# Patient Record
Sex: FEMALE | Race: WHITE | NOT HISPANIC OR LATINO | Employment: OTHER | URBAN - METROPOLITAN AREA
[De-identification: names, ages, dates, MRNs, and addresses within clinical notes are randomized per-mention and may not be internally consistent; named-entity substitution may affect disease eponyms.]

---

## 2017-05-12 ENCOUNTER — ALLSCRIPTS OFFICE VISIT (OUTPATIENT)
Dept: OTHER | Facility: OTHER | Age: 52
End: 2017-05-12

## 2017-08-03 ENCOUNTER — HOSPITAL ENCOUNTER (OUTPATIENT)
Facility: HOSPITAL | Age: 52
Setting detail: OBSERVATION
Discharge: HOME | End: 2017-08-04
Attending: EMERGENCY MEDICINE | Admitting: HOSPITALIST
Payer: COMMERCIAL

## 2017-08-03 ENCOUNTER — APPOINTMENT (EMERGENCY)
Dept: CT IMAGING | Facility: HOSPITAL | Age: 52
End: 2017-08-03
Payer: COMMERCIAL

## 2017-08-03 ENCOUNTER — APPOINTMENT (EMERGENCY)
Dept: RADIOLOGY | Facility: HOSPITAL | Age: 52
End: 2017-08-03
Payer: COMMERCIAL

## 2017-08-03 DIAGNOSIS — N39.0 URINARY TRACT INFECTION: ICD-10-CM

## 2017-08-03 DIAGNOSIS — R11.10 VOMITING: ICD-10-CM

## 2017-08-03 DIAGNOSIS — R50.9 FEVER: ICD-10-CM

## 2017-08-03 DIAGNOSIS — E86.0 DEHYDRATION: ICD-10-CM

## 2017-08-03 DIAGNOSIS — R07.9 CHEST PAIN: Primary | ICD-10-CM

## 2017-08-03 LAB
ALBUMIN SERPL BCP-MCNC: 4.2 G/DL (ref 3.5–5)
ALP SERPL-CCNC: 60 U/L (ref 46–116)
ALT SERPL W P-5'-P-CCNC: 23 U/L (ref 12–78)
ANION GAP SERPL CALCULATED.3IONS-SCNC: 11 MMOL/L (ref 4–13)
APTT PPP: 30 SECONDS (ref 23–35)
AST SERPL W P-5'-P-CCNC: 16 U/L (ref 5–45)
BASOPHILS # BLD AUTO: 0.03 THOUSANDS/ΜL (ref 0–0.1)
BASOPHILS NFR BLD AUTO: 1 % (ref 0–1)
BILIRUB SERPL-MCNC: 0.4 MG/DL (ref 0.2–1)
BUN SERPL-MCNC: 20 MG/DL (ref 5–25)
CALCIUM SERPL-MCNC: 10 MG/DL (ref 8.3–10.1)
CHLORIDE SERPL-SCNC: 93 MMOL/L (ref 100–108)
CK SERPL-CCNC: 30 U/L (ref 26–192)
CO2 SERPL-SCNC: 27 MMOL/L (ref 21–32)
CREAT SERPL-MCNC: 1.15 MG/DL (ref 0.6–1.3)
DEPRECATED D DIMER PPP: <270 NG/ML (FEU) (ref 0–424)
EOSINOPHIL # BLD AUTO: 0.01 THOUSAND/ΜL (ref 0–0.61)
EOSINOPHIL NFR BLD AUTO: 0 % (ref 0–6)
ERYTHROCYTE [DISTWIDTH] IN BLOOD BY AUTOMATED COUNT: 11.8 % (ref 11.6–15.1)
GFR SERPL CREATININE-BSD FRML MDRD: 54 ML/MIN/1.73SQ M
GLUCOSE SERPL-MCNC: 105 MG/DL (ref 65–140)
HCT VFR BLD AUTO: 42.3 % (ref 34.8–46.1)
HGB BLD-MCNC: 14.7 G/DL (ref 11.5–15.4)
INR PPP: 1.02 (ref 0.86–1.16)
LIPASE SERPL-CCNC: 235 U/L (ref 73–393)
LYMPHOCYTES # BLD AUTO: 0.95 THOUSANDS/ΜL (ref 0.6–4.47)
LYMPHOCYTES NFR BLD AUTO: 15 % (ref 14–44)
MCH RBC QN AUTO: 34.5 PG (ref 26.8–34.3)
MCHC RBC AUTO-ENTMCNC: 34.8 G/DL (ref 31.4–37.4)
MCV RBC AUTO: 99 FL (ref 82–98)
MONOCYTES # BLD AUTO: 0.93 THOUSAND/ΜL (ref 0.17–1.22)
MONOCYTES NFR BLD AUTO: 15 % (ref 4–12)
NEUTROPHILS # BLD AUTO: 4.33 THOUSANDS/ΜL (ref 1.85–7.62)
NEUTS SEG NFR BLD AUTO: 69 % (ref 43–75)
PLATELET # BLD AUTO: 213 THOUSANDS/UL (ref 149–390)
PMV BLD AUTO: 10.4 FL (ref 8.9–12.7)
POTASSIUM SERPL-SCNC: 4.1 MMOL/L (ref 3.5–5.3)
PROT SERPL-MCNC: 7.7 G/DL (ref 6.4–8.2)
PROTHROMBIN TIME: 13.7 SECONDS (ref 12.1–14.4)
RBC # BLD AUTO: 4.26 MILLION/UL (ref 3.81–5.12)
SODIUM SERPL-SCNC: 131 MMOL/L (ref 136–145)
TROPONIN I SERPL-MCNC: <0.02 NG/ML
WBC # BLD AUTO: 6.25 THOUSAND/UL (ref 4.31–10.16)

## 2017-08-03 PROCEDURE — 83690 ASSAY OF LIPASE: CPT | Performed by: EMERGENCY MEDICINE

## 2017-08-03 PROCEDURE — 85379 FIBRIN DEGRADATION QUANT: CPT | Performed by: EMERGENCY MEDICINE

## 2017-08-03 PROCEDURE — 84484 ASSAY OF TROPONIN QUANT: CPT | Performed by: EMERGENCY MEDICINE

## 2017-08-03 PROCEDURE — 74177 CT ABD & PELVIS W/CONTRAST: CPT

## 2017-08-03 PROCEDURE — 80053 COMPREHEN METABOLIC PANEL: CPT | Performed by: EMERGENCY MEDICINE

## 2017-08-03 PROCEDURE — 87040 BLOOD CULTURE FOR BACTERIA: CPT | Performed by: EMERGENCY MEDICINE

## 2017-08-03 PROCEDURE — 93005 ELECTROCARDIOGRAM TRACING: CPT | Performed by: EMERGENCY MEDICINE

## 2017-08-03 PROCEDURE — 36415 COLL VENOUS BLD VENIPUNCTURE: CPT | Performed by: EMERGENCY MEDICINE

## 2017-08-03 PROCEDURE — 85610 PROTHROMBIN TIME: CPT | Performed by: EMERGENCY MEDICINE

## 2017-08-03 PROCEDURE — 82550 ASSAY OF CK (CPK): CPT | Performed by: EMERGENCY MEDICINE

## 2017-08-03 PROCEDURE — 85025 COMPLETE CBC W/AUTO DIFF WBC: CPT | Performed by: EMERGENCY MEDICINE

## 2017-08-03 PROCEDURE — 71020 HB CHEST X-RAY 2VW FRONTAL&LATL: CPT

## 2017-08-03 PROCEDURE — 83605 ASSAY OF LACTIC ACID: CPT | Performed by: EMERGENCY MEDICINE

## 2017-08-03 PROCEDURE — 71275 CT ANGIOGRAPHY CHEST: CPT

## 2017-08-03 PROCEDURE — 85730 THROMBOPLASTIN TIME PARTIAL: CPT | Performed by: EMERGENCY MEDICINE

## 2017-08-03 RX ORDER — ONDANSETRON 2 MG/ML
4 INJECTION INTRAMUSCULAR; INTRAVENOUS ONCE
Status: COMPLETED | OUTPATIENT
Start: 2017-08-03 | End: 2017-08-04

## 2017-08-03 RX ORDER — LEVOTHYROXINE SODIUM 88 UG/1
88 TABLET ORAL DAILY
COMMUNITY
End: 2018-03-23 | Stop reason: SDUPTHER

## 2017-08-03 RX ORDER — MOEXIPRIL HCL 15 MG
15 TABLET ORAL
COMMUNITY
End: 2018-03-23 | Stop reason: SDUPTHER

## 2017-08-03 RX ORDER — HYDROXYCHLOROQUINE SULFATE 200 MG/1
TABLET, FILM COATED ORAL
COMMUNITY

## 2017-08-03 RX ORDER — HYDROCODONE BITARTRATE AND ACETAMINOPHEN 5; 325 MG/1; MG/1
1 TABLET ORAL EVERY 6 HOURS PRN
COMMUNITY
End: 2020-05-16 | Stop reason: HOSPADM

## 2017-08-03 RX ORDER — LORAZEPAM 0.5 MG/1
0.5 TABLET ORAL EVERY 8 HOURS PRN
COMMUNITY
End: 2018-10-02

## 2017-08-03 RX ORDER — SODIUM CHLORIDE 9 MG/ML
125 INJECTION, SOLUTION INTRAVENOUS CONTINUOUS
Status: DISCONTINUED | OUTPATIENT
Start: 2017-08-03 | End: 2017-08-04 | Stop reason: HOSPADM

## 2017-08-03 RX ADMIN — IOHEXOL 50 ML: 240 INJECTION, SOLUTION INTRATHECAL; INTRAVASCULAR; INTRAVENOUS; ORAL at 23:45

## 2017-08-04 VITALS
SYSTOLIC BLOOD PRESSURE: 93 MMHG | OXYGEN SATURATION: 97 % | WEIGHT: 112.88 LBS | BODY MASS INDEX: 20.77 KG/M2 | TEMPERATURE: 98.5 F | DIASTOLIC BLOOD PRESSURE: 57 MMHG | HEART RATE: 78 BPM | HEIGHT: 62 IN | RESPIRATION RATE: 18 BRPM

## 2017-08-04 PROBLEM — R11.10 VOMITING: Status: ACTIVE | Noted: 2017-08-04

## 2017-08-04 PROBLEM — R07.9 CHEST PAIN: Status: ACTIVE | Noted: 2017-08-04

## 2017-08-04 PROBLEM — N39.0 URINARY TRACT INFECTION: Status: ACTIVE | Noted: 2017-08-04

## 2017-08-04 LAB
ANION GAP SERPL CALCULATED.3IONS-SCNC: 8 MMOL/L (ref 4–13)
ATRIAL RATE: 94 BPM
BACTERIA UR QL AUTO: ABNORMAL /HPF
BASOPHILS # BLD AUTO: 0.02 THOUSANDS/ΜL (ref 0–0.1)
BASOPHILS NFR BLD AUTO: 0 % (ref 0–1)
BILIRUB UR QL STRIP: NEGATIVE
BUN SERPL-MCNC: 17 MG/DL (ref 5–25)
CALCIUM SERPL-MCNC: 8.1 MG/DL (ref 8.3–10.1)
CHLORIDE SERPL-SCNC: 100 MMOL/L (ref 100–108)
CLARITY UR: CLEAR
CO2 SERPL-SCNC: 25 MMOL/L (ref 21–32)
COLOR UR: YELLOW
CREAT SERPL-MCNC: 0.94 MG/DL (ref 0.6–1.3)
EOSINOPHIL # BLD AUTO: 0.02 THOUSAND/ΜL (ref 0–0.61)
EOSINOPHIL NFR BLD AUTO: 0 % (ref 0–6)
ERYTHROCYTE [DISTWIDTH] IN BLOOD BY AUTOMATED COUNT: 11.8 % (ref 11.6–15.1)
GFR SERPL CREATININE-BSD FRML MDRD: 69 ML/MIN/1.73SQ M
GLUCOSE P FAST SERPL-MCNC: 91 MG/DL (ref 65–99)
GLUCOSE SERPL-MCNC: 91 MG/DL (ref 65–140)
GLUCOSE UR STRIP-MCNC: NEGATIVE MG/DL
HCT VFR BLD AUTO: 35.6 % (ref 34.8–46.1)
HGB BLD-MCNC: 12 G/DL (ref 11.5–15.4)
HGB UR QL STRIP.AUTO: NEGATIVE
KETONES UR STRIP-MCNC: NEGATIVE MG/DL
LACTATE SERPL-SCNC: 0.7 MMOL/L (ref 0.5–2)
LEUKOCYTE ESTERASE UR QL STRIP: ABNORMAL
LYMPHOCYTES # BLD AUTO: 1.35 THOUSANDS/ΜL (ref 0.6–4.47)
LYMPHOCYTES NFR BLD AUTO: 28 % (ref 14–44)
MCH RBC QN AUTO: 34 PG (ref 26.8–34.3)
MCHC RBC AUTO-ENTMCNC: 33.7 G/DL (ref 31.4–37.4)
MCV RBC AUTO: 101 FL (ref 82–98)
MONOCYTES # BLD AUTO: 0.97 THOUSAND/ΜL (ref 0.17–1.22)
MONOCYTES NFR BLD AUTO: 20 % (ref 4–12)
NEUTROPHILS # BLD AUTO: 2.44 THOUSANDS/ΜL (ref 1.85–7.62)
NEUTS SEG NFR BLD AUTO: 52 % (ref 43–75)
NITRITE UR QL STRIP: POSITIVE
NON-SQ EPI CELLS URNS QL MICRO: ABNORMAL /HPF
P AXIS: 57 DEGREES
PH UR STRIP.AUTO: 6 [PH] (ref 4.5–8)
PLATELET # BLD AUTO: 178 THOUSANDS/UL (ref 149–390)
PMV BLD AUTO: 10.5 FL (ref 8.9–12.7)
POTASSIUM SERPL-SCNC: 3.9 MMOL/L (ref 3.5–5.3)
PR INTERVAL: 142 MS
PROT UR STRIP-MCNC: NEGATIVE MG/DL
QRS AXIS: 78 DEGREES
QRSD INTERVAL: 76 MS
QT INTERVAL: 338 MS
QTC INTERVAL: 422 MS
RBC # BLD AUTO: 3.53 MILLION/UL (ref 3.81–5.12)
RBC #/AREA URNS AUTO: ABNORMAL /HPF
SODIUM SERPL-SCNC: 133 MMOL/L (ref 136–145)
SP GR UR STRIP.AUTO: <=1.005 (ref 1–1.03)
T WAVE AXIS: 66 DEGREES
TROPONIN I SERPL-MCNC: <0.02 NG/ML
UROBILINOGEN UR QL STRIP.AUTO: 0.2 E.U./DL
VENTRICULAR RATE: 94 BPM
WBC # BLD AUTO: 4.8 THOUSAND/UL (ref 4.31–10.16)
WBC #/AREA URNS AUTO: ABNORMAL /HPF

## 2017-08-04 PROCEDURE — 96375 TX/PRO/DX INJ NEW DRUG ADDON: CPT

## 2017-08-04 PROCEDURE — C9113 INJ PANTOPRAZOLE SODIUM, VIA: HCPCS | Performed by: PHYSICIAN ASSISTANT

## 2017-08-04 PROCEDURE — 84484 ASSAY OF TROPONIN QUANT: CPT | Performed by: EMERGENCY MEDICINE

## 2017-08-04 PROCEDURE — 81001 URINALYSIS AUTO W/SCOPE: CPT | Performed by: EMERGENCY MEDICINE

## 2017-08-04 PROCEDURE — 85025 COMPLETE CBC W/AUTO DIFF WBC: CPT | Performed by: HOSPITALIST

## 2017-08-04 PROCEDURE — 80048 BASIC METABOLIC PNL TOTAL CA: CPT | Performed by: HOSPITALIST

## 2017-08-04 PROCEDURE — 87086 URINE CULTURE/COLONY COUNT: CPT | Performed by: EMERGENCY MEDICINE

## 2017-08-04 PROCEDURE — 87186 SC STD MICRODIL/AGAR DIL: CPT | Performed by: EMERGENCY MEDICINE

## 2017-08-04 PROCEDURE — 99285 EMERGENCY DEPT VISIT HI MDM: CPT

## 2017-08-04 PROCEDURE — 96374 THER/PROPH/DIAG INJ IV PUSH: CPT

## 2017-08-04 PROCEDURE — 36415 COLL VENOUS BLD VENIPUNCTURE: CPT | Performed by: EMERGENCY MEDICINE

## 2017-08-04 PROCEDURE — 87077 CULTURE AEROBIC IDENTIFY: CPT | Performed by: EMERGENCY MEDICINE

## 2017-08-04 PROCEDURE — 87040 BLOOD CULTURE FOR BACTERIA: CPT | Performed by: EMERGENCY MEDICINE

## 2017-08-04 PROCEDURE — 96361 HYDRATE IV INFUSION ADD-ON: CPT

## 2017-08-04 RX ORDER — HYDROXYCHLOROQUINE SULFATE 200 MG/1
200 TABLET, FILM COATED ORAL 2 TIMES DAILY WITH MEALS
Status: DISCONTINUED | OUTPATIENT
Start: 2017-08-04 | End: 2017-08-04 | Stop reason: HOSPADM

## 2017-08-04 RX ORDER — PANTOPRAZOLE SODIUM 40 MG/1
40 TABLET, DELAYED RELEASE ORAL 2 TIMES DAILY
Qty: 60 TABLET | Refills: 0 | Status: SHIPPED | OUTPATIENT
Start: 2017-08-04 | End: 2018-10-15

## 2017-08-04 RX ORDER — PREDNISONE 1 MG/1
1 TABLET ORAL 2 TIMES DAILY PRN
Status: DISCONTINUED | OUTPATIENT
Start: 2017-08-04 | End: 2017-08-04 | Stop reason: HOSPADM

## 2017-08-04 RX ORDER — ACETAMINOPHEN 325 MG/1
650 TABLET ORAL EVERY 6 HOURS PRN
Status: DISCONTINUED | OUTPATIENT
Start: 2017-08-04 | End: 2017-08-04 | Stop reason: HOSPADM

## 2017-08-04 RX ORDER — NICOTINE 21 MG/24HR
1 PATCH, TRANSDERMAL 24 HOURS TRANSDERMAL DAILY
Status: DISCONTINUED | OUTPATIENT
Start: 2017-08-04 | End: 2017-08-04 | Stop reason: HOSPADM

## 2017-08-04 RX ORDER — SODIUM CHLORIDE 9 MG/ML
125 INJECTION, SOLUTION INTRAVENOUS CONTINUOUS
Status: DISCONTINUED | OUTPATIENT
Start: 2017-08-04 | End: 2017-08-04 | Stop reason: HOSPADM

## 2017-08-04 RX ORDER — CIPROFLOXACIN 500 MG/1
500 TABLET, FILM COATED ORAL EVERY 12 HOURS SCHEDULED
Qty: 12 TABLET | Refills: 0 | Status: SHIPPED | OUTPATIENT
Start: 2017-08-04 | End: 2017-08-10

## 2017-08-04 RX ORDER — PANTOPRAZOLE SODIUM 40 MG/1
40 INJECTION, POWDER, FOR SOLUTION INTRAVENOUS ONCE
Status: COMPLETED | OUTPATIENT
Start: 2017-08-04 | End: 2017-08-04

## 2017-08-04 RX ORDER — ONDANSETRON 2 MG/ML
4 INJECTION INTRAMUSCULAR; INTRAVENOUS EVERY 6 HOURS PRN
Status: DISCONTINUED | OUTPATIENT
Start: 2017-08-04 | End: 2017-08-04 | Stop reason: HOSPADM

## 2017-08-04 RX ORDER — LACTOBACILLUS ACIDOPH-L.BULGARICUS 1 MILLION CELL CHEWABLE TABLET 1MM CELL
1 TABLET,CHEWABLE ORAL
Qty: 21 TABLET | Refills: 0 | Status: SHIPPED | OUTPATIENT
Start: 2017-08-04 | End: 2017-08-04

## 2017-08-04 RX ORDER — MELATONIN
1000 DAILY
COMMUNITY
End: 2018-10-02

## 2017-08-04 RX ORDER — PANTOPRAZOLE SODIUM 40 MG/1
40 TABLET, DELAYED RELEASE ORAL 2 TIMES DAILY
Qty: 60 TABLET | Refills: 0 | Status: SHIPPED | OUTPATIENT
Start: 2017-08-04 | End: 2017-08-04

## 2017-08-04 RX ORDER — NICOTINE 21 MG/24HR
1 PATCH, TRANSDERMAL 24 HOURS TRANSDERMAL DAILY
Qty: 28 PATCH | Refills: 0 | Status: SHIPPED | OUTPATIENT
Start: 2017-08-04 | End: 2018-10-02

## 2017-08-04 RX ORDER — PREDNISONE 1 MG/1
1 TABLET ORAL 2 TIMES DAILY PRN
COMMUNITY
End: 2020-08-03 | Stop reason: SDUPTHER

## 2017-08-04 RX ORDER — LORAZEPAM 0.5 MG/1
0.5 TABLET ORAL EVERY 8 HOURS PRN
Status: DISCONTINUED | OUTPATIENT
Start: 2017-08-04 | End: 2017-08-04 | Stop reason: HOSPADM

## 2017-08-04 RX ORDER — MOEXIPRIL HCL 15 MG
15 TABLET ORAL DAILY
Status: DISCONTINUED | OUTPATIENT
Start: 2017-08-04 | End: 2017-08-04 | Stop reason: HOSPADM

## 2017-08-04 RX ORDER — LEVOTHYROXINE SODIUM 88 UG/1
88 TABLET ORAL
Status: DISCONTINUED | OUTPATIENT
Start: 2017-08-04 | End: 2017-08-04 | Stop reason: HOSPADM

## 2017-08-04 RX ORDER — MELATONIN
1000 DAILY
Status: DISCONTINUED | OUTPATIENT
Start: 2017-08-04 | End: 2017-08-04 | Stop reason: HOSPADM

## 2017-08-04 RX ORDER — LACTOBACILLUS ACIDOPH-L.BULGARICUS 1 MILLION CELL CHEWABLE TABLET 1MM CELL
1 TABLET,CHEWABLE ORAL
Qty: 21 TABLET | Refills: 0 | Status: SHIPPED | OUTPATIENT
Start: 2017-08-04 | End: 2017-08-11

## 2017-08-04 RX ORDER — SENNOSIDES 8.6 MG
1 TABLET ORAL DAILY
Status: DISCONTINUED | OUTPATIENT
Start: 2017-08-04 | End: 2017-08-04 | Stop reason: HOSPADM

## 2017-08-04 RX ORDER — HYDROCODONE BITARTRATE AND ACETAMINOPHEN 5; 325 MG/1; MG/1
1 TABLET ORAL EVERY 6 HOURS PRN
Status: DISCONTINUED | OUTPATIENT
Start: 2017-08-04 | End: 2017-08-04 | Stop reason: HOSPADM

## 2017-08-04 RX ORDER — CIPROFLOXACIN 500 MG/1
500 TABLET, FILM COATED ORAL EVERY 12 HOURS SCHEDULED
Qty: 12 TABLET | Refills: 0 | Status: SHIPPED | OUTPATIENT
Start: 2017-08-04 | End: 2017-08-04

## 2017-08-04 RX ADMIN — HYDROCODONE BITARTRATE AND ACETAMINOPHEN 1 TABLET: 5; 325 TABLET ORAL at 08:08

## 2017-08-04 RX ADMIN — FAMOTIDINE 20 MG: 10 INJECTION, SOLUTION INTRAVENOUS at 00:48

## 2017-08-04 RX ADMIN — ONDANSETRON 4 MG: 2 INJECTION INTRAMUSCULAR; INTRAVENOUS at 00:47

## 2017-08-04 RX ADMIN — IOHEXOL 100 ML: 350 INJECTION, SOLUTION INTRAVENOUS at 01:46

## 2017-08-04 RX ADMIN — SODIUM CHLORIDE 125 ML/HR: 0.9 INJECTION, SOLUTION INTRAVENOUS at 02:51

## 2017-08-04 RX ADMIN — ACETAMINOPHEN 650 MG: 325 TABLET ORAL at 05:35

## 2017-08-04 RX ADMIN — SODIUM CHLORIDE 125 ML/HR: 0.9 INJECTION, SOLUTION INTRAVENOUS at 08:09

## 2017-08-04 RX ADMIN — SODIUM CHLORIDE 125 ML/HR: 0.9 INJECTION, SOLUTION INTRAVENOUS at 04:35

## 2017-08-04 RX ADMIN — SODIUM CHLORIDE 1000 ML: 0.9 INJECTION, SOLUTION INTRAVENOUS at 00:40

## 2017-08-04 RX ADMIN — PANTOPRAZOLE SODIUM 40 MG: 40 INJECTION, POWDER, FOR SOLUTION INTRAVENOUS at 05:34

## 2017-08-04 RX ADMIN — Medication 15 MG: at 09:00

## 2017-08-04 RX ADMIN — CHOLECALCIFEROL TAB 25 MCG (1000 UNIT) 1000 UNITS: 25 TAB at 08:08

## 2017-08-04 RX ADMIN — HYDROXYCHLOROQUINE SULFATE 200 MG: 200 TABLET, FILM COATED ORAL at 08:08

## 2017-08-04 RX ADMIN — CEFTRIAXONE SODIUM 1000 MG: 10 INJECTION, POWDER, FOR SOLUTION INTRAVENOUS at 02:49

## 2017-08-04 RX ADMIN — LEVOTHYROXINE SODIUM 88 MCG: 88 TABLET ORAL at 05:35

## 2017-08-06 LAB — BACTERIA UR CULT: NORMAL

## 2017-08-08 ENCOUNTER — ALLSCRIPTS OFFICE VISIT (OUTPATIENT)
Dept: OTHER | Facility: OTHER | Age: 52
End: 2017-08-08

## 2017-08-08 LAB
BILIRUB UR QL STRIP: NORMAL
CLARITY UR: NORMAL
COLOR UR: YELLOW
GLUCOSE (HISTORICAL): NORMAL
HGB UR QL STRIP.AUTO: NORMAL
KETONES UR STRIP-MCNC: NORMAL MG/DL
LEUKOCYTE ESTERASE UR QL STRIP: NORMAL
NITRITE UR QL STRIP: NORMAL
PH UR STRIP.AUTO: 5 [PH]
PROT UR STRIP-MCNC: NORMAL MG/DL
SP GR UR STRIP.AUTO: 1.01
UROBILINOGEN UR QL STRIP.AUTO: 0.2

## 2017-08-09 LAB
BACTERIA BLD CULT: NORMAL
BACTERIA BLD CULT: NORMAL

## 2017-08-11 ENCOUNTER — GENERIC CONVERSION - ENCOUNTER (OUTPATIENT)
Dept: OTHER | Facility: OTHER | Age: 52
End: 2017-08-11

## 2017-08-11 PROCEDURE — 88305 TISSUE EXAM BY PATHOLOGIST: CPT | Performed by: INTERNAL MEDICINE

## 2017-08-12 ENCOUNTER — LAB REQUISITION (OUTPATIENT)
Dept: LAB | Facility: HOSPITAL | Age: 52
End: 2017-08-12
Payer: COMMERCIAL

## 2017-08-12 DIAGNOSIS — K30 FUNCTIONAL DYSPEPSIA: ICD-10-CM

## 2017-08-12 DIAGNOSIS — R10.9 ABDOMINAL PAIN: ICD-10-CM

## 2017-08-22 ENCOUNTER — GENERIC CONVERSION - ENCOUNTER (OUTPATIENT)
Dept: OTHER | Facility: OTHER | Age: 52
End: 2017-08-22

## 2017-09-01 ENCOUNTER — APPOINTMENT (OUTPATIENT)
Dept: LAB | Facility: CLINIC | Age: 52
End: 2017-09-01
Payer: COMMERCIAL

## 2017-09-01 ENCOUNTER — TRANSCRIBE ORDERS (OUTPATIENT)
Dept: LAB | Facility: CLINIC | Age: 52
End: 2017-09-01

## 2017-09-01 DIAGNOSIS — R19.4 FREQUENT BOWEL MOVEMENTS: ICD-10-CM

## 2017-09-01 DIAGNOSIS — K31.89 DYSPEPSIA AND OTHER SPECIFIED DISORDERS OF FUNCTION OF STOMACH: Primary | ICD-10-CM

## 2017-09-01 DIAGNOSIS — R10.13 DYSPEPSIA AND OTHER SPECIFIED DISORDERS OF FUNCTION OF STOMACH: Primary | ICD-10-CM

## 2017-09-01 DIAGNOSIS — K31.89 DYSPEPSIA AND OTHER SPECIFIED DISORDERS OF FUNCTION OF STOMACH: ICD-10-CM

## 2017-09-01 DIAGNOSIS — R10.13 DYSPEPSIA AND OTHER SPECIFIED DISORDERS OF FUNCTION OF STOMACH: ICD-10-CM

## 2017-09-01 PROCEDURE — 83516 IMMUNOASSAY NONANTIBODY: CPT

## 2017-09-03 LAB
TTG IGA SER-ACNC: <2 U/ML (ref 0–3)
TTG IGG SER-ACNC: <2 U/ML (ref 0–5)

## 2017-09-05 ENCOUNTER — GENERIC CONVERSION - ENCOUNTER (OUTPATIENT)
Dept: OTHER | Facility: OTHER | Age: 52
End: 2017-09-05

## 2017-10-09 ENCOUNTER — GENERIC CONVERSION - ENCOUNTER (OUTPATIENT)
Dept: OTHER | Facility: OTHER | Age: 52
End: 2017-10-09

## 2018-01-13 VITALS
WEIGHT: 119 LBS | HEIGHT: 62 IN | DIASTOLIC BLOOD PRESSURE: 100 MMHG | RESPIRATION RATE: 16 BRPM | SYSTOLIC BLOOD PRESSURE: 140 MMHG | HEART RATE: 78 BPM | TEMPERATURE: 98.7 F | BODY MASS INDEX: 21.9 KG/M2

## 2018-01-14 VITALS
RESPIRATION RATE: 18 BRPM | SYSTOLIC BLOOD PRESSURE: 162 MMHG | WEIGHT: 114.38 LBS | TEMPERATURE: 97.5 F | DIASTOLIC BLOOD PRESSURE: 94 MMHG | HEART RATE: 82 BPM | BODY MASS INDEX: 21.05 KG/M2 | HEIGHT: 62 IN

## 2018-01-16 NOTE — PROGRESS NOTES
Assessment    1  Sinusitis (473 9) (J32 9)    Plan  Sinusitis    · Amoxicillin 875 MG Oral Tablet; TAKE 1 TABLET EVERY 12 HOURS DAILY    Discussion/Summary    Sinusitis  Amoxil 875mg BID for 10 days  patient may use over-the-counter medications as necessary  Patient will call symptoms persist after medication completed  Chief Complaint  Pt is c/o congestion, head pressure, headache, and fever x 3 days  All meds/allergies reviewed with pt  History of Present Illness  HPI: patient's states maximum temperature was 100 5 degrees   She normally smokes one pack of cigarettes per day      Review of Systems    Constitutional: feeling poorly and feeling tired  ENT: as noted in HPI  Cardiovascular: no complaints of slow or fast heart rate, no chest pain, no palpitations, no leg claudication or lower extremity edema  Respiratory: as noted in HPI  Gastrointestinal: no complaints of abdominal pain, no constipation, no nausea or diarrhea, no vomiting, no bloody stools  Genitourinary: no complaints of dysuria, no incontinence, no pelvic pain, no dysmenorrhea, no vaginal discharge or abnormal vaginal bleeding  Active Problems    1  Abdominal pain (789 00) (R10 9)   2  Denied: History of Abnormal Pap Smear Of Cervix   3  Anxiety (300 00) (F41 9)   4  Arthritis (716 90) (M19 90)   5  Backache (724 5) (M54 9)   6  Depression (311) (F32 9)   7  Hypertension (401 9) (I10)   8  Hypothyroidism (244 9) (E03 9)   9  Menopausal symptom (627 2) (N95 1)   10  Nephrolithiasis (592 0) (N20 0)   11  Postoperative visit (V58 49) (Z48 89)   12  Screening for colorectal cancer (V76 51) (Z12 11,Z12 12)   13  Sinusitis (473 9) (J32 9)   14  Systemic lupus erythematosus (710 0) (M32 9)   15  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)   16  Vaginal lesion (623 8) (N89 8)   17  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)    Past Medical History    1  History of Nephrolithiasis (V13 01)   2  History of Oral contraceptive prescribed (V25 01) (Z30 011)    Family History    1  Family history of Breast Cancer (V16 3)   2  Family history of Diabetes Mellitus (V18 0)   3  Family history of Hypertension (V17 49)   4  Family history of Nephrolithiasis    5  Family history of Heart Disease (V17 49)   6  Family history of Hypertension (V17 49)    7  Family history of Diabetes Mellitus (V18 0)   8  Family history of Nephrolithiasis    9  Family history of Breast Cancer (V16 3)   10  Family history of Breast Cancer (V16 3)    Social History    · Alcohol Use (History)   · Socially started 30 years prior  · Current Smoker (305 1)   · 1/2 ppd   · Marital History - Currently     Surgical History    1  Denied: History of Abnormal Pap Smear Of Cervix   2  History of Femur Repair   3  History of Hysterectomy   4  History of Oophorectomy Unilateral Left Side   5  History of Vaginal Surgery    Current Meds   1  ALPRAZolam 0 5 MG Oral Tablet; TAKE ONE (1) TABLET(S) EVERY 8 HOURS   ASNEEDED; Therapy: 33HTY9201 to (Evaluate:33Rxu6681)  Requested for: 42GTO2959; Last   Rx:13Oct2015 Ordered   2  Estradiol 1 MG Oral Tablet; TAKE ONE (1) TABLET(S) DAILY; Therapy: 02Ytv1933 to )  Requested for: 01UPK1135; Last   Rx:04Nov2015 Ordered   3  Levothyroxine Sodium 88 MCG Oral Tablet; TAKE ONE (1) TABLET(S) DAILY; Therapy: 75LBV7304 to (Evaluate:02Apr2016)  Requested for: 26OVC5982; Last   Rx:05Oct2015 Ordered   4  Moexipril HCl - 15 MG Oral Tablet; TAKE ONE (1) TABLET(S) DAILY AS DIRECTED; Therapy: 88YDT1224 to (Wilman Kahn)  Requested for: 65NFI9927; Last   Rx:04Jan2016 Ordered   5  Plaquenil 200 MG Oral Tablet; Therapy: (Recorded:36Knm2245) to Recorded   6  PredniSONE 5 MG Oral Tablet; TAKE AS DIRECTED AS NEEDED; Therapy: 30Cei0009 to Recorded   7  Vicodin TABS; Therapy: (Recorded:63Cws8770) to Recorded    The medication list was reviewed and updated today  Allergies    1   Mobic TABS   2  Sulfa Drugs    Vitals   Recorded: 21Jan2016 10:31AM Recorded: 21Jan2016 10:23AM   Temperature  97 F   Heart Rate  92   Systolic 382 439   Diastolic 88 94   Height  5 ft 2 in   Weight  113 lb 8 0 oz   BMI Calculated  20 76   BSA Calculated  1 5     Physical Exam    Constitutional   General appearance: No acute distress, well appearing and well nourished  Ears, Nose, Mouth, and Throat   External inspection of ears and nose: Normal     Otoscopic examination: Tympanic membranes translucent with normal light reflex  Canals patent without erythema  Nasal mucosa, septum, and turbinates: Abnormal   increased mucosal swelling  Oropharynx: Normal with no erythema, edema, exudate or lesions  Pulmonary   Respiratory effort: No increased work of breathing or signs of respiratory distress  Auscultation of lungs: Clear to auscultation  harsh cough  Cardiovascular   Auscultation of heart: Normal rate and rhythm, normal S1 and S2, without murmurs  Examination of extremities for edema and/or varicosities: Normal     Lymphatic   Palpation of lymph nodes in neck: No lymphadenopathy           Future Appointments    Date/Time Provider Specialty Site   02/10/2016 01:45 PM Troy Ramírez MD Gynecological Oncology [facility]     Signatures   Electronically signed by : QUINTEN Sawyer ; Jan 21 1344  1:02PM EST                       (Author)

## 2018-01-16 NOTE — MISCELLANEOUS
Message  GI Reminder Recall [facility]:   Date: 08/22/2017   Dear Shayy Cannon:     Review of our records shows you are due for the following: EGD  Or records indicate that you are due at this time to have a follow-up examination for a EGD  As you know, these tests are done to prevent cancer, a very common disease in the [place] and responsible for thousands of patient deaths each year  We at [facility] are concerned for your health, and would very much appreciate you getting in touch with us at your earliest convenience  Again, this examination is vital to your proper health maintenance and for the prevention of cancer  We have attempted to reach you and have been unsuccessful  Please contact our office to schedule your procedure  Thank You       Please call the following office to schedule your appointment:   [address], [address], [place], [address] [facility] [phone]  We look forward to hearing from you!      Sincerely,         Signatures   Electronically signed by : Marjorie Hayes, ; Aug 22 2017  4:14PM EST                       (Author)

## 2018-01-17 NOTE — MISCELLANEOUS
Assessment    1  Gastritis (535 50) (K29 70)   2  Anxiety (300 00) (F41 9)    Plan  Abdominal pain, Gastritis    · EGD; Status:Hold For - Scheduling; Requested for:16Ehy6926;    Perform:[facility]; TOV:47EME9908;OEXPJSL; For:Abdominal pain, Gastritis; Ordered By:Dean Banerjee;    Discussion/Summary  Discussion Summary:   Gastritis  Patient hasn't improved her diet to assist with her symptoms  She is at high risk for gastritis or ulcers due to her anxiety as well as her chronic use of prednisone for her arthritis  She was given a prescription to obtain upper endoscopy for further evaluation of her symptoms and she will continue Protonix at this time  Anxiety  Patient given a refill on her alprazolam to use as needed for her chronic anxiety  Chief Complaint  Chief Complaint Free Text Note Form: [place]: Pt was admitted to [facility] from 08/03/2017 through 08/04/2017  Dx: Vomiting, CP, UTi  Spoke with pt who reports she was treated for UTI while in hospital and believes infection is coming back  Pt was offered an appt for today, but was not able to come in because of her work schedule  Denies abdominal pain  N/V/D  Follow up with pcp scheduled for tomorrow, 08/08/2017 @ 4:30pm       History of Present Illness  TCM Communication [facility]: The patient is being contacted for follow-up after hospitalization and 08/07/2017  She was hospitalized at [facility]  The date of admission: 08/03/2017, date of discharge: 08/04/2017  Diagnosis: vomiting, CP, UTI  She was discharged to home  Medications were not reviewed today  Counseling was provided to the patient  Topics counseled included importance of compliance with treatment  Communication performed and completed by Nandini Campos       HPI: I reviewed the patient's [place] notes as well as discharge summary from recent hospital stay  She was diagnosed with with a urinary tract infection confirmed by culture   She denies any urinary symptoms at this time  She does continue to experience occasional abdominal discomfort which has improved since her discharge  She was prescribed Protonix to take once daily  She has significantly decreased her smoking and alcohol intake  She does not consume much caffeine or spicy foods  She does admit to significant job stress which she feels have been contributing to her overall symptoms      Review of Systems  Complete-Female:   Constitutional: feeling tired  Eyes: No complaints of eye pain, no red eyes, no eyesight problems, no discharge, no dry eyes, no itching of eyes  ENT: no complaints of earache, no loss of hearing, no nose bleeds, no nasal discharge, no sore throat, no hoarseness  Cardiovascular: No complaints of slow heart rate, no fast heart rate, no chest pain, no palpitations, no leg claudication, no lower extremity edema  Respiratory: No complaints of shortness of breath, no wheezing, no cough, no SOB on exertion, no orthopnea, no PND  Gastrointestinal: as noted in HPI  Genitourinary: No complaints of dysuria, no incontinence, no pelvic pain, no dysmenorrhea, no vaginal discharge or bleeding  Musculoskeletal: as noted in HPI  Integumentary: No complaints of skin rash or lesions, no itching, no skin wounds, no breast pain or lump  Neurological: No complaints of headache, no confusion, no convulsions, no numbness, no dizziness or fainting, no tingling, no limb weakness, no difficulty walking  Psychiatric: as noted in HPI  Active Problems    1  Abdominal pain (789 00) (R10 9)   2  Denied: History of Abnormal Pap Smear Of Cervix   3  Anxiety (300 00) (F41 9)   4  Arthritis (716 90) (M19 90)   5  Backache (724 5) (M54 9)   6  Depression (311) (F32 9)   7  Hypertension (401 9) (I10)   8  Hypothyroidism (244 9) (E03 9)   9  Intermittent vomiting (787 03) (R11 10)   10  Menopausal symptom (627 2) (N95 1)   11  Nephrolithiasis (592 0) (N20 0)   12  Postoperative visit (V58 49) (Z48 89)   13   Screening for colorectal cancer (V76 51) (Z12 11,Z12 12)   14  Sinusitis (473 9) (J32 9)   15  Systemic lupus erythematosus (710 0) (M32 9)   16  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)   17  Vaginal lesion (623 8) (N89 8)   18  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)   19  Vitamin D deficiency (268 9) (E55 9)    Past Medical History    1  History of Nephrolithiasis (V13 01)   2  History of Oral contraceptive prescribed (V25 01) (Z30 011)    Surgical History    1  Denied: History of Abnormal Pap Smear Of Cervix   2  History of Femur Repair   3  History of Hysterectomy   4  History of Oophorectomy Unilateral Left Side   5  History of Vaginal Surgery    Family History  Mother    1  Family history of Breast Cancer (V16 3)   2  Family history of Diabetes Mellitus (V18 0)   3  Family history of Hypertension (V17 49)   4  Family history of Nephrolithiasis  Father    5  Family history of Heart Disease (V17 49)   6  Family history of Hypertension (V17 49)  Brother    7  Family history of Diabetes Mellitus (V18 0)   8  Family history of Nephrolithiasis  Maternal Aunt    9  Family history of Breast Cancer (V16 3)   10  Family history of Breast Cancer (V16 3)    Social History    · Alcohol Use (History)   · Current Smoker (305 1)   · Marital History - Currently     Current Meds   1  ALPRAZolam 0 5 MG Oral Tablet; TAKE ONE (1) TABLET(S) EVERY 8 HOURS   ASNEEDED; Therapy: 77JHA9511 to (Last Rx:45Bhb5191)  Requested for: 74Bip9761 Ordered   2  AzaTHIOprine 50 MG Oral Tablet; TAKE 1 TABLET DAILY; Therapy: 71ACZ6918 to Recorded   3  Cefuroxime Axetil 500 MG Oral Tablet; one po bid; Therapy: 53QTL7091 to (Last Rx:58Usx1551)  Requested for: 57GZL5722 Ordered   4  Estradiol 1 MG Oral Tablet; TAKE ONE (1) TABLET(S) DAILY; Therapy: 10Ggn0472 to (Evaluate:29Oct2016)  Requested for: 00IST5972; Last   Rx:04Nov2015; Status: ACTIVE - Renewal Denied Ordered   5   Levothyroxine Sodium 88 MCG Oral Tablet; TAKE ONE (1) TABLET(S) DAILY; Therapy: 68RZT8278 to (Evaluate:14Yhj2079)  Requested for: 01Apr2017; Last   Rx:01Apr2017 Ordered   6  Moexipril HCl - 15 MG Oral Tablet; TAKE ONE TABLET BY MOUTH EVERY DAY AS   DIRECTED; Therapy: 06GQT3959 to (Evaluate:18Emt4421)  Requested for: 67ZSQ9628; Last   Rx:31Mar2017 Ordered   7  Plaquenil 200 MG Oral Tablet; Therapy: (Recorded:57Gec9683) to Recorded   8  PredniSONE 5 MG Oral Tablet; TAKE AS DIRECTED AS NEEDED; Therapy: 93Azb2023 to Recorded   9  Vicodin TABS; Therapy: (Recorded:75Xjr2326) to Recorded   10  Vitamin D (Ergocalciferol) 88921 UNIT Oral Capsule; TAKE 1 CAPSULE WEEKLY; Therapy: 54EIG6421 to Recorded  Medication List Reviewed: The medication list was reviewed and updated today  Allergies    1  Mobic TABS   2  Sulfa Drugs   3  Methotrexate Derivatives    Vitals  Signs   Recorded: 08Aug2017 04:44PM   Temperature: 97 5 F  Heart Rate: 82  Respiration: 18  Systolic: 585  Diastolic: 94  Height: 5 ft 2 in  Weight: 114 lb 6 oz  BMI Calculated: 20 92  BSA Calculated: 1 51    Physical Exam    Constitutional   General appearance: No acute distress, well appearing and well nourished  Pulmonary   Respiratory effort: No increased work of breathing or signs of respiratory distress  Auscultation of lungs: Clear to auscultation  Cardiovascular   Auscultation of heart: Normal rate and rhythm, normal S1 and S2, without murmurs  Examination of extremities for edema and/or varicosities: Normal     Carotid pulses: Normal     Abdomen   Abdomen: Abnormal   Her abdomen is soft with positive bowel sounds  There is generalized mild tenderness to palpation  No rebound or guarding noted  No masses palpated  Liver and spleen: No hepatomegaly or splenomegaly  Lymphatic   Palpation of lymph nodes in neck: No lymphadenopathy  Health Management  Screening for colorectal cancer   COLONOSCOPY; every 10 years; Next Due: 08Sep2015;  Overdue    Future Appointments    Date/Time Provider Specialty Site   11/22/2017 02:45 PM Lucille Brito, [facility] [address]     Signatures   Electronically signed by : QUINTEN Waters ; Aug  9 1545  6:43AM EST                       (Author)

## 2018-01-17 NOTE — MISCELLANEOUS
Message   PT EXCUSED FROM WORK 1/21/16   Return to work or school:   Angela Parson is under my professional care   She was seen in my office on 1/21/16             Signatures   Electronically signed by : Geo Salvador, ; Jan 21 2016 10:47AM EST                       (Author)

## 2018-03-12 DIAGNOSIS — F41.9 ANXIETY: Primary | ICD-10-CM

## 2018-03-12 RX ORDER — ALPRAZOLAM 0.5 MG/1
0.5 TABLET ORAL EVERY 8 HOURS PRN
Qty: 30 TABLET | Refills: 3 | Status: SHIPPED | OUTPATIENT
Start: 2018-03-12 | End: 2018-05-01 | Stop reason: SDUPTHER

## 2018-03-12 RX ORDER — ALPRAZOLAM 0.5 MG/1
TABLET ORAL
COMMUNITY
Start: 2014-06-27 | End: 2018-03-12 | Stop reason: SDUPTHER

## 2018-03-23 DIAGNOSIS — E03.9 HYPOTHYROIDISM, UNSPECIFIED TYPE: ICD-10-CM

## 2018-03-23 DIAGNOSIS — I10 ESSENTIAL HYPERTENSION: Primary | ICD-10-CM

## 2018-03-23 RX ORDER — LEVOTHYROXINE SODIUM 88 UG/1
TABLET ORAL
Qty: 30 TABLET | Refills: 5 | Status: SHIPPED | OUTPATIENT
Start: 2018-03-23 | End: 2018-09-14 | Stop reason: SDUPTHER

## 2018-03-23 RX ORDER — MOEXIPRIL HCL 15 MG
TABLET ORAL
Qty: 30 TABLET | Refills: 5 | Status: SHIPPED | OUTPATIENT
Start: 2018-03-23 | End: 2018-09-14 | Stop reason: SDUPTHER

## 2018-05-01 DIAGNOSIS — F41.9 ANXIETY: ICD-10-CM

## 2018-05-01 RX ORDER — ALPRAZOLAM 0.5 MG/1
TABLET ORAL
Qty: 30 TABLET | Refills: 1 | Status: SHIPPED | OUTPATIENT
Start: 2018-05-01 | End: 2018-05-29 | Stop reason: SDUPTHER

## 2018-05-29 DIAGNOSIS — F41.9 ANXIETY: ICD-10-CM

## 2018-05-29 RX ORDER — ALPRAZOLAM 0.5 MG/1
TABLET ORAL
Qty: 30 TABLET | Refills: 1 | Status: SHIPPED | OUTPATIENT
Start: 2018-05-29 | End: 2018-06-21 | Stop reason: SDUPTHER

## 2018-06-21 DIAGNOSIS — F41.9 ANXIETY: ICD-10-CM

## 2018-06-21 RX ORDER — ALPRAZOLAM 0.5 MG/1
TABLET ORAL
Qty: 30 TABLET | Refills: 1 | Status: SHIPPED | OUTPATIENT
Start: 2018-06-21 | End: 2018-07-23 | Stop reason: SDUPTHER

## 2018-07-23 DIAGNOSIS — F41.9 ANXIETY: ICD-10-CM

## 2018-07-23 RX ORDER — ALPRAZOLAM 0.5 MG/1
TABLET ORAL
Qty: 30 TABLET | Refills: 1 | Status: SHIPPED | OUTPATIENT
Start: 2018-07-23 | End: 2018-08-20 | Stop reason: SDUPTHER

## 2018-08-02 ENCOUNTER — TRANSCRIBE ORDERS (OUTPATIENT)
Dept: ADMINISTRATIVE | Facility: HOSPITAL | Age: 53
End: 2018-08-02

## 2018-08-02 DIAGNOSIS — M32.10 LUPOID NEPHRITIS (HCC): Primary | ICD-10-CM

## 2018-08-02 DIAGNOSIS — N08 LUPOID NEPHRITIS (HCC): Primary | ICD-10-CM

## 2018-08-20 DIAGNOSIS — F41.9 ANXIETY: ICD-10-CM

## 2018-08-20 RX ORDER — ALPRAZOLAM 0.5 MG/1
TABLET ORAL
Qty: 30 TABLET | Refills: 1 | Status: SHIPPED | OUTPATIENT
Start: 2018-08-20 | End: 2018-09-13 | Stop reason: SDUPTHER

## 2018-09-13 DIAGNOSIS — F41.9 ANXIETY: ICD-10-CM

## 2018-09-13 RX ORDER — ALPRAZOLAM 0.5 MG/1
TABLET ORAL
Qty: 30 TABLET | Refills: 1 | Status: SHIPPED | OUTPATIENT
Start: 2018-09-13 | End: 2018-10-02 | Stop reason: SDUPTHER

## 2018-09-14 DIAGNOSIS — I10 ESSENTIAL HYPERTENSION: ICD-10-CM

## 2018-09-14 DIAGNOSIS — E03.9 HYPOTHYROIDISM, UNSPECIFIED TYPE: ICD-10-CM

## 2018-09-14 RX ORDER — LEVOTHYROXINE SODIUM 88 UG/1
TABLET ORAL
Qty: 30 TABLET | Refills: 5 | Status: SHIPPED | OUTPATIENT
Start: 2018-09-14 | End: 2019-03-08 | Stop reason: SDUPTHER

## 2018-09-14 RX ORDER — MOEXIPRIL HCL 15 MG
TABLET ORAL
Qty: 30 TABLET | Refills: 5 | Status: SHIPPED | OUTPATIENT
Start: 2018-09-14 | End: 2019-03-08 | Stop reason: SDUPTHER

## 2018-10-02 ENCOUNTER — OFFICE VISIT (OUTPATIENT)
Dept: FAMILY MEDICINE CLINIC | Facility: CLINIC | Age: 53
End: 2018-10-02
Payer: COMMERCIAL

## 2018-10-02 VITALS
RESPIRATION RATE: 16 BRPM | SYSTOLIC BLOOD PRESSURE: 142 MMHG | WEIGHT: 103.2 LBS | DIASTOLIC BLOOD PRESSURE: 80 MMHG | TEMPERATURE: 97.6 F | BODY MASS INDEX: 18.29 KG/M2 | HEART RATE: 96 BPM | HEIGHT: 63 IN

## 2018-10-02 DIAGNOSIS — F43.10 POSTTRAUMATIC STRESS DISORDER: Primary | ICD-10-CM

## 2018-10-02 DIAGNOSIS — F41.9 ANXIETY: ICD-10-CM

## 2018-10-02 PROCEDURE — 99214 OFFICE O/P EST MOD 30 MIN: CPT | Performed by: FAMILY MEDICINE

## 2018-10-02 RX ORDER — ALPRAZOLAM 1 MG/1
1 TABLET ORAL 3 TIMES DAILY PRN
Qty: 90 TABLET | Refills: 3 | Status: SHIPPED | OUTPATIENT
Start: 2018-10-02 | End: 2019-01-18 | Stop reason: SDUPTHER

## 2018-10-02 RX ORDER — CHOLECALCIFEROL (VITAMIN D3) 1250 MCG
50000 CAPSULE ORAL WEEKLY
COMMUNITY
End: 2021-03-19 | Stop reason: HOSPADM

## 2018-10-02 RX ORDER — ALPRAZOLAM 1 MG/1
0.5 TABLET ORAL 3 TIMES DAILY PRN
Qty: 90 TABLET | Refills: 3 | Status: SHIPPED | OUTPATIENT
Start: 2018-10-02 | End: 2018-10-02 | Stop reason: SDUPTHER

## 2018-10-02 NOTE — ASSESSMENT & PLAN NOTE
Posttraumatic stress disorder  I had a long discussion with the patient regarding her feelings and treatment options  We will increase her Xanax to 1 mg t i d  p r n  she was given referral to [address] [place] [address] to initiate counseling therapy  She was given a note to be out of work this week  We also discussed possibility of needing FMLA paperwork or possibly short-term disability should symptoms persist without improvement  She I did recommend starting a daily maintenance medication such as Zoloft for maintenance treatment of her condition
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
## 2018-10-15 ENCOUNTER — OFFICE VISIT (OUTPATIENT)
Dept: FAMILY MEDICINE CLINIC | Facility: CLINIC | Age: 53
End: 2018-10-15
Payer: COMMERCIAL

## 2018-10-15 VITALS
SYSTOLIC BLOOD PRESSURE: 136 MMHG | HEART RATE: 88 BPM | BODY MASS INDEX: 18.71 KG/M2 | RESPIRATION RATE: 14 BRPM | DIASTOLIC BLOOD PRESSURE: 92 MMHG | WEIGHT: 105.6 LBS | TEMPERATURE: 96.7 F | HEIGHT: 63 IN

## 2018-10-15 DIAGNOSIS — F41.9 ANXIETY: ICD-10-CM

## 2018-10-15 DIAGNOSIS — Z28.21 REFUSED INFLUENZA VACCINE: Primary | ICD-10-CM

## 2018-10-15 PROBLEM — K20.90 ESOPHAGITIS: Status: ACTIVE | Noted: 2017-08-14

## 2018-10-15 PROBLEM — K90.0 ADULT CELIAC DISEASE: Status: ACTIVE | Noted: 2017-08-14

## 2018-10-15 PROBLEM — E55.9 VITAMIN D DEFICIENCY: Status: ACTIVE | Noted: 2017-05-12

## 2018-10-15 PROCEDURE — 99213 OFFICE O/P EST LOW 20 MIN: CPT | Performed by: FAMILY MEDICINE

## 2018-10-15 NOTE — ASSESSMENT & PLAN NOTE
Anxiety  Patient still experiencing significant symptoms from posttraumatic stress disorder  We had a long discussion regarding maintenance medication and decided to start Zoloft 50 mg daily  She may use Xanax t i aravind Bhatia She was given referral to [place] [address] to initiate personal counseling    We discussed the possibility of requiring to take short-term disability time off from work
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
## 2018-10-25 ENCOUNTER — TELEPHONE (OUTPATIENT)
Dept: FAMILY MEDICINE CLINIC | Facility: CLINIC | Age: 53
End: 2018-10-25

## 2018-11-06 ENCOUNTER — TELEPHONE (OUTPATIENT)
Dept: FAMILY MEDICINE CLINIC | Facility: CLINIC | Age: 53
End: 2018-11-06

## 2018-11-06 DIAGNOSIS — F41.9 ANXIETY: ICD-10-CM

## 2018-11-06 RX ORDER — SERTRALINE HYDROCHLORIDE 100 MG/1
100 TABLET, FILM COATED ORAL DAILY
Qty: 30 TABLET | Refills: 5 | Status: SHIPPED | OUTPATIENT
Start: 2018-11-06 | End: 2019-04-16 | Stop reason: SDUPTHER

## 2018-11-06 NOTE — TELEPHONE ENCOUNTER
Jelly Solorio is patients therapist and would like to have patients Sertraline 50 mg increased to 75 x 1 wk then to 100 mg if you are ok with that, for breakthrough anxiety  She also would like to extend her leave to January    Please call if you would need anything else

## 2018-11-07 NOTE — TELEPHONE ENCOUNTER
Spoke w/ Tianna Santillan and gave MD instructions for pt   Tianna Santillan will fax paperwork over to extend pt's leave

## 2018-11-07 NOTE — TELEPHONE ENCOUNTER
I agree with this suggestion   She can take 1 and 1/2 of her 50mg tabs for 1 week and I will send in for 100mg tab to take thereafter

## 2018-11-12 ENCOUNTER — OFFICE VISIT (OUTPATIENT)
Dept: FAMILY MEDICINE CLINIC | Facility: CLINIC | Age: 53
End: 2018-11-12
Payer: COMMERCIAL

## 2018-11-12 VITALS
SYSTOLIC BLOOD PRESSURE: 108 MMHG | WEIGHT: 101.2 LBS | HEART RATE: 80 BPM | RESPIRATION RATE: 14 BRPM | HEIGHT: 63 IN | TEMPERATURE: 96.9 F | BODY MASS INDEX: 17.93 KG/M2 | DIASTOLIC BLOOD PRESSURE: 82 MMHG

## 2018-11-12 DIAGNOSIS — F41.9 ANXIETY: Primary | ICD-10-CM

## 2018-11-12 PROCEDURE — 3008F BODY MASS INDEX DOCD: CPT | Performed by: FAMILY MEDICINE

## 2018-11-12 PROCEDURE — 99213 OFFICE O/P EST LOW 20 MIN: CPT | Performed by: FAMILY MEDICINE

## 2018-11-12 NOTE — ASSESSMENT & PLAN NOTE
Anxiety  Patient will continue titrating her Zoloft up to 100 mg once daily  She will continue see her counselor twice a week    Patient will have follow-up appointment next month

## 2018-11-12 NOTE — PROGRESS NOTES
FAMILY PRACTICE OFFICE VISIT       NAME: Keisha Davey  AGE: 52 y o  SEX: female       : 1965        MRN: [id number]    DATE: 2018  TIME: 12:02 PM    Assessment and Plan     Problem List Items Addressed This Visit     Anxiety - Primary     Anxiety  Patient will continue titrating her Zoloft up to 100 mg once daily  She will continue see her counselor twice a week  Patient will have follow-up appointment next month                 There are no Patient Instructions on file for this visit  Chief Complaint     Chief Complaint   Patient presents with    Follow-up     Patient is here for a follow-up  History of Present Illness     Patient continues to see her counselor twice a week for symptoms related to PTSD  She is also titrating her Zoloft as instructed and tomorrow will start 100 mg tablet once daily  She is making slow progress with her symptoms  She does have supportive  as well as an [employer] that is supportive  Currently her symptoms are still significant enough that she does not feel comfortable performing her previous job duties  She is currently on short-term disability  Review of Systems   Review of Systems   Constitutional: Positive for fatigue  Respiratory: Negative  Cardiovascular: Negative  Gastrointestinal: Negative  Musculoskeletal:        Chronic intermittent arthralgias from her autoimmune disease   Psychiatric/Behavioral: Positive for confusion, decreased concentration, dysphoric mood and sleep disturbance  Negative for suicidal ideas  The patient is nervous/anxious          Active Problem List     Patient Active Problem List   Diagnosis    Vomiting    Chest pain    Urinary tract infection    Hypertension    Systemic lupus erythematosus ([address] )    Hypothyroidism    Posttraumatic stress disorder    Adult celiac disease    Anxiety    Depression    Esophagitis    Nephrolithiasis    Vitamin D deficiency       Past Medical History:  Past Medical History:   Diagnosis Date    Disease of thyroid gland     Hypertension     Lupus     Nephrolithiasis        Past Surgical History:  Past Surgical History:   Procedure Laterality Date    FEMUR FRACTURE SURGERY      HYSTERECTOMY      LEFT OOPHORECTOMY      due to torsion     VAGINA SURGERY         Family History:  Family History   Problem Relation Age of Onset   [employer] Breast cancer Mother     Diabetes Mother     Hypertension Mother     Nephrolithiasis Mother     Heart disease Father     Hypertension Father     Diabetes Brother     Nephrolithiasis Brother     Breast cancer Maternal Aunt        Social History:  Social History     Social History    Marital status: /Civil Union     Spouse name: N/A    Number of children: N/A    Years of education: N/A     Occupational History    Not on file  Social History Main Topics    Smoking status: Current Every Day Smoker     Packs/day: 1 50    Smokeless tobacco: Never Used      Comment:  5 ppd per [employer]    Alcohol use 12 6 oz/week     21 Cans of beer per week      Comment: 3 beers day, socially started 30 yrs prior     Drug use: No    Sexual activity: Not on file     Other Topics Concern    Not on file     Social History Narrative    No narrative on file       Objective     Vitals:    11/12/18 0803   BP: 108/82   Pulse: 80   Resp: 14   Temp: (!) 96 9 °F (36 1 °C)     Wt Readings from Last 3 Encounters:   11/12/18 45 9 kg (101 lb 3 2 oz)   10/15/18 47 9 kg (105 lb 9 6 oz)   10/02/18 46 8 kg (103 lb 3 2 oz)       Physical Exam   Constitutional: No distress  Psychiatric:   Patient did appear more comfortable that her previous office visits where she exhibited visible tremors and crackling voice and crying spells  She still appeared  uncomfortably anxious but overall improved         Pertinent Laboratory/Diagnostic Studies:  Lab Results   Component Value Date    GLUCOSE 97 04/22/2015    BUN 17 08/04/2017 CREATININE 0 94 08/04/2017    CALCIUM 8 1 (L) 08/04/2017     (L) 04/22/2015    K 3 9 08/04/2017    CO2 25 08/04/2017     08/04/2017     Lab Results   Component Value Date    ALT 23 08/03/2017    AST 16 08/03/2017    ALKPHOS 60 08/03/2017    BILITOT 0 2 04/22/2015       Lab Results   Component Value Date    WBC 4 80 08/04/2017    HGB 12 0 08/04/2017    HCT 35 6 08/04/2017     (H) 08/04/2017     08/04/2017       No results found for: TSH    No results found for: CHOL  No results found for: TRIG  No results found for: HDL  No results found for: LDLCALC  No results found for: HGBA1C    Results for orders placed or performed in visit on 09/01/17   Tissue Transglutaminase, IgG,IgA   Result Value Ref Range    TISSUE TRANSGLUTAMINASE IGA <2 0 - 3 U/mL    Tissue Transglut Ab IGG <2 0 - 5 U/mL       No orders of the defined types were placed in this encounter        ALLERGIES:  Allergies   Allergen Reactions    Mobic [Meloxicam] Eye Swelling     Reaction Date: 12Aug2011;     Methotrexate Rash    Sulfa Antibiotics Rash       Current Medications     Current Outpatient Prescriptions   Medication Sig Dispense Refill    ALPRAZolam (XANAX) 1 mg tablet Take 1 tablet (1 mg total) by mouth 3 (three) times a day as needed for anxiety 90 tablet 3    Cholecalciferol (VITAMIN D3) 26465 units CAPS Take 50,000 Units by mouth once a week      HYDROcodone-acetaminophen (NORCO) 5-325 mg per tablet Take 1 tablet by mouth every 6 (six) hours as needed for pain      hydroxychloroquine (PLAQUENIL) 200 mg tablet Take 200 mg by mouth 2 (two) times a day with meals      levothyroxine 88 mcg tablet TAKE ONE TABLET BY MOUTH EVERY DAY 30 tablet 5    moexipril (UNIVASC) 15 MG tablet TAKE ONE TABLET BY MOUTH EVERY DAY AS DIRECTED 30 tablet 5    Nutritional Supplements (OSTEOPOROSIS SUPPORT PO) Take by mouth      predniSONE 1 mg tablet Take 1 mg by mouth 2 (two) times a day as needed (Lupus exacerbations)      sertraline (ZOLOFT) 100 mg tablet Take 1 tablet (100 mg total) by mouth daily 30 tablet 5     No current facility-administered medications for this visit  Health Maintenance     Health Maintenance   Topic Date Due    Pneumococcal PPSV23 Medium Risk Adult (1 of 1 - PPSV23) 08/29/1982    DTaP,Tdap,and Td Vaccines (1 - Tdap) 08/29/1984    INFLUENZA VACCINE  07/01/2018    CRC Screening: Colonoscopy  10/09/2027     There is no immunization history for the selected administration types on file for this patient      Real Valencia MD

## 2018-12-28 ENCOUNTER — OFFICE VISIT (OUTPATIENT)
Dept: FAMILY MEDICINE CLINIC | Facility: CLINIC | Age: 53
End: 2018-12-28
Payer: COMMERCIAL

## 2018-12-28 VITALS
RESPIRATION RATE: 16 BRPM | HEIGHT: 63 IN | SYSTOLIC BLOOD PRESSURE: 110 MMHG | WEIGHT: 107.4 LBS | HEART RATE: 82 BPM | BODY MASS INDEX: 19.03 KG/M2 | DIASTOLIC BLOOD PRESSURE: 80 MMHG | TEMPERATURE: 97.9 F

## 2018-12-28 DIAGNOSIS — F43.10 POSTTRAUMATIC STRESS DISORDER: Primary | ICD-10-CM

## 2018-12-28 PROCEDURE — 99213 OFFICE O/P EST LOW 20 MIN: CPT | Performed by: FAMILY MEDICINE

## 2018-12-31 NOTE — PROGRESS NOTES
FAMILY PRACTICE OFFICE VISIT       NAME: Stephen Davey  AGE: 52 y o  SEX: female       : 1965        MRN: [id number]    DATE: 2018  TIME: 7:03 AM    Assessment and Plan     Problem List Items Addressed This Visit     Posttraumatic stress disorder - Primary     PTSD  Pt will increase her Zoloft to 125mg daily  She will continue with her therapy and have f/u ov in 4-5 weeks  At this time she is unable to perform her usual job duties and will be out of work at least until next ov  There are no Patient Instructions on file for this visit  Chief Complaint     Chief Complaint   Patient presents with    Follow-up     Patient is here for a follow up visit  History of Present Illness     Pt continues to see her counselor on a weekily basis  She is making little progress in her sx's of anxiety, panic attacks, agoraphobia, anhedonia, and depression sx's  Her counselor recommended increasing her Zoloft to 125mg daily  She cannot foresee returning to work at this time due to her sx's  Review of Systems   Review of Systems   Constitutional: Positive for fatigue  HENT: Negative  Respiratory: Negative  Cardiovascular: Negative  Gastrointestinal: Negative  Musculoskeletal: Positive for arthralgias  Skin: Negative  Neurological: Negative  Psychiatric/Behavioral: Positive for decreased concentration, dysphoric mood and sleep disturbance  Negative for suicidal ideas  The patient is nervous/anxious          Active Problem List     Patient Active Problem List   Diagnosis    Vomiting    Chest pain    Urinary tract infection    Hypertension    Systemic lupus erythematosus ([address] )    Hypothyroidism    Posttraumatic stress disorder    Adult celiac disease    Anxiety    Depression    Esophagitis    Nephrolithiasis    Vitamin D deficiency       Past Medical History:  Past Medical History:   Diagnosis Date    Disease of thyroid gland     Hypertension     Lupus     Nephrolithiasis        Past Surgical History:  Past Surgical History:   Procedure Laterality Date    FEMUR FRACTURE SURGERY      HYSTERECTOMY      LEFT OOPHORECTOMY      due to torsion     VAGINA SURGERY         Family History:  Family History   Problem Relation Age of Onset   Guero Belcher Breast cancer Mother     Diabetes Mother     Hypertension Mother     Nephrolithiasis Mother     Heart disease Father     Hypertension Father     Diabetes Brother     Nephrolithiasis Brother     Breast cancer Maternal Aunt        Social History:  Social History     Social History    Marital status: /Civil Union     Spouse name: N/A    Number of children: N/A    Years of education: N/A     Occupational History    Not on file  Social History Main Topics    Smoking status: Current Every Day Smoker     Packs/day: 1 50    Smokeless tobacco: Never Used      Comment:  5 ppd per [employer]    Alcohol use 12 6 oz/week     21 Cans of beer per week      Comment: 3 beers day, socially started 30 yrs prior     Drug use: No    Sexual activity: Not on file     Other Topics Concern    Not on file     Social History Narrative    No narrative on file       Objective     Vitals:    12/28/18 1259   BP: 110/80   Pulse: 82   Resp: 16   Temp: 97 9 °F (36 6 °C)     Wt Readings from Last 3 Encounters:   12/28/18 48 7 kg (107 lb 6 4 oz)   11/12/18 45 9 kg (101 lb 3 2 oz)   10/15/18 47 9 kg (105 lb 9 6 oz)       Physical Exam   Constitutional: No distress  Psychiatric:   Pt in NAD but appeared somewhat anxious in describing her sx's  She had mild trembling of hands and cracking voice         Pertinent Laboratory/Diagnostic Studies:  Lab Results   Component Value Date    GLUCOSE 97 04/22/2015    BUN 17 08/04/2017    CREATININE 0 94 08/04/2017    CALCIUM 8 1 (L) 08/04/2017     (L) 04/22/2015    K 3 9 08/04/2017    CO2 25 08/04/2017     08/04/2017     Lab Results   Component Value Date    ALT 23 08/03/2017    AST 16 08/03/2017    ALKPHOS 60 08/03/2017    BILITOT 0 2 04/22/2015       Lab Results   Component Value Date    WBC 4 80 08/04/2017    HGB 12 0 08/04/2017    HCT 35 6 08/04/2017     (H) 08/04/2017     08/04/2017       No results found for: TSH    No results found for: CHOL  No results found for: TRIG  No results found for: HDL  No results found for: LDLCALC  No results found for: HGBA1C    Results for orders placed or performed in visit on 09/01/17   Tissue Transglutaminase, IgG,IgA   Result Value Ref Range    TISSUE TRANSGLUTAMINASE IGA <2 0 - 3 U/mL    Tissue Transglut Ab IGG <2 0 - 5 U/mL       No orders of the defined types were placed in this encounter  ALLERGIES:  Allergies   Allergen Reactions    Mobic [Meloxicam] Eye Swelling     Reaction Date: 12Aug2011;     Methotrexate Rash    Sulfa Antibiotics Rash       Current Medications     Current Outpatient Prescriptions   Medication Sig Dispense Refill    ALPRAZolam (XANAX) 1 mg tablet Take 1 tablet (1 mg total) by mouth 3 (three) times a day as needed for anxiety 90 tablet 3    Cholecalciferol (VITAMIN D3) 17359 units CAPS Take 50,000 Units by mouth once a week      HYDROcodone-acetaminophen (NORCO) 5-325 mg per tablet Take 1 tablet by mouth every 6 (six) hours as needed for pain      hydroxychloroquine (PLAQUENIL) 200 mg tablet Take 200 mg by mouth 2 (two) times a day with meals      levothyroxine 88 mcg tablet TAKE ONE TABLET BY MOUTH EVERY DAY 30 tablet 5    moexipril (UNIVASC) 15 MG tablet TAKE ONE TABLET BY MOUTH EVERY DAY AS DIRECTED 30 tablet 5    Nutritional Supplements (OSTEOPOROSIS SUPPORT PO) Take by mouth      predniSONE 1 mg tablet Take 1 mg by mouth 2 (two) times a day as needed (Lupus exacerbations)      sertraline (ZOLOFT) 100 mg tablet Take 1 tablet (100 mg total) by mouth daily 30 tablet 5     No current facility-administered medications for this visit            Health Maintenance     Health Maintenance   Topic Date Due    Hepatitis C Screening  1965    Pneumococcal PPSV23 Medium Risk Adult (1 of 1 - PPSV23) 08/29/1982    DTaP,Tdap,and Td Vaccines (1 - Tdap) 08/29/1984    INFLUENZA VACCINE  07/01/2018    CRC Screening: Colonoscopy  10/09/2027     There is no immunization history for the selected administration types on file for this patient      Paulo Hassan MD

## 2018-12-31 NOTE — ASSESSMENT & PLAN NOTE
PTSD  Pt will increase her Zoloft to 125mg daily  She will continue with her therapy and have f/u ov in 4-5 weeks  At this time she is unable to perform her usual job duties and will be out of work at least until next ov

## 2019-01-02 ENCOUNTER — TELEPHONE (OUTPATIENT)
Dept: FAMILY MEDICINE CLINIC | Facility: CLINIC | Age: 54
End: 2019-01-02

## 2019-01-02 DIAGNOSIS — F41.9 ANXIETY: Primary | ICD-10-CM

## 2019-01-02 NOTE — TELEPHONE ENCOUNTER
Pt saw therapist this am and she agreed to up the dosage to 125 mg Zoloft  She will now however need a script for zoloft 50 [facility] to accomodate this  She will split in half    Please advise

## 2019-01-18 DIAGNOSIS — F41.9 ANXIETY: ICD-10-CM

## 2019-01-18 RX ORDER — ALPRAZOLAM 1 MG/1
TABLET ORAL
Qty: 90 TABLET | Refills: 3 | Status: SHIPPED | OUTPATIENT
Start: 2019-01-18 | End: 2019-05-10 | Stop reason: SDUPTHER

## 2019-01-24 DIAGNOSIS — Z12.39 SCREENING FOR MALIGNANT NEOPLASM OF BREAST: Primary | ICD-10-CM

## 2019-02-22 ENCOUNTER — OFFICE VISIT (OUTPATIENT)
Dept: FAMILY MEDICINE CLINIC | Facility: CLINIC | Age: 54
End: 2019-02-22
Payer: COMMERCIAL

## 2019-02-22 VITALS
TEMPERATURE: 97.8 F | BODY MASS INDEX: 18.43 KG/M2 | HEIGHT: 63 IN | SYSTOLIC BLOOD PRESSURE: 102 MMHG | DIASTOLIC BLOOD PRESSURE: 60 MMHG | WEIGHT: 104 LBS | RESPIRATION RATE: 16 BRPM | HEART RATE: 88 BPM

## 2019-02-22 DIAGNOSIS — F43.10 POSTTRAUMATIC STRESS DISORDER: ICD-10-CM

## 2019-02-22 DIAGNOSIS — F41.9 ANXIETY: ICD-10-CM

## 2019-02-22 DIAGNOSIS — Z28.21 VACCINATION REFUSED BY PATIENT: Primary | ICD-10-CM

## 2019-02-22 DIAGNOSIS — Z12.39 SCREENING FOR MALIGNANT NEOPLASM OF BREAST: ICD-10-CM

## 2019-02-22 PROCEDURE — 99213 OFFICE O/P EST LOW 20 MIN: CPT | Performed by: FAMILY MEDICINE

## 2019-02-22 PROCEDURE — 3008F BODY MASS INDEX DOCD: CPT | Performed by: FAMILY MEDICINE

## 2019-02-22 NOTE — ASSESSMENT & PLAN NOTE
Posttraumatic stress disorder  The patient continues to experience significant symptoms related to her medical condition  She was instructed to increase her sertraline to a total of 150 mg daily  She will continue this meet with her therapist on a weekly basis  At this time she is unable to perform the job duties she had at her [employer] branch
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
## 2019-03-08 DIAGNOSIS — E03.9 HYPOTHYROIDISM, UNSPECIFIED TYPE: ICD-10-CM

## 2019-03-08 DIAGNOSIS — I10 ESSENTIAL HYPERTENSION: ICD-10-CM

## 2019-03-08 RX ORDER — LEVOTHYROXINE SODIUM 88 UG/1
TABLET ORAL
Qty: 30 TABLET | Refills: 5 | Status: SHIPPED | OUTPATIENT
Start: 2019-03-08 | End: 2019-10-14 | Stop reason: SDUPTHER

## 2019-03-08 RX ORDER — MOEXIPRIL HCL 15 MG
TABLET ORAL
Qty: 30 TABLET | Refills: 5 | Status: SHIPPED | OUTPATIENT
Start: 2019-03-08 | End: 2019-10-14 | Stop reason: SDUPTHER

## 2019-04-12 DIAGNOSIS — F41.9 ANXIETY: ICD-10-CM

## 2019-04-16 ENCOUNTER — TELEPHONE (OUTPATIENT)
Dept: FAMILY MEDICINE CLINIC | Facility: CLINIC | Age: 54
End: 2019-04-16

## 2019-04-16 DIAGNOSIS — F41.9 ANXIETY: ICD-10-CM

## 2019-04-16 RX ORDER — SERTRALINE HYDROCHLORIDE 100 MG/1
TABLET, FILM COATED ORAL
Qty: 45 TABLET | Refills: 5 | Status: SHIPPED | OUTPATIENT
Start: 2019-04-16 | End: 2019-08-27 | Stop reason: SDUPTHER

## 2019-05-10 DIAGNOSIS — F41.9 ANXIETY: ICD-10-CM

## 2019-05-10 RX ORDER — ALPRAZOLAM 1 MG/1
TABLET ORAL
Qty: 90 TABLET | Refills: 3 | Status: SHIPPED | OUTPATIENT
Start: 2019-05-10 | End: 2019-08-23 | Stop reason: SDUPTHER

## 2019-08-23 DIAGNOSIS — F41.9 ANXIETY: ICD-10-CM

## 2019-08-23 RX ORDER — ALPRAZOLAM 1 MG/1
TABLET ORAL
Qty: 90 TABLET | Refills: 0 | Status: SHIPPED | OUTPATIENT
Start: 2019-08-23 | End: 2019-09-19 | Stop reason: SDUPTHER

## 2019-08-27 DIAGNOSIS — F41.9 ANXIETY: ICD-10-CM

## 2019-08-27 RX ORDER — SERTRALINE HYDROCHLORIDE 100 MG/1
TABLET, FILM COATED ORAL
Qty: 45 TABLET | Refills: 5 | Status: ON HOLD | OUTPATIENT
Start: 2019-08-27 | End: 2020-03-17

## 2019-09-19 DIAGNOSIS — F41.9 ANXIETY: ICD-10-CM

## 2019-09-19 RX ORDER — ALPRAZOLAM 1 MG/1
TABLET ORAL
Qty: 90 TABLET | Refills: 0 | Status: SHIPPED | OUTPATIENT
Start: 2019-09-19 | End: 2019-10-16 | Stop reason: SDUPTHER

## 2019-10-14 DIAGNOSIS — I10 ESSENTIAL HYPERTENSION: ICD-10-CM

## 2019-10-14 DIAGNOSIS — E03.9 HYPOTHYROIDISM, UNSPECIFIED TYPE: ICD-10-CM

## 2019-10-14 RX ORDER — LEVOTHYROXINE SODIUM 88 UG/1
TABLET ORAL
Qty: 30 TABLET | Refills: 3 | Status: SHIPPED | OUTPATIENT
Start: 2019-10-14 | End: 2020-02-20 | Stop reason: SDUPTHER

## 2019-10-14 RX ORDER — MOEXIPRIL HCL 15 MG
TABLET ORAL
Qty: 30 TABLET | Refills: 3 | Status: SHIPPED | OUTPATIENT
Start: 2019-10-14 | End: 2020-02-20 | Stop reason: SDUPTHER

## 2019-10-16 DIAGNOSIS — F41.9 ANXIETY: ICD-10-CM

## 2019-10-16 RX ORDER — ALPRAZOLAM 1 MG/1
TABLET ORAL
Qty: 90 TABLET | Refills: 0 | Status: SHIPPED | OUTPATIENT
Start: 2019-10-16 | End: 2019-11-18 | Stop reason: SDUPTHER

## 2019-10-23 DIAGNOSIS — F41.9 ANXIETY: ICD-10-CM

## 2019-10-23 RX ORDER — SERTRALINE HYDROCHLORIDE 100 MG/1
TABLET, FILM COATED ORAL
Qty: 45 TABLET | Refills: 5 | Status: SHIPPED | OUTPATIENT
Start: 2019-10-23 | End: 2020-05-04

## 2019-11-18 DIAGNOSIS — F41.9 ANXIETY: ICD-10-CM

## 2019-11-18 RX ORDER — ALPRAZOLAM 1 MG/1
TABLET ORAL
Qty: 90 TABLET | Refills: 1 | Status: SHIPPED | OUTPATIENT
Start: 2019-11-18 | End: 2020-01-15

## 2020-01-15 DIAGNOSIS — F41.9 ANXIETY: ICD-10-CM

## 2020-01-15 RX ORDER — ALPRAZOLAM 1 MG/1
TABLET ORAL
Qty: 90 TABLET | Refills: 0 | Status: SHIPPED | OUTPATIENT
Start: 2020-01-15 | End: 2020-02-17 | Stop reason: SDUPTHER

## 2020-02-03 ENCOUNTER — TELEPHONE (OUTPATIENT)
Dept: FAMILY MEDICINE CLINIC | Facility: CLINIC | Age: 55
End: 2020-02-03

## 2020-02-03 DIAGNOSIS — N63.0 BREAST NODULE: Primary | ICD-10-CM

## 2020-02-03 NOTE — TELEPHONE ENCOUNTER
Patient called stating she needs a RX to have a Diagnostic Mammogram done due to her feeling a lump in her breast     Please put order in system so patient can schedule this

## 2020-02-04 ENCOUNTER — HOSPITAL ENCOUNTER (OUTPATIENT)
Dept: MAMMOGRAPHY | Facility: CLINIC | Age: 55
Discharge: HOME | End: 2020-02-04
Payer: COMMERCIAL

## 2020-02-04 ENCOUNTER — HOSPITAL ENCOUNTER (OUTPATIENT)
Dept: ULTRASOUND IMAGING | Facility: CLINIC | Age: 55
Discharge: HOME | End: 2020-02-04
Payer: COMMERCIAL

## 2020-02-04 VITALS — HEIGHT: 63 IN | WEIGHT: 110 LBS | BODY MASS INDEX: 19.49 KG/M2

## 2020-02-04 DIAGNOSIS — N63.0 BREAST NODULE: ICD-10-CM

## 2020-02-04 PROCEDURE — 77066 DX MAMMO INCL CAD BI: CPT

## 2020-02-04 PROCEDURE — 76642 ULTRASOUND BREAST LIMITED: CPT

## 2020-02-04 PROCEDURE — G0279 TOMOSYNTHESIS, MAMMO: HCPCS

## 2020-02-04 RX ORDER — CYCLOBENZAPRINE HCL 5 MG
5 TABLET ORAL 3 TIMES DAILY PRN
COMMUNITY
End: 2020-05-10 | Stop reason: ALTCHOICE

## 2020-02-04 NOTE — PROGRESS NOTES
Met with patient and Dr Titus Terrazas                 regarding recommendation for;      __x___ RIGHT __x(2)____LEFT      __X(left x 2)___Ultrasound guided  __x(Rt)____Stereotactic  Breast biopsy  __x___Verbalized understanding        Blood thinners:  _____yes __x___no    Date stopped: _____n/a______    Biopsy teaching sheet given:  ___x  ____yes ______no

## 2020-02-09 DIAGNOSIS — I10 ESSENTIAL HYPERTENSION: ICD-10-CM

## 2020-02-09 DIAGNOSIS — E03.9 HYPOTHYROIDISM, UNSPECIFIED TYPE: ICD-10-CM

## 2020-02-10 RX ORDER — MOEXIPRIL HCL 15 MG
TABLET ORAL
Qty: 30 TABLET | Refills: 3 | OUTPATIENT
Start: 2020-02-10

## 2020-02-10 RX ORDER — LEVOTHYROXINE SODIUM 88 UG/1
TABLET ORAL
Qty: 30 TABLET | Refills: 3 | OUTPATIENT
Start: 2020-02-10

## 2020-02-11 DIAGNOSIS — F41.9 ANXIETY: ICD-10-CM

## 2020-02-11 RX ORDER — ALPRAZOLAM 1 MG/1
TABLET ORAL
Qty: 90 TABLET | Refills: 0 | OUTPATIENT
Start: 2020-02-11

## 2020-02-12 ENCOUNTER — HOSPITAL ENCOUNTER (OUTPATIENT)
Dept: ULTRASOUND IMAGING | Facility: CLINIC | Age: 55
Discharge: HOME | End: 2020-02-12
Payer: COMMERCIAL

## 2020-02-12 ENCOUNTER — HOSPITAL ENCOUNTER (OUTPATIENT)
Dept: MAMMOGRAPHY | Facility: CLINIC | Age: 55
Discharge: HOME | End: 2020-02-12
Admitting: RADIOLOGY
Payer: COMMERCIAL

## 2020-02-12 ENCOUNTER — HOSPITAL ENCOUNTER (OUTPATIENT)
Dept: MAMMOGRAPHY | Facility: CLINIC | Age: 55
Discharge: HOME | End: 2020-02-12
Payer: COMMERCIAL

## 2020-02-12 VITALS — HEART RATE: 92 BPM | DIASTOLIC BLOOD PRESSURE: 70 MMHG | SYSTOLIC BLOOD PRESSURE: 112 MMHG

## 2020-02-12 VITALS
HEIGHT: 63 IN | WEIGHT: 110 LBS | DIASTOLIC BLOOD PRESSURE: 60 MMHG | HEART RATE: 92 BPM | SYSTOLIC BLOOD PRESSURE: 110 MMHG | BODY MASS INDEX: 19.49 KG/M2

## 2020-02-12 DIAGNOSIS — R92.8 ABNORMAL MAMMOGRAM: ICD-10-CM

## 2020-02-12 DIAGNOSIS — R92.0 MAMMOGRAPHIC MICROCALCIFICATION: ICD-10-CM

## 2020-02-12 DIAGNOSIS — R92.8 ABNORMAL ULTRASOUND OF BREAST: ICD-10-CM

## 2020-02-12 PROCEDURE — 88367 INSITU HYBRIDIZATION AUTO: CPT | Performed by: PATHOLOGY

## 2020-02-12 PROCEDURE — 88341 IMHCHEM/IMCYTCHM EA ADD ANTB: CPT | Performed by: PATHOLOGY

## 2020-02-12 PROCEDURE — 88342 IMHCHEM/IMCYTCHM 1ST ANTB: CPT | Performed by: PATHOLOGY

## 2020-02-12 PROCEDURE — 88305 TISSUE EXAM BY PATHOLOGIST: CPT | Performed by: PATHOLOGY

## 2020-02-12 PROCEDURE — ND001 PR NO DOCUMENTATION: Performed by: PATHOLOGY

## 2020-02-12 PROCEDURE — 19084 BX BREAST ADD LESION US IMAG: CPT

## 2020-02-12 PROCEDURE — 88361 TUMOR IMMUNOHISTOCHEM/COMPUT: CPT | Performed by: PATHOLOGY

## 2020-02-12 PROCEDURE — 19081 BX BREAST 1ST LESION STRTCTC: CPT

## 2020-02-12 PROCEDURE — 19083 BX BREAST 1ST LESION US IMAG: CPT

## 2020-02-12 RX ORDER — LIDOCAINE HYDROCHLORIDE 10 MG/ML
5 INJECTION, SOLUTION EPIDURAL; INFILTRATION; INTRACAUDAL; PERINEURAL ONCE
Status: COMPLETED | OUTPATIENT
Start: 2020-02-12 | End: 2020-02-12

## 2020-02-12 RX ORDER — LIDOCAINE HYDROCHLORIDE AND EPINEPHRINE 10; 10 MG/ML; UG/ML
1 INJECTION, SOLUTION INFILTRATION; PERINEURAL ONCE
Status: COMPLETED | OUTPATIENT
Start: 2020-02-12 | End: 2020-02-12

## 2020-02-12 RX ADMIN — LIDOCAINE HYDROCHLORIDE 5 ML: 10 INJECTION, SOLUTION EPIDURAL; INFILTRATION; INTRACAUDAL; PERINEURAL at 11:02

## 2020-02-12 RX ADMIN — LIDOCAINE HYDROCHLORIDE 5 ML: 10 INJECTION, SOLUTION EPIDURAL; INFILTRATION; INTRACAUDAL; PERINEURAL at 09:45

## 2020-02-12 RX ADMIN — LIDOCAINE HYDROCHLORIDE,EPINEPHRINE BITARTRATE 1 ML: 10; .01 INJECTION, SOLUTION INFILTRATION; PERINEURAL at 09:45

## 2020-02-12 RX ADMIN — LIDOCAINE HYDROCHLORIDE 5 ML: 10 INJECTION, SOLUTION EPIDURAL; INFILTRATION; INTRACAUDAL; PERINEURAL at 10:54

## 2020-02-12 NOTE — PROGRESS NOTES
Procedure type:    __X___ultrasound guided _____stereotactic    Breast:    ____X_Left _____Right    Location: 10 o'clock 5 cm fn    Needle: 12ga Denys    # of passes: 3    Clip: Heart (Ultraclip)    Performed by: Dr Barrie Roe held for 5 minutes by:  Stephanie Real(PCA)    Steri Strips:    ___X__yes _____no    Band aid:    _X____yes_____no    Tape and guaze:    _____yes __X___no    Tolerated procedure:    ___X__yes _____no

## 2020-02-12 NOTE — PROGRESS NOTES
Procedure type:    _____ultrasound guided ___x__stereotactic    Breast:    _____Left ___x__Right    Location: 11 o'clock 5 cm fn    Needle: 8ga Revolve    # of passes: 2 cores with calcs and 2 cores without calcs    Clip: Triple twist    Performed by: Dr Banks Beams held for 5 minutes by:  Shelli Real(PCA)    Steri Strips:    __X___yes _____no    Band aid:    ___X__yes_____no    Tape and guaze:    ___X__yes _____no    Tolerated procedure:    ___X__yes _____no

## 2020-02-12 NOTE — DISCHARGE INSTR - OTHER ORDERS
POST LARGE CORE BREAST BIOPSY PATIENT INFORMATION      1  Place an ice pack inside your bra over the top of the dressing every hour for 20 minutes (20 minutes on, 60 minutes off)  Do this until bedtime  2  Do not shower or bathe until the following morning  3  You may bathe your breast carefully with the steri-strips in place  Be careful    Not to loosen them  The steri-strips will fall off in 3-5 days  4  You may have mild discomfort, and you may have some bruising where the   Needle entered the skin  This should clear within 5-7 days  5  If you need medicine for discomfort, take acetaminophen products such as   Tylenol  You may also take Advil or Motrin products  6  Do not participate in strenuous activities such as-tennis, aerobics, skiing,  Weight lifting, etc  for 24 hours  Refrain from swimming/soaking for 72 hours  7  Wearing a bra for sleeping may be more comfortable for the first 24-48 hours  8  Watch for continued bleeding, pain or fever over 101; please call with any questions or concerns  For procedures done at the [facility]  [address] call:  Jose C Davenport RN at [phone]  Bren Whitlock RN at [phone]                    *After 4 PM call the Interventional Radiology Department                    [phone] and ask to speak with the nurse on call  For procedures done at the [address] [place] [address] call:         Naomi Cooper RN at   *After 4 PM call the Interventional Radiology Department   [phone] and ask to speak with the nurse on call  For procedures done at [address] [place] [address] call: The Radiology Nurse at [phone]  *After 4 PM call your physician, or go to the Emergency Department  9          The final results of your biopsy are usually available within one week

## 2020-02-12 NOTE — PROGRESS NOTES
Procedure type:    __X___ultrasound guided _____stereotactic    Breast:    ___X__Left _____Right    Location: 10 o'clock 4 cm fn    Needle: 12ga Denys    # of passes: 4    Clip: Wing(ULtraclip)    Performed by: Dr Ze Carpenter held for 5 minutes by:  Hal Real(PCA)    Steri Strips:    __X___yes _____no    Band aid:    ___X__yes_____no    Tape and guaze:    _____yes __X___no    Tolerated procedure:    __X___yes _____no

## 2020-02-14 ENCOUNTER — TELEPHONE (OUTPATIENT)
Dept: HEMATOLOGY ONCOLOGY | Facility: CLINIC | Age: 55
End: 2020-02-14

## 2020-02-14 ENCOUNTER — TELEPHONE (OUTPATIENT)
Dept: MAMMOGRAPHY | Facility: CLINIC | Age: 55
End: 2020-02-14

## 2020-02-14 DIAGNOSIS — F41.9 ANXIETY: ICD-10-CM

## 2020-02-14 RX ORDER — ALPRAZOLAM 1 MG/1
1 TABLET ORAL 3 TIMES DAILY PRN
Qty: 90 TABLET | Refills: 0 | OUTPATIENT
Start: 2020-02-14

## 2020-02-14 RX ORDER — ALPRAZOLAM 1 MG/1
TABLET ORAL
Qty: 90 TABLET | Refills: 0 | OUTPATIENT
Start: 2020-02-14

## 2020-02-14 NOTE — PROGRESS NOTES
Post procedure call completed 2/14/20 @ 1032    Bleeding: _____yes __x___no    Pain: _____yes ___x___no    Redness/Swelling: __x____yes ______no    Band aid removed: ___x__yes _____no    Steri-Strips intact: ___x___yes _____no  Pt states her breast are sore and a little swollen   Pt states she has mild bruising in her left breast

## 2020-02-14 NOTE — TELEPHONE ENCOUNTER
New Patient Encounter    New Patient Intake Form   Patient Details:  Sofy Patel  1965  [id number]    Background Information:  [address] starts by opening a telephone encounter and gathering the following information   Who is calling to schedule? If not self, relationship to patient? Dhaval Peer   Referring Provider Dr Shahzad Arango   What is the diagnosis? Left Breast Cancer   Is this diagnosis confirmed Yes   Is there a confirmed diagnosis from a biopsy/tissue reviewed by pathology? Yes   Is there any prior history of Cancer? No   If yes, please explain N/A   When was the diagnosis? February 2020   Is patient aware of diagnosis? Yes   Reason for visit? NP DX   Have you had any testing done? If so: when, where? Yes   Are records in [facility]? yes   Was the patient told to bring a disk? no   Scheduling Information:   Preferred [place]: [facility]     Requesting Specific Provider? Any   Are there any dates/time the patient cannot be seen? Miscellaneous:    After completing the above information, please route to Financial Counselor and the appropriate Nurse Navigator for review

## 2020-02-17 DIAGNOSIS — I10 ESSENTIAL HYPERTENSION: ICD-10-CM

## 2020-02-17 DIAGNOSIS — F41.9 ANXIETY: ICD-10-CM

## 2020-02-17 DIAGNOSIS — E03.9 HYPOTHYROIDISM, UNSPECIFIED TYPE: ICD-10-CM

## 2020-02-17 RX ORDER — ALPRAZOLAM 1 MG/1
1 TABLET ORAL 3 TIMES DAILY PRN
Qty: 90 TABLET | Refills: 0 | Status: SHIPPED | OUTPATIENT
Start: 2020-02-17 | End: 2020-03-16

## 2020-02-17 RX ORDER — MOEXIPRIL HCL 15 MG
TABLET ORAL
Qty: 30 TABLET | Refills: 3 | OUTPATIENT
Start: 2020-02-17

## 2020-02-17 RX ORDER — LEVOTHYROXINE SODIUM 88 UG/1
TABLET ORAL
Qty: 30 TABLET | Refills: 3 | OUTPATIENT
Start: 2020-02-17

## 2020-02-18 NOTE — PROGRESS NOTES
Patient Past Medical History:  Systemic Lupus, HTN, PTSD, anxiety/depression, thyroid dysfunction, osteoporosis, chronic lower back pain          Allergies:  Mobic, Methotrexate, Sulfa        Past Breast History:     Previous Biopsy:   Yes______ No________      Breast: Right____ Left______       Malignant______ Benign_______      Treatments if applicable        Present Breast History:     Right___x_______    Left_____x________     Density____x_(left)____    Calcifications____x__(Right)______   Palpable_______x__(Left)_____      Patient notified of results on: 2/14/20 by Dr Hammer Meals    Date of Appointment with Surgeon   3/4/20 with Dr Alfa Rivas

## 2020-02-19 RX ORDER — ERGOCALCIFEROL 1.25 MG/1
50000 CAPSULE ORAL WEEKLY
COMMUNITY
Start: 2019-11-29 | End: 2020-05-10 | Stop reason: ALTCHOICE

## 2020-02-20 ENCOUNTER — APPOINTMENT (OUTPATIENT)
Dept: LAB | Facility: CLINIC | Age: 55
End: 2020-02-20
Payer: COMMERCIAL

## 2020-02-20 ENCOUNTER — OFFICE VISIT (OUTPATIENT)
Dept: FAMILY MEDICINE CLINIC | Facility: CLINIC | Age: 55
End: 2020-02-20
Payer: COMMERCIAL

## 2020-02-20 VITALS
SYSTOLIC BLOOD PRESSURE: 130 MMHG | DIASTOLIC BLOOD PRESSURE: 80 MMHG | TEMPERATURE: 96.9 F | HEART RATE: 106 BPM | HEIGHT: 63 IN | RESPIRATION RATE: 15 BRPM | WEIGHT: 132.8 LBS | BODY MASS INDEX: 23.53 KG/M2 | OXYGEN SATURATION: 99 %

## 2020-02-20 DIAGNOSIS — I10 ESSENTIAL HYPERTENSION: ICD-10-CM

## 2020-02-20 DIAGNOSIS — M32.9 SYSTEMIC LUPUS ERYTHEMATOSUS, UNSPECIFIED SLE TYPE, UNSPECIFIED ORGAN INVOLVEMENT STATUS (HCC): ICD-10-CM

## 2020-02-20 DIAGNOSIS — E03.9 HYPOTHYROIDISM, UNSPECIFIED TYPE: ICD-10-CM

## 2020-02-20 DIAGNOSIS — F43.10 POSTTRAUMATIC STRESS DISORDER: ICD-10-CM

## 2020-02-20 DIAGNOSIS — F41.9 ANXIETY: ICD-10-CM

## 2020-02-20 DIAGNOSIS — I10 ESSENTIAL HYPERTENSION: Primary | ICD-10-CM

## 2020-02-20 LAB
ALBUMIN SERPL BCP-MCNC: 4.2 G/DL (ref 3.5–5)
ALP SERPL-CCNC: 86 U/L (ref 46–116)
ALT SERPL W P-5'-P-CCNC: 20 U/L (ref 12–78)
ANION GAP SERPL CALCULATED.3IONS-SCNC: 7 MMOL/L (ref 4–13)
AST SERPL W P-5'-P-CCNC: 23 U/L (ref 5–45)
BILIRUB SERPL-MCNC: 0.24 MG/DL (ref 0.2–1)
BUN SERPL-MCNC: 10 MG/DL (ref 5–25)
CALCIUM SERPL-MCNC: 9.4 MG/DL (ref 8.3–10.1)
CHLORIDE SERPL-SCNC: 108 MMOL/L (ref 100–108)
CO2 SERPL-SCNC: 25 MMOL/L (ref 21–32)
CREAT SERPL-MCNC: 0.68 MG/DL (ref 0.6–1.3)
ERYTHROCYTE [DISTWIDTH] IN BLOOD BY AUTOMATED COUNT: 13.7 % (ref 11.6–15.1)
GFR SERPL CREATININE-BSD FRML MDRD: 98 ML/MIN/1.73SQ M
GLUCOSE SERPL-MCNC: 77 MG/DL (ref 65–140)
HCT VFR BLD AUTO: 43.4 % (ref 34.8–46.1)
HGB BLD-MCNC: 14.3 G/DL (ref 11.5–15.4)
MCH RBC QN AUTO: 32.9 PG (ref 26.8–34.3)
MCHC RBC AUTO-ENTMCNC: 32.9 G/DL (ref 31.4–37.4)
MCV RBC AUTO: 100 FL (ref 82–98)
PLATELET # BLD AUTO: 350 THOUSANDS/UL (ref 149–390)
PMV BLD AUTO: 11.3 FL (ref 8.9–12.7)
POTASSIUM SERPL-SCNC: 4 MMOL/L (ref 3.5–5.3)
PROT SERPL-MCNC: 7.9 G/DL (ref 6.4–8.2)
RBC # BLD AUTO: 4.34 MILLION/UL (ref 3.81–5.12)
SODIUM SERPL-SCNC: 140 MMOL/L (ref 136–145)
TSH SERPL DL<=0.05 MIU/L-ACNC: 1.2 UIU/ML (ref 0.36–3.74)
WBC # BLD AUTO: 9.16 THOUSAND/UL (ref 4.31–10.16)

## 2020-02-20 PROCEDURE — 85027 COMPLETE CBC AUTOMATED: CPT

## 2020-02-20 PROCEDURE — 3075F SYST BP GE 130 - 139MM HG: CPT | Performed by: FAMILY MEDICINE

## 2020-02-20 PROCEDURE — 84443 ASSAY THYROID STIM HORMONE: CPT

## 2020-02-20 PROCEDURE — 80053 COMPREHEN METABOLIC PANEL: CPT

## 2020-02-20 PROCEDURE — 3008F BODY MASS INDEX DOCD: CPT | Performed by: FAMILY MEDICINE

## 2020-02-20 PROCEDURE — 99214 OFFICE O/P EST MOD 30 MIN: CPT | Performed by: FAMILY MEDICINE

## 2020-02-20 PROCEDURE — 36415 COLL VENOUS BLD VENIPUNCTURE: CPT

## 2020-02-20 PROCEDURE — 3079F DIAST BP 80-89 MM HG: CPT | Performed by: FAMILY MEDICINE

## 2020-02-20 RX ORDER — LEVOTHYROXINE SODIUM 88 UG/1
88 TABLET ORAL DAILY
Qty: 30 TABLET | Refills: 5 | Status: SHIPPED | OUTPATIENT
Start: 2020-02-20 | End: 2020-08-13

## 2020-02-20 RX ORDER — CYCLOBENZAPRINE HCL 10 MG
10 TABLET ORAL
COMMUNITY
End: 2021-03-19 | Stop reason: HOSPADM

## 2020-02-20 RX ORDER — MOEXIPRIL HCL 15 MG
15 TABLET ORAL DAILY
Qty: 30 TABLET | Refills: 5 | Status: SHIPPED | OUTPATIENT
Start: 2020-02-20 | End: 2020-07-15 | Stop reason: HOSPADM

## 2020-02-20 NOTE — ASSESSMENT & PLAN NOTE
Posttraumatic stress disorder  Patient will continue see her counselor  She will continue with current regimen of medications  Her disability paperwork was filled out

## 2020-02-20 NOTE — PROGRESS NOTES
FAMILY PRACTICE OFFICE VISIT       NAME: Imani Davey  AGE: 62 y o  SEX: female       : 1965        MRN: [id number]    DATE: 2020  TIME: 5:34 PM    Assessment and Plan     Problem List Items Addressed This Visit        Endocrine    Hypothyroidism     Hypothyroidism  Patient will check TSH blood work and continue with current dose of levothyroxine         Relevant Medications    levothyroxine 88 mcg tablet    Other Relevant Orders    TSH, 3rd generation (Completed)    CBC (Completed)    Comprehensive metabolic panel (Completed)       Cardiovascular and Mediastinum    Hypertension - Primary     Hypertension  Patient blood pressure is stable at this time she will continue with current regimen of medications  She was made aware of her increased weight and should try to obtain an aerobic form of exercise such as walking 30 minutes 3 days a week  Relevant Medications    moexipril (UNIVASC) 15 MG tablet    Other Relevant Orders    TSH, 3rd generation (Completed)    CBC (Completed)    Comprehensive metabolic panel (Completed)       Other    Systemic lupus erythematosus (HCC)    Posttraumatic stress disorder     Posttraumatic stress disorder  Patient will continue see her counselor  She will continue with current regimen of medications  Her disability paperwork was filled out  Anxiety              Chief Complaint     Chief Complaint   Patient presents with    Medication Refill       History of Present Illness     Patient in the office to review chronic medical conditions  Patient was working with an  and won a work settlement  after being on long-term disability for over 1 year  She is currently applying for permanent social security disability  She sees her rheumatologist on a regular basis for her history of lupus as well as fibromyalgia  She sees her psychologist every 1-2 weeks for ongoing treatment of generalized anxiety disorder and posttraumatic stress disorder    I reviewed her current list of medications  Patient required a mental status evaluation form to be filled out for her disability  She states that she rarely leaves her house and only feel homes comfortable going to certain small supermarket as well as occasional gatherings with her motorcycling friends otherwise patient is primarily homebound  As she has anhedonia and does not obtain much exercise  She has gained approximately 30 lb over last year due to inactivity and increased eating  She most recently was diagnosed with left breast cancer  She is scheduled to see her breast oncologist for further evaluation and treatment options  This has significantly increased her anxiety and her depression  Review of Systems   Review of Systems   Constitutional: Positive for fatigue and unexpected weight change  HENT: Negative  Respiratory: Negative  Cardiovascular: Negative  Gastrointestinal: Negative  Musculoskeletal: Positive for arthralgias, gait problem and myalgias  Psychiatric/Behavioral: Positive for agitation, decreased concentration, dysphoric mood and sleep disturbance  Negative for suicidal ideas  The patient is nervous/anxious          Active Problem List     Patient Active Problem List   Diagnosis    Vomiting    Chest pain    Urinary tract infection    Hypertension    Systemic lupus erythematosus ([address] )    Hypothyroidism    Posttraumatic stress disorder    Adult celiac disease    Anxiety    Depression    Esophagitis    Nephrolithiasis    Vitamin D deficiency       Past Medical History:  Past Medical History:   Diagnosis Date    Disease of thyroid gland     Hypertension     Lupus ([address] )     Nephrolithiasis        Past Surgical History:  Past Surgical History:   Procedure Laterality Date    FEMUR FRACTURE SURGERY      HYSTERECTOMY      LEFT OOPHORECTOMY      due to torsion     MAMMO STEREOTACTIC BREAST BIOPSY RIGHT (ALL INC) Right 2/12/2020    US GUIDANCE BREAST BIOPSY LEFT EACH ADDITIONAL Left 2/12/2020    US GUIDED BREAST BIOPSY LEFT COMPLETE Left 2/12/2020    VAGINA SURGERY         Family History:  Family History   Problem Relation Age of Onset    Diabetes Mother     Hypertension Mother     Nephrolithiasis Mother     Breast cancer Mother 79    Heart disease Father     Hypertension Father     Diabetes Brother     Nephrolithiasis Brother     Breast cancer Maternal Aunt     No Known Problems Maternal Grandmother     No Known Problems Maternal Grandfather     No Known Problems Paternal Grandmother     No Known Problems Paternal Grandfather        Social History:  Social History     Socioeconomic History    Marital status: /Civil Union     Spouse name: Not on file    Number of children: Not on file    Years of education: Not on file    Highest education level: Not on file   Occupational History    Not on file   Social Needs    Financial resource strain: Not on file    Food insecurity:     Worry: Not on file     Inability: Not on file    Transportation needs:     Medical: Not on file     Non-medical: Not on file   Tobacco Use    Smoking status: Current Every Day Smoker     Packs/day: 1 50    Smokeless tobacco: Never Used    Tobacco comment:  5 ppd per [employer]   Substance and Sexual Activity    Alcohol use:  Yes     Alcohol/week: 21 0 standard drinks     Types: 21 Cans of beer per week     Comment: 3 beers day, socially started 30 yrs prior     Drug use: No    Sexual activity: Not on file   Lifestyle    Physical activity:     Days per week: Not on file     Minutes per session: Not on file    Stress: Not on file   Relationships    Social connections:     Talks on phone: Not on file     Gets together: Not on file     Attends Gnosticism service: Not on file     Active member of club or organization: Not on file     Attends meetings of clubs or organizations: Not on file     Relationship status: Not on file    Intimate partner violence:     Fear of current or ex partner: Not on file     Emotionally abused: Not on file     Physically abused: Not on file     Forced sexual activity: Not on file   Other Topics Concern    Not on file   Social History Narrative    Not on file       Objective     Vitals:    02/20/20 1101   BP: 130/80   Pulse: (!) 106   Resp: 15   Temp: (!) 96 9 °F (36 1 °C)   SpO2: 99%     Wt Readings from Last 3 Encounters:   02/20/20 60 2 kg (132 lb 12 8 oz)   02/12/20 49 9 kg (110 lb)   02/04/20 49 9 kg (110 lb)       Physical Exam   Constitutional: She is oriented to person, place, and time  She appears well-developed and well-nourished  No distress  HENT:   Right Ear: External ear normal    Left Ear: External ear normal    Mouth/Throat: Oropharynx is clear and moist  No oropharyngeal exudate  Tympanic membranes within normal limits bilaterally   Eyes: Pupils are equal, round, and reactive to light  EOM are normal  Right eye exhibits no discharge  Left eye exhibits no discharge  Cardiovascular: Normal rate, regular rhythm and normal heart sounds  No murmur heard  Pulmonary/Chest: Effort normal and breath sounds normal  No respiratory distress  She has no wheezes  She has no rales  Abdominal:   Abdomen is soft, nontender with positive bowel sounds  There is no rebound or guarding  No masses palpated   Musculoskeletal: She exhibits no edema  Lymphadenopathy:     She has no cervical adenopathy  Neurological: She is alert and oriented to person, place, and time  No cranial nerve deficit  Skin: She is not diaphoretic     Psychiatric:   Patient in no acute distress but did appear mildly anxious and somewhat depressed in describing her situation and current diagnoses       Pertinent Laboratory/Diagnostic Studies:  Lab Results   Component Value Date    GLUCOSE 97 04/22/2015    BUN 10 02/20/2020    CREATININE 0 68 02/20/2020    CALCIUM 9 4 02/20/2020     (L) 04/22/2015    K 4 0 02/20/2020    CO2 25 02/20/2020     02/20/2020 Lab Results   Component Value Date    ALT 20 02/20/2020    AST 23 02/20/2020    ALKPHOS 86 02/20/2020    BILITOT 0 2 04/22/2015       Lab Results   Component Value Date    WBC 9 16 02/20/2020    HGB 14 3 02/20/2020    HCT 43 4 02/20/2020     (H) 02/20/2020     02/20/2020       No results found for: TSH    No results found for: CHOL  No results found for: TRIG  No results found for: HDL  No results found for: LDLCALC  No results found for: HGBA1C    Results for orders placed or performed during the hospital encounter of 02/12/20   Tissue Exam   Result Value Ref Range    Case Report       Surgical Pathology Report                         Case: [id number]                                   Authorizing Provider:  Yobani Zee MD           Collected:           02/12/2020 0954              Ordering Location:     [address] [place] [address] Breast Received:            02/12/2020 [facility]                                                                       Pathologist:           Savita Avendano MD                                                         Specimens:   A) - Breast, Right, Specimen 1 @ 11:00, 10cm FN-2 Cores with calcs                                  B) - Breast, Right, Specimen 2 @ 11:00, 10cm FN-2 Cores without calcs                               C) - Breast, Left, US BX Left Br 1000 5CMFN 3 passes 12G Marquee                                    D) - Breast, Left, US BX Left Br 1000 4CMFN 4 passes 12G Marquee                           Addendum       Specimen D:  Performed on invasive carcinoma block D2:  Test Description  Result  Prognostic Interpretation  Estrogen Receptor/ER  0%  Negative    Primary Antibody: SP1    Internal control: Not present                 External control: Positive                 Chau Score*: 0  Progesterone Receptor/PgR 0%  Negative    Primary Antibody: 1    Internal control: Not present                 External control: Positive Chau Score*: 0  HER2 by IHC   2+  Equivocal    Primary Antibody:4B5  HER2 by FISH         To Follow  A positive control for each antibody has been reviewed and accepted  Fixative: formalin, duration of fixation (9 5 hours) meets specified requirements of latest ASCO/CAP guidelines  Cold Ischemia Time (minutes): Not stated  Sample Adequacy: Adequate  These immunohistochemical tests were performed at [facility] in [place], [place] and interpreted by Dr Summer Asif  An electronic copy of this report will be kept on file in the Medical Records Department at [facility]   * The Chau score is a semi quantitative system that takes into consideration the proportion of positive cells (scored on a scale of 0-5) and staining intensity (scored on a scale of (0-3)  The proportion and intensity are summed to produce total scores of 0 or 2 through 8  A score of 0-2 is regarded as negative while 3-8 as positive  Final Diagnosis       A  Right breast, 11:00, 10 cm from nipple, with calcifications, stereotactic large core needle biopsy x 2"  - Benign breast glandular parenchyma with fibroadenomatoid stromal hyperplasia, involving by dystrophic & clustered calcifications up to 800 microns diameter   - No epithelial atypia and no evidence of malignancy  B  Right breast, 11:00, 10 cm from nipple, without calcifications, stereotactic large core needle biopsy x remaining  - Benign fatty breast glandular tissue with nonspecific stromal collagenization   - No calcifications seen  - No epithelial atypia and no evidence of malignancy  C  Left breast, 10:00, 5 cm from nipple, ultrasound-guided large core (12g marquee) needle biopsy x 3:  - Benign breast glandular tissue with age-appropriate involutional change and nonspecific stromal collagenization   - No calcifications seen  - No epithelial atypia and no evidence of malignancy      D  Left breast, 10:00, 4 cm from nipple, ultrasound-guided large core (12g marquee) needle biopsy x 4:  - Invasive breast carcinoma of no special type (ductal NST/invasive ductal carcinoma)  * [place] grade 3 of 3 (total score = 3+3+3 = of 9)    -- tubule formation < 10%, score 3    -- nuclear grade 3 of 3, score 3    -- mitoses = 10 mitoses/mm2, score 3    * confirmed by tumor cell immunophenotype:    -- positive: E-cadherin & p120 (each in a cytoplasmic membrane-restricted pattern), Gata3(diffusely strong nuclear), CK7(cytoplasmic), p63(nuclear & cytoplasmic in minor, < 10% subset)  -- negative:  p63, smooth muscle myosin heavy chain  * invasive carcinoma involves 4 of 4 submitted core biopsies, maximal dimension = 18 millimeters  * estrogen, progesterone & HER2 receptor studies pending, to be described in a separate receptor report  - Ductal carcinoma in situ (DCIS): Not identified  - Lymph-vascular invasion: Not identified  - Microcalcifications: Absent  Note:    1  Intradepartmental consultation concurs with invasive breast carcinoma of no special type  2  Final report is telephonically discussed with Marya Christensen of [address] @ 11:30 AM, 2/14/2020  Additional Information       All controls performed with the immunohistochemical stains reported above reacted appropriately  These tests were developed and their performance characteristics determined by [facility] or [facility]  They may not be cleared or approved by the [place]  Food and Drug Administration  The FDA has determined that such clearance or approval is not necessary  These tests are used for clinical purposes  They should not be regarded as investigational or for research  This laboratory has been approved by CLIA 88, designated as a high-complexity laboratory and is qualified to perform these tests  Gross Description       A   The specimen is received in formalin, labeled with the patient's name and hospital number, and is designated "specimen 1 @ 11:00, 10cm FN-2 cores with calcs"  The specimen consists of 5 cores of pale yellow fatty tissue ranging in length from 1 0-2 6 cm by 0 1-0 5 cm in diameter each  Also received in the specimen container is a 1 0 x 0 6 x 0 2 cm aggregate of multiple portions of tan-yellow fatty tissue  The specimen is entirely submitted in between sponges with the tissue cores in cassettes A1-A3 and the tissue fragments in cassette A4     B  The specimen is received in formalin, labeled with the patient's name and hospital number, and is designated "specimen 2 @ 11:00, 10cm FN-2 cores without calcs"  The specimen consists of 4 cores of pale yellow fatty tissue ranging in length from 1 9-2 8 cm x 0 2-0 5 cm in diameter each  Entirely submitted in between sponges in 2 cassettes  C  The specimen is received in formalin, labeled with the patient's name and hospital number, and is designated "ultrasound biopsy left breast 1000 5cmfn 3 passes 12g marquee"  The specimen consists of 3 cores of tan-yellow and focally hemorrhagic fibrofatty tissue ranging in length from 1 0-1 4 cm by 0 2-0 4 cm in diameter each  Entirely submitted in between sponges in 2 cassettes  D  The specimen is received in formalin, labeled with the patient's name and hospital number, and is designated "ultrasound biopsy left breast 1000 4 cmfn 4 passes 12g marquee"  The specimen consists of 4 cores of yellow/white fibrofatty tissue ranging in length from 1 1-1 6 cm x 0 2 cm in diameter each  Entirely submitted in between sponges in 2 cassettes  Note: The estimated total formalin fixation time based upon information provided by the submitting clinician and the standard processing schedule is 9 5 hours    RRkat      Clinical Information       Right stereotactic breast biopsy @@ 11:00, 10cm FN for indeterminate calcifications/ Lateral to medial approach/ 8g Mammotome Revolve/ Triple twist Clip/ Specimens collected Orders Placed This Encounter   Procedures    TSH, 3rd generation    CBC    Comprehensive metabolic panel       ALLERGIES:  Allergies   Allergen Reactions    Mobic [Meloxicam] Eye Swelling     Reaction Date: 12Aug2011;     Methotrexate Rash    Sulfa Antibiotics Rash       Current Medications     Current Outpatient Medications   Medication Sig Dispense Refill    ALPRAZolam (XANAX) 1 mg tablet Take 1 tablet (1 mg total) by mouth 3 (three) times a day as needed for anxiety 90 tablet 0    Cholecalciferol (VITAMIN D3) 08980 units CAPS Take 50,000 Units by mouth once a week      cyclobenzaprine (FLEXERIL) 10 mg tablet Take 5 mg by mouth 3 (three) times a day as needed for muscle spasms      cyclobenzaprine (FLEXERIL) 5 mg tablet Take 5 mg by mouth 3 (three) times a day as needed for muscle spasms      ergocalciferol (VITAMIN D2) 50,000 units Take 50,000 Units by mouth once a week      HYDROcodone-acetaminophen (NORCO) 5-325 mg per tablet Take 1 tablet by mouth every 6 (six) hours as needed for pain      hydroxychloroquine (PLAQUENIL) 200 mg tablet Take 1 tablet in morning and 1/2 tablet in evening      levothyroxine 88 mcg tablet Take 1 tablet (88 mcg total) by mouth daily 30 tablet 5    moexipril (UNIVASC) 15 MG tablet Take 1 tablet (15 mg total) by mouth daily 30 tablet 5    Nutritional Supplements (OSTEOPOROSIS SUPPORT PO) Take by mouth      predniSONE 1 mg tablet Take 1 mg by mouth 2 (two) times a day as needed (Lupus exacerbations)      sertraline (ZOLOFT) 100 mg tablet Use one and one-half tablet once daily 45 tablet 5    sertraline (ZOLOFT) 100 mg tablet TAKE 1 & 1/2 TABLETS BY MOUTH ONCE DAILY 45 tablet 5    sertraline (ZOLOFT) 50 mg tablet 1/2 tab po q day 45 tablet 1     No current facility-administered medications for this visit            Health Maintenance     Health Maintenance   Topic Date Due    Hepatitis C Screening  1965    Pneumococcal Vaccine: Pediatrics (0 to 5 Years) and At-Risk Patients (6 to 59 Years) (1 of 1 - PPSV23) 08/29/1969    DTaP,Tdap,and Td Vaccines (1 - Tdap) 08/29/1974    HIV Screening  08/29/1978    Annual Physical  08/29/1981    Influenza Vaccine  03/18/2020 (Originally 7/1/2019)    MAMMOGRAM  02/04/2021    BMI: Adult  02/20/2021    CRC Screening: Colonoscopy  10/09/2027    Pneumococcal Vaccine: 65+ Years (1 of 2 - PCV13) 08/29/2028    HIB Vaccine  Aged Out    Hepatitis B Vaccine  Aged Out    IPV Vaccine  Aged Out    Hepatitis A Vaccine  Aged Out    Meningococcal ACWY Vaccine  Aged Out    HPV Vaccine  Aged Out     There is no immunization history for the selected administration types on file for this patient      Uma Baeza MD    I spent 30 minutes with this patient which greater than 50% was spent counseling

## 2020-02-20 NOTE — ASSESSMENT & PLAN NOTE
Hypertension  Patient blood pressure is stable at this time she will continue with current regimen of medications  She was made aware of her increased weight and should try to obtain an aerobic form of exercise such as walking 30 minutes 3 days a week

## 2020-02-21 ENCOUNTER — TELEPHONE (OUTPATIENT)
Dept: FAMILY MEDICINE CLINIC | Facility: CLINIC | Age: 55
End: 2020-02-21

## 2020-02-21 NOTE — TELEPHONE ENCOUNTER
----- Message from Pennie Cervantes MD sent at 5/56/4249  7:03 AM EST -----  All recent blood work stable for patient

## 2020-02-25 PROBLEM — Z17.1 MALIGNANT NEOPLASM OF UPPER-INNER QUADRANT OF LEFT BREAST IN FEMALE, ESTROGEN RECEPTOR NEGATIVE (HCC): Status: ACTIVE | Noted: 2020-02-25

## 2020-02-25 PROBLEM — C50.212 MALIGNANT NEOPLASM OF UPPER-INNER QUADRANT OF LEFT BREAST IN FEMALE, ESTROGEN RECEPTOR NEGATIVE (HCC): Status: ACTIVE | Noted: 2020-02-25

## 2020-02-26 ENCOUNTER — APPOINTMENT (OUTPATIENT)
Dept: LAB | Facility: CLINIC | Age: 55
End: 2020-02-26
Payer: COMMERCIAL

## 2020-02-26 ENCOUNTER — PATIENT OUTREACH (OUTPATIENT)
Dept: HEMATOLOGY ONCOLOGY | Facility: CLINIC | Age: 55
End: 2020-02-26

## 2020-02-26 ENCOUNTER — CONSULT (OUTPATIENT)
Dept: SURGICAL ONCOLOGY | Facility: CLINIC | Age: 55
End: 2020-02-26
Payer: COMMERCIAL

## 2020-02-26 ENCOUNTER — HOSPITAL ENCOUNTER (OUTPATIENT)
Dept: CT IMAGING | Facility: HOSPITAL | Age: 55
Discharge: HOME | End: 2020-02-26
Attending: SURGERY
Payer: COMMERCIAL

## 2020-02-26 ENCOUNTER — TRANSCRIBE ORDERS (OUTPATIENT)
Dept: LAB | Facility: CLINIC | Age: 55
End: 2020-02-26

## 2020-02-26 VITALS
WEIGHT: 130 LBS | HEIGHT: 63 IN | TEMPERATURE: 98.3 F | BODY MASS INDEX: 23.04 KG/M2 | RESPIRATION RATE: 16 BRPM | HEART RATE: 112 BPM | DIASTOLIC BLOOD PRESSURE: 90 MMHG | SYSTOLIC BLOOD PRESSURE: 122 MMHG

## 2020-02-26 DIAGNOSIS — Z80.3 FAMILY HISTORY OF BREAST CANCER: ICD-10-CM

## 2020-02-26 DIAGNOSIS — C50.212 MALIGNANT NEOPLASM OF UPPER-INNER QUADRANT OF LEFT BREAST IN FEMALE, ESTROGEN RECEPTOR NEGATIVE (HCC): Primary | ICD-10-CM

## 2020-02-26 DIAGNOSIS — Z17.1 MALIGNANT NEOPLASM OF UPPER-INNER QUADRANT OF LEFT BREAST IN FEMALE, ESTROGEN RECEPTOR NEGATIVE (HCC): ICD-10-CM

## 2020-02-26 DIAGNOSIS — Z17.1 ESTROGEN RECEPTOR NEGATIVE STATUS (ER-): ICD-10-CM

## 2020-02-26 DIAGNOSIS — C50.212 MALIGNANT NEOPLASM OF UPPER-INNER QUADRANT OF LEFT FEMALE BREAST, UNSPECIFIED ESTROGEN RECEPTOR STATUS (HCC): Primary | ICD-10-CM

## 2020-02-26 DIAGNOSIS — Z17.1 MALIGNANT NEOPLASM OF UPPER-INNER QUADRANT OF LEFT BREAST IN FEMALE, ESTROGEN RECEPTOR NEGATIVE (HCC): Primary | ICD-10-CM

## 2020-02-26 DIAGNOSIS — C50.212 MALIGNANT NEOPLASM OF UPPER-INNER QUADRANT OF LEFT FEMALE BREAST, UNSPECIFIED ESTROGEN RECEPTOR STATUS (HCC): ICD-10-CM

## 2020-02-26 DIAGNOSIS — C50.212 MALIGNANT NEOPLASM OF UPPER-INNER QUADRANT OF LEFT BREAST IN FEMALE, ESTROGEN RECEPTOR NEGATIVE (HCC): ICD-10-CM

## 2020-02-26 LAB — MISCELLANEOUS LAB TEST RESULT: NORMAL

## 2020-02-26 PROCEDURE — 71260 CT THORAX DX C+: CPT

## 2020-02-26 PROCEDURE — 99245 OFF/OP CONSLTJ NEW/EST HI 55: CPT | Performed by: SURGERY

## 2020-02-26 PROCEDURE — 36415 COLL VENOUS BLD VENIPUNCTURE: CPT

## 2020-02-26 PROCEDURE — 74177 CT ABD & PELVIS W/CONTRAST: CPT

## 2020-02-26 RX ADMIN — IOHEXOL 50 ML: 240 INJECTION, SOLUTION INTRATHECAL; INTRAVASCULAR; INTRAVENOUS; ORAL at 14:51

## 2020-02-26 RX ADMIN — IOHEXOL 100 ML: 350 INJECTION, SOLUTION INTRAVENOUS at 14:51

## 2020-02-26 NOTE — PROGRESS NOTES
Surgical Oncology Consult Note       [address] [facility]  [facility] SURGICAL ONCOLOGY [place]  [address] [facility] [address]  [place] [address]    Jeff Dire Mock  1965  [id number]  [address] [facility]  [facility] SURGICAL ONCOLOGY [place]  [address] [facility] [place] [address]      Chief Complaint:     Chief Complaint   Patient presents with    Consult    Breast Cancer     New Diagnosis       Assessment and Plan:   Assessment/Plan   Patient presents with a new diagnosis of left invasive ductal carcinoma, ER negative CO negative and her 2 positive, grade 3  She had 2 other biopsies which were benign  She has a poorly imaged left mammogram finding for which an MRI is been recommended  I have recommended genetic testing  I have also recommended neoadjuvant chemotherapy after discussion with Brii Elliott  Patient also has systemic lupus erythematosus which may potentially complicate breast conserving surgery  All this was reviewed with the patient  We will coordinate a metastatic workup and have her see Dr Brii Elliott for neoadjuvant chemotherapy  We will see her back in 3 months presuming she initiates neoadjuvant chemotherapy  Oncology History:        Malignant neoplasm of upper-inner quadrant of left breast in female, estrogen receptor negative ([facility] [address] )    2/12/2020 Biopsy     A  & B  Right breast stereotactic biopsy with and without calcs; 11 o'clock, 10 cm from nipple  Benign breast glandular parenchyma with fibroadenomatoid stromal hyperplasia  No atypia and no evidence of malignancy     C  Left breast ultrasound-guided biopsy; 10 o'clock, 5 cm from nipple  Benign breast glandular tissue  No atypia and no evidence of malignancy    D  Left breast ultrasound-guided biopsy; 10 o'clock, 4 cm from nipple  Invasive breast carcinoma of no special type (ductal NST)  Grade 3  ER 0  CO 0  HER2 2+; FISH positive    Concordant  Right breast clear  Malignant mass measures 2 3 cm on mammo  Left axilla US negative  Mammographic abnormality with no US correlate  MRI recommended to further evaluate this finding  History of Present Illness: This is a 57-year-old woman who approximately 1 month ago appreciated a small swelling on her breast   This area has increased in size  She subsequently had imaging which demonstrated 3 abnormalities 1 in the right breast 2 in the left 1 at the 10 o'clock position 4 cm from the nipple and 1 at the 10 o'clock position 5 cm from the nipple  All 3 areas were biopsied  The left breast 10:00 a m  4 cm from the nipple was a 2 3 cm mass which was shown to be invasive ductal carcinoma grade 3, ER 0% AK 0% HER2 Lydia 2+/fish positive  There is no lymphovascular invasion  Evaluation of the axillary ultrasound was negative  The right breast was cleared the patient did have a mammographic finding for which there is no ultrasound correlate in the left breast and consequently an MRI has been recommended  Patient presents now for an opinion regarding further management  The patient has a family history remarkable for her mother having breast cancer in her 76s and ultimately expiring relatively quickly from metastatic disease  The maternal aunt also had breast cancer in her 76s did not undergo treatment and  relatively soon  Patient has a personal history of lupus erythematosus  Review of Systems:   Review of Systems   Constitutional: Negative for activity change, appetite change and fatigue  HENT: Negative  Eyes: Negative  Respiratory: Negative for cough, shortness of breath and wheezing  Cardiovascular: Negative for chest pain and leg swelling  Gastrointestinal: Negative  Endocrine: Negative  Genitourinary: Negative  Musculoskeletal:        No new changes or complaints of bone pain   Skin: Negative  Allergic/Immunologic: Negative  Neurological: Negative  Hematological: Negative  Psychiatric/Behavioral: Negative  Past Medical History:      Patient Active Problem List   Diagnosis    Vomiting    Chest pain    Urinary tract infection    Hypertension    Systemic lupus erythematosus ([facility] [address] )    Hypothyroidism    Posttraumatic stress disorder    Adult celiac disease    Anxiety    Depression    Esophagitis    Nephrolithiasis    Vitamin D deficiency    Malignant neoplasm of upper-inner quadrant of left breast in female, estrogen receptor negative ([facility] [address] )        Past Medical History:   Diagnosis Date    Disease of thyroid gland     Hypertension     Lupus ([facility] [address] )     Malignant neoplasm of upper-inner quadrant of left breast in female, estrogen receptor negative ([facility] [address] ) 2/25/2020    Nephrolithiasis         Past Surgical History:   Procedure Laterality Date    FEMUR FRACTURE SURGERY      HYSTERECTOMY      LEFT OOPHORECTOMY      due to torsion     MAMMO STEREOTACTIC BREAST BIOPSY RIGHT (ALL INC) Right 2/12/2020    US GUIDANCE BREAST BIOPSY LEFT EACH ADDITIONAL Left 2/12/2020    US GUIDED BREAST BIOPSY LEFT COMPLETE Left 2/12/2020    VAGINA SURGERY          Family History   Problem Relation Age of Onset    Diabetes Mother     Hypertension Mother     Nephrolithiasis Mother     Breast cancer Mother 79    Heart disease Father     Hypertension Father     Diabetes Brother     Nephrolithiasis Brother     Breast cancer Maternal Aunt     No Known Problems Maternal Grandmother     No Known Problems Maternal Grandfather     No Known Problems Paternal Grandmother     No Known Problems Paternal Grandfather         Social History     Socioeconomic History    Marital status: /Civil Union     Spouse name: Not on file    Number of children: Not on file    Years of education: Not on file    Highest education level: Not on file   Occupational History    Not on file   Social Needs    Financial resource strain: Not on file    Food insecurity:     Worry: Not on file     Inability: Not on file   Riley Voss Transportation needs:     Medical: Not on file     Non-medical: Not on file   Tobacco Use    Smoking status: Current Every Day Smoker     Packs/day: 1 50     Types: Cigarettes     Start date: 200    Smokeless tobacco: Never Used    Tobacco comment:  5 ppd per [employer]   Substance and Sexual Activity    Alcohol use:  Yes     Alcohol/week: 21 0 standard drinks     Types: 21 Cans of beer per week     Comment: 3 beers day, socially started 30 yrs prior     Drug use: No    Sexual activity: Not on file   Lifestyle    Physical activity:     Days per week: Not on file     Minutes per session: Not on file    Stress: Not on file   Relationships    Social connections:     Talks on phone: Not on file     Gets together: Not on file     Attends Mosque service: Not on file     Active member of club or organization: Not on file     Attends meetings of clubs or organizations: Not on file     Relationship status: Not on file    Intimate partner violence:     Fear of current or ex partner: Not on file     Emotionally abused: Not on file     Physically abused: Not on file     Forced sexual activity: Not on file   Other Topics Concern    Not on file   Social History Narrative    Not on file        Current Outpatient Medications:     ALPRAZolam (XANAX) 1 mg tablet, Take 1 tablet (1 mg total) by mouth 3 (three) times a day as needed for anxiety, Disp: 90 tablet, Rfl: 0    Cholecalciferol (VITAMIN D3) 58337 units CAPS, Take 50,000 Units by mouth once a week, Disp: , Rfl:     cyclobenzaprine (FLEXERIL) 10 mg tablet, Take 5 mg by mouth 3 (three) times a day as needed for muscle spasms, Disp: , Rfl:     cyclobenzaprine (FLEXERIL) 5 mg tablet, Take 5 mg by mouth 3 (three) times a day as needed for muscle spasms, Disp: , Rfl:     ergocalciferol (VITAMIN D2) 50,000 units, Take 50,000 Units by mouth once a week, Disp: , Rfl:     HYDROcodone-acetaminophen (NORCO) 5-325 mg per tablet, Take 1 tablet by mouth every 6 (six) hours as needed for pain, Disp: , Rfl:     hydroxychloroquine (PLAQUENIL) 200 mg tablet, Take 1 tablet in morning and 1/2 tablet in evening, Disp: , Rfl:     levothyroxine 88 mcg tablet, Take 1 tablet (88 mcg total) by mouth daily, Disp: 30 tablet, Rfl: 5    moexipril (UNIVASC) 15 MG tablet, Take 1 tablet (15 mg total) by mouth daily, Disp: 30 tablet, Rfl: 5    Nutritional Supplements (OSTEOPOROSIS SUPPORT PO), Take by mouth, Disp: , Rfl:     predniSONE 1 mg tablet, Take 1 mg by mouth 2 (two) times a day as needed (Lupus exacerbations), Disp: , Rfl:     sertraline (ZOLOFT) 100 mg tablet, Use one and one-half tablet once daily, Disp: 45 tablet, Rfl: 5    sertraline (ZOLOFT) 100 mg tablet, TAKE 1 & 1/2 TABLETS BY MOUTH ONCE DAILY, Disp: 45 tablet, Rfl: 5    sertraline (ZOLOFT) 50 mg tablet, 1/2 tab po q day, Disp: 45 tablet, Rfl: 1     Allergies   Allergen Reactions    Mobic [Meloxicam] Eye Swelling     Reaction Date: 12Aug2011;     Methotrexate Rash    Sulfa Antibiotics Rash       Physical Exam:     Vitals:    02/26/20 0801   BP: 122/90   Pulse: (!) 112   Resp: 16   Temp: 98 3 °F (36 8 °C)     Physical Exam   Constitutional: She is oriented to person, place, and time  She appears well-developed and well-nourished  HENT:   Head: Normocephalic and atraumatic  Mouth/Throat: Oropharynx is clear and moist    Eyes: Pupils are equal, round, and reactive to light  EOM are normal    Neck: Normal range of motion  Neck supple  No JVD present  No tracheal deviation present  No thyromegaly present  Cardiovascular: Normal rate, regular rhythm, normal heart sounds and intact distal pulses  Exam reveals no gallop and no friction rub  No murmur heard  Pulmonary/Chest: Effort normal and breath sounds normal  No respiratory distress  She has no wheezes  She has no rales  Right breast exhibits no inverted nipple, no mass, no nipple discharge, no skin change and no tenderness   Left breast exhibits no inverted nipple, no mass, no nipple discharge, no skin change and no tenderness  Breasts are symmetrical    Examination of the right breast demonstrates no skin changes nipple discharge dominant masses or axillary adenopathy  Examination of the left breast in both the sitting and supine position demonstrate a dominant masses outlined on the diagram   There is no axillary adenopathy  There is no ruperto clavicular adenopathy  There are no worrisome skin findings nipple discharge  Abdominal: Soft  She exhibits no distension and no mass  There is no hepatomegaly  There is no tenderness  There is no rebound and no guarding  Musculoskeletal: Normal range of motion  She exhibits no edema or tenderness  Lymphadenopathy:     She has no cervical adenopathy  Neurological: She is alert and oriented to person, place, and time  No cranial nerve deficit  Skin: Skin is warm and dry  No rash noted  No erythema  Psychiatric: She has a normal mood and affect  Her behavior is normal    Vitals reviewed  Results:   I reviewed her mammogram and ultrasound I concur with reports  I agree with the recommendation for MRI  Discussion/Summary:   The patient has a history of lupus which most of her socks [place] is in the joints so hopefully she could undergo radiation therapy and breast conservation therapy  If this is decided on the final option will have her see Radiation Oncology prior to initiating a surgical procedure  Prior to that however she is a good candidate for neoadjuvant chemotherapy I have reviewed this with Dr Tiara Al as well as the patient in detail  We would also recommend a metastatic workup which is been ordered  Presuming she starts neoadjuvant chemotherapy I will see her back in 3 months  Otherwise we will direct our attention to surgical therapy as a 1st option as outlined above  All questions were answered the patient's satisfaction      Advance Care Planning/Advance Directives:  I discussed the disease status, treatment plans and follow-up with the patient

## 2020-02-26 NOTE — PROGRESS NOTES
Met with patient & S/O on 2/26/20 after MD consultation  Introduced myself, & explained role of nurse navigator  Offered therapeutic listening & emotional support  Answered all questions to their satisfaction  Informed and provided written material pertaining to cancer support services available, including: Cancer counselors, LAFF program, P/T, Nutrition and treatment classes, [place] and My Chart  Patient was given the patient guidebook and my contact information  I encouraged her to call for questions/concerns  Patient escorted to surgery scheduler  Onc Nurse Episode established  Will follow up as needed

## 2020-02-27 ENCOUNTER — HOSPITAL ENCOUNTER (OUTPATIENT)
Dept: NUCLEAR MEDICINE | Facility: HOSPITAL | Age: 55
Discharge: HOME | End: 2020-02-27
Attending: SURGERY
Payer: COMMERCIAL

## 2020-02-27 DIAGNOSIS — Z17.1 MALIGNANT NEOPLASM OF UPPER-INNER QUADRANT OF LEFT BREAST IN FEMALE, ESTROGEN RECEPTOR NEGATIVE (HCC): ICD-10-CM

## 2020-02-27 DIAGNOSIS — C50.212 MALIGNANT NEOPLASM OF UPPER-INNER QUADRANT OF LEFT BREAST IN FEMALE, ESTROGEN RECEPTOR NEGATIVE (HCC): ICD-10-CM

## 2020-02-27 PROCEDURE — 78306 BONE IMAGING WHOLE BODY: CPT

## 2020-02-27 PROCEDURE — A9503 TC99M MEDRONATE: HCPCS

## 2020-03-03 ENCOUNTER — TELEPHONE (OUTPATIENT)
Dept: SURGICAL ONCOLOGY | Facility: CLINIC | Age: 55
End: 2020-03-03

## 2020-03-03 NOTE — TELEPHONE ENCOUNTER
Called patient to review multiple test results  Explained that her genetic testing results came back negative  Informed patient that the CT was negative for any sign of metastasis  Also discussed the bone scan results which showed activity on the spine but that Dr Pankaj De La Cruz and Dr Ellyn Kocher were not concerned about it from a metastatic standpoint  Patient verbalized understanding and was appreciative of the phone call   Patient requested that all her results be mailed to her; done per request

## 2020-03-09 ENCOUNTER — HOSPITAL ENCOUNTER (OUTPATIENT)
Dept: RADIOLOGY | Facility: HOSPITAL | Age: 55
Discharge: HOME | End: 2020-03-09
Attending: SURGERY
Payer: COMMERCIAL

## 2020-03-09 DIAGNOSIS — Z17.1 MALIGNANT NEOPLASM OF UPPER-INNER QUADRANT OF LEFT BREAST IN FEMALE, ESTROGEN RECEPTOR NEGATIVE (HCC): ICD-10-CM

## 2020-03-09 DIAGNOSIS — C50.212 MALIGNANT NEOPLASM OF UPPER-INNER QUADRANT OF LEFT BREAST IN FEMALE, ESTROGEN RECEPTOR NEGATIVE (HCC): ICD-10-CM

## 2020-03-09 PROCEDURE — A9585 GADOBUTROL INJECTION: HCPCS | Performed by: SURGERY

## 2020-03-09 PROCEDURE — 77049 MRI BREAST C-+ W/CAD BI: CPT

## 2020-03-09 PROCEDURE — C8908 MRI W/O FOL W/CONT, BREAST,: HCPCS

## 2020-03-09 RX ADMIN — GADOBUTROL 6 ML: 604.72 INJECTION INTRAVENOUS at 13:56

## 2020-03-11 ENCOUNTER — DOCUMENTATION (OUTPATIENT)
Dept: HEMATOLOGY ONCOLOGY | Facility: CLINIC | Age: 55
End: 2020-03-11

## 2020-03-11 ENCOUNTER — CONSULT (OUTPATIENT)
Dept: HEMATOLOGY ONCOLOGY | Facility: CLINIC | Age: 55
End: 2020-03-11
Payer: COMMERCIAL

## 2020-03-11 VITALS
SYSTOLIC BLOOD PRESSURE: 122 MMHG | RESPIRATION RATE: 18 BRPM | BODY MASS INDEX: 23.74 KG/M2 | TEMPERATURE: 97.4 F | OXYGEN SATURATION: 97 % | WEIGHT: 134 LBS | HEART RATE: 92 BPM | DIASTOLIC BLOOD PRESSURE: 78 MMHG | HEIGHT: 63 IN

## 2020-03-11 DIAGNOSIS — C50.212 MALIGNANT NEOPLASM OF UPPER-INNER QUADRANT OF LEFT BREAST IN FEMALE, ESTROGEN RECEPTOR NEGATIVE (HCC): ICD-10-CM

## 2020-03-11 DIAGNOSIS — Z17.1 MALIGNANT NEOPLASM OF UPPER-INNER QUADRANT OF LEFT BREAST IN FEMALE, ESTROGEN RECEPTOR NEGATIVE (HCC): Primary | ICD-10-CM

## 2020-03-11 DIAGNOSIS — Z17.1 MALIGNANT NEOPLASM OF UPPER-INNER QUADRANT OF LEFT BREAST IN FEMALE, ESTROGEN RECEPTOR NEGATIVE (HCC): ICD-10-CM

## 2020-03-11 DIAGNOSIS — D70.1 CHEMOTHERAPY INDUCED NEUTROPENIA (HCC): ICD-10-CM

## 2020-03-11 DIAGNOSIS — C50.212 MALIGNANT NEOPLASM OF UPPER-INNER QUADRANT OF LEFT BREAST IN FEMALE, ESTROGEN RECEPTOR NEGATIVE (HCC): Primary | ICD-10-CM

## 2020-03-11 DIAGNOSIS — T45.1X5A CHEMOTHERAPY INDUCED NEUTROPENIA (HCC): ICD-10-CM

## 2020-03-11 PROCEDURE — 99245 OFF/OP CONSLTJ NEW/EST HI 55: CPT | Performed by: INTERNAL MEDICINE

## 2020-03-11 RX ORDER — ONDANSETRON 4 MG/1
4 TABLET, FILM COATED ORAL EVERY 8 HOURS PRN
Qty: 30 TABLET | Refills: 1 | Status: SHIPPED | OUTPATIENT
Start: 2020-03-11 | End: 2020-04-16

## 2020-03-11 NOTE — PROGRESS NOTES
Hematology / Oncology Outpatient Consult Note    Minus Gracia 62 y o  female [id number]         Date:  3/11/2020    Assessment / Plan:  A 12-year-old postmenopausal woman with newly diagnosed clinical stage II left breast cancer, grade 3, ER negative, LA negative, HER2 fish positive disease  She presents today with her  to discuss neoadjuvant chemotherapy  She is a heavy smoking history others alcohol drink  I strongly recommended her to quit smoking and stop drinking  We had extensive discussion regarding the diagnosis, aggressive nature of disease, tumor phenotype, probable staging information, prognosis with a without systemic chemotherapy, and treatment options  Based on her phenotype and stage, it is reasonable to treat her with neoadjuvant chemotherapy  I recommended her to have [place] 26 with pertuzumab every 3 weeks x6 cycles  Side effects of this regimen was thoroughly discussed, including but not limited to alopecia, nausea, vomiting, neutropenia, risk of infection, allergic reaction, peripheral edema, cardiac toxicities  She understood and wished to proceed  I am going to ask interventional Radiology to place a Port-A-Cath  She is going to have echocardiogram for initial cardiac monitoring  She is going to have 1st cycle of neoadjuvant chemotherapy at [address] [place] [address] in March 24, 2020  She was instructed to give us a call immediately if she has fever above 100 4  All the patient and her 's questions were answered to their satisfaction  Subjective:     HPI:  A 12-year-old postmenopausal woman who felt a lump in her left breast which she brought to medical attention  She was found to have large left breast mass for which she underwent biopsy recently which showed invasive ductal carcinoma, grade 3  This was ER negative, LA negative, HER2 2+ disease  HER2 fish was positive for gene application with ratio of 2 2  She was subsequently seen by Dr Sahra Sandhu  Based on MRI, she had 2 7 x 2 1 x 2 3 cm of left breast mass with no enlarged axillary lymph node  Bone scan was negative for osseous metastasis  CT scan of chest abdomen pelvis showed no evidence of distant metastasis  She was referred to me to discuss neoadjuvant chemotherapy  She presents today with her   She has no breast related symptomatology  She denied any pain  She has no respiratory symptoms  Her weight is stable  She has extensive history of smoking with 1 and half pack per day for 40 years  She also drinks 4 beers every day for many years  She has lupus for which she is on low-dose prednisone  She also has hypothyroidism as well as well-controlled hypertension  She has a mother and maternal and had breast cancer above the age of 61  She has no family history of ovarian cancer  Her performance status is 0 to 1/4 on the ECOG scale  Interval History:          Objective:     Primary Diagnosis:    Clinical stage II left breast cancer, grade 3, ER negative, NE negative, HER2 fish positive disease, diagnosed in March 2020  Cancer Staging:  Cancer Staging  No matching staging information was found for the patient  Previous Hematologic/ Oncologic Treatment:         Current Hematologic/ Oncologic Treatment:      Neoadjuvant chemotherapy with TCH with pertuzumab x6 cycles  1st cycle to be given in March 24, 2020  Disease Status:     Not evaluated at this time  Test Results:    Pathology:    Left breast biopsy showed invasive ductal carcinoma, grade 3, ER negative, NE negative, HER2 fish positive disease with ratio of 2 2  Radiology:    Bone scan was negative for osseous metastasis  CT scan of chest abdomen pelvis showed no evidence of distant metastasis  Laboratory:    See below      Physical Exam:      General Appearance:    Alert, oriented        Eyes:    PERRL   Ears:    Normal external ear canals, both ears   Nose:   Nares normal, septum midline   Throat: Mucosa moist  Pharynx without injection  Neck:   Supple       Lungs:     Clear to auscultation bilaterally   Chest Wall:    No tenderness or deformity    Heart:    Regular rate and rhythm       Abdomen:     Soft, non-tender, bowel sounds +, no organomegaly           Extremities:   Extremities no cyanosis or edema       Skin:   no rash or icterus  Lymph nodes:   Cervical, supraclavicular, and axillary nodes normal   Neurologic:   CNII-XII intact, normal strength, sensation and reflexes     Throughout          Breast exam:   5 x 5 cm of mass at in upper quadrant of her left breast   No palpable left axillary adenopathy  Right breast exam is negative  ROS: Review of Systems   All other systems reviewed and are negative  Imaging: Nm Bone Scan Whole Body    Result Date: 2/27/2020  Narrative: BONE SCAN  WHOLE BODY INDICATION: C50 212: Malignant neoplasm of upper-inner quadrant of left female breast Z17 1: Estrogen receptor negative status (ER-) PREVIOUS FILM CORRELATION:    CT chest abdomen pelvis 2/26/2020 TECHNIQUE:   This study was performed following the intravenous administration of 25 0 mCi Tc-99m labeled MDP  Delayed, anterior and posterior whole body images were acquired, 2-3 hours after radiopharmaceutical administration  Additional delayed static images acquired of the bilateral ribs and pelvis  FINDINGS:  Foci of radiotracer uptake at the maxilla and mandible may be related to dental disease  Linear band of radiotracer uptake at the L1 level  On CT there is slight sclerosis at the superior endplate with mild loss of vertebral body height  Mild patchy radiotracer uptake in the lower cervical spine on the left is probably degenerative  Mild patchy radiotracer uptake in the lower lumbar spine on the right is compatible with degenerative changes as seen on CT  Mild focal radiotracer uptake at the left hip greater trochanter corresponds to enthesopathy and old fracture deformity on CT  Foci of radiotracer uptake at the bilateral shoulders and left wrist are likely related to arthritic changes given the distribution  Renal activity appears symmetric  Impression: 1  Linear band of radiotracer uptake at the L1 vertebral body level  On CT there is slight sclerosis at the superior endplate with mild loss of vertebral body height  There is likely a developing compression deformity based on the CT findings  This is not typical for metastasis  This could be further evaluated with MRI  Workstation performed: [id number]     Ct Chest Abdomen Pelvis W Contrast    Result Date: 2/26/2020  Narrative: CT CHEST, ABDOMEN AND PELVIS WITH IV CONTRAST INDICATION:   C50 212: Malignant neoplasm of upper-inner quadrant of left female breast Z17 1: Estrogen receptor negative status (ER-)  COMPARISON:  August 4, 2017  TECHNIQUE: CT examination of the chest, abdomen and pelvis was performed  Axial, sagittal, and coronal 2D reformatted images were created from the source data and submitted for interpretation  Radiation dose length product (DLP) for this visit:  581 mGy-cm   This examination, like all CT scans performed in the [facility], was performed utilizing techniques to minimize radiation dose exposure, including the use of iterative reconstruction and automated exposure control  IV Contrast: 100 mL of iohexol (OMNIPAQUE) Enteric Contrast: Enteric contrast was administered  FINDINGS: CHEST LUNGS:  Hypoventilatory changes noted in the dependent lungs  There is a calcified subpleural granuloma in the posterior right lower lobe  No suspicious noncalcified pulmonary mass  PLEURA:  Unremarkable  HEART/GREAT VESSELS:  Unremarkable for patient's age  MEDIASTINUM AND FACUNDO:  Unremarkable  CHEST WALL AND LOWER NECK: There is a 10 mm nodule in the lower pole the right thyroid lobe    Incidental discovery of one or more thyroid nodule(s) measuring less than 1 5 cm and without suspicious features is noted in this patient who is above 28years old; according to guidelines published in the February 2015 white paper on incidental thyroid nodules in the Journal of the [employer] of Radiology [facility]), no further evaluation is recommended  There is an enhancing 2 4 x 1 8 cm mass in the left breast consistent with the known primary breast malignancy  ABDOMEN LIVER/BILIARY TREE:  Unremarkable  GALLBLADDER:  No calcified gallstones  No pericholecystic inflammatory change  SPLEEN:  Unremarkable  PANCREAS:  Unremarkable  ADRENAL GLANDS:  Unremarkable  KIDNEYS/URETERS:  Unremarkable  No hydronephrosis  STOMACH AND BOWEL:  Unremarkable  APPENDIX:  A normal appendix was visualized  ABDOMINOPELVIC CAVITY:  No ascites or free intraperitoneal air  No lymphadenopathy  VESSELS:  Unremarkable for patient's age  PELVIS REPRODUCTIVE ORGANS:  Patient is status post hysterectomy  URINARY BLADDER:  Unremarkable  ABDOMINAL WALL/INGUINAL REGIONS:  There is a small fat-containing umbilical hernia, unchanged from previous examination  OSSEOUS STRUCTURES:  Postsurgical deformity associated with the left greater trochanter  Mild lumbar degenerative changes most notable at L4-L5  Sclerosis and subtle chronic irregularity in the superior endplate at L1  No acute fracture or destructive  osseous lesion  Impression: Primary malignancy in the left breast   No CT evidence of metastatic tumor in the chest, abdomen or pelvis   Workstation performed: [id number]     Mri Breast Bilateral W And Wo Contrast W Cad    Result Date: 3/10/2020  Narrative: DIAGNOSIS: Malignant neoplasm of upper-inner quadrant of left breast in female, estrogen receptor negative (HCC) TECHNIQUE: MRI of both breasts was performed in axial and sagittal planes utilizing a combination of axial diffusion, fat suppressed sagittal T2 , gradient echo in phase T1 weighting followed by sagittal dynamic post contrast imaging with 3D fat suppressed gradient-echo T1 sequences (VIBRANT) at 1 5 minute intervals  Axial VIBRANT post contrast images were obtained  Sagittal subtraction images were generated  This exam was read in conjunction with [employer] software  Intravenous contrast was administered at 2cc/sec, without documented contrast reaction  MEDICATIONS ADMINISTERED: gadobutrol injection (MULTI-DOSE) SOLN 6 mL, Total Given: 6 mL (1 dose) COMPARISONS:  No prior breast MRI is available for comparison  Mammograms and ultrasounds dated between 02/20/2009 and 02/12/2020 were reviewed  RELEVANT HISTORY: Family Breast Cancer History: History of breast cancer in Mother, Maternal Aunt  Family Medical History: Family medical history includes breast cancer in maternal aunt and breast cancer in mother  Personal History: Hormone history includes birth control  Surgical history includes hysterectomy  Medical history includes breast cancer  RISK ASSESSMENT: 5 Year Tyrer-Cuzick: 2 39 % 10 Year Tyrer-Cuzick: 5 27 % Lifetime Tyrer-Cuzick: 16 % TISSUE DENSITY: FGT: The breasts have heterogeneous fibroglandular tissue  BPE: The background parenchymal enhancement is mild  INDICATION: Chrissie Forrest is a 62 y o  female presenting for Invasive breast cancer, stage I/II, initial workup  FINDINGS: LEFT 1) MASS: There is a 27 mm x 21 mm x 23 mm irregularly shaped, heterogeneous mass with spiculated margins seen in the left breast at 10 o'clock  Delayed phase is washout  This is the biopsy-proven carcinoma  Susceptibility artifact from biopsy marker clip is noted in the mass  No adenopathy is identified  No skin, nipple areolar complex, or chest wall involvement is identified  2) MASS: Post biopsy changes are noted in the region of the biopsied mass in the left breast at 10:00 a m , 5 cm from nipple  3) MASS: There is a 5 mm oval, heterogeneous mass with irregular margins seen in the left breast at 9 o'clock  Delayed phase is washout  Axial postcontrast image series 8, image 248/432    Sagittal post-contrast image series 85113, image 35/140  This correlates with the focal asymmetry seen on recent mammography  Left MRI guided core needle biopsy is recommended  RIGHT There are no suspicious enhancing masses or suspicious areas of nonmass enhancement  There is no axillary or internal mammary adenopathy  Post biopsy changes are noted  Impression:  Biopsy proven carcinoma measures 2 7 cm (left breast 10:00)  5 mm non circumscribed mass with washout is noted in the 9 o'clock position of the left breast, posterior 3rd  Left MRI guided core needle biopsy is recommended  No suspicious enhancement in the right breast  No adenopathy identified  ASSESSMENT/BI-RADS CATEGORY: Left: 4 - Suspicious Right: 2 - Benign Overall: 4 - Suspicious RECOMMENDATION:      - Surgical consultation for the left breast       - MRI-guided breast biopsy for the left breast       - Diagnostic mammogram in 1 year for the right breast  Workstation ID: [id number]    Mammo Stereotactic Breast Biopsy Right ([employer]), Us Guided Breast Biopsy Left Complete, Us Guidance Breast Biopsy Left Each Additional, Mammo Post Biopsy Bilateral    Addendum Date: 2/14/2020 Addendum:   ADDENDUM: PATHOLOGY RESULTS: 4) Calcifications in the right breast at 11 o'clock, 10 cm from the nipple: Benign finding: the pathology findings indicate fibroadenomatoid hyperplasia and benign calcifications  The pathology result is concordant with imaging  2) Mass in the left breast at 10 o'clock, 5 cm from the nipple: Benign finding: the pathology findings indicate stromal collagenization  The pathology result is concordant with imaging  1) Mass in left breast at 10 o'clock, 4 cm from the nipple: Malignant finding: the pathology findings indicate invasive ductal carcinoma  The pathology result is concordant with imaging  Right breast:  Pathology is benign and concordant with imaging    Recent right breast Imaging dated February 4, 2020 was reviewed and no other suspicious findings are seen  Left breast:  Pathology results are concordant with imaging  The malignant mass at 10 o'clock, 5 cm from the nipple measures 19 mm craniocaudal by 23 mm AP x 19 mm transverse on mammogram 02/04/2020  On ultrasound the same day the mass measures 21 x 19 x 17 mm  Ultrasound of the left axilla was performed 02/04/2020 and no abnormal lymph nodes were visualized  There is a mammographic asymmetry in the left breast which did not have a sonographic correlate and has not been biopsied  Pretreatment MRI is recommended to further evaluate this finding (please see the diagnostic report from 02/04/2020)  I personally discussed the pathology results and recommendation for pretreatment MRI with the patient via telephone at 2:05 p m  02/14/2020  She will be contacted by our nurse to facilitate scheduling her surgery consultation appointment  RECOMMENDATION:      - Surgical Consultation for both breasts  - Breast MRI for both breasts  END ADDENDUM    Result Date: 2/12/2020  Narrative: DIAGNOSIS: Abnormal mammogram INDICATION: Abdulaziz Bowman is a 62 y o  female presenting for bilateral breast biopsies  FINDINGS: Informed consent was obtained by myself, the performing physician  The following technique was used for the stereotactic biopsy:  A time-out was performed  The calcifications were imaged and localized under stereotactic guidance  The skin was cleansed in the usual manner  Anesthesia was administered with local lidocaine  The needle was advanced into the breast, location and targeting was confirmed with additional images  Multiple samples were obtained  Calcifications were confirmed within the specimen radiograph  A clip was placed in the biopsy cavity and confirmed with an additional image  The needle was removed and hemostasis was obtained with direct pressure on the breast  The following technique was used for both ultrasound-guided biopsies:  Time-out was performed    The mass was localized under ultrasound guidance and the skin was marked  The skin was cleansed in the usual manner  Anesthesia was administered with local lidocaine  Under direct ultrasound guidance, a spring-loaded biopsy device was used to obtain multiple samples  Under direct ultrasound guidance, a biopsy clip was placed within the biopsy cavity  Hemostasis was obtained with direct pressure on the breast  Biopsy site A:  Right breast CALCIFICATIONS at 11 o'clock, 10 cm from the nipple (these calcifications were previously described at 12 o'clock, however, the location is better seen on the CC view today confirming they are at 11:00), finding 4 in the prior report  Stereotactic biopsy was performed  5 mL of lidocaine 1% and 20 mL of lidocaine 1% with epinephrine was administered  An 8 gauge vacuum assisted biopsy device was used to obtain 4 specimens from a lateral approach  A triple twist clip was placed within the biopsy cavity  The clip appears well placed on the post biopsy mammogram   Biopsy site B:  LEFT breast MASS at 10 o'clock, 5 cm from the nipple, finding 2 in the prior report  Ultrasound-guided biopsy was performed  5 mL of lidocaine 1% was administered  A 12 gauge spring-loaded vacuum device was used to obtain 3 specimens  A heart shaped clip was placed in the biopsy cavity  The clip projects in the expected location on the post biopsy mammogram   Biopsy site C:  Left breast MASS at 10 o'clock, 4 cm from the nipple, finding 1 on the prior report  Ultrasound-guided biopsy was performed  5 mL of lidocaine 1% was administered  A 12 G spring-loaded biopsy device was used to obtain 4 samples  A wing clip was placed within the mass  The clip appears well placed on the post biopsy mammogram      Impression:  1  Successful stereotactic core needle biopsy of calcifications in the right breast at 11 o'clock, 10 cm from the nipple (previously described as 12:00)    Biopsy site is marked with a triple twist clip which appears well placed  2  Successful ultrasound-guided core needle biopsy of the small mass in the left breast at 10 o'clock, 5 cm from the nipple  The biopsy site is marked with a heart shaped clip which projects in the expected location on the post biopsy mammogram  3  Successful ultrasound-guided core needle biopsy of the mass in the left breast at 4 o'clock, 1 cm from the nipple  The mass is marked with a wing clip which appears well placed  4  Management recommendations will be made once the pathology results are available  If any of the biopsy results are malignant, preoperative MRI may be indicated to further evaluate the small asymmetry described on the recent diagnostic study as this does not correspond with today's biopsy sites  RECOMMENDATION:      - Waiting for pathology for both breasts  Workstation ID: [id number]          Labs:   Lab Results   Component Value Date    WBC 9 16 02/20/2020    HGB 14 3 02/20/2020    HCT 43 4 02/20/2020     (H) 02/20/2020     02/20/2020     Lab Results   Component Value Date     (L) 04/22/2015    K 4 0 02/20/2020     02/20/2020    CO2 25 02/20/2020    ANIONGAP 11 04/22/2015    BUN 10 02/20/2020    CREATININE 0 68 02/20/2020    GLUCOSE 97 04/22/2015    GLUF 91 08/04/2017    CALCIUM 9 4 02/20/2020    AST 23 02/20/2020    ALT 20 02/20/2020    ALKPHOS 86 02/20/2020    PROT 7 2 04/22/2015    BILITOT 0 2 04/22/2015    EGFR 98 02/20/2020         Vital Sign:    Body surface area is 1 63 meters squared      Wt Readings from Last 3 Encounters:   03/11/20 60 8 kg (134 lb)   03/09/20 54 4 kg (120 lb)   02/26/20 59 kg (130 lb)        Temp Readings from Last 3 Encounters:   03/11/20 (!) 97 4 °F (36 3 °C) (Tympanic Core)   02/26/20 98 3 °F (36 8 °C)   02/20/20 (!) 96 9 °F (36 1 °C) (Tympanic)        BP Readings from Last 3 Encounters:   03/11/20 122/78   02/26/20 122/90   02/20/20 130/80         Pulse Readings from Last 3 Encounters:   03/11/20 92   02/26/20 (!) 112   02/20/20 (!) 106     @LASTSAO2(3)@    Active Problems:   Patient Active Problem List   Diagnosis    Vomiting    Chest pain    Urinary tract infection    Hypertension    Systemic lupus erythematosus ([facility] [address] )    Hypothyroidism    Posttraumatic stress disorder    Adult celiac disease    Anxiety    Depression    Esophagitis    Nephrolithiasis    Vitamin D deficiency    Malignant neoplasm of upper-inner quadrant of left breast in female, estrogen receptor negative ([facility] [address] )    Chemotherapy induced neutropenia ([facility] [address] )       Past Medical History:   Past Medical History:   Diagnosis Date    Disease of thyroid gland     Hypertension     Lupus ([facility] [address] )     Malignant neoplasm of upper-inner quadrant of left breast in female, estrogen receptor negative ([facility] [address] ) 2/25/2020    Nephrolithiasis        Surgical History:   Past Surgical History:   Procedure Laterality Date    FEMUR FRACTURE SURGERY      HYSTERECTOMY      LEFT OOPHORECTOMY      due to torsion     MAMMO STEREOTACTIC BREAST BIOPSY RIGHT ([employer] Right 2/12/2020    US GUIDANCE BREAST BIOPSY LEFT EACH ADDITIONAL Left 2/12/2020    US GUIDED BREAST BIOPSY LEFT COMPLETE Left 2/12/2020    VAGINA SURGERY         Family History:    Family History   Problem Relation Age of Onset    Diabetes Mother     Hypertension Mother     Nephrolithiasis Mother     Breast cancer Mother 79    Heart disease Father     Hypertension Father     Diabetes Brother     Nephrolithiasis Brother     Breast cancer Maternal Aunt     No Known Problems Maternal Grandmother     No Known Problems Maternal Grandfather     No Known Problems Paternal Grandmother     No Known Problems Paternal Grandfather        Cancer-related family history includes Breast cancer in her maternal aunt; Breast cancer (age of onset: 79) in her mother      Social History:   Social History     Socioeconomic History    Marital status: /Civil Union     Spouse name: Not on file    Number of children: Not on file    Years of education: Not on file    Highest education level: Not on file   Occupational History    Not on file   Social Needs    Financial resource strain: Not on file    Food insecurity:     Worry: Not on file     Inability: Not on file    Transportation needs:     Medical: Not on file     Non-medical: Not on file   Tobacco Use    Smoking status: Current Every Day Smoker     Packs/day: 1 50     Types: Cigarettes     Start date: [address] [facility]    Smokeless tobacco: Never Used    Tobacco comment:  5 ppd per [employer]   Substance and Sexual Activity    Alcohol use:  Yes     Alcohol/week: 21 0 standard drinks     Types: 21 Cans of beer per week     Comment: 3 beers day, socially started 30 yrs prior     Drug use: No    Sexual activity: Not on file   Lifestyle    Physical activity:     Days per week: Not on file     Minutes per session: Not on file    Stress: Not on file   Relationships    Social connections:     Talks on phone: Not on file     Gets together: Not on file     Attends Spiritism service: Not on file     Active member of club or organization: Not on file     Attends meetings of clubs or organizations: Not on file     Relationship status: Not on file    Intimate partner violence:     Fear of current or ex partner: Not on file     Emotionally abused: Not on file     Physically abused: Not on file     Forced sexual activity: Not on file   Other Topics Concern    Not on file   Social History Narrative    Not on file       Current Medications:   Current Outpatient Medications   Medication Sig Dispense Refill    ALPRAZolam (XANAX) 1 mg tablet Take 1 tablet (1 mg total) by mouth 3 (three) times a day as needed for anxiety 90 tablet 0    Cholecalciferol (VITAMIN D3) 84253 units CAPS Take 50,000 Units by mouth once a week      CRANIAL PROSTHESIS, RX, One wig as needed 1 Piece 0    cyclobenzaprine (FLEXERIL) 10 mg tablet Take 5 mg by mouth 3 (three) times a day as needed for muscle spasms      cyclobenzaprine (FLEXERIL) 5 mg tablet Take 5 mg by mouth 3 (three) times a day as needed for muscle spasms      ergocalciferol (VITAMIN D2) 50,000 units Take 50,000 Units by mouth once a week      HYDROcodone-acetaminophen (NORCO) 5-325 mg per tablet Take 1 tablet by mouth every 6 (six) hours as needed for pain      hydroxychloroquine (PLAQUENIL) 200 mg tablet Take 1 tablet in morning and 1/2 tablet in evening      levothyroxine 88 mcg tablet Take 1 tablet (88 mcg total) by mouth daily 30 tablet 5    moexipril (UNIVASC) 15 MG tablet Take 1 tablet (15 mg total) by mouth daily 30 tablet 5    Nutritional Supplements (OSTEOPOROSIS SUPPORT PO) Take by mouth      predniSONE 1 mg tablet Take 1 mg by mouth 2 (two) times a day as needed (Lupus exacerbations)      sertraline (ZOLOFT) 100 mg tablet Use one and one-half tablet once daily 45 tablet 5    sertraline (ZOLOFT) 100 mg tablet TAKE 1 & 1/2 TABLETS BY MOUTH ONCE DAILY 45 tablet 5    sertraline (ZOLOFT) 50 mg tablet 1/2 tab po q day 45 tablet 1    ondansetron (ZOFRAN) 4 mg tablet Take 1 tablet (4 mg total) by mouth every 8 (eight) hours as needed for nausea or vomiting 30 tablet 1     No current facility-administered medications for this visit  Allergies:    Allergies   Allergen Reactions    Mobic [Meloxicam] Eye Swelling     Reaction Date: [id number];     Methotrexate Rash    Sulfa Antibiotics Rash

## 2020-03-11 NOTE — LETTER
March 11, 9313     [address] [place] [address]    Patient: Birdie Lefort   YOB: 1965   Date of Visit: 3/11/2020       Dear Dr Jadon Santillan: Thank you for referring Baljit Galdamez to me for evaluation  Below are my notes for this consultation  If you have questions, please do not hesitate to call me  I look forward to following your patient along with you  Sincerely,        Anthony See MD        CC: MD Anthony Díaz MD  3/11/2020 10:12 AM  Sign at close encounter  Hematology / Oncology Outpatient Consult Note    Birdie Lefort 62 y o  female [id number]         Date:  3/11/2020    Assessment / Plan:  A 24-year-old postmenopausal woman with newly diagnosed clinical stage II left breast cancer, grade 3, ER negative, NM negative, HER2 fish positive disease  She presents today with her  to discuss neoadjuvant chemotherapy  She is a heavy smoking history others alcohol drink  I strongly recommended her to quit smoking and stop drinking  We had extensive discussion regarding the diagnosis, aggressive nature of disease, tumor phenotype, probable staging information, prognosis with a without systemic chemotherapy, and treatment options  Based on her phenotype and stage, it is reasonable to treat her with neoadjuvant chemotherapy  I recommended her to have [address] with pertuzumab every 3 weeks x6 cycles  Side effects of this regimen was thoroughly discussed, including but not limited to alopecia, nausea, vomiting, neutropenia, risk of infection, allergic reaction, peripheral edema, cardiac toxicities  She understood and wished to proceed  I am going to ask interventional Radiology to place a Port-A-Cath  She is going to have echocardiogram for initial cardiac monitoring  She is going to have 1st cycle of neoadjuvant chemotherapy at [facility] in March 24, 2020   She was instructed to give us a call immediately if she has fever above 100 4  All the patient and her 's questions were answered to their satisfaction  Subjective:     HPI:  A 78-year-old postmenopausal woman who felt a lump in her left breast which she brought to medical attention  She was found to have large left breast mass for which she underwent biopsy recently which showed invasive ductal carcinoma, grade 3  This was ER negative, SC negative, HER2 2+ disease  HER2 fish was positive for gene application with ratio of 2 2  She was subsequently seen by Dr Candido Friedman  Based on MRI, she had 2 7 x 2 1 x 2 3 cm of left breast mass with no enlarged axillary lymph node  Bone scan was negative for osseous metastasis  CT scan of chest abdomen pelvis showed no evidence of distant metastasis  She was referred to me to discuss neoadjuvant chemotherapy  She presents today with her   She has no breast related symptomatology  She denied any pain  She has no respiratory symptoms  Her weight is stable  She has extensive history of smoking with 1 and half pack per day for 40 years  She also drinks 4 beers every day for many years  She has lupus for which she is on low-dose prednisone  She also has hypothyroidism as well as well-controlled hypertension  She has a mother and maternal and had breast cancer above the age of 61  She has no family history of ovarian cancer  Her performance status is 0 to 1/4 on the ECOG scale  Interval History:          Objective:     Primary Diagnosis:    Clinical stage II left breast cancer, grade 3, ER negative, SC negative, HER2 fish positive disease, diagnosed in March 2020  Cancer Staging:  Cancer Staging  No matching staging information was found for the patient  Previous Hematologic/ Oncologic Treatment:         Current Hematologic/ Oncologic Treatment:      Neoadjuvant chemotherapy with TCH with pertuzumab x6 cycles  1st cycle to be given in March 24, 2020       Disease Status:     Not evaluated at this time  Test Results:    Pathology:    Left breast biopsy showed invasive ductal carcinoma, grade 3, ER negative, MI negative, HER2 fish positive disease with ratio of 2 2  Radiology:    Bone scan was negative for osseous metastasis  CT scan of chest abdomen pelvis showed no evidence of distant metastasis  Laboratory:    See below  Physical Exam:      General Appearance:    Alert, oriented        Eyes:    PERRL   Ears:    Normal external ear canals, both ears   Nose:   Nares normal, septum midline   Throat:   Mucosa moist  Pharynx without injection  Neck:   Supple       Lungs:     Clear to auscultation bilaterally   Chest Wall:    No tenderness or deformity    Heart:    Regular rate and rhythm       Abdomen:     Soft, non-tender, bowel sounds +, no organomegaly           Extremities:   Extremities no cyanosis or edema       Skin:   no rash or icterus  Lymph nodes:   Cervical, supraclavicular, and axillary nodes normal   Neurologic:   CNII-XII intact, normal strength, sensation and reflexes     Throughout          Breast exam:   5 x 5 cm of mass at in upper quadrant of her left breast   No palpable left axillary adenopathy  Right breast exam is negative  ROS: Review of Systems   All other systems reviewed and are negative  Imaging: Nm Bone Scan Whole Body    Result Date: 2/27/2020  Narrative: BONE SCAN  WHOLE BODY INDICATION: C50 212: Malignant neoplasm of upper-inner quadrant of left female breast Z17 1: Estrogen receptor negative status (ER-) PREVIOUS FILM CORRELATION:    CT chest abdomen pelvis 2/26/2020 TECHNIQUE:   This study was performed following the intravenous administration of 25 0 mCi Tc-99m labeled MDP  Delayed, anterior and posterior whole body images were acquired, 2-3 hours after radiopharmaceutical administration  Additional delayed static images acquired of the bilateral ribs and pelvis   FINDINGS:  Foci of radiotracer uptake at the maxilla and mandible may be related to dental disease  Linear band of radiotracer uptake at the L1 level  On CT there is slight sclerosis at the superior endplate with mild loss of vertebral body height  Mild patchy radiotracer uptake in the lower cervical spine on the left is probably degenerative  Mild patchy radiotracer uptake in the lower lumbar spine on the right is compatible with degenerative changes as seen on CT  Mild focal radiotracer uptake at the left hip greater trochanter corresponds to enthesopathy and old fracture deformity on CT  Foci of radiotracer uptake at the bilateral shoulders and left wrist are likely related to arthritic changes given the distribution  Renal activity appears symmetric  Impression: 1  Linear band of radiotracer uptake at the L1 vertebral body level  On CT there is slight sclerosis at the superior endplate with mild loss of vertebral body height  There is likely a developing compression deformity based on the CT findings  This is not typical for metastasis  This could be further evaluated with MRI  Workstation performed: [id number]     Ct Chest Abdomen Pelvis W Contrast    Result Date: 2/26/2020  Narrative: CT CHEST, ABDOMEN AND PELVIS WITH IV CONTRAST INDICATION:   C50 212: Malignant neoplasm of upper-inner quadrant of left female breast Z17 1: Estrogen receptor negative status (ER-)  COMPARISON:  August 4, 2017  TECHNIQUE: CT examination of the chest, abdomen and pelvis was performed  Axial, sagittal, and coronal 2D reformatted images were created from the source data and submitted for interpretation  Radiation dose length product (DLP) for this visit:  581 mGy-cm   This examination, like all CT scans performed in the [facility], was performed utilizing techniques to minimize radiation dose exposure, including the use of iterative reconstruction and automated exposure control   IV Contrast: 100 mL of iohexol (OMNIPAQUE) Enteric Contrast: Enteric contrast was administered  FINDINGS: CHEST LUNGS:  Hypoventilatory changes noted in the dependent lungs  There is a calcified subpleural granuloma in the posterior right lower lobe  No suspicious noncalcified pulmonary mass  PLEURA:  Unremarkable  HEART/GREAT VESSELS:  Unremarkable for patient's age  MEDIASTINUM AND FACUNDO:  Unremarkable  CHEST WALL AND LOWER NECK: There is a 10 mm nodule in the lower pole the right thyroid lobe  Incidental discovery of one or more thyroid nodule(s) measuring less than 1 5 cm and without suspicious features is noted in this patient who is above 28years old; according to guidelines published in the February 2015 white paper on incidental thyroid nodules in the Journal of the [employer] of Radiology [facility]), no further evaluation is recommended  There is an enhancing 2 4 x 1 8 cm mass in the left breast consistent with the known primary breast malignancy  ABDOMEN LIVER/BILIARY TREE:  Unremarkable  GALLBLADDER:  No calcified gallstones  No pericholecystic inflammatory change  SPLEEN:  Unremarkable  PANCREAS:  Unremarkable  ADRENAL GLANDS:  Unremarkable  KIDNEYS/URETERS:  Unremarkable  No hydronephrosis  STOMACH AND BOWEL:  Unremarkable  APPENDIX:  A normal appendix was visualized  ABDOMINOPELVIC CAVITY:  No ascites or free intraperitoneal air  No lymphadenopathy  VESSELS:  Unremarkable for patient's age  PELVIS REPRODUCTIVE ORGANS:  Patient is status post hysterectomy  URINARY BLADDER:  Unremarkable  ABDOMINAL WALL/INGUINAL REGIONS:  There is a small fat-containing umbilical hernia, unchanged from previous examination  OSSEOUS STRUCTURES:  Postsurgical deformity associated with the left greater trochanter  Mild lumbar degenerative changes most notable at L4-L5  Sclerosis and subtle chronic irregularity in the superior endplate at L1  No acute fracture or destructive  osseous lesion       Impression: Primary malignancy in the left breast   No CT evidence of metastatic tumor in the chest, abdomen or pelvis  Workstation performed: [id number]     Mri Breast Bilateral W And Wo Contrast W Cad    Result Date: 3/10/2020  Narrative: DIAGNOSIS: Malignant neoplasm of upper-inner quadrant of left breast in female, estrogen receptor negative (HCC) TECHNIQUE: MRI of both breasts was performed in axial and sagittal planes utilizing a combination of axial diffusion, fat suppressed sagittal T2 , gradient echo in phase T1 weighting followed by sagittal dynamic post contrast imaging with 3D fat suppressed gradient-echo T1 sequences (VIBRANT) at 1 5 minute intervals  Axial VIBRANT post contrast images were obtained  Sagittal subtraction images were generated  This exam was read in conjunction with Wideo software  Intravenous contrast was administered at 2cc/sec, without documented contrast reaction  MEDICATIONS ADMINISTERED: gadobutrol injection (MULTI-DOSE) SOLN 6 mL, Total Given: 6 mL (1 dose) COMPARISONS:  No prior breast MRI is available for comparison  Mammograms and ultrasounds dated between 02/20/2009 and 02/12/2020 were reviewed  RELEVANT HISTORY: Family Breast Cancer History: History of breast cancer in Mother, Maternal Aunt  Family Medical History: Family medical history includes breast cancer in maternal aunt and breast cancer in mother  Personal History: Hormone history includes birth control  Surgical history includes hysterectomy  Medical history includes breast cancer  RISK ASSESSMENT: 5 Year Tyrer-Cuzick: 2 39 % 10 Year Tyrer-Cuzick: 5 27 % Lifetime Tyrer-Cuzick: 16 % TISSUE DENSITY: FGT: The breasts have heterogeneous fibroglandular tissue  BPE: The background parenchymal enhancement is mild  INDICATION: Rosalia Strickland is a 62 y o  female presenting for Invasive breast cancer, stage I/II, initial workup  FINDINGS: LEFT 1) MASS: There is a 27 mm x 21 mm x 23 mm irregularly shaped, heterogeneous mass with spiculated margins seen in the left breast at 10 o'clock  Delayed phase is washout    This is the biopsy-proven carcinoma  Susceptibility artifact from biopsy marker clip is noted in the mass  No adenopathy is identified  No skin, nipple areolar complex, or chest wall involvement is identified  2) MASS: Post biopsy changes are noted in the region of the biopsied mass in the left breast at 10:00 a m , 5 cm from nipple  3) MASS: There is a 5 mm oval, heterogeneous mass with irregular margins seen in the left breast at 9 o'clock  Delayed phase is washout  Axial postcontrast image series 8, image 248/432  Sagittal post-contrast image series 27802, image 35/140  This correlates with the focal asymmetry seen on recent mammography  Left MRI guided core needle biopsy is recommended  RIGHT There are no suspicious enhancing masses or suspicious areas of nonmass enhancement  There is no axillary or internal mammary adenopathy  Post biopsy changes are noted  Impression:  Biopsy proven carcinoma measures 2 7 cm (left breast 10:00)  5 mm non circumscribed mass with washout is noted in the 9 o'clock position of the left breast, posterior 3rd  Left MRI guided core needle biopsy is recommended  No suspicious enhancement in the right breast  No adenopathy identified  ASSESSMENT/BI-RADS CATEGORY: Left: 4 - Suspicious Right: 2 - Benign Overall: 4 - Suspicious RECOMMENDATION:      - Surgical consultation for the left breast       - MRI-guided breast biopsy for the left breast       - Diagnostic mammogram in 1 year for the right breast  Workstation ID: [id number]    Mammo Stereotactic Breast Biopsy Right ([employer]), Us Guided Breast Biopsy Left Complete, Us Guidance Breast Biopsy Left Each Additional, Mammo Post Biopsy Bilateral    Addendum Date: 2/14/2020 Addendum:   ADDENDUM: PATHOLOGY RESULTS: 4) Calcifications in the right breast at 11 o'clock, 10 cm from the nipple: Benign finding: the pathology findings indicate fibroadenomatoid hyperplasia and benign calcifications   The pathology result is concordant with imaging  2) Mass in the left breast at 10 o'clock, 5 cm from the nipple: Benign finding: the pathology findings indicate stromal collagenization  The pathology result is concordant with imaging  1) Mass in left breast at 10 o'clock, 4 cm from the nipple: Malignant finding: the pathology findings indicate invasive ductal carcinoma  The pathology result is concordant with imaging  Right breast:  Pathology is benign and concordant with imaging  Recent right breast Imaging dated February 4, 2020 was reviewed and no other suspicious findings are seen  Left breast:  Pathology results are concordant with imaging  The malignant mass at 10 o'clock, 5 cm from the nipple measures 19 mm craniocaudal by 23 mm AP x 19 mm transverse on mammogram 02/04/2020  On ultrasound the same day the mass measures 21 x 19 x 17 mm  Ultrasound of the left axilla was performed 02/04/2020 and no abnormal lymph nodes were visualized  There is a mammographic asymmetry in the left breast which did not have a sonographic correlate and has not been biopsied  Pretreatment MRI is recommended to further evaluate this finding (please see the diagnostic report from 02/04/2020)  I personally discussed the pathology results and recommendation for pretreatment MRI with the patient via telephone at 2:05 p m  02/14/2020  She will be contacted by our nurse to facilitate scheduling her surgery consultation appointment  RECOMMENDATION:      - Surgical Consultation for both breasts  - Breast MRI for both breasts  END ADDENDUM    Result Date: 2/12/2020  Narrative: DIAGNOSIS: Abnormal mammogram INDICATION: Cheikh Hernandez is a 62 y o  female presenting for bilateral breast biopsies  FINDINGS: Informed consent was obtained by myself, the performing physician  The following technique was used for the stereotactic biopsy:  A time-out was performed  The calcifications were imaged and localized under stereotactic guidance    The skin was cleansed in the usual manner  Anesthesia was administered with local lidocaine  The needle was advanced into the breast, location and targeting was confirmed with additional images  Multiple samples were obtained  Calcifications were confirmed within the specimen radiograph  A clip was placed in the biopsy cavity and confirmed with an additional image  The needle was removed and hemostasis was obtained with direct pressure on the breast  The following technique was used for both ultrasound-guided biopsies:  Time-out was performed  The mass was localized under ultrasound guidance and the skin was marked  The skin was cleansed in the usual manner  Anesthesia was administered with local lidocaine  Under direct ultrasound guidance, a spring-loaded biopsy device was used to obtain multiple samples  Under direct ultrasound guidance, a biopsy clip was placed within the biopsy cavity  Hemostasis was obtained with direct pressure on the breast  Biopsy site A:  Right breast CALCIFICATIONS at 11 o'clock, 10 cm from the nipple (these calcifications were previously described at 12 o'clock, however, the location is better seen on the CC view today confirming they are at 11:00), finding 4 in the prior report  Stereotactic biopsy was performed  5 mL of lidocaine 1% and 20 mL of lidocaine 1% with epinephrine was administered  An 8 gauge vacuum assisted biopsy device was used to obtain 4 specimens from a lateral approach  A triple twist clip was placed within the biopsy cavity  The clip appears well placed on the post biopsy mammogram   Biopsy site B:  LEFT breast MASS at 10 o'clock, 5 cm from the nipple, finding 2 in the prior report  Ultrasound-guided biopsy was performed  5 mL of lidocaine 1% was administered  A 12 gauge spring-loaded vacuum device was used to obtain 3 specimens  A heart shaped clip was placed in the biopsy cavity    The clip projects in the expected location on the post biopsy mammogram   Biopsy site C:  Left breast MASS at 10 o'clock, 4 cm from the nipple, finding 1 on the prior report  Ultrasound-guided biopsy was performed  5 mL of lidocaine 1% was administered  A 12 G spring-loaded biopsy device was used to obtain 4 samples  A wing clip was placed within the mass  The clip appears well placed on the post biopsy mammogram      Impression:  1  Successful stereotactic core needle biopsy of calcifications in the right breast at 11 o'clock, 10 cm from the nipple (previously described as 12:00)  Biopsy site is marked with a triple twist clip which appears well placed  2  Successful ultrasound-guided core needle biopsy of the small mass in the left breast at 10 o'clock, 5 cm from the nipple  The biopsy site is marked with a heart shaped clip which projects in the expected location on the post biopsy mammogram  3  Successful ultrasound-guided core needle biopsy of the mass in the left breast at 4 o'clock, 1 cm from the nipple  The mass is marked with a wing clip which appears well placed  4  Management recommendations will be made once the pathology results are available  If any of the biopsy results are malignant, preoperative MRI may be indicated to further evaluate the small asymmetry described on the recent diagnostic study as this does not correspond with today's biopsy sites  RECOMMENDATION:      - Waiting for pathology for both breasts   Workstation ID: [id number]          Labs:   Lab Results   Component Value Date    WBC 9 16 02/20/2020    HGB 14 3 02/20/2020    HCT 43 4 02/20/2020     (H) 02/20/2020     02/20/2020     Lab Results   Component Value Date     (L) 04/22/2015    K 4 0 02/20/2020     02/20/2020    CO2 25 02/20/2020    ANIONGAP 11 04/22/2015    BUN 10 02/20/2020    CREATININE 0 68 02/20/2020    GLUCOSE 97 04/22/2015    GLUF 91 08/04/2017    CALCIUM 9 4 02/20/2020    AST 23 02/20/2020    ALT 20 02/20/2020    ALKPHOS 86 02/20/2020    PROT 7 2 04/22/2015    BILITOT 0 2 04/22/2015 EGFR 98 02/20/2020         Vital Sign:    Body surface area is 1 63 meters squared      Wt Readings from Last 3 Encounters:   03/11/20 60 8 kg (134 lb)   03/09/20 54 4 kg (120 lb)   02/26/20 59 kg (130 lb)        Temp Readings from Last 3 Encounters:   03/11/20 (!) 97 4 °F (36 3 °C) (Tympanic Core)   02/26/20 98 3 °F (36 8 °C)   02/20/20 (!) 96 9 °F (36 1 °C) (Tympanic)        BP Readings from Last 3 Encounters:   03/11/20 122/78   02/26/20 122/90   02/20/20 130/80         Pulse Readings from Last 3 Encounters:   03/11/20 92   02/26/20 (!) 112   02/20/20 (!) 106     @NNBOBKL1(2)@    Active Problems:   Patient Active Problem List   Diagnosis    Vomiting    Chest pain    Urinary tract infection    Hypertension    Systemic lupus erythematosus (HCC)    Hypothyroidism    Posttraumatic stress disorder    Adult celiac disease    Anxiety    Depression    Esophagitis    Nephrolithiasis    Vitamin D deficiency    Malignant neoplasm of upper-inner quadrant of left breast in female, estrogen receptor negative ([address] )    Chemotherapy induced neutropenia ([address] )       Past Medical History:   Past Medical History:   Diagnosis Date    Disease of thyroid gland     Hypertension     Lupus ([address] )     Malignant neoplasm of upper-inner quadrant of left breast in female, estrogen receptor negative ([address] ) 2/25/2020    Nephrolithiasis        Surgical History:   Past Surgical History:   Procedure Laterality Date    FEMUR FRACTURE SURGERY      HYSTERECTOMY      LEFT OOPHORECTOMY      due to torsion     MAMMO STEREOTACTIC BREAST BIOPSY RIGHT ([employer] Right 2/12/2020    US GUIDANCE BREAST BIOPSY LEFT EACH ADDITIONAL Left 2/12/2020    US GUIDED BREAST BIOPSY LEFT COMPLETE Left 2/12/2020    VAGINA SURGERY         Family History:    Family History   Problem Relation Age of Onset    Diabetes Mother     Hypertension Mother     Nephrolithiasis Mother     Breast cancer Mother 79    Heart disease Father     Hypertension Father     Diabetes Brother     Nephrolithiasis Brother     Breast cancer Maternal Aunt     No Known Problems Maternal Grandmother     No Known Problems Maternal Grandfather     No Known Problems Paternal Grandmother     No Known Problems Paternal Grandfather        Cancer-related family history includes Breast cancer in her maternal aunt; Breast cancer (age of onset: 79) in her mother  Social History:   Social History     Socioeconomic History    Marital status: /Civil Union     Spouse name: Not on file    Number of children: Not on file    Years of education: Not on file    Highest education level: Not on file   Occupational History    Not on file   Social Needs    Financial resource strain: Not on file    Food insecurity:     Worry: Not on file     Inability: Not on file    Transportation needs:     Medical: Not on file     Non-medical: Not on file   Tobacco Use    Smoking status: Current Every Day Smoker     Packs/day: 1 50     Types: Cigarettes     Start date: 1990    Smokeless tobacco: Never Used    Tobacco comment:  5 ppd per [employer]   Substance and Sexual Activity    Alcohol use:  Yes     Alcohol/week: 21 0 standard drinks     Types: 21 Cans of beer per week     Comment: 3 beers day, socially started 30 yrs prior     Drug use: No    Sexual activity: Not on file   Lifestyle    Physical activity:     Days per week: Not on file     Minutes per session: Not on file    Stress: Not on file   Relationships    Social connections:     Talks on phone: Not on file     Gets together: Not on file     Attends Taoist service: Not on file     Active member of club or organization: Not on file     Attends meetings of clubs or organizations: Not on file     Relationship status: Not on file    Intimate partner violence:     Fear of current or ex partner: Not on file     Emotionally abused: Not on file     Physically abused: Not on file     Forced sexual activity: Not on file   Other Topics Concern    Not on file   Social History Narrative    Not on file       Current Medications:   Current Outpatient Medications   Medication Sig Dispense Refill    ALPRAZolam (XANAX) 1 mg tablet Take 1 tablet (1 mg total) by mouth 3 (three) times a day as needed for anxiety 90 tablet 0    Cholecalciferol (VITAMIN D3) 99207 units CAPS Take 50,000 Units by mouth once a week      CRANIAL PROSTHESIS, RX, One wig as needed 1 Piece 0    cyclobenzaprine (FLEXERIL) 10 mg tablet Take 5 mg by mouth 3 (three) times a day as needed for muscle spasms      cyclobenzaprine (FLEXERIL) 5 mg tablet Take 5 mg by mouth 3 (three) times a day as needed for muscle spasms      ergocalciferol (VITAMIN D2) 50,000 units Take 50,000 Units by mouth once a week      HYDROcodone-acetaminophen (NORCO) 5-325 mg per tablet Take 1 tablet by mouth every 6 (six) hours as needed for pain      hydroxychloroquine (PLAQUENIL) 200 mg tablet Take 1 tablet in morning and 1/2 tablet in evening      levothyroxine 88 mcg tablet Take 1 tablet (88 mcg total) by mouth daily 30 tablet 5    moexipril (UNIVASC) 15 MG tablet Take 1 tablet (15 mg total) by mouth daily 30 tablet 5    Nutritional Supplements (OSTEOPOROSIS SUPPORT PO) Take by mouth      predniSONE 1 mg tablet Take 1 mg by mouth 2 (two) times a day as needed (Lupus exacerbations)      sertraline (ZOLOFT) 100 mg tablet Use one and one-half tablet once daily 45 tablet 5    sertraline (ZOLOFT) 100 mg tablet TAKE 1 & 1/2 TABLETS BY MOUTH ONCE DAILY 45 tablet 5    sertraline (ZOLOFT) 50 mg tablet 1/2 tab po q day 45 tablet 1    ondansetron (ZOFRAN) 4 mg tablet Take 1 tablet (4 mg total) by mouth every 8 (eight) hours as needed for nausea or vomiting 30 tablet 1     No current facility-administered medications for this visit  Allergies:    Allergies   Allergen Reactions    Mobic [Meloxicam] Eye Swelling     Reaction Date: 12Aug2011;     Methotrexate Rash    Sulfa Antibiotics Rash

## 2020-03-11 NOTE — PROGRESS NOTES
Met with patient and her  to discuss her treatment plan  She is a postmenopausal woman with newly diagnosed clinical stage II left breast cancer, grade 3, ER negative, TX negative, HER2 fish positive disease  She is a heavy smoking history others alcohol drink  Dr Pankaj De La Cruz had extensive discussion regarding the diagnosis, aggressive nature of disease, tumor phenotype, probable staging information, prognosis with a without systemic chemotherapy, and treatment options  She will have [address] with pertuzumab every 3 weeks x6 cycles  Side effects of this regimen was thoroughly discussed by Dr Pankaj De La Cruz, including but not limited to alopecia, nausea, vomiting, neutropenia, risk of infection, allergic reaction, peripheral edema, cardiac toxicities  Patient will have port placement and echo for initial cardiac monitoring  Given: Chemotherapy and You booklet, Nutrition booklet, medication information, TLC booklet, list of places to obtain a wig, wig script, Zofran script (sent to pharmacy), side effect management with medications    She had mentioned that she has PTSD and sees a counselor  However, she is going to take a break from the counseling as she has a lot going on with her chemotherapy treatment  I did explain that I would reach out to our social workers and see if they can offer any information/help her if needed  She is on disability and not working so she is very flexible with appointments      Emailed Francie Nowak and Marian Santoyo RN

## 2020-03-12 ENCOUNTER — ANESTHESIA EVENT (OUTPATIENT)
Dept: PERIOP | Facility: AMBULARY SURGERY CENTER | Age: 55
End: 2020-03-12
Payer: COMMERCIAL

## 2020-03-12 ENCOUNTER — TELEPHONE (OUTPATIENT)
Dept: SURGICAL ONCOLOGY | Facility: CLINIC | Age: 55
End: 2020-03-12

## 2020-03-12 DIAGNOSIS — Z17.1 MALIGNANT NEOPLASM OF UPPER-INNER QUADRANT OF LEFT BREAST IN FEMALE, ESTROGEN RECEPTOR NEGATIVE (HCC): Primary | ICD-10-CM

## 2020-03-12 DIAGNOSIS — C50.212 MALIGNANT NEOPLASM OF UPPER-INNER QUADRANT OF LEFT BREAST IN FEMALE, ESTROGEN RECEPTOR NEGATIVE (HCC): Primary | ICD-10-CM

## 2020-03-12 NOTE — TELEPHONE ENCOUNTER
Called patient to discuss breast MRI results  There was no answer; message left with call back number provided  Patient returned the phone call  Explained MRI findings as well as recommendation for a MRI-guided breast biopsy  Patient verbalized understanding  Informed patient that a phone call would be made to schedule this and she would receive a phone call with the date  Patient verbalized understanding and was appreciative of the phone call

## 2020-03-12 NOTE — PROGRESS NOTES
Dr Henrry Carrero,  recommended a MRI guided left core needle biopsy  The patient has already undergone multiple biopsies I had Dr Delta John also reviewed the films he concurs  We will contact the patient I have placed an order for this biopsy

## 2020-03-13 ENCOUNTER — DOCUMENTATION (OUTPATIENT)
Dept: RADIATION ONCOLOGY | Facility: HOSPITAL | Age: 55
End: 2020-03-13

## 2020-03-13 ENCOUNTER — TELEPHONE (OUTPATIENT)
Dept: HEMATOLOGY ONCOLOGY | Facility: CLINIC | Age: 55
End: 2020-03-13

## 2020-03-13 ENCOUNTER — PATIENT OUTREACH (OUTPATIENT)
Dept: HEMATOLOGY ONCOLOGY | Facility: CLINIC | Age: 55
End: 2020-03-13

## 2020-03-13 NOTE — PROGRESS NOTES
Received consult from Michael Torres from Dr Germania Cortez office requesting a follow up with the pt  Reviewed pt's medical record in Epic  Placed a call to the pt's preferred phone number-the call went to voice mail so I left a message for the pt with my contact information and the reason for my call  Encouraged the pt to return my call at her convenience

## 2020-03-16 ENCOUNTER — DOCUMENTATION (OUTPATIENT)
Dept: RADIATION ONCOLOGY | Facility: HOSPITAL | Age: 55
End: 2020-03-16

## 2020-03-16 DIAGNOSIS — F41.9 ANXIETY: ICD-10-CM

## 2020-03-16 RX ORDER — ALPRAZOLAM 1 MG/1
TABLET ORAL
Qty: 90 TABLET | Refills: 0 | Status: SHIPPED | OUTPATIENT
Start: 2020-03-16 | End: 2020-04-07

## 2020-03-16 NOTE — PROGRESS NOTES
Called and spoke with the pt  She is having a port placed tomorrow at [facility]  She will be receiving her initial infusion tx on 3/24/20 at 800 am and I will plan to meet with her at that appt  Pt mentioned she has been seeing a therapist for years for PTSD and has been employing the techniques taught to her by this therapist in preparation for her 3/24/20 appt  Reviewed my role with her and again encouraged her to call me as she needs to leading up to 3/24/20

## 2020-03-17 ENCOUNTER — HOSPITAL ENCOUNTER (OUTPATIENT)
Facility: AMBULARY SURGERY CENTER | Age: 55
Setting detail: OUTPATIENT SURGERY
Discharge: HOME | End: 2020-03-17
Attending: RADIOLOGY | Admitting: RADIOLOGY
Payer: COMMERCIAL

## 2020-03-17 ENCOUNTER — APPOINTMENT (OUTPATIENT)
Dept: RADIOLOGY | Facility: AMBULARY SURGERY CENTER | Age: 55
End: 2020-03-17
Payer: COMMERCIAL

## 2020-03-17 ENCOUNTER — ANESTHESIA (OUTPATIENT)
Dept: PERIOP | Facility: AMBULARY SURGERY CENTER | Age: 55
End: 2020-03-17
Payer: COMMERCIAL

## 2020-03-17 VITALS
BODY MASS INDEX: 26.93 KG/M2 | HEART RATE: 92 BPM | RESPIRATION RATE: 18 BRPM | OXYGEN SATURATION: 94 % | WEIGHT: 152 LBS | DIASTOLIC BLOOD PRESSURE: 64 MMHG | TEMPERATURE: 97.3 F | SYSTOLIC BLOOD PRESSURE: 95 MMHG | HEIGHT: 63 IN

## 2020-03-17 PROCEDURE — 77001 FLUOROGUIDE FOR VEIN DEVICE: CPT

## 2020-03-17 PROCEDURE — C1788 PORT, INDWELLING, IMP: HCPCS | Performed by: RADIOLOGY

## 2020-03-17 PROCEDURE — 36561 INSERT TUNNELED CV CATH: CPT | Performed by: RADIOLOGY

## 2020-03-17 PROCEDURE — 76937 US GUIDE VASCULAR ACCESS: CPT | Performed by: RADIOLOGY

## 2020-03-17 PROCEDURE — 77001 FLUOROGUIDE FOR VEIN DEVICE: CPT | Performed by: RADIOLOGY

## 2020-03-17 DEVICE — PORT DIGINTY 8FR MICRO-STICK KIT HEMODIAL MID SIZE
Type: IMPLANTABLE DEVICE | Site: CHEST | Status: NON-FUNCTIONAL
Removed: 2021-07-06

## 2020-03-17 RX ORDER — SODIUM CHLORIDE, SODIUM LACTATE, POTASSIUM CHLORIDE, CALCIUM CHLORIDE 600; 310; 30; 20 MG/100ML; MG/100ML; MG/100ML; MG/100ML
125 INJECTION, SOLUTION INTRAVENOUS CONTINUOUS
Status: DISCONTINUED | OUTPATIENT
Start: 2020-03-17 | End: 2020-03-17 | Stop reason: HOSPADM

## 2020-03-17 RX ORDER — LIDOCAINE HYDROCHLORIDE AND EPINEPHRINE 10; 10 MG/ML; UG/ML
INJECTION, SOLUTION INFILTRATION; PERINEURAL AS NEEDED
Status: DISCONTINUED | OUTPATIENT
Start: 2020-03-17 | End: 2020-03-17 | Stop reason: HOSPADM

## 2020-03-17 RX ORDER — MIDAZOLAM HYDROCHLORIDE 2 MG/2ML
INJECTION, SOLUTION INTRAMUSCULAR; INTRAVENOUS AS NEEDED
Status: DISCONTINUED | OUTPATIENT
Start: 2020-03-17 | End: 2020-03-17 | Stop reason: SURG

## 2020-03-17 RX ORDER — FENTANYL CITRATE 50 UG/ML
INJECTION, SOLUTION INTRAMUSCULAR; INTRAVENOUS AS NEEDED
Status: DISCONTINUED | OUTPATIENT
Start: 2020-03-17 | End: 2020-03-17 | Stop reason: SURG

## 2020-03-17 RX ORDER — ONDANSETRON 2 MG/ML
4 INJECTION INTRAMUSCULAR; INTRAVENOUS ONCE AS NEEDED
Status: DISCONTINUED | OUTPATIENT
Start: 2020-03-17 | End: 2020-03-17 | Stop reason: HOSPADM

## 2020-03-17 RX ORDER — CEFOXITIN 1 G/1
INJECTION, POWDER, FOR SOLUTION INTRAVENOUS AS NEEDED
Status: DISCONTINUED | OUTPATIENT
Start: 2020-03-17 | End: 2020-03-17 | Stop reason: SURG

## 2020-03-17 RX ORDER — FENTANYL CITRATE/PF 50 MCG/ML
25 SYRINGE (ML) INJECTION
Status: COMPLETED | OUTPATIENT
Start: 2020-03-17 | End: 2020-03-17

## 2020-03-17 RX ADMIN — FENTANYL CITRATE 25 MCG: 0.05 INJECTION, SOLUTION INTRAMUSCULAR; INTRAVENOUS at 09:32

## 2020-03-17 RX ADMIN — FENTANYL CITRATE 50 MCG: 50 INJECTION, SOLUTION INTRAMUSCULAR; INTRAVENOUS at 08:41

## 2020-03-17 RX ADMIN — SODIUM CHLORIDE, SODIUM LACTATE, POTASSIUM CHLORIDE, AND CALCIUM CHLORIDE: .6; .31; .03; .02 INJECTION, SOLUTION INTRAVENOUS at 08:49

## 2020-03-17 RX ADMIN — FENTANYL CITRATE 25 MCG: 0.05 INJECTION, SOLUTION INTRAMUSCULAR; INTRAVENOUS at 09:27

## 2020-03-17 RX ADMIN — FENTANYL CITRATE 25 MCG: 0.05 INJECTION, SOLUTION INTRAMUSCULAR; INTRAVENOUS at 09:37

## 2020-03-17 RX ADMIN — CEFOXITIN 1000 MG: 1 INJECTION, POWDER, FOR SOLUTION INTRAVENOUS at 08:47

## 2020-03-17 RX ADMIN — FENTANYL CITRATE 25 MCG: 0.05 INJECTION, SOLUTION INTRAMUSCULAR; INTRAVENOUS at 09:20

## 2020-03-17 RX ADMIN — FENTANYL CITRATE 50 MCG: 50 INJECTION, SOLUTION INTRAMUSCULAR; INTRAVENOUS at 08:49

## 2020-03-17 RX ADMIN — MIDAZOLAM HYDROCHLORIDE 1 MG: 1 INJECTION, SOLUTION INTRAMUSCULAR; INTRAVENOUS at 08:41

## 2020-03-17 RX ADMIN — MIDAZOLAM HYDROCHLORIDE 1 MG: 1 INJECTION, SOLUTION INTRAMUSCULAR; INTRAVENOUS at 08:49

## 2020-03-17 NOTE — ANESTHESIA PREPROCEDURE EVALUATION
Review of Systems/Medical History  Patient summary reviewed  Chart reviewed      Cardiovascular  Hypertension controlled,    Pulmonary  Smoker cigarette smoker  ,        GI/Hepatic       Kidney stones,        Endo/Other  History of thyroid disease , hypothyroidism,      GYN       Hematology  Negative hematology ROS      Musculoskeletal  Negative musculoskeletal ROS        Neurology  Negative neurology ROS      Psychology   Anxiety, Depression ,              Physical Exam    Airway    Mallampati score: II  TM Distance: >3 FB  Neck ROM: full     Dental   upper dentures and lower dentures,     Cardiovascular  Cardiovascular exam normal    Pulmonary  Pulmonary exam normal     Other Findings        Anesthesia Plan  ASA Score- 2     Anesthesia Type- IV sedation with anesthesia with ASA Monitors  Additional Monitors:   Airway Plan:         Plan Factors-  Patient did not smoke on day of surgery  Induction- intravenous  Postoperative Plan-     Informed Consent- Anesthetic plan and risks discussed with patient  I personally reviewed this patient with the CRNA  Discussed and agreed on the Anesthesia Plan with the CRNA  Johnny Novak

## 2020-03-17 NOTE — OP NOTE
Chest port placement 3/17/2020     History:      Breast carcinoma     Procedure:     The patient was identified verbally and by wristband  Timeout was performed  Informed consent was obtained       All elements of maximal sterile barrier technique were followed (cap, mask, sterile gown, sterile gloves, large sterile sheet, hand hygiene and 2% chlorhexidine for cutaneous antisepsis)      Following obtaining informed consent, the patient was prepped and draped in the usual sterile fashion  Using ultrasound guidance, access was gained to the patient's right  internal jugular vein using a micropuncture system  The micropuncture wire was removed and a  035 wire was advanced to the level of the inferior vena cava using fluoroscopic guidance      Using 1% lidocaine, the region of the right anterior chest was was anesthetized  A small incision was made using a 15 blade scalpel  The port pocket was created using blunt dissection      The catheter tubing was tunneled from the incision to the venotomy  The micropuncture dilator was exchanged for a peel-away sheath, using fluoroscopic guidance  The catheter was place through the peel-away sheath  The catheter was measured and cut to size  The catheter was attached to the port  The port was flushed with saline to ensure patency without evidence of leakage  The port was placed in the port pocket  The port was sutured in the pocket using 3-0 Vicryl      The incisions were approximated with 3-0 Vicryl and Histoacryl  The patient tolerated the procedure well without apparent immediate complications  The patient left the IR department in unchanged condition      Dr Renetta Nichols performed and directly supervised the entire procedure      Findings:      Using ultrasound guidance, the right internal jugular vein was cannulated using Seldinger technique  The right internal jugular vein was evaluated as a potential access site  The right internal jugular vein was patent, and free of thrombus  Static images of the vessel was obtained  Visualization of real time needle entry into the vessel was obtained      Fluoroscopic spot image demonstrates a newly placed single lumen chest port via the right internal jugular vein with the most central aspect at the SVC/RA junction   The catheter tubing is smooth in contour         IMPRESSION:     Successful placement of a single lumen chest port via the right internal jugular vein

## 2020-03-17 NOTE — INTERVAL H&P NOTE
Patient arrived to [facility] for port placement    The procedure and risks were discussed with the patient  All questions were answered  Informed consent was obtained  H & P reviewed after examining the patient and I find no changes in the patient condition since the H & P has been written  /71   Pulse (!) 106   Temp 97 6 °F (36 4 °C) (Temporal)   Resp 16   Ht 5' 3" (1 6 m)   Wt 68 9 kg (152 lb)   SpO2 98%   BMI 26 93 kg/m²     Patient re-evaluated   Accept as history and physical     Liz Davis, DO/March 17, 2020/8:05 AM

## 2020-03-17 NOTE — PROGRESS NOTES
Spoke with patient after Dr Bernadette Cyr    made recommendation for;      _____ RIGHT ___x___LEFT      _____Ultrasound guided__x____MRI guided  ______Stereotactic  Breast biopsy  __x___Verbalized understanding  Blood thinners:  _____yes __x___no    Date stopped: ____n/a_______    Biopsy teaching sheet given:  _______yes ___x___no  Reviewed procedure & preparation for procedure & pt verbalized understanding

## 2020-03-17 NOTE — ANESTHESIA POSTPROCEDURE EVALUATION
Post-Op Assessment Note    CV Status:  Stable  Pain Score: 3    Pain management: adequate     Mental Status:  Alert and awake   Hydration Status:  Stable   PONV Controlled:  None   Airway Patency:  Patent   Post Op Vitals Reviewed: Yes      Staff: Anesthesiologist           BP      Temp      Pulse     Resp      SpO2

## 2020-03-17 NOTE — DISCHARGE INSTRUCTIONS
Implanted Venous Access Port     WHAT YOU NEED TO KNOW:   An implanted venous access port is a device used to give treatments and take blood  It may also be called a central venous access device (CVAD)  The port is a small container that is placed under your skin, usually in your upper chest  The port is attached to a catheter that enters a large vein  DISCHARGE INSTRUCTIONS:   Resume your normal diet  Small sips of flat soda will help with mild nausea  Prevent an infection:   · Wash your hands often  Use soap and water  Clean your hands before and after you care for your port  Remind everyone who cares for your port to wash their hands  · Check your skin for infection every day  Look for redness, swelling, or fluid oozing from the port site  Care for your port:   1  You may shower beginning 48 hours after procedure  2  Change dressing if it becomes wet  3  Remove dressing after 24 hours  Leave glue in place  4  It is normal for some bruising to occur  5  Use Tylenol for pain  6  Limit use of arm on the side that your port was placed  Lift nothing heavier than 5 pounds for 1 week, and then gradually increase activity as tolerated  7  DO NOT apply ointment, lotion or cream to port site until incision is healed  Allow glue to fall off  DO NOT attempt to peel glue from skin even it it begins to flake  8, After the port incision is healed you may swim, bathe  Notify the Interventional Radiologist if you have any of the followin  Fever above 101 F    2  Increased redness or swelling after 1st day  3  Increased pain after 1st day  4  Any sign of infection (drainage from port site, skin separation, hot to touch)  5  Persistent nausea or vomiting  Contact Interventional Radiology at [phone] [facility] PATIENTS: Contact Interventional Radiology at [phone]) ([address] [facility] [address]: Contact Interventional Radiology at [phone])

## 2020-03-18 ENCOUNTER — TELEPHONE (OUTPATIENT)
Dept: SURGICAL ONCOLOGY | Facility: CLINIC | Age: 55
End: 2020-03-18

## 2020-03-18 NOTE — TELEPHONE ENCOUNTER
Patient called the office reporting significant pain after her port-placement, which was not alleviated with Vicodin  Reported her pain as a 9/10 and stated that it hurts when she swallows or turns her head, and that the pain radiates down her neck  Instructed patient to contact Interventional Radiology regarding this since they put the port in  Patient stated that she had already called them and they recommended she contact our office for pain medication  Patient denied redness, swelling, fevers, or discharge from incision  Reviewed this with Dr Carine Martinez who recommended the patient contact her PCP or present to the emergency room for further evaluation  This was explained to the patient  Patient reported that she was going to wait until later today and probably go to the emergency room after her  got home from work  Stressed multiple times the importance of seeking further attention for this as there could potentially be an underlying issue that would need treatment  Patient verbalized understanding

## 2020-03-19 ENCOUNTER — TELEPHONE (OUTPATIENT)
Dept: RADIOLOGY | Facility: HOSPITAL | Age: 55
End: 2020-03-19

## 2020-03-19 ENCOUNTER — HOSPITAL ENCOUNTER (OUTPATIENT)
Dept: NON INVASIVE DIAGNOSTICS | Facility: CLINIC | Age: 55
Discharge: HOME | End: 2020-03-19
Payer: COMMERCIAL

## 2020-03-19 DIAGNOSIS — C50.212 MALIGNANT NEOPLASM OF UPPER-INNER QUADRANT OF LEFT BREAST IN FEMALE, ESTROGEN RECEPTOR NEGATIVE (HCC): ICD-10-CM

## 2020-03-19 DIAGNOSIS — T80.212A PORT OR RESERVOIR INFECTION, INITIAL ENCOUNTER: Primary | ICD-10-CM

## 2020-03-19 DIAGNOSIS — Z17.1 MALIGNANT NEOPLASM OF UPPER-INNER QUADRANT OF LEFT BREAST IN FEMALE, ESTROGEN RECEPTOR NEGATIVE (HCC): ICD-10-CM

## 2020-03-19 PROCEDURE — 93306 TTE W/DOPPLER COMPLETE: CPT | Performed by: INTERNAL MEDICINE

## 2020-03-19 PROCEDURE — 93306 TTE W/DOPPLER COMPLETE: CPT

## 2020-03-19 RX ORDER — CEPHALEXIN 500 MG/1
500 CAPSULE ORAL EVERY 12 HOURS SCHEDULED
Qty: 14 CAPSULE | Refills: 0 | Status: SHIPPED | OUTPATIENT
Start: 2020-03-19 | End: 2020-03-26

## 2020-03-19 NOTE — TELEPHONE ENCOUNTER
Called ins & spoke to nuzhat call ref# [id number] pt has an active bc plan that runs on a plan year 09/01/19-08/31/20  Not cobra no other ins  Effective date 01/01/20  Pt has a $1000 deduct met  Out of pocket $7900 met $8308 62 after the out of pocket is met then all co-pays are waived  The following services are covered 100%  Nicki Jair & chemo drugs/radiation/advanced & standard dx imaging/pt has to go to an in network lab/genetic & genomic testing/in pt hospitalization & out pt surgery  pcp co-pay $20 specialist co-pay $50 ER co-pay $200  rx benefits thru [facility] [employer] & preferred speciality pharmacy is [facility]  Can buy & bill   called pt & went over her benefits & she thanked me for escobar méndez

## 2020-03-19 NOTE — TELEPHONE ENCOUNTER
INTERVENTIONAL RADIOLOGY CLINIC CONSULT NOTE:    History of Present Illness:  Cheikh Hernandez is a 62 y o  female with a recent diagnosis of breast ca who recently had a port placed on Tuesday  She presents with worsening tenderness at her port site  She denies fevers, chills, or leakage through the port incision  She has been taking occasional Vicodin for the pain, but continues to have pain  The port has not yet been accessed  She is anticipating initiating chemotherapy on Monday  I have been been consulted to determine the appropriate procedure, whether or not a procedure can and should be performed, and to place the correct procedure orders  Past Medical History:  Patient Active Problem List   Diagnosis    Vomiting    Chest pain    Urinary tract infection    Hypertension    [facility]ic lupus erythematosus ([facility] [address] )    Hypothyroidism    Posttraumatic stress disorder    Adult celiac disease    Anxiety    Depression    Esophagitis    Nephrolithiasis    Vitamin D deficiency    Malignant neoplasm of upper-inner quadrant of left breast in female, estrogen receptor negative ([facility] [address] )    Chemotherapy induced neutropenia ([facility] [address] )     Reviewed  Past Surgical History:  Past Surgical History:   Procedure Laterality Date    FEMUR FRACTURE SURGERY      FL GUIDED CENTRAL VENOUS ACCESS DEVICE INSERTION  3/17/2020    HYSTERECTOMY      LEFT OOPHORECTOMY      due to torsion     MAMMO STEREOTACTIC BREAST BIOPSY RIGHT (ALL INC) Right 2/12/2020    OK INSJ TUNNELED CTR VAD W/SUBQ PORT AGE 5 YR/> N/A 3/17/2020    Procedure: INSERTION VENOUS PORT ( PORT-A-CATH) IR;  Surgeon: Parveen Wharton DO;  Location: [facility];  Service: Interventional Radiology    US GUIDANCE BREAST BIOPSY LEFT EACH ADDITIONAL Left 2/12/2020    US GUIDED BREAST BIOPSY LEFT COMPLETE Left 2/12/2020    VAGINA SURGERY       Reviewed      Family History:  Family History   Problem Relation Age of Onset    Diabetes Mother     Hypertension Mother     Nephrolithiasis Mother     Breast cancer Mother 79    Heart disease Father     Hypertension Father     Diabetes Brother     Nephrolithiasis Brother     Breast cancer Maternal Aunt     No Known Problems Maternal Grandmother     No Known Problems Maternal Grandfather     No Known Problems Paternal Grandmother     No Known Problems Paternal Grandfather      Reviewed  Social History:  Social History     Socioeconomic History    Marital status: /Civil Union     Spouse name: Not on file    Number of children: Not on file    Years of education: Not on file    Highest education level: Not on file   Occupational History    Not on file   Social Needs    Financial resource strain: Not on file    Food insecurity:     Worry: Not on file     Inability: Not on file    Transportation needs:     Medical: Not on file     Non-medical: Not on file   Tobacco Use    Smoking status: Current Every Day Smoker     Packs/day: 1 50     Types: Cigarettes     Start date: 1990    Smokeless tobacco: Never Used    Tobacco comment:  5 ppd per [employer]   Substance and Sexual Activity    Alcohol use:  Yes     Alcohol/week: 21 0 standard drinks     Types: 21 Cans of beer per week     Comment: 3 beers day, socially started 30 yrs prior     Drug use: No    Sexual activity: Not on file   Lifestyle    Physical activity:     Days per week: Not on file     Minutes per session: Not on file    Stress: Not on file   Relationships    Social connections:     Talks on phone: Not on file     Gets together: Not on file     Attends [employer] service: Not on file     Active member of club or organization: Not on file     Attends meetings of clubs or organizations: Not on file     Relationship status: Not on file    Intimate partner violence:     Fear of current or ex partner: Not on file     Emotionally abused: Not on file     Physically abused: Not on file     Forced sexual activity: Not on file   Other Topics Concern    Not on file   Social History Narrative    Not on file     Reviewed  Allergies: Allergies   Allergen Reactions    Mobic [Meloxicam] Eye Swelling     Reaction Date: 12Aug2011;     Methotrexate Rash    Sulfa Antibiotics Rash     Reviewed      Current Medications:    Current Outpatient Medications:     ALPRAZolam (XANAX) 1 mg tablet, TAKE ONE TABLET BY MOUTH THREE TIMES A DAY AS NEEDED FOR ANXIETY, Disp: 90 tablet, Rfl: 0    cephalexin (KEFLEX) 500 mg capsule, Take 1 capsule (500 mg total) by mouth every 12 (twelve) hours for 7 days, Disp: 14 capsule, Rfl: 0    Cholecalciferol (VITAMIN D3) 86573 units CAPS, Take 50,000 Units by mouth once a week, Disp: , Rfl:     CRANIAL PROSTHESIS, RX,, One wig as needed, Disp: 1 Piece, Rfl: 0    cyclobenzaprine (FLEXERIL) 10 mg tablet, Take 5 mg by mouth 3 (three) times a day as needed for muscle spasms, Disp: , Rfl:     cyclobenzaprine (FLEXERIL) 5 mg tablet, Take 5 mg by mouth 3 (three) times a day as needed for muscle spasms, Disp: , Rfl:     ergocalciferol (VITAMIN D2) 50,000 units, Take 50,000 Units by mouth once a week, Disp: , Rfl:     HYDROcodone-acetaminophen (NORCO) 5-325 mg per tablet, Take 1 tablet by mouth every 6 (six) hours as needed for pain, Disp: , Rfl:     hydroxychloroquine (PLAQUENIL) 200 mg tablet, Take 1 tablet in morning and 1/2 tablet in evening, Disp: , Rfl:     levothyroxine 88 mcg tablet, Take 1 tablet (88 mcg total) by mouth daily, Disp: 30 tablet, Rfl: 5    moexipril (UNIVASC) 15 MG tablet, Take 1 tablet (15 mg total) by mouth daily, Disp: 30 tablet, Rfl: 5    Nutritional Supplements (OSTEOPOROSIS SUPPORT PO), Take by mouth, Disp: , Rfl:     ondansetron (ZOFRAN) 4 mg tablet, Take 1 tablet (4 mg total) by mouth every 8 (eight) hours as needed for nausea or vomiting, Disp: 30 tablet, Rfl: 1    predniSONE 1 mg tablet, Take 1 mg by mouth 2 (two) times a day as needed (Lupus exacerbations), Disp: , Rfl:     sertraline (ZOLOFT) 100 mg tablet, TAKE 1 & 1/2 TABLETS BY MOUTH ONCE DAILY, Disp: 45 tablet, Rfl: 5    sertraline (ZOLOFT) 50 mg tablet, 1/2 tab po q day, Disp: 45 tablet, Rfl: 1  Reviewed  Physical Examination:  CONSTITUTIONAL: The patient appeared well in no acute distress  NEUROLOGICAL: alert, awake, answering questions appropriately  PSYCHIATRIC: Affect normal   EYES: PERRL and anicteric  PULMONARY:No respiratory distress  CARDIOVASCULAR: No peripheral edema present  GASTROINTESTINAL: No distention present  MUSCULOSKELETAL: gait was grossly intact  The patient did not exhibit muscle wasting  SKIN: port site mildly erythematous and tender to palpation  No discharge  Site is well approximated  EXTREMITIES: Without cyanosis or clubbing  Pulses are present  Labs/Imaging:  CBC with diff:   Lab Results   Component Value Date    WBC 9 16 02/20/2020    HGB 14 3 02/20/2020    HCT 43 4 02/20/2020     (H) 02/20/2020     02/20/2020    MCH 32 9 02/20/2020    MCHC 32 9 02/20/2020    RDW 13 7 02/20/2020    MPV 11 3 02/20/2020     BMP/CMP:  Lab Results   Component Value Date     (L) 04/22/2015    K 4 0 02/20/2020    K 4 2 04/22/2015     02/20/2020     (L) 04/22/2015    CO2 25 02/20/2020    CO2 22 04/22/2015    ANIONGAP 11 04/22/2015    BUN 10 02/20/2020    BUN 8 04/22/2015    CREATININE 0 68 02/20/2020    CREATININE 0 49 (L) 04/22/2015    GLUCOSE 97 04/22/2015    CALCIUM 9 4 02/20/2020    CALCIUM 8 8 04/22/2015    AST 23 02/20/2020    AST 29 04/22/2015    ALT 20 02/20/2020    ALT 21 04/22/2015    ALKPHOS 86 02/20/2020    ALKPHOS 63 04/22/2015    PROT 7 2 04/22/2015    BILITOT 0 2 04/22/2015    EGFR 98 02/20/2020   ,     Coags:   Lab Results   Component Value Date    PTT 30 08/03/2017    INR 1 02 08/03/2017   ,          I reviewed prior clinical lab tests, independently reviewed relevant prior imaging, and reviewed and summarized old records  Review of Systems:  Reviewed      I have reviewed and made appropriate changes to the review of systems input by the medical assistant  Assessment and Plan:  Cheikh Hernandez is a 62 y o  female with a recent diagnosis of breast ca who recently had a port placed on Tuesday  The port site is tender to palpation  There is no discharge  Given her history of lupus, I suspect there may be impaired wound healing, however, I cannot rule out a skin infection at this point  I prescribed 500 mg keflex for 7 days empirically  I instructed her to call us if her pain worsens, she develops fevers or chills, or has any discharge from the port site  Assuming she improves over the weekend, she should be able to initiate chemotherapy on Monday  Thank you for allowing me to participate in the care of Cheikh Hernandez  Please don't hesitate to call, text, email, or TigerText with any questions  Reji Alonso MD  Interventional Radiologist  [facility]  Personal Cell: [phone]  [email or web address]

## 2020-03-20 ENCOUNTER — HOSPITAL ENCOUNTER (OUTPATIENT)
Dept: RADIOLOGY | Facility: HOSPITAL | Age: 55
Discharge: HOME | End: 2020-03-20
Attending: SURGERY
Payer: COMMERCIAL

## 2020-03-20 DIAGNOSIS — C50.212 MALIGNANT NEOPLASM OF UPPER-INNER QUADRANT OF LEFT BREAST IN FEMALE, ESTROGEN RECEPTOR NEGATIVE (HCC): ICD-10-CM

## 2020-03-20 DIAGNOSIS — Z17.1 MALIGNANT NEOPLASM OF UPPER-INNER QUADRANT OF LEFT BREAST IN FEMALE, ESTROGEN RECEPTOR NEGATIVE (HCC): ICD-10-CM

## 2020-03-20 PROCEDURE — 19085 BX BREAST 1ST LESION MR IMAG: CPT

## 2020-03-20 PROCEDURE — 88305 TISSUE EXAM BY PATHOLOGIST: CPT | Performed by: PATHOLOGY

## 2020-03-20 PROCEDURE — A9585 GADOBUTROL INJECTION: HCPCS | Performed by: SURGERY

## 2020-03-20 RX ORDER — LIDOCAINE HYDROCHLORIDE 10 MG/ML
5 INJECTION, SOLUTION EPIDURAL; INFILTRATION; INTRACAUDAL; PERINEURAL ONCE
Status: COMPLETED | OUTPATIENT
Start: 2020-03-20 | End: 2020-03-20

## 2020-03-20 RX ORDER — LIDOCAINE HYDROCHLORIDE AND EPINEPHRINE BITARTRATE 20; .01 MG/ML; MG/ML
20 INJECTION, SOLUTION SUBCUTANEOUS ONCE
Status: COMPLETED | OUTPATIENT
Start: 2020-03-20 | End: 2020-03-20

## 2020-03-20 RX ORDER — SODIUM CHLORIDE 9 MG/ML
20 INJECTION, SOLUTION INTRAVENOUS ONCE
Status: CANCELLED | OUTPATIENT
Start: 2020-03-24

## 2020-03-20 RX ADMIN — GADOBUTROL 6 ML: 604.72 INJECTION INTRAVENOUS at 09:48

## 2020-03-20 RX ADMIN — LIDOCAINE HYDROCHLORIDE 5 ML: 10 INJECTION, SOLUTION EPIDURAL; INFILTRATION; INTRACAUDAL; PERINEURAL at 09:15

## 2020-03-20 RX ADMIN — LIDOCAINE HYDROCHLORIDE,EPINEPHRINE BITARTRATE 20 ML: 20; .01 INJECTION, SOLUTION INFILTRATION; PERINEURAL at 09:15

## 2020-03-23 ENCOUNTER — APPOINTMENT (OUTPATIENT)
Dept: LAB | Facility: CLINIC | Age: 55
End: 2020-03-23
Payer: COMMERCIAL

## 2020-03-23 DIAGNOSIS — T45.1X5A CHEMOTHERAPY INDUCED NEUTROPENIA (HCC): ICD-10-CM

## 2020-03-23 DIAGNOSIS — Z17.1 MALIGNANT NEOPLASM OF UPPER-INNER QUADRANT OF LEFT BREAST IN FEMALE, ESTROGEN RECEPTOR NEGATIVE (HCC): ICD-10-CM

## 2020-03-23 DIAGNOSIS — C50.212 MALIGNANT NEOPLASM OF UPPER-INNER QUADRANT OF LEFT BREAST IN FEMALE, ESTROGEN RECEPTOR NEGATIVE (HCC): ICD-10-CM

## 2020-03-23 DIAGNOSIS — D70.1 CHEMOTHERAPY INDUCED NEUTROPENIA (HCC): ICD-10-CM

## 2020-03-23 LAB
ALBUMIN SERPL BCP-MCNC: 3.9 G/DL (ref 3.5–5)
ALP SERPL-CCNC: 82 U/L (ref 46–116)
ALT SERPL W P-5'-P-CCNC: 20 U/L (ref 12–78)
ANION GAP SERPL CALCULATED.3IONS-SCNC: 10 MMOL/L (ref 4–13)
AST SERPL W P-5'-P-CCNC: 20 U/L (ref 5–45)
BASOPHILS # BLD AUTO: 0.09 THOUSANDS/ΜL (ref 0–0.1)
BASOPHILS NFR BLD AUTO: 1 % (ref 0–1)
BILIRUB SERPL-MCNC: 0.25 MG/DL (ref 0.2–1)
BUN SERPL-MCNC: 9 MG/DL (ref 5–25)
CALCIUM SERPL-MCNC: 9.2 MG/DL (ref 8.3–10.1)
CHLORIDE SERPL-SCNC: 104 MMOL/L (ref 100–108)
CO2 SERPL-SCNC: 25 MMOL/L (ref 21–32)
CREAT SERPL-MCNC: 0.91 MG/DL (ref 0.6–1.3)
EOSINOPHIL # BLD AUTO: 0.25 THOUSAND/ΜL (ref 0–0.61)
EOSINOPHIL NFR BLD AUTO: 3 % (ref 0–6)
ERYTHROCYTE [DISTWIDTH] IN BLOOD BY AUTOMATED COUNT: 13.6 % (ref 11.6–15.1)
GFR SERPL CREATININE-BSD FRML MDRD: 71 ML/MIN/1.73SQ M
GLUCOSE SERPL-MCNC: 91 MG/DL (ref 65–140)
HCT VFR BLD AUTO: 44.5 % (ref 34.8–46.1)
HGB BLD-MCNC: 14.2 G/DL (ref 11.5–15.4)
IMM GRANULOCYTES # BLD AUTO: 0.04 THOUSAND/UL (ref 0–0.2)
IMM GRANULOCYTES NFR BLD AUTO: 0 % (ref 0–2)
LYMPHOCYTES # BLD AUTO: 1.98 THOUSANDS/ΜL (ref 0.6–4.47)
LYMPHOCYTES NFR BLD AUTO: 21 % (ref 14–44)
MCH RBC QN AUTO: 32.1 PG (ref 26.8–34.3)
MCHC RBC AUTO-ENTMCNC: 31.9 G/DL (ref 31.4–37.4)
MCV RBC AUTO: 101 FL (ref 82–98)
MONOCYTES # BLD AUTO: 0.81 THOUSAND/ΜL (ref 0.17–1.22)
MONOCYTES NFR BLD AUTO: 9 % (ref 4–12)
NEUTROPHILS # BLD AUTO: 6.12 THOUSANDS/ΜL (ref 1.85–7.62)
NEUTS SEG NFR BLD AUTO: 66 % (ref 43–75)
NRBC BLD AUTO-RTO: 0 /100 WBCS
PLATELET # BLD AUTO: 335 THOUSANDS/UL (ref 149–390)
PMV BLD AUTO: 10.1 FL (ref 8.9–12.7)
POTASSIUM SERPL-SCNC: 4.1 MMOL/L (ref 3.5–5.3)
PROT SERPL-MCNC: 7.9 G/DL (ref 6.4–8.2)
RBC # BLD AUTO: 4.43 MILLION/UL (ref 3.81–5.12)
SODIUM SERPL-SCNC: 139 MMOL/L (ref 136–145)
WBC # BLD AUTO: 9.29 THOUSAND/UL (ref 4.31–10.16)

## 2020-03-23 PROCEDURE — 85025 COMPLETE CBC W/AUTO DIFF WBC: CPT

## 2020-03-23 PROCEDURE — 80053 COMPREHEN METABOLIC PANEL: CPT

## 2020-03-23 PROCEDURE — 36415 COLL VENOUS BLD VENIPUNCTURE: CPT

## 2020-03-23 NOTE — PROGRESS NOTES
Post procedure call completed on 3/23/20 @ 1006    Bleeding: _____yes __x___no    Pain: __x___yes ______no    Redness/Swelling: ______yes __x____no    Band aid removed: __x___yes _____no    Steri-Strips intact: ___x___yes _____no  Pt states she has pain in the left nipple when she bends forward   She said she "doesn't have the pain once she settles down "

## 2020-03-24 ENCOUNTER — HOSPITAL ENCOUNTER (OUTPATIENT)
Dept: INFUSION CENTER | Facility: CLINIC | Age: 55
Discharge: HOME | End: 2020-03-24
Payer: COMMERCIAL

## 2020-03-24 VITALS
DIASTOLIC BLOOD PRESSURE: 66 MMHG | SYSTOLIC BLOOD PRESSURE: 98 MMHG | RESPIRATION RATE: 18 BRPM | BODY MASS INDEX: 23.28 KG/M2 | WEIGHT: 131.39 LBS | TEMPERATURE: 98.6 F | HEIGHT: 63 IN | OXYGEN SATURATION: 98 % | HEART RATE: 89 BPM

## 2020-03-24 DIAGNOSIS — T45.1X5A CHEMOTHERAPY INDUCED NEUTROPENIA (HCC): Primary | ICD-10-CM

## 2020-03-24 DIAGNOSIS — D70.1 CHEMOTHERAPY INDUCED NEUTROPENIA (HCC): Primary | ICD-10-CM

## 2020-03-24 DIAGNOSIS — C50.212 MALIGNANT NEOPLASM OF UPPER-INNER QUADRANT OF LEFT BREAST IN FEMALE, ESTROGEN RECEPTOR NEGATIVE (HCC): ICD-10-CM

## 2020-03-24 DIAGNOSIS — Z17.1 MALIGNANT NEOPLASM OF UPPER-INNER QUADRANT OF LEFT BREAST IN FEMALE, ESTROGEN RECEPTOR NEGATIVE (HCC): ICD-10-CM

## 2020-03-24 PROCEDURE — 96377 APPLICATON ON-BODY INJECTOR: CPT

## 2020-03-24 PROCEDURE — 96367 TX/PROPH/DG ADDL SEQ IV INF: CPT

## 2020-03-24 PROCEDURE — 96417 CHEMO IV INFUS EACH ADDL SEQ: CPT

## 2020-03-24 PROCEDURE — 96413 CHEMO IV INFUSION 1 HR: CPT

## 2020-03-24 RX ORDER — SODIUM CHLORIDE 9 MG/ML
20 INJECTION, SOLUTION INTRAVENOUS ONCE
Status: COMPLETED | OUTPATIENT
Start: 2020-03-24 | End: 2020-03-24

## 2020-03-24 RX ADMIN — DOCETAXEL 122.2 MG: 20 INJECTION, SOLUTION, CONCENTRATE INTRAVENOUS at 13:05

## 2020-03-24 RX ADMIN — SODIUM CHLORIDE 20 ML/HR: 0.9 INJECTION, SOLUTION INTRAVENOUS at 08:52

## 2020-03-24 RX ADMIN — PERTUZUMAB 840 MG: 30 INJECTION, SOLUTION, CONCENTRATE INTRAVENOUS at 10:10

## 2020-03-24 RX ADMIN — SODIUM CHLORIDE 150 MG: 0.9 INJECTION, SOLUTION INTRAVENOUS at 09:23

## 2020-03-24 RX ADMIN — DEXAMETHASONE SODIUM PHOSPHATE: 10 INJECTION, SOLUTION INTRAMUSCULAR; INTRAVENOUS at 08:52

## 2020-03-24 RX ADMIN — PEGFILGRASTIM 6 MG: KIT SUBCUTANEOUS at 15:16

## 2020-03-24 RX ADMIN — TRASTUZUMAB 486 MG: 150 INJECTION, POWDER, LYOPHILIZED, FOR SOLUTION INTRAVENOUS at 11:23

## 2020-03-24 RX ADMIN — CARBOPLATIN 547.8 MG: 10 INJECTION, SOLUTION INTRAVENOUS at 14:09

## 2020-03-25 ENCOUNTER — TELEPHONE (OUTPATIENT)
Dept: HEMATOLOGY ONCOLOGY | Facility: CLINIC | Age: 55
End: 2020-03-25

## 2020-03-25 NOTE — TELEPHONE ENCOUNTER
Called patient 3/25 left a message that pt needs to be seen prior to her treatment date on 4/14  Made pt an appointment for 4/8 at 9:00am  Stated that if this date and or time does not work please give the office a call

## 2020-03-25 NOTE — TELEPHONE ENCOUNTER
Patient called to confirm their appointment  Appointment confirmed for Dr Dorothea Sullivan             Appointment date and time:  4- 8-20 @ [address] [facility] location: [facility]               Patient verbalized understanding of above

## 2020-03-25 NOTE — TELEPHONE ENCOUNTER
Patient c/o a 'burning " pain in leftnipple since biopsy done on 3/20/2020, worsening when bending over  No other associated symptoms described   Instucted patient to take tylenol q6hr prn for pain and call if symptoms worsen

## 2020-03-26 ENCOUNTER — TELEPHONE (OUTPATIENT)
Dept: INFUSION CENTER | Facility: CLINIC | Age: 55
End: 2020-03-26

## 2020-03-27 ENCOUNTER — TELEPHONE (OUTPATIENT)
Dept: HEMATOLOGY ONCOLOGY | Facility: CLINIC | Age: 55
End: 2020-03-27

## 2020-03-30 ENCOUNTER — TELEPHONE (OUTPATIENT)
Dept: SURGICAL ONCOLOGY | Facility: CLINIC | Age: 55
End: 2020-03-30

## 2020-03-30 NOTE — TELEPHONE ENCOUNTER
Called patient to discuss her breast biopsy results; which came back benign  Explained that the radiologists were in agreement with the pathology report and recommended a follow up MRI towards the end of her neoadjuvant chemo treatment  Patient verbalized understanding and was appreciative of the phone call

## 2020-04-07 ENCOUNTER — DOCUMENTATION (OUTPATIENT)
Dept: OTHER | Facility: HOSPITAL | Age: 55
End: 2020-04-07

## 2020-04-07 ENCOUNTER — TELEPHONE (OUTPATIENT)
Dept: HEMATOLOGY ONCOLOGY | Facility: CLINIC | Age: 55
End: 2020-04-07

## 2020-04-07 DIAGNOSIS — F41.9 ANXIETY: ICD-10-CM

## 2020-04-07 RX ORDER — ALPRAZOLAM 1 MG/1
TABLET ORAL
Qty: 90 TABLET | Refills: 1 | Status: SHIPPED | OUTPATIENT
Start: 2020-04-07 | End: 2020-06-11

## 2020-04-08 ENCOUNTER — OFFICE VISIT (OUTPATIENT)
Dept: HEMATOLOGY ONCOLOGY | Facility: CLINIC | Age: 55
End: 2020-04-08
Payer: COMMERCIAL

## 2020-04-08 VITALS
HEART RATE: 105 BPM | HEIGHT: 63 IN | TEMPERATURE: 98.2 F | WEIGHT: 130 LBS | OXYGEN SATURATION: 98 % | RESPIRATION RATE: 16 BRPM | BODY MASS INDEX: 23.04 KG/M2

## 2020-04-08 DIAGNOSIS — C50.212 MALIGNANT NEOPLASM OF UPPER-INNER QUADRANT OF LEFT BREAST IN FEMALE, ESTROGEN RECEPTOR NEGATIVE (HCC): Primary | ICD-10-CM

## 2020-04-08 DIAGNOSIS — Z17.1 MALIGNANT NEOPLASM OF UPPER-INNER QUADRANT OF LEFT BREAST IN FEMALE, ESTROGEN RECEPTOR NEGATIVE (HCC): Primary | ICD-10-CM

## 2020-04-08 PROCEDURE — 3078F DIAST BP <80 MM HG: CPT | Performed by: INTERNAL MEDICINE

## 2020-04-08 PROCEDURE — 3074F SYST BP LT 130 MM HG: CPT | Performed by: INTERNAL MEDICINE

## 2020-04-08 PROCEDURE — 99214 OFFICE O/P EST MOD 30 MIN: CPT | Performed by: INTERNAL MEDICINE

## 2020-04-08 PROCEDURE — 3008F BODY MASS INDEX DOCD: CPT | Performed by: INTERNAL MEDICINE

## 2020-04-13 RX ORDER — SODIUM CHLORIDE 9 MG/ML
20 INJECTION, SOLUTION INTRAVENOUS ONCE
Status: CANCELLED | OUTPATIENT
Start: 2020-04-14

## 2020-04-14 ENCOUNTER — HOSPITAL ENCOUNTER (OUTPATIENT)
Dept: INFUSION CENTER | Facility: CLINIC | Age: 55
Discharge: HOME | End: 2020-04-14
Payer: COMMERCIAL

## 2020-04-14 ENCOUNTER — APPOINTMENT (OUTPATIENT)
Dept: LAB | Facility: CLINIC | Age: 55
End: 2020-04-14
Payer: COMMERCIAL

## 2020-04-14 VITALS
HEART RATE: 103 BPM | RESPIRATION RATE: 18 BRPM | DIASTOLIC BLOOD PRESSURE: 85 MMHG | HEIGHT: 63 IN | TEMPERATURE: 98.4 F | WEIGHT: 128.5 LBS | BODY MASS INDEX: 22.77 KG/M2 | OXYGEN SATURATION: 95 % | SYSTOLIC BLOOD PRESSURE: 121 MMHG

## 2020-04-14 DIAGNOSIS — C50.212 MALIGNANT NEOPLASM OF UPPER-INNER QUADRANT OF LEFT BREAST IN FEMALE, ESTROGEN RECEPTOR NEGATIVE (HCC): ICD-10-CM

## 2020-04-14 DIAGNOSIS — D70.1 CHEMOTHERAPY INDUCED NEUTROPENIA (HCC): ICD-10-CM

## 2020-04-14 DIAGNOSIS — Z17.1 MALIGNANT NEOPLASM OF UPPER-INNER QUADRANT OF LEFT BREAST IN FEMALE, ESTROGEN RECEPTOR NEGATIVE (HCC): ICD-10-CM

## 2020-04-14 DIAGNOSIS — T45.1X5A CHEMOTHERAPY INDUCED NEUTROPENIA (HCC): Primary | ICD-10-CM

## 2020-04-14 DIAGNOSIS — D70.1 CHEMOTHERAPY INDUCED NEUTROPENIA (HCC): Primary | ICD-10-CM

## 2020-04-14 DIAGNOSIS — T45.1X5A CHEMOTHERAPY INDUCED NEUTROPENIA (HCC): ICD-10-CM

## 2020-04-14 LAB
ALBUMIN SERPL BCP-MCNC: 4.4 G/DL (ref 3.5–5)
ALP SERPL-CCNC: 86 U/L (ref 46–116)
ALT SERPL W P-5'-P-CCNC: 33 U/L (ref 12–78)
ANION GAP SERPL CALCULATED.3IONS-SCNC: 12 MMOL/L (ref 4–13)
AST SERPL W P-5'-P-CCNC: 35 U/L (ref 5–45)
BASOPHILS # BLD MANUAL: 0 THOUSAND/UL (ref 0–0.1)
BASOPHILS NFR MAR MANUAL: 0 % (ref 0–1)
BILIRUB SERPL-MCNC: 0.18 MG/DL (ref 0.2–1)
BUN SERPL-MCNC: 5 MG/DL (ref 5–25)
CALCIUM SERPL-MCNC: 9.9 MG/DL (ref 8.3–10.1)
CHLORIDE SERPL-SCNC: 105 MMOL/L (ref 100–108)
CO2 SERPL-SCNC: 24 MMOL/L (ref 21–32)
CREAT SERPL-MCNC: 0.85 MG/DL (ref 0.6–1.3)
EOSINOPHIL # BLD MANUAL: 0 THOUSAND/UL (ref 0–0.4)
EOSINOPHIL NFR BLD MANUAL: 0 % (ref 0–6)
ERYTHROCYTE [DISTWIDTH] IN BLOOD BY AUTOMATED COUNT: 14.1 % (ref 11.6–15.1)
GFR SERPL CREATININE-BSD FRML MDRD: 77 ML/MIN/1.73SQ M
GLUCOSE SERPL-MCNC: 111 MG/DL (ref 65–140)
HCT VFR BLD AUTO: 40.4 % (ref 34.8–46.1)
HGB BLD-MCNC: 13 G/DL (ref 11.5–15.4)
LYMPHOCYTES # BLD AUTO: 0.95 THOUSAND/UL (ref 0.6–4.47)
LYMPHOCYTES # BLD AUTO: 7 % (ref 14–44)
MCH RBC QN AUTO: 32.4 PG (ref 26.8–34.3)
MCHC RBC AUTO-ENTMCNC: 32.2 G/DL (ref 31.4–37.4)
MCV RBC AUTO: 101 FL (ref 82–98)
MONOCYTES # BLD AUTO: 0.27 THOUSAND/UL (ref 0–1.22)
MONOCYTES NFR BLD: 2 % (ref 4–12)
NEUTROPHILS # BLD MANUAL: 12.31 THOUSAND/UL (ref 1.85–7.62)
NEUTS BAND NFR BLD MANUAL: 6 % (ref 0–8)
NEUTS SEG NFR BLD AUTO: 85 % (ref 43–75)
NRBC BLD AUTO-RTO: 0 /100 WBCS
PLATELET # BLD AUTO: 343 THOUSANDS/UL (ref 149–390)
PLATELET BLD QL SMEAR: ADEQUATE
PMV BLD AUTO: 9.9 FL (ref 8.9–12.7)
POTASSIUM SERPL-SCNC: 4.6 MMOL/L (ref 3.5–5.3)
PROT SERPL-MCNC: 8 G/DL (ref 6.4–8.2)
RBC # BLD AUTO: 4.01 MILLION/UL (ref 3.81–5.12)
RBC MORPH BLD: NORMAL
SODIUM SERPL-SCNC: 141 MMOL/L (ref 136–145)
TOTAL CELLS COUNTED SPEC: 100
WBC # BLD AUTO: 13.53 THOUSAND/UL (ref 4.31–10.16)

## 2020-04-14 PROCEDURE — 96367 TX/PROPH/DG ADDL SEQ IV INF: CPT

## 2020-04-14 PROCEDURE — 96417 CHEMO IV INFUS EACH ADDL SEQ: CPT

## 2020-04-14 PROCEDURE — 36415 COLL VENOUS BLD VENIPUNCTURE: CPT

## 2020-04-14 PROCEDURE — 80053 COMPREHEN METABOLIC PANEL: CPT

## 2020-04-14 PROCEDURE — 96377 APPLICATON ON-BODY INJECTOR: CPT

## 2020-04-14 PROCEDURE — 85007 BL SMEAR W/DIFF WBC COUNT: CPT

## 2020-04-14 PROCEDURE — 96413 CHEMO IV INFUSION 1 HR: CPT

## 2020-04-14 PROCEDURE — 85027 COMPLETE CBC AUTOMATED: CPT

## 2020-04-14 RX ORDER — SODIUM CHLORIDE 9 MG/ML
20 INJECTION, SOLUTION INTRAVENOUS ONCE
Status: COMPLETED | OUTPATIENT
Start: 2020-04-14 | End: 2020-04-14

## 2020-04-14 RX ADMIN — CARBOPLATIN 575.4 MG: 10 INJECTION, SOLUTION INTRAVENOUS at 15:10

## 2020-04-14 RX ADMIN — SODIUM CHLORIDE 150 MG: 0.9 INJECTION, SOLUTION INTRAVENOUS at 11:55

## 2020-04-14 RX ADMIN — PERTUZUMAB 420 MG: 30 INJECTION, SOLUTION, CONCENTRATE INTRAVENOUS at 12:44

## 2020-04-14 RX ADMIN — PEGFILGRASTIM 6 MG: KIT SUBCUTANEOUS at 16:10

## 2020-04-14 RX ADMIN — DEXAMETHASONE SODIUM PHOSPHATE: 10 INJECTION, SOLUTION INTRAMUSCULAR; INTRAVENOUS at 11:28

## 2020-04-14 RX ADMIN — DOCETAXEL 122.2 MG: 20 INJECTION, SOLUTION, CONCENTRATE INTRAVENOUS at 14:05

## 2020-04-14 RX ADMIN — SODIUM CHLORIDE 20 ML/HR: 0.9 INJECTION, SOLUTION INTRAVENOUS at 11:22

## 2020-04-14 RX ADMIN — TRASTUZUMAB 364 MG: 150 INJECTION, POWDER, LYOPHILIZED, FOR SOLUTION INTRAVENOUS at 13:26

## 2020-04-16 DIAGNOSIS — Z17.1 MALIGNANT NEOPLASM OF UPPER-INNER QUADRANT OF LEFT BREAST IN FEMALE, ESTROGEN RECEPTOR NEGATIVE (HCC): ICD-10-CM

## 2020-04-16 DIAGNOSIS — C50.212 MALIGNANT NEOPLASM OF UPPER-INNER QUADRANT OF LEFT BREAST IN FEMALE, ESTROGEN RECEPTOR NEGATIVE (HCC): ICD-10-CM

## 2020-04-16 RX ORDER — ONDANSETRON 4 MG/1
TABLET, FILM COATED ORAL
Qty: 30 TABLET | Refills: 1 | Status: ON HOLD | OUTPATIENT
Start: 2020-04-16 | End: 2020-08-27 | Stop reason: SDUPTHER

## 2020-04-24 ENCOUNTER — DOCUMENTATION (OUTPATIENT)
Dept: OTHER | Facility: HOSPITAL | Age: 55
End: 2020-04-24

## 2020-04-24 ENCOUNTER — HOSPITAL ENCOUNTER (OUTPATIENT)
Facility: HOSPITAL | Age: 55
Setting detail: OBSERVATION
Discharge: HOME | End: 2020-04-26
Attending: EMERGENCY MEDICINE | Admitting: FAMILY MEDICINE
Payer: COMMERCIAL

## 2020-04-24 ENCOUNTER — APPOINTMENT (EMERGENCY)
Dept: RADIOLOGY | Facility: HOSPITAL | Age: 55
End: 2020-04-24
Payer: COMMERCIAL

## 2020-04-24 ENCOUNTER — APPOINTMENT (EMERGENCY)
Dept: CT IMAGING | Facility: HOSPITAL | Age: 55
End: 2020-04-24
Payer: COMMERCIAL

## 2020-04-24 ENCOUNTER — TELEPHONE (OUTPATIENT)
Dept: OTHER | Facility: OTHER | Age: 55
End: 2020-04-24

## 2020-04-24 DIAGNOSIS — R11.2 NAUSEA AND VOMITING: Primary | ICD-10-CM

## 2020-04-24 DIAGNOSIS — N17.9 AKI (ACUTE KIDNEY INJURY) (HCC): ICD-10-CM

## 2020-04-24 PROBLEM — D72.829 LEUKOCYTOSIS: Status: ACTIVE | Noted: 2020-04-24

## 2020-04-24 PROBLEM — R19.7 NAUSEA, VOMITING, AND DIARRHEA: Status: ACTIVE | Noted: 2020-04-24

## 2020-04-24 LAB
ALBUMIN SERPL BCP-MCNC: 4.2 G/DL (ref 3.5–5)
ALP SERPL-CCNC: 149 U/L (ref 46–116)
ALT SERPL W P-5'-P-CCNC: 27 U/L (ref 12–78)
ANION GAP SERPL CALCULATED.3IONS-SCNC: 18 MMOL/L (ref 4–13)
APTT PPP: 31 SECONDS (ref 23–37)
AST SERPL W P-5'-P-CCNC: 21 U/L (ref 5–45)
BASE EX.OXY STD BLDV CALC-SCNC: 88.6 % (ref 60–80)
BASE EXCESS BLDV CALC-SCNC: -5.7 MMOL/L
BASOPHILS # BLD AUTO: 0.11 THOUSANDS/ΜL (ref 0–0.1)
BASOPHILS NFR BLD AUTO: 1 % (ref 0–1)
BILIRUB SERPL-MCNC: 0.36 MG/DL (ref 0.2–1)
BUN SERPL-MCNC: 13 MG/DL (ref 5–25)
CALCIUM SERPL-MCNC: 9.7 MG/DL (ref 8.3–10.1)
CHLORIDE SERPL-SCNC: 104 MMOL/L (ref 100–108)
CO2 SERPL-SCNC: 18 MMOL/L (ref 21–32)
CREAT SERPL-MCNC: 1.1 MG/DL (ref 0.6–1.3)
D DIMER PPP FEU-MCNC: 0.45 UG/ML FEU
EOSINOPHIL # BLD AUTO: 0 THOUSAND/ΜL (ref 0–0.61)
EOSINOPHIL NFR BLD AUTO: 0 % (ref 0–6)
ERYTHROCYTE [DISTWIDTH] IN BLOOD BY AUTOMATED COUNT: 13.7 % (ref 11.6–15.1)
GFR SERPL CREATININE-BSD FRML MDRD: 56 ML/MIN/1.73SQ M
GLUCOSE SERPL-MCNC: 121 MG/DL (ref 65–140)
HCO3 BLDV-SCNC: 19.7 MMOL/L (ref 24–30)
HCT VFR BLD AUTO: 35.5 % (ref 34.8–46.1)
HGB BLD-MCNC: 12 G/DL (ref 11.5–15.4)
IMM GRANULOCYTES # BLD AUTO: 0.48 THOUSAND/UL (ref 0–0.2)
IMM GRANULOCYTES NFR BLD AUTO: 2 % (ref 0–2)
INR PPP: 1.09 (ref 0.84–1.19)
LACTATE SERPL-SCNC: 0.5 MMOL/L (ref 0.5–2)
LIPASE SERPL-CCNC: 146 U/L (ref 73–393)
LYMPHOCYTES # BLD AUTO: 0.62 THOUSANDS/ΜL (ref 0.6–4.47)
LYMPHOCYTES NFR BLD AUTO: 3 % (ref 14–44)
MCH RBC QN AUTO: 32.2 PG (ref 26.8–34.3)
MCHC RBC AUTO-ENTMCNC: 33.8 G/DL (ref 31.4–37.4)
MCV RBC AUTO: 95 FL (ref 82–98)
MONOCYTES # BLD AUTO: 1.16 THOUSAND/ΜL (ref 0.17–1.22)
MONOCYTES NFR BLD AUTO: 5 % (ref 4–12)
NEUTROPHILS # BLD AUTO: 20.53 THOUSANDS/ΜL (ref 1.85–7.62)
NEUTS SEG NFR BLD AUTO: 89 % (ref 43–75)
NRBC BLD AUTO-RTO: 0 /100 WBCS
O2 CT BLDV-SCNC: 14.2 ML/DL
PCO2 BLDV: 38.1 MM HG (ref 42–50)
PH BLDV: 7.33 [PH] (ref 7.3–7.4)
PLATELET # BLD AUTO: 217 THOUSANDS/UL (ref 149–390)
PMV BLD AUTO: 10.4 FL (ref 8.9–12.7)
PO2 BLDV: 66.3 MM HG (ref 35–45)
POTASSIUM SERPL-SCNC: 4 MMOL/L (ref 3.5–5.3)
PROT SERPL-MCNC: 7.8 G/DL (ref 6.4–8.2)
PROTHROMBIN TIME: 13.5 SECONDS (ref 11.6–14.5)
RBC # BLD AUTO: 3.73 MILLION/UL (ref 3.81–5.12)
SARS-COV-2 RNA RESP QL NAA+PROBE: NEGATIVE
SODIUM SERPL-SCNC: 140 MMOL/L (ref 136–145)
TROPONIN I SERPL-MCNC: <0.02 NG/ML
WBC # BLD AUTO: 22.9 THOUSAND/UL (ref 4.31–10.16)

## 2020-04-24 PROCEDURE — 80053 COMPREHEN METABOLIC PANEL: CPT | Performed by: EMERGENCY MEDICINE

## 2020-04-24 PROCEDURE — 93005 ELECTROCARDIOGRAM TRACING: CPT

## 2020-04-24 PROCEDURE — 96374 THER/PROPH/DIAG INJ IV PUSH: CPT

## 2020-04-24 PROCEDURE — 96360 HYDRATION IV INFUSION INIT: CPT

## 2020-04-24 PROCEDURE — 85610 PROTHROMBIN TIME: CPT | Performed by: EMERGENCY MEDICINE

## 2020-04-24 PROCEDURE — 82805 BLOOD GASES W/O2 SATURATION: CPT | Performed by: EMERGENCY MEDICINE

## 2020-04-24 PROCEDURE — 85379 FIBRIN DEGRADATION QUANT: CPT | Performed by: EMERGENCY MEDICINE

## 2020-04-24 PROCEDURE — 71275 CT ANGIOGRAPHY CHEST: CPT

## 2020-04-24 PROCEDURE — 99285 EMERGENCY DEPT VISIT HI MDM: CPT

## 2020-04-24 PROCEDURE — 85025 COMPLETE CBC W/AUTO DIFF WBC: CPT | Performed by: EMERGENCY MEDICINE

## 2020-04-24 PROCEDURE — 36415 COLL VENOUS BLD VENIPUNCTURE: CPT | Performed by: EMERGENCY MEDICINE

## 2020-04-24 PROCEDURE — 74177 CT ABD & PELVIS W/CONTRAST: CPT

## 2020-04-24 PROCEDURE — 84484 ASSAY OF TROPONIN QUANT: CPT | Performed by: EMERGENCY MEDICINE

## 2020-04-24 PROCEDURE — 85730 THROMBOPLASTIN TIME PARTIAL: CPT | Performed by: EMERGENCY MEDICINE

## 2020-04-24 PROCEDURE — 87635 SARS-COV-2 COVID-19 AMP PRB: CPT | Performed by: EMERGENCY MEDICINE

## 2020-04-24 PROCEDURE — 83605 ASSAY OF LACTIC ACID: CPT | Performed by: EMERGENCY MEDICINE

## 2020-04-24 PROCEDURE — 96375 TX/PRO/DX INJ NEW DRUG ADDON: CPT

## 2020-04-24 PROCEDURE — 71045 X-RAY EXAM CHEST 1 VIEW: CPT

## 2020-04-24 PROCEDURE — 99285 EMERGENCY DEPT VISIT HI MDM: CPT | Performed by: EMERGENCY MEDICINE

## 2020-04-24 PROCEDURE — 83690 ASSAY OF LIPASE: CPT | Performed by: EMERGENCY MEDICINE

## 2020-04-24 RX ORDER — HYDROMORPHONE HCL/PF 1 MG/ML
0.5 SYRINGE (ML) INJECTION ONCE
Status: COMPLETED | OUTPATIENT
Start: 2020-04-24 | End: 2020-04-24

## 2020-04-24 RX ORDER — ONDANSETRON 2 MG/ML
4 INJECTION INTRAMUSCULAR; INTRAVENOUS ONCE
Status: COMPLETED | OUTPATIENT
Start: 2020-04-24 | End: 2020-04-24

## 2020-04-24 RX ADMIN — SODIUM CHLORIDE 1000 ML: 0.9 INJECTION, SOLUTION INTRAVENOUS at 22:55

## 2020-04-24 RX ADMIN — HYDROMORPHONE HYDROCHLORIDE 0.5 MG: 1 INJECTION, SOLUTION INTRAMUSCULAR; INTRAVENOUS; SUBCUTANEOUS at 20:43

## 2020-04-24 RX ADMIN — ONDANSETRON 4 MG: 2 INJECTION INTRAMUSCULAR; INTRAVENOUS at 20:40

## 2020-04-24 RX ADMIN — IOHEXOL 100 ML: 350 INJECTION, SOLUTION INTRAVENOUS at 22:29

## 2020-04-25 PROBLEM — E87.2 INCREASED ANION GAP METABOLIC ACIDOSIS: Status: ACTIVE | Noted: 2020-04-25

## 2020-04-25 PROBLEM — E87.29 INCREASED ANION GAP METABOLIC ACIDOSIS: Status: ACTIVE | Noted: 2020-04-25

## 2020-04-25 LAB
ANION GAP SERPL CALCULATED.3IONS-SCNC: 13 MMOL/L (ref 4–13)
BASE EX.OXY STD BLDV CALC-SCNC: 95.3 % (ref 60–80)
BASE EXCESS BLDV CALC-SCNC: -7.4 MMOL/L
BASOPHILS # BLD AUTO: 0.03 THOUSANDS/ΜL (ref 0–0.1)
BASOPHILS NFR BLD AUTO: 0 % (ref 0–1)
BILIRUB UR QL STRIP: NEGATIVE
BUN SERPL-MCNC: 12 MG/DL (ref 5–25)
C DIFF TOX GENS STL QL NAA+PROBE: NEGATIVE
CALCIUM SERPL-MCNC: 8.3 MG/DL (ref 8.3–10.1)
CHLORIDE SERPL-SCNC: 105 MMOL/L (ref 100–108)
CLARITY UR: CLEAR
CO2 SERPL-SCNC: 20 MMOL/L (ref 21–32)
COLOR UR: YELLOW
CREAT SERPL-MCNC: 1.04 MG/DL (ref 0.6–1.3)
EOSINOPHIL # BLD AUTO: 0 THOUSAND/ΜL (ref 0–0.61)
EOSINOPHIL NFR BLD AUTO: 0 % (ref 0–6)
ERYTHROCYTE [DISTWIDTH] IN BLOOD BY AUTOMATED COUNT: 14 % (ref 11.6–15.1)
GFR SERPL CREATININE-BSD FRML MDRD: 60 ML/MIN/1.73SQ M
GLUCOSE P FAST SERPL-MCNC: 93 MG/DL (ref 65–99)
GLUCOSE SERPL-MCNC: 93 MG/DL (ref 65–140)
GLUCOSE UR STRIP-MCNC: NEGATIVE MG/DL
HCO3 BLDV-SCNC: 17.5 MMOL/L (ref 24–30)
HCT VFR BLD AUTO: 31.7 % (ref 34.8–46.1)
HGB BLD-MCNC: 10.4 G/DL (ref 11.5–15.4)
HGB UR QL STRIP.AUTO: NEGATIVE
IMM GRANULOCYTES # BLD AUTO: 0.22 THOUSAND/UL (ref 0–0.2)
IMM GRANULOCYTES NFR BLD AUTO: 1 % (ref 0–2)
KETONES UR STRIP-MCNC: NEGATIVE MG/DL
LEUKOCYTE ESTERASE UR QL STRIP: NEGATIVE
LYMPHOCYTES # BLD AUTO: 1.82 THOUSANDS/ΜL (ref 0.6–4.47)
LYMPHOCYTES NFR BLD AUTO: 11 % (ref 14–44)
MCH RBC QN AUTO: 32.2 PG (ref 26.8–34.3)
MCHC RBC AUTO-ENTMCNC: 32.8 G/DL (ref 31.4–37.4)
MCV RBC AUTO: 98 FL (ref 82–98)
MONOCYTES # BLD AUTO: 1.23 THOUSAND/ΜL (ref 0.17–1.22)
MONOCYTES NFR BLD AUTO: 7 % (ref 4–12)
NEUTROPHILS # BLD AUTO: 13.89 THOUSANDS/ΜL (ref 1.85–7.62)
NEUTS SEG NFR BLD AUTO: 81 % (ref 43–75)
NITRITE UR QL STRIP: NEGATIVE
NRBC BLD AUTO-RTO: 0 /100 WBCS
O2 CT BLDV-SCNC: 14.4 ML/DL
PCO2 BLDV: 32.9 MM HG (ref 42–50)
PH BLDV: 7.34 [PH] (ref 7.3–7.4)
PH UR STRIP.AUTO: 5.5 [PH]
PLATELET # BLD AUTO: 180 THOUSANDS/UL (ref 149–390)
PMV BLD AUTO: 9.9 FL (ref 8.9–12.7)
PO2 BLDV: 103.9 MM HG (ref 35–45)
POTASSIUM SERPL-SCNC: 3.3 MMOL/L (ref 3.5–5.3)
PROT UR STRIP-MCNC: NEGATIVE MG/DL
RBC # BLD AUTO: 3.23 MILLION/UL (ref 3.81–5.12)
SODIUM SERPL-SCNC: 138 MMOL/L (ref 136–145)
SP GR UR STRIP.AUTO: 1.01 (ref 1–1.03)
UROBILINOGEN UR QL STRIP.AUTO: 0.2 E.U./DL
WBC # BLD AUTO: 17.19 THOUSAND/UL (ref 4.31–10.16)

## 2020-04-25 PROCEDURE — 99220 PR INITIAL OBSERVATION CARE/DAY 70 MINUTES: CPT | Performed by: INTERNAL MEDICINE

## 2020-04-25 PROCEDURE — 87505 NFCT AGENT DETECTION GI: CPT | Performed by: PHYSICIAN ASSISTANT

## 2020-04-25 PROCEDURE — 81003 URINALYSIS AUTO W/O SCOPE: CPT | Performed by: PHYSICIAN ASSISTANT

## 2020-04-25 PROCEDURE — 82805 BLOOD GASES W/O2 SATURATION: CPT | Performed by: PHYSICIAN ASSISTANT

## 2020-04-25 PROCEDURE — 85025 COMPLETE CBC W/AUTO DIFF WBC: CPT | Performed by: PHYSICIAN ASSISTANT

## 2020-04-25 PROCEDURE — 80048 BASIC METABOLIC PNL TOTAL CA: CPT | Performed by: PHYSICIAN ASSISTANT

## 2020-04-25 RX ORDER — HYDROXYCHLOROQUINE SULFATE 200 MG/1
100 TABLET, FILM COATED ORAL
Status: DISCONTINUED | OUTPATIENT
Start: 2020-04-25 | End: 2020-04-26 | Stop reason: HOSPADM

## 2020-04-25 RX ORDER — SODIUM CHLORIDE 9 MG/ML
125 INJECTION, SOLUTION INTRAVENOUS CONTINUOUS
Status: DISCONTINUED | OUTPATIENT
Start: 2020-04-25 | End: 2020-04-26 | Stop reason: HOSPADM

## 2020-04-25 RX ORDER — ALPRAZOLAM 0.5 MG/1
1 TABLET ORAL 3 TIMES DAILY PRN
Status: DISCONTINUED | OUTPATIENT
Start: 2020-04-25 | End: 2020-04-26 | Stop reason: HOSPADM

## 2020-04-25 RX ORDER — LEVOTHYROXINE SODIUM 88 UG/1
88 TABLET ORAL
Status: DISCONTINUED | OUTPATIENT
Start: 2020-04-25 | End: 2020-04-26 | Stop reason: HOSPADM

## 2020-04-25 RX ORDER — CYCLOBENZAPRINE HCL 10 MG
5 TABLET ORAL 3 TIMES DAILY PRN
Status: DISCONTINUED | OUTPATIENT
Start: 2020-04-25 | End: 2020-04-26 | Stop reason: HOSPADM

## 2020-04-25 RX ORDER — ONDANSETRON 2 MG/ML
4 INJECTION INTRAMUSCULAR; INTRAVENOUS EVERY 6 HOURS PRN
Status: DISCONTINUED | OUTPATIENT
Start: 2020-04-25 | End: 2020-04-26 | Stop reason: HOSPADM

## 2020-04-25 RX ORDER — ONDANSETRON 2 MG/ML
4 INJECTION INTRAMUSCULAR; INTRAVENOUS ONCE
Status: COMPLETED | OUTPATIENT
Start: 2020-04-25 | End: 2020-04-25

## 2020-04-25 RX ORDER — LISINOPRIL 20 MG/1
20 TABLET ORAL DAILY
Status: DISCONTINUED | OUTPATIENT
Start: 2020-04-25 | End: 2020-04-26 | Stop reason: HOSPADM

## 2020-04-25 RX ORDER — HYDROXYCHLOROQUINE SULFATE 200 MG/1
200 TABLET, FILM COATED ORAL
Status: DISCONTINUED | OUTPATIENT
Start: 2020-04-25 | End: 2020-04-26 | Stop reason: HOSPADM

## 2020-04-25 RX ORDER — DICYCLOMINE HCL 20 MG
20 TABLET ORAL EVERY 6 HOURS PRN
Status: DISCONTINUED | OUTPATIENT
Start: 2020-04-25 | End: 2020-04-26 | Stop reason: HOSPADM

## 2020-04-25 RX ORDER — HYDROCODONE BITARTRATE AND ACETAMINOPHEN 5; 325 MG/1; MG/1
1 TABLET ORAL EVERY 6 HOURS PRN
Status: DISCONTINUED | OUTPATIENT
Start: 2020-04-25 | End: 2020-04-26 | Stop reason: HOSPADM

## 2020-04-25 RX ADMIN — HYDROXYCHLOROQUINE SULFATE 100 MG: 200 TABLET, FILM COATED ORAL at 22:54

## 2020-04-25 RX ADMIN — HYDROXYCHLOROQUINE SULFATE 100 MG: 200 TABLET, FILM COATED ORAL at 01:30

## 2020-04-25 RX ADMIN — HYDROCODONE BITARTRATE AND ACETAMINOPHEN 1 TABLET: 5; 325 TABLET ORAL at 16:41

## 2020-04-25 RX ADMIN — HYDROCODONE BITARTRATE AND ACETAMINOPHEN 1 TABLET: 5; 325 TABLET ORAL at 22:54

## 2020-04-25 RX ADMIN — LISINOPRIL 20 MG: 20 TABLET ORAL at 09:03

## 2020-04-25 RX ADMIN — ENOXAPARIN SODIUM 40 MG: 40 INJECTION SUBCUTANEOUS at 09:05

## 2020-04-25 RX ADMIN — SODIUM CHLORIDE 125 ML/HR: 0.9 INJECTION, SOLUTION INTRAVENOUS at 08:49

## 2020-04-25 RX ADMIN — SERTRALINE HYDROCHLORIDE 150 MG: 100 TABLET ORAL at 09:04

## 2020-04-25 RX ADMIN — ONDANSETRON 4 MG: 2 INJECTION INTRAMUSCULAR; INTRAVENOUS at 01:29

## 2020-04-25 RX ADMIN — HYDROCODONE BITARTRATE AND ACETAMINOPHEN 1 TABLET: 5; 325 TABLET ORAL at 01:29

## 2020-04-25 RX ADMIN — ALPRAZOLAM 1 MG: 0.5 TABLET ORAL at 06:16

## 2020-04-25 RX ADMIN — SODIUM CHLORIDE 125 ML/HR: 0.9 INJECTION, SOLUTION INTRAVENOUS at 01:19

## 2020-04-25 RX ADMIN — HYDROXYCHLOROQUINE SULFATE 200 MG: 200 TABLET, FILM COATED ORAL at 09:03

## 2020-04-25 RX ADMIN — HYDROCODONE BITARTRATE AND ACETAMINOPHEN 1 TABLET: 5; 325 TABLET ORAL at 10:06

## 2020-04-25 RX ADMIN — LEVOTHYROXINE SODIUM 88 MCG: 88 TABLET ORAL at 05:48

## 2020-04-26 VITALS
WEIGHT: 123.9 LBS | HEART RATE: 97 BPM | BODY MASS INDEX: 21.68 KG/M2 | TEMPERATURE: 98.1 F | DIASTOLIC BLOOD PRESSURE: 68 MMHG | OXYGEN SATURATION: 96 % | RESPIRATION RATE: 16 BRPM | SYSTOLIC BLOOD PRESSURE: 107 MMHG

## 2020-04-26 LAB
ANION GAP SERPL CALCULATED.3IONS-SCNC: 10 MMOL/L (ref 4–13)
BUN SERPL-MCNC: 4 MG/DL (ref 5–25)
CALCIUM SERPL-MCNC: 7.9 MG/DL (ref 8.3–10.1)
CAMPYLOBACTER DNA SPEC NAA+PROBE: NORMAL
CHLORIDE SERPL-SCNC: 112 MMOL/L (ref 100–108)
CO2 SERPL-SCNC: 21 MMOL/L (ref 21–32)
CREAT SERPL-MCNC: 0.71 MG/DL (ref 0.6–1.3)
GFR SERPL CREATININE-BSD FRML MDRD: 96 ML/MIN/1.73SQ M
GLUCOSE SERPL-MCNC: 93 MG/DL (ref 65–140)
POTASSIUM SERPL-SCNC: 3 MMOL/L (ref 3.5–5.3)
SALMONELLA DNA SPEC QL NAA+PROBE: NORMAL
SHIGA TOXIN STX GENE SPEC NAA+PROBE: NORMAL
SHIGELLA DNA SPEC QL NAA+PROBE: NORMAL
SODIUM SERPL-SCNC: 143 MMOL/L (ref 136–145)

## 2020-04-26 PROCEDURE — 99217 PR OBSERVATION CARE DISCHARGE MANAGEMENT: CPT | Performed by: INTERNAL MEDICINE

## 2020-04-26 PROCEDURE — 80048 BASIC METABOLIC PNL TOTAL CA: CPT | Performed by: INTERNAL MEDICINE

## 2020-04-26 RX ORDER — POTASSIUM CHLORIDE 20 MEQ/1
40 TABLET, EXTENDED RELEASE ORAL ONCE
Status: DISCONTINUED | OUTPATIENT
Start: 2020-04-26 | End: 2020-04-26 | Stop reason: HOSPADM

## 2020-04-26 RX ORDER — DICYCLOMINE HCL 20 MG
20 TABLET ORAL EVERY 6 HOURS PRN
Qty: 30 TABLET | Refills: 0 | Status: SHIPPED | OUTPATIENT
Start: 2020-04-26 | End: 2020-08-17

## 2020-04-26 RX ADMIN — LEVOTHYROXINE SODIUM 88 MCG: 88 TABLET ORAL at 06:14

## 2020-04-26 RX ADMIN — SERTRALINE HYDROCHLORIDE 150 MG: 100 TABLET ORAL at 08:36

## 2020-04-26 RX ADMIN — HYDROCODONE BITARTRATE AND ACETAMINOPHEN 1 TABLET: 5; 325 TABLET ORAL at 08:36

## 2020-04-26 RX ADMIN — HYDROXYCHLOROQUINE SULFATE 200 MG: 200 TABLET, FILM COATED ORAL at 08:36

## 2020-04-26 RX ADMIN — SODIUM CHLORIDE 125 ML/HR: 0.9 INJECTION, SOLUTION INTRAVENOUS at 06:28

## 2020-04-26 RX ADMIN — ALPRAZOLAM 1 MG: 0.5 TABLET ORAL at 06:28

## 2020-04-26 RX ADMIN — ENOXAPARIN SODIUM 40 MG: 40 INJECTION SUBCUTANEOUS at 08:37

## 2020-04-27 ENCOUNTER — TRANSITIONAL CARE MANAGEMENT (OUTPATIENT)
Dept: FAMILY MEDICINE CLINIC | Facility: CLINIC | Age: 55
End: 2020-04-27

## 2020-04-27 LAB
ATRIAL RATE: 93 BPM
P AXIS: 57 DEGREES
PR INTERVAL: 176 MS
QRS AXIS: 81 DEGREES
QRSD INTERVAL: 72 MS
QT INTERVAL: 340 MS
QTC INTERVAL: 422 MS
T WAVE AXIS: 56 DEGREES
VENTRICULAR RATE: 93 BPM

## 2020-04-27 PROCEDURE — 93010 ELECTROCARDIOGRAM REPORT: CPT | Performed by: INTERNAL MEDICINE

## 2020-04-29 PROBLEM — Z95.828 PORT-A-CATH IN PLACE: Status: ACTIVE | Noted: 2020-04-29

## 2020-05-01 RX ORDER — SODIUM CHLORIDE 9 MG/ML
20 INJECTION, SOLUTION INTRAVENOUS ONCE
Status: CANCELLED | OUTPATIENT
Start: 2020-05-05

## 2020-05-04 ENCOUNTER — HOSPITAL ENCOUNTER (OUTPATIENT)
Dept: INFUSION CENTER | Facility: CLINIC | Age: 55
Discharge: HOME | End: 2020-05-04
Payer: COMMERCIAL

## 2020-05-04 VITALS — TEMPERATURE: 98.2 F

## 2020-05-04 DIAGNOSIS — T45.1X5A CHEMOTHERAPY INDUCED NEUTROPENIA (HCC): ICD-10-CM

## 2020-05-04 DIAGNOSIS — C50.212 MALIGNANT NEOPLASM OF UPPER-INNER QUADRANT OF LEFT BREAST IN FEMALE, ESTROGEN RECEPTOR NEGATIVE (HCC): ICD-10-CM

## 2020-05-04 DIAGNOSIS — D70.1 CHEMOTHERAPY INDUCED NEUTROPENIA (HCC): ICD-10-CM

## 2020-05-04 DIAGNOSIS — Z95.828 PORT-A-CATH IN PLACE: Primary | ICD-10-CM

## 2020-05-04 DIAGNOSIS — F41.9 ANXIETY: ICD-10-CM

## 2020-05-04 DIAGNOSIS — Z17.1 MALIGNANT NEOPLASM OF UPPER-INNER QUADRANT OF LEFT BREAST IN FEMALE, ESTROGEN RECEPTOR NEGATIVE (HCC): ICD-10-CM

## 2020-05-04 LAB
ALBUMIN SERPL BCP-MCNC: 3.4 G/DL (ref 3.5–5)
ALP SERPL-CCNC: 97 U/L (ref 46–116)
ALT SERPL W P-5'-P-CCNC: 17 U/L (ref 12–78)
ANION GAP SERPL CALCULATED.3IONS-SCNC: 6 MMOL/L (ref 4–13)
AST SERPL W P-5'-P-CCNC: 13 U/L (ref 5–45)
BASOPHILS # BLD AUTO: 0.02 THOUSANDS/ΜL (ref 0–0.1)
BASOPHILS NFR BLD AUTO: 0 % (ref 0–1)
BILIRUB SERPL-MCNC: 0.13 MG/DL (ref 0.2–1)
BUN SERPL-MCNC: 10 MG/DL (ref 5–25)
CALCIUM SERPL-MCNC: 8.4 MG/DL (ref 8.3–10.1)
CHLORIDE SERPL-SCNC: 106 MMOL/L (ref 100–108)
CO2 SERPL-SCNC: 30 MMOL/L (ref 21–32)
CREAT SERPL-MCNC: 0.76 MG/DL (ref 0.6–1.3)
EOSINOPHIL # BLD AUTO: 0.01 THOUSAND/ΜL (ref 0–0.61)
EOSINOPHIL NFR BLD AUTO: 0 % (ref 0–6)
ERYTHROCYTE [DISTWIDTH] IN BLOOD BY AUTOMATED COUNT: 14.6 % (ref 11.6–15.1)
GFR SERPL CREATININE-BSD FRML MDRD: 88 ML/MIN/1.73SQ M
GLUCOSE P FAST SERPL-MCNC: 90 MG/DL (ref 65–99)
GLUCOSE SERPL-MCNC: 90 MG/DL (ref 65–140)
HCT VFR BLD AUTO: 28.9 % (ref 34.8–46.1)
HGB BLD-MCNC: 9.4 G/DL (ref 11.5–15.4)
IMM GRANULOCYTES # BLD AUTO: 0.03 THOUSAND/UL (ref 0–0.2)
IMM GRANULOCYTES NFR BLD AUTO: 1 % (ref 0–2)
LYMPHOCYTES # BLD AUTO: 1.03 THOUSANDS/ΜL (ref 0.6–4.47)
LYMPHOCYTES NFR BLD AUTO: 17 % (ref 14–44)
MCH RBC QN AUTO: 32.8 PG (ref 26.8–34.3)
MCHC RBC AUTO-ENTMCNC: 32.5 G/DL (ref 31.4–37.4)
MCV RBC AUTO: 101 FL (ref 82–98)
MONOCYTES # BLD AUTO: 0.6 THOUSAND/ΜL (ref 0.17–1.22)
MONOCYTES NFR BLD AUTO: 10 % (ref 4–12)
NEUTROPHILS # BLD AUTO: 4.34 THOUSANDS/ΜL (ref 1.85–7.62)
NEUTS SEG NFR BLD AUTO: 72 % (ref 43–75)
NRBC BLD AUTO-RTO: 0 /100 WBCS
PLATELET # BLD AUTO: 136 THOUSANDS/UL (ref 149–390)
PMV BLD AUTO: 10 FL (ref 8.9–12.7)
POTASSIUM SERPL-SCNC: 3.6 MMOL/L (ref 3.5–5.3)
PROT SERPL-MCNC: 6.6 G/DL (ref 6.4–8.2)
RBC # BLD AUTO: 2.87 MILLION/UL (ref 3.81–5.12)
SODIUM SERPL-SCNC: 142 MMOL/L (ref 136–145)
WBC # BLD AUTO: 6.03 THOUSAND/UL (ref 4.31–10.16)

## 2020-05-04 PROCEDURE — 85025 COMPLETE CBC W/AUTO DIFF WBC: CPT | Performed by: INTERNAL MEDICINE

## 2020-05-04 PROCEDURE — 80053 COMPREHEN METABOLIC PANEL: CPT | Performed by: INTERNAL MEDICINE

## 2020-05-04 RX ORDER — SERTRALINE HYDROCHLORIDE 100 MG/1
TABLET, FILM COATED ORAL
Qty: 45 TABLET | Refills: 5 | Status: SHIPPED | OUTPATIENT
Start: 2020-05-04 | End: 2020-06-22

## 2020-05-05 ENCOUNTER — OFFICE VISIT (OUTPATIENT)
Dept: HEMATOLOGY ONCOLOGY | Facility: CLINIC | Age: 55
End: 2020-05-05
Payer: COMMERCIAL

## 2020-05-05 ENCOUNTER — HOSPITAL ENCOUNTER (OUTPATIENT)
Dept: INFUSION CENTER | Facility: CLINIC | Age: 55
Discharge: HOME | End: 2020-05-05
Payer: COMMERCIAL

## 2020-05-05 VITALS
OXYGEN SATURATION: 99 % | HEIGHT: 63 IN | DIASTOLIC BLOOD PRESSURE: 70 MMHG | HEART RATE: 97 BPM | BODY MASS INDEX: 22.5 KG/M2 | SYSTOLIC BLOOD PRESSURE: 118 MMHG | RESPIRATION RATE: 18 BRPM | WEIGHT: 127 LBS | TEMPERATURE: 97.9 F

## 2020-05-05 VITALS
HEART RATE: 99 BPM | OXYGEN SATURATION: 97 % | BODY MASS INDEX: 22.68 KG/M2 | WEIGHT: 128 LBS | DIASTOLIC BLOOD PRESSURE: 64 MMHG | HEIGHT: 63 IN | RESPIRATION RATE: 18 BRPM | SYSTOLIC BLOOD PRESSURE: 102 MMHG

## 2020-05-05 DIAGNOSIS — Z17.1 MALIGNANT NEOPLASM OF UPPER-INNER QUADRANT OF LEFT BREAST IN FEMALE, ESTROGEN RECEPTOR NEGATIVE (HCC): ICD-10-CM

## 2020-05-05 DIAGNOSIS — D70.1 CHEMOTHERAPY INDUCED NEUTROPENIA (HCC): Primary | ICD-10-CM

## 2020-05-05 DIAGNOSIS — T45.1X5A CHEMOTHERAPY INDUCED NEUTROPENIA (HCC): Primary | ICD-10-CM

## 2020-05-05 DIAGNOSIS — Z17.1 MALIGNANT NEOPLASM OF UPPER-INNER QUADRANT OF LEFT BREAST IN FEMALE, ESTROGEN RECEPTOR NEGATIVE (HCC): Primary | ICD-10-CM

## 2020-05-05 DIAGNOSIS — C50.212 MALIGNANT NEOPLASM OF UPPER-INNER QUADRANT OF LEFT BREAST IN FEMALE, ESTROGEN RECEPTOR NEGATIVE (HCC): Primary | ICD-10-CM

## 2020-05-05 DIAGNOSIS — C50.212 MALIGNANT NEOPLASM OF UPPER-INNER QUADRANT OF LEFT BREAST IN FEMALE, ESTROGEN RECEPTOR NEGATIVE (HCC): ICD-10-CM

## 2020-05-05 PROCEDURE — 3008F BODY MASS INDEX DOCD: CPT | Performed by: INTERNAL MEDICINE

## 2020-05-05 PROCEDURE — 96417 CHEMO IV INFUS EACH ADDL SEQ: CPT

## 2020-05-05 PROCEDURE — 99214 OFFICE O/P EST MOD 30 MIN: CPT | Performed by: INTERNAL MEDICINE

## 2020-05-05 PROCEDURE — 3078F DIAST BP <80 MM HG: CPT | Performed by: INTERNAL MEDICINE

## 2020-05-05 PROCEDURE — 96367 TX/PROPH/DG ADDL SEQ IV INF: CPT

## 2020-05-05 PROCEDURE — 3074F SYST BP LT 130 MM HG: CPT | Performed by: INTERNAL MEDICINE

## 2020-05-05 PROCEDURE — 96377 APPLICATON ON-BODY INJECTOR: CPT

## 2020-05-05 PROCEDURE — 96413 CHEMO IV INFUSION 1 HR: CPT

## 2020-05-05 RX ORDER — SODIUM CHLORIDE 9 MG/ML
20 INJECTION, SOLUTION INTRAVENOUS ONCE
Status: COMPLETED | OUTPATIENT
Start: 2020-05-05 | End: 2020-05-05

## 2020-05-05 RX ADMIN — DOCETAXEL 122.2 MG: 20 INJECTION, SOLUTION, CONCENTRATE INTRAVENOUS at 14:48

## 2020-05-05 RX ADMIN — PERTUZUMAB 420 MG: 30 INJECTION, SOLUTION, CONCENTRATE INTRAVENOUS at 13:26

## 2020-05-05 RX ADMIN — DEXAMETHASONE SODIUM PHOSPHATE: 10 INJECTION, SOLUTION INTRAMUSCULAR; INTRAVENOUS at 12:17

## 2020-05-05 RX ADMIN — SODIUM CHLORIDE 20 ML/HR: 0.9 INJECTION, SOLUTION INTRAVENOUS at 11:43

## 2020-05-05 RX ADMIN — TRASTUZUMAB 364 MG: 150 INJECTION, POWDER, LYOPHILIZED, FOR SOLUTION INTRAVENOUS at 14:06

## 2020-05-05 RX ADMIN — SODIUM CHLORIDE 150 MG: 0.9 INJECTION, SOLUTION INTRAVENOUS at 12:41

## 2020-05-05 RX ADMIN — PEGFILGRASTIM 6 MG: KIT SUBCUTANEOUS at 16:50

## 2020-05-05 RX ADMIN — CARBOPLATIN 625.8 MG: 10 INJECTION, SOLUTION INTRAVENOUS at 15:50

## 2020-05-07 ENCOUNTER — OFFICE VISIT (OUTPATIENT)
Dept: FAMILY MEDICINE CLINIC | Facility: CLINIC | Age: 55
End: 2020-05-07
Payer: COMMERCIAL

## 2020-05-07 VITALS — BODY MASS INDEX: 21.7 KG/M2 | TEMPERATURE: 97.8 F | WEIGHT: 124 LBS

## 2020-05-07 DIAGNOSIS — C50.919 MALIGNANT NEOPLASM OF FEMALE BREAST, UNSPECIFIED ESTROGEN RECEPTOR STATUS, UNSPECIFIED LATERALITY, UNSPECIFIED SITE OF BREAST (HCC): ICD-10-CM

## 2020-05-07 DIAGNOSIS — I10 ESSENTIAL HYPERTENSION: Primary | ICD-10-CM

## 2020-05-07 DIAGNOSIS — F43.10 POSTTRAUMATIC STRESS DISORDER: ICD-10-CM

## 2020-05-07 DIAGNOSIS — M32.9 SYSTEMIC LUPUS ERYTHEMATOSUS, UNSPECIFIED SLE TYPE, UNSPECIFIED ORGAN INVOLVEMENT STATUS (HCC): ICD-10-CM

## 2020-05-07 PROCEDURE — 99495 TRANSJ CARE MGMT MOD F2F 14D: CPT | Performed by: FAMILY MEDICINE

## 2020-05-08 PROBLEM — R19.7 NAUSEA, VOMITING, AND DIARRHEA: Status: RESOLVED | Noted: 2020-04-24 | Resolved: 2020-05-08

## 2020-05-08 PROBLEM — R07.9 CHEST PAIN: Status: RESOLVED | Noted: 2017-08-04 | Resolved: 2020-05-08

## 2020-05-08 PROBLEM — C50.919 BREAST CANCER (HCC): Status: ACTIVE | Noted: 2020-05-08

## 2020-05-08 PROBLEM — R11.10 VOMITING: Status: RESOLVED | Noted: 2017-08-04 | Resolved: 2020-05-08

## 2020-05-08 PROBLEM — R11.2 NAUSEA, VOMITING, AND DIARRHEA: Status: RESOLVED | Noted: 2020-04-24 | Resolved: 2020-05-08

## 2020-05-10 ENCOUNTER — APPOINTMENT (EMERGENCY)
Dept: RADIOLOGY | Facility: HOSPITAL | Age: 55
DRG: 683 | End: 2020-05-10
Payer: COMMERCIAL

## 2020-05-10 ENCOUNTER — TELEPHONE (OUTPATIENT)
Dept: OTHER | Facility: OTHER | Age: 55
End: 2020-05-10

## 2020-05-10 ENCOUNTER — APPOINTMENT (EMERGENCY)
Dept: CT IMAGING | Facility: HOSPITAL | Age: 55
DRG: 683 | End: 2020-05-10
Payer: COMMERCIAL

## 2020-05-10 ENCOUNTER — HOSPITAL ENCOUNTER (INPATIENT)
Facility: HOSPITAL | Age: 55
LOS: 6 days | Discharge: HOME | DRG: 683 | End: 2020-05-16
Attending: EMERGENCY MEDICINE | Admitting: INTERNAL MEDICINE
Payer: COMMERCIAL

## 2020-05-10 DIAGNOSIS — R11.2 INTRACTABLE NAUSEA AND VOMITING: ICD-10-CM

## 2020-05-10 DIAGNOSIS — E86.0 DEHYDRATION: ICD-10-CM

## 2020-05-10 DIAGNOSIS — A41.9 SEPSIS (HCC): Primary | ICD-10-CM

## 2020-05-10 DIAGNOSIS — N17.9 ACUTE KIDNEY INJURY (HCC): ICD-10-CM

## 2020-05-10 DIAGNOSIS — C50.212 MALIGNANT NEOPLASM OF UPPER-INNER QUADRANT OF LEFT BREAST IN FEMALE, ESTROGEN RECEPTOR NEGATIVE (HCC): ICD-10-CM

## 2020-05-10 DIAGNOSIS — R11.10 INTRACTABLE VOMITING: ICD-10-CM

## 2020-05-10 DIAGNOSIS — F41.9 ANXIETY: ICD-10-CM

## 2020-05-10 DIAGNOSIS — R19.7 DIARRHEA: ICD-10-CM

## 2020-05-10 DIAGNOSIS — Z17.1 MALIGNANT NEOPLASM OF UPPER-INNER QUADRANT OF LEFT BREAST IN FEMALE, ESTROGEN RECEPTOR NEGATIVE (HCC): ICD-10-CM

## 2020-05-10 DIAGNOSIS — T45.1X5A CINV (CHEMOTHERAPY-INDUCED NAUSEA AND VOMITING): ICD-10-CM

## 2020-05-10 DIAGNOSIS — C50.919 BREAST CANCER (HCC): ICD-10-CM

## 2020-05-10 DIAGNOSIS — R11.2 CINV (CHEMOTHERAPY-INDUCED NAUSEA AND VOMITING): ICD-10-CM

## 2020-05-10 PROBLEM — E87.1 HYPONATREMIA: Status: ACTIVE | Noted: 2020-05-10

## 2020-05-10 PROBLEM — R65.10 SIRS (SYSTEMIC INFLAMMATORY RESPONSE SYNDROME) (HCC): Status: ACTIVE | Noted: 2020-05-10

## 2020-05-10 LAB
ALBUMIN SERPL BCP-MCNC: 4.1 G/DL (ref 3.5–5)
ALP SERPL-CCNC: 166 U/L (ref 46–116)
ALT SERPL W P-5'-P-CCNC: 21 U/L (ref 12–78)
ANION GAP SERPL CALCULATED.3IONS-SCNC: 18 MMOL/L (ref 4–13)
AST SERPL W P-5'-P-CCNC: 30 U/L (ref 5–45)
BASOPHILS # BLD MANUAL: 0 THOUSAND/UL (ref 0–0.1)
BASOPHILS NFR MAR MANUAL: 0 % (ref 0–1)
BILIRUB SERPL-MCNC: 0.55 MG/DL (ref 0.2–1)
BUN SERPL-MCNC: 24 MG/DL (ref 5–25)
CALCIUM SERPL-MCNC: 9.3 MG/DL (ref 8.3–10.1)
CHLORIDE SERPL-SCNC: 94 MMOL/L (ref 100–108)
CO2 SERPL-SCNC: 19 MMOL/L (ref 21–32)
CREAT SERPL-MCNC: 1.86 MG/DL (ref 0.6–1.3)
EOSINOPHIL # BLD MANUAL: 0 THOUSAND/UL (ref 0–0.4)
EOSINOPHIL NFR BLD MANUAL: 0 % (ref 0–6)
ERYTHROCYTE [DISTWIDTH] IN BLOOD BY AUTOMATED COUNT: 15 % (ref 11.6–15.1)
GFR SERPL CREATININE-BSD FRML MDRD: 30 ML/MIN/1.73SQ M
GLUCOSE SERPL-MCNC: 123 MG/DL (ref 65–140)
HCT VFR BLD AUTO: 31.1 % (ref 34.8–46.1)
HGB BLD-MCNC: 10.3 G/DL (ref 11.5–15.4)
LACTATE SERPL-SCNC: 1.2 MMOL/L (ref 0.5–2)
LACTATE SERPL-SCNC: 2.9 MMOL/L (ref 0.5–2)
LIPASE SERPL-CCNC: 91 U/L (ref 73–393)
LYMPHOCYTES # BLD AUTO: 0.62 THOUSAND/UL (ref 0.6–4.47)
LYMPHOCYTES # BLD AUTO: 4 % (ref 14–44)
MAGNESIUM SERPL-MCNC: 1.7 MG/DL (ref 1.6–2.6)
MCH RBC QN AUTO: 32.3 PG (ref 26.8–34.3)
MCHC RBC AUTO-ENTMCNC: 33.1 G/DL (ref 31.4–37.4)
MCV RBC AUTO: 98 FL (ref 82–98)
MONOCYTES # BLD AUTO: 1.08 THOUSAND/UL (ref 0–1.22)
MONOCYTES NFR BLD: 7 % (ref 4–12)
NEUTROPHILS # BLD MANUAL: 13.74 THOUSAND/UL (ref 1.85–7.62)
NEUTS BAND NFR BLD MANUAL: 6 % (ref 0–8)
NEUTS SEG NFR BLD AUTO: 83 % (ref 43–75)
NRBC BLD AUTO-RTO: 0 /100 WBCS
PLATELET # BLD AUTO: 214 THOUSANDS/UL (ref 149–390)
PLATELET BLD QL SMEAR: ADEQUATE
PMV BLD AUTO: 11.4 FL (ref 8.9–12.7)
POTASSIUM SERPL-SCNC: 4.3 MMOL/L (ref 3.5–5.3)
PROT SERPL-MCNC: 8.1 G/DL (ref 6.4–8.2)
RBC # BLD AUTO: 3.19 MILLION/UL (ref 3.81–5.12)
SARS-COV-2 RNA RESP QL NAA+PROBE: NEGATIVE
SODIUM SERPL-SCNC: 131 MMOL/L (ref 136–145)
TOTAL CELLS COUNTED SPEC: 100
TROPONIN I SERPL-MCNC: <0.02 NG/ML
WBC # BLD AUTO: 15.44 THOUSAND/UL (ref 4.31–10.16)

## 2020-05-10 PROCEDURE — 83690 ASSAY OF LIPASE: CPT | Performed by: PHYSICIAN ASSISTANT

## 2020-05-10 PROCEDURE — 74176 CT ABD & PELVIS W/O CONTRAST: CPT

## 2020-05-10 PROCEDURE — 96367 TX/PROPH/DG ADDL SEQ IV INF: CPT

## 2020-05-10 PROCEDURE — 85007 BL SMEAR W/DIFF WBC COUNT: CPT | Performed by: PHYSICIAN ASSISTANT

## 2020-05-10 PROCEDURE — 87635 SARS-COV-2 COVID-19 AMP PRB: CPT | Performed by: PHYSICIAN ASSISTANT

## 2020-05-10 PROCEDURE — 85027 COMPLETE CBC AUTOMATED: CPT | Performed by: PHYSICIAN ASSISTANT

## 2020-05-10 PROCEDURE — 80053 COMPREHEN METABOLIC PANEL: CPT | Performed by: PHYSICIAN ASSISTANT

## 2020-05-10 PROCEDURE — 96365 THER/PROPH/DIAG IV INF INIT: CPT

## 2020-05-10 PROCEDURE — 96376 TX/PRO/DX INJ SAME DRUG ADON: CPT

## 2020-05-10 PROCEDURE — 36415 COLL VENOUS BLD VENIPUNCTURE: CPT | Performed by: PHYSICIAN ASSISTANT

## 2020-05-10 PROCEDURE — 96375 TX/PRO/DX INJ NEW DRUG ADDON: CPT

## 2020-05-10 PROCEDURE — 84484 ASSAY OF TROPONIN QUANT: CPT | Performed by: PHYSICIAN ASSISTANT

## 2020-05-10 PROCEDURE — 99285 EMERGENCY DEPT VISIT HI MDM: CPT

## 2020-05-10 PROCEDURE — 96361 HYDRATE IV INFUSION ADD-ON: CPT

## 2020-05-10 PROCEDURE — 71045 X-RAY EXAM CHEST 1 VIEW: CPT

## 2020-05-10 PROCEDURE — 93005 ELECTROCARDIOGRAM TRACING: CPT

## 2020-05-10 PROCEDURE — 83735 ASSAY OF MAGNESIUM: CPT | Performed by: PHYSICIAN ASSISTANT

## 2020-05-10 PROCEDURE — 99223 1ST HOSP IP/OBS HIGH 75: CPT | Performed by: PHYSICIAN ASSISTANT

## 2020-05-10 PROCEDURE — 87040 BLOOD CULTURE FOR BACTERIA: CPT | Performed by: PHYSICIAN ASSISTANT

## 2020-05-10 PROCEDURE — 99285 EMERGENCY DEPT VISIT HI MDM: CPT | Performed by: PHYSICIAN ASSISTANT

## 2020-05-10 PROCEDURE — 83605 ASSAY OF LACTIC ACID: CPT | Performed by: PHYSICIAN ASSISTANT

## 2020-05-10 RX ORDER — HYDRALAZINE HYDROCHLORIDE 20 MG/ML
10 INJECTION INTRAMUSCULAR; INTRAVENOUS EVERY 6 HOURS PRN
Status: DISCONTINUED | OUTPATIENT
Start: 2020-05-10 | End: 2020-05-16 | Stop reason: HOSPADM

## 2020-05-10 RX ORDER — LIDOCAINE 50 MG/G
1 PATCH TOPICAL DAILY
Status: DISCONTINUED | OUTPATIENT
Start: 2020-05-10 | End: 2020-05-16 | Stop reason: HOSPADM

## 2020-05-10 RX ORDER — PROMETHAZINE HYDROCHLORIDE 25 MG/ML
6.25 INJECTION, SOLUTION INTRAMUSCULAR; INTRAVENOUS ONCE
Status: COMPLETED | OUTPATIENT
Start: 2020-05-10 | End: 2020-05-10

## 2020-05-10 RX ORDER — HYDROXYCHLOROQUINE SULFATE 200 MG/1
200 TABLET, FILM COATED ORAL
Status: DISCONTINUED | OUTPATIENT
Start: 2020-05-11 | End: 2020-05-16 | Stop reason: HOSPADM

## 2020-05-10 RX ORDER — LISINOPRIL 20 MG/1
20 TABLET ORAL DAILY
Status: CANCELLED | OUTPATIENT
Start: 2020-05-11

## 2020-05-10 RX ORDER — ONDANSETRON 2 MG/ML
INJECTION INTRAMUSCULAR; INTRAVENOUS
Status: DISPENSED
Start: 2020-05-10 | End: 2020-05-11

## 2020-05-10 RX ORDER — HYDROCODONE BITARTRATE AND ACETAMINOPHEN 5; 325 MG/1; MG/1
1 TABLET ORAL EVERY 6 HOURS PRN
Status: DISCONTINUED | OUTPATIENT
Start: 2020-05-10 | End: 2020-05-12

## 2020-05-10 RX ORDER — HYDROXYCHLOROQUINE SULFATE 200 MG/1
100 TABLET, FILM COATED ORAL EVERY EVENING
Status: DISCONTINUED | OUTPATIENT
Start: 2020-05-10 | End: 2020-05-16 | Stop reason: HOSPADM

## 2020-05-10 RX ORDER — SUCRALFATE ORAL 1 G/10ML
1000 SUSPENSION ORAL EVERY 6 HOURS SCHEDULED
Status: DISCONTINUED | OUTPATIENT
Start: 2020-05-10 | End: 2020-05-13

## 2020-05-10 RX ORDER — LEVOTHYROXINE SODIUM 88 UG/1
88 TABLET ORAL
Status: DISCONTINUED | OUTPATIENT
Start: 2020-05-11 | End: 2020-05-16 | Stop reason: HOSPADM

## 2020-05-10 RX ORDER — METOCLOPRAMIDE HYDROCHLORIDE 5 MG/ML
10 INJECTION INTRAMUSCULAR; INTRAVENOUS ONCE
Status: COMPLETED | OUTPATIENT
Start: 2020-05-10 | End: 2020-05-10

## 2020-05-10 RX ORDER — CYCLOBENZAPRINE HCL 10 MG
10 TABLET ORAL
Status: DISCONTINUED | OUTPATIENT
Start: 2020-05-10 | End: 2020-05-16 | Stop reason: HOSPADM

## 2020-05-10 RX ORDER — METOCLOPRAMIDE 10 MG/1
5 TABLET ORAL
Status: DISCONTINUED | OUTPATIENT
Start: 2020-05-11 | End: 2020-05-13

## 2020-05-10 RX ORDER — DICYCLOMINE HCL 20 MG
20 TABLET ORAL EVERY 6 HOURS PRN
Status: DISCONTINUED | OUTPATIENT
Start: 2020-05-10 | End: 2020-05-16 | Stop reason: HOSPADM

## 2020-05-10 RX ORDER — ONDANSETRON 2 MG/ML
4 INJECTION INTRAMUSCULAR; INTRAVENOUS EVERY 4 HOURS PRN
Status: DISCONTINUED | OUTPATIENT
Start: 2020-05-10 | End: 2020-05-14

## 2020-05-10 RX ORDER — SODIUM CHLORIDE 9 MG/ML
125 INJECTION, SOLUTION INTRAVENOUS CONTINUOUS
Status: DISCONTINUED | OUTPATIENT
Start: 2020-05-10 | End: 2020-05-14

## 2020-05-10 RX ORDER — ONDANSETRON 4 MG/1
4 TABLET, ORALLY DISINTEGRATING ORAL EVERY 6 HOURS SCHEDULED
Status: DISCONTINUED | OUTPATIENT
Start: 2020-05-11 | End: 2020-05-10

## 2020-05-10 RX ORDER — ALPRAZOLAM 0.5 MG/1
1 TABLET ORAL 3 TIMES DAILY PRN
Status: DISCONTINUED | OUTPATIENT
Start: 2020-05-10 | End: 2020-05-16 | Stop reason: HOSPADM

## 2020-05-10 RX ADMIN — PROMETHAZINE HYDROCHLORIDE 6.25 MG: 25 INJECTION INTRAMUSCULAR; INTRAVENOUS at 18:45

## 2020-05-10 RX ADMIN — SODIUM CHLORIDE 1000 ML: 0.9 INJECTION, SOLUTION INTRAVENOUS at 18:09

## 2020-05-10 RX ADMIN — HYDROCODONE BITARTRATE AND ACETAMINOPHEN 1 TABLET: 5; 325 TABLET ORAL at 23:59

## 2020-05-10 RX ADMIN — SUCRALFATE 1000 MG: 1 SUSPENSION ORAL at 23:37

## 2020-05-10 RX ADMIN — HYDROXYCHLOROQUINE SULFATE 100 MG: 200 TABLET, FILM COATED ORAL at 23:37

## 2020-05-10 RX ADMIN — METOCLOPRAMIDE 10 MG: 5 INJECTION, SOLUTION INTRAMUSCULAR; INTRAVENOUS at 19:27

## 2020-05-10 RX ADMIN — ONDANSETRON 8 MG: 2 INJECTION INTRAMUSCULAR; INTRAVENOUS at 18:10

## 2020-05-10 RX ADMIN — ONDANSETRON 4 MG: 2 INJECTION INTRAMUSCULAR; INTRAVENOUS at 22:47

## 2020-05-10 RX ADMIN — MORPHINE SULFATE 2 MG: 2 INJECTION, SOLUTION INTRAMUSCULAR; INTRAVENOUS at 18:31

## 2020-05-10 RX ADMIN — ALPRAZOLAM 1 MG: 0.5 TABLET ORAL at 23:59

## 2020-05-10 RX ADMIN — CYCLOBENZAPRINE 10 MG: 10 TABLET, FILM COATED ORAL at 23:37

## 2020-05-10 RX ADMIN — SODIUM CHLORIDE 1000 ML: 0.9 INJECTION, SOLUTION INTRAVENOUS at 19:28

## 2020-05-10 RX ADMIN — Medication 2 MG: at 20:40

## 2020-05-10 RX ADMIN — SODIUM CHLORIDE 125 ML/HR: 0.9 INJECTION, SOLUTION INTRAVENOUS at 22:44

## 2020-05-10 RX ADMIN — CEFEPIME HYDROCHLORIDE 1000 MG: 1 INJECTION, POWDER, FOR SOLUTION INTRAMUSCULAR; INTRAVENOUS at 19:52

## 2020-05-10 RX ADMIN — LIDOCAINE 1 PATCH: 50 PATCH TOPICAL at 23:37

## 2020-05-11 LAB
ANION GAP SERPL CALCULATED.3IONS-SCNC: 15 MMOL/L (ref 4–13)
BILIRUB UR QL STRIP: NEGATIVE
BUN SERPL-MCNC: 18 MG/DL (ref 5–25)
CALCIUM SERPL-MCNC: 8.2 MG/DL (ref 8.3–10.1)
CAMPYLOBACTER DNA SPEC NAA+PROBE: NORMAL
CHLORIDE SERPL-SCNC: 104 MMOL/L (ref 100–108)
CLARITY UR: CLEAR
CO2 SERPL-SCNC: 20 MMOL/L (ref 21–32)
COLOR UR: NORMAL
CREAT SERPL-MCNC: 1.29 MG/DL (ref 0.6–1.3)
ERYTHROCYTE [DISTWIDTH] IN BLOOD BY AUTOMATED COUNT: 15.3 % (ref 11.6–15.1)
GFR SERPL CREATININE-BSD FRML MDRD: 46 ML/MIN/1.73SQ M
GLUCOSE SERPL-MCNC: 94 MG/DL (ref 65–140)
GLUCOSE UR STRIP-MCNC: NEGATIVE MG/DL
HCT VFR BLD AUTO: 26 % (ref 34.8–46.1)
HGB BLD-MCNC: 8.6 G/DL (ref 11.5–15.4)
HGB UR QL STRIP.AUTO: NEGATIVE
KETONES UR STRIP-MCNC: NEGATIVE MG/DL
LEUKOCYTE ESTERASE UR QL STRIP: NEGATIVE
MAGNESIUM SERPL-MCNC: 2 MG/DL (ref 1.6–2.6)
MCH RBC QN AUTO: 32.8 PG (ref 26.8–34.3)
MCHC RBC AUTO-ENTMCNC: 33.1 G/DL (ref 31.4–37.4)
MCV RBC AUTO: 99 FL (ref 82–98)
NITRITE UR QL STRIP: NEGATIVE
PH UR STRIP.AUTO: 6 [PH]
PHOSPHATE SERPL-MCNC: 3 MG/DL (ref 2.7–4.5)
PLATELET # BLD AUTO: 187 THOUSANDS/UL (ref 149–390)
PMV BLD AUTO: 10.7 FL (ref 8.9–12.7)
POTASSIUM SERPL-SCNC: 3.1 MMOL/L (ref 3.5–5.3)
PROCALCITONIN SERPL-MCNC: 0.17 NG/ML
PROT UR STRIP-MCNC: NEGATIVE MG/DL
RBC # BLD AUTO: 2.62 MILLION/UL (ref 3.81–5.12)
SALMONELLA DNA SPEC QL NAA+PROBE: NORMAL
SHIGA TOXIN STX GENE SPEC NAA+PROBE: NORMAL
SHIGELLA DNA SPEC QL NAA+PROBE: NORMAL
SODIUM SERPL-SCNC: 139 MMOL/L (ref 136–145)
SP GR UR STRIP.AUTO: 1.02 (ref 1–1.03)
UROBILINOGEN UR QL STRIP.AUTO: 0.2 E.U./DL
WBC # BLD AUTO: 8.73 THOUSAND/UL (ref 4.31–10.16)

## 2020-05-11 PROCEDURE — 87493 C DIFF AMPLIFIED PROBE: CPT | Performed by: PHYSICIAN ASSISTANT

## 2020-05-11 PROCEDURE — 99254 IP/OBS CNSLTJ NEW/EST MOD 60: CPT | Performed by: PHYSICIAN ASSISTANT

## 2020-05-11 PROCEDURE — 99232 SBSQ HOSP IP/OBS MODERATE 35: CPT | Performed by: INTERNAL MEDICINE

## 2020-05-11 PROCEDURE — 85027 COMPLETE CBC AUTOMATED: CPT | Performed by: INTERNAL MEDICINE

## 2020-05-11 PROCEDURE — 84145 PROCALCITONIN (PCT): CPT | Performed by: INTERNAL MEDICINE

## 2020-05-11 PROCEDURE — 84100 ASSAY OF PHOSPHORUS: CPT | Performed by: INTERNAL MEDICINE

## 2020-05-11 PROCEDURE — 83735 ASSAY OF MAGNESIUM: CPT | Performed by: INTERNAL MEDICINE

## 2020-05-11 PROCEDURE — 87505 NFCT AGENT DETECTION GI: CPT | Performed by: PHYSICIAN ASSISTANT

## 2020-05-11 PROCEDURE — 81003 URINALYSIS AUTO W/O SCOPE: CPT | Performed by: PHYSICIAN ASSISTANT

## 2020-05-11 PROCEDURE — 80048 BASIC METABOLIC PNL TOTAL CA: CPT | Performed by: INTERNAL MEDICINE

## 2020-05-11 RX ORDER — OLANZAPINE 10 MG/1
10 TABLET ORAL DAILY
Qty: 15 TABLET | Refills: 0 | Status: SHIPPED | OUTPATIENT
Start: 2020-05-26 | End: 2020-06-16

## 2020-05-11 RX ORDER — POTASSIUM CHLORIDE 20 MEQ/1
40 TABLET, EXTENDED RELEASE ORAL ONCE
Status: COMPLETED | OUTPATIENT
Start: 2020-05-11 | End: 2020-05-11

## 2020-05-11 RX ORDER — HEPARIN SODIUM 5000 [USP'U]/ML
5000 INJECTION, SOLUTION INTRAVENOUS; SUBCUTANEOUS EVERY 8 HOURS SCHEDULED
Status: DISCONTINUED | OUTPATIENT
Start: 2020-05-11 | End: 2020-05-16 | Stop reason: HOSPADM

## 2020-05-11 RX ADMIN — ALPRAZOLAM 1 MG: 0.5 TABLET ORAL at 08:54

## 2020-05-11 RX ADMIN — HYDROXYCHLOROQUINE SULFATE 200 MG: 200 TABLET, FILM COATED ORAL at 06:38

## 2020-05-11 RX ADMIN — ONDANSETRON 4 MG: 2 INJECTION INTRAMUSCULAR; INTRAVENOUS at 22:14

## 2020-05-11 RX ADMIN — METOCLOPRAMIDE HYDROCHLORIDE 5 MG: 10 TABLET ORAL at 17:07

## 2020-05-11 RX ADMIN — HYDROCODONE BITARTRATE AND ACETAMINOPHEN 1 TABLET: 5; 325 TABLET ORAL at 19:42

## 2020-05-11 RX ADMIN — ONDANSETRON 4 MG: 2 INJECTION INTRAMUSCULAR; INTRAVENOUS at 07:38

## 2020-05-11 RX ADMIN — CYCLOBENZAPRINE 10 MG: 10 TABLET, FILM COATED ORAL at 21:20

## 2020-05-11 RX ADMIN — SUCRALFATE 1000 MG: 1 SUSPENSION ORAL at 06:39

## 2020-05-11 RX ADMIN — HEPARIN SODIUM 5000 UNITS: 5000 INJECTION INTRAVENOUS; SUBCUTANEOUS at 21:20

## 2020-05-11 RX ADMIN — METOCLOPRAMIDE HYDROCHLORIDE 5 MG: 10 TABLET ORAL at 06:38

## 2020-05-11 RX ADMIN — HEPARIN SODIUM 5000 UNITS: 5000 INJECTION INTRAVENOUS; SUBCUTANEOUS at 12:43

## 2020-05-11 RX ADMIN — HYDROCODONE BITARTRATE AND ACETAMINOPHEN 1 TABLET: 5; 325 TABLET ORAL at 12:40

## 2020-05-11 RX ADMIN — SUCRALFATE 1000 MG: 1 SUSPENSION ORAL at 17:07

## 2020-05-11 RX ADMIN — LEVOTHYROXINE SODIUM 88 MCG: 88 TABLET ORAL at 06:38

## 2020-05-11 RX ADMIN — LIDOCAINE 1 PATCH: 50 PATCH TOPICAL at 19:42

## 2020-05-11 RX ADMIN — HYDROXYCHLOROQUINE SULFATE 100 MG: 200 TABLET, FILM COATED ORAL at 17:07

## 2020-05-11 RX ADMIN — ONDANSETRON 4 MG: 2 INJECTION INTRAMUSCULAR; INTRAVENOUS at 03:11

## 2020-05-11 RX ADMIN — ONDANSETRON 4 MG: 2 INJECTION INTRAMUSCULAR; INTRAVENOUS at 14:52

## 2020-05-11 RX ADMIN — SODIUM CHLORIDE 125 ML/HR: 0.9 INJECTION, SOLUTION INTRAVENOUS at 07:37

## 2020-05-11 RX ADMIN — ALPRAZOLAM 1 MG: 0.5 TABLET ORAL at 17:07

## 2020-05-11 RX ADMIN — METOCLOPRAMIDE HYDROCHLORIDE 5 MG: 10 TABLET ORAL at 12:41

## 2020-05-11 RX ADMIN — SODIUM CHLORIDE 125 ML/HR: 0.9 INJECTION, SOLUTION INTRAVENOUS at 12:40

## 2020-05-11 RX ADMIN — POTASSIUM CHLORIDE 40 MEQ: 1500 TABLET, EXTENDED RELEASE ORAL at 14:52

## 2020-05-12 PROBLEM — E87.6 HYPOKALEMIA: Status: ACTIVE | Noted: 2020-05-12

## 2020-05-12 LAB
ALBUMIN SERPL BCP-MCNC: 3.2 G/DL (ref 3.5–5)
ALP SERPL-CCNC: 113 U/L (ref 46–116)
ALT SERPL W P-5'-P-CCNC: 20 U/L (ref 12–78)
ANION GAP SERPL CALCULATED.3IONS-SCNC: 12 MMOL/L (ref 4–13)
ANION GAP SERPL CALCULATED.3IONS-SCNC: 12 MMOL/L (ref 4–13)
AST SERPL W P-5'-P-CCNC: 25 U/L (ref 5–45)
ATRIAL RATE: 94 BPM
BILIRUB SERPL-MCNC: 0.29 MG/DL (ref 0.2–1)
BUN SERPL-MCNC: 7 MG/DL (ref 5–25)
BUN SERPL-MCNC: 9 MG/DL (ref 5–25)
C DIFF TOX GENS STL QL NAA+PROBE: NEGATIVE
CALCIUM SERPL-MCNC: 7.9 MG/DL (ref 8.3–10.1)
CALCIUM SERPL-MCNC: 8.1 MG/DL (ref 8.3–10.1)
CHLORIDE SERPL-SCNC: 105 MMOL/L (ref 100–108)
CHLORIDE SERPL-SCNC: 105 MMOL/L (ref 100–108)
CO2 SERPL-SCNC: 20 MMOL/L (ref 21–32)
CO2 SERPL-SCNC: 21 MMOL/L (ref 21–32)
CREAT SERPL-MCNC: 0.83 MG/DL (ref 0.6–1.3)
CREAT SERPL-MCNC: 0.86 MG/DL (ref 0.6–1.3)
ERYTHROCYTE [DISTWIDTH] IN BLOOD BY AUTOMATED COUNT: 15.1 % (ref 11.6–15.1)
GFR SERPL CREATININE-BSD FRML MDRD: 76 ML/MIN/1.73SQ M
GFR SERPL CREATININE-BSD FRML MDRD: 79 ML/MIN/1.73SQ M
GLUCOSE SERPL-MCNC: 104 MG/DL (ref 65–140)
GLUCOSE SERPL-MCNC: 89 MG/DL (ref 65–140)
HCT VFR BLD AUTO: 24.5 % (ref 34.8–46.1)
HGB BLD-MCNC: 8 G/DL (ref 11.5–15.4)
MAGNESIUM SERPL-MCNC: 1.4 MG/DL (ref 1.6–2.6)
MCH RBC QN AUTO: 32.7 PG (ref 26.8–34.3)
MCHC RBC AUTO-ENTMCNC: 32.7 G/DL (ref 31.4–37.4)
MCV RBC AUTO: 100 FL (ref 82–98)
P AXIS: 71 DEGREES
PHOSPHATE SERPL-MCNC: 2.4 MG/DL (ref 2.7–4.5)
PLATELET # BLD AUTO: 151 THOUSANDS/UL (ref 149–390)
PMV BLD AUTO: 10.9 FL (ref 8.9–12.7)
POTASSIUM SERPL-SCNC: 2.8 MMOL/L (ref 3.5–5.3)
POTASSIUM SERPL-SCNC: 3.2 MMOL/L (ref 3.5–5.3)
PR INTERVAL: 154 MS
PROT SERPL-MCNC: 6.2 G/DL (ref 6.4–8.2)
QRS AXIS: 83 DEGREES
QRSD INTERVAL: 70 MS
QT INTERVAL: 344 MS
QTC INTERVAL: 430 MS
RBC # BLD AUTO: 2.45 MILLION/UL (ref 3.81–5.12)
SODIUM SERPL-SCNC: 137 MMOL/L (ref 136–145)
SODIUM SERPL-SCNC: 138 MMOL/L (ref 136–145)
T WAVE AXIS: 66 DEGREES
VENTRICULAR RATE: 94 BPM
WBC # BLD AUTO: 6 THOUSAND/UL (ref 4.31–10.16)

## 2020-05-12 PROCEDURE — 87177 OVA AND PARASITES SMEARS: CPT | Performed by: INTERNAL MEDICINE

## 2020-05-12 PROCEDURE — 87209 SMEAR COMPLEX STAIN: CPT | Performed by: INTERNAL MEDICINE

## 2020-05-12 PROCEDURE — 84100 ASSAY OF PHOSPHORUS: CPT | Performed by: INTERNAL MEDICINE

## 2020-05-12 PROCEDURE — 99253 IP/OBS CNSLTJ NEW/EST LOW 45: CPT | Performed by: NURSE PRACTITIONER

## 2020-05-12 PROCEDURE — 80048 BASIC METABOLIC PNL TOTAL CA: CPT | Performed by: INTERNAL MEDICINE

## 2020-05-12 PROCEDURE — 93010 ELECTROCARDIOGRAM REPORT: CPT | Performed by: INTERNAL MEDICINE

## 2020-05-12 PROCEDURE — 99232 SBSQ HOSP IP/OBS MODERATE 35: CPT | Performed by: INTERNAL MEDICINE

## 2020-05-12 PROCEDURE — 83735 ASSAY OF MAGNESIUM: CPT | Performed by: INTERNAL MEDICINE

## 2020-05-12 PROCEDURE — 80053 COMPREHEN METABOLIC PANEL: CPT | Performed by: INTERNAL MEDICINE

## 2020-05-12 PROCEDURE — 85027 COMPLETE CBC AUTOMATED: CPT | Performed by: INTERNAL MEDICINE

## 2020-05-12 RX ORDER — POTASSIUM CHLORIDE 29.8 MG/ML
40 INJECTION INTRAVENOUS ONCE
Status: DISCONTINUED | OUTPATIENT
Start: 2020-05-12 | End: 2020-05-12

## 2020-05-12 RX ORDER — DEXAMETHASONE 2 MG/1
2 TABLET ORAL
Status: DISCONTINUED | OUTPATIENT
Start: 2020-05-13 | End: 2020-05-16 | Stop reason: HOSPADM

## 2020-05-12 RX ORDER — METOCLOPRAMIDE HYDROCHLORIDE 5 MG/ML
5 INJECTION INTRAMUSCULAR; INTRAVENOUS ONCE
Status: COMPLETED | OUTPATIENT
Start: 2020-05-12 | End: 2020-05-12

## 2020-05-12 RX ORDER — MAGNESIUM SULFATE HEPTAHYDRATE 40 MG/ML
2 INJECTION, SOLUTION INTRAVENOUS ONCE
Status: COMPLETED | OUTPATIENT
Start: 2020-05-12 | End: 2020-05-12

## 2020-05-12 RX ORDER — ACETAMINOPHEN 325 MG/1
975 TABLET ORAL EVERY 8 HOURS SCHEDULED
Status: DISCONTINUED | OUTPATIENT
Start: 2020-05-12 | End: 2020-05-16 | Stop reason: HOSPADM

## 2020-05-12 RX ORDER — OXYCODONE HYDROCHLORIDE 5 MG/1
5 TABLET ORAL EVERY 4 HOURS PRN
Status: DISCONTINUED | OUTPATIENT
Start: 2020-05-12 | End: 2020-05-16 | Stop reason: HOSPADM

## 2020-05-12 RX ORDER — POTASSIUM CHLORIDE 20MEQ/15ML
40 LIQUID (ML) ORAL ONCE
Status: COMPLETED | OUTPATIENT
Start: 2020-05-12 | End: 2020-05-12

## 2020-05-12 RX ORDER — POTASSIUM CHLORIDE 20 MEQ/1
40 TABLET, EXTENDED RELEASE ORAL ONCE
Status: COMPLETED | OUTPATIENT
Start: 2020-05-12 | End: 2020-05-12

## 2020-05-12 RX ORDER — OXYCODONE HYDROCHLORIDE 10 MG/1
10 TABLET ORAL EVERY 4 HOURS PRN
Status: DISCONTINUED | OUTPATIENT
Start: 2020-05-12 | End: 2020-05-16 | Stop reason: HOSPADM

## 2020-05-12 RX ADMIN — HEPARIN SODIUM 5000 UNITS: 5000 INJECTION INTRAVENOUS; SUBCUTANEOUS at 05:26

## 2020-05-12 RX ADMIN — HYDROCODONE BITARTRATE AND ACETAMINOPHEN 1 TABLET: 5; 325 TABLET ORAL at 06:44

## 2020-05-12 RX ADMIN — HEPARIN SODIUM 5000 UNITS: 5000 INJECTION INTRAVENOUS; SUBCUTANEOUS at 13:49

## 2020-05-12 RX ADMIN — LEVOTHYROXINE SODIUM 88 MCG: 88 TABLET ORAL at 05:26

## 2020-05-12 RX ADMIN — SODIUM CHLORIDE 125 ML/HR: 0.9 INJECTION, SOLUTION INTRAVENOUS at 20:11

## 2020-05-12 RX ADMIN — CYCLOBENZAPRINE 10 MG: 10 TABLET, FILM COATED ORAL at 22:06

## 2020-05-12 RX ADMIN — MAGNESIUM SULFATE HEPTAHYDRATE 2 G: 40 INJECTION, SOLUTION INTRAVENOUS at 09:45

## 2020-05-12 RX ADMIN — POTASSIUM CHLORIDE 40 MEQ: 20 SOLUTION ORAL at 18:42

## 2020-05-12 RX ADMIN — OXYCODONE HYDROCHLORIDE 10 MG: 10 TABLET ORAL at 18:53

## 2020-05-12 RX ADMIN — ACETAMINOPHEN 975 MG: 325 TABLET, FILM COATED ORAL at 11:35

## 2020-05-12 RX ADMIN — ALPRAZOLAM 1 MG: 0.5 TABLET ORAL at 22:06

## 2020-05-12 RX ADMIN — HYDROXYCHLOROQUINE SULFATE 200 MG: 200 TABLET, FILM COATED ORAL at 06:45

## 2020-05-12 RX ADMIN — SUCRALFATE 1000 MG: 1 SUSPENSION ORAL at 11:35

## 2020-05-12 RX ADMIN — SODIUM CHLORIDE 125 ML/HR: 0.9 INJECTION, SOLUTION INTRAVENOUS at 05:31

## 2020-05-12 RX ADMIN — MORPHINE SULFATE 2 MG: 2 INJECTION, SOLUTION INTRAMUSCULAR; INTRAVENOUS at 00:39

## 2020-05-12 RX ADMIN — METOCLOPRAMIDE HYDROCHLORIDE 5 MG: 10 TABLET ORAL at 11:36

## 2020-05-12 RX ADMIN — ALPRAZOLAM 1 MG: 0.5 TABLET ORAL at 13:49

## 2020-05-12 RX ADMIN — ALPRAZOLAM 1 MG: 0.5 TABLET ORAL at 07:34

## 2020-05-12 RX ADMIN — SUCRALFATE 1000 MG: 1 SUSPENSION ORAL at 18:42

## 2020-05-12 RX ADMIN — METOCLOPRAMIDE HYDROCHLORIDE 5 MG: 5 INJECTION INTRAMUSCULAR; INTRAVENOUS at 00:39

## 2020-05-12 RX ADMIN — METOCLOPRAMIDE HYDROCHLORIDE 5 MG: 10 TABLET ORAL at 06:45

## 2020-05-12 RX ADMIN — METOCLOPRAMIDE HYDROCHLORIDE 5 MG: 10 TABLET ORAL at 18:43

## 2020-05-12 RX ADMIN — SUCRALFATE 1000 MG: 1 SUSPENSION ORAL at 05:26

## 2020-05-12 RX ADMIN — POTASSIUM CHLORIDE 40 MEQ: 1500 TABLET, EXTENDED RELEASE ORAL at 09:44

## 2020-05-12 RX ADMIN — ACETAMINOPHEN 975 MG: 325 TABLET, FILM COATED ORAL at 22:05

## 2020-05-12 RX ADMIN — SERTRALINE HYDROCHLORIDE 150 MG: 100 TABLET ORAL at 08:25

## 2020-05-12 RX ADMIN — HEPARIN SODIUM 5000 UNITS: 5000 INJECTION INTRAVENOUS; SUBCUTANEOUS at 22:05

## 2020-05-12 RX ADMIN — POTASSIUM PHOSPHATE, MONOBASIC AND POTASSIUM PHOSPHATE, DIBASIC 21 MMOL: 224; 236 INJECTION, SOLUTION, CONCENTRATE INTRAVENOUS at 11:36

## 2020-05-12 RX ADMIN — SODIUM CHLORIDE 125 ML/HR: 0.9 INJECTION, SOLUTION INTRAVENOUS at 13:29

## 2020-05-12 RX ADMIN — LIDOCAINE 1 PATCH: 50 PATCH TOPICAL at 18:39

## 2020-05-12 RX ADMIN — OXYCODONE HYDROCHLORIDE 5 MG: 5 TABLET ORAL at 13:49

## 2020-05-12 RX ADMIN — HYDROXYCHLOROQUINE SULFATE 100 MG: 200 TABLET, FILM COATED ORAL at 18:43

## 2020-05-13 LAB
ANION GAP SERPL CALCULATED.3IONS-SCNC: 12 MMOL/L (ref 4–13)
BUN SERPL-MCNC: 3 MG/DL (ref 5–25)
CALCIUM SERPL-MCNC: 8.2 MG/DL (ref 8.3–10.1)
CHLORIDE SERPL-SCNC: 109 MMOL/L (ref 100–108)
CO2 SERPL-SCNC: 18 MMOL/L (ref 21–32)
CREAT SERPL-MCNC: 0.91 MG/DL (ref 0.6–1.3)
ERYTHROCYTE [DISTWIDTH] IN BLOOD BY AUTOMATED COUNT: 15.5 % (ref 11.6–15.1)
GFR SERPL CREATININE-BSD FRML MDRD: 71 ML/MIN/1.73SQ M
GLUCOSE SERPL-MCNC: 95 MG/DL (ref 65–140)
HCT VFR BLD AUTO: 25.3 % (ref 34.8–46.1)
HGB BLD-MCNC: 8 G/DL (ref 11.5–15.4)
MAGNESIUM SERPL-MCNC: 1.5 MG/DL (ref 1.6–2.6)
MCH RBC QN AUTO: 31.9 PG (ref 26.8–34.3)
MCHC RBC AUTO-ENTMCNC: 31.6 G/DL (ref 31.4–37.4)
MCV RBC AUTO: 101 FL (ref 82–98)
PHOSPHATE SERPL-MCNC: 2.5 MG/DL (ref 2.7–4.5)
PLATELET # BLD AUTO: 151 THOUSANDS/UL (ref 149–390)
PMV BLD AUTO: 10.9 FL (ref 8.9–12.7)
POTASSIUM SERPL-SCNC: 3.3 MMOL/L (ref 3.5–5.3)
RBC # BLD AUTO: 2.51 MILLION/UL (ref 3.81–5.12)
SODIUM SERPL-SCNC: 139 MMOL/L (ref 136–145)
WBC # BLD AUTO: 10.48 THOUSAND/UL (ref 4.31–10.16)

## 2020-05-13 PROCEDURE — 84100 ASSAY OF PHOSPHORUS: CPT | Performed by: INTERNAL MEDICINE

## 2020-05-13 PROCEDURE — 83735 ASSAY OF MAGNESIUM: CPT | Performed by: INTERNAL MEDICINE

## 2020-05-13 PROCEDURE — 85027 COMPLETE CBC AUTOMATED: CPT | Performed by: INTERNAL MEDICINE

## 2020-05-13 PROCEDURE — 80048 BASIC METABOLIC PNL TOTAL CA: CPT | Performed by: INTERNAL MEDICINE

## 2020-05-13 PROCEDURE — 99232 SBSQ HOSP IP/OBS MODERATE 35: CPT | Performed by: INTERNAL MEDICINE

## 2020-05-13 PROCEDURE — 99233 SBSQ HOSP IP/OBS HIGH 50: CPT | Performed by: NURSE PRACTITIONER

## 2020-05-13 RX ORDER — LOPERAMIDE HYDROCHLORIDE 2 MG/1
2 CAPSULE ORAL EVERY 4 HOURS PRN
Status: DISCONTINUED | OUTPATIENT
Start: 2020-05-13 | End: 2020-05-16 | Stop reason: HOSPADM

## 2020-05-13 RX ORDER — OLANZAPINE 2.5 MG/1
2.5 TABLET ORAL ONCE
Status: COMPLETED | OUTPATIENT
Start: 2020-05-13 | End: 2020-05-13

## 2020-05-13 RX ORDER — MAGNESIUM SULFATE HEPTAHYDRATE 40 MG/ML
2 INJECTION, SOLUTION INTRAVENOUS ONCE
Status: COMPLETED | OUTPATIENT
Start: 2020-05-13 | End: 2020-05-13

## 2020-05-13 RX ORDER — POTASSIUM CHLORIDE 20MEQ/15ML
40 LIQUID (ML) ORAL 2 TIMES DAILY
Status: DISCONTINUED | OUTPATIENT
Start: 2020-05-13 | End: 2020-05-14

## 2020-05-13 RX ORDER — METOCLOPRAMIDE HYDROCHLORIDE 5 MG/ML
10 INJECTION INTRAMUSCULAR; INTRAVENOUS EVERY 6 HOURS
Status: DISCONTINUED | OUTPATIENT
Start: 2020-05-13 | End: 2020-05-13

## 2020-05-13 RX ORDER — OLANZAPINE 2.5 MG/1
5 TABLET ORAL
Status: DISCONTINUED | OUTPATIENT
Start: 2020-05-13 | End: 2020-05-14

## 2020-05-13 RX ADMIN — ALPRAZOLAM 1 MG: 0.5 TABLET ORAL at 13:24

## 2020-05-13 RX ADMIN — HEPARIN SODIUM 5000 UNITS: 5000 INJECTION INTRAVENOUS; SUBCUTANEOUS at 05:18

## 2020-05-13 RX ADMIN — OXYCODONE HYDROCHLORIDE 10 MG: 10 TABLET ORAL at 10:25

## 2020-05-13 RX ADMIN — METOCLOPRAMIDE HYDROCHLORIDE 10 MG: 5 INJECTION INTRAMUSCULAR; INTRAVENOUS at 10:20

## 2020-05-13 RX ADMIN — MORPHINE SULFATE 2 MG: 2 INJECTION, SOLUTION INTRAMUSCULAR; INTRAVENOUS at 23:32

## 2020-05-13 RX ADMIN — LEVOTHYROXINE SODIUM 88 MCG: 88 TABLET ORAL at 05:09

## 2020-05-13 RX ADMIN — ONDANSETRON 4 MG: 2 INJECTION INTRAMUSCULAR; INTRAVENOUS at 05:04

## 2020-05-13 RX ADMIN — LOPERAMIDE HYDROCHLORIDE 2 MG: 2 CAPSULE ORAL at 23:39

## 2020-05-13 RX ADMIN — SODIUM CHLORIDE 125 ML/HR: 0.9 INJECTION, SOLUTION INTRAVENOUS at 21:30

## 2020-05-13 RX ADMIN — ALPRAZOLAM 1 MG: 0.5 TABLET ORAL at 06:37

## 2020-05-13 RX ADMIN — METOCLOPRAMIDE HYDROCHLORIDE 5 MG: 10 TABLET ORAL at 07:09

## 2020-05-13 RX ADMIN — ACETAMINOPHEN 975 MG: 325 TABLET, FILM COATED ORAL at 05:09

## 2020-05-13 RX ADMIN — SUCRALFATE 1000 MG: 1 SUSPENSION ORAL at 00:03

## 2020-05-13 RX ADMIN — OLANZAPINE 2.5 MG: 2.5 TABLET, FILM COATED ORAL at 11:12

## 2020-05-13 RX ADMIN — DEXAMETHASONE 2 MG: 2 TABLET ORAL at 08:29

## 2020-05-13 RX ADMIN — SODIUM CHLORIDE 125 ML/HR: 0.9 INJECTION, SOLUTION INTRAVENOUS at 05:18

## 2020-05-13 RX ADMIN — POTASSIUM PHOSPHATE, MONOBASIC AND POTASSIUM PHOSPHATE, DIBASIC 21 MMOL: 224; 236 INJECTION, SOLUTION, CONCENTRATE INTRAVENOUS at 10:26

## 2020-05-13 RX ADMIN — OXYCODONE HYDROCHLORIDE 5 MG: 5 TABLET ORAL at 05:18

## 2020-05-13 RX ADMIN — HYDROXYCHLOROQUINE SULFATE 200 MG: 200 TABLET, FILM COATED ORAL at 08:29

## 2020-05-13 RX ADMIN — MAGNESIUM GLUCONATE 500 MG ORAL TABLET 400 MG: 500 TABLET ORAL at 18:48

## 2020-05-13 RX ADMIN — MAGNESIUM SULFATE HEPTAHYDRATE 2 G: 40 INJECTION, SOLUTION INTRAVENOUS at 08:29

## 2020-05-13 RX ADMIN — MAGNESIUM GLUCONATE 500 MG ORAL TABLET 400 MG: 500 TABLET ORAL at 13:30

## 2020-05-13 RX ADMIN — ACETAMINOPHEN 975 MG: 325 TABLET, FILM COATED ORAL at 13:30

## 2020-05-13 RX ADMIN — HEPARIN SODIUM 5000 UNITS: 5000 INJECTION INTRAVENOUS; SUBCUTANEOUS at 13:30

## 2020-05-13 RX ADMIN — SERTRALINE HYDROCHLORIDE 150 MG: 100 TABLET ORAL at 08:30

## 2020-05-13 RX ADMIN — LIDOCAINE 1 PATCH: 50 PATCH TOPICAL at 18:49

## 2020-05-13 RX ADMIN — HEPARIN SODIUM 5000 UNITS: 5000 INJECTION INTRAVENOUS; SUBCUTANEOUS at 21:29

## 2020-05-13 RX ADMIN — MORPHINE SULFATE 2 MG: 2 INJECTION, SOLUTION INTRAMUSCULAR; INTRAVENOUS at 14:42

## 2020-05-13 RX ADMIN — HYDROXYCHLOROQUINE SULFATE 100 MG: 200 TABLET, FILM COATED ORAL at 18:48

## 2020-05-13 RX ADMIN — SUCRALFATE 1000 MG: 1 SUSPENSION ORAL at 11:12

## 2020-05-13 RX ADMIN — CYCLOBENZAPRINE 10 MG: 10 TABLET, FILM COATED ORAL at 21:30

## 2020-05-13 RX ADMIN — ACETAMINOPHEN 975 MG: 325 TABLET, FILM COATED ORAL at 21:29

## 2020-05-13 RX ADMIN — SODIUM CHLORIDE 125 ML/HR: 0.9 INJECTION, SOLUTION INTRAVENOUS at 13:29

## 2020-05-13 RX ADMIN — MORPHINE SULFATE 2 MG: 2 INJECTION, SOLUTION INTRAMUSCULAR; INTRAVENOUS at 19:05

## 2020-05-13 RX ADMIN — OLANZAPINE 5 MG: 2.5 TABLET, FILM COATED ORAL at 21:30

## 2020-05-14 LAB
ANION GAP SERPL CALCULATED.3IONS-SCNC: 11 MMOL/L (ref 4–13)
BUN SERPL-MCNC: 4 MG/DL (ref 5–25)
CALCIUM SERPL-MCNC: 8.2 MG/DL (ref 8.3–10.1)
CHLORIDE SERPL-SCNC: 110 MMOL/L (ref 100–108)
CO2 SERPL-SCNC: 20 MMOL/L (ref 21–32)
CREAT SERPL-MCNC: 0.72 MG/DL (ref 0.6–1.3)
ERYTHROCYTE [DISTWIDTH] IN BLOOD BY AUTOMATED COUNT: 15.4 % (ref 11.6–15.1)
GFR SERPL CREATININE-BSD FRML MDRD: 94 ML/MIN/1.73SQ M
GLUCOSE SERPL-MCNC: 90 MG/DL (ref 65–140)
HCT VFR BLD AUTO: 22.3 % (ref 34.8–46.1)
HGB BLD-MCNC: 7.3 G/DL (ref 11.5–15.4)
MAGNESIUM SERPL-MCNC: 1.5 MG/DL (ref 1.6–2.6)
MCH RBC QN AUTO: 33 PG (ref 26.8–34.3)
MCHC RBC AUTO-ENTMCNC: 32.7 G/DL (ref 31.4–37.4)
MCV RBC AUTO: 101 FL (ref 82–98)
O+P STL CONC: NORMAL
PHOSPHATE SERPL-MCNC: 3.4 MG/DL (ref 2.7–4.5)
PLATELET # BLD AUTO: 139 THOUSANDS/UL (ref 149–390)
PMV BLD AUTO: 10.8 FL (ref 8.9–12.7)
POTASSIUM SERPL-SCNC: 3.1 MMOL/L (ref 3.5–5.3)
RBC # BLD AUTO: 2.21 MILLION/UL (ref 3.81–5.12)
SODIUM SERPL-SCNC: 141 MMOL/L (ref 136–145)
WBC # BLD AUTO: 8.42 THOUSAND/UL (ref 4.31–10.16)

## 2020-05-14 PROCEDURE — 80048 BASIC METABOLIC PNL TOTAL CA: CPT | Performed by: INTERNAL MEDICINE

## 2020-05-14 PROCEDURE — 85027 COMPLETE CBC AUTOMATED: CPT | Performed by: INTERNAL MEDICINE

## 2020-05-14 PROCEDURE — 99232 SBSQ HOSP IP/OBS MODERATE 35: CPT | Performed by: NURSE PRACTITIONER

## 2020-05-14 PROCEDURE — C9113 INJ PANTOPRAZOLE SODIUM, VIA: HCPCS | Performed by: NURSE PRACTITIONER

## 2020-05-14 PROCEDURE — 84100 ASSAY OF PHOSPHORUS: CPT | Performed by: INTERNAL MEDICINE

## 2020-05-14 PROCEDURE — 99232 SBSQ HOSP IP/OBS MODERATE 35: CPT | Performed by: INTERNAL MEDICINE

## 2020-05-14 PROCEDURE — 83735 ASSAY OF MAGNESIUM: CPT | Performed by: INTERNAL MEDICINE

## 2020-05-14 RX ORDER — POTASSIUM CHLORIDE 20 MEQ/1
40 TABLET, EXTENDED RELEASE ORAL 2 TIMES DAILY
Status: DISCONTINUED | OUTPATIENT
Start: 2020-05-14 | End: 2020-05-15

## 2020-05-14 RX ORDER — OLANZAPINE 2.5 MG/1
5 TABLET ORAL 2 TIMES DAILY
Status: DISCONTINUED | OUTPATIENT
Start: 2020-05-14 | End: 2020-05-16 | Stop reason: HOSPADM

## 2020-05-14 RX ORDER — PANTOPRAZOLE SODIUM 40 MG/1
40 INJECTION, POWDER, FOR SOLUTION INTRAVENOUS EVERY 12 HOURS SCHEDULED
Status: DISCONTINUED | OUTPATIENT
Start: 2020-05-14 | End: 2020-05-16 | Stop reason: HOSPADM

## 2020-05-14 RX ORDER — MAGNESIUM SULFATE HEPTAHYDRATE 40 MG/ML
2 INJECTION, SOLUTION INTRAVENOUS ONCE
Status: COMPLETED | OUTPATIENT
Start: 2020-05-14 | End: 2020-05-14

## 2020-05-14 RX ORDER — POTASSIUM CHLORIDE AND SODIUM CHLORIDE 900; 300 MG/100ML; MG/100ML
100 INJECTION, SOLUTION INTRAVENOUS CONTINUOUS
Status: DISCONTINUED | OUTPATIENT
Start: 2020-05-14 | End: 2020-05-15

## 2020-05-14 RX ORDER — METOCLOPRAMIDE 10 MG/1
10 TABLET ORAL
Status: DISCONTINUED | OUTPATIENT
Start: 2020-05-14 | End: 2020-05-14

## 2020-05-14 RX ADMIN — LIDOCAINE 1 PATCH: 50 PATCH TOPICAL at 21:15

## 2020-05-14 RX ADMIN — CYCLOBENZAPRINE 10 MG: 10 TABLET, FILM COATED ORAL at 21:15

## 2020-05-14 RX ADMIN — ONDANSETRON 4 MG: 2 INJECTION INTRAMUSCULAR; INTRAVENOUS at 09:26

## 2020-05-14 RX ADMIN — MORPHINE SULFATE 2 MG: 2 INJECTION, SOLUTION INTRAMUSCULAR; INTRAVENOUS at 15:08

## 2020-05-14 RX ADMIN — HEPARIN SODIUM 5000 UNITS: 5000 INJECTION INTRAVENOUS; SUBCUTANEOUS at 05:50

## 2020-05-14 RX ADMIN — ONDANSETRON 8 MG: 2 INJECTION INTRAMUSCULAR; INTRAVENOUS at 17:44

## 2020-05-14 RX ADMIN — LOPERAMIDE HYDROCHLORIDE 2 MG: 2 CAPSULE ORAL at 08:29

## 2020-05-14 RX ADMIN — SERTRALINE HYDROCHLORIDE 150 MG: 100 TABLET ORAL at 08:28

## 2020-05-14 RX ADMIN — LEVOTHYROXINE SODIUM 88 MCG: 88 TABLET ORAL at 05:50

## 2020-05-14 RX ADMIN — OLANZAPINE 5 MG: 2.5 TABLET, FILM COATED ORAL at 17:15

## 2020-05-14 RX ADMIN — MAGNESIUM SULFATE HEPTAHYDRATE 2 G: 40 INJECTION, SOLUTION INTRAVENOUS at 14:55

## 2020-05-14 RX ADMIN — ACETAMINOPHEN 975 MG: 325 TABLET, FILM COATED ORAL at 21:15

## 2020-05-14 RX ADMIN — HYDROXYCHLOROQUINE SULFATE 100 MG: 200 TABLET, FILM COATED ORAL at 17:45

## 2020-05-14 RX ADMIN — MAGNESIUM GLUCONATE 500 MG ORAL TABLET 400 MG: 500 TABLET ORAL at 17:45

## 2020-05-14 RX ADMIN — MORPHINE SULFATE 2 MG: 2 INJECTION, SOLUTION INTRAMUSCULAR; INTRAVENOUS at 06:04

## 2020-05-14 RX ADMIN — ALPRAZOLAM 1 MG: 0.5 TABLET ORAL at 08:28

## 2020-05-14 RX ADMIN — ALPRAZOLAM 1 MG: 0.5 TABLET ORAL at 15:20

## 2020-05-14 RX ADMIN — POTASSIUM CHLORIDE 40 MEQ: 20 SOLUTION ORAL at 08:24

## 2020-05-14 RX ADMIN — MORPHINE SULFATE 2 MG: 2 INJECTION, SOLUTION INTRAMUSCULAR; INTRAVENOUS at 10:12

## 2020-05-14 RX ADMIN — PANTOPRAZOLE SODIUM 40 MG: 40 INJECTION, POWDER, FOR SOLUTION INTRAVENOUS at 17:15

## 2020-05-14 RX ADMIN — SODIUM CHLORIDE 125 ML/HR: 0.9 INJECTION, SOLUTION INTRAVENOUS at 05:53

## 2020-05-14 RX ADMIN — HEPARIN SODIUM 5000 UNITS: 5000 INJECTION INTRAVENOUS; SUBCUTANEOUS at 14:53

## 2020-05-14 RX ADMIN — MAGNESIUM GLUCONATE 500 MG ORAL TABLET 400 MG: 500 TABLET ORAL at 08:28

## 2020-05-14 RX ADMIN — POTASSIUM CHLORIDE 40 MEQ: 1500 TABLET, EXTENDED RELEASE ORAL at 18:28

## 2020-05-14 RX ADMIN — HYDROXYCHLOROQUINE SULFATE 200 MG: 200 TABLET, FILM COATED ORAL at 07:52

## 2020-05-14 RX ADMIN — POTASSIUM CHLORIDE AND SODIUM CHLORIDE 100 ML/HR: 900; 300 INJECTION, SOLUTION INTRAVENOUS at 17:47

## 2020-05-14 RX ADMIN — OXYCODONE HYDROCHLORIDE 5 MG: 5 TABLET ORAL at 22:14

## 2020-05-14 RX ADMIN — ACETAMINOPHEN 975 MG: 325 TABLET, FILM COATED ORAL at 05:50

## 2020-05-14 RX ADMIN — HEPARIN SODIUM 5000 UNITS: 5000 INJECTION INTRAVENOUS; SUBCUTANEOUS at 21:14

## 2020-05-14 RX ADMIN — POTASSIUM CHLORIDE AND SODIUM CHLORIDE 100 ML/HR: 900; 300 INJECTION, SOLUTION INTRAVENOUS at 10:14

## 2020-05-14 RX ADMIN — DEXAMETHASONE 2 MG: 2 TABLET ORAL at 07:52

## 2020-05-14 RX ADMIN — LOPERAMIDE HYDROCHLORIDE 2 MG: 2 CAPSULE ORAL at 23:40

## 2020-05-14 RX ADMIN — ACETAMINOPHEN 975 MG: 325 TABLET, FILM COATED ORAL at 14:53

## 2020-05-15 LAB
ANION GAP SERPL CALCULATED.3IONS-SCNC: 8 MMOL/L (ref 4–13)
BUN SERPL-MCNC: 5 MG/DL (ref 5–25)
CALCIUM SERPL-MCNC: 8.7 MG/DL (ref 8.3–10.1)
CHLORIDE SERPL-SCNC: 110 MMOL/L (ref 100–108)
CO2 SERPL-SCNC: 21 MMOL/L (ref 21–32)
CREAT SERPL-MCNC: 0.77 MG/DL (ref 0.6–1.3)
ERYTHROCYTE [DISTWIDTH] IN BLOOD BY AUTOMATED COUNT: 15.4 % (ref 11.6–15.1)
GFR SERPL CREATININE-BSD FRML MDRD: 87 ML/MIN/1.73SQ M
GLUCOSE SERPL-MCNC: 86 MG/DL (ref 65–140)
HCT VFR BLD AUTO: 22.3 % (ref 34.8–46.1)
HGB BLD-MCNC: 7.2 G/DL (ref 11.5–15.4)
MAGNESIUM SERPL-MCNC: 1.6 MG/DL (ref 1.6–2.6)
MCH RBC QN AUTO: 32.6 PG (ref 26.8–34.3)
MCHC RBC AUTO-ENTMCNC: 32.3 G/DL (ref 31.4–37.4)
MCV RBC AUTO: 101 FL (ref 82–98)
PHOSPHATE SERPL-MCNC: 2.8 MG/DL (ref 2.7–4.5)
PLATELET # BLD AUTO: 114 THOUSANDS/UL (ref 149–390)
PMV BLD AUTO: 10.7 FL (ref 8.9–12.7)
POTASSIUM SERPL-SCNC: 5.3 MMOL/L (ref 3.5–5.3)
RBC # BLD AUTO: 2.21 MILLION/UL (ref 3.81–5.12)
SODIUM SERPL-SCNC: 139 MMOL/L (ref 136–145)
WBC # BLD AUTO: 9.18 THOUSAND/UL (ref 4.31–10.16)

## 2020-05-15 PROCEDURE — 85027 COMPLETE CBC AUTOMATED: CPT | Performed by: INTERNAL MEDICINE

## 2020-05-15 PROCEDURE — 83735 ASSAY OF MAGNESIUM: CPT | Performed by: INTERNAL MEDICINE

## 2020-05-15 PROCEDURE — C9113 INJ PANTOPRAZOLE SODIUM, VIA: HCPCS | Performed by: NURSE PRACTITIONER

## 2020-05-15 PROCEDURE — 99232 SBSQ HOSP IP/OBS MODERATE 35: CPT | Performed by: NURSE PRACTITIONER

## 2020-05-15 PROCEDURE — 99232 SBSQ HOSP IP/OBS MODERATE 35: CPT | Performed by: INTERNAL MEDICINE

## 2020-05-15 PROCEDURE — 80048 BASIC METABOLIC PNL TOTAL CA: CPT | Performed by: INTERNAL MEDICINE

## 2020-05-15 PROCEDURE — 84100 ASSAY OF PHOSPHORUS: CPT | Performed by: INTERNAL MEDICINE

## 2020-05-15 RX ORDER — PROCHLORPERAZINE MALEATE 10 MG
5 TABLET ORAL EVERY 6 HOURS PRN
Status: DISCONTINUED | OUTPATIENT
Start: 2020-05-15 | End: 2020-05-16 | Stop reason: HOSPADM

## 2020-05-15 RX ORDER — MAGNESIUM SULFATE HEPTAHYDRATE 40 MG/ML
2 INJECTION, SOLUTION INTRAVENOUS ONCE
Status: COMPLETED | OUTPATIENT
Start: 2020-05-15 | End: 2020-05-15

## 2020-05-15 RX ADMIN — MAGNESIUM SULFATE HEPTAHYDRATE 2 G: 40 INJECTION, SOLUTION INTRAVENOUS at 15:28

## 2020-05-15 RX ADMIN — MORPHINE SULFATE 2 MG: 2 INJECTION, SOLUTION INTRAMUSCULAR; INTRAVENOUS at 12:15

## 2020-05-15 RX ADMIN — ACETAMINOPHEN 975 MG: 325 TABLET, FILM COATED ORAL at 21:29

## 2020-05-15 RX ADMIN — HYDROXYCHLOROQUINE SULFATE 100 MG: 200 TABLET, FILM COATED ORAL at 17:13

## 2020-05-15 RX ADMIN — MAGNESIUM GLUCONATE 500 MG ORAL TABLET 400 MG: 500 TABLET ORAL at 07:59

## 2020-05-15 RX ADMIN — ACETAMINOPHEN 975 MG: 325 TABLET, FILM COATED ORAL at 05:02

## 2020-05-15 RX ADMIN — Medication 8 MG: at 02:32

## 2020-05-15 RX ADMIN — OXYCODONE HYDROCHLORIDE 5 MG: 5 TABLET ORAL at 21:28

## 2020-05-15 RX ADMIN — MAGNESIUM GLUCONATE 500 MG ORAL TABLET 400 MG: 500 TABLET ORAL at 17:13

## 2020-05-15 RX ADMIN — LEVOTHYROXINE SODIUM 88 MCG: 88 TABLET ORAL at 05:02

## 2020-05-15 RX ADMIN — LOPERAMIDE HYDROCHLORIDE 2 MG: 2 CAPSULE ORAL at 21:28

## 2020-05-15 RX ADMIN — ALPRAZOLAM 1 MG: 0.5 TABLET ORAL at 15:16

## 2020-05-15 RX ADMIN — ACETAMINOPHEN 975 MG: 325 TABLET, FILM COATED ORAL at 13:54

## 2020-05-15 RX ADMIN — POTASSIUM CHLORIDE AND SODIUM CHLORIDE 100 ML/HR: 900; 300 INJECTION, SOLUTION INTRAVENOUS at 04:54

## 2020-05-15 RX ADMIN — OXYCODONE HYDROCHLORIDE 5 MG: 5 TABLET ORAL at 09:20

## 2020-05-15 RX ADMIN — PANTOPRAZOLE SODIUM 40 MG: 40 INJECTION, POWDER, FOR SOLUTION INTRAVENOUS at 07:59

## 2020-05-15 RX ADMIN — OXYCODONE HYDROCHLORIDE 5 MG: 5 TABLET ORAL at 05:10

## 2020-05-15 RX ADMIN — ALPRAZOLAM 1 MG: 0.5 TABLET ORAL at 21:28

## 2020-05-15 RX ADMIN — OLANZAPINE 5 MG: 2.5 TABLET, FILM COATED ORAL at 07:59

## 2020-05-15 RX ADMIN — OLANZAPINE 5 MG: 2.5 TABLET, FILM COATED ORAL at 17:13

## 2020-05-15 RX ADMIN — DEXAMETHASONE 2 MG: 2 TABLET ORAL at 07:59

## 2020-05-15 RX ADMIN — LOPERAMIDE HYDROCHLORIDE 2 MG: 2 CAPSULE ORAL at 07:59

## 2020-05-15 RX ADMIN — LIDOCAINE 1 PATCH: 50 PATCH TOPICAL at 17:14

## 2020-05-15 RX ADMIN — HEPARIN SODIUM 5000 UNITS: 5000 INJECTION INTRAVENOUS; SUBCUTANEOUS at 05:02

## 2020-05-15 RX ADMIN — POTASSIUM CHLORIDE 40 MEQ: 1500 TABLET, EXTENDED RELEASE ORAL at 07:59

## 2020-05-15 RX ADMIN — Medication 8 MG: at 17:37

## 2020-05-15 RX ADMIN — HEPARIN SODIUM 5000 UNITS: 5000 INJECTION INTRAVENOUS; SUBCUTANEOUS at 21:29

## 2020-05-15 RX ADMIN — CYCLOBENZAPRINE 10 MG: 10 TABLET, FILM COATED ORAL at 21:29

## 2020-05-15 RX ADMIN — LOPERAMIDE HYDROCHLORIDE 2 MG: 2 CAPSULE ORAL at 05:10

## 2020-05-15 RX ADMIN — SERTRALINE HYDROCHLORIDE 150 MG: 100 TABLET ORAL at 07:58

## 2020-05-15 RX ADMIN — Medication 8 MG: at 09:20

## 2020-05-15 RX ADMIN — OXYCODONE HYDROCHLORIDE 5 MG: 5 TABLET ORAL at 15:16

## 2020-05-15 RX ADMIN — ALPRAZOLAM 1 MG: 0.5 TABLET ORAL at 09:45

## 2020-05-15 RX ADMIN — HYDROXYCHLOROQUINE SULFATE 200 MG: 200 TABLET, FILM COATED ORAL at 07:58

## 2020-05-15 RX ADMIN — PANTOPRAZOLE SODIUM 40 MG: 40 INJECTION, POWDER, FOR SOLUTION INTRAVENOUS at 21:29

## 2020-05-16 VITALS
TEMPERATURE: 97.9 F | HEART RATE: 88 BPM | RESPIRATION RATE: 18 BRPM | OXYGEN SATURATION: 97 % | DIASTOLIC BLOOD PRESSURE: 61 MMHG | SYSTOLIC BLOOD PRESSURE: 97 MMHG | BODY MASS INDEX: 21.1 KG/M2 | WEIGHT: 120.59 LBS

## 2020-05-16 LAB
ANION GAP SERPL CALCULATED.3IONS-SCNC: 11 MMOL/L (ref 4–13)
BACTERIA BLD CULT: NORMAL
BACTERIA BLD CULT: NORMAL
BUN SERPL-MCNC: 7 MG/DL (ref 5–25)
CALCIUM SERPL-MCNC: 9.1 MG/DL (ref 8.3–10.1)
CHLORIDE SERPL-SCNC: 104 MMOL/L (ref 100–108)
CO2 SERPL-SCNC: 21 MMOL/L (ref 21–32)
CREAT SERPL-MCNC: 0.92 MG/DL (ref 0.6–1.3)
ERYTHROCYTE [DISTWIDTH] IN BLOOD BY AUTOMATED COUNT: 15.3 % (ref 11.6–15.1)
GFR SERPL CREATININE-BSD FRML MDRD: 70 ML/MIN/1.73SQ M
GLUCOSE SERPL-MCNC: 91 MG/DL (ref 65–140)
HCT VFR BLD AUTO: 23.6 % (ref 34.8–46.1)
HGB BLD-MCNC: 7.7 G/DL (ref 11.5–15.4)
MAGNESIUM SERPL-MCNC: 1.7 MG/DL (ref 1.6–2.6)
MCH RBC QN AUTO: 32.9 PG (ref 26.8–34.3)
MCHC RBC AUTO-ENTMCNC: 32.6 G/DL (ref 31.4–37.4)
MCV RBC AUTO: 101 FL (ref 82–98)
PHOSPHATE SERPL-MCNC: 4.7 MG/DL (ref 2.7–4.5)
PLATELET # BLD AUTO: 96 THOUSANDS/UL (ref 149–390)
PMV BLD AUTO: 10.6 FL (ref 8.9–12.7)
POTASSIUM SERPL-SCNC: 3.8 MMOL/L (ref 3.5–5.3)
RBC # BLD AUTO: 2.34 MILLION/UL (ref 3.81–5.12)
SODIUM SERPL-SCNC: 136 MMOL/L (ref 136–145)
WBC # BLD AUTO: 7.66 THOUSAND/UL (ref 4.31–10.16)

## 2020-05-16 PROCEDURE — 80048 BASIC METABOLIC PNL TOTAL CA: CPT | Performed by: INTERNAL MEDICINE

## 2020-05-16 PROCEDURE — 99239 HOSP IP/OBS DSCHRG MGMT >30: CPT | Performed by: INTERNAL MEDICINE

## 2020-05-16 PROCEDURE — 84100 ASSAY OF PHOSPHORUS: CPT | Performed by: INTERNAL MEDICINE

## 2020-05-16 PROCEDURE — C9113 INJ PANTOPRAZOLE SODIUM, VIA: HCPCS | Performed by: NURSE PRACTITIONER

## 2020-05-16 PROCEDURE — 85027 COMPLETE CBC AUTOMATED: CPT | Performed by: INTERNAL MEDICINE

## 2020-05-16 PROCEDURE — 83735 ASSAY OF MAGNESIUM: CPT | Performed by: INTERNAL MEDICINE

## 2020-05-16 RX ORDER — POTASSIUM CHLORIDE 20 MEQ/1
20 TABLET, EXTENDED RELEASE ORAL EVERY OTHER DAY
Qty: 20 TABLET | Refills: 0 | Status: SHIPPED | OUTPATIENT
Start: 2020-05-16 | End: 2020-07-15 | Stop reason: HOSPADM

## 2020-05-16 RX ORDER — DEXAMETHASONE 2 MG/1
2 TABLET ORAL
Qty: 30 TABLET | Refills: 0 | Status: SHIPPED | OUTPATIENT
Start: 2020-05-17 | End: 2020-06-16 | Stop reason: SDUPTHER

## 2020-05-16 RX ORDER — PROCHLORPERAZINE MALEATE 5 MG/1
5 TABLET ORAL EVERY 6 HOURS PRN
Qty: 30 TABLET | Refills: 0 | Status: SHIPPED | OUTPATIENT
Start: 2020-05-16 | End: 2020-08-27 | Stop reason: HOSPADM

## 2020-05-16 RX ORDER — LOPERAMIDE HYDROCHLORIDE 2 MG/1
2 CAPSULE ORAL EVERY 4 HOURS PRN
Qty: 30 CAPSULE | Refills: 0 | Status: SHIPPED | OUTPATIENT
Start: 2020-05-16 | End: 2020-09-19

## 2020-05-16 RX ORDER — OXYCODONE HYDROCHLORIDE 5 MG/1
5 TABLET ORAL EVERY 4 HOURS PRN
Qty: 30 TABLET | Refills: 0 | Status: SHIPPED | OUTPATIENT
Start: 2020-05-16 | End: 2020-05-22 | Stop reason: SDUPTHER

## 2020-05-16 RX ADMIN — HYDROXYCHLOROQUINE SULFATE 200 MG: 200 TABLET, FILM COATED ORAL at 09:14

## 2020-05-16 RX ADMIN — MAGNESIUM GLUCONATE 500 MG ORAL TABLET 400 MG: 500 TABLET ORAL at 09:13

## 2020-05-16 RX ADMIN — DEXAMETHASONE 2 MG: 2 TABLET ORAL at 09:13

## 2020-05-16 RX ADMIN — OXYCODONE HYDROCHLORIDE 5 MG: 5 TABLET ORAL at 05:23

## 2020-05-16 RX ADMIN — ALPRAZOLAM 1 MG: 0.5 TABLET ORAL at 05:23

## 2020-05-16 RX ADMIN — HEPARIN SODIUM 5000 UNITS: 5000 INJECTION INTRAVENOUS; SUBCUTANEOUS at 05:11

## 2020-05-16 RX ADMIN — LEVOTHYROXINE SODIUM 88 MCG: 88 TABLET ORAL at 05:11

## 2020-05-16 RX ADMIN — ACETAMINOPHEN 975 MG: 325 TABLET, FILM COATED ORAL at 05:11

## 2020-05-16 RX ADMIN — OXYCODONE HYDROCHLORIDE 5 MG: 5 TABLET ORAL at 09:21

## 2020-05-16 RX ADMIN — PANTOPRAZOLE SODIUM 40 MG: 40 INJECTION, POWDER, FOR SOLUTION INTRAVENOUS at 09:14

## 2020-05-16 RX ADMIN — OXYCODONE HYDROCHLORIDE 5 MG: 5 TABLET ORAL at 13:25

## 2020-05-16 RX ADMIN — Medication 8 MG: at 01:13

## 2020-05-16 RX ADMIN — OLANZAPINE 5 MG: 2.5 TABLET, FILM COATED ORAL at 09:13

## 2020-05-16 RX ADMIN — SERTRALINE HYDROCHLORIDE 150 MG: 100 TABLET ORAL at 09:13

## 2020-05-18 ENCOUNTER — TRANSITIONAL CARE MANAGEMENT (OUTPATIENT)
Dept: FAMILY MEDICINE CLINIC | Facility: CLINIC | Age: 55
End: 2020-05-18

## 2020-05-19 ENCOUNTER — OFFICE VISIT (OUTPATIENT)
Dept: FAMILY MEDICINE CLINIC | Facility: CLINIC | Age: 55
End: 2020-05-19
Payer: COMMERCIAL

## 2020-05-19 VITALS — HEIGHT: 63 IN | WEIGHT: 120 LBS | TEMPERATURE: 97.7 F | BODY MASS INDEX: 21.26 KG/M2

## 2020-05-19 DIAGNOSIS — M32.9 SYSTEMIC LUPUS ERYTHEMATOSUS, UNSPECIFIED SLE TYPE, UNSPECIFIED ORGAN INVOLVEMENT STATUS (HCC): ICD-10-CM

## 2020-05-19 DIAGNOSIS — R11.2 INTRACTABLE NAUSEA AND VOMITING: Primary | ICD-10-CM

## 2020-05-19 DIAGNOSIS — N17.9 AKI (ACUTE KIDNEY INJURY) (HCC): ICD-10-CM

## 2020-05-19 DIAGNOSIS — C50.212 MALIGNANT NEOPLASM OF UPPER-INNER QUADRANT OF LEFT BREAST IN FEMALE, ESTROGEN RECEPTOR NEGATIVE (HCC): ICD-10-CM

## 2020-05-19 DIAGNOSIS — Z17.1 MALIGNANT NEOPLASM OF UPPER-INNER QUADRANT OF LEFT BREAST IN FEMALE, ESTROGEN RECEPTOR NEGATIVE (HCC): ICD-10-CM

## 2020-05-19 PROCEDURE — 99214 OFFICE O/P EST MOD 30 MIN: CPT | Performed by: FAMILY MEDICINE

## 2020-05-20 ENCOUNTER — OFFICE VISIT (OUTPATIENT)
Dept: HEMATOLOGY ONCOLOGY | Facility: CLINIC | Age: 55
End: 2020-05-20
Payer: COMMERCIAL

## 2020-05-20 VITALS
HEIGHT: 63 IN | RESPIRATION RATE: 18 BRPM | BODY MASS INDEX: 22.32 KG/M2 | HEART RATE: 135 BPM | TEMPERATURE: 98.9 F | WEIGHT: 126 LBS | DIASTOLIC BLOOD PRESSURE: 54 MMHG | SYSTOLIC BLOOD PRESSURE: 98 MMHG | OXYGEN SATURATION: 95 %

## 2020-05-20 DIAGNOSIS — D70.1 CHEMOTHERAPY INDUCED NEUTROPENIA (HCC): ICD-10-CM

## 2020-05-20 DIAGNOSIS — T45.1X5A CHEMOTHERAPY INDUCED NEUTROPENIA (HCC): ICD-10-CM

## 2020-05-20 DIAGNOSIS — G89.29 OTHER CHRONIC PAIN: ICD-10-CM

## 2020-05-20 DIAGNOSIS — Z17.1 MALIGNANT NEOPLASM OF UPPER-INNER QUADRANT OF LEFT BREAST IN FEMALE, ESTROGEN RECEPTOR NEGATIVE (HCC): Primary | ICD-10-CM

## 2020-05-20 DIAGNOSIS — C50.212 MALIGNANT NEOPLASM OF UPPER-INNER QUADRANT OF LEFT BREAST IN FEMALE, ESTROGEN RECEPTOR NEGATIVE (HCC): Primary | ICD-10-CM

## 2020-05-20 PROCEDURE — 1111F DSCHRG MED/CURRENT MED MERGE: CPT | Performed by: PHYSICIAN ASSISTANT

## 2020-05-20 PROCEDURE — 3074F SYST BP LT 130 MM HG: CPT | Performed by: PHYSICIAN ASSISTANT

## 2020-05-20 PROCEDURE — 99214 OFFICE O/P EST MOD 30 MIN: CPT | Performed by: PHYSICIAN ASSISTANT

## 2020-05-20 PROCEDURE — 3008F BODY MASS INDEX DOCD: CPT | Performed by: PHYSICIAN ASSISTANT

## 2020-05-20 PROCEDURE — 3078F DIAST BP <80 MM HG: CPT | Performed by: PHYSICIAN ASSISTANT

## 2020-05-21 ENCOUNTER — TELEPHONE (OUTPATIENT)
Dept: FAMILY MEDICINE CLINIC | Facility: CLINIC | Age: 55
End: 2020-05-21

## 2020-05-21 DIAGNOSIS — M32.9 SYSTEMIC LUPUS ERYTHEMATOSUS, UNSPECIFIED SLE TYPE, UNSPECIFIED ORGAN INVOLVEMENT STATUS (HCC): Primary | ICD-10-CM

## 2020-05-21 RX ORDER — HYDROCODONE BITARTRATE AND ACETAMINOPHEN 5; 325 MG/1; MG/1
1 TABLET ORAL EVERY 6 HOURS PRN
Qty: 120 TABLET | Refills: 0 | Status: SHIPPED | OUTPATIENT
Start: 2020-05-21 | End: 2020-05-26 | Stop reason: ALTCHOICE

## 2020-05-22 ENCOUNTER — TELEPHONE (OUTPATIENT)
Dept: FAMILY MEDICINE CLINIC | Facility: CLINIC | Age: 55
End: 2020-05-22

## 2020-05-22 DIAGNOSIS — C50.212 MALIGNANT NEOPLASM OF UPPER-INNER QUADRANT OF LEFT BREAST IN FEMALE, ESTROGEN RECEPTOR NEGATIVE (HCC): ICD-10-CM

## 2020-05-22 DIAGNOSIS — Z17.1 MALIGNANT NEOPLASM OF UPPER-INNER QUADRANT OF LEFT BREAST IN FEMALE, ESTROGEN RECEPTOR NEGATIVE (HCC): ICD-10-CM

## 2020-05-22 RX ORDER — OXYCODONE HYDROCHLORIDE 5 MG/1
5 TABLET ORAL EVERY 4 HOURS PRN
Qty: 30 TABLET | Refills: 0 | Status: SHIPPED | OUTPATIENT
Start: 2020-05-22 | End: 2020-05-26 | Stop reason: SDUPTHER

## 2020-05-22 RX ORDER — SODIUM CHLORIDE 9 MG/ML
20 INJECTION, SOLUTION INTRAVENOUS ONCE
Status: CANCELLED | OUTPATIENT
Start: 2020-06-02

## 2020-05-23 ENCOUNTER — DOCUMENTATION (OUTPATIENT)
Dept: HEMATOLOGY ONCOLOGY | Facility: MEDICAL CENTER | Age: 55
End: 2020-05-23

## 2020-05-23 ENCOUNTER — HOSPITAL ENCOUNTER (EMERGENCY)
Facility: HOSPITAL | Age: 55
Discharge: HOME | End: 2020-05-23
Attending: EMERGENCY MEDICINE | Admitting: EMERGENCY MEDICINE
Payer: COMMERCIAL

## 2020-05-23 ENCOUNTER — HOSPITAL ENCOUNTER (OUTPATIENT)
Dept: INFUSION CENTER | Facility: CLINIC | Age: 55
Discharge: HOME | End: 2020-05-23
Payer: COMMERCIAL

## 2020-05-23 VITALS — TEMPERATURE: 97.4 F

## 2020-05-23 VITALS
HEART RATE: 84 BPM | DIASTOLIC BLOOD PRESSURE: 81 MMHG | RESPIRATION RATE: 16 BRPM | SYSTOLIC BLOOD PRESSURE: 117 MMHG | OXYGEN SATURATION: 97 % | TEMPERATURE: 98.3 F

## 2020-05-23 DIAGNOSIS — T45.1X5A CHEMOTHERAPY INDUCED NEUTROPENIA (HCC): ICD-10-CM

## 2020-05-23 DIAGNOSIS — Z17.1 MALIGNANT NEOPLASM OF UPPER-INNER QUADRANT OF LEFT BREAST IN FEMALE, ESTROGEN RECEPTOR NEGATIVE (HCC): ICD-10-CM

## 2020-05-23 DIAGNOSIS — Z51.89 ENCOUNTER FOR BLOOD TRANSFUSION: ICD-10-CM

## 2020-05-23 DIAGNOSIS — Z95.828 PORT-A-CATH IN PLACE: Primary | ICD-10-CM

## 2020-05-23 DIAGNOSIS — D70.1 CHEMOTHERAPY INDUCED NEUTROPENIA (HCC): ICD-10-CM

## 2020-05-23 DIAGNOSIS — D64.9 ANEMIA: Primary | ICD-10-CM

## 2020-05-23 DIAGNOSIS — C50.212 MALIGNANT NEOPLASM OF UPPER-INNER QUADRANT OF LEFT BREAST IN FEMALE, ESTROGEN RECEPTOR NEGATIVE (HCC): ICD-10-CM

## 2020-05-23 LAB
ABO GROUP BLD: NORMAL
ABO GROUP BLD: NORMAL
ALBUMIN SERPL BCP-MCNC: 3.3 G/DL (ref 3.5–5)
ALP SERPL-CCNC: 78 U/L (ref 46–116)
ALT SERPL W P-5'-P-CCNC: 16 U/L (ref 12–78)
ANION GAP SERPL CALCULATED.3IONS-SCNC: 10 MMOL/L (ref 4–13)
AST SERPL W P-5'-P-CCNC: 17 U/L (ref 5–45)
BASOPHILS # BLD AUTO: 0.01 THOUSANDS/ΜL (ref 0–0.1)
BASOPHILS NFR BLD AUTO: 0 % (ref 0–1)
BILIRUB SERPL-MCNC: 0.15 MG/DL (ref 0.2–1)
BLD GP AB SCN SERPL QL: NEGATIVE
BUN SERPL-MCNC: 10 MG/DL (ref 5–25)
CALCIUM SERPL-MCNC: 8.6 MG/DL (ref 8.3–10.1)
CHLORIDE SERPL-SCNC: 98 MMOL/L (ref 100–108)
CO2 SERPL-SCNC: 24 MMOL/L (ref 21–32)
CREAT SERPL-MCNC: 1.15 MG/DL (ref 0.6–1.3)
EOSINOPHIL # BLD AUTO: 0 THOUSAND/ΜL (ref 0–0.61)
EOSINOPHIL NFR BLD AUTO: 0 % (ref 0–6)
ERYTHROCYTE [DISTWIDTH] IN BLOOD BY AUTOMATED COUNT: 16.1 % (ref 11.6–15.1)
GFR SERPL CREATININE-BSD FRML MDRD: 53 ML/MIN/1.73SQ M
GLUCOSE SERPL-MCNC: 104 MG/DL (ref 65–140)
HCT VFR BLD AUTO: 20.2 % (ref 34.8–46.1)
HGB BLD-MCNC: 6.6 G/DL (ref 11.5–15.4)
IMM GRANULOCYTES # BLD AUTO: 0.04 THOUSAND/UL (ref 0–0.2)
IMM GRANULOCYTES NFR BLD AUTO: 1 % (ref 0–2)
LYMPHOCYTES # BLD AUTO: 0.47 THOUSANDS/ΜL (ref 0.6–4.47)
LYMPHOCYTES NFR BLD AUTO: 6 % (ref 14–44)
MCH RBC QN AUTO: 33.7 PG (ref 26.8–34.3)
MCHC RBC AUTO-ENTMCNC: 32.7 G/DL (ref 31.4–37.4)
MCV RBC AUTO: 103 FL (ref 82–98)
MONOCYTES # BLD AUTO: 0.61 THOUSAND/ΜL (ref 0.17–1.22)
MONOCYTES NFR BLD AUTO: 8 % (ref 4–12)
NEUTROPHILS # BLD AUTO: 6.3 THOUSANDS/ΜL (ref 1.85–7.62)
NEUTS SEG NFR BLD AUTO: 85 % (ref 43–75)
NRBC BLD AUTO-RTO: 0 /100 WBCS
PLATELET # BLD AUTO: 40 THOUSANDS/UL (ref 149–390)
PMV BLD AUTO: 10.9 FL (ref 8.9–12.7)
POTASSIUM SERPL-SCNC: 4 MMOL/L (ref 3.5–5.3)
PROT SERPL-MCNC: 6.9 G/DL (ref 6.4–8.2)
RBC # BLD AUTO: 1.96 MILLION/UL (ref 3.81–5.12)
RH BLD: POSITIVE
RH BLD: POSITIVE
SODIUM SERPL-SCNC: 132 MMOL/L (ref 136–145)
SPECIMEN EXPIRATION DATE: NORMAL
WBC # BLD AUTO: 7.42 THOUSAND/UL (ref 4.31–10.16)

## 2020-05-23 PROCEDURE — 86901 BLOOD TYPING SEROLOGIC RH(D): CPT | Performed by: EMERGENCY MEDICINE

## 2020-05-23 PROCEDURE — 99283 EMERGENCY DEPT VISIT LOW MDM: CPT | Performed by: EMERGENCY MEDICINE

## 2020-05-23 PROCEDURE — 36430 TRANSFUSION BLD/BLD COMPNT: CPT

## 2020-05-23 PROCEDURE — 86850 RBC ANTIBODY SCREEN: CPT | Performed by: EMERGENCY MEDICINE

## 2020-05-23 PROCEDURE — 99283 EMERGENCY DEPT VISIT LOW MDM: CPT

## 2020-05-23 PROCEDURE — P9016 RBC LEUKOCYTES REDUCED: HCPCS

## 2020-05-23 PROCEDURE — 86920 COMPATIBILITY TEST SPIN: CPT

## 2020-05-23 PROCEDURE — 86900 BLOOD TYPING SEROLOGIC ABO: CPT | Performed by: EMERGENCY MEDICINE

## 2020-05-23 PROCEDURE — 85025 COMPLETE CBC W/AUTO DIFF WBC: CPT | Performed by: INTERNAL MEDICINE

## 2020-05-23 PROCEDURE — 80053 COMPREHEN METABOLIC PANEL: CPT | Performed by: INTERNAL MEDICINE

## 2020-05-23 PROCEDURE — 36415 COLL VENOUS BLD VENIPUNCTURE: CPT | Performed by: EMERGENCY MEDICINE

## 2020-05-23 RX ORDER — OXYCODONE HYDROCHLORIDE 5 MG/1
5 TABLET ORAL ONCE
Status: COMPLETED | OUTPATIENT
Start: 2020-05-23 | End: 2020-05-23

## 2020-05-23 RX ADMIN — OXYCODONE HYDROCHLORIDE 5 MG: 5 TABLET ORAL at 15:51

## 2020-05-24 LAB
ABO GROUP BLD BPU: NORMAL
BPU ID: NORMAL
CROSSMATCH: NORMAL
UNIT DISPENSE STATUS: NORMAL
UNIT PRODUCT CODE: NORMAL
UNIT RH: NORMAL

## 2020-05-26 ENCOUNTER — TELEPHONE (OUTPATIENT)
Dept: PALLIATIVE MEDICINE | Facility: CLINIC | Age: 55
End: 2020-05-26

## 2020-05-26 ENCOUNTER — HOSPITAL ENCOUNTER (OUTPATIENT)
Dept: INFUSION CENTER | Facility: CLINIC | Age: 55
Discharge: HOME | End: 2020-05-26

## 2020-05-26 ENCOUNTER — DOCUMENTATION (OUTPATIENT)
Dept: HEMATOLOGY ONCOLOGY | Facility: CLINIC | Age: 55
End: 2020-05-26

## 2020-05-26 ENCOUNTER — OFFICE VISIT (OUTPATIENT)
Dept: HEMATOLOGY ONCOLOGY | Facility: CLINIC | Age: 55
End: 2020-05-26
Payer: COMMERCIAL

## 2020-05-26 VITALS
OXYGEN SATURATION: 97 % | HEIGHT: 63 IN | DIASTOLIC BLOOD PRESSURE: 64 MMHG | WEIGHT: 125 LBS | BODY MASS INDEX: 22.15 KG/M2 | RESPIRATION RATE: 18 BRPM | HEART RATE: 121 BPM | TEMPERATURE: 99.6 F | SYSTOLIC BLOOD PRESSURE: 110 MMHG

## 2020-05-26 DIAGNOSIS — C50.212 MALIGNANT NEOPLASM OF UPPER-INNER QUADRANT OF LEFT BREAST IN FEMALE, ESTROGEN RECEPTOR NEGATIVE (HCC): ICD-10-CM

## 2020-05-26 DIAGNOSIS — Z17.1 MALIGNANT NEOPLASM OF UPPER-INNER QUADRANT OF LEFT BREAST IN FEMALE, ESTROGEN RECEPTOR NEGATIVE (HCC): ICD-10-CM

## 2020-05-26 DIAGNOSIS — Z17.1 MALIGNANT NEOPLASM OF UPPER-INNER QUADRANT OF LEFT BREAST IN FEMALE, ESTROGEN RECEPTOR NEGATIVE (HCC): Primary | ICD-10-CM

## 2020-05-26 DIAGNOSIS — T45.1X5A CHEMOTHERAPY INDUCED NEUTROPENIA (HCC): ICD-10-CM

## 2020-05-26 DIAGNOSIS — D70.1 CHEMOTHERAPY INDUCED NEUTROPENIA (HCC): ICD-10-CM

## 2020-05-26 DIAGNOSIS — C50.212 MALIGNANT NEOPLASM OF UPPER-INNER QUADRANT OF LEFT BREAST IN FEMALE, ESTROGEN RECEPTOR NEGATIVE (HCC): Primary | ICD-10-CM

## 2020-05-26 PROCEDURE — 1111F DSCHRG MED/CURRENT MED MERGE: CPT | Performed by: INTERNAL MEDICINE

## 2020-05-26 PROCEDURE — 3078F DIAST BP <80 MM HG: CPT | Performed by: INTERNAL MEDICINE

## 2020-05-26 PROCEDURE — 3008F BODY MASS INDEX DOCD: CPT | Performed by: INTERNAL MEDICINE

## 2020-05-26 PROCEDURE — 3074F SYST BP LT 130 MM HG: CPT | Performed by: INTERNAL MEDICINE

## 2020-05-26 PROCEDURE — 99215 OFFICE O/P EST HI 40 MIN: CPT | Performed by: INTERNAL MEDICINE

## 2020-05-26 RX ORDER — SODIUM CHLORIDE 9 MG/ML
20 INJECTION, SOLUTION INTRAVENOUS ONCE
Status: CANCELLED | OUTPATIENT
Start: 2020-07-07

## 2020-05-26 RX ORDER — SODIUM CHLORIDE 9 MG/ML
20 INJECTION, SOLUTION INTRAVENOUS ONCE
Status: CANCELLED | OUTPATIENT
Start: 2020-06-17

## 2020-05-26 RX ORDER — SODIUM CHLORIDE 9 MG/ML
20 INJECTION, SOLUTION INTRAVENOUS ONCE
Status: CANCELLED | OUTPATIENT
Start: 2020-06-25

## 2020-05-26 RX ORDER — SODIUM CHLORIDE 9 MG/ML
20 INJECTION, SOLUTION INTRAVENOUS ONCE
Status: CANCELLED | OUTPATIENT
Start: 2020-06-09

## 2020-05-26 RX ORDER — SODIUM CHLORIDE 9 MG/ML
20 INJECTION, SOLUTION INTRAVENOUS ONCE
Status: CANCELLED | OUTPATIENT
Start: 2020-07-01

## 2020-05-26 RX ORDER — OXYCODONE HYDROCHLORIDE 5 MG/1
5 TABLET ORAL EVERY 4 HOURS PRN
Qty: 120 TABLET | Refills: 0 | Status: SHIPPED | OUTPATIENT
Start: 2020-05-26 | End: 2020-06-11 | Stop reason: SDUPTHER

## 2020-05-26 RX ORDER — SODIUM CHLORIDE 9 MG/ML
20 INJECTION, SOLUTION INTRAVENOUS ONCE
Status: CANCELLED | OUTPATIENT
Start: 2020-06-02

## 2020-05-27 ENCOUNTER — OFFICE VISIT (OUTPATIENT)
Dept: SURGICAL ONCOLOGY | Facility: CLINIC | Age: 55
End: 2020-05-27
Payer: COMMERCIAL

## 2020-05-27 VITALS
HEIGHT: 63 IN | BODY MASS INDEX: 21.97 KG/M2 | RESPIRATION RATE: 14 BRPM | WEIGHT: 124 LBS | HEART RATE: 112 BPM | SYSTOLIC BLOOD PRESSURE: 100 MMHG | DIASTOLIC BLOOD PRESSURE: 58 MMHG | TEMPERATURE: 98 F

## 2020-05-27 DIAGNOSIS — Z17.1 MALIGNANT NEOPLASM OF UPPER-INNER QUADRANT OF LEFT BREAST IN FEMALE, ESTROGEN RECEPTOR NEGATIVE (HCC): Primary | ICD-10-CM

## 2020-05-27 DIAGNOSIS — C50.212 MALIGNANT NEOPLASM OF UPPER-INNER QUADRANT OF LEFT BREAST IN FEMALE, ESTROGEN RECEPTOR NEGATIVE (HCC): Primary | ICD-10-CM

## 2020-05-27 PROCEDURE — 99214 OFFICE O/P EST MOD 30 MIN: CPT | Performed by: SURGERY

## 2020-05-27 PROCEDURE — 3078F DIAST BP <80 MM HG: CPT | Performed by: SURGERY

## 2020-05-27 PROCEDURE — 1111F DSCHRG MED/CURRENT MED MERGE: CPT | Performed by: SURGERY

## 2020-05-27 PROCEDURE — 3074F SYST BP LT 130 MM HG: CPT | Performed by: SURGERY

## 2020-05-27 PROCEDURE — 3008F BODY MASS INDEX DOCD: CPT | Performed by: SURGERY

## 2020-05-29 ENCOUNTER — TELEPHONE (OUTPATIENT)
Dept: HEMATOLOGY ONCOLOGY | Facility: CLINIC | Age: 55
End: 2020-05-29

## 2020-06-01 ENCOUNTER — HOSPITAL ENCOUNTER (OUTPATIENT)
Dept: INFUSION CENTER | Facility: CLINIC | Age: 55
Discharge: HOME | End: 2020-06-01
Payer: COMMERCIAL

## 2020-06-01 VITALS — TEMPERATURE: 98 F

## 2020-06-01 DIAGNOSIS — Z95.828 PORT-A-CATH IN PLACE: ICD-10-CM

## 2020-06-01 DIAGNOSIS — T45.1X5A CHEMOTHERAPY INDUCED NEUTROPENIA (HCC): Primary | ICD-10-CM

## 2020-06-01 DIAGNOSIS — D70.1 CHEMOTHERAPY INDUCED NEUTROPENIA (HCC): Primary | ICD-10-CM

## 2020-06-01 DIAGNOSIS — C50.212 MALIGNANT NEOPLASM OF UPPER-INNER QUADRANT OF LEFT BREAST IN FEMALE, ESTROGEN RECEPTOR NEGATIVE (HCC): ICD-10-CM

## 2020-06-01 DIAGNOSIS — Z17.1 MALIGNANT NEOPLASM OF UPPER-INNER QUADRANT OF LEFT BREAST IN FEMALE, ESTROGEN RECEPTOR NEGATIVE (HCC): ICD-10-CM

## 2020-06-01 LAB
ALBUMIN SERPL BCP-MCNC: 3.4 G/DL (ref 3.5–5)
ALP SERPL-CCNC: 89 U/L (ref 46–116)
ALT SERPL W P-5'-P-CCNC: 15 U/L (ref 12–78)
ANION GAP SERPL CALCULATED.3IONS-SCNC: 10 MMOL/L (ref 4–13)
AST SERPL W P-5'-P-CCNC: 16 U/L (ref 5–45)
BASOPHILS # BLD AUTO: 0 THOUSANDS/ΜL (ref 0–0.1)
BASOPHILS NFR BLD AUTO: 0 % (ref 0–1)
BILIRUB SERPL-MCNC: 0.11 MG/DL (ref 0.2–1)
BUN SERPL-MCNC: 8 MG/DL (ref 5–25)
CALCIUM SERPL-MCNC: 8.4 MG/DL (ref 8.3–10.1)
CHLORIDE SERPL-SCNC: 103 MMOL/L (ref 100–108)
CO2 SERPL-SCNC: 24 MMOL/L (ref 21–32)
CREAT SERPL-MCNC: 0.76 MG/DL (ref 0.6–1.3)
EOSINOPHIL # BLD AUTO: 0 THOUSAND/ΜL (ref 0–0.61)
EOSINOPHIL NFR BLD AUTO: 0 % (ref 0–6)
ERYTHROCYTE [DISTWIDTH] IN BLOOD BY AUTOMATED COUNT: 18.6 % (ref 11.6–15.1)
GFR SERPL CREATININE-BSD FRML MDRD: 88 ML/MIN/1.73SQ M
GLUCOSE SERPL-MCNC: 106 MG/DL (ref 65–140)
HCT VFR BLD AUTO: 28.9 % (ref 34.8–46.1)
HGB BLD-MCNC: 9 G/DL (ref 11.5–15.4)
IMM GRANULOCYTES # BLD AUTO: 0.12 THOUSAND/UL (ref 0–0.2)
IMM GRANULOCYTES NFR BLD AUTO: 2 % (ref 0–2)
LYMPHOCYTES # BLD AUTO: 0.63 THOUSANDS/ΜL (ref 0.6–4.47)
LYMPHOCYTES NFR BLD AUTO: 8 % (ref 14–44)
MCH RBC QN AUTO: 33.5 PG (ref 26.8–34.3)
MCHC RBC AUTO-ENTMCNC: 31.1 G/DL (ref 31.4–37.4)
MCV RBC AUTO: 107 FL (ref 82–98)
MONOCYTES # BLD AUTO: 0.47 THOUSAND/ΜL (ref 0.17–1.22)
MONOCYTES NFR BLD AUTO: 6 % (ref 4–12)
NEUTROPHILS # BLD AUTO: 6.24 THOUSANDS/ΜL (ref 1.85–7.62)
NEUTS SEG NFR BLD AUTO: 84 % (ref 43–75)
NRBC BLD AUTO-RTO: 0 /100 WBCS
PLATELET # BLD AUTO: 460 THOUSANDS/UL (ref 149–390)
PMV BLD AUTO: 8.8 FL (ref 8.9–12.7)
POTASSIUM SERPL-SCNC: 4.4 MMOL/L (ref 3.5–5.3)
PROT SERPL-MCNC: 6.9 G/DL (ref 6.4–8.2)
RBC # BLD AUTO: 2.69 MILLION/UL (ref 3.81–5.12)
SODIUM SERPL-SCNC: 137 MMOL/L (ref 136–145)
WBC # BLD AUTO: 7.46 THOUSAND/UL (ref 4.31–10.16)

## 2020-06-01 PROCEDURE — 85025 COMPLETE CBC W/AUTO DIFF WBC: CPT | Performed by: INTERNAL MEDICINE

## 2020-06-01 PROCEDURE — 80053 COMPREHEN METABOLIC PANEL: CPT | Performed by: INTERNAL MEDICINE

## 2020-06-03 ENCOUNTER — HOSPITAL ENCOUNTER (OUTPATIENT)
Dept: INFUSION CENTER | Facility: CLINIC | Age: 55
Discharge: HOME | End: 2020-06-03
Payer: COMMERCIAL

## 2020-06-03 VITALS
HEIGHT: 63 IN | TEMPERATURE: 99 F | RESPIRATION RATE: 18 BRPM | BODY MASS INDEX: 22.73 KG/M2 | SYSTOLIC BLOOD PRESSURE: 113 MMHG | HEART RATE: 106 BPM | DIASTOLIC BLOOD PRESSURE: 74 MMHG | WEIGHT: 128.31 LBS

## 2020-06-03 DIAGNOSIS — D70.1 CHEMOTHERAPY INDUCED NEUTROPENIA (HCC): Primary | ICD-10-CM

## 2020-06-03 DIAGNOSIS — T45.1X5A CHEMOTHERAPY INDUCED NEUTROPENIA (HCC): Primary | ICD-10-CM

## 2020-06-03 DIAGNOSIS — Z17.1 MALIGNANT NEOPLASM OF UPPER-INNER QUADRANT OF LEFT BREAST IN FEMALE, ESTROGEN RECEPTOR NEGATIVE (HCC): ICD-10-CM

## 2020-06-03 DIAGNOSIS — C50.212 MALIGNANT NEOPLASM OF UPPER-INNER QUADRANT OF LEFT BREAST IN FEMALE, ESTROGEN RECEPTOR NEGATIVE (HCC): ICD-10-CM

## 2020-06-03 PROCEDURE — 96417 CHEMO IV INFUS EACH ADDL SEQ: CPT

## 2020-06-03 PROCEDURE — 96413 CHEMO IV INFUSION 1 HR: CPT

## 2020-06-03 PROCEDURE — 96367 TX/PROPH/DG ADDL SEQ IV INF: CPT

## 2020-06-03 RX ORDER — SODIUM CHLORIDE 9 MG/ML
20 INJECTION, SOLUTION INTRAVENOUS ONCE
Status: COMPLETED | OUTPATIENT
Start: 2020-06-03 | End: 2020-06-03

## 2020-06-03 RX ADMIN — PACLITAXEL 127.2 MG: 6 INJECTION, SOLUTION, CONCENTRATE INTRAVENOUS at 13:18

## 2020-06-03 RX ADMIN — FAMOTIDINE 20 MG: 10 INJECTION INTRAVENOUS at 11:30

## 2020-06-03 RX ADMIN — SODIUM CHLORIDE 20 ML/HR: 0.9 INJECTION, SOLUTION INTRAVENOUS at 10:23

## 2020-06-03 RX ADMIN — PERTUZUMAB 420 MG: 30 INJECTION, SOLUTION, CONCENTRATE INTRAVENOUS at 11:58

## 2020-06-03 RX ADMIN — TRASTUZUMAB 340 MG: 150 INJECTION, POWDER, LYOPHILIZED, FOR SOLUTION INTRAVENOUS at 12:38

## 2020-06-03 RX ADMIN — DEXAMETHASONE SODIUM PHOSPHATE 10 MG: 10 INJECTION, SOLUTION INTRAMUSCULAR; INTRAVENOUS at 10:25

## 2020-06-03 RX ADMIN — DIPHENHYDRAMINE HYDROCHLORIDE 25 MG: 50 INJECTION, SOLUTION INTRAMUSCULAR; INTRAVENOUS at 10:46

## 2020-06-04 ENCOUNTER — TELEPHONE (OUTPATIENT)
Dept: HEMATOLOGY ONCOLOGY | Facility: CLINIC | Age: 55
End: 2020-06-04

## 2020-06-05 ENCOUNTER — CLINICAL SUPPORT (OUTPATIENT)
Dept: RADIATION ONCOLOGY | Facility: HOSPITAL | Age: 55
End: 2020-06-05
Attending: RADIOLOGY
Payer: COMMERCIAL

## 2020-06-05 VITALS
SYSTOLIC BLOOD PRESSURE: 118 MMHG | BODY MASS INDEX: 22.64 KG/M2 | RESPIRATION RATE: 18 BRPM | TEMPERATURE: 98 F | OXYGEN SATURATION: 97 % | HEART RATE: 119 BPM | DIASTOLIC BLOOD PRESSURE: 76 MMHG | WEIGHT: 129.4 LBS

## 2020-06-05 DIAGNOSIS — C50.212 MALIGNANT NEOPLASM OF UPPER-INNER QUADRANT OF LEFT BREAST IN FEMALE, ESTROGEN RECEPTOR NEGATIVE (HCC): Primary | ICD-10-CM

## 2020-06-05 DIAGNOSIS — C50.212 MALIGNANT NEOPLASM OF UPPER-INNER QUADRANT OF LEFT BREAST IN FEMALE, ESTROGEN RECEPTOR NEGATIVE (HCC): ICD-10-CM

## 2020-06-05 DIAGNOSIS — Z17.1 MALIGNANT NEOPLASM OF UPPER-INNER QUADRANT OF LEFT BREAST IN FEMALE, ESTROGEN RECEPTOR NEGATIVE (HCC): ICD-10-CM

## 2020-06-05 DIAGNOSIS — Z17.1 MALIGNANT NEOPLASM OF UPPER-INNER QUADRANT OF LEFT BREAST IN FEMALE, ESTROGEN RECEPTOR NEGATIVE (HCC): Primary | ICD-10-CM

## 2020-06-05 PROCEDURE — 99211 OFF/OP EST MAY X REQ PHY/QHP: CPT | Performed by: RADIOLOGY

## 2020-06-08 ENCOUNTER — HOSPITAL ENCOUNTER (OUTPATIENT)
Dept: INFUSION CENTER | Facility: CLINIC | Age: 55
Discharge: HOME | End: 2020-06-08
Payer: COMMERCIAL

## 2020-06-08 ENCOUNTER — APPOINTMENT (OUTPATIENT)
Dept: RADIATION ONCOLOGY | Facility: HOSPITAL | Age: 55
End: 2020-06-08
Payer: COMMERCIAL

## 2020-06-08 VITALS — TEMPERATURE: 97.9 F

## 2020-06-08 DIAGNOSIS — Z95.828 PORT-A-CATH IN PLACE: ICD-10-CM

## 2020-06-08 DIAGNOSIS — D70.1 CHEMOTHERAPY INDUCED NEUTROPENIA (HCC): Primary | ICD-10-CM

## 2020-06-08 DIAGNOSIS — T45.1X5A CHEMOTHERAPY INDUCED NEUTROPENIA (HCC): Primary | ICD-10-CM

## 2020-06-08 DIAGNOSIS — Z17.1 MALIGNANT NEOPLASM OF UPPER-INNER QUADRANT OF LEFT BREAST IN FEMALE, ESTROGEN RECEPTOR NEGATIVE (HCC): ICD-10-CM

## 2020-06-08 DIAGNOSIS — C50.212 MALIGNANT NEOPLASM OF UPPER-INNER QUADRANT OF LEFT BREAST IN FEMALE, ESTROGEN RECEPTOR NEGATIVE (HCC): ICD-10-CM

## 2020-06-08 LAB
BASOPHILS # BLD AUTO: 0.01 THOUSANDS/ΜL (ref 0–0.1)
BASOPHILS NFR BLD AUTO: 0 % (ref 0–1)
EOSINOPHIL # BLD AUTO: 0.01 THOUSAND/ΜL (ref 0–0.61)
EOSINOPHIL NFR BLD AUTO: 0 % (ref 0–6)
ERYTHROCYTE [DISTWIDTH] IN BLOOD BY AUTOMATED COUNT: 19 % (ref 11.6–15.1)
HCT VFR BLD AUTO: 30.3 % (ref 34.8–46.1)
HGB BLD-MCNC: 9.5 G/DL (ref 11.5–15.4)
IMM GRANULOCYTES # BLD AUTO: 0.05 THOUSAND/UL (ref 0–0.2)
IMM GRANULOCYTES NFR BLD AUTO: 1 % (ref 0–2)
LYMPHOCYTES # BLD AUTO: 0.58 THOUSANDS/ΜL (ref 0.6–4.47)
LYMPHOCYTES NFR BLD AUTO: 7 % (ref 14–44)
MCH RBC QN AUTO: 33.9 PG (ref 26.8–34.3)
MCHC RBC AUTO-ENTMCNC: 31.4 G/DL (ref 31.4–37.4)
MCV RBC AUTO: 108 FL (ref 82–98)
MONOCYTES # BLD AUTO: 0.5 THOUSAND/ΜL (ref 0.17–1.22)
MONOCYTES NFR BLD AUTO: 6 % (ref 4–12)
NEUTROPHILS # BLD AUTO: 7.62 THOUSANDS/ΜL (ref 1.85–7.62)
NEUTS SEG NFR BLD AUTO: 86 % (ref 43–75)
NRBC BLD AUTO-RTO: 0 /100 WBCS
PLATELET # BLD AUTO: 469 THOUSANDS/UL (ref 149–390)
PMV BLD AUTO: 9 FL (ref 8.9–12.7)
RBC # BLD AUTO: 2.8 MILLION/UL (ref 3.81–5.12)
WBC # BLD AUTO: 8.77 THOUSAND/UL (ref 4.31–10.16)

## 2020-06-08 PROCEDURE — 85025 COMPLETE CBC W/AUTO DIFF WBC: CPT | Performed by: INTERNAL MEDICINE

## 2020-06-09 ENCOUNTER — APPOINTMENT (OUTPATIENT)
Dept: RADIATION ONCOLOGY | Facility: HOSPITAL | Age: 55
End: 2020-06-09
Payer: COMMERCIAL

## 2020-06-10 ENCOUNTER — HOSPITAL ENCOUNTER (OUTPATIENT)
Dept: INFUSION CENTER | Facility: HOSPITAL | Age: 55
Discharge: HOME | End: 2020-06-10
Attending: INTERNAL MEDICINE
Payer: COMMERCIAL

## 2020-06-10 VITALS
SYSTOLIC BLOOD PRESSURE: 120 MMHG | HEIGHT: 63 IN | TEMPERATURE: 96.5 F | WEIGHT: 130.07 LBS | RESPIRATION RATE: 16 BRPM | HEART RATE: 96 BPM | DIASTOLIC BLOOD PRESSURE: 76 MMHG | BODY MASS INDEX: 23.05 KG/M2

## 2020-06-10 DIAGNOSIS — T45.1X5A CHEMOTHERAPY INDUCED NEUTROPENIA (HCC): Primary | ICD-10-CM

## 2020-06-10 DIAGNOSIS — D70.1 CHEMOTHERAPY INDUCED NEUTROPENIA (HCC): Primary | ICD-10-CM

## 2020-06-10 DIAGNOSIS — Z17.1 MALIGNANT NEOPLASM OF UPPER-INNER QUADRANT OF LEFT BREAST IN FEMALE, ESTROGEN RECEPTOR NEGATIVE (HCC): ICD-10-CM

## 2020-06-10 DIAGNOSIS — C50.212 MALIGNANT NEOPLASM OF UPPER-INNER QUADRANT OF LEFT BREAST IN FEMALE, ESTROGEN RECEPTOR NEGATIVE (HCC): ICD-10-CM

## 2020-06-10 LAB
ALBUMIN SERPL BCP-MCNC: 3.1 G/DL (ref 3.5–5)
ALP SERPL-CCNC: 83 U/L (ref 46–116)
ALT SERPL W P-5'-P-CCNC: 14 U/L (ref 12–78)
ANION GAP SERPL CALCULATED.3IONS-SCNC: 6 MMOL/L (ref 4–13)
AST SERPL W P-5'-P-CCNC: 20 U/L (ref 5–45)
BILIRUB SERPL-MCNC: <0.1 MG/DL (ref 0.2–1)
BUN SERPL-MCNC: 14 MG/DL (ref 5–25)
CALCIUM SERPL-MCNC: 9 MG/DL (ref 8.3–10.1)
CHLORIDE SERPL-SCNC: 108 MMOL/L (ref 100–108)
CO2 SERPL-SCNC: 23 MMOL/L (ref 21–32)
CREAT SERPL-MCNC: 0.68 MG/DL (ref 0.6–1.3)
GFR SERPL CREATININE-BSD FRML MDRD: 98 ML/MIN/1.73SQ M
GLUCOSE SERPL-MCNC: 90 MG/DL (ref 65–140)
POTASSIUM SERPL-SCNC: 4 MMOL/L (ref 3.5–5.3)
PROT SERPL-MCNC: 6.9 G/DL (ref 6.4–8.2)
SODIUM SERPL-SCNC: 137 MMOL/L (ref 136–145)

## 2020-06-10 PROCEDURE — 80053 COMPREHEN METABOLIC PANEL: CPT | Performed by: INTERNAL MEDICINE

## 2020-06-10 PROCEDURE — 96367 TX/PROPH/DG ADDL SEQ IV INF: CPT

## 2020-06-10 PROCEDURE — 96413 CHEMO IV INFUSION 1 HR: CPT

## 2020-06-10 RX ORDER — SODIUM CHLORIDE 9 MG/ML
20 INJECTION, SOLUTION INTRAVENOUS ONCE
Status: COMPLETED | OUTPATIENT
Start: 2020-06-10 | End: 2020-06-10

## 2020-06-10 RX ADMIN — DIPHENHYDRAMINE HYDROCHLORIDE 25 MG: 50 INJECTION, SOLUTION INTRAMUSCULAR; INTRAVENOUS at 09:09

## 2020-06-10 RX ADMIN — PACLITAXEL 127.2 MG: 6 INJECTION, SOLUTION, CONCENTRATE INTRAVENOUS at 10:11

## 2020-06-10 RX ADMIN — DEXAMETHASONE SODIUM PHOSPHATE 10 MG: 10 INJECTION, SOLUTION INTRAMUSCULAR; INTRAVENOUS at 08:49

## 2020-06-10 RX ADMIN — SODIUM CHLORIDE 20 ML/HR: 0.9 INJECTION, SOLUTION INTRAVENOUS at 08:49

## 2020-06-10 RX ADMIN — FAMOTIDINE 20 MG: 10 INJECTION INTRAVENOUS at 09:37

## 2020-06-11 ENCOUNTER — DOCUMENTATION (OUTPATIENT)
Dept: SURGICAL ONCOLOGY | Facility: CLINIC | Age: 55
End: 2020-06-11

## 2020-06-11 DIAGNOSIS — Z17.1 MALIGNANT NEOPLASM OF UPPER-INNER QUADRANT OF LEFT BREAST IN FEMALE, ESTROGEN RECEPTOR NEGATIVE (HCC): ICD-10-CM

## 2020-06-11 DIAGNOSIS — F41.9 ANXIETY: ICD-10-CM

## 2020-06-11 DIAGNOSIS — C50.212 MALIGNANT NEOPLASM OF UPPER-INNER QUADRANT OF LEFT BREAST IN FEMALE, ESTROGEN RECEPTOR NEGATIVE (HCC): ICD-10-CM

## 2020-06-11 RX ORDER — ALPRAZOLAM 1 MG/1
TABLET ORAL
Qty: 90 TABLET | Refills: 1 | Status: SHIPPED | OUTPATIENT
Start: 2020-06-11 | End: 2020-06-15

## 2020-06-11 RX ORDER — OXYCODONE HYDROCHLORIDE 5 MG/1
5-10 TABLET ORAL EVERY 4 HOURS PRN
Qty: 180 TABLET | Refills: 0 | Status: SHIPPED | OUTPATIENT
Start: 2020-06-11 | End: 2020-07-08 | Stop reason: SDUPTHER

## 2020-06-14 DIAGNOSIS — F41.9 ANXIETY: ICD-10-CM

## 2020-06-15 ENCOUNTER — TELEPHONE (OUTPATIENT)
Dept: PALLIATIVE MEDICINE | Facility: CLINIC | Age: 55
End: 2020-06-15

## 2020-06-15 ENCOUNTER — HOSPITAL ENCOUNTER (OUTPATIENT)
Dept: INFUSION CENTER | Facility: CLINIC | Age: 55
Discharge: HOME | End: 2020-06-15
Payer: COMMERCIAL

## 2020-06-15 VITALS — TEMPERATURE: 97.8 F

## 2020-06-15 DIAGNOSIS — Z17.1 MALIGNANT NEOPLASM OF UPPER-INNER QUADRANT OF LEFT BREAST IN FEMALE, ESTROGEN RECEPTOR NEGATIVE (HCC): ICD-10-CM

## 2020-06-15 DIAGNOSIS — Z95.828 PORT-A-CATH IN PLACE: Primary | ICD-10-CM

## 2020-06-15 DIAGNOSIS — T45.1X5A CHEMOTHERAPY INDUCED NEUTROPENIA (HCC): ICD-10-CM

## 2020-06-15 DIAGNOSIS — D70.1 CHEMOTHERAPY INDUCED NEUTROPENIA (HCC): ICD-10-CM

## 2020-06-15 DIAGNOSIS — C50.212 MALIGNANT NEOPLASM OF UPPER-INNER QUADRANT OF LEFT BREAST IN FEMALE, ESTROGEN RECEPTOR NEGATIVE (HCC): ICD-10-CM

## 2020-06-15 LAB
BASOPHILS # BLD AUTO: 0.02 THOUSANDS/ΜL (ref 0–0.1)
BASOPHILS NFR BLD AUTO: 0 % (ref 0–1)
EOSINOPHIL # BLD AUTO: 0 THOUSAND/ΜL (ref 0–0.61)
EOSINOPHIL NFR BLD AUTO: 0 % (ref 0–6)
ERYTHROCYTE [DISTWIDTH] IN BLOOD BY AUTOMATED COUNT: 18.6 % (ref 11.6–15.1)
HCT VFR BLD AUTO: 30.5 % (ref 34.8–46.1)
HGB BLD-MCNC: 9.6 G/DL (ref 11.5–15.4)
IMM GRANULOCYTES # BLD AUTO: 0.09 THOUSAND/UL (ref 0–0.2)
IMM GRANULOCYTES NFR BLD AUTO: 1 % (ref 0–2)
LYMPHOCYTES # BLD AUTO: 0.46 THOUSANDS/ΜL (ref 0.6–4.47)
LYMPHOCYTES NFR BLD AUTO: 5 % (ref 14–44)
MCH RBC QN AUTO: 33.9 PG (ref 26.8–34.3)
MCHC RBC AUTO-ENTMCNC: 31.5 G/DL (ref 31.4–37.4)
MCV RBC AUTO: 108 FL (ref 82–98)
MONOCYTES # BLD AUTO: 0.37 THOUSAND/ΜL (ref 0.17–1.22)
MONOCYTES NFR BLD AUTO: 4 % (ref 4–12)
NEUTROPHILS # BLD AUTO: 9.04 THOUSANDS/ΜL (ref 1.85–7.62)
NEUTS SEG NFR BLD AUTO: 90 % (ref 43–75)
NRBC BLD AUTO-RTO: 0 /100 WBCS
PLATELET # BLD AUTO: 341 THOUSANDS/UL (ref 149–390)
PMV BLD AUTO: 9.4 FL (ref 8.9–12.7)
RBC # BLD AUTO: 2.83 MILLION/UL (ref 3.81–5.12)
WBC # BLD AUTO: 9.98 THOUSAND/UL (ref 4.31–10.16)

## 2020-06-15 PROCEDURE — 85025 COMPLETE CBC W/AUTO DIFF WBC: CPT | Performed by: INTERNAL MEDICINE

## 2020-06-15 RX ORDER — ALPRAZOLAM 1 MG/1
TABLET ORAL
Qty: 90 TABLET | Refills: 1 | Status: SHIPPED | OUTPATIENT
Start: 2020-06-15 | End: 2020-08-03 | Stop reason: SDUPTHER

## 2020-06-16 ENCOUNTER — HOSPITAL ENCOUNTER (OUTPATIENT)
Dept: INFUSION CENTER | Facility: CLINIC | Age: 55
End: 2020-06-16

## 2020-06-16 ENCOUNTER — OFFICE VISIT (OUTPATIENT)
Dept: PALLIATIVE MEDICINE | Facility: CLINIC | Age: 55
End: 2020-06-16
Payer: COMMERCIAL

## 2020-06-16 VITALS
WEIGHT: 130.5 LBS | BODY MASS INDEX: 23.12 KG/M2 | DIASTOLIC BLOOD PRESSURE: 68 MMHG | OXYGEN SATURATION: 95 % | HEART RATE: 101 BPM | SYSTOLIC BLOOD PRESSURE: 100 MMHG | RESPIRATION RATE: 16 BRPM | TEMPERATURE: 98.2 F | HEIGHT: 63 IN

## 2020-06-16 DIAGNOSIS — R11.2 INTRACTABLE NAUSEA AND VOMITING: ICD-10-CM

## 2020-06-16 PROCEDURE — 3078F DIAST BP <80 MM HG: CPT | Performed by: FAMILY MEDICINE

## 2020-06-16 PROCEDURE — 1111F DSCHRG MED/CURRENT MED MERGE: CPT | Performed by: FAMILY MEDICINE

## 2020-06-16 PROCEDURE — 3074F SYST BP LT 130 MM HG: CPT | Performed by: FAMILY MEDICINE

## 2020-06-16 PROCEDURE — 99214 OFFICE O/P EST MOD 30 MIN: CPT | Performed by: FAMILY MEDICINE

## 2020-06-16 RX ORDER — DEXAMETHASONE 1 MG
1 TABLET ORAL
Qty: 30 TABLET | Refills: 0 | Status: SHIPPED | OUTPATIENT
Start: 2020-06-16 | End: 2020-08-03 | Stop reason: SDUPTHER

## 2020-06-17 ENCOUNTER — HOSPITAL ENCOUNTER (OUTPATIENT)
Dept: INFUSION CENTER | Facility: HOSPITAL | Age: 55
Discharge: HOME | End: 2020-06-17
Attending: INTERNAL MEDICINE
Payer: COMMERCIAL

## 2020-06-17 VITALS
HEIGHT: 63 IN | HEART RATE: 122 BPM | RESPIRATION RATE: 18 BRPM | BODY MASS INDEX: 23.55 KG/M2 | SYSTOLIC BLOOD PRESSURE: 126 MMHG | WEIGHT: 132.94 LBS | DIASTOLIC BLOOD PRESSURE: 65 MMHG | TEMPERATURE: 97.5 F

## 2020-06-17 DIAGNOSIS — C50.212 MALIGNANT NEOPLASM OF UPPER-INNER QUADRANT OF LEFT BREAST IN FEMALE, ESTROGEN RECEPTOR NEGATIVE (HCC): ICD-10-CM

## 2020-06-17 DIAGNOSIS — T45.1X5A CHEMOTHERAPY INDUCED NEUTROPENIA (HCC): Primary | ICD-10-CM

## 2020-06-17 DIAGNOSIS — Z17.1 MALIGNANT NEOPLASM OF UPPER-INNER QUADRANT OF LEFT BREAST IN FEMALE, ESTROGEN RECEPTOR NEGATIVE (HCC): ICD-10-CM

## 2020-06-17 DIAGNOSIS — D70.1 CHEMOTHERAPY INDUCED NEUTROPENIA (HCC): Primary | ICD-10-CM

## 2020-06-17 PROCEDURE — 96413 CHEMO IV INFUSION 1 HR: CPT

## 2020-06-17 PROCEDURE — 96367 TX/PROPH/DG ADDL SEQ IV INF: CPT

## 2020-06-17 RX ORDER — SODIUM CHLORIDE 9 MG/ML
20 INJECTION, SOLUTION INTRAVENOUS ONCE
Status: COMPLETED | OUTPATIENT
Start: 2020-06-17 | End: 2020-06-17

## 2020-06-17 RX ADMIN — SODIUM CHLORIDE 20 ML/HR: 0.9 INJECTION, SOLUTION INTRAVENOUS at 08:50

## 2020-06-17 RX ADMIN — PACLITAXEL 127.2 MG: 6 INJECTION, SOLUTION, CONCENTRATE INTRAVENOUS at 10:16

## 2020-06-17 RX ADMIN — FAMOTIDINE 20 MG: 10 INJECTION, SOLUTION INTRAVENOUS at 09:47

## 2020-06-17 RX ADMIN — DEXAMETHASONE SODIUM PHOSPHATE 10 MG: 10 INJECTION, SOLUTION INTRAMUSCULAR; INTRAVENOUS at 09:02

## 2020-06-17 RX ADMIN — DIPHENHYDRAMINE HYDROCHLORIDE 25 MG: 50 INJECTION, SOLUTION INTRAMUSCULAR; INTRAVENOUS at 09:23

## 2020-06-20 DIAGNOSIS — F41.9 ANXIETY: ICD-10-CM

## 2020-06-22 ENCOUNTER — HOSPITAL ENCOUNTER (OUTPATIENT)
Dept: INFUSION CENTER | Facility: CLINIC | Age: 55
Discharge: HOME | End: 2020-06-22
Payer: COMMERCIAL

## 2020-06-22 VITALS — TEMPERATURE: 95.6 F

## 2020-06-22 DIAGNOSIS — Z17.1 MALIGNANT NEOPLASM OF UPPER-INNER QUADRANT OF LEFT BREAST IN FEMALE, ESTROGEN RECEPTOR NEGATIVE (HCC): ICD-10-CM

## 2020-06-22 DIAGNOSIS — C50.212 MALIGNANT NEOPLASM OF UPPER-INNER QUADRANT OF LEFT BREAST IN FEMALE, ESTROGEN RECEPTOR NEGATIVE (HCC): ICD-10-CM

## 2020-06-22 DIAGNOSIS — Z95.828 PORT-A-CATH IN PLACE: Primary | ICD-10-CM

## 2020-06-22 DIAGNOSIS — T45.1X5A CHEMOTHERAPY INDUCED NEUTROPENIA (HCC): ICD-10-CM

## 2020-06-22 DIAGNOSIS — D70.1 CHEMOTHERAPY INDUCED NEUTROPENIA (HCC): ICD-10-CM

## 2020-06-22 LAB
ALBUMIN SERPL BCP-MCNC: 3.1 G/DL (ref 3.5–5)
ALP SERPL-CCNC: 77 U/L (ref 46–116)
ALT SERPL W P-5'-P-CCNC: 16 U/L (ref 12–78)
ANION GAP SERPL CALCULATED.3IONS-SCNC: 6 MMOL/L (ref 4–13)
AST SERPL W P-5'-P-CCNC: 15 U/L (ref 5–45)
BASOPHILS # BLD AUTO: 0.02 THOUSANDS/ΜL (ref 0–0.1)
BASOPHILS NFR BLD AUTO: 0 % (ref 0–1)
BILIRUB SERPL-MCNC: 0.47 MG/DL (ref 0.2–1)
BUN SERPL-MCNC: 14 MG/DL (ref 5–25)
CALCIUM SERPL-MCNC: 8.9 MG/DL (ref 8.3–10.1)
CHLORIDE SERPL-SCNC: 99 MMOL/L (ref 100–108)
CO2 SERPL-SCNC: 27 MMOL/L (ref 21–32)
CREAT SERPL-MCNC: 1.07 MG/DL (ref 0.6–1.3)
EOSINOPHIL # BLD AUTO: 0.01 THOUSAND/ΜL (ref 0–0.61)
EOSINOPHIL NFR BLD AUTO: 0 % (ref 0–6)
ERYTHROCYTE [DISTWIDTH] IN BLOOD BY AUTOMATED COUNT: 18 % (ref 11.6–15.1)
GFR SERPL CREATININE-BSD FRML MDRD: 58 ML/MIN/1.73SQ M
GLUCOSE SERPL-MCNC: 100 MG/DL (ref 65–140)
HCT VFR BLD AUTO: 30.7 % (ref 34.8–46.1)
HGB BLD-MCNC: 9.7 G/DL (ref 11.5–15.4)
IMM GRANULOCYTES # BLD AUTO: 0.08 THOUSAND/UL (ref 0–0.2)
IMM GRANULOCYTES NFR BLD AUTO: 1 % (ref 0–2)
LYMPHOCYTES # BLD AUTO: 0.66 THOUSANDS/ΜL (ref 0.6–4.47)
LYMPHOCYTES NFR BLD AUTO: 10 % (ref 14–44)
MCH RBC QN AUTO: 33.6 PG (ref 26.8–34.3)
MCHC RBC AUTO-ENTMCNC: 31.6 G/DL (ref 31.4–37.4)
MCV RBC AUTO: 106 FL (ref 82–98)
MONOCYTES # BLD AUTO: 0.47 THOUSAND/ΜL (ref 0.17–1.22)
MONOCYTES NFR BLD AUTO: 7 % (ref 4–12)
NEUTROPHILS # BLD AUTO: 5.1 THOUSANDS/ΜL (ref 1.85–7.62)
NEUTS SEG NFR BLD AUTO: 82 % (ref 43–75)
NRBC BLD AUTO-RTO: 0 /100 WBCS
PLATELET # BLD AUTO: 395 THOUSANDS/UL (ref 149–390)
PMV BLD AUTO: 9.7 FL (ref 8.9–12.7)
POTASSIUM SERPL-SCNC: 4.4 MMOL/L (ref 3.5–5.3)
PROT SERPL-MCNC: 7.3 G/DL (ref 6.4–8.2)
RBC # BLD AUTO: 2.89 MILLION/UL (ref 3.81–5.12)
SODIUM SERPL-SCNC: 132 MMOL/L (ref 136–145)
WBC # BLD AUTO: 6.34 THOUSAND/UL (ref 4.31–10.16)

## 2020-06-22 PROCEDURE — 80053 COMPREHEN METABOLIC PANEL: CPT | Performed by: INTERNAL MEDICINE

## 2020-06-22 PROCEDURE — 85025 COMPLETE CBC W/AUTO DIFF WBC: CPT | Performed by: INTERNAL MEDICINE

## 2020-06-22 RX ORDER — SERTRALINE HYDROCHLORIDE 100 MG/1
TABLET, FILM COATED ORAL
Qty: 45 TABLET | Refills: 5 | Status: SHIPPED | OUTPATIENT
Start: 2020-06-22 | End: 2020-06-23

## 2020-06-23 ENCOUNTER — TELEPHONE (OUTPATIENT)
Dept: HEMATOLOGY ONCOLOGY | Facility: CLINIC | Age: 55
End: 2020-06-23

## 2020-06-23 DIAGNOSIS — F41.9 ANXIETY: ICD-10-CM

## 2020-06-23 RX ORDER — SERTRALINE HYDROCHLORIDE 100 MG/1
TABLET, FILM COATED ORAL
Qty: 45 TABLET | Refills: 5 | Status: SHIPPED | OUTPATIENT
Start: 2020-06-23 | End: 2021-04-23

## 2020-06-25 ENCOUNTER — DOCUMENTATION (OUTPATIENT)
Dept: HEMATOLOGY ONCOLOGY | Facility: CLINIC | Age: 55
End: 2020-06-25

## 2020-06-25 ENCOUNTER — HOSPITAL ENCOUNTER (OUTPATIENT)
Dept: INFUSION CENTER | Facility: CLINIC | Age: 55
Discharge: HOME | End: 2020-06-25
Payer: COMMERCIAL

## 2020-06-25 VITALS
HEART RATE: 102 BPM | RESPIRATION RATE: 18 BRPM | WEIGHT: 127 LBS | HEIGHT: 63 IN | SYSTOLIC BLOOD PRESSURE: 108 MMHG | OXYGEN SATURATION: 98 % | TEMPERATURE: 97.1 F | DIASTOLIC BLOOD PRESSURE: 68 MMHG | BODY MASS INDEX: 22.5 KG/M2

## 2020-06-25 DIAGNOSIS — R19.7 DIARRHEA, UNSPECIFIED TYPE: Primary | ICD-10-CM

## 2020-06-25 DIAGNOSIS — T45.1X5A CHEMOTHERAPY INDUCED NEUTROPENIA (HCC): Primary | ICD-10-CM

## 2020-06-25 DIAGNOSIS — C50.212 MALIGNANT NEOPLASM OF UPPER-INNER QUADRANT OF LEFT BREAST IN FEMALE, ESTROGEN RECEPTOR NEGATIVE (HCC): ICD-10-CM

## 2020-06-25 DIAGNOSIS — D70.1 CHEMOTHERAPY INDUCED NEUTROPENIA (HCC): Primary | ICD-10-CM

## 2020-06-25 DIAGNOSIS — Z17.1 MALIGNANT NEOPLASM OF UPPER-INNER QUADRANT OF LEFT BREAST IN FEMALE, ESTROGEN RECEPTOR NEGATIVE (HCC): ICD-10-CM

## 2020-06-25 PROCEDURE — 96413 CHEMO IV INFUSION 1 HR: CPT

## 2020-06-25 PROCEDURE — 96417 CHEMO IV INFUS EACH ADDL SEQ: CPT

## 2020-06-25 PROCEDURE — 96367 TX/PROPH/DG ADDL SEQ IV INF: CPT

## 2020-06-25 RX ORDER — DIPHENOXYLATE HYDROCHLORIDE AND ATROPINE SULFATE 2.5; .025 MG/1; MG/1
1 TABLET ORAL 4 TIMES DAILY PRN
Qty: 30 TABLET | Refills: 0 | Status: SHIPPED | OUTPATIENT
Start: 2020-06-25 | End: 2020-07-15 | Stop reason: HOSPADM

## 2020-06-25 RX ORDER — SODIUM CHLORIDE 9 MG/ML
20 INJECTION, SOLUTION INTRAVENOUS ONCE
Status: COMPLETED | OUTPATIENT
Start: 2020-06-25 | End: 2020-06-25

## 2020-06-25 RX ADMIN — PACLITAXEL 127.2 MG: 6 INJECTION, SOLUTION, CONCENTRATE INTRAVENOUS at 11:36

## 2020-06-25 RX ADMIN — PERTUZUMAB 420 MG: 30 INJECTION, SOLUTION, CONCENTRATE INTRAVENOUS at 10:21

## 2020-06-25 RX ADMIN — TRASTUZUMAB 340 MG: 150 INJECTION, POWDER, LYOPHILIZED, FOR SOLUTION INTRAVENOUS at 11:00

## 2020-06-25 RX ADMIN — FAMOTIDINE 20 MG: 10 INJECTION INTRAVENOUS at 09:49

## 2020-06-25 RX ADMIN — SODIUM CHLORIDE 20 ML/HR: 0.9 INJECTION, SOLUTION INTRAVENOUS at 09:02

## 2020-06-25 RX ADMIN — DIPHENHYDRAMINE HYDROCHLORIDE 25 MG: 50 INJECTION, SOLUTION INTRAMUSCULAR; INTRAVENOUS at 09:27

## 2020-06-25 RX ADMIN — DEXAMETHASONE SODIUM PHOSPHATE 10 MG: 10 INJECTION, SOLUTION INTRAMUSCULAR; INTRAVENOUS at 09:03

## 2020-06-29 ENCOUNTER — HOSPITAL ENCOUNTER (OUTPATIENT)
Dept: INFUSION CENTER | Facility: CLINIC | Age: 55
Discharge: HOME | End: 2020-06-29
Payer: COMMERCIAL

## 2020-06-29 ENCOUNTER — HOSPITAL ENCOUNTER (OUTPATIENT)
Dept: RADIOLOGY | Facility: HOSPITAL | Age: 55
Discharge: HOME | End: 2020-06-29
Attending: SURGERY
Payer: COMMERCIAL

## 2020-06-29 VITALS — TEMPERATURE: 97.6 F

## 2020-06-29 DIAGNOSIS — C50.212 MALIGNANT NEOPLASM OF UPPER-INNER QUADRANT OF LEFT BREAST IN FEMALE, ESTROGEN RECEPTOR NEGATIVE (HCC): ICD-10-CM

## 2020-06-29 DIAGNOSIS — D70.1 CHEMOTHERAPY INDUCED NEUTROPENIA (HCC): ICD-10-CM

## 2020-06-29 DIAGNOSIS — Z17.1 MALIGNANT NEOPLASM OF UPPER-INNER QUADRANT OF LEFT BREAST IN FEMALE, ESTROGEN RECEPTOR NEGATIVE (HCC): ICD-10-CM

## 2020-06-29 DIAGNOSIS — T45.1X5A CHEMOTHERAPY INDUCED NEUTROPENIA (HCC): ICD-10-CM

## 2020-06-29 LAB
BASOPHILS # BLD AUTO: 0.04 THOUSANDS/ΜL (ref 0–0.1)
BASOPHILS NFR BLD AUTO: 1 % (ref 0–1)
EOSINOPHIL # BLD AUTO: 0.04 THOUSAND/ΜL (ref 0–0.61)
EOSINOPHIL NFR BLD AUTO: 1 % (ref 0–6)
ERYTHROCYTE [DISTWIDTH] IN BLOOD BY AUTOMATED COUNT: 17.1 % (ref 11.6–15.1)
HCT VFR BLD AUTO: 34.1 % (ref 34.8–46.1)
HGB BLD-MCNC: 10.8 G/DL (ref 11.5–15.4)
IMM GRANULOCYTES # BLD AUTO: 0.09 THOUSAND/UL (ref 0–0.2)
IMM GRANULOCYTES NFR BLD AUTO: 1 % (ref 0–2)
LYMPHOCYTES # BLD AUTO: 1.63 THOUSANDS/ΜL (ref 0.6–4.47)
LYMPHOCYTES NFR BLD AUTO: 21 % (ref 14–44)
MCH RBC QN AUTO: 33 PG (ref 26.8–34.3)
MCHC RBC AUTO-ENTMCNC: 31.7 G/DL (ref 31.4–37.4)
MCV RBC AUTO: 104 FL (ref 82–98)
MONOCYTES # BLD AUTO: 0.59 THOUSAND/ΜL (ref 0.17–1.22)
MONOCYTES NFR BLD AUTO: 8 % (ref 4–12)
NEUTROPHILS # BLD AUTO: 5.4 THOUSANDS/ΜL (ref 1.85–7.62)
NEUTS SEG NFR BLD AUTO: 68 % (ref 43–75)
NRBC BLD AUTO-RTO: 0 /100 WBCS
PLATELET # BLD AUTO: 435 THOUSANDS/UL (ref 149–390)
PMV BLD AUTO: 9.9 FL (ref 8.9–12.7)
RBC # BLD AUTO: 3.27 MILLION/UL (ref 3.81–5.12)
WBC # BLD AUTO: 7.79 THOUSAND/UL (ref 4.31–10.16)

## 2020-06-29 PROCEDURE — 85025 COMPLETE CBC W/AUTO DIFF WBC: CPT | Performed by: INTERNAL MEDICINE

## 2020-06-29 PROCEDURE — 77049 MRI BREAST C-+ W/CAD BI: CPT

## 2020-06-29 PROCEDURE — C8908 MRI W/O FOL W/CONT, BREAST,: HCPCS

## 2020-06-29 PROCEDURE — A9585 GADOBUTROL INJECTION: HCPCS | Performed by: SURGERY

## 2020-06-29 RX ADMIN — GADOBUTROL 6 ML: 604.72 INJECTION INTRAVENOUS at 14:11

## 2020-06-29 NOTE — PROGRESS NOTES
Pt offers no complaints, labs drawn via pac for treatment at \[facility]\"" wed, apts confirmed

## 2020-06-30 ENCOUNTER — OFFICE VISIT (OUTPATIENT)
Dept: HEMATOLOGY ONCOLOGY | Facility: CLINIC | Age: 55
End: 2020-06-30
Payer: COMMERCIAL

## 2020-06-30 VITALS
TEMPERATURE: 99 F | BODY MASS INDEX: 21.97 KG/M2 | OXYGEN SATURATION: 98 % | SYSTOLIC BLOOD PRESSURE: 132 MMHG | WEIGHT: 124 LBS | DIASTOLIC BLOOD PRESSURE: 68 MMHG | HEIGHT: 63 IN | HEART RATE: 126 BPM | RESPIRATION RATE: 18 BRPM

## 2020-06-30 DIAGNOSIS — D70.1 CHEMOTHERAPY INDUCED NEUTROPENIA (HCC): ICD-10-CM

## 2020-06-30 DIAGNOSIS — C50.212 MALIGNANT NEOPLASM OF UPPER-INNER QUADRANT OF LEFT BREAST IN FEMALE, ESTROGEN RECEPTOR NEGATIVE (HCC): Primary | ICD-10-CM

## 2020-06-30 DIAGNOSIS — Z17.1 MALIGNANT NEOPLASM OF UPPER-INNER QUADRANT OF LEFT BREAST IN FEMALE, ESTROGEN RECEPTOR NEGATIVE (HCC): Primary | ICD-10-CM

## 2020-06-30 DIAGNOSIS — T45.1X5A CHEMOTHERAPY INDUCED NEUTROPENIA (HCC): ICD-10-CM

## 2020-06-30 PROCEDURE — 3075F SYST BP GE 130 - 139MM HG: CPT | Performed by: INTERNAL MEDICINE

## 2020-06-30 PROCEDURE — 3078F DIAST BP <80 MM HG: CPT | Performed by: INTERNAL MEDICINE

## 2020-06-30 PROCEDURE — 1111F DSCHRG MED/CURRENT MED MERGE: CPT | Performed by: INTERNAL MEDICINE

## 2020-06-30 PROCEDURE — 99214 OFFICE O/P EST MOD 30 MIN: CPT | Performed by: INTERNAL MEDICINE

## 2020-06-30 PROCEDURE — 3008F BODY MASS INDEX DOCD: CPT | Performed by: INTERNAL MEDICINE

## 2020-06-30 RX ORDER — SODIUM CHLORIDE 9 MG/ML
20 INJECTION, SOLUTION INTRAVENOUS ONCE
Status: CANCELLED | OUTPATIENT
Start: 2020-10-19

## 2020-06-30 RX ORDER — SODIUM CHLORIDE 9 MG/ML
20 INJECTION, SOLUTION INTRAVENOUS ONCE
Status: CANCELLED | OUTPATIENT
Start: 2020-11-30

## 2020-06-30 RX ORDER — SODIUM CHLORIDE 9 MG/ML
20 INJECTION, SOLUTION INTRAVENOUS ONCE
Status: CANCELLED | OUTPATIENT
Start: 2020-11-09

## 2020-07-01 ENCOUNTER — HOSPITAL ENCOUNTER (OUTPATIENT)
Dept: INFUSION CENTER | Facility: HOSPITAL | Age: 55
Discharge: HOME | End: 2020-07-01
Attending: INTERNAL MEDICINE
Payer: COMMERCIAL

## 2020-07-01 VITALS
RESPIRATION RATE: 18 BRPM | WEIGHT: 125.66 LBS | SYSTOLIC BLOOD PRESSURE: 100 MMHG | DIASTOLIC BLOOD PRESSURE: 57 MMHG | HEART RATE: 122 BPM | HEIGHT: 63 IN | BODY MASS INDEX: 22.27 KG/M2 | TEMPERATURE: 97.7 F

## 2020-07-01 DIAGNOSIS — D70.1 CHEMOTHERAPY INDUCED NEUTROPENIA (HCC): Primary | ICD-10-CM

## 2020-07-01 DIAGNOSIS — T45.1X5A CHEMOTHERAPY INDUCED NEUTROPENIA (HCC): Primary | ICD-10-CM

## 2020-07-01 DIAGNOSIS — Z17.1 MALIGNANT NEOPLASM OF UPPER-INNER QUADRANT OF LEFT BREAST IN FEMALE, ESTROGEN RECEPTOR NEGATIVE (HCC): ICD-10-CM

## 2020-07-01 DIAGNOSIS — C50.212 MALIGNANT NEOPLASM OF UPPER-INNER QUADRANT OF LEFT BREAST IN FEMALE, ESTROGEN RECEPTOR NEGATIVE (HCC): ICD-10-CM

## 2020-07-01 PROCEDURE — 96367 TX/PROPH/DG ADDL SEQ IV INF: CPT

## 2020-07-01 PROCEDURE — 96413 CHEMO IV INFUSION 1 HR: CPT

## 2020-07-01 RX ORDER — SODIUM CHLORIDE 9 MG/ML
20 INJECTION, SOLUTION INTRAVENOUS ONCE
Status: COMPLETED | OUTPATIENT
Start: 2020-07-01 | End: 2020-07-01

## 2020-07-01 RX ADMIN — SODIUM CHLORIDE 20 ML/HR: 0.9 INJECTION, SOLUTION INTRAVENOUS at 09:31

## 2020-07-01 RX ADMIN — PACLITAXEL 127.2 MG: 6 INJECTION, SOLUTION, CONCENTRATE INTRAVENOUS at 10:53

## 2020-07-01 RX ADMIN — DIPHENHYDRAMINE HYDROCHLORIDE 25 MG: 50 INJECTION, SOLUTION INTRAMUSCULAR; INTRAVENOUS at 09:56

## 2020-07-01 RX ADMIN — DEXAMETHASONE SODIUM PHOSPHATE 10 MG: 10 INJECTION, SOLUTION INTRAMUSCULAR; INTRAVENOUS at 09:30

## 2020-07-01 RX ADMIN — FAMOTIDINE 20 MG: 10 INJECTION INTRAVENOUS at 10:17

## 2020-07-01 NOTE — PLAN OF CARE
Problem: Potential for Falls  Goal: Patient will remain free of falls  Description  INTERVENTIONS:  - Assess patient frequently for physical needs  -  Identify cognitive and physical deficits and behaviors that affect risk of falls    -  [place] fall precautions as indicated by assessment   - Educate patient/family on patient safety including physical limitations  - Instruct patient to call for assistance with activity based on assessment  - Modify environment to reduce risk of injury  - Consider OT/PT consult to assist with strengthening/mobility  Outcome: Progressing

## 2020-07-06 ENCOUNTER — HOSPITAL ENCOUNTER (OUTPATIENT)
Dept: INFUSION CENTER | Facility: CLINIC | Age: 55
Discharge: HOME | End: 2020-07-06
Payer: COMMERCIAL

## 2020-07-06 VITALS — TEMPERATURE: 96.6 F

## 2020-07-06 DIAGNOSIS — D70.1 CHEMOTHERAPY INDUCED NEUTROPENIA (HCC): Primary | ICD-10-CM

## 2020-07-06 DIAGNOSIS — Z95.828 PORT-A-CATH IN PLACE: ICD-10-CM

## 2020-07-06 DIAGNOSIS — T45.1X5A CHEMOTHERAPY INDUCED NEUTROPENIA (HCC): Primary | ICD-10-CM

## 2020-07-06 DIAGNOSIS — Z17.1 MALIGNANT NEOPLASM OF UPPER-INNER QUADRANT OF LEFT BREAST IN FEMALE, ESTROGEN RECEPTOR NEGATIVE (HCC): ICD-10-CM

## 2020-07-06 DIAGNOSIS — C50.212 MALIGNANT NEOPLASM OF UPPER-INNER QUADRANT OF LEFT BREAST IN FEMALE, ESTROGEN RECEPTOR NEGATIVE (HCC): ICD-10-CM

## 2020-07-06 LAB
BASOPHILS # BLD AUTO: 0.03 THOUSANDS/ΜL (ref 0–0.1)
BASOPHILS NFR BLD AUTO: 0 % (ref 0–1)
EOSINOPHIL # BLD AUTO: 0.02 THOUSAND/ΜL (ref 0–0.61)
EOSINOPHIL NFR BLD AUTO: 0 % (ref 0–6)
ERYTHROCYTE [DISTWIDTH] IN BLOOD BY AUTOMATED COUNT: 17.6 % (ref 11.6–15.1)
HCT VFR BLD AUTO: 30.9 % (ref 34.8–46.1)
HGB BLD-MCNC: 9.8 G/DL (ref 11.5–15.4)
IMM GRANULOCYTES # BLD AUTO: 0.04 THOUSAND/UL (ref 0–0.2)
IMM GRANULOCYTES NFR BLD AUTO: 0 % (ref 0–2)
LYMPHOCYTES # BLD AUTO: 1.19 THOUSANDS/ΜL (ref 0.6–4.47)
LYMPHOCYTES NFR BLD AUTO: 13 % (ref 14–44)
MCH RBC QN AUTO: 33.3 PG (ref 26.8–34.3)
MCHC RBC AUTO-ENTMCNC: 31.7 G/DL (ref 31.4–37.4)
MCV RBC AUTO: 105 FL (ref 82–98)
MONOCYTES # BLD AUTO: 0.48 THOUSAND/ΜL (ref 0.17–1.22)
MONOCYTES NFR BLD AUTO: 5 % (ref 4–12)
NEUTROPHILS # BLD AUTO: 7.61 THOUSANDS/ΜL (ref 1.85–7.62)
NEUTS SEG NFR BLD AUTO: 82 % (ref 43–75)
NRBC BLD AUTO-RTO: 0 /100 WBCS
PLATELET # BLD AUTO: 359 THOUSANDS/UL (ref 149–390)
PMV BLD AUTO: 10 FL (ref 8.9–12.7)
RBC # BLD AUTO: 2.94 MILLION/UL (ref 3.81–5.12)
WBC # BLD AUTO: 9.37 THOUSAND/UL (ref 4.31–10.16)

## 2020-07-06 PROCEDURE — 85025 COMPLETE CBC W/AUTO DIFF WBC: CPT | Performed by: INTERNAL MEDICINE

## 2020-07-06 NOTE — PROGRESS NOTES
Patient arrived for central line blood work  Offers no complaints  Blood work drawn with port access  Port flushed per protocol   Patient verified upcoming appointment and declined AVS

## 2020-07-07 ENCOUNTER — HOSPITAL ENCOUNTER (OUTPATIENT)
Dept: INFUSION CENTER | Facility: CLINIC | Age: 55
Discharge: HOME | End: 2020-07-07
Payer: COMMERCIAL

## 2020-07-07 VITALS
BODY MASS INDEX: 22.41 KG/M2 | HEIGHT: 63 IN | DIASTOLIC BLOOD PRESSURE: 70 MMHG | RESPIRATION RATE: 18 BRPM | HEART RATE: 114 BPM | WEIGHT: 126.5 LBS | SYSTOLIC BLOOD PRESSURE: 114 MMHG | TEMPERATURE: 97 F

## 2020-07-07 DIAGNOSIS — D70.1 CHEMOTHERAPY INDUCED NEUTROPENIA (HCC): Primary | ICD-10-CM

## 2020-07-07 DIAGNOSIS — T45.1X5A CHEMOTHERAPY INDUCED NEUTROPENIA (HCC): Primary | ICD-10-CM

## 2020-07-07 DIAGNOSIS — C50.212 MALIGNANT NEOPLASM OF UPPER-INNER QUADRANT OF LEFT BREAST IN FEMALE, ESTROGEN RECEPTOR NEGATIVE (HCC): ICD-10-CM

## 2020-07-07 DIAGNOSIS — Z17.1 MALIGNANT NEOPLASM OF UPPER-INNER QUADRANT OF LEFT BREAST IN FEMALE, ESTROGEN RECEPTOR NEGATIVE (HCC): ICD-10-CM

## 2020-07-07 PROBLEM — C50.919 BREAST CANCER (HCC): Status: RESOLVED | Noted: 2020-05-08 | Resolved: 2020-07-07

## 2020-07-07 PROCEDURE — 96367 TX/PROPH/DG ADDL SEQ IV INF: CPT

## 2020-07-07 PROCEDURE — 96413 CHEMO IV INFUSION 1 HR: CPT

## 2020-07-07 RX ORDER — SODIUM CHLORIDE 9 MG/ML
20 INJECTION, SOLUTION INTRAVENOUS ONCE
Status: COMPLETED | OUTPATIENT
Start: 2020-07-07 | End: 2020-07-07

## 2020-07-07 RX ADMIN — DIPHENHYDRAMINE HYDROCHLORIDE 25 MG: 50 INJECTION, SOLUTION INTRAMUSCULAR; INTRAVENOUS at 09:21

## 2020-07-07 RX ADMIN — PACLITAXEL 127.2 MG: 6 INJECTION, SOLUTION, CONCENTRATE INTRAVENOUS at 10:12

## 2020-07-07 RX ADMIN — DEXAMETHASONE SODIUM PHOSPHATE 10 MG: 10 INJECTION, SOLUTION INTRAMUSCULAR; INTRAVENOUS at 08:58

## 2020-07-07 RX ADMIN — FAMOTIDINE 20 MG: 10 INJECTION INTRAVENOUS at 09:42

## 2020-07-07 RX ADMIN — SODIUM CHLORIDE 20 ML/HR: 0.9 INJECTION, SOLUTION INTRAVENOUS at 08:58

## 2020-07-07 NOTE — PROGRESS NOTES
Pt tolerated treatment well without any adverse reactions  Pt does not have any future infusion appointments at this time, pt will be getting surgery and then f/u with Dr Deepika Mccurdy for possible infusions after surgery    Declines AVS

## 2020-07-07 NOTE — PROGRESS NOTES
Pt here for D15 chemotherapy, offers no complaints, resting comfortably  Vitals stable  Labs reviewed from 7/6-within parameters for treatment    Call bell in reach, will continue to monitor

## 2020-07-08 ENCOUNTER — OFFICE VISIT (OUTPATIENT)
Dept: SURGICAL ONCOLOGY | Facility: CLINIC | Age: 55
End: 2020-07-08
Payer: COMMERCIAL

## 2020-07-08 ENCOUNTER — TELEPHONE (OUTPATIENT)
Dept: PALLIATIVE MEDICINE | Facility: CLINIC | Age: 55
End: 2020-07-08

## 2020-07-08 VITALS
WEIGHT: 125 LBS | HEART RATE: 112 BPM | HEIGHT: 60 IN | RESPIRATION RATE: 16 BRPM | TEMPERATURE: 98.2 F | SYSTOLIC BLOOD PRESSURE: 112 MMHG | DIASTOLIC BLOOD PRESSURE: 70 MMHG | BODY MASS INDEX: 24.54 KG/M2

## 2020-07-08 DIAGNOSIS — C50.212 MALIGNANT NEOPLASM OF UPPER-INNER QUADRANT OF LEFT BREAST IN FEMALE, ESTROGEN RECEPTOR NEGATIVE (HCC): ICD-10-CM

## 2020-07-08 DIAGNOSIS — Z17.1 MALIGNANT NEOPLASM OF UPPER-INNER QUADRANT OF LEFT BREAST IN FEMALE, ESTROGEN RECEPTOR NEGATIVE (HCC): ICD-10-CM

## 2020-07-08 DIAGNOSIS — Z01.818 PREOP EXAMINATION: Primary | ICD-10-CM

## 2020-07-08 PROCEDURE — 3074F SYST BP LT 130 MM HG: CPT | Performed by: SURGERY

## 2020-07-08 PROCEDURE — 3008F BODY MASS INDEX DOCD: CPT | Performed by: SURGERY

## 2020-07-08 PROCEDURE — 99214 OFFICE O/P EST MOD 30 MIN: CPT | Performed by: SURGERY

## 2020-07-08 PROCEDURE — 3078F DIAST BP <80 MM HG: CPT | Performed by: SURGERY

## 2020-07-08 PROCEDURE — 1111F DSCHRG MED/CURRENT MED MERGE: CPT | Performed by: SURGERY

## 2020-07-08 RX ORDER — OXYCODONE HYDROCHLORIDE 5 MG/1
5-10 TABLET ORAL EVERY 4 HOURS PRN
Qty: 180 TABLET | Refills: 0 | Status: SHIPPED | OUTPATIENT
Start: 2020-07-08 | End: 2020-08-03 | Stop reason: SDUPTHER

## 2020-07-08 NOTE — PATIENT INSTRUCTIONS
Pre-Surgery Instructions:   Medication Instructions    ALPRAZolam (XANAX) 1 mg tablet Take morning of surgery IF needed    Cholecalciferol (VITAMIN D3) 42615 units CAPS Stop taking 1 week prior to surgery         cyclobenzaprine (FLEXERIL) 10 mg tablet Stop taking 1 days prior to surgery    dexamethasone (DECADRON) 1 mg tablet Stop taking 1 days prior to surgery    diphenoxylate-atropine (LOMOTIL) 2 5-0 025 mg per tablet Stop taking 1 days prior to surgery    hydroxychloroquine (PLAQUENIL) 200 mg tablet Stop taking 1 days prior to surgery    levothyroxine 88 mcg tablet Take morning of surgery    loperamide (IMODIUM) 2 mg capsule Stop taking 1 days prior to surgery    moexipril (UNIVASC) 15 MG tablet Stop taking 1 days prior to surgery    Nutritional Supplements (OSTEOPOROSIS SUPPORT PO) Stop taking 1 week prior to surgery    ondansetron (ZOFRAN) 4 mg tablet Stop taking 1 days prior to surgery    oxyCODONE (ROXICODONE) 5 mg immediate release tablet Stop taking 1 days prior to surgery    potassium chloride (K-DUR,KLOR-CON) 20 mEq tablet Stop taking 1 week prior to surgery    predniSONE 1 mg tablet Stop taking 1 days prior to surgery    prochlorperazine (COMPAZINE) 5 mg tablet Stop taking 1 days prior to surgery    sertraline (ZOLOFT) 100 mg tablet Take morning of surgery IF this is when you would normally take it               Mastectomy   AMBULATORY CARE:   What you need to know about a mastectomy:  A mastectomy is surgery to remove all or part of your breast  Tissue, lymph nodes, or muscle near the breast may also be removed  A mastectomy is done to treat breast cancer and prevent cancer from spreading  A mastectomy can also be done to prevent breast cancer  This may be a choice if you are at high risk for breast cancer  The type of mastectomy you need may depend on the size of the tumor  It may also depend on if the cancer has spread     How to prepare for a mastectomy:  Your healthcare provider will talk to you about how to prepare for surgery  He may tell you not to eat or drink anything after midnight on the day of your surgery  He will tell you what medicines to take or not take on the day of your surgery  You may need to stop taking aspirin or blood thinners several days before surgery  Arrange for someone to drive you home and stay with you after surgery  This person can help care for you and watch for complications from surgery  What will happen during a mastectomy:   · You will be given general anesthesia to keep you asleep and free from pain during surgery  You may be given an antibiotic through your IV to help prevent a bacterial infection  Your healthcare provider will make an incision over your breast  He will remove the tumor and breast tissue  He may also remove muscle from behind your breast  If lymph nodes will be taken, a small incision will be made in your armpit  The lymph nodes will be removed and tested for cancer  · One or more drains may be inserted near your incision  A drain removes extra fluid and helps your incision heal  Your healthcare provider will close your incision with stitches and cover it with a bandage  He may also wrap a tight-fitting bandage around both of your breasts  This may decrease swelling, bleeding, and pain  What will happen after a mastectomy:  Healthcare providers will monitor you until you are awake  You may need to spend 1 to 2 nights in the hospital  You may have difficulty moving your arm closest to your mastectomy  This should get better in a few days  Get out of bed and walk when your healthcare provider says it is safe  This will help prevent blood clots  Risks of a mastectomy:  You may bleed more than expected or get an infection  Nerves, blood vessels, and muscles may be damaged during your surgery  Blood or fluid may collect under your skin  You may need other procedures to remove the fluid or blood   You may have swelling in your arm closest to the mastectomy or where lymph nodes were removed  This swelling is called lymphedema  Lymphedema may cause tingling, numbness, stiffness, and weakness in your arm  This may be permanent  You may get a blood clot in your arm or leg  The blood clot may travel to your heart, lungs, or brain  This may become life-threatening  Call 911 for any of the following:   · You feel lightheaded, short of breath, and have chest pain  · You cough up blood  · You have trouble breathing  Seek care immediately if:   · Blood soaks through your bandage  · Your stitches come apart  · Your bruise suddenly gets bigger  · Your leg or arm is larger than normal and painful  Contact your healthcare provider if:   · You have a fever or chills  · Your wound is red, swollen, or draining pus  · You have nausea or are vomiting  · Your skin is itchy, swollen, or you have a rash  · Your pain does not get better after you take pain medicine  · Your drain falls out or stops draining fluid  · Your drain has pus or foul-smelling fluid coming out of it  · You have numbness, tingling, or swelling in your arm or hand  · You feel very sad or anxious  · You have trouble coping with your condition  · You have questions or concerns about your condition or care  Medicines: You may need any of the following:  · Antibiotics  help prevent a bacterial infection  · Prescription pain medicine  may be given  Ask your healthcare provider how to take this medicine safely  Some prescription pain medicines contain acetaminophen  Do not take other medicines that contain acetaminophen without talking to your healthcare provider  Too much acetaminophen may cause liver damage  Prescription pain medicine may cause constipation  Ask your healthcare provider how to prevent or treat constipation  · NSAIDs , such as ibuprofen, help decrease swelling, pain, and fever   NSAIDs can cause stomach bleeding or kidney problems in certain people  If you take blood thinner medicine, always ask your healthcare provider if NSAIDs are safe for you  Always read the medicine label and follow directions  · Take your medicine as directed  Contact your healthcare provider if you think your medicine is not helping or if you have side effects  Tell him or her if you are allergic to any medicine  Keep a list of the medicines, vitamins, and herbs you take  Include the amounts, and when and why you take them  Bring the list or the pill bottles to follow-up visits  Carry your medicine list with you in case of an emergency  Care for your wound as directed: If you have a tight-fitting bandage, you can remove it in 24 to 48 hours, or as directed  Ask your healthcare provider when your incision can get wet  You may need to take a sponge bath until your drain is removed  Carefully wash around the incision with soap and water  It is okay to allow the soap and water to gently run over your incision  Gently pat dry the area and put on new, clean bandages as directed  Change your bandages when they get wet or dirty  If lymph nodes were removed from your armpit, ask your healthcare provider when you can wear deodorant  Check your incision every day for redness, pus, or swelling  Self-care:   · Apply ice  on your incision for 15 to 20 minutes every hour or as directed  Use an ice pack, or put crushed ice in a plastic bag  Cover it with a towel  Ice helps prevent tissue damage and decreases swelling and pain  · Elevate  your arm nearest to your incision above the level of your heart  Do this as often as you can  This will help decrease swelling and pain  Prop your arm on pillows or blankets to keep it elevated comfortably  · Rest  as directed  Do not lift anything heavier than 5 pounds  Do not push or pull with your arms  You can use your arms to groom, eat, and bathe  Take short walks around the house   Gradually walk further as you feel better  Ask your healthcare provider when you can return to your normal activities  · Do not sleep on your stomach  This will put too much pressure on your incision  Sleep on your back or on the side opposite to your incision  · Empty your drain  as directed  You may need to write down how much fluid you empty from your drain each day  Ask your healthcare provider for more information about how to empty your drain  · Wear a supportive bra  as directed  Wait until you remove the tight-fitting bandage to wear a bra  You may be given a surgical bra or told to wear a sports bra  A supportive bra may help hold your bandages in place  It may also help with swelling and pain  Do not  wear bras with lace or underwire  They may rub against your incision and cause discomfort  Arm stretches: Your healthcare provider may show you how to do arm stretches  Arm stretches may prevent stiff arms or shoulders  You may need to wait until after your drains are removed to begin stretching  Do not do arm stretches until your healthcare provider says it is okay  Ask your healthcare provider for more information about arm stretches  More information and support: You may have difficulty coping with the changes to your body  Talk to your family or friends about how you are feeling  Ask your healthcare provider about support groups  It may be helpful to talk with other women who have had a mastectomy  · [address] [place] [address]  Phone: [phone]  Web Address: [email or web address]  [email or web address]  · [address] [place] [address]  Phone: [phone]  Web Address: http://[employer]/  gov  Follow up with your healthcare provider as directed:  Write down your questions so you remember to ask them during your visits     © 2017 [facility] Information is for End User's use only and may not be sold, redistributed or otherwise used for commercial purposes  All illustrations and images included in CareNotes® are the copyrighted property of [employer]  or [employer]  The above information is an  only  It is not intended as medical advice for individual conditions or treatments  Talk to your doctor, nurse or pharmacist before following any medical regimen to see if it is safe and effective for you  Breast Cancer [place] Lymph Node Biopsy   AMBULATORY CARE:   What you need to know about a sentinel lymph node biopsy (SLNB):  A sentinel lymph node (SLN) is usually the lymph node closest to the breast tumor  It is usually found in the armpit, or along the sternum (breastbone) or collarbone  A biopsy is a procedure used to find and remove a SLN  During the biopsy, the SLN will be tested for cancer cells  If the test is positive, it may mean that breast cancer has spread outside of your breast  This information can help your healthcare provider decide what other treatments you need  How to prepare for a SLNB:   · You may need a nuclear scan before your procedure  During a nuclear scan, healthcare providers will inject a small amount of radioactive liquid in your breast  Radioactive liquid will move to the location of your lymph nodes and help them show up better in pictures  A camera will take pictures of the lymph nodes  The pictures will help your healthcare provider plan for your procedure  · Your healthcare provider will talk to you about how to prepare for your procedure  He may tell you not to eat or drink anything after midnight on the day of your procedure  He will tell you what medicines to take or not take on the day of your procedure  You may be given contrast liquid during your biopsy  Tell your healthcare provider if you have ever had an allergic reaction to contrast liquid  Arrange for someone to drive you home and stay with you after your procedure    What will happen during a SLNB:   · You may be given an antibiotic through your IV to help prevent a bacterial infection  Tell the healthcare provider if you have ever had an allergic reaction to an antibiotic  You may be given general anesthesia to keep you asleep and free from pain during your procedure  You may instead be given local anesthesia to numb the area  With local anesthesia, you may still feel pressure or pushing during the procedure, but you should not feel any pain  · Your healthcare provider will inject blue contrast liquid, radioactive liquid, or both near the tumor  The liquid will move to the SLN  Your healthcare provider may use an instrument to help find the SLN  He will do this by gently moving an instrument over your skin  The instrument will show pictures of the SLN on a monitor  Your healthcare provider will make a small incision in the skin that covers the SLN  The incision is usually in your armpit or chest  The SLN will be removed and checked for cancer cells  If cancer is found, your healthcare provider may remove several more lymph nodes for testing  Your incision may be closed with stitches or strips of medical tape and covered with a bandage  What will happen after a SLNB:  Healthcare providers will monitor you until you are awake  You may be able to go home after you are awake and your pain is controlled  Your urine or bowel movement may be blue for 24 to 48 hours after your procedure  This is caused by the blue contrast liquid given to you during the procedure  You may have bruising or swelling at the biopsy site  This is normal and expected  The arm closest to the biopsy site may be sore  This should get better within 48 to 72 hours  Risks of a SLNB:  You may bleed more than expected or get an infection  You may develop a condition called lymphedema  Lymphedema is tissue swelling in your arm nearest to where the SLN was removed  You may have long-term pain or discomfort in your arm   Your skin in the arm may be permanently thick or hard  Your nerves may be damaged during your procedure  This may cause numbness or tingling in your arm  It may also cause difficulty moving your arm  You may have an allergic reaction to the contrast liquid  This may require medicine or other treatments  Seek care immediately if:   · Blood soaks through your bandage  · Your stitches come apart  · Your bruise suddenly gets larger and feels firm  Contact your healthcare provider if:   · You have a fever or chills  · Your wound is red, swollen, or draining pus  · You have nausea or are vomiting  · Your skin is itchy, swollen, or you have a rash  · Your pain does not get better after you take medicine for pain  · You have questions or concerns about your condition or care  Medicines: You may need any of the following:  · NSAIDs , such as ibuprofen, help decrease swelling, pain, and fever  This medicine is available with or without a doctor's order  NSAIDs can cause stomach bleeding or kidney problems in certain people  If you take blood thinner medicine, always ask your healthcare provider if NSAIDs are safe for you  Always read the medicine label and follow directions  · Acetaminophen  decreases pain and fever  It is available without a doctor's order  Ask how much to take and how often to take it  Follow directions  Read the labels of all other medicines you are using to see if they also contain acetaminophen, or ask your doctor or pharmacist  Acetaminophen can cause liver damage if not taken correctly  Do not use more than 4 grams (4,000 milligrams) total of acetaminophen in one day  · Prescription pain medicine  may be given  Ask your healthcare provider how to take this medicine safely  Some prescription pain medicines contain acetaminophen  Do not take other medicines that contain acetaminophen without talking to your healthcare provider  Too much acetaminophen may cause liver damage  Prescription pain medicine may cause constipation  Ask your healthcare provider how to prevent or treat constipation  · Take your medicine as directed  Contact your healthcare provider if you think your medicine is not helping or if you have side effects  Tell him or her if you are allergic to any medicine  Keep a list of the medicines, vitamins, and herbs you take  Include the amounts, and when and why you take them  Bring the list or the pill bottles to follow-up visits  Carry your medicine list with you in case of an emergency  Care for your incision wound as directed:  Ask your healthcare provider when your wound can get wet  Carefully wash around the wound with soap and water  It is okay to let soap and water gently run over your wound  Do not  scrub your wound  Gently pat dry the area and put on new, clean bandages as directed  Change your bandages when they get wet or dirty  If you have strips of medical tape, let them fall of on their own  It may take 10 to 14 days for them to fall off  Check your wound every day for signs of infection, such as redness, swelling, or pus  Do not put powders or lotions on your wound  If lymph nodes have been taken from your armpit, ask your healthcare provider when you can wear deodorant  Self-care:   · Apply ice  on your wound for 15 to 20 minutes every hour or as directed  Use an ice pack, or put crushed ice in a plastic bag  Cover it with a towel before you apply it to your skin  Ice helps prevent tissue damage and decreases swelling and pain  · Elevate  your arm nearest to the biopsy site as often as you can  This will help decrease swelling and pain  Prop your arm on pillows or blankets to keep it elevated above the level of your heart comfortably  · Do not do strenuous activities  for 24 to 48 hours  Strenuous activities include heavy lifting, sports, or running  If lymph nodes were taken from your armpit, do not push or pull with your arm   These activities may put too much stress on your wound  Rest and take short walks around the house  Ask your healthcare provider when you can return to your normal activities  · Drink plenty of liquids  as directed  This will help flush out contrast liquid from your body  Ask how much liquid to drink each day and which liquids are best for you  Ask your healthcare provider how to prevent lymphedema and infection:  Lymphedema is fluid buildup in fatty tissues under your skin  Lymphedema may happen in the arm closest to where lymph nodes were removed  An infection in your skin can make lymphedema worse  Ask your healthcare provider how you can decrease your risk for skin infections and lymphedema  Follow up with your healthcare provider as directed:  Write down your questions so you remember to ask them during your visits  © 2017 [facility] Information is for End User's use only and may not be sold, redistributed or otherwise used for commercial purposes  All illustrations and images included in CareNotes® are the copyrighted property of [employer] Inc  or [employer]  The above information is an  only  It is not intended as medical advice for individual conditions or treatments  Talk to your doctor, nurse or pharmacist before following any medical regimen to see if it is safe and effective for you  Kurt-Rabago Drain Care   AMBULATORY CARE:   A Kurt-Rabago (LUCIUS) drain  is used to remove fluids that build up in an area of your body after surgery  The LUCIUS drain is a bulb shaped device connected to a tube  One end of the tube is placed inside you during surgery  The other end comes out through a small cut in your skin  The bulb is connected to this end  You may have a stitch to hold the tube in place  Seek care immediately if:   · Your LUCIUS drain breaks or comes out       · You have cloudy yellow or brown drainage from your LUCIUS drain site, or the drainage smells bad   Contact your healthcare provider if:   · You drain less than 30 milliliters (2 tablespoons) in 24 hours  This may mean your drain can be removed  · You suddenly stop draining fluid or think your LUCIUS drain is blocked  · You have a fever higher than 101 5°F (38 6°C)  · You have increased pain, redness, or swelling around the drain site  · You have questions about your LUCIUS drain care  How a Kurt-Rabago drain works: The LUCIUS drain removes fluids by creating suction in the tube  The bulb is squeezed flat and connected to the tube that sticks out of your body  The bulb expands as it fills with fluid  How to change the bandage around your Kurt-Rabago drain:  If you have a bandage, change it once a day  You may need to change your bandage more than once a day if it gets completely wet  · Wash your hands with soap and water  · Loosen the tape and gently remove the old bandage  Throw the old bandage into a plastic trash bag  · Use soap and water or saline (salt water) solution to clean your LUCIUS drain site  Dip a cotton swab or gauze pad in the solution and gently clean your skin  · Pat the area dry  · Place a new bandage on your LUCIUS drain site and secure it to your skin with medical tape  · Wash your hands  How to empty the Kurt-Rabago drain:  Empty the bulb when it is half full or every 8 to 12 hours  · Wash your hands with soap and water  · Remove the plug from the bulb  · Pour the fluid into a measuring cup  · Clean the plug with an alcohol swab or a cotton ball dipped in rubbing alcohol  · Squeeze the bulb flat and put the plug back in  The bulb should stay flat until it starts to fill with fluid again  · Measure the amount of fluid you pour out  Write down how much fluid you empty from the LUCIUS drain and the date and time you collected it  · Flush the fluid down the toilet  Wash your hands    Clear clogged tubing: Use the following steps to clear your Kurt-Rabago tubing:  · Hold the tubing between your thumb and first finger at the place closest to your skin  This hand will prevent the tube from being pulled out of your skin  · Use your other thumb and first finger to slide the clog down the tubing toward the bulb  You may have to repeat the sliding until the tubing is unclogged  Kurt-Rabago drain removal:  The amount of fluid that you drain will decrease as your wound heals  The LUCIUS drain usually is removed when less than 30 milliliters (2 tablespoons) is collected in 24 hours  Ask your healthcare provider when and how your LUCIUS drain will be removed  Follow up with your healthcare provider as directed:  Write down your questions so you remember to ask them during your visits  © 2017 [address] [employer] Information is for End User's use only and may not be sold, redistributed or otherwise used for commercial purposes  All illustrations and images included in CareNotes® are the copyrighted property of [employer] M [employer]  or [employer]  The above information is an  only  It is not intended as medical advice for individual conditions or treatments  Talk to your doctor, nurse or pharmacist before following any medical regimen to see if it is safe and effective for you

## 2020-07-08 NOTE — TELEPHONE ENCOUNTER
Prior authorization for pt's   Oxycodone 5 mg  has been initiated [address] and sent along with ICD 10 codes    [employer]  Phone [phone] determination

## 2020-07-08 NOTE — TELEPHONE ENCOUNTER
Received prior authorization approval for Oxycodone 5 MG through 07/08/21  Confirmed with pharmacy  Pt has been informed

## 2020-07-12 ENCOUNTER — APPOINTMENT (EMERGENCY)
Dept: RADIOLOGY | Facility: HOSPITAL | Age: 55
DRG: 690 | End: 2020-07-12
Payer: COMMERCIAL

## 2020-07-12 ENCOUNTER — HOSPITAL ENCOUNTER (INPATIENT)
Facility: HOSPITAL | Age: 55
LOS: 3 days | Discharge: HOME | DRG: 690 | End: 2020-07-15
Attending: EMERGENCY MEDICINE | Admitting: INTERNAL MEDICINE
Payer: COMMERCIAL

## 2020-07-12 ENCOUNTER — APPOINTMENT (EMERGENCY)
Dept: CT IMAGING | Facility: HOSPITAL | Age: 55
DRG: 690 | End: 2020-07-12
Payer: COMMERCIAL

## 2020-07-12 DIAGNOSIS — N39.0 UTI (URINARY TRACT INFECTION): Primary | ICD-10-CM

## 2020-07-12 DIAGNOSIS — E87.6 ACUTE HYPOKALEMIA: ICD-10-CM

## 2020-07-12 DIAGNOSIS — E86.0 DEHYDRATION: ICD-10-CM

## 2020-07-12 DIAGNOSIS — E87.1 ACUTE HYPONATREMIA: ICD-10-CM

## 2020-07-12 PROBLEM — D64.9 ANEMIA: Status: ACTIVE | Noted: 2020-07-12

## 2020-07-12 LAB
ALBUMIN SERPL BCP-MCNC: 3.5 G/DL (ref 3.5–5)
ALP SERPL-CCNC: 79 U/L (ref 46–116)
ALT SERPL W P-5'-P-CCNC: 14 U/L (ref 12–78)
ANION GAP SERPL CALCULATED.3IONS-SCNC: 11 MMOL/L (ref 4–13)
AST SERPL W P-5'-P-CCNC: 18 U/L (ref 5–45)
BACTERIA UR QL AUTO: ABNORMAL /HPF
BASOPHILS # BLD AUTO: 0.03 THOUSANDS/ΜL (ref 0–0.1)
BASOPHILS NFR BLD AUTO: 0 % (ref 0–1)
BILIRUB SERPL-MCNC: 0.26 MG/DL (ref 0.2–1)
BILIRUB UR QL STRIP: NEGATIVE
BUN SERPL-MCNC: 21 MG/DL (ref 5–25)
CALCIUM SERPL-MCNC: 8.5 MG/DL (ref 8.3–10.1)
CHLORIDE SERPL-SCNC: 95 MMOL/L (ref 100–108)
CLARITY UR: ABNORMAL
CO2 SERPL-SCNC: 22 MMOL/L (ref 21–32)
COLOR UR: YELLOW
CREAT SERPL-MCNC: 1.21 MG/DL (ref 0.6–1.3)
EOSINOPHIL # BLD AUTO: 0.02 THOUSAND/ΜL (ref 0–0.61)
EOSINOPHIL NFR BLD AUTO: 0 % (ref 0–6)
ERYTHROCYTE [DISTWIDTH] IN BLOOD BY AUTOMATED COUNT: 16.6 % (ref 11.6–15.1)
GFR SERPL CREATININE-BSD FRML MDRD: 50 ML/MIN/1.73SQ M
GLUCOSE SERPL-MCNC: 102 MG/DL (ref 65–140)
GLUCOSE UR STRIP-MCNC: NEGATIVE MG/DL
HCT VFR BLD AUTO: 27.5 % (ref 34.8–46.1)
HGB BLD-MCNC: 8.9 G/DL (ref 11.5–15.4)
HGB UR QL STRIP.AUTO: ABNORMAL
IMM GRANULOCYTES # BLD AUTO: 0.08 THOUSAND/UL (ref 0–0.2)
IMM GRANULOCYTES NFR BLD AUTO: 1 % (ref 0–2)
KETONES UR STRIP-MCNC: NEGATIVE MG/DL
LACTATE SERPL-SCNC: 0.6 MMOL/L (ref 0.5–2)
LEUKOCYTE ESTERASE UR QL STRIP: ABNORMAL
LYMPHOCYTES # BLD AUTO: 0.66 THOUSANDS/ΜL (ref 0.6–4.47)
LYMPHOCYTES NFR BLD AUTO: 7 % (ref 14–44)
MCH RBC QN AUTO: 33.1 PG (ref 26.8–34.3)
MCHC RBC AUTO-ENTMCNC: 32.4 G/DL (ref 31.4–37.4)
MCV RBC AUTO: 102 FL (ref 82–98)
MONOCYTES # BLD AUTO: 0.61 THOUSAND/ΜL (ref 0.17–1.22)
MONOCYTES NFR BLD AUTO: 6 % (ref 4–12)
NEUTROPHILS # BLD AUTO: 8.32 THOUSANDS/ΜL (ref 1.85–7.62)
NEUTS SEG NFR BLD AUTO: 86 % (ref 43–75)
NITRITE UR QL STRIP: NEGATIVE
NON-SQ EPI CELLS URNS QL MICRO: ABNORMAL /HPF
NRBC BLD AUTO-RTO: 0 /100 WBCS
OSMOLALITY UR/SERPL-RTO: 285 MMOL/KG (ref 282–298)
OTHER STN SPEC: ABNORMAL
PH UR STRIP.AUTO: 5.5 [PH]
PLATELET # BLD AUTO: 242 THOUSANDS/UL (ref 149–390)
PMV BLD AUTO: 9.9 FL (ref 8.9–12.7)
POTASSIUM SERPL-SCNC: 3.2 MMOL/L (ref 3.5–5.3)
PROT SERPL-MCNC: 6.8 G/DL (ref 6.4–8.2)
PROT UR STRIP-MCNC: NEGATIVE MG/DL
RBC # BLD AUTO: 2.69 MILLION/UL (ref 3.81–5.12)
RBC #/AREA URNS AUTO: ABNORMAL /HPF
SARS-COV-2 RNA RESP QL NAA+PROBE: NEGATIVE
SODIUM SERPL-SCNC: 128 MMOL/L (ref 136–145)
SP GR UR STRIP.AUTO: <=1.005 (ref 1–1.03)
T4 FREE SERPL-MCNC: 1.32 NG/DL (ref 0.76–1.46)
TSH SERPL DL<=0.05 MIU/L-ACNC: 5.07 UIU/ML (ref 0.36–3.74)
URATE SERPL-MCNC: 5.6 MG/DL (ref 2–6.8)
UROBILINOGEN UR QL STRIP.AUTO: 0.2 E.U./DL
WBC # BLD AUTO: 9.72 THOUSAND/UL (ref 4.31–10.16)
WBC #/AREA URNS AUTO: ABNORMAL /HPF

## 2020-07-12 PROCEDURE — 71045 X-RAY EXAM CHEST 1 VIEW: CPT

## 2020-07-12 PROCEDURE — 84300 ASSAY OF URINE SODIUM: CPT | Performed by: INTERNAL MEDICINE

## 2020-07-12 PROCEDURE — 84550 ASSAY OF BLOOD/URIC ACID: CPT | Performed by: INTERNAL MEDICINE

## 2020-07-12 PROCEDURE — 87635 SARS-COV-2 COVID-19 AMP PRB: CPT | Performed by: EMERGENCY MEDICINE

## 2020-07-12 PROCEDURE — 84439 ASSAY OF FREE THYROXINE: CPT | Performed by: INTERNAL MEDICINE

## 2020-07-12 PROCEDURE — 99285 EMERGENCY DEPT VISIT HI MDM: CPT

## 2020-07-12 PROCEDURE — 74177 CT ABD & PELVIS W/CONTRAST: CPT

## 2020-07-12 PROCEDURE — 81001 URINALYSIS AUTO W/SCOPE: CPT | Performed by: EMERGENCY MEDICINE

## 2020-07-12 PROCEDURE — 87086 URINE CULTURE/COLONY COUNT: CPT | Performed by: EMERGENCY MEDICINE

## 2020-07-12 PROCEDURE — 99285 EMERGENCY DEPT VISIT HI MDM: CPT | Performed by: EMERGENCY MEDICINE

## 2020-07-12 PROCEDURE — 83605 ASSAY OF LACTIC ACID: CPT | Performed by: EMERGENCY MEDICINE

## 2020-07-12 PROCEDURE — 36415 COLL VENOUS BLD VENIPUNCTURE: CPT | Performed by: EMERGENCY MEDICINE

## 2020-07-12 PROCEDURE — 96361 HYDRATE IV INFUSION ADD-ON: CPT

## 2020-07-12 PROCEDURE — 99223 1ST HOSP IP/OBS HIGH 75: CPT | Performed by: INTERNAL MEDICINE

## 2020-07-12 PROCEDURE — 80053 COMPREHEN METABOLIC PANEL: CPT | Performed by: EMERGENCY MEDICINE

## 2020-07-12 PROCEDURE — 83930 ASSAY OF BLOOD OSMOLALITY: CPT | Performed by: INTERNAL MEDICINE

## 2020-07-12 PROCEDURE — 96374 THER/PROPH/DIAG INJ IV PUSH: CPT

## 2020-07-12 PROCEDURE — 84443 ASSAY THYROID STIM HORMONE: CPT | Performed by: EMERGENCY MEDICINE

## 2020-07-12 PROCEDURE — 85025 COMPLETE CBC W/AUTO DIFF WBC: CPT | Performed by: EMERGENCY MEDICINE

## 2020-07-12 PROCEDURE — 83935 ASSAY OF URINE OSMOLALITY: CPT | Performed by: INTERNAL MEDICINE

## 2020-07-12 PROCEDURE — 87040 BLOOD CULTURE FOR BACTERIA: CPT | Performed by: EMERGENCY MEDICINE

## 2020-07-12 RX ORDER — NICOTINE 21 MG/24HR
1 PATCH, TRANSDERMAL 24 HOURS TRANSDERMAL DAILY
Status: DISCONTINUED | OUTPATIENT
Start: 2020-07-13 | End: 2020-07-15 | Stop reason: HOSPADM

## 2020-07-12 RX ORDER — HYDROXYCHLOROQUINE SULFATE 200 MG/1
200 TABLET, FILM COATED ORAL
Status: DISCONTINUED | OUTPATIENT
Start: 2020-07-13 | End: 2020-07-15 | Stop reason: HOSPADM

## 2020-07-12 RX ORDER — LEVOTHYROXINE SODIUM 88 UG/1
88 TABLET ORAL DAILY
Status: DISCONTINUED | OUTPATIENT
Start: 2020-07-13 | End: 2020-07-15 | Stop reason: HOSPADM

## 2020-07-12 RX ORDER — LORAZEPAM 2 MG/ML
1 INJECTION INTRAMUSCULAR ONCE
Status: COMPLETED | OUTPATIENT
Start: 2020-07-12 | End: 2020-07-12

## 2020-07-12 RX ORDER — LISINOPRIL 20 MG/1
20 TABLET ORAL DAILY
Status: DISCONTINUED | OUTPATIENT
Start: 2020-07-13 | End: 2020-07-13

## 2020-07-12 RX ORDER — HYDROXYCHLOROQUINE SULFATE 200 MG/1
100 TABLET, FILM COATED ORAL
Status: DISCONTINUED | OUTPATIENT
Start: 2020-07-12 | End: 2020-07-15 | Stop reason: HOSPADM

## 2020-07-12 RX ORDER — DEXAMETHASONE 2 MG/1
1 TABLET ORAL
Status: DISCONTINUED | OUTPATIENT
Start: 2020-07-13 | End: 2020-07-13

## 2020-07-12 RX ORDER — SODIUM CHLORIDE 9 MG/ML
75 INJECTION, SOLUTION INTRAVENOUS CONTINUOUS
Status: DISCONTINUED | OUTPATIENT
Start: 2020-07-12 | End: 2020-07-14

## 2020-07-12 RX ORDER — ALPRAZOLAM 0.5 MG/1
1 TABLET ORAL 3 TIMES DAILY PRN
Status: DISCONTINUED | OUTPATIENT
Start: 2020-07-12 | End: 2020-07-15 | Stop reason: HOSPADM

## 2020-07-12 RX ORDER — POTASSIUM CHLORIDE 20 MEQ/1
40 TABLET, EXTENDED RELEASE ORAL ONCE
Status: COMPLETED | OUTPATIENT
Start: 2020-07-12 | End: 2020-07-12

## 2020-07-12 RX ORDER — ONDANSETRON 2 MG/ML
4 INJECTION INTRAMUSCULAR; INTRAVENOUS EVERY 6 HOURS PRN
Status: DISCONTINUED | OUTPATIENT
Start: 2020-07-12 | End: 2020-07-15 | Stop reason: HOSPADM

## 2020-07-12 RX ORDER — ACETAMINOPHEN 325 MG/1
650 TABLET ORAL EVERY 6 HOURS PRN
Status: DISCONTINUED | OUTPATIENT
Start: 2020-07-12 | End: 2020-07-13

## 2020-07-12 RX ORDER — CYCLOBENZAPRINE HCL 10 MG
10 TABLET ORAL
Status: DISCONTINUED | OUTPATIENT
Start: 2020-07-12 | End: 2020-07-15 | Stop reason: HOSPADM

## 2020-07-12 RX ADMIN — HYDROXYCHLOROQUINE SULFATE 100 MG: 200 TABLET, FILM COATED ORAL at 21:41

## 2020-07-12 RX ADMIN — LORAZEPAM 1 MG: 2 INJECTION INTRAMUSCULAR; INTRAVENOUS at 15:22

## 2020-07-12 RX ADMIN — SODIUM CHLORIDE 100 ML/HR: 0.9 INJECTION, SOLUTION INTRAVENOUS at 19:53

## 2020-07-12 RX ADMIN — CYCLOBENZAPRINE HYDROCHLORIDE 10 MG: 10 TABLET, FILM COATED ORAL at 21:41

## 2020-07-12 RX ADMIN — POTASSIUM CHLORIDE 40 MEQ: 1500 TABLET, EXTENDED RELEASE ORAL at 19:50

## 2020-07-12 RX ADMIN — IOHEXOL 100 ML: 350 INJECTION, SOLUTION INTRAVENOUS at 15:56

## 2020-07-12 RX ADMIN — SODIUM CHLORIDE 1000 ML: 0.9 INJECTION, SOLUTION INTRAVENOUS at 15:24

## 2020-07-12 RX ADMIN — CEFTRIAXONE 1000 MG: 1 INJECTION, POWDER, FOR SOLUTION INTRAMUSCULAR; INTRAVENOUS at 18:40

## 2020-07-12 NOTE — ASSESSMENT & PLAN NOTE
· Patient stated she feels anxious specially for her upcoming surgery  · Likely contributing to patient's tachycardia  · Continue Xanax 1 tab t i d  P r n , Zoloft 150 mg daily

## 2020-07-12 NOTE — ASSESSMENT & PLAN NOTE
· Patient reports decreased appetite for the past few days  Denies nausea and vomiting  · Presented with sodium 128    · Likely hypovolemic hyponatremia, can also consider SIADH in the setting of malignancy  · Will check for serum osmolality, urine all some, urine sodium and uric acid  · Start IV fluids  cc/hour  · Recheck BMP in 6 hours  · Target sodium level 133-135  · If with no improvement or with sudden worsening, consider Nephrology consult

## 2020-07-12 NOTE — ASSESSMENT & PLAN NOTE
· Patient reports decreased appetite for the past few days  Denies nausea and vomiting  · Presented with sodium 128  Ur Osm: 161, Serum Osm: 285, Ur Sodium: 24   · Suspect a pseudohyponatremia  Normal protein levels  Unknown cholesterol levels  · Start IV fluids  cc/hour  · Sodium corrected overnight  Currently 136  · Recheck BMP 8PM (7/13) & AM (7/14)

## 2020-07-12 NOTE — H&P
H&P- Shellie  1965, 62 y o  female MRN: [id number]    Unit/Bed#: S -01 Encounter: [id number]    Primary Care Provider: Kdaie Her MD   Date and time admitted to hospital: 7/12/2020  2:39 PM    * UTI (urinary tract infection)  Assessment & Plan  · Patient presented with suprapubic pain, dysuria  Denies hematuria, no fevers or chills  · UA showed:  Positive leukocytes, moderate blood, RBC 2-4, WBC innumerable, occasional bacteria  · Lactic acid normal, no leukocytosis  · Started on IV Rocephin, continue with IV antibiotics  · Await final results of urine culture  · Await BC x2  · Monitor CBC  · Monitor vitals, trend fever curve    Hyponatremia  Assessment & Plan  · Patient reports decreased appetite for the past few days  Denies nausea and vomiting  · Presented with sodium 128    · Likely hypovolemic hyponatremia, can also consider SIADH in the setting of malignancy  · Will check for serum osmolality, urine osm, urine sodium and uric acid  · Start IV fluids  cc/hour  · Target sodium level 133-135  · If with no improvement or with sudden worsening, consider Nephrology consult    Anemia  Assessment & Plan  · Per chart review, baseline around 9  · Currently, patient denies hematuria, GI bleed  · Likely in the setting of chronic illness, diet, possibly chemotherapy contributing  · Stable at 8 9  · Continue to monitor    Hypokalemia  Assessment & Plan  · Likely due to poor appetite, replete  · Monitor BMP, replace as necessary    Malignant neoplasm of upper-inner quadrant of left breast in female, estrogen receptor negative ([facility] [address] )  Assessment & Plan  · Patient has history of left breast cancer, completed course of chemotherapy, with recent MRI showing complete resolution of malignancy  · Patient is supposed to undergo mastectomy scheduled for 7/24/2020  · Continue follow-up with Surgical Oncology    Anxiety  Assessment & Plan  · Patient stated she feels anxious specially for her upcoming surgery  · Likely contributing to patient's tachycardia  · Continue Xanax 1 tab t i d  P r n , Zoloft 150 mg daily    Hypothyroidism  Assessment & Plan  · TSH noted to be elevated  · Will check free T4  · Continue current meds for now  · If free T4 normal, consider recheck TSH in 4-6 weeks    Systemic lupus erythematosus (HCC)  Assessment & Plan  · Continue dexamethasone, Plaquenil  · Continue follow-up as outpatient    Hypertension  Assessment & Plan  · Maintained on Univasc 15 mg daily not available on formulary  · Will substitute lisinopril 20 mg daily  · Continue monitor BP    VTE Prophylaxis: Enoxaparin (Lovenox)  / sequential compression device   Code Status:  Full code  POLST: POLST form is not discussed and not completed at this time  Anticipated Length of Stay:  Patient will be admitted on an Inpatient basis with an anticipated length of stay of  > 2 midnights  Justification for Hospital Stay:  Further evaluation and management of above problems    Chief Complaint:   Abdominal pain, dysuria    History of Present Illness:    Shara Palomino is a 62 y o  female who presents with abdominal pain and dysuria  Patient has PMH of left breast malignancy s/p chemotherapy with planned mastectomy, SLE, hypertension, hypothyroidism and anxiety  Patient reported abdominal pain mostly in the suprapubic area pain and dysuria for the past few days  Patient also endorsed difficulty initiating urination  Patient also reported decrease in appetite for the last 2-3 days  Patient also endorses some mild fatigue and weakness, however this is attributed to her recent chemotherapy  No nausea or vomiting  Denies fevers or chills  Denies chest pain, shortness of breath  No changes with bowel habits  No other URI symptoms  No sick contacts  Patient just recently completed her chemotherapy treatment for her breast cancer, and she is supposed to undergo mastectomy the end of July      At the ED, urinalysis was suspicious for UTI  Lactic acid was normal   No leukocytosis  Patient was initiated on IV ceftriaxone  Patient was also noted to have hyponatremia and hypokalemia  Patient will be admitted in our service for further evaluation and management  Review of Systems:    Review of Systems   Constitutional: Positive for fatigue  Negative for chills and fever  HENT: Negative for congestion, tinnitus and trouble swallowing  Eyes: Negative for pain and visual disturbance  Respiratory: Negative for cough, chest tightness and shortness of breath  Cardiovascular: Negative for chest pain, palpitations and leg swelling  Gastrointestinal: Positive for abdominal pain  Negative for blood in stool, nausea and vomiting  Genitourinary: Positive for difficulty urinating and dysuria  Negative for hematuria  Musculoskeletal: Positive for back pain and myalgias  Neurological: Negative for dizziness, syncope and headaches  Hematological: Negative for adenopathy  Psychiatric/Behavioral: Negative for confusion and hallucinations  The patient is nervous/anxious          Past Medical and Surgical History:     Past Medical History:   Diagnosis Date    Disease of thyroid gland     Hypertension     Lupus ([facility] [address] )     Malignant neoplasm of upper-inner quadrant of left breast in female, estrogen receptor negative ([facility] [address] ) 2/25/2020    Nephrolithiasis     PTSD (post-traumatic stress disorder)        Past Surgical History:   Procedure Laterality Date    FEMUR FRACTURE SURGERY      FL GUIDED CENTRAL VENOUS ACCESS DEVICE INSERTION  3/17/2020    HYSTERECTOMY      LEFT OOPHORECTOMY      due to torsion     MAMMO STEREOTACTIC BREAST BIOPSY RIGHT (ALL INC) Right 2/12/2020    MRI BREAST BIOPSY LEFT (ALL INCLUSIVE) Left 3/20/2020    AK INSJ TUNNELED CTR VAD W/SUBQ PORT AGE 5 YR/> N/A 3/17/2020    Procedure: INSERTION VENOUS PORT ( PORT-A-CATH) IR;  Surgeon: Victoria Taylor DO;  Location: [facility];  Service: Interventional Radiology  US GUIDANCE BREAST BIOPSY LEFT EACH ADDITIONAL Left 2/12/2020    US GUIDED BREAST BIOPSY LEFT COMPLETE Left 2/12/2020    VAGINA SURGERY         Meds/Allergies:    Prior to Admission medications    Medication Sig Start Date End Date Taking?  Authorizing Provider   ALPRAZolam Osie Cools) 1 mg tablet TAKE ONE TABLET THREE TIMES A DAY AS NEEDED FOR ANXIETY 9/21/93  Yes Heaven Blackwood MD   Cholecalciferol (VITAMIN D3) 73635 units CAPS Take 50,000 Units by mouth once a week   Yes Historical Provider, MD   cyclobenzaprine (FLEXERIL) 10 mg tablet Take 10 mg by mouth daily at bedtime    Yes Historical Provider, MD   dexamethasone (DECADRON) 1 mg tablet Take 1 tablet (1 mg total) by mouth daily with breakfast 6/16/20  Yes Eulalio Purcell MD   hydroxychloroquine (PLAQUENIL) 200 mg tablet Take 1 tablet in morning and 1/2 tablet in evening   Yes Historical Provider, MD   levothyroxine 88 mcg tablet Take 1 tablet (88 mcg total) by mouth daily 1/19/33  Yes Heaven Blackwood MD   moexipril (UNIVASC) 15 MG tablet Take 1 tablet (15 mg total) by mouth daily 7/51/45  Yes Heaven Blackwood MD   ondansetron (ZOFRAN) 4 mg tablet TAKE ONE TABLET BY MOUTH EVERY 8 HOURS AS NEEDED FOR NAUSEA OR VOMITING 4/16/20  Yes Afua Zuniga MD   prochlorperazine (COMPAZINE) 5 mg tablet Take 1 tablet (5 mg total) by mouth every 6 (six) hours as needed for nausea or vomiting 5/16/20  Yes Catrachito Jackson MD   sertraline (ZOLOFT) 100 mg tablet TAKE 1 & 1/2 TABLETS BY MOUTH ONCE DAILY 4/99/61  Yes Heaven Blackwood MD   CRANIAL PROSTHESIS, RX, One wig as needed 3/11/20   Afua Zuniga MD   dicyclomine (BENTYL) 20 mg tablet Take 1 tablet (20 mg total) by mouth every 6 (six) hours as needed (abdominal pain) for up to 10 days 4/26/20 6/5/20  Ileana Adames MD   diphenoxylate-atropine (LOMOTIL) 2 5-0 025 mg per tablet Take 1 tablet by mouth 4 (four) times a day as needed for diarrhea  Patient not taking: Reported on 7/12/2020 6/25/20   Afua Zuniga MD loperamide (IMODIUM) 2 mg capsule Take 1 capsule (2 mg total) by mouth every 4 (four) hours as needed for diarrhea 5/16/20   Antony Dominique MD   magnesium oxide (MAG-OX) 400 mg Take 1 tablet (400 mg total) by mouth 2 (two) times a day 5/16/20 6/15/20  Antony Dominique MD   Nutritional Supplements (OSTEOPOROSIS SUPPORT PO) Take by mouth    Historical Provider, MD   oxyCODONE (ROXICODONE) 5 mg immediate release tablet Take 1-2 tablets (5-10 mg total) by mouth every 4 (four) hours as needed (cancer pain  Max of 6 tabs / day  )Max Daily Amount: 30 mg  Patient not taking: Reported on 7/12/2020 7/8/20   Trisha Hays MD   potassium chloride (K-DUR,KLOR-CON) 20 mEq tablet Take 1 tablet (20 mEq total) by mouth every other day 5/16/20 7/8/20  Antony Dominique MD   predniSONE 1 mg tablet Take 1 mg by mouth 2 (two) times a day as needed (Lupus exacerbations)    Historical Provider, MD     I have reviewed home medications with patient personally  Allergies: Allergies   Allergen Reactions    Mobic [Meloxicam] Eye Swelling     Reaction Date: 12Aug2011;     Methotrexate Rash    Sulfa Antibiotics Rash       Social History:     Marital Status: /Civil Union   Occupation:  None  Patient Pre-hospital Living Situation:  Lives with   Patient Pre-hospital Level of Mobility:  Ambulatory  Patient Pre-hospital Diet Restrictions:  None  Substance Use History:   Social History     Substance and Sexual Activity   Alcohol Use Not Currently    Alcohol/week: 21 0 standard drinks    Types: 21 Cans of beer per week    Frequency: Never    Comment: pt states she had her last beer 3/22/2020     Social History     Tobacco Use   Smoking Status Current Every Day Smoker    Packs/day: 0 25    Types: Cigarettes    Start date: 200   Smokeless Tobacco Never Used   Tobacco Comment      5 ppd per [employer]     Social History     Substance and Sexual Activity   Drug Use No       Family History:    Family History   Problem Relation Age of Onset    Diabetes Mother     Hypertension Mother     Nephrolithiasis Mother     Breast cancer Mother 79    Heart disease Father     Hypertension Father     Diabetes Brother     Nephrolithiasis Brother     Breast cancer Maternal Aunt     No Known Problems Maternal Grandmother     No Known Problems Maternal Grandfather     No Known Problems Paternal Grandmother     No Known Problems Paternal Grandfather        Physical Exam:     Vitals:   Blood Pressure: 106/64 (07/12/20 1855)  Pulse: (!) 118 (07/12/20 1855)  Temperature: 99 °F (37 2 °C) (07/12/20 1855)  Temp Source: Oral (07/12/20 1855)  Respirations: 16 (07/12/20 1855)  Height: 5' 3" (160 cm) (07/12/20 1855)  Weight - Scale: 56 6 kg (124 lb 12 5 oz) (07/12/20 1855)  SpO2: 96 % (07/12/20 1855)    Physical Exam   Constitutional: She is oriented to person, place, and time  No distress  Chronically ill-appearing pleasant female   HENT:   Head: Normocephalic and atraumatic  Mouth/Throat: Oropharynx is clear and moist    Eyes: Conjunctivae are normal  No scleral icterus  Neck: Normal range of motion  No JVD present  Cardiovascular: Regular rhythm and normal heart sounds  Tachycardic   Pulmonary/Chest: Effort normal and breath sounds normal  No respiratory distress  Abdominal: Soft  Bowel sounds are normal  There is tenderness (Suprapubic area)  There is no guarding  Musculoskeletal: Normal range of motion  She exhibits no edema or tenderness  Neurological: She is alert and oriented to person, place, and time  She exhibits normal muscle tone  Skin: Skin is warm  No rash noted  She is not diaphoretic  No erythema  Psychiatric: She has a normal mood and affect  Her behavior is normal  Thought content normal    Nursing note and vitals reviewed  Additional Data:     Lab Results: I have personally reviewed pertinent reports        Results from last 7 days   Lab Units 07/12/20  1515   WBC Thousand/uL 9 72   HEMOGLOBIN g/dL 8 9*   HEMATOCRIT % 27 5*   PLATELETS Thousands/uL 242   NEUTROS PCT % 86*   LYMPHS PCT % 7*   MONOS PCT % 6   EOS PCT % 0     Results from last 7 days   Lab Units 07/12/20  1515   POTASSIUM mmol/L 3 2*   CHLORIDE mmol/L 95*   CO2 mmol/L 22   BUN mg/dL 21   CREATININE mg/dL 1 21   CALCIUM mg/dL 8 5   ALK PHOS U/L 79   ALT U/L 14   AST U/L 18           Imaging: I have personally reviewed pertinent reports  Mri Breast Bilateral W And Wo Contrast W Cad    Result Date: 6/30/2020  Narrative: DIAGNOSIS: Malignant neoplasm of upper-inner quadrant of left breast in female, estrogen receptor negative (HCC) TECHNIQUE: MRI of both breasts was performed in axial and sagittal planes utilizing a combination of axial diffusion, fat suppressed sagittal T2 , gradient echo in phase T1 weighting followed by sagittal dynamic post contrast imaging with 3D fat suppressed gradient-echo T1 sequences (VIBRANT) at 1 5 minute intervals  Axial VIBRANT post contrast images were obtained  Sagittal subtraction images were generated  This exam was read in conjunction with [employer] software  Intravenous contrast was administered at 2cc/sec, without documented contrast reaction  MEDICATIONS ADMINISTERED: gadobutrol injection (MULTI-DOSE) SOLN 6 mL, Total Given: 6 mL (1 dose) COMPARISONS:  Comparison from March 9, 2020 available  RELEVANT HISTORY: Family Breast Cancer History: History of breast cancer in Mother, Maternal Aunt  Family Medical History: Family medical history includes breast cancer in maternal aunt and breast cancer in mother  Personal History: Hormone history includes birth control  Surgical history includes hysterectomy  Medical history includes breast cancer  RISK ASSESSMENT: 5 Year Tyrer-Cuzick: 2 39 % 10 Year Tyrer-Cuzick: 5 27 % Lifetime Tyrer-Cuzick: 16 % TISSUE DENSITY: FGT: The breasts have heterogeneous fibroglandular tissue  BPE: No BPE recorded   INDICATION: Amy Plasencia is a 62 y o  female presenting for evaluate response to neoadjuvant chemotherapy  FINDINGS: LEFT 1) MASS:  Previously described 27 mm 10:00 Left breast mass is generally resolved without evidence of suspicious enhancement (status post neoadjuvant chemotherapy)  There is decreased prominence of left axillary lymph nodes  There is no suspicious enhancement on CADstream analysis  RIGHT There are no suspicious enhancing masses or suspicious areas of nonmass enhancement  There is no axillary or internal mammary adenopathy  A port is present in the right medial chest   There is no suspicious enhancement on CADstream analysis  Impression:   Interval resolution of left breast large left breast mass as well as decrease in left axillary lymph node prominence  ASSESSMENT/BI-RADS CATEGORY: Right: 2 - Benign Overall: 2 - Benign RECOMMENDATION:      - Surgical consultation for the left breast     Workstation ID: [id number]    Ct Abdomen Pelvis With Contrast    Result Date: 7/12/2020  Narrative: CT ABDOMEN AND PELVIS WITH IV CONTRAST INDICATION:   abd pain and back pain,  current breast CA  COMPARISON:  5/10/2020 TECHNIQUE:  CT examination of the abdomen and pelvis was performed  Axial, sagittal, and coronal 2D reformatted images were created from the source data and submitted for interpretation  Radiation dose length product (DLP) for this visit:  540 mGy-cm   This examination, like all CT scans performed in the [facility], was performed utilizing techniques to minimize radiation dose exposure, including the use of iterative reconstruction and automated exposure control  IV Contrast:  100 mL of iohexol (OMNIPAQUE) Enteric Contrast:  Enteric contrast was not administered  FINDINGS: ABDOMEN LOWER CHEST:  There are is a focal groundglass opacities in the mid to lower lung fields  LIVER/BILIARY TREE:  Unremarkable  GALLBLADDER:  No calcified gallstones  No pericholecystic inflammatory change  SPLEEN:  Unremarkable  PANCREAS:  Unremarkable   ADRENAL GLANDS:  Unremarkable  KIDNEYS/URETERS:  There is a small nonobstructing right renal calculus  There is a small right renal cyst  No hydronephrosis  STOMACH AND BOWEL:  Unremarkable  APPENDIX:  No findings to suggest appendicitis  ABDOMINOPELVIC CAVITY:  No ascites  No pneumoperitoneum  No lymphadenopathy  VESSELS:  Unremarkable for patient's age  PELVIS REPRODUCTIVE ORGANS:  Surgical changes of prior hysterectomy  URINARY BLADDER:  There is diffuse enhancement of the bladder wall  ABDOMINAL WALL/INGUINAL REGIONS:  Unremarkable  OSSEOUS STRUCTURES:  No acute fracture or destructive osseous lesion  Impression: 1  Mild enhancement of the bladder wall without thickening or stranding  This is of uncertain etiology and clinical significance though correlation with urinalysis is recommended as cystitis is not excluded  2   Otherwise no acute inflammatory process visualized in the abdomen or pelvis  3   Multifocal groundglass opacity at the lung bases  This is nonspecific though possibly inflammatory/infectious  In the setting of clinically suspected/proven COVID-19, the above lung parenchymal findings on CT indicate intermediate confidence level for COVID-19  4   Small nonobstructing right renal calculus  The study was marked in [facility] for immediate notification  Workstation performed: [id number]       EKG, Pathology, and Other Studies Reviewed on Admission:   · EKG: NA    Epic / Care Everywhere Records Reviewed: Yes     ** Please Note: This note has been constructed using a voice recognition system   **

## 2020-07-12 NOTE — ASSESSMENT & PLAN NOTE
· Maintained on Univasc 15 mg daily not available on formulary  · Will substitute lisinopril 20 mg daily  · Continue monitor BP

## 2020-07-12 NOTE — ED PROVIDER NOTES
History  Chief Complaint   Patient presents with    Medical Problem     Pt states that she has neck pain and lower back pain and had discharge when she went to the bathroom today  Unable to have a bowel movement for a few days  Last chemo treatment was this past tuesday  History provided by:  Patient  Abdominal Pain   Pain location:  Generalized  Pain quality: aching    Pain radiates to:  Does not radiate  Pain severity:  Moderate  Onset quality:  Gradual  Duration:  4 days  Timing:  Constant  Progression:  Worsening  Chronicity:  New  Context comment:  Known breast cancer, has been constipated for past 5 days  Relieved by:  None tried  Worsened by:  Nothing  Ineffective treatments:  None tried  Associated symptoms: constipation and nausea    Associated symptoms: no chest pain, no chills, no cough, no diarrhea, no dysuria, no fever, no shortness of breath, no sore throat and no vomiting    Associated symptoms comment:  Some generalized body aches specifically neck and lower back  Believes is from her fibromyalgia    Patient having worsening anxiety, has posttraumatic stress disorder is also extremely nervous about upcoming mastectomy      Prior to Admission Medications   Prescriptions Last Dose Informant Patient Reported? Taking?    ALPRAZolam (XANAX) 1 mg tablet   No Yes   Sig: TAKE ONE TABLET THREE TIMES A DAY AS NEEDED FOR ANXIETY   CRANIAL PROSTHESIS, RX,   No No   Sig: One wig as needed   Cholecalciferol (VITAMIN D3) 53295 units CAPS Past Week at Unknown time Self Yes Yes   Sig: Take 50,000 Units by mouth once a week   Nutritional Supplements (OSTEOPOROSIS SUPPORT PO) Not Taking at Unknown time  Yes No   Sig: Take by mouth   cyclobenzaprine (FLEXERIL) 10 mg tablet 7/11/2020 at Unknown time  Yes Yes   Sig: Take 10 mg by mouth daily at bedtime    dexamethasone (DECADRON) 1 mg tablet 7/12/2020 at Unknown time  No Yes   Sig: Take 1 tablet (1 mg total) by mouth daily with breakfast   dicyclomine (BENTYL) 20 mg tablet   No No   Sig: Take 1 tablet (20 mg total) by mouth every 6 (six) hours as needed (abdominal pain) for up to 10 days   diphenoxylate-atropine (LOMOTIL) 2 5-0 025 mg per tablet Not Taking at Unknown time  No No   Sig: Take 1 tablet by mouth 4 (four) times a day as needed for diarrhea   Patient not taking: Reported on 7/12/2020   hydroxychloroquine (PLAQUENIL) 200 mg tablet 7/12/2020 at Unknown time Self Yes Yes   Sig: Take 1 tablet in morning and 1/2 tablet in evening   levothyroxine 88 mcg tablet 7/12/2020 at Unknown time  No Yes   Sig: Take 1 tablet (88 mcg total) by mouth daily   loperamide (IMODIUM) 2 mg capsule   No No   Sig: Take 1 capsule (2 mg total) by mouth every 4 (four) hours as needed for diarrhea   magnesium oxide (MAG-OX) 400 mg   No No   Sig: Take 1 tablet (400 mg total) by mouth 2 (two) times a day   moexipril (UNIVASC) 15 MG tablet 7/12/2020 at Unknown time  No Yes   Sig: Take 1 tablet (15 mg total) by mouth daily   ondansetron (ZOFRAN) 4 mg tablet   No Yes   Sig: TAKE ONE TABLET BY MOUTH EVERY 8 HOURS AS NEEDED FOR NAUSEA OR VOMITING   oxyCODONE (ROXICODONE) 5 mg immediate release tablet Not Taking at Unknown time  No No   Sig: Take 1-2 tablets (5-10 mg total) by mouth every 4 (four) hours as needed (cancer pain  Max of 6 tabs / day  )Max Daily Amount: 30 mg   Patient not taking: Reported on 7/12/2020   potassium chloride (K-DUR,KLOR-CON) 20 mEq tablet   No No   Sig: Take 1 tablet (20 mEq total) by mouth every other day   predniSONE 1 mg tablet Not Taking at Unknown time  Yes No   Sig: Take 1 mg by mouth 2 (two) times a day as needed (Lupus exacerbations)   prochlorperazine (COMPAZINE) 5 mg tablet   No Yes   Sig: Take 1 tablet (5 mg total) by mouth every 6 (six) hours as needed for nausea or vomiting   sertraline (ZOLOFT) 100 mg tablet 7/12/2020 at Unknown time  No Yes   Sig: TAKE 1 & 1/2 TABLETS BY MOUTH ONCE DAILY      Facility-Administered Medications: None       Past Medical History: Diagnosis Date    Disease of thyroid gland     Hypertension     Lupus ([facility] [address] )     Malignant neoplasm of upper-inner quadrant of left breast in female, estrogen receptor negative ([facility] [address] ) 2/25/2020    Nephrolithiasis     PTSD (post-traumatic stress disorder)        Past Surgical History:   Procedure Laterality Date    FEMUR FRACTURE SURGERY      FL GUIDED CENTRAL VENOUS ACCESS DEVICE INSERTION  3/17/2020    HYSTERECTOMY      LEFT OOPHORECTOMY      due to torsion     MAMMO STEREOTACTIC BREAST BIOPSY RIGHT (ALL INC) Right 2/12/2020    MRI BREAST BIOPSY LEFT (ALL INCLUSIVE) Left 3/20/2020    MT INSJ TUNNELED CTR VAD W/SUBQ PORT AGE 5 YR/> N/A 3/17/2020    Procedure: INSERTION VENOUS PORT ( PORT-A-CATH) IR;  Surgeon: Kobe Oconnor DO;  Location: [facility];  Service: Interventional Radiology    US GUIDANCE BREAST BIOPSY LEFT EACH ADDITIONAL Left 2/12/2020    US GUIDED BREAST BIOPSY LEFT COMPLETE Left 2/12/2020    VAGINA SURGERY         Family History   Problem Relation Age of Onset    Diabetes Mother     Hypertension Mother     Nephrolithiasis Mother     Breast cancer Mother 79    Heart disease Father     Hypertension Father     Diabetes Brother     Nephrolithiasis Brother     Breast cancer Maternal Aunt     No Known Problems Maternal Grandmother     No Known Problems Maternal Grandfather     No Known Problems Paternal Grandmother     No Known Problems Paternal Grandfather      I have reviewed and agree with the history as documented      E-Cigarette/Vaping    E-Cigarette Use Never User      E-Cigarette/Vaping Substances    Nicotine No     THC No     CBD No     Flavoring No     Other No     Unknown No      Social History     Tobacco Use    Smoking status: Current Every Day Smoker     Packs/day: 0 25     Types: Cigarettes     Start date: 200    Smokeless tobacco: Never Used    Tobacco comment:  5 ppd per [employer]   Substance Use Topics    Alcohol use: Not Currently Alcohol/week: 21 0 standard drinks     Types: 21 Cans of beer per week     Frequency: Never     Comment: pt states she had her last beer 3/22/2020    Drug use: No       Review of Systems   Constitutional: Negative for activity change, chills, diaphoresis and fever  HENT: Negative for congestion, sinus pressure and sore throat  Eyes: Negative for pain and visual disturbance  Respiratory: Negative for cough, chest tightness, shortness of breath, wheezing and stridor  Cardiovascular: Negative for chest pain and palpitations  Gastrointestinal: Positive for abdominal pain, constipation and nausea  Negative for abdominal distention, diarrhea and vomiting  Genitourinary: Negative for dysuria and frequency  Musculoskeletal: Negative for neck pain and neck stiffness  Skin: Negative for rash  Neurological: Negative for dizziness, speech difficulty, light-headedness, numbness and headaches  Physical Exam  Physical Exam   Constitutional: She is oriented to person, place, and time  She appears well-developed  No distress  HENT:   Head: Normocephalic and atraumatic  Eyes: Pupils are equal, round, and reactive to light  Neck: Normal range of motion  Neck supple  No tracheal deviation present  Cardiovascular: Regular rhythm, normal heart sounds and intact distal pulses  Tachycardia present  No murmur heard  Pulmonary/Chest: Effort normal and breath sounds normal  No stridor  No respiratory distress  Abdominal: Soft  She exhibits no distension  There is tenderness (Diffuse, mild)  There is no rebound and no guarding  Musculoskeletal: Normal range of motion  Neurological: She is alert and oriented to person, place, and time  Skin: Skin is warm and dry  She is not diaphoretic  No erythema  No pallor  Psychiatric: Her mood appears anxious  Vitals reviewed        Vital Signs  ED Triage Vitals   Temperature Pulse Respirations Blood Pressure SpO2   07/12/20 1448 07/12/20 1443 07/12/20 1443 07/12/20 1443 07/12/20 1443   98 8 °F (37 1 °C) (!) 127 18 130/63 100 %      Temp Source Heart Rate Source Patient Position - Orthostatic VS BP Location FiO2 (%)   07/12/20 1448 07/12/20 1443 07/12/20 1443 07/12/20 1443 --   Oral Monitor Sitting Right arm       Pain Score       07/12/20 1443       9           Vitals:    07/12/20 1443 07/12/20 1643 07/12/20 1855   BP: 130/63 107/57 106/64   Pulse: (!) 127 (!) 118 (!) 118   Patient Position - Orthostatic VS: Sitting Lying Lying         Visual Acuity      ED Medications  Medications   ALPRAZolam (XANAX) tablet 1 mg (has no administration in time range)   cyclobenzaprine (FLEXERIL) tablet 10 mg (has no administration in time range)   dexamethasone (DECADRON) tablet 1 mg (has no administration in time range)   hydroxychloroquine (PLAQUENIL) tablet 200 mg (has no administration in time range)   hydroxychloroquine (PLAQUENIL) tablet 100 mg (has no administration in time range)   levothyroxine tablet 88 mcg (has no administration in time range)   lisinopril (ZESTRIL) tablet 20 mg (has no administration in time range)   sodium chloride 0 9 % infusion (100 mL/hr Intravenous New Bag 7/12/20 1953)   ondansetron (ZOFRAN) injection 4 mg (has no administration in time range)   nicotine (NICODERM CQ) 14 mg/24hr TD 24 hr patch 1 patch (has no administration in time range)   enoxaparin (LOVENOX) subcutaneous injection 40 mg (has no administration in time range)   acetaminophen (TYLENOL) tablet 650 mg (has no administration in time range)   cefTRIAXone (ROCEPHIN) 1,000 mg in dextrose 5 % 50 mL IVPB (has no administration in time range)   sertraline (ZOLOFT) tablet 150 mg (has no administration in time range)   sodium chloride 0 9 % bolus 1,000 mL (0 mL Intravenous Stopped 7/12/20 1836)   LORazepam (ATIVAN) injection 1 mg (1 mg Intravenous Given 7/12/20 1522)   iohexol (OMNIPAQUE) 350 MG/ML injection (MULTI-DOSE) 100 mL (100 mL Intravenous Given 7/12/20 2532)   ceftriaxone (ROCEPHIN) 1 g/50 mL in dextrose IVPB (0 mg Intravenous Stopped 7/12/20 1849)   potassium chloride (K-DUR,KLOR-CON) CR tablet 40 mEq (40 mEq Oral Given 7/12/20 1950)       Diagnostic Studies  Results Reviewed     Procedure Component Value Units Date/Time    Uric acid [[id number]]  (Normal) Collected:  07/12/20 1515    Lab Status:  Final result Specimen:  Blood from Central Venous Line Updated:  07/12/20 1950     Uric Acid 5 6 mg/dL     Osmolality, urine [[id number]] Collected:  07/12/20 1702    Lab Status: In process Specimen:  Urine, Clean Catch Updated:  07/12/20 1944    Sodium, urine, random [[id number]] Collected:  07/12/20 1702    Lab Status: In process Specimen:  Urine, Clean Catch Updated:  07/12/20 1944    T4, free [[id number]] Collected:  07/12/20 1515    Lab Status: In process Specimen:  Blood from Central Venous Line Updated:  07/12/20 1943    Blood culture #2 [[id number]] Collected:  07/12/20 1836    Lab Status: In process Specimen:  Blood from Hand, Right Updated:  07/12/20 1839    Blood culture #1 [[id number]] Collected:  07/12/20 1836    Lab Status: In process Specimen:  Blood from Arm, Right Updated:  07/12/20 1839    Urine Microscopic [[id number]]  (Abnormal) Collected:  07/12/20 1702    Lab Status:  Final result Specimen:  Urine, Clean Catch Updated:  07/12/20 1823     RBC, UA 2-4 /hpf      WBC, UA Innumerable /hpf      Epithelial Cells Occasional /hpf      Bacteria, UA Occasional /hpf      OTHER OBSERVATIONS WBCs Clumped    Urine culture [[id number]] Collected:  07/12/20 1702    Lab Status: In process Specimen:  Urine, Clean Catch Updated:  07/12/20 1822    Novel Coronavirus (Covid-19),PCR [facility] [[id number]]  (Normal) Collected:  07/12/20 1651    Lab Status:  Final result Specimen:  Nares from Nose Updated:  07/12/20 1800     SARS-CoV-2 Negative    Narrative:        The specimen collection materials, transport medium, and/or testing methodology utilized in the production of these test results have been proven to be reliable in a limited validation with an abbreviated program under the Emergency Utilization Authorization provided by the FDA  Testing reported as "Presumptive positive" will be confirmed with secondary testing with a reference laboratory to ensure result accuracy  Clinical caution and judgement should be used with the interpretation of these results with consideration of the clinical impression and other laboratory testing  Testing reported as "Positive" or "Negative" has been proven to be accurate according to standard laboratory validation requirements  All testing is performed with control materials showing appropriate reactivity at standard intervals  UA w Reflex to Microscopic w Reflex to Culture [[id number]]  (Abnormal) Collected:  07/12/20 1702    Lab Status:  Final result Specimen:  Urine, Clean Catch Updated:  07/12/20 1717     Color, UA Yellow     Clarity, UA Cloudy     Specific Gravity, UA <=1 005     pH, UA 5 5     Leukocytes, UA Large     Nitrite, UA Negative     Protein, UA Negative mg/dl      Glucose, UA Negative mg/dl      Ketones, UA Negative mg/dl      Urobilinogen, UA 0 2 E U /dl      Bilirubin, UA Negative     Blood, UA Moderate    TSH [[id number]]  (Abnormal) Collected:  07/12/20 1515    Lab Status:  Final result Specimen:  Blood from Central Venous Line Updated:  07/12/20 1544     TSH 3RD GENERATON 5 068 uIU/mL     Narrative:       Patients undergoing fluorescein dye angiography may retain small amounts of fluorescein in the body for 48-72 hours post procedure  Samples containing fluorescein can produce falsely depressed TSH values  If the patient had this procedure,a specimen should be resubmitted post fluorescein clearance        Lactic acid [[id number]]  (Normal) Collected:  07/12/20 1515    Lab Status:  Final result Specimen:  Blood from Central Venous Line Updated:  07/12/20 1537     LACTIC ACID 0 6 mmol/L     Narrative:       Result may be elevated if tourniquet was used during collection      Comprehensive metabolic panel [[id number]]  (Abnormal) Collected:  07/12/20 1515    Lab Status:  Final result Specimen:  Blood from Central Venous Line Updated:  07/12/20 1534     Sodium 128 mmol/L      Potassium 3 2 mmol/L      Chloride 95 mmol/L      CO2 22 mmol/L      ANION GAP 11 mmol/L      BUN 21 mg/dL      Creatinine 1 21 mg/dL      Glucose 102 mg/dL      Calcium 8 5 mg/dL      AST 18 U/L      ALT 14 U/L      Alkaline Phosphatase 79 U/L      Total Protein 6 8 g/dL      Albumin 3 5 g/dL      Total Bilirubin 0 26 mg/dL      eGFR 50 ml/min/1 73sq m     Narrative:       National Kidney Disease Foundation guidelines for Chronic Kidney Disease (CKD):     Stage 1 with normal or high GFR (GFR > 90 mL/min/1 73 square meters)    Stage 2 Mild CKD (GFR = 60-89 mL/min/1 73 square meters)    Stage 3A Moderate CKD (GFR = 45-59 mL/min/1 73 square meters)    Stage 3B Moderate CKD (GFR = 30-44 mL/min/1 73 square meters)    Stage 4 Severe CKD (GFR = 15-29 mL/min/1 73 square meters)    Stage 5 End Stage CKD (GFR <15 mL/min/1 73 square meters)  Note: GFR calculation is accurate only with a steady state creatinine    CBC and differential [[id number]]  (Abnormal) Collected:  07/12/20 1515    Lab Status:  Final result Specimen:  Blood from Central Venous Line Updated:  07/12/20 1522     WBC 9 72 Thousand/uL      RBC 2 69 Million/uL      Hemoglobin 8 9 g/dL      Hematocrit 27 5 %       fL      MCH 33 1 pg      MCHC 32 4 g/dL      RDW 16 6 %      MPV 9 9 fL      Platelets 186 Thousands/uL      nRBC 0 /100 WBCs      Neutrophils Relative 86 %      Immat GRANS % 1 %      Lymphocytes Relative 7 %      Monocytes Relative 6 %      Eosinophils Relative 0 %      Basophils Relative 0 %      Neutrophils Absolute 8 32 Thousands/µL      Immature Grans Absolute 0 08 Thousand/uL      Lymphocytes Absolute 0 66 Thousands/µL      Monocytes Absolute 0 61 Thousand/µL      Eosinophils Absolute 0 02 Thousand/µL      Basophils Absolute 0 03 Thousands/µL                  XR chest 1 view portable   ED Interpretation by Isaiah Wagner DO (07/12 1657)   Multifocal ground-glass infiltrate      CT abdomen pelvis with contrast   Final Result by Adams Boxer, MD (07/12 1636)      1  Mild enhancement of the bladder wall without thickening or stranding  This is of uncertain etiology and clinical significance though correlation with urinalysis is recommended as cystitis is not excluded  2   Otherwise no acute inflammatory process visualized in the abdomen or pelvis  3   Multifocal groundglass opacity at the lung bases  This is nonspecific though possibly inflammatory/infectious  In the setting of clinically suspected/proven COVID-19, the above lung parenchymal findings on CT indicate intermediate confidence level    for COVID-19       4   Small nonobstructing right renal calculus  The study was marked in [facility] for immediate notification  Workstation performed: [id number]                    Procedures  Procedures         ED Course       US AUDIT      Most Recent Value   Initial Alcohol Screen: US AUDIT-C    1  How often do you have a drink containing alcohol?  0 Filed at: 07/12/2020 1448   2  How many drinks containing alcohol do you have on a typical day you are drinking? 0 Filed at: 07/12/2020 1448   3b  FEMALE Any Age, or MALE 65+: How often do you have 4 or more drinks on one occassion? 0 Filed at: 07/12/2020 1448   Audit-C Score  0 Filed at: 07/12/2020 1448                  FARIDEH/DAST-10      Most Recent Value   How many times in the past year have you    Used an illegal drug or used a prescription medication for non-medical reasons?   Never Filed at: 07/12/2020 1444                                MDM  Number of Diagnoses or Management Options  Acute hypokalemia: new and requires workup  Acute hyponatremia: new and requires workup  Dehydration: new and requires workup  UTI (urinary tract infection): new and requires workup  Diagnosis management comments:       Initial ED assessment:  53-year-old female, breast cancer, presents generalized abdominal pain, constipation    Initial DDx includes but is not limited to:   Constipation, diverticulitis, colitis, metastatic spread, anxiety    Initial ED plan:   Blood work, CT scan, disposition pending ED workup           Amount and/or Complexity of Data Reviewed  Clinical lab tests: ordered and reviewed  Tests in the radiology section of CPT®: ordered and reviewed  Decide to obtain previous medical records or to obtain history from someone other than the patient: yes  Obtain history from someone other than the patient: yes  Review and summarize past medical records: yes  Discuss the patient with other providers: yes  Independent visualization of images, tracings, or specimens: yes          Disposition  Final diagnoses:   UTI (urinary tract infection)   Dehydration   Acute hypokalemia   Acute hyponatremia     Time reflects when diagnosis was documented in both MDM as applicable and the Disposition within this note     Time User Action Codes Description Comment    7/12/2020  6:16 PM Jed TREJO Add [N39 0] UTI (urinary tract infection)     7/12/2020  6:16 PM Anna Durán, [address] [place] [address] [E86 0] Dehydration     7/12/2020  6:16 PM Anna Durán, [address] [place] [address] [E87 6] Acute hypokalemia     7/12/2020  6:16 PM Elizabeth Correa Add [E87 1] Acute hyponatremia       ED Disposition     ED Disposition Condition Date/Time Comment    Admit Stable Sun Jul 12, 2020  6:16 PM Case was discussed with Dr Lamonte Prader and the patient's admission status was agreed to be Admission Status: inpatient status to the service of Dr Lamonte Prader           Follow-up Information    None         Current Discharge Medication List      CONTINUE these medications which have NOT CHANGED    Details   ALPRAZolam (XANAX) 1 mg tablet TAKE ONE TABLET THREE TIMES A DAY AS NEEDED FOR ANXIETY  Qty: 90 tablet, Refills: 1    Associated Diagnoses: Anxiety Cholecalciferol (VITAMIN D3) 84719 units CAPS Take 50,000 Units by mouth once a week      cyclobenzaprine (FLEXERIL) 10 mg tablet Take 10 mg by mouth daily at bedtime       dexamethasone (DECADRON) 1 mg tablet Take 1 tablet (1 mg total) by mouth daily with breakfast  Qty: 30 tablet, Refills: 0    Associated Diagnoses: Intractable nausea and vomiting      hydroxychloroquine (PLAQUENIL) 200 mg tablet Take 1 tablet in morning and 1/2 tablet in evening      levothyroxine 88 mcg tablet Take 1 tablet (88 mcg total) by mouth daily  Qty: 30 tablet, Refills: 5    Associated Diagnoses: Hypothyroidism, unspecified type      moexipril (UNIVASC) 15 MG tablet Take 1 tablet (15 mg total) by mouth daily  Qty: 30 tablet, Refills: 5    Associated Diagnoses: Essential hypertension      ondansetron (ZOFRAN) 4 mg tablet TAKE ONE TABLET BY MOUTH EVERY 8 HOURS AS NEEDED FOR NAUSEA OR VOMITING  Qty: 30 tablet, Refills: 1    Associated Diagnoses: Malignant neoplasm of upper-inner quadrant of left breast in female, estrogen receptor negative (HCC)      prochlorperazine (COMPAZINE) 5 mg tablet Take 1 tablet (5 mg total) by mouth every 6 (six) hours as needed for nausea or vomiting  Qty: 30 tablet, Refills: 0    Associated Diagnoses: Intractable nausea and vomiting      sertraline (ZOLOFT) 100 mg tablet TAKE 1 & 1/2 TABLETS BY MOUTH ONCE DAILY  Qty: 45 tablet, Refills: 5    Associated Diagnoses: Anxiety      CRANIAL PROSTHESIS, RX, One wig as needed  Qty: 1 Piece, Refills: 0    Associated Diagnoses: Malignant neoplasm of upper-inner quadrant of left breast in female, estrogen receptor negative (HCC)      dicyclomine (BENTYL) 20 mg tablet Take 1 tablet (20 mg total) by mouth every 6 (six) hours as needed (abdominal pain) for up to 10 days  Qty: 30 tablet, Refills: 0    Associated Diagnoses: Nausea and vomiting      diphenoxylate-atropine (LOMOTIL) 2 5-0 025 mg per tablet Take 1 tablet by mouth 4 (four) times a day as needed for diarrhea  Qty: 30 tablet, Refills: 0    Associated Diagnoses: Diarrhea, unspecified type      loperamide (IMODIUM) 2 mg capsule Take 1 capsule (2 mg total) by mouth every 4 (four) hours as needed for diarrhea  Qty: 30 capsule, Refills: 0    Associated Diagnoses: Intractable nausea and vomiting      magnesium oxide (MAG-OX) 400 mg Take 1 tablet (400 mg total) by mouth 2 (two) times a day  Qty: 60 tablet, Refills: 0    Associated Diagnoses: Intractable nausea and vomiting      Nutritional Supplements (OSTEOPOROSIS SUPPORT PO) Take by mouth      oxyCODONE (ROXICODONE) 5 mg immediate release tablet Take 1-2 tablets (5-10 mg total) by mouth every 4 (four) hours as needed (cancer pain  Max of 6 tabs / day  )Max Daily Amount: 30 mg  Qty: 180 tablet, Refills: 0    Associated Diagnoses: Malignant neoplasm of upper-inner quadrant of left breast in female, estrogen receptor negative (HCC)      potassium chloride (K-DUR,KLOR-CON) 20 mEq tablet Take 1 tablet (20 mEq total) by mouth every other day  Qty: 20 tablet, Refills: 0    Associated Diagnoses: Intractable nausea and vomiting      predniSONE 1 mg tablet Take 1 mg by mouth 2 (two) times a day as needed (Lupus exacerbations)           No discharge procedures on file      PDMP Review       Value Time User    PDMP Reviewed  Yes 5/16/2020 12:58 PM Carlos Sorto MD          ED Provider  Electronically Signed by           Danyelle Hawkins DO  07/12/20 2034

## 2020-07-12 NOTE — ASSESSMENT & PLAN NOTE
· Per chart review, baseline around 9  · Currently, patient denies hematuria, GI bleed  · Likely in the setting of chronic illness, diet, possibly chemotherapy contributing  · Stable at 8 9  · Continue to monitor

## 2020-07-12 NOTE — ASSESSMENT & PLAN NOTE
· Patient has history of left breast cancer, completed course of chemotherapy, with recent MRI showing complete resolution of malignancy  · Patient is supposed to undergo mastectomy scheduled for 7/24/2020  · Continue follow-up with Surgical Oncology

## 2020-07-12 NOTE — ASSESSMENT & PLAN NOTE
· Patient presented with suprapubic pain, dysuria  Denies hematuria, no fevers or chills    · UA showed:  Positive leukocytes, moderate blood, RBC 2-4, WBC enumerable, occasional bacteria  · Lactic acid normal, no leukocytosis  · Started on IV Rocephin, continue with IV antibiotics  · Await final results of urine culture  · Await BC x2  · Monitor CBC  · Monitor vitals, trend fever curve

## 2020-07-12 NOTE — ASSESSMENT & PLAN NOTE
· TSH noted to be elevated  · Will check free T4  · Continue current meds for now  · If free T4 normal, consider recheck TSH in 4-6 weeks

## 2020-07-13 PROBLEM — I95.9 HYPOTENSION: Status: ACTIVE | Noted: 2020-07-13

## 2020-07-13 LAB
ANION GAP SERPL CALCULATED.3IONS-SCNC: 10 MMOL/L (ref 4–13)
ANION GAP SERPL CALCULATED.3IONS-SCNC: 9 MMOL/L (ref 4–13)
ANION GAP SERPL CALCULATED.3IONS-SCNC: 9 MMOL/L (ref 4–13)
BACTERIA UR CULT: NORMAL
BUN SERPL-MCNC: 11 MG/DL (ref 5–25)
BUN SERPL-MCNC: 12 MG/DL (ref 5–25)
BUN SERPL-MCNC: 9 MG/DL (ref 5–25)
CALCIUM SERPL-MCNC: 7.6 MG/DL (ref 8.3–10.1)
CALCIUM SERPL-MCNC: 7.8 MG/DL (ref 8.3–10.1)
CALCIUM SERPL-MCNC: 7.9 MG/DL (ref 8.3–10.1)
CHLORIDE SERPL-SCNC: 105 MMOL/L (ref 100–108)
CHLORIDE SERPL-SCNC: 106 MMOL/L (ref 100–108)
CHLORIDE SERPL-SCNC: 106 MMOL/L (ref 100–108)
CO2 SERPL-SCNC: 20 MMOL/L (ref 21–32)
CO2 SERPL-SCNC: 21 MMOL/L (ref 21–32)
CO2 SERPL-SCNC: 22 MMOL/L (ref 21–32)
CREAT SERPL-MCNC: 0.72 MG/DL (ref 0.6–1.3)
CREAT SERPL-MCNC: 0.73 MG/DL (ref 0.6–1.3)
CREAT SERPL-MCNC: 0.82 MG/DL (ref 0.6–1.3)
ERYTHROCYTE [DISTWIDTH] IN BLOOD BY AUTOMATED COUNT: 16.7 % (ref 11.6–15.1)
GFR SERPL CREATININE-BSD FRML MDRD: 80 ML/MIN/1.73SQ M
GFR SERPL CREATININE-BSD FRML MDRD: 92 ML/MIN/1.73SQ M
GFR SERPL CREATININE-BSD FRML MDRD: 94 ML/MIN/1.73SQ M
GLUCOSE SERPL-MCNC: 129 MG/DL (ref 65–140)
GLUCOSE SERPL-MCNC: 84 MG/DL (ref 65–140)
GLUCOSE SERPL-MCNC: 87 MG/DL (ref 65–140)
HCT VFR BLD AUTO: 26 % (ref 34.8–46.1)
HGB BLD-MCNC: 8.3 G/DL (ref 11.5–15.4)
MCH RBC QN AUTO: 33.3 PG (ref 26.8–34.3)
MCHC RBC AUTO-ENTMCNC: 31.9 G/DL (ref 31.4–37.4)
MCV RBC AUTO: 104 FL (ref 82–98)
OSMOLALITY UR: 161 MMOL/KG
PLATELET # BLD AUTO: 233 THOUSANDS/UL (ref 149–390)
PMV BLD AUTO: 10 FL (ref 8.9–12.7)
POTASSIUM SERPL-SCNC: 4.1 MMOL/L (ref 3.5–5.3)
POTASSIUM SERPL-SCNC: 4.2 MMOL/L (ref 3.5–5.3)
POTASSIUM SERPL-SCNC: 4.3 MMOL/L (ref 3.5–5.3)
RBC # BLD AUTO: 2.49 MILLION/UL (ref 3.81–5.12)
SARS-COV-2 RNA RESP QL NAA+PROBE: NEGATIVE
SODIUM 24H UR-SCNC: 24 MOL/L
SODIUM SERPL-SCNC: 136 MMOL/L (ref 136–145)
WBC # BLD AUTO: 9.51 THOUSAND/UL (ref 4.31–10.16)

## 2020-07-13 PROCEDURE — 85027 COMPLETE CBC AUTOMATED: CPT | Performed by: INTERNAL MEDICINE

## 2020-07-13 PROCEDURE — 80048 BASIC METABOLIC PNL TOTAL CA: CPT | Performed by: INTERNAL MEDICINE

## 2020-07-13 PROCEDURE — 99233 SBSQ HOSP IP/OBS HIGH 50: CPT | Performed by: INTERNAL MEDICINE

## 2020-07-13 PROCEDURE — 80048 BASIC METABOLIC PNL TOTAL CA: CPT | Performed by: STUDENT IN AN ORGANIZED HEALTH CARE EDUCATION/TRAINING PROGRAM

## 2020-07-13 PROCEDURE — 87635 SARS-COV-2 COVID-19 AMP PRB: CPT | Performed by: STUDENT IN AN ORGANIZED HEALTH CARE EDUCATION/TRAINING PROGRAM

## 2020-07-13 RX ORDER — OXYCODONE HYDROCHLORIDE 5 MG/1
5 TABLET ORAL EVERY 4 HOURS PRN
Status: DISCONTINUED | OUTPATIENT
Start: 2020-07-13 | End: 2020-07-14

## 2020-07-13 RX ORDER — ACETAMINOPHEN 325 MG/1
650 TABLET ORAL EVERY 8 HOURS SCHEDULED
Status: DISCONTINUED | OUTPATIENT
Start: 2020-07-13 | End: 2020-07-15 | Stop reason: HOSPADM

## 2020-07-13 RX ORDER — LISINOPRIL 20 MG/1
20 TABLET ORAL DAILY
Status: DISCONTINUED | OUTPATIENT
Start: 2020-07-14 | End: 2020-07-13

## 2020-07-13 RX ORDER — HYDRALAZINE HYDROCHLORIDE 20 MG/ML
5 INJECTION INTRAMUSCULAR; INTRAVENOUS EVERY 6 HOURS PRN
Status: DISCONTINUED | OUTPATIENT
Start: 2020-07-13 | End: 2020-07-15 | Stop reason: HOSPADM

## 2020-07-13 RX ADMIN — ACETAMINOPHEN 650 MG: 325 TABLET, FILM COATED ORAL at 15:50

## 2020-07-13 RX ADMIN — DEXAMETHASONE 1 MG: 2 TABLET ORAL at 09:19

## 2020-07-13 RX ADMIN — CEFTRIAXONE 1000 MG: 1 INJECTION, POWDER, FOR SOLUTION INTRAMUSCULAR; INTRAVENOUS at 17:43

## 2020-07-13 RX ADMIN — ENOXAPARIN SODIUM 40 MG: 40 INJECTION SUBCUTANEOUS at 09:19

## 2020-07-13 RX ADMIN — SODIUM CHLORIDE 500 ML: 0.9 INJECTION, SOLUTION INTRAVENOUS at 09:24

## 2020-07-13 RX ADMIN — SODIUM CHLORIDE 125 ML/HR: 0.9 INJECTION, SOLUTION INTRAVENOUS at 21:23

## 2020-07-13 RX ADMIN — SODIUM CHLORIDE 125 ML/HR: 0.9 INJECTION, SOLUTION INTRAVENOUS at 15:49

## 2020-07-13 RX ADMIN — HYDROXYCHLOROQUINE SULFATE 100 MG: 200 TABLET, FILM COATED ORAL at 21:22

## 2020-07-13 RX ADMIN — OXYCODONE HYDROCHLORIDE 5 MG: 5 TABLET ORAL at 17:43

## 2020-07-13 RX ADMIN — SERTRALINE HYDROCHLORIDE 150 MG: 100 TABLET ORAL at 09:19

## 2020-07-13 RX ADMIN — LEVOTHYROXINE SODIUM 88 MCG: 88 TABLET ORAL at 05:23

## 2020-07-13 RX ADMIN — SODIUM CHLORIDE 500 ML: 0.9 INJECTION, SOLUTION INTRAVENOUS at 07:16

## 2020-07-13 RX ADMIN — ACETAMINOPHEN 650 MG: 325 TABLET, FILM COATED ORAL at 21:22

## 2020-07-13 RX ADMIN — CYCLOBENZAPRINE HYDROCHLORIDE 10 MG: 10 TABLET, FILM COATED ORAL at 21:22

## 2020-07-13 RX ADMIN — HYDROCORTISONE SODIUM SUCCINATE 100 MG: 100 INJECTION, POWDER, FOR SOLUTION INTRAMUSCULAR; INTRAVENOUS at 15:56

## 2020-07-13 RX ADMIN — HYDROCORTISONE SODIUM SUCCINATE 100 MG: 100 INJECTION, POWDER, FOR SOLUTION INTRAMUSCULAR; INTRAVENOUS at 23:51

## 2020-07-13 RX ADMIN — HYDROXYCHLOROQUINE SULFATE 200 MG: 200 TABLET, FILM COATED ORAL at 09:19

## 2020-07-13 NOTE — UTILIZATION REVIEW
Initial Clinical Review    Admission: Date/Time/Statement: Admission Orders (From admission, onward)     Ordered        07/12/20 1816  Inpatient Admission (expected length of stay for this patient Order details is greater than two midnights)  Once                   Orders Placed This Encounter   Procedures    Inpatient Admission (expected length of stay for this patient Order details is greater than two midnights)     Standing Status:   Standing     Number of Occurrences:   1     Order Specific Question:   Admitting Physician     Answer:   Curtis Howard [[id number]]     Order Specific Question:   Level of Care     Answer:   Med Surg [[id number]]     Order Specific Question:   Estimated length of stay     Answer:   More than 2 Midnights     Order Specific Question:   Certification     Answer:   I certify that inpatient services are medically necessary for this patient for a duration of greater than two midnights  See H&P and MD Progress Notes for additional information about the patient's course of treatment  ED Arrival Information     Expected Arrival Acuity Means of Arrival Escorted By Service Admission Type    - 7/12/2020 14:24 Urgent Walk-In Family Member General Medicine Urgent    Arrival Complaint    Vaginal Problem        Chief Complaint   Patient presents with   [facility] Medical Problem     Pt states that she has neck pain and lower back pain and had discharge when she went to the bathroom today  Unable to have a bowel movement for a few days  Last chemo treatment was this past tuesday  Assessment/Plan:  this is a 62year old female from home to ED admitted inpatient due to UTI/hyponatremia  History of breast cancer, recently completed chemo, SLE   presented due to worsening neck and lower back pain, constipated for last 5 days with nausea  On exam tachycardic  Diffuse abdominal tenderness  Anxious  Blood cultures done  COVID 19 negative  UA large leukocytes, moderate blood  Na 128  K 3 2   H&H 8 9/27 5 with baseline hgb of 9  CxR showed ground glass infiltrate  Ct abdomen showed possible cystitis  In the ED given 1 liter 0 9% NSS IVF, ativan 1 IV, rocephin IV  IV antibiotics, follow up on urine and blood cultures, IVF with target sodium 133 - 135 in progress  Replete K, monitor BNP  On 7/13/2020 hypotensive and hypoxic  Feels short of breath, has continued abdominal discomfort  On exam tachycardic, placed on oxygen 2 liters, abdominal distention with tenderness  IVF bolus of 500 cc given x 2, titrate oxygen  Continue antibiotics       ED Triage Vitals   Temperature Pulse Respirations Blood Pressure SpO2   07/12/20 1448 07/12/20 1443 07/12/20 1443 07/12/20 1443 07/12/20 1443   98 8 °F (37 1 °C) (!) 127 18 130/63 100 %      Temp Source Heart Rate Source Patient Position - Orthostatic VS BP Location FiO2 (%)   07/12/20 1448 07/12/20 1443 07/12/20 1443 07/12/20 1443 --   Oral Monitor Sitting Right arm       Pain Score       07/12/20 1443       9        Wt Readings from Last 1 Encounters:   07/13/20 56 kg (123 lb 7 3 oz)     Additional Vital Signs:   07/13/20 0954  --  --  --  90/48Abnormal   --  --  --  --  --  Sitting   07/13/20 0800  --  --  --  88/49Abnormal   --  --  --  --  --  --   07/13/20 0723  98 2 °F (36 8 °C)  100  16  80/54Abnormal   60  98 %  --  --  Nasal cannula  Lying   07/13/20 0628  98 8 °F (37 1 °C)  106Abnormal   --  86/54Abnormal   --  93 %  28  2 L/min  Nasal cannula  --   07/13/20 0627  --  --  --  88/54Abnormal   --  84 %Abnormal   --  --  None (Room air)  Lying   07/12/20 2205  99 5 °F (37 5 °C)  112Abnormal   18  92/49Abnormal   67  88 %Abnormal    --  --  None (Room air)  Sitting   SpO2: Notified nurse at 07/12/20 2205   07/12/20 1855  99 °F (37 2 °C)  118Abnormal   16  106/64  --  96 %  --  --  None (Room air)  Lying   07/12/20 1643  --  118Abnormal   18  107/57  --  97 %  --  --  None (Room air         Pertinent Labs/Diagnostic Test Results:   7/12/2020 CT abdomen  Mild enhancement of the bladder wall without thickening or stranding  This is of uncertain etiology and clinical significance though correlation with urinalysis is recommended as cystitis is not excluded  2   Otherwise no acute inflammatory process visualized in the abdomen or pelvis  3   Multifocal groundglass opacity at the lung bases  This is nonspecific though possibly inflammatory/infectious   In the setting of clinically suspected/proven COVID-19, the above lung parenchymal findings on CT indicate intermediate confidence level   for COVID-19   4   Small nonobstructing right renal calculus    7/12/2020 CxR Scattered groundglass infiltrates    Results from last 7 days   Lab Units 07/13/20  0948 07/12/20  1651   SARS-COV-2  Negative Negative     Results from last 7 days   Lab Units 07/13/20  0535 07/12/20  1515 07/06/20  1438   WBC Thousand/uL 9 51 9 72 9 37   HEMOGLOBIN g/dL 8 3* 8 9* 9 8*   HEMATOCRIT % 26 0* 27 5* 30 9*   PLATELETS Thousands/uL 233 242 359   NEUTROS ABS Thousands/µL  --  8 32* 7 61     Results from last 7 days   Lab Units 07/13/20  1213 07/13/20  0535 07/12/20  1515   SODIUM mmol/L 136 136 128*   POTASSIUM mmol/L 4 2 4 3 3 2*   CHLORIDE mmol/L 106 106 95*   CO2 mmol/L 20* 21 22   ANION GAP mmol/L 10 9 11   BUN mg/dL 9 12 21   CREATININE mg/dL 0 72 0 73 1 21   EGFR ml/min/1 73sq m 94 92 50   CALCIUM mg/dL 7 8* 7 9* 8 5     Results from last 7 days   Lab Units 07/12/20  1515   AST U/L 18   ALT U/L 14   ALK PHOS U/L 79   TOTAL PROTEIN g/dL 6 8   ALBUMIN g/dL 3 5   TOTAL BILIRUBIN mg/dL 0 26     Results from last 7 days   Lab Units 07/13/20  1213 07/13/20  0535 07/12/20  1515   GLUCOSE RANDOM mg/dL 84 87 102     Results from last 7 days   Lab Units 07/12/20  1952   OSMOLALITY, SERUM mmol/     Results from last 7 days   Lab Units 07/12/20  1515   TSH 3RD GENERATON uIU/mL 5 068*     Results from last 7 days   Lab Units 07/12/20  1515   LACTIC ACID mmol/L 0 6     Results from last 7 days   Lab Units 07/12/20  1702   CLARITY UA  Cloudy   COLOR UA  Yellow   SPEC GRAV UA  <=1 005   PH UA  5 5   GLUCOSE UA mg/dl Negative   KETONES UA mg/dl Negative   BLOOD UA  Moderate*   PROTEIN UA mg/dl Negative   NITRITE UA  Negative   BILIRUBIN UA  Negative   UROBILINOGEN UA E U /dl 0 2   LEUKOCYTES UA  Large*   WBC UA /hpf Innumerable*   RBC UA /hpf 2-4*   BACTERIA UA /hpf Occasional   EPITHELIAL CELLS WET PREP /hpf Occasional   SODIUM UR  24     Results from last 7 days   Lab Units 07/12/20  1836   BLOOD CULTURE  Received in Microbiology Lab  Culture in Progress  Received in Microbiology Lab  Culture in Progress       ED Treatment:   Medication Administration from 07/12/2020 1424 to 07/12/2020 1921       Date/Time Order Dose Route Action Comments     07/12/2020 1524 sodium chloride 0 9 % bolus 1,000 mL 1,000 mL Intravenous New Bag      07/12/2020 1522 LORazepam (ATIVAN) injection 1 mg 1 mg Intravenous Given      07/12/2020 1840 ceftriaxone (ROCEPHIN) 1 g/50 mL in dextrose IVPB 1,000 mg Intravenous New Bag         Past Medical History:   Diagnosis Date    Disease of thyroid gland     Hypertension     Lupus ([facility] [address] )     Malignant neoplasm of upper-inner quadrant of left breast in female, estrogen receptor negative ([facility] [address] ) 2/25/2020    Nephrolithiasis     PTSD (post-traumatic stress disorder)      Present on Admission:   Systemic lupus erythematosus ([facility] [address] )   Hypothyroidism   Hypertension   Anxiety   Hyponatremia   Hypokalemia      Admitting Diagnosis: Dehydration [E86 0]  Acute hyponatremia [E87 1]  Acute hypokalemia [E87 6]  UTI (urinary tract infection) [N39 0]  Vaginal pain [R10 2]  Age/Sex: 62 y o  female  Admission Orders: 7/12/2020 1816 inpatient   Scheduled Medications:  Medications:  cefTRIAXone 1,000 mg Intravenous Q24H   cyclobenzaprine 10 mg Oral HS   dexamethasone 1 mg Oral Daily With Breakfast   enoxaparin 40 mg Subcutaneous Daily   hydroxychloroquine 100 mg Oral HS   hydroxychloroquine 200 mg Oral Daily With Breakfast   levothyroxine 88 mcg Oral Daily   [START ON 7/14/2020] lisinopril 20 mg Oral Daily   nicotine 1 patch Transdermal Daily   sertraline 150 mg Oral Daily     Continuous IV Infusions:  sodium chloride 125 mL/hr Intravenous Continuous   sodium chloride 0 9 % bolus 500 mL   Dose: 500 mL  Freq: Once Route: IV  Last Dose: 500 mL (07/13/20 0716)  Start: 07/13/20 0700 End: 07/13/20 0916  sodium chloride 0 9 % bolus 500 mL   Dose: 500 mL  Freq: Once Route: IV  Last Dose: 500 mL (07/13/20 0924)  Start: 07/13/20 0900 End: 07/13/20 1124    PRN Meds: not used   acetaminophen 650 mg Oral Q6H PRN   ALPRAZolam 1 mg Oral TID PRN   ondansetron 4 mg Intravenous Q6H PRN     Contact and airborne isolation       Network Utilization Review Department  [email or web address]  ATTENTION: Please call with any questions or concerns to [phone] and carefully listen to the prompts so that you are directed to the right person  All voicemails are confidential   Rustam Miranda all requests for admission clinical reviews, approved or denied determinations and any other requests to dedicated fax number below belonging to the campus where the patient is receiving treatment   List of dedicated fax numbers for the Facilities:  [address] DENIALS (Administrative/Medical Necessity) [phone]   [address] (Maternity/NICU/Pediatrics) [phone]   Antonio Lewis [phone]   Darian Bowers [phone]   Luanne Covington [phone]   Stephanie Birmingham [phone]   [facility] [address] [phone]   [facility]  [phone]   [address] [facility] [address] [facility]  [address] [facility] [address] [facility] [phone]

## 2020-07-13 NOTE — PLAN OF CARE
Problem: Potential for Falls  Goal: Patient will remain free of falls  Description  INTERVENTIONS:  - Assess patient frequently for physical needs  -  Identify cognitive and physical deficits and behaviors that affect risk of falls    -  [place] fall precautions as indicated by assessment   - Educate patient/family on patient safety including physical limitations  - Instruct patient to call for assistance with activity based on assessment  - Modify environment to reduce risk of injury  - Consider OT/PT consult to assist with strengthening/mobility  Outcome: Progressing     Problem: PAIN - ADULT  Goal: Verbalizes/displays adequate comfort level or baseline comfort level  Description  Interventions:  - Encourage patient to monitor pain and request assistance  - Assess pain using appropriate pain scale  - Administer analgesics based on type and severity of pain and evaluate response  - Implement non-pharmacological measures as appropriate and evaluate response  - Consider cultural and social influences on pain and pain management  - Notify physician/advanced practitioner if interventions unsuccessful or patient reports new pain  Outcome: Progressing     Problem: INFECTION - ADULT  Goal: Absence or prevention of progression during hospitalization  Description  INTERVENTIONS:  - Assess and monitor for signs and symptoms of infection  - Monitor lab/diagnostic results  - Monitor all insertion sites, i e  indwelling lines, tubes, and drains  - Monitor endotracheal if appropriate and nasal secretions for changes in amount and color  - [place] appropriate cooling/warming therapies per order  - Administer medications as ordered  - Instruct and encourage patient and family to use good hand hygiene technique  - Identify and instruct in appropriate isolation precautions for identified infection/condition  Outcome: Progressing     Problem: SAFETY ADULT  Goal: Maintain or return to baseline ADL function  Description  INTERVENTIONS:  -  Assess patient's ability to carry out ADLs; assess patient's baseline for ADL function and identify physical deficits which impact ability to perform ADLs (bathing, care of mouth/teeth, toileting, grooming, dressing, etc )  - Assess/evaluate cause of self-care deficits   - Assess range of motion  - Assess patient's mobility; develop plan if impaired  - Assess patient's need for assistive devices and provide as appropriate  - Encourage maximum independence but intervene and supervise when necessary  - Involve family in performance of ADLs  - Assess for home care needs following discharge   - Consider OT consult to assist with ADL evaluation and planning for discharge  - Provide patient education as appropriate  Outcome: Progressing  Goal: Maintain or return mobility status to optimal level  Description  INTERVENTIONS:  - Assess patient's baseline mobility status (ambulation, transfers, stairs, etc )    - Identify cognitive and physical deficits and behaviors that affect mobility  - Identify mobility aids required to assist with transfers and/or ambulation (gait belt, sit-to-stand, lift, walker, cane, etc )  - [place] fall precautions as indicated by assessment  - Record patient progress and toleration of activity level on Mobility SBAR; progress patient to next Phase/Stage  - Instruct patient to call for assistance with activity based on assessment  - Consider rehabilitation consult to assist with strengthening/weightbearing, etc   Outcome: Progressing     Problem: DISCHARGE PLANNING  Goal: Discharge to home or other facility with appropriate resources  Description  INTERVENTIONS:  - Identify barriers to discharge w/patient and caregiver  - Arrange for needed discharge resources and transportation as appropriate  - Identify discharge learning needs (meds, wound care, etc )  - Arrange for interpretive services to assist at discharge as needed  - Refer to Case Management Department for coordinating discharge planning if the patient needs post-hospital services based on physician/advanced practitioner order or complex needs related to functional status, cognitive ability, or social support system  Outcome: Progressing     Problem: Knowledge Deficit  Goal: Patient/family/caregiver demonstrates understanding of disease process, treatment plan, medications, and discharge instructions  Description  Complete learning assessment and assess knowledge base    Interventions:  - Provide teaching at level of understanding  - Provide teaching via preferred learning methods  Outcome: Progressing

## 2020-07-13 NOTE — QUICK NOTE
0630 Notified for hypotension and hypoxia  Upon review of vitals, patient has been hypotensive and hypoxic since 2200 last night  Patient reports feeling short of breath overnight  She complains of generalized abd discomfort  Reports she has not urinated since last night some time  Has not been bladder scanned  Reports dysuria  Denies nausea, vomiting, diarrhea, fever, chills  CT abd pelvis: multifocal groundglass opacity at bases  COVID negative on admission  Prior echo LVEF 60%      Physical Exam:     Physical Exam   Constitutional: She is oriented to person, place, and time  She appears well-developed and well-nourished  No distress  HENT:   Head: Normocephalic and atraumatic  Eyes: Conjunctivae and EOM are normal  No scleral icterus  Neck: Normal range of motion  Neck supple  Cardiovascular: Regular rhythm, normal heart sounds and intact distal pulses  Exam reveals no friction rub  No murmur heard  Tachycardic   Pulmonary/Chest: Effort normal and breath sounds normal  No respiratory distress  She has no wheezes  She has no rales  2 L nasal cannula  Abdominal: Soft  Bowel sounds are normal  She exhibits distension  There is tenderness (Bilateral lower quadrants)  There is no rigidity, no rebound, no guarding, no tenderness at McBurney's point and negative López's sign  Musculoskeletal: Normal range of motion  She exhibits no edema or tenderness  Neurological: She is alert and oriented to person, place, and time  Skin: Skin is warm and dry  Capillary refill takes less than 2 seconds  Psychiatric: She has a normal mood and affect  Continue nasal cannula  Titrate  Incentive spirometer  500 cc bolus  Q4 vitals  Could consider ddimer, but pt currently denying any sob or chest pain  Remains tachycardic

## 2020-07-13 NOTE — PROGRESS NOTES
Progress Note - Shellie Rivero 1965, 62 y o  female MRN: [id number]    Unit/Bed#: S -01 Encounter: [id number]    Primary Care Provider: Kadie Her MD   Date and time admitted to hospital: 7/12/2020  2:39 PM        * UTI (urinary tract infection)  Assessment & Plan  · Patient presented with suprapubic pain, dysuria  Denies hematuria, no fevers or chills  · UA showed:  Positive leukocytes, moderate blood, RBC 2-4, WBC enumerable, occasional bacteria  · Lactic acid normal, no leukocytosis  · Started on IV Rocephin, continue with IV antibiotics  · Await final results of urine culture  · Awaiting blood cultures x2  · Monitor vitals, trend fever curve  · Does not meet Sepsis criteria at this time  Hypotension  Assessment & Plan  · BP on admission was 130/63  Progressively trended down overnight to 80/44  Patient was administered two 500cc boluses over 2 hour period  BP increased top 90/48  · Patient states that her systolic BP is normally around 110  · During evaluation this AM, patient is alert and oriented  She denies chest pain, SOB, dizziness, and lightheadedness  · Increased IVF from 100cc/hr to 125cc/hr  Repeat BP 89/55  · Likely etiology: Suspect this is a systemic response secondary to UTI  Does not qualify for Sepsis at this time  · Continuing to trend vitals  Hyponatremia  Assessment & Plan  · Patient reports decreased appetite for the past few days  Denies nausea and vomiting  · Presented with sodium 128  Ur Osm: 161, Serum Osm: 285, Ur Sodium: 24   · Suspect a pseudohyponatremia  Normal protein levels  Unknown cholesterol levels  · Start IV fluids  cc/hour  · Sodium corrected overnight  Currently 136  · Recheck BMP 8PM (7/13) & AM (7/14)  Anemia  Assessment & Plan  · Per chart review, baseline around 9  Currently 8 3     · Patient denies hematuria, GI bleed  · Likely in the setting of chronic illness, diet, possibly chemotherapy contributing, or hemodilution  · Continue to monitor    Hypokalemia  Assessment & Plan  · Likely due to poor appetite, repleted  · Repeat K+ 4 3    · Monitor BMP, replace as necessary    Malignant neoplasm of upper-inner quadrant of left breast in female, estrogen receptor negative ([facility] [address] )  Assessment & Plan  · Patient has history of left breast cancer, completed course of chemotherapy, with recent MRI showing complete resolution of malignancy  · Patient is supposed to undergo mastectomy scheduled for 7/24/2020  · Continue follow-up with Surgical Oncology    Anxiety  Assessment & Plan  · Patient stated she feels anxious specially for her upcoming surgery  · Likely contributing to patient's tachycardia  · Continue Xanax 1 tab t i d  P r n , Zoloft 150 mg daily    Hypothyroidism  Assessment & Plan  · Mild Subclinical Hypothyroidism: TSH noted to be elevated  Free T4 within normal range  · Patient complains of fatigue, but had just finished chemotherapy regimen for diagnosed breast cancer  · Continue on current medication  · Consider rechecking TSH in 4-6 weeks  Systemic lupus erythematosus (HCC)  Assessment & Plan  · Continue dexamethasone, Plaquenil  · Continue follow-up as outpatient    Hypertension  Assessment & Plan  · Maintained on Univasc 15 mg daily not available on formulary  · Will substitute lisinopril 20 mg daily  · Continue monitor BP        VTE Pharmacologic Prophylaxis:   Pharmacologic: Enoxaparin (Lovenox)  Mechanical VTE Prophylaxis in Place: Yes    Discussions with Specialists or Other Care Team Provider: None     Education and Discussions with Family / Patient: Attempted to call   Will try to call again this afternoon  Current Length of Stay: 1 day(s)    Current Patient Status: Inpatient     Discharge Plan / Estimated Discharge Date: Unknown    Code Status: Level 1 - Full Code      Subjective:   Patient complained of SOB overnight  Stated that 2L Nasal canula helped alleviate her SOB   She denied chest pains, diaphoresis, nausea, vomiting, dizziness, or lightheadedness  She also complains of dysuria but denies abdominal pains  Complains of 8/10 back pain related to her cancer  States that she takes 5-10mg oxycodone q4hr prn to help alleviate sx  Objective:     Vitals:   Temp (24hrs), Av 9 °F (37 2 °C), Min:98 2 °F (36 8 °C), Max:99 5 °F (37 5 °C)    Temp:  [98 2 °F (36 8 °C)-99 5 °F (37 5 °C)] 98 2 °F (36 8 °C)  HR:  [100-127] 100  Resp:  [16-18] 16  BP: ()/(48-64) 89/55  SpO2:  [84 %-100 %] 98 %  Body mass index is 21 87 kg/m²  Input and Output Summary (last 24 hours): Intake/Output Summary (Last 24 hours) at 2020 1442  Last data filed at 2020 1125  Gross per 24 hour   Intake 1050 ml   Output 1350 ml   Net -300 ml       Physical Exam:     Physical Exam   Constitutional: She is oriented to person, place, and time  She appears well-developed  No distress  HENT:   Head: Normocephalic and atraumatic  Eyes: Pupils are equal, round, and reactive to light  Conjunctivae and EOM are normal    Cardiovascular: S1 normal and S2 normal  Tachycardia present  No murmur heard  Pulmonary/Chest: Effort normal and breath sounds normal  No stridor  No respiratory distress  She has no wheezes  She has no rales  Abdominal: Soft  She exhibits no distension  There is tenderness (suprapubic )  There is no guarding  Neurological: She is alert and oriented to person, place, and time  Psychiatric: She has a normal mood and affect           Additional Data:     Labs:    Results from last 7 days   Lab Units 20  0535 20  1515   WBC Thousand/uL 9 51 9 72   HEMOGLOBIN g/dL 8 3* 8 9*   HEMATOCRIT % 26 0* 27 5*   PLATELETS Thousands/uL 233 242   NEUTROS PCT %  --  86*   LYMPHS PCT %  --  7*   MONOS PCT %  --  6   EOS PCT %  --  0     Results from last 7 days   Lab Units 20  1213  07/12/20  1515   POTASSIUM mmol/L 4 2   < > 3 2*   CHLORIDE mmol/L 106   < > 95*   CO2 mmol/L 20* < > 22   BUN mg/dL 9   < > 21   CREATININE mg/dL 0 72   < > 1 21   CALCIUM mg/dL 7 8*   < > 8 5   ALK PHOS U/L  --   --  79   ALT U/L  --   --  14   AST U/L  --   --  18    < > = values in this interval not displayed  * I Have Reviewed All Lab Data Listed Above  * Additional Pertinent Lab Tests Reviewed: All Labs Within Last 24 Hours Reviewed    Imaging:    Imaging Reports Reviewed Today Include: None   Imaging Personally Reviewed by Myself Includes:  None    Recent Cultures (last 7 days):     Results from last 7 days   Lab Units 07/12/20  1836   BLOOD CULTURE  Received in Microbiology Lab  Culture in Progress  Received in Microbiology Lab  Culture in Progress  Last 24 Hours Medication List:     Current Facility-Administered Medications:  acetaminophen 650 mg Oral Q6H PRN Bandar Trammell MD    ALPRAZolam 1 mg Oral TID PRN Bandar Trammell MD    cefTRIAXone 1,000 mg Intravenous Q24H Viet Jj MD    cyclobenzaprine 10 mg Oral HS Viet Jj MD    dexamethasone 1 mg Oral Daily With Breakfast Paulina Jj MD    enoxaparin 40 mg Subcutaneous Daily Bandar Trammell MD    hydroxychloroquine 100 mg Oral HS Bandar Trammell MD    hydroxychloroquine 200 mg Oral Daily With Breakfast Bandar Trammell MD    levothyroxine 88 mcg Oral Daily Bandar Trammell MD    [START ON 7/14/2020] lisinopril 20 mg Oral Daily Vivek Shields DO    nicotine 1 patch Transdermal Daily Viet Jj MD    ondansetron 4 mg Intravenous Q6H PRN Bandar Trammell MD    sertraline 150 mg Oral Daily Viet Jj MD    sodium chloride 125 mL/hr Intravenous Continuous Vivek Shields DO Last Rate: 125 mL/hr (07/13/20 1211)        Today, Patient Was Seen By: Rodrigo Baires DO    ** Please Note: This note has been constructed using a voice recognition system   **

## 2020-07-13 NOTE — ASSESSMENT & PLAN NOTE
· Mild Subclinical Hypothyroidism: TSH noted to be elevated  Free T4 within normal range  · Patient complains of fatigue, but had just finished chemotherapy regimen for diagnosed breast cancer  · Continue on current medication  · Consider rechecking TSH in 4-6 weeks

## 2020-07-13 NOTE — ASSESSMENT & PLAN NOTE
· Per chart review, baseline around 9  Currently 8 3  · Patient denies hematuria, GI bleed  · Likely in the setting of chronic illness, diet, possibly chemotherapy contributing, or hemodilution    · Continue to monitor

## 2020-07-13 NOTE — PLAN OF CARE
Problem: Potential for Falls  Goal: Patient will remain free of falls  Description  INTERVENTIONS:  - Assess patient frequently for physical needs  -  Identify cognitive and physical deficits and behaviors that affect risk of falls    -  [place] fall precautions as indicated by assessment   - Educate patient/family on patient safety including physical limitations  - Instruct patient to call for assistance with activity based on assessment  - Modify environment to reduce risk of injury  - Consider OT/PT consult to assist with strengthening/mobility  Outcome: Progressing     Problem: PAIN - ADULT  Goal: Verbalizes/displays adequate comfort level or baseline comfort level  Description  Interventions:  - Encourage patient to monitor pain and request assistance  - Assess pain using appropriate pain scale  - Administer analgesics based on type and severity of pain and evaluate response  - Implement non-pharmacological measures as appropriate and evaluate response  - Consider cultural and social influences on pain and pain management  - Notify physician/advanced practitioner if interventions unsuccessful or patient reports new pain  Outcome: Progressing     Problem: INFECTION - ADULT  Goal: Absence or prevention of progression during hospitalization  Description  INTERVENTIONS:  - Assess and monitor for signs and symptoms of infection  - Monitor lab/diagnostic results  - Monitor all insertion sites, i e  indwelling lines, tubes, and drains  - Monitor endotracheal if appropriate and nasal secretions for changes in amount and color  - [place] appropriate cooling/warming therapies per order  - Administer medications as ordered  - Instruct and encourage patient and family to use good hand hygiene technique  - Identify and instruct in appropriate isolation precautions for identified infection/condition  Outcome: Progressing     Problem: SAFETY ADULT  Goal: Maintain or return to baseline ADL function  Description  INTERVENTIONS:  -  Assess patient's ability to carry out ADLs; assess patient's baseline for ADL function and identify physical deficits which impact ability to perform ADLs (bathing, care of mouth/teeth, toileting, grooming, dressing, etc )  - Assess/evaluate cause of self-care deficits   - Assess range of motion  - Assess patient's mobility; develop plan if impaired  - Assess patient's need for assistive devices and provide as appropriate  - Encourage maximum independence but intervene and supervise when necessary  - Involve family in performance of ADLs  - Assess for home care needs following discharge   - Consider OT consult to assist with ADL evaluation and planning for discharge  - Provide patient education as appropriate  Outcome: Progressing  Goal: Maintain or return mobility status to optimal level  Description  INTERVENTIONS:  - Assess patient's baseline mobility status (ambulation, transfers, stairs, etc )    - Identify cognitive and physical deficits and behaviors that affect mobility  - Identify mobility aids required to assist with transfers and/or ambulation (gait belt, sit-to-stand, lift, walker, cane, etc )  - [place] fall precautions as indicated by assessment  - Record patient progress and toleration of activity level on Mobility SBAR; progress patient to next Phase/Stage  - Instruct patient to call for assistance with activity based on assessment  - Consider rehabilitation consult to assist with strengthening/weightbearing, etc   Outcome: Progressing     Problem: DISCHARGE PLANNING  Goal: Discharge to home or other facility with appropriate resources  Description  INTERVENTIONS:  - Identify barriers to discharge w/patient and caregiver  - Arrange for needed discharge resources and transportation as appropriate  - Identify discharge learning needs (meds, wound care, etc )  - Arrange for interpretive services to assist at discharge as needed  - Refer to Case Management Department for coordinating discharge planning if the patient needs post-hospital services based on physician/advanced practitioner order or complex needs related to functional status, cognitive ability, or social support system  Outcome: Progressing     Problem: Knowledge Deficit  Goal: Patient/family/caregiver demonstrates understanding of disease process, treatment plan, medications, and discharge instructions  Description  Complete learning assessment and assess knowledge base    Interventions:  - Provide teaching at level of understanding  - Provide teaching via preferred learning methods  Outcome: Progressing

## 2020-07-13 NOTE — ASSESSMENT & PLAN NOTE
· Patient presented with suprapubic pain, dysuria  Denies hematuria, no fevers or chills  · UA showed:  Positive leukocytes, moderate blood, RBC 2-4, WBC enumerable, occasional bacteria  · Lactic acid normal, no leukocytosis  · Started on IV Rocephin, continue with IV antibiotics  · Await final results of urine culture  · Awaiting blood cultures x2  · Monitor vitals, trend fever curve  · Does not meet Sepsis criteria at this time

## 2020-07-13 NOTE — ASSESSMENT & PLAN NOTE
· BP on admission was 130/63  Progressively trended down overnight to 80/44  Patient was administered two 500cc boluses over 2 hour period  BP increased top 90/48  · Patient states that her systolic BP is normally around 110  · During evaluation this AM, patient is alert and oriented  She denies chest pain, SOB, dizziness, and lightheadedness  · Increased IVF from 100cc/hr to 125cc/hr  Repeat BP 89/55  · Likely etiology: Suspect this is a systemic response secondary to UTI  Does not qualify for Sepsis at this time  · Continuing to trend vitals

## 2020-07-14 LAB
ANION GAP SERPL CALCULATED.3IONS-SCNC: 11 MMOL/L (ref 4–13)
BASOPHILS # BLD AUTO: 0.01 THOUSANDS/ΜL (ref 0–0.1)
BASOPHILS NFR BLD AUTO: 0 % (ref 0–1)
BUN SERPL-MCNC: 9 MG/DL (ref 5–25)
CALCIUM SERPL-MCNC: 7.9 MG/DL (ref 8.3–10.1)
CHLORIDE SERPL-SCNC: 109 MMOL/L (ref 100–108)
CO2 SERPL-SCNC: 18 MMOL/L (ref 21–32)
CREAT SERPL-MCNC: 0.63 MG/DL (ref 0.6–1.3)
EOSINOPHIL # BLD AUTO: 0 THOUSAND/ΜL (ref 0–0.61)
EOSINOPHIL NFR BLD AUTO: 0 % (ref 0–6)
ERYTHROCYTE [DISTWIDTH] IN BLOOD BY AUTOMATED COUNT: 17.1 % (ref 11.6–15.1)
GFR SERPL CREATININE-BSD FRML MDRD: 101 ML/MIN/1.73SQ M
GLUCOSE SERPL-MCNC: 110 MG/DL (ref 65–140)
HCT VFR BLD AUTO: 26.5 % (ref 34.8–46.1)
HGB BLD-MCNC: 8.3 G/DL (ref 11.5–15.4)
IMM GRANULOCYTES # BLD AUTO: 0.04 THOUSAND/UL (ref 0–0.2)
IMM GRANULOCYTES NFR BLD AUTO: 1 % (ref 0–2)
LYMPHOCYTES # BLD AUTO: 0.52 THOUSANDS/ΜL (ref 0.6–4.47)
LYMPHOCYTES NFR BLD AUTO: 8 % (ref 14–44)
MCH RBC QN AUTO: 33.6 PG (ref 26.8–34.3)
MCHC RBC AUTO-ENTMCNC: 31.3 G/DL (ref 31.4–37.4)
MCV RBC AUTO: 107 FL (ref 82–98)
MONOCYTES # BLD AUTO: 0.26 THOUSAND/ΜL (ref 0.17–1.22)
MONOCYTES NFR BLD AUTO: 4 % (ref 4–12)
NEUTROPHILS # BLD AUTO: 6.14 THOUSANDS/ΜL (ref 1.85–7.62)
NEUTS SEG NFR BLD AUTO: 87 % (ref 43–75)
NRBC BLD AUTO-RTO: 0 /100 WBCS
PLATELET # BLD AUTO: 227 THOUSANDS/UL (ref 149–390)
PMV BLD AUTO: 10.6 FL (ref 8.9–12.7)
POTASSIUM SERPL-SCNC: 3.8 MMOL/L (ref 3.5–5.3)
RBC # BLD AUTO: 2.47 MILLION/UL (ref 3.81–5.12)
SODIUM SERPL-SCNC: 138 MMOL/L (ref 136–145)
WBC # BLD AUTO: 6.97 THOUSAND/UL (ref 4.31–10.16)

## 2020-07-14 PROCEDURE — 80048 BASIC METABOLIC PNL TOTAL CA: CPT | Performed by: STUDENT IN AN ORGANIZED HEALTH CARE EDUCATION/TRAINING PROGRAM

## 2020-07-14 PROCEDURE — 99232 SBSQ HOSP IP/OBS MODERATE 35: CPT | Performed by: HOSPITALIST

## 2020-07-14 PROCEDURE — 85025 COMPLETE CBC W/AUTO DIFF WBC: CPT | Performed by: INTERNAL MEDICINE

## 2020-07-14 RX ORDER — OXYCODONE HYDROCHLORIDE 5 MG/1
5 TABLET ORAL EVERY 4 HOURS PRN
Status: DISCONTINUED | OUTPATIENT
Start: 2020-07-14 | End: 2020-07-15 | Stop reason: HOSPADM

## 2020-07-14 RX ORDER — DEXAMETHASONE 2 MG/1
1 TABLET ORAL DAILY
Status: DISCONTINUED | OUTPATIENT
Start: 2020-07-14 | End: 2020-07-15 | Stop reason: HOSPADM

## 2020-07-14 RX ORDER — OXYCODONE HYDROCHLORIDE 10 MG/1
10 TABLET ORAL EVERY 4 HOURS PRN
Status: DISCONTINUED | OUTPATIENT
Start: 2020-07-14 | End: 2020-07-15 | Stop reason: HOSPADM

## 2020-07-14 RX ADMIN — SODIUM CHLORIDE 125 ML/HR: 0.9 INJECTION, SOLUTION INTRAVENOUS at 04:25

## 2020-07-14 RX ADMIN — ACETAMINOPHEN 650 MG: 325 TABLET, FILM COATED ORAL at 21:09

## 2020-07-14 RX ADMIN — OXYCODONE HYDROCHLORIDE 10 MG: 10 TABLET ORAL at 21:43

## 2020-07-14 RX ADMIN — HYDROCORTISONE SODIUM SUCCINATE 100 MG: 100 INJECTION, POWDER, FOR SOLUTION INTRAMUSCULAR; INTRAVENOUS at 05:17

## 2020-07-14 RX ADMIN — ACETAMINOPHEN 650 MG: 325 TABLET, FILM COATED ORAL at 13:48

## 2020-07-14 RX ADMIN — LEVOTHYROXINE SODIUM 88 MCG: 88 TABLET ORAL at 05:17

## 2020-07-14 RX ADMIN — DEXAMETHASONE 1 MG: 2 TABLET ORAL at 12:42

## 2020-07-14 RX ADMIN — SERTRALINE HYDROCHLORIDE 150 MG: 100 TABLET ORAL at 08:55

## 2020-07-14 RX ADMIN — OXYCODONE HYDROCHLORIDE 10 MG: 10 TABLET ORAL at 12:52

## 2020-07-14 RX ADMIN — ACETAMINOPHEN 650 MG: 325 TABLET, FILM COATED ORAL at 05:16

## 2020-07-14 RX ADMIN — OXYCODONE HYDROCHLORIDE 10 MG: 10 TABLET ORAL at 17:37

## 2020-07-14 RX ADMIN — HYDROXYCHLOROQUINE SULFATE 100 MG: 200 TABLET, FILM COATED ORAL at 21:10

## 2020-07-14 RX ADMIN — ALPRAZOLAM 1 MG: 0.5 TABLET ORAL at 09:00

## 2020-07-14 RX ADMIN — ENOXAPARIN SODIUM 40 MG: 40 INJECTION SUBCUTANEOUS at 08:55

## 2020-07-14 RX ADMIN — CEFTRIAXONE 1000 MG: 1 INJECTION, POWDER, FOR SOLUTION INTRAMUSCULAR; INTRAVENOUS at 17:37

## 2020-07-14 RX ADMIN — OXYCODONE HYDROCHLORIDE 5 MG: 5 TABLET ORAL at 05:25

## 2020-07-14 RX ADMIN — CYCLOBENZAPRINE HYDROCHLORIDE 10 MG: 10 TABLET, FILM COATED ORAL at 21:09

## 2020-07-14 RX ADMIN — HYDROXYCHLOROQUINE SULFATE 200 MG: 200 TABLET, FILM COATED ORAL at 08:55

## 2020-07-14 NOTE — ASSESSMENT & PLAN NOTE
· Per chart review, baseline around 9  Currently 8 3  Stable  · Patient denies hematuria, GI bleed  · Likely in the setting of chronic illness, diet, possibly chemotherapy contributing, or hemodilution    · Continue to monitor

## 2020-07-14 NOTE — ASSESSMENT & PLAN NOTE
· Patient presented with suprapubic pain, dysuria  Denies hematuria, no fevers or chills  Dysuria has resolved  · UA showed:  Positive leukocytes, moderate blood, RBC 2-4, WBC enumerable, occasional bacteria  · Lactic acid normal, no leukocytosis  · Started on IV Rocephin 7/12  Will receive 3 days of total treatment this evening, 7/14  · Urine culture: 10,000-19,000 Mixed Contaminants  · Blood cultures: No growth at 24hrs x2  · Monitor vitals, trend fever curve  Plan:   · Even though the patient is actively receiving chemotherapy for diagnosed breast cancer (last and final treatment before surgery), she appears to be immunocompetent based on recent lab values  In addition, she remains afebrile, continues to have stable VS, and her symptoms of dysuria have also significantly improved with antibiotic therapy  Considering this, we will treat this patient as an uncomplicated UTI  · She will finish her last dose of IV Ceftriaxone this evening

## 2020-07-14 NOTE — ASSESSMENT & PLAN NOTE
· Will transition the patient from IV hydrocortisone back to home regimen of dexamethasone     · Continue follow-up as outpatient

## 2020-07-14 NOTE — ASSESSMENT & PLAN NOTE
· Patient reports decreased appetite for the past few days  Denies nausea and vomiting  · IV fluids  cc/hour were started  · Sodium corrected overnight  · Sodium remains stable at 138

## 2020-07-14 NOTE — ASSESSMENT & PLAN NOTE
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

## 2020-07-14 NOTE — PLAN OF CARE
Problem: Potential for Falls  Goal: Patient will remain free of falls  Description  INTERVENTIONS:  - Assess patient frequently for physical needs  -  Identify cognitive and physical deficits and behaviors that affect risk of falls    -  [place] fall precautions as indicated by assessment   - Educate patient/family on patient safety including physical limitations  - Instruct patient to call for assistance with activity based on assessment  - Modify environment to reduce risk of injury  - Consider OT/PT consult to assist with strengthening/mobility  Outcome: Progressing     Problem: PAIN - ADULT  Goal: Verbalizes/displays adequate comfort level or baseline comfort level  Description  Interventions:  - Encourage patient to monitor pain and request assistance  - Assess pain using appropriate pain scale  - Administer analgesics based on type and severity of pain and evaluate response  - Implement non-pharmacological measures as appropriate and evaluate response  - Consider cultural and social influences on pain and pain management  - Notify physician/advanced practitioner if interventions unsuccessful or patient reports new pain  Outcome: Progressing     Problem: INFECTION - ADULT  Goal: Absence or prevention of progression during hospitalization  Description  INTERVENTIONS:  - Assess and monitor for signs and symptoms of infection  - Monitor lab/diagnostic results  - Monitor all insertion sites, i e  indwelling lines, tubes, and drains  - Monitor endotracheal if appropriate and nasal secretions for changes in amount and color  - [place] appropriate cooling/warming therapies per order  - Administer medications as ordered  - Instruct and encourage patient and family to use good hand hygiene technique  - Identify and instruct in appropriate isolation precautions for identified infection/condition  Outcome: Progressing     Problem: SAFETY ADULT  Goal: Maintain or return to baseline ADL function  Description  INTERVENTIONS:  -  Assess patient's ability to carry out ADLs; assess patient's baseline for ADL function and identify physical deficits which impact ability to perform ADLs (bathing, care of mouth/teeth, toileting, grooming, dressing, etc )  - Assess/evaluate cause of self-care deficits   - Assess range of motion  - Assess patient's mobility; develop plan if impaired  - Assess patient's need for assistive devices and provide as appropriate  - Encourage maximum independence but intervene and supervise when necessary  - Involve family in performance of ADLs  - Assess for home care needs following discharge   - Consider OT consult to assist with ADL evaluation and planning for discharge  - Provide patient education as appropriate  Outcome: Progressing  Goal: Maintain or return mobility status to optimal level  Description  INTERVENTIONS:  - Assess patient's baseline mobility status (ambulation, transfers, stairs, etc )    - Identify cognitive and physical deficits and behaviors that affect mobility  - Identify mobility aids required to assist with transfers and/or ambulation (gait belt, sit-to-stand, lift, walker, cane, etc )  - [place] fall precautions as indicated by assessment  - Record patient progress and toleration of activity level on Mobility SBAR; progress patient to next Phase/Stage  - Instruct patient to call for assistance with activity based on assessment  - Consider rehabilitation consult to assist with strengthening/weightbearing, etc   Outcome: Progressing     Problem: DISCHARGE PLANNING  Goal: Discharge to home or other facility with appropriate resources  Description  INTERVENTIONS:  - Identify barriers to discharge w/patient and caregiver  - Arrange for needed discharge resources and transportation as appropriate  - Identify discharge learning needs (meds, wound care, etc )  - Arrange for interpretive services to assist at discharge as needed  - Refer to Case Management Department for coordinating discharge planning if the patient needs post-hospital services based on physician/advanced practitioner order or complex needs related to functional status, cognitive ability, or social support system  Outcome: Progressing     Problem: Knowledge Deficit  Goal: Patient/family/caregiver demonstrates understanding of disease process, treatment plan, medications, and discharge instructions  Description  Complete learning assessment and assess knowledge base    Interventions:  - Provide teaching at level of understanding  - Provide teaching via preferred learning methods  Outcome: Progressing

## 2020-07-14 NOTE — ASSESSMENT & PLAN NOTE
· Discontinued univasc due to hypotension  · Plan to restart medication after seeing PCP during follow-up     · Continue monitor BP

## 2020-07-14 NOTE — PROGRESS NOTES
Progress Note - Gabe Byrd 1965, 62 y o  female MRN: [id number]    Unit/Bed#: S -01 Encounter: [id number]    Primary Care Provider: Marthe Krabbe, MD   Date and time admitted to hospital: 7/12/2020  2:39 PM        * UTI (urinary tract infection)  Assessment & Plan  · Patient presented with suprapubic pain, dysuria  Denies hematuria, no fevers or chills  Dysuria has resolved  · UA showed:  Positive leukocytes, moderate blood, RBC 2-4, WBC enumerable, occasional bacteria  · Lactic acid normal, no leukocytosis  · Started on IV Rocephin 7/12  Will receive 3 days of total treatment this evening, 7/14  · Urine culture: 10,000-19,000 Mixed Contaminants  · Blood cultures: No growth at 24hrs x2  · Monitor vitals, trend fever curve  Plan:   · Even though the patient is actively receiving chemotherapy for diagnosed breast cancer (last and final treatment before surgery), she appears to be immunocompetent based on recent lab values  In addition, she remains afebrile, continues to have stable VS, and her symptoms of dysuria have also significantly improved with antibiotic therapy  Considering this, we will treat this patient as an uncomplicated UTI  · She will finish her last dose of IV Ceftriaxone this evening  Hypotension  Assessment & Plan  · BP on admission was 130/63  Progressively trended down to 80/44  Normal Systolic BP around 998, per patient  · Patient was administered two 500cc boluses over 2 hour period  BP increased top 90/48  IVF increased from 100cc/hr to 125 cc/hr  Repeat BP 89/55  · Based on these findings and the fact that the patient is maintained on dexamethasone 1mg daily for SLE, we 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  · We discontinued dexamethasone and started the patient on IV hydrocortisone 100mgIV q6hrs  · Blood pressure significantly increased overnight  Currently, 122/61    · During evaluation this AM, patient stated that she is feeling much better  She denies chest pain, SOB, dizziness, and lightheadedness  Plan:   · Considering the patient's blood pressure has returned back to normal, we will discontinue the IV hydrocortisone and transition the patient back to her home regimen of dexamethasone  · We will continue to monitor the patient's blood pressure overnight  Hyponatremia  Assessment & Plan  · Patient reports decreased appetite for the past few days  Denies nausea and vomiting  · IV fluids  cc/hour were started  · Sodium corrected overnight  · Sodium remains stable at 138  Anemia  Assessment & Plan  · Per chart review, baseline around 9  Currently 8 3  Stable  · Patient denies hematuria, GI bleed  · Likely in the setting of chronic illness, diet, possibly chemotherapy contributing, or hemodilution  · Continue to monitor    Hypokalemia  Assessment & Plan  · Likely due to poor appetite, repleted and stable  · Monitor BMP, replace as necessary    Malignant neoplasm of upper-inner quadrant of left breast in female, [facility] receptor [facility]  Assessment & Plan  · Patient has history of left breast cancer, completed course of chemotherapy, with recent MRI showing complete resolution of malignancy  · Patient is supposed to undergo mastectomy scheduled for 7/24/2020  · Continue follow-up with Surgical Oncology    Anxiety  Assessment & Plan  · Patient stated she feels anxious specially for her upcoming surgery  · Likely contributing to patient's tachycardia  · Continue Xanax 1 tab t i d  P r n , Zoloft 150 mg daily    Hypothyroidism  Assessment & Plan  · Mild Subclinical Hypothyroidism: TSH noted to be elevated  Free T4 within normal range  · Patient complains of fatigue, but had just finished chemotherapy regimen for diagnosed breast cancer  · Continue on current medication  · Consider rechecking TSH in 4-6 weeks       Systemic lupus erythematosus ([facility] )  Assessment & Plan  · Will transition the patient from IV hydrocortisone back to home regimen of dexamethasone  · Continue follow-up as outpatient    Hypertension  Assessment & Plan  · Discontinued univasc due to hypotension  · Plan to restart medication after seeing PCP during follow-up  · Continue monitor BP        VTE Pharmacologic Prophylaxis:   Pharmacologic: Enoxaparin (Lovenox)  Mechanical VTE Prophylaxis in Place: Yes    Discussions with Specialists or Other Care Team Provider:  None    Education and Discussions with Family / Patient:  Plan of care discussed with patient and  at bedside  Current Length of Stay: 2 day(s)    Current Patient Status: Inpatient     Discharge Plan / Estimated Discharge Date: 7/15    Code Status: Level 1 - Full Code      Subjective:   Patient is sitting comfortably at bedside  States that her dysuria has resolved  Denies chest pain, shortness of breath, abdominal pain, diarrhea, dizziness, lightheadedness  States that she is ready to be discharged  Objective:     Vitals:   Temp (24hrs), Av 1 °F (36 7 °C), Min:97 6 °F (36 4 °C), Max:98 5 °F (36 9 °C)    Temp:  [97 6 °F (36 4 °C)-98 5 °F (36 9 °C)] 97 6 °F (36 4 °C)  HR:  [] 71  Resp:  [18] 18  BP: ()/(52-61) 122/61  SpO2:  [95 %-100 %] 99 %  Body mass index is 22 85 kg/m²  Input and Output Summary (last 24 hours): Intake/Output Summary (Last 24 hours) at 2020 1125  Last data filed at 2020 1056  Gross per 24 hour   Intake 2764 58 ml   Output 813 ml   Net 1951 58 ml       Physical Exam:     Physical Exam   Constitutional: She is oriented to person, place, and time  She appears well-developed  No distress  HENT:   Head: Normocephalic and atraumatic  Eyes: Pupils are equal, round, and reactive to light  Conjunctivae and EOM are normal    Cardiovascular: Normal rate, regular rhythm, S1 normal and S2 normal    Pulmonary/Chest: Effort normal and breath sounds normal  No respiratory distress   She has no wheezes  She has no rales  Abdominal: Soft  Bowel sounds are normal  She exhibits no distension  There is no tenderness  Neurological: She is alert and oriented to person, place, and time  Psychiatric: She has a normal mood and affect  Additional Data:     Labs:    Results from last 7 days   Lab Units 07/14/20  0511   WBC Thousand/uL 6 97   HEMOGLOBIN g/dL 8 3*   HEMATOCRIT % 26 5*   PLATELETS Thousands/uL 227   NEUTROS PCT % 87*   LYMPHS PCT % 8*   MONOS PCT % 4   EOS PCT % 0     Results from last 7 days   Lab Units 07/14/20  0511  07/12/20  1515   POTASSIUM mmol/L 3 8   < > 3 2*   CHLORIDE mmol/L 109*   < > 95*   CO2 mmol/L 18*   < > 22   BUN mg/dL 9   < > 21   CREATININE mg/dL 0 63   < > 1 21   CALCIUM mg/dL 7 9*   < > 8 5   ALK PHOS U/L  --   --  79   ALT U/L  --   --  14   AST U/L  --   --  18    < > = values in this interval not displayed  * I Have Reviewed All Lab Data Listed Above  * Additional Pertinent Lab Tests Reviewed: All Labs Within Last 24 Hours Reviewed    Imaging:    Imaging Reports Reviewed Today Include:  None  Imaging Personally Reviewed by Myself Includes:  None    Recent Cultures (last 7 days):     Results from last 7 days   Lab Units 07/12/20  1836 07/12/20  1702   BLOOD CULTURE  No Growth at 24 hrs    No Growth at 24 hrs   --    URINE CULTURE   --  10,000-19,000 cfu/ml        Last 24 Hours Medication List:     Current Facility-Administered Medications:  acetaminophen 650 mg Oral Q8H Tianna Perez MD    ALPRAZolam 1 mg Oral TID PRN Meryl Matthews MD    cefTRIAXone 1,000 mg Intravenous Q24H Gabriela Coronel DO Last Rate: 1,000 mg (07/13/20 1743)   cyclobenzaprine 10 mg Oral HS Jackie Jj MD    dexamethasone 1 mg Oral Daily Gabriela Coronel DO    enoxaparin 40 mg Subcutaneous Daily Jackie Jj MD    hydrALAZINE 5 mg Intravenous Q6H PRN Cristobal Flannery MD    hydroxychloroquine 100 mg Oral HS Meryl Matthews MD hydroxychloroquine 200 mg Oral Daily With Breakfast Manda Sheth MD    levothyroxine 88 mcg Oral Daily Manda Sheth MD    nicotine 1 patch Transdermal Daily Grant Jj MD    ondansetron 4 mg Intravenous Q6H PRN Manda Sheth MD    oxyCODONE 5 mg Oral Q4H PRN Aniket Lockett MD    sertraline 150 mg Oral Daily Manda Sheth MD         Today, Patient Was Seen By: Debora Watson DO    ** Please Note: This note has been constructed using a voice recognition system   **

## 2020-07-14 NOTE — PLAN OF CARE
Problem: Potential for Falls  Goal: Patient will remain free of falls  Description  INTERVENTIONS:  - Assess patient frequently for physical needs  -  Identify cognitive and physical deficits and behaviors that affect risk of falls    -  [place] fall precautions as indicated by assessment   - Educate patient/family on patient safety including physical limitations  - Instruct patient to call for assistance with activity based on assessment  - Modify environment to reduce risk of injury  - Consider OT/PT consult to assist with strengthening/mobility  Outcome: Progressing     Problem: PAIN - ADULT  Goal: Verbalizes/displays adequate comfort level or baseline comfort level  Description  Interventions:  - Encourage patient to monitor pain and request assistance  - Assess pain using appropriate pain scale  - Administer analgesics based on type and severity of pain and evaluate response  - Implement non-pharmacological measures as appropriate and evaluate response  - Consider cultural and social influences on pain and pain management  - Notify physician/advanced practitioner if interventions unsuccessful or patient reports new pain  Outcome: Progressing     Problem: INFECTION - ADULT  Goal: Absence or prevention of progression during hospitalization  Description  INTERVENTIONS:  - Assess and monitor for signs and symptoms of infection  - Monitor lab/diagnostic results  - Monitor all insertion sites, i e  indwelling lines, tubes, and drains  - Monitor endotracheal if appropriate and nasal secretions for changes in amount and color  - [place] appropriate cooling/warming therapies per order  - Administer medications as ordered  - Instruct and encourage patient and family to use good hand hygiene technique  - Identify and instruct in appropriate isolation precautions for identified infection/condition  Outcome: Progressing     Problem: SAFETY ADULT  Goal: Maintain or return to baseline ADL function  Description  INTERVENTIONS:  -  Assess patient's ability to carry out ADLs; assess patient's baseline for ADL function and identify physical deficits which impact ability to perform ADLs (bathing, care of mouth/teeth, toileting, grooming, dressing, etc )  - Assess/evaluate cause of self-care deficits   - Assess range of motion  - Assess patient's mobility; develop plan if impaired  - Assess patient's need for assistive devices and provide as appropriate  - Encourage maximum independence but intervene and supervise when necessary  - Involve family in performance of ADLs  - Assess for home care needs following discharge   - Consider OT consult to assist with ADL evaluation and planning for discharge  - Provide patient education as appropriate  Outcome: Progressing  Goal: Maintain or return mobility status to optimal level  Description  INTERVENTIONS:  - Assess patient's baseline mobility status (ambulation, transfers, stairs, etc )    - Identify cognitive and physical deficits and behaviors that affect mobility  - Identify mobility aids required to assist with transfers and/or ambulation (gait belt, sit-to-stand, lift, walker, cane, etc )  - [place] fall precautions as indicated by assessment  - Record patient progress and toleration of activity level on Mobility SBAR; progress patient to next Phase/Stage  - Instruct patient to call for assistance with activity based on assessment  - Consider rehabilitation consult to assist with strengthening/weightbearing, etc   Outcome: Progressing     Problem: DISCHARGE PLANNING  Goal: Discharge to home or other facility with appropriate resources  Description  INTERVENTIONS:  - Identify barriers to discharge w/patient and caregiver  - Arrange for needed discharge resources and transportation as appropriate  - Identify discharge learning needs (meds, wound care, etc )  - Arrange for interpretive services to assist at discharge as needed  - Refer to Case Management Department for coordinating discharge planning if the patient needs post-hospital services based on physician/advanced practitioner order or complex needs related to functional status, cognitive ability, or social support system  Outcome: Progressing     Problem: Knowledge Deficit  Goal: Patient/family/caregiver demonstrates understanding of disease process, treatment plan, medications, and discharge instructions  Description  Complete learning assessment and assess knowledge base    Interventions:  - Provide teaching at level of understanding  - Provide teaching via preferred learning methods  Outcome: Progressing

## 2020-07-14 NOTE — PLAN OF CARE
Problem: Potential for Falls  Goal: Patient will remain free of falls  Description  INTERVENTIONS:  - Assess patient frequently for physical needs  -  Identify cognitive and physical deficits and behaviors that affect risk of falls    -  [place] fall precautions as indicated by assessment   - Educate patient/family on patient safety including physical limitations  - Instruct patient to call for assistance with activity based on assessment  - Modify environment to reduce risk of injury  - Consider OT/PT consult to assist with strengthening/mobility  Outcome: Progressing     Problem: PAIN - ADULT  Goal: Verbalizes/displays adequate comfort level or baseline comfort level  Description  Interventions:  - Encourage patient to monitor pain and request assistance  - Assess pain using appropriate pain scale  - Administer analgesics based on type and severity of pain and evaluate response  - Implement non-pharmacological measures as appropriate and evaluate response  - Consider cultural and social influences on pain and pain management  - Notify physician/advanced practitioner if interventions unsuccessful or patient reports new pain  Outcome: Progressing     Problem: INFECTION - ADULT  Goal: Absence or prevention of progression during hospitalization  Description  INTERVENTIONS:  - Assess and monitor for signs and symptoms of infection  - Monitor lab/diagnostic results  - Monitor all insertion sites, i e  indwelling lines, tubes, and drains  - Monitor endotracheal if appropriate and nasal secretions for changes in amount and color  - [place] appropriate cooling/warming therapies per order  - Administer medications as ordered  - Instruct and encourage patient and family to use good hand hygiene technique  - Identify and instruct in appropriate isolation precautions for identified infection/condition  Outcome: Progressing     Problem: SAFETY ADULT  Goal: Maintain or return to baseline ADL function  Description  INTERVENTIONS:  -  Assess patient's ability to carry out ADLs; assess patient's baseline for ADL function and identify physical deficits which impact ability to perform ADLs (bathing, care of mouth/teeth, toileting, grooming, dressing, etc )  - Assess/evaluate cause of self-care deficits   - Assess range of motion  - Assess patient's mobility; develop plan if impaired  - Assess patient's need for assistive devices and provide as appropriate  - Encourage maximum independence but intervene and supervise when necessary  - Involve family in performance of ADLs  - Assess for home care needs following discharge   - Consider OT consult to assist with ADL evaluation and planning for discharge  - Provide patient education as appropriate  Outcome: Progressing  Goal: Maintain or return mobility status to optimal level  Description  INTERVENTIONS:  - Assess patient's baseline mobility status (ambulation, transfers, stairs, etc )    - Identify cognitive and physical deficits and behaviors that affect mobility  - Identify mobility aids required to assist with transfers and/or ambulation (gait belt, sit-to-stand, lift, walker, cane, etc )  - [place] fall precautions as indicated by assessment  - Record patient progress and toleration of activity level on Mobility SBAR; progress patient to next Phase/Stage  - Instruct patient to call for assistance with activity based on assessment  - Consider rehabilitation consult to assist with strengthening/weightbearing, etc   Outcome: Progressing     Problem: DISCHARGE PLANNING  Goal: Discharge to home or other facility with appropriate resources  Description  INTERVENTIONS:  - Identify barriers to discharge w/patient and caregiver  - Arrange for needed discharge resources and transportation as appropriate  - Identify discharge learning needs (meds, wound care, etc )  - Arrange for interpretive services to assist at discharge as needed  - Refer to Case Management Department for coordinating discharge planning if the patient needs post-hospital services based on physician/advanced practitioner order or complex needs related to functional status, cognitive ability, or social support system  Outcome: Progressing     Problem: Knowledge Deficit  Goal: Patient/family/caregiver demonstrates understanding of disease process, treatment plan, medications, and discharge instructions  Description  Complete learning assessment and assess knowledge base    Interventions:  - Provide teaching at level of understanding  - Provide teaching via preferred learning methods  Outcome: Progressing

## 2020-07-15 VITALS
SYSTOLIC BLOOD PRESSURE: 118 MMHG | BODY MASS INDEX: 22.86 KG/M2 | WEIGHT: 129 LBS | HEART RATE: 87 BPM | DIASTOLIC BLOOD PRESSURE: 57 MMHG | HEIGHT: 63 IN | TEMPERATURE: 97.8 F | RESPIRATION RATE: 18 BRPM | OXYGEN SATURATION: 100 %

## 2020-07-15 PROCEDURE — 99239 HOSP IP/OBS DSCHRG MGMT >30: CPT | Performed by: HOSPITALIST

## 2020-07-15 RX ADMIN — SERTRALINE HYDROCHLORIDE 150 MG: 100 TABLET ORAL at 09:35

## 2020-07-15 RX ADMIN — DEXAMETHASONE 1 MG: 2 TABLET ORAL at 09:35

## 2020-07-15 RX ADMIN — HYDROXYCHLOROQUINE SULFATE 200 MG: 200 TABLET, FILM COATED ORAL at 09:35

## 2020-07-15 RX ADMIN — ACETAMINOPHEN 650 MG: 325 TABLET, FILM COATED ORAL at 05:49

## 2020-07-15 RX ADMIN — OXYCODONE HYDROCHLORIDE 10 MG: 10 TABLET ORAL at 05:56

## 2020-07-15 RX ADMIN — LEVOTHYROXINE SODIUM 88 MCG: 88 TABLET ORAL at 05:49

## 2020-07-15 RX ADMIN — ENOXAPARIN SODIUM 40 MG: 40 INJECTION SUBCUTANEOUS at 09:34

## 2020-07-15 NOTE — DISCHARGE INSTR - AVS FIRST PAGE
Dear Serene Serrano,     It was our pleasure to care for you here at [facility], [address]  It is our hope that we were always able to exceed the expected standards for your care during your stay  You were hospitalized due to Urinary Tract Infection  You were cared for on the 4th floor by Ruiz Roberts DO under the service of Della Flannery MD with the [facility] who covers for your primary care physician (PCP), Gerri Tamayo MD, while you were hospitalized  If you have any questions or concerns related to this hospitalization, you may contact us at [phone]  For follow up as well as any medication refills, we recommend that you follow up with your primary care physician  A registered nurse will reach out to you by phone within a few days after your discharge to answer any additional questions that you may have after going home  However, at this time we provide for you here, the most important instructions / recommendations at discharge:     · Notable Medication Adjustments -   · You will discontinue taking moexipril (Univasc) until speaking with your primary care provider due to low blood pressure  · You will discontinue taking potassium chloride until speaking with your primary care provider  · Testing Required after Discharge -   · None  · Important follow up information -   · Follow-up with your primary care provider in 1 week to coordinate care  · Please review this entire after visit summary as additional general instructions including medication list, appointments, activity, diet, any pertinent wound care, and other additional recommendations from your care team that may be provided for you        Sincerely,     Ruiz Roberts DO

## 2020-07-15 NOTE — DISCHARGE SUMMARY
Discharge- Tiffanie Edwards 1965, 62 y o  female MRN: [id number]    Unit/Bed#: S -01 Encounter: [id number]    Primary Care Provider: Ni Mcconnell MD   Date and time admitted to hospital: 7/12/2020  2:39 PM        * UTI (urinary tract infection)  Assessment & Plan  · Patient presented with suprapubic pain, dysuria  Denies hematuria, no fevers or chills  Dysuria has resolved  · UA showed:  Positive leukocytes, moderate blood, RBC 2-4, WBC enumerable, occasional bacteria  · Lactic acid normal, no leukocytosis  · Started on IV Rocephin 7/12  Will receive 3 days of total treatment  · Urine culture: 10,000-19,000 Mixed Contaminants  · Blood cultures: No growth at 48hrs x2  · Monitor vitals, trend fever curve  Plan:   · Even though the patient is actively receiving chemotherapy for diagnosed breast cancer (last and final treatment before surgery), she appears to be immunocompetent based on recent lab values  In addition, she remains afebrile, continues to have stable VS, and her symptoms of dysuria have also significantly improved with antibiotic therapy  Considering this, we will treat this patient as an uncomplicated UTI  · Completed 3/3 doses of IV Ceftriaxone on 7/14  Hypotension  Assessment & Plan  · BP on admission was 130/63  Progressively trended down to 80/44  Normal Systolic BP around 625, per patient  · Patient was administered two 500cc boluses over 2 hour period  BP increased top 90/48  IVF increased from 100cc/hr to 125 cc/hr  Repeat BP 89/55  · Based on these findings and the fact that the patient is maintained on dexamethasone 1mg daily for SLE, we 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  · We discontinued dexamethasone and started the patient on IV hydrocortisone 100mgIV q6hrs  · Blood pressure significantly increased overnight  · She denies chest pain, SOB, dizziness, and lightheadedness       Plan:   · Blood pressure remains stable following one stress dose of IV hydrocortisone  · Patient transitioned back to home regimen of dexamethasone  · Continue to monitor blood pressure at home  · Discontinued UNIVASC until patient follows up with PCP  Instructions provided  Hyponatremia  Assessment & Plan  · Patient reports decreased appetite for the past few days  Denies nausea and vomiting  · Sodium corrected w/ IVF  · Sodium remains stable  Anemia  Assessment & Plan  · Per chart review, baseline around 9  Currently 8 3  Stable  · Patient denies hematuria, GI bleed  · Likely in the setting of chronic illness, diet, possibly chemotherapy contributing, or hemodilution  · Continue to monitor    Hypokalemia  Assessment & Plan  · Likely due to poor appetite, repleted and stable  Malignant neoplasm of upper-inner quadrant of left breast in female, estrogen receptor negative ([facility] [address] )  Assessment & Plan  · Patient has history of left breast cancer, completed course of chemotherapy, with recent MRI showing complete resolution of malignancy  · Patient is supposed to undergo mastectomy scheduled for 7/24/2020  · Continue follow-up with Surgical Oncology    Anxiety  Assessment & Plan  · Patient stated she feels anxious specially for her upcoming surgery  · Likely contributing to patient's tachycardia  · Continue Xanax 1 tab t i d  P r n , Zoloft 150 mg daily    Hypothyroidism  Assessment & Plan  · Mild Subclinical Hypothyroidism: TSH noted to be elevated  Free T4 within normal range  · Patient complains of fatigue, but had just finished chemotherapy regimen for diagnosed breast cancer  · Continue on current medication  · Consider rechecking TSH in 4-6 weeks  Systemic lupus erythematosus ([facility] [address] )  Assessment & Plan  · Continue home regimen of dexamethasone  · Continue follow-up as outpatient    Hypertension  Assessment & Plan  · Discontinued univasc due to hypotension     · Plan to restart medication after seeing PCP during follow-up  · Continue monitor BP at home  Discharging Resident Physician: Bert Goldmann, DO  Attending: Dylan Gottlieb MD  PCP: Lissette Musa MD  Admission Date: 7/12/2020  Discharge Date: 07/15/20    Disposition:     Home    Reason for Admission: Dysuria and abdominal pain    Consultations During Hospital Stay:  · None    Procedures Performed:     · None     Significant Findings / Test Results:   · UA:+ Large Leukocytes + Moderate Blood   · Blood Cultures: No growth @ 48 hrs  · Urine Culture: 10,000-19,000 Mixed Contaminants  · Novel Coronavirus: Normal   · TSH: 5 068   · Free T4: 1 32    Incidental Findings:   CT Abdomen & Pelvis   ·  Mild enhancement of the bladder wall without thickening or stranding  This is of uncertain etiology and clinical significance though correlation with urinalysis is recommended as cystitis is not excluded  ·  Multifocal groundglass opacity at the lung bases  This is nonspecific though possibly inflammatory/infectious  Test Results Pending at Discharge (will require follow up): · None     Outpatient Tests Requested:  · None    Complications:  None    Hospital Course:     Azael Lockhart is a 62 y o  female patient w/ PMHx of left breast malignancy s/p chemotherapy with planned mastectomy and SLE who originally presented to the hospital on 7/12/2020 due to abdominal pain and dysuria  In the ED, urinalysis was suspicious for UTI  Lactic acid was normal   No leukocytosis  Patient was initiated on IV ceftriaxone  Patient was also noted to have hyponatremia and hypokalemia  Hyponatremia and hypokalemia resolved with IVF  However, patient had episode of hypotension, 80/44  BP on admission was 130/63  Patient was administered two 500cc boluses over 2 hour period  BP increased to 90/48  IVF increased from 100cc/hr to 125 cc/hr  Repeat BP 89/55        Based on these findings and the fact that the patient is maintained on dexamethasone 1mg daily for SLE, we 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  We discontinued dexamethasone and started the patient on stress dose of IV hydrocortisone  Blood pressure significantly increased overnight  Patient was transitioned back to home dose of dexamethasone and her blood pressure remained stable  During this time patient continued on IV ceftriaxone for 3 days  States that her abdominal pain and dysuria has completely resolved  Condition at Discharge: good     Discharge Day Visit / Exam:     Subjective:  Patient is sitting comfortably at bedside  No new complaints this morning  Denies chest pain, shortness of breath, dysuria, abdominal pain, lightheadedness, and dizziness  States that she is ready to be discharged  Vitals: Blood Pressure: 118/57 (07/15/20 0700)  Pulse: 87 (07/15/20 0700)  Temperature: 97 8 °F (36 6 °C) (07/15/20 0700)  Temp Source: Oral (07/15/20 0700)  Respirations: 18 (07/15/20 0700)  Height: 5' 3" (160 cm) (07/12/20 1855)  Weight - Scale: 58 5 kg (129 lb) (07/14/20 0600)  SpO2: 100 % (07/15/20 0700)  Exam:   Physical Exam   Constitutional: She is oriented to person, place, and time  She appears well-developed  No distress  HENT:   Head: Normocephalic and atraumatic  Eyes: Pupils are equal, round, and reactive to light  Conjunctivae and EOM are normal    Cardiovascular: Normal rate, regular rhythm, S1 normal and S2 normal    Pulmonary/Chest: Effort normal and breath sounds normal  No respiratory distress  She has no wheezes  She has no rales  Abdominal: Soft  Bowel sounds are normal  There is no tenderness  Neurological: She is alert and oriented to person, place, and time  Psychiatric: She has a normal mood and affect  Discussion with Family: Yes  Planned was discussed with patient and  at bedside  Discharge instructions/Information to patient and family:   See after visit summary for information provided to patient and family        Provisions for Follow-Up Care:  See after visit summary for information related to follow-up care and any pertinent home health orders  Planned Readmission:  No     Discharge Medications:  See after visit summary for reconciled discharge medications provided to patient and family        ** Please Note: This note has been constructed using a voice recognition system **

## 2020-07-15 NOTE — DISCHARGE INSTRUCTIONS

## 2020-07-15 NOTE — ASSESSMENT & PLAN NOTE
· Discontinued univasc due to hypotension  · Plan to restart medication after seeing PCP during follow-up  · Continue monitor BP at home

## 2020-07-15 NOTE — ASSESSMENT & PLAN NOTE
· Patient presented with suprapubic pain, dysuria  Denies hematuria, no fevers or chills  Dysuria has resolved  · UA showed:  Positive leukocytes, moderate blood, RBC 2-4, WBC enumerable, occasional bacteria  · Lactic acid normal, no leukocytosis  · Started on IV Rocephin 7/12  Will receive 3 days of total treatment  · Urine culture: 10,000-19,000 Mixed Contaminants  · Blood cultures: No growth at 48hrs x2  · Monitor vitals, trend fever curve  Plan:   · Even though the patient is actively receiving chemotherapy for diagnosed breast cancer (last and final treatment before surgery), she appears to be immunocompetent based on recent lab values  In addition, she remains afebrile, continues to have stable VS, and her symptoms of dysuria have also significantly improved with antibiotic therapy  Considering this, we will treat this patient as an uncomplicated UTI  · Completed 3/3 doses of IV Ceftriaxone on 7/14

## 2020-07-15 NOTE — ASSESSMENT & PLAN NOTE
· Patient reports decreased appetite for the past few days  Denies nausea and vomiting  · Sodium corrected w/ IVF  · Sodium remains stable

## 2020-07-15 NOTE — INCIDENTAL FINDINGS
The following findings require follow up:  Radiographic finding   Findin) Mild enhancement of the bladder wall without thickening or stranding  This is of uncertain etiology and clinical significance though correlation with urinalysis is recommended as cystitis is not excluded  2) Multifocal groundglass opacity at the lung bases  This is nonspecific though possibly inflammatory/infectious     Follow up required: Clinical    Follow up should be done within 3  month(s)    Please notify the following clinician to assist with the follow up:   Dr Lisbeth Agee MD

## 2020-07-15 NOTE — ASSESSMENT & PLAN NOTE
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

## 2020-07-16 ENCOUNTER — TRANSITIONAL CARE MANAGEMENT (OUTPATIENT)
Dept: FAMILY MEDICINE CLINIC | Facility: CLINIC | Age: 55
End: 2020-07-16

## 2020-07-17 ENCOUNTER — TELEPHONE (OUTPATIENT)
Dept: SURGICAL ONCOLOGY | Facility: CLINIC | Age: 55
End: 2020-07-17

## 2020-07-17 LAB
BACTERIA BLD CULT: NORMAL
BACTERIA BLD CULT: NORMAL

## 2020-07-17 NOTE — TELEPHONE ENCOUNTER
Patient called the office and left a message stating that she was just discharged from the hospital this week and has some "urgent" questions regarding her surgery on 7/24  Reviewed everything with Dr En Chadwick and called the patient back to discuss  Explained that Dr En Chadwick recommended that her surgery be delayed by at least 3 weeks and that she obtain medical clearance prior to the new date  Patient was upset at first but reported that she would rather do this the right way  Informed patient that we would cancel her surgery on 7/24 and move it to 8/18 instead  Instructed patient to get her COVID testing and PAT blood-work completed on 8/8  Explained that we would keep her appointment with Dr En Chadwick on 8/10 but would change it to a pre-op H&P instead of a post-op  Instructed patient to contact her PCP's office and inform them that she would need medical clearance prior to her surgery on 8/18  Patient verbalized understanding of all of the above

## 2020-07-20 ENCOUNTER — OFFICE VISIT (OUTPATIENT)
Dept: FAMILY MEDICINE CLINIC | Facility: CLINIC | Age: 55
End: 2020-07-20
Payer: COMMERCIAL

## 2020-07-20 ENCOUNTER — TELEPHONE (OUTPATIENT)
Dept: FAMILY MEDICINE CLINIC | Facility: CLINIC | Age: 55
End: 2020-07-20

## 2020-07-20 VITALS
WEIGHT: 136.6 LBS | OXYGEN SATURATION: 97 % | HEIGHT: 63 IN | DIASTOLIC BLOOD PRESSURE: 80 MMHG | TEMPERATURE: 98.2 F | SYSTOLIC BLOOD PRESSURE: 120 MMHG | BODY MASS INDEX: 24.2 KG/M2 | HEART RATE: 96 BPM | RESPIRATION RATE: 16 BRPM

## 2020-07-20 DIAGNOSIS — Z09 HOSPITAL DISCHARGE FOLLOW-UP: Primary | ICD-10-CM

## 2020-07-20 DIAGNOSIS — R60.0 LOWER EXTREMITY EDEMA: ICD-10-CM

## 2020-07-20 DIAGNOSIS — N30.00 ACUTE CYSTITIS WITHOUT HEMATURIA: Primary | ICD-10-CM

## 2020-07-20 DIAGNOSIS — E87.1 HYPONATREMIA: ICD-10-CM

## 2020-07-20 DIAGNOSIS — R93.89 ABNORMAL CHEST X-RAY: ICD-10-CM

## 2020-07-20 DIAGNOSIS — E03.9 HYPOTHYROIDISM, UNSPECIFIED TYPE: ICD-10-CM

## 2020-07-20 DIAGNOSIS — N30.00 ACUTE CYSTITIS WITHOUT HEMATURIA: ICD-10-CM

## 2020-07-20 DIAGNOSIS — D64.9 ANEMIA, UNSPECIFIED TYPE: ICD-10-CM

## 2020-07-20 DIAGNOSIS — I10 ESSENTIAL HYPERTENSION: ICD-10-CM

## 2020-07-20 LAB
SL AMB  POCT GLUCOSE, UA: NORMAL
SL AMB LEUKOCYTE ESTERASE,UA: NORMAL
SL AMB POCT BILIRUBIN,UA: NORMAL
SL AMB POCT BLOOD,UA: NORMAL
SL AMB POCT CLARITY,UA: CLEAR
SL AMB POCT COLOR,UA: NORMAL
SL AMB POCT KETONES,UA: NORMAL
SL AMB POCT NITRITE,UA: NORMAL
SL AMB POCT PH,UA: 7
SL AMB POCT SPECIFIC GRAVITY,UA: 1
SL AMB POCT URINE PROTEIN: NORMAL
SL AMB POCT UROBILINOGEN: 0.2

## 2020-07-20 PROCEDURE — 99495 TRANSJ CARE MGMT MOD F2F 14D: CPT | Performed by: NURSE PRACTITIONER

## 2020-07-20 PROCEDURE — 87086 URINE CULTURE/COLONY COUNT: CPT | Performed by: NURSE PRACTITIONER

## 2020-07-20 PROCEDURE — 81003 URINALYSIS AUTO W/O SCOPE: CPT | Performed by: NURSE PRACTITIONER

## 2020-07-20 PROCEDURE — 1111F DSCHRG MED/CURRENT MED MERGE: CPT | Performed by: NURSE PRACTITIONER

## 2020-07-20 RX ORDER — NITROFURANTOIN 25; 75 MG/1; MG/1
100 CAPSULE ORAL 2 TIMES DAILY
Qty: 10 CAPSULE | Refills: 0 | Status: SHIPPED | OUTPATIENT
Start: 2020-07-20 | End: 2020-07-25

## 2020-07-20 NOTE — PROGRESS NOTES
FAMILY PRACTICE OFFICE VISIT       NAME: Jewell Davey  AGE: 62 y o  SEX: female       : 1965        MRN: [id number]    Assessment and Plan     Problem List Items Addressed This Visit        Endocrine    Hypothyroidism    Relevant Orders    TSH, 3rd generation       Cardiovascular and Mediastinum    Hypertension    Relevant Orders    Basic metabolic panel    CBC and differential       Genitourinary    UTI (urinary tract infection)    Relevant Orders    POCT urine dip auto non-scope (Completed)    Urine culture       Other    Hyponatremia    Relevant Orders    Basic metabolic panel    Anemia    Relevant Orders    CBC and differential      Other Visit Diagnoses     Hospital discharge follow-up    -  Primary    Abnormal chest x-ray        Relevant Orders    XR chest pa & lateral    Lower extremity edema              1  Hospital discharge follow-up     2  Acute cystitis without hematuria  POCT urine dip auto non-scope    Urine culture   3  Hypothyroidism, unspecified type  TSH, 3rd generation   4  Essential hypertension  Basic metabolic panel    CBC and differential   5  Hyponatremia  Basic metabolic panel   6  Anemia, unspecified type  CBC and differential   7  Abnormal chest x-ray  XR chest pa & lateral   8  Lower extremity edema       This 57-year-old female presents today for hospital follow-up  Currently has left breast cancer and is status post chemotherapy with planned mastectomy for   Also has lupus, PTSD, hypothyroidism, hypertension, nephrolithiasis, anemia  Has a Port-A-Cath in place  She presented to the hospital on  for dysuria and abdominal pain  UA was positive for large leukocytes and moderate blood  Blood cultures were negative times 48 hours  Urine culture showed [id number] mixed contaminants  COVID-19 testing was negative  TSH was mildly elevated at 5 068    CT abdomen and pelvis showed mild enhancement of the bladder wall without thickening or stranding this is of uncertain etiology and clinical significance though correlation with urinalysis is recommended as cystitis is not excluded  There is multifocal ground-glass opacity at lung bases this is nonspecific though possibly inflammatory/infectious  She had no leukocytosis  She did have hyponatremia and hypokalemia on admission improved with fluid replacement  She was treated for 3 days with IV ceftriaxone  Symptoms resolved  She is maintained on dexamethasone 1 mg daily for SLE  It was suspected during hospitalization due to hypotension, hyponatremia, hypokalemia that she may have been adrenally insufficient due to active infection  Dexamethasone was discontinued and she was treated with IV hydrocortisone  Prior to discharge, she was returned to home dose of dexamethasone 1 mg daily  Hemoglobin and hematocrit are stable 8 3/26 5  Today she is complaining of returning symptoms of feeling incomplete bladder emptying, frequency, and dysuria  In office urine dip is positive for trace leukocytes  Will send culture  Will begin Macrobid 100 mg twice daily for 5 days, while culture is pending  Call for worsening symptoms  Will repeat, BMP, CBC, and TSH  She has an appointment with the infusion center on August 8th for port flush  She will get blood work drawn at this time  Will repeat chest x-ray in 3 weeks, prior to mastectomy, to check for clearing of ground glass opacities at bilateral lung bases  Lower extremity edema, likely from large amount of fluids given in-patient for hypokalemia, hyponatremia, and hypotension  Recommend elevation and monitoring over the next few days to 1 week for symptom resolution  If symptoms are persistent, she will call  We discussed diuretics, which likely would deplete sodium and potassium, therefore reluctant to prescribe right now  Patient verbalizes understanding  Blood pressure is stable today 120/80  Will continue to hold Moexipril     She does not have a blood pressure cuff at home to monitor home blood pressures  She will call with any questions or concerns  Tobacco Cessation Counseling: Tobacco cessation counseling and education was provided  The patient is sincerely urged to quit consumption of tobacco  She is not ready to quit tobacco  The numerous health risks of tobacco consumption were discussed  If she decides to quit, there are a number of helpful adjunctive aids, and she can see me to discuss nicotine replacement therapy, chantix, or bupropion anytime in the future  Chief Complaint     Chief Complaint   Patient presents with    Transition of Care Management    Foot Swelling     B/L foot swelling 2 + days       History of Present Illness     Shara Palomino is a 57-year-old female presenting today for hospital follow-up  She was admitted to the hospital 7/12 through 7/15 for abdominal pain and dysuria  She was treated with IV ceftriaxone for 3 days, with symptom resolution  She has left breast cancer and is status post chemotherapy with planned mastectomy  Mastectomy was originally scheduled for 7/25, but with recent hospitalization and urinary tract infection, surgery has been rescheduled for August 18th  She understands, but is concerned about waiting longer for surgery  She does follow with a counselor Ellen Mckenna for PTSD  Has a virtual visit scheduled for later today  She does feel like her urinary tract infection symptoms are returning  Does not feel like she completely empties her bladder, has urinary frequency and burning with urination sometimes  Also notes over the past 3 days she has developed bilateral pedal and ankle edema  She has no shortness of breath, chest pain, palpitations  No orthopnea or PND  Unfortunately smokes approximately 5 cigarettes per day  She is trying to quit through cutting back  She has reduced down from 1 5 packs per day  She quit drinking all alcohol with cancer diagnosis                TCM Call (since 6/19/2020)     Date and time call was made  7/16/2020 11:19 AM    Hospital care reviewed  Records reviewed    Patient was hospitialized at  [facility]    Date of Admission  07/12/20    Date of discharge  07/15/20    Diagnosis   [facility]: 07/12/20-07/15/20: UTI    Disposition  Home    Were the patients medications reviewed and updated  No    Current Symptoms  None      TCM Call (since 6/19/2020)     Post hospital issues  Poor medication adherence    Should patient be enrolled in anticoag monitoring? No    Scheduled for follow up? Yes    Did you obtain your prescribed medications  No    Why were you unable to obtain your medications  Pt states no new meds were prescribed  Do you need help managing your prescriptions or medications  No    Is transportation to your appointment needed  No    I have advised the patient to call PCP with any new or worsening symptoms  Shelashvin Silence, CMA    Interperter language line needed  No    Counseling  Patient    Counseling topics  instructions for management; Importance of RX compliance    Comments  Apt scheduled for 07/20/20 at 8am              Review of Systems   Review of Systems   Constitutional: Negative  HENT: Negative  Respiratory: Negative for cough, chest tightness, shortness of breath and wheezing  Cardiovascular: Positive for leg swelling  Negative for chest pain and palpitations  Gastrointestinal: Negative  Genitourinary: Positive for dysuria and frequency  Negative for flank pain, hematuria, pelvic pain and urgency  Musculoskeletal: Negative  Skin: Negative  Neurological: Negative  Hematological: Negative  Psychiatric/Behavioral: The patient is nervous/anxious (anxious over mastectomy being postponed)          Active Problem List     Patient Active Problem List   Diagnosis    UTI (urinary tract infection)    Hypertension    Systemic lupus erythematosus ([facility] )    Hypothyroidism    Posttraumatic stress disorder    Adult celiac disease    Anxiety    Depression    Esophagitis    Nephrolithiasis    Vitamin D deficiency    Malignant neoplasm of upper-inner quadrant of left breast in female, estrogen receptor negative (HCC)    Chemotherapy induced neutropenia (HCC)    Leukocytosis    Increased anion gap metabolic acidosis    Port-A-Cath in place    Intractable nausea and vomiting    SIRS (systemic inflammatory response syndrome) (HCC)    JEFERSON (acute kidney injury) ([facility] [address] )    Hyponatremia    Hypokalemia    Anemia    Hypotension       Past Medical History:  Past Medical History:   Diagnosis Date    Disease of thyroid gland     Hypertension     Lupus ([facility] [address] )     Malignant neoplasm of upper-inner quadrant of left breast in female, estrogen receptor negative [facility] 2/25/2020    Nephrolithiasis     PTSD (post-traumatic stress disorder)        Past Surgical History:  Past Surgical History:   Procedure Laterality Date    FEMUR FRACTURE SURGERY      FL GUIDED CENTRAL VENOUS ACCESS DEVICE INSERTION  3/17/2020    HYSTERECTOMY      LEFT OOPHORECTOMY      due to torsion     MAMMO STEREOTACTIC BREAST BIOPSY RIGHT (ALL INC) Right 2/12/2020    MRI BREAST BIOPSY LEFT (ALL INCLUSIVE) Left 3/20/2020    DC INSJ TUNNELED CTR VAD W/SUBQ PORT AGE 5 YR/> N/A 3/17/2020    Procedure: INSERTION VENOUS PORT ( PORT-A-CATH) IR;  Surgeon: Anna Mullen DO;  Location: [facility];  Service: Interventional Radiology    US GUIDANCE BREAST BIOPSY LEFT EACH ADDITIONAL Left 2/12/2020    US GUIDED BREAST BIOPSY LEFT COMPLETE Left 2/12/2020    VAGINA SURGERY         Family History:  Family History   Problem Relation Age of Onset    Diabetes Mother     Hypertension Mother     Nephrolithiasis Mother     Breast cancer Mother 79    Heart disease Father     Hypertension Father     Diabetes Brother     Nephrolithiasis Brother     Breast cancer Maternal Aunt     No Known Problems Maternal Grandmother     No Known Problems Maternal Grandfather     No Known Problems Paternal Grandmother     No Known Problems Paternal Grandfather        Social History:  Social History     Socioeconomic History    Marital status: /Civil Union     Spouse name: Not on file    Number of children: Not on file    Years of education: Not on file    Highest education level: Not on file   Occupational History    Not on file   Social Needs    Financial resource strain: Not on file    Food insecurity:     Worry: Not on file     Inability: Not on file    Transportation needs:     Medical: Not on file     Non-medical: Not on file   Tobacco Use    Smoking status: Current Every Day Smoker     Packs/day: 0 25     Types: Cigarettes     Start date: 1990    Smokeless tobacco: Never Used    Tobacco comment:  5 ppd per [employer]   Substance and Sexual Activity    Alcohol use: Not Currently     Alcohol/week: 21 0 standard drinks     Types: 21 Cans of beer per week     Frequency: Never     Comment: pt states she had her last beer 3/22/2020    Drug use: No    Sexual activity: Not on file   Lifestyle    Physical activity:     Days per week: Not on file     Minutes per session: Not on file    Stress: Not on file   Relationships    Social connections:     Talks on phone: Not on file     Gets together: Not on file     Attends [employer] service: Not on file     Active member of club or organization: Not on file     Attends meetings of clubs or organizations: Not on file     Relationship status: Not on file    Intimate partner violence:     Fear of current or ex partner: Not on file     Emotionally abused: Not on file     Physically abused: Not on file     Forced sexual activity: Not on file   Other Topics Concern    Not on file   Social History Narrative    Not on file       I have reviewed the patient's medical history in detail; there are no changes to the history as noted in the electronic medical record      Objective     Vitals:    07/20/20 0805 07/20/20 0841   BP: 120/80    Pulse: 105 96   Resp: 16    Temp: 98 2 °F (36 8 °C)    TempSrc: Temporal    SpO2: 97%    Weight: 62 kg (136 lb 9 6 oz)    Height: 5' 3" (1 6 m)      Wt Readings from Last 3 Encounters:   07/20/20 62 kg (136 lb 9 6 oz)   07/14/20 58 5 kg (129 lb)   07/08/20 56 7 kg (125 lb)     Physical Exam   Constitutional: She is oriented to person, place, and time  She appears well-developed and well-nourished  She does not appear ill  No distress  HENT:   Head: Normocephalic and atraumatic  Right Ear: Tympanic membrane and ear canal normal    Left Ear: Tympanic membrane and ear canal normal    Eyes: Pupils are equal, round, and reactive to light  Conjunctivae are normal    Neck: Normal range of motion  Neck supple  No thyromegaly present  Cardiovascular: Normal rate, regular rhythm and normal heart sounds  No murmur heard  Pulmonary/Chest: Effort normal and breath sounds normal  No respiratory distress  Abdominal: Soft  Bowel sounds are normal  There is no tenderness  Musculoskeletal: She exhibits edema (mild to moderate nonpitting bilateral lower extremity edema, no erythema, skin color changes, tenderness, or warmth)  Lymphadenopathy:     She has no cervical adenopathy  Neurological: She is alert and oriented to person, place, and time  Skin: Skin is warm and dry  No rash noted  Psychiatric: She has a normal mood and affect  Nursing note and vitals reviewed           ALLERGIES:  Allergies   Allergen Reactions    Mobic [Meloxicam] Eye Swelling     Reaction Date: 12Aug2011;     Methotrexate Rash    Sulfa Antibiotics Rash       Current Medications     Current Outpatient Medications   Medication Sig Dispense Refill    ALPRAZolam (XANAX) 1 mg tablet TAKE ONE TABLET THREE TIMES A DAY AS NEEDED FOR ANXIETY 90 tablet 1    Cholecalciferol (VITAMIN D3) 69064 units CAPS Take 50,000 Units by mouth once a week      CRANIAL PROSTHESIS, RX, One wig as needed 1 Piece 0    cyclobenzaprine (FLEXERIL) 10 mg tablet Take 10 mg by mouth daily at bedtime       dexamethasone (DECADRON) 1 mg tablet Take 1 tablet (1 mg total) by mouth daily with breakfast 30 tablet 0    hydroxychloroquine (PLAQUENIL) 200 mg tablet Take 1 tablet in morning and 1/2 tablet in evening      levothyroxine 88 mcg tablet Take 1 tablet (88 mcg total) by mouth daily 30 tablet 5    loperamide (IMODIUM) 2 mg capsule Take 1 capsule (2 mg total) by mouth every 4 (four) hours as needed for diarrhea 30 capsule 0    Nutritional Supplements (OSTEOPOROSIS SUPPORT PO) Take by mouth      ondansetron (ZOFRAN) 4 mg tablet TAKE ONE TABLET BY MOUTH EVERY 8 HOURS AS NEEDED FOR NAUSEA OR VOMITING 30 tablet 1    oxyCODONE (ROXICODONE) 5 mg immediate release tablet Take 1-2 tablets (5-10 mg total) by mouth every 4 (four) hours as needed (cancer pain  Max of 6 tabs / day  )Max Daily Amount: 30 mg 180 tablet 0    predniSONE 1 mg tablet Take 1 mg by mouth 2 (two) times a day as needed (Lupus exacerbations)      prochlorperazine (COMPAZINE) 5 mg tablet Take 1 tablet (5 mg total) by mouth every 6 (six) hours as needed for nausea or vomiting 30 tablet 0    sertraline (ZOLOFT) 100 mg tablet TAKE 1 & 1/2 TABLETS BY MOUTH ONCE DAILY 45 tablet 5    dicyclomine (BENTYL) 20 mg tablet Take 1 tablet (20 mg total) by mouth every 6 (six) hours as needed (abdominal pain) for up to 10 days 30 tablet 0    magnesium oxide (MAG-OX) 400 mg Take 1 tablet (400 mg total) by mouth 2 (two) times a day 60 tablet 0    nitrofurantoin (MACROBID) 100 mg capsule Take 1 capsule (100 mg total) by mouth 2 (two) times a day for 5 days 10 capsule 0     No current facility-administered medications for this visit            Health Maintenance     Health Maintenance   Topic Date Due    Hepatitis C Screening  1965    Pneumococcal Vaccine: Pediatrics (0 to 5 Years) and At-Risk Patients (6 to 59 Years) (1 of 1 - PPSV23) 08/29/1969    DTaP,Tdap,and Td Vaccines (1 - Tdap) 08/29/1974    HIV Screening  08/29/1978    Annual Physical  08/29/1981    Influenza Vaccine  07/01/2020    MAMMOGRAM  02/04/2021    BMI: Adult  07/20/2021    CRC Screening: Colonoscopy  10/09/2027    Pneumococcal Vaccine: 65+ Years (1 of 2 - PCV13) 08/29/2028    HIB Vaccine  Aged Out    Hepatitis B Vaccine  Aged Out    IPV Vaccine  Aged Out    Hepatitis A Vaccine  Aged Out    Meningococcal ACWY Vaccine  Aged Out    HPV Vaccine  Aged Out     There is no immunization history for the selected administration types on file for this patient      Melania Alvarez

## 2020-07-20 NOTE — TELEPHONE ENCOUNTER
Please contact patient  She has trace amount of leukocytes in her urine  Given her return of symptoms, I would like to start an oral antibiotic, macrobid  Take this antibiotic twice daily for 5 days with food  I will send her urine for a urine culture  Please have her call for any worsening of her symptoms or development of new symptoms

## 2020-07-21 LAB — BACTERIA UR CULT: NORMAL

## 2020-07-21 NOTE — TELEPHONE ENCOUNTER
Letter received with new end date of [place] for Oxycodone 5 mg     Changed to 07/08/22    Letter sent to media

## 2020-07-21 NOTE — RESULT ENCOUNTER NOTE
Please let patient know her urine culture is negative for infection  She can stop taking the antibiotic Macrobid  Please have her call for any persisting symptoms

## 2020-07-22 ENCOUNTER — TELEPHONE (OUTPATIENT)
Dept: FAMILY MEDICINE CLINIC | Facility: CLINIC | Age: 55
End: 2020-07-22

## 2020-07-22 NOTE — TELEPHONE ENCOUNTER
----- Message from [id number] sent at 7/21/2020  7:41 PM EDT -----  Please let patient know her urine culture is negative for infection  She can stop taking the antibiotic Macrobid  Please have her call for any persisting symptoms

## 2020-07-29 NOTE — PATIENT INSTRUCTIONS
PRESCRIPTION REFILL REMINDER:  All medication refills should be requested prior to [facility] on Friday  Any refill requests after noon on Friday would be addressed the following Monday  Please protect yourself from the novel Coronavirus (COVID-19)! Even though we STILL do not have a vaccine or good antiviral drugs for this infection, the following strategies can help you stay healthy:    = Wash your hands with soap and water, or hand  with at least 60% alcohol, often  = Avoid touching your face!   = Avoid close contact with others, even if they seem healthy  Avoid gatherings of more than 10 people, and don't travel unnecessarily  = Stay home, except to get medical care or other essentials  If you can eat and drink and breathe and sleep, please consider calling your doctor's office instead of visiting in person, even if you are ill   = Cover your cough with a tissue, or your elbow  = Clean frequently touched surfaces and objects daily (e g , tables, countertops, light switches, doorknobs, and cabinet handles)  Regular household detergent and water are sufficient  Numbers of cases of Coronavirus are spiking in many [place] States  This is not a more dangerous virus, but a sign that more people in a community are spreading the virus  Please check the local disease reports near you if you consider travelling this summer  We do NOT advise travel to any community or State with a rising viral caseload  Check out [employer] for Elizabeth data that are updated daily  http://www eMeter/   Global Epidemics  Org, from [facility] will give you [id number] information on virus cases  Https://globalepidemics  org/

## 2020-07-31 ENCOUNTER — TELEPHONE (OUTPATIENT)
Dept: FAMILY MEDICINE CLINIC | Facility: CLINIC | Age: 55
End: 2020-07-31

## 2020-07-31 ENCOUNTER — HOSPITAL ENCOUNTER (OUTPATIENT)
Dept: RADIOLOGY | Facility: HOSPITAL | Age: 55
Discharge: HOME | End: 2020-07-31
Payer: COMMERCIAL

## 2020-07-31 ENCOUNTER — HOSPITAL ENCOUNTER (OUTPATIENT)
Dept: NON INVASIVE DIAGNOSTICS | Facility: HOSPITAL | Age: 55
Discharge: HOME | End: 2020-07-31
Attending: INTERNAL MEDICINE
Payer: COMMERCIAL

## 2020-07-31 ENCOUNTER — APPOINTMENT (OUTPATIENT)
Dept: LAB | Facility: CLINIC | Age: 55
End: 2020-07-31
Payer: COMMERCIAL

## 2020-07-31 DIAGNOSIS — C50.212 MALIGNANT NEOPLASM OF UPPER-INNER QUADRANT OF LEFT BREAST IN FEMALE, ESTROGEN RECEPTOR NEGATIVE (HCC): ICD-10-CM

## 2020-07-31 DIAGNOSIS — D64.9 ANEMIA, UNSPECIFIED TYPE: ICD-10-CM

## 2020-07-31 DIAGNOSIS — R93.89 ABNORMAL CHEST X-RAY: ICD-10-CM

## 2020-07-31 DIAGNOSIS — E87.1 HYPONATREMIA: ICD-10-CM

## 2020-07-31 DIAGNOSIS — I10 ESSENTIAL HYPERTENSION: ICD-10-CM

## 2020-07-31 DIAGNOSIS — Z17.1 MALIGNANT NEOPLASM OF UPPER-INNER QUADRANT OF LEFT BREAST IN FEMALE, ESTROGEN RECEPTOR NEGATIVE (HCC): ICD-10-CM

## 2020-07-31 DIAGNOSIS — E03.9 HYPOTHYROIDISM, UNSPECIFIED TYPE: ICD-10-CM

## 2020-07-31 LAB
ANION GAP SERPL CALCULATED.3IONS-SCNC: 12 MMOL/L (ref 4–13)
BASOPHILS # BLD AUTO: 0.04 THOUSANDS/ΜL (ref 0–0.1)
BASOPHILS NFR BLD AUTO: 1 % (ref 0–1)
BUN SERPL-MCNC: 11 MG/DL (ref 5–25)
CALCIUM SERPL-MCNC: 9.1 MG/DL (ref 8.3–10.1)
CHLORIDE SERPL-SCNC: 105 MMOL/L (ref 100–108)
CO2 SERPL-SCNC: 24 MMOL/L (ref 21–32)
CREAT SERPL-MCNC: 0.93 MG/DL (ref 0.6–1.3)
EOSINOPHIL # BLD AUTO: 0.11 THOUSAND/ΜL (ref 0–0.61)
EOSINOPHIL NFR BLD AUTO: 2 % (ref 0–6)
ERYTHROCYTE [DISTWIDTH] IN BLOOD BY AUTOMATED COUNT: 17 % (ref 11.6–15.1)
GFR SERPL CREATININE-BSD FRML MDRD: 69 ML/MIN/1.73SQ M
GLUCOSE P FAST SERPL-MCNC: 86 MG/DL (ref 65–99)
HCT VFR BLD AUTO: 37.1 % (ref 34.8–46.1)
HGB BLD-MCNC: 11.4 G/DL (ref 11.5–15.4)
IMM GRANULOCYTES # BLD AUTO: 0.04 THOUSAND/UL (ref 0–0.2)
IMM GRANULOCYTES NFR BLD AUTO: 1 % (ref 0–2)
LYMPHOCYTES # BLD AUTO: 1.68 THOUSANDS/ΜL (ref 0.6–4.47)
LYMPHOCYTES NFR BLD AUTO: 24 % (ref 14–44)
MCH RBC QN AUTO: 31.8 PG (ref 26.8–34.3)
MCHC RBC AUTO-ENTMCNC: 30.7 G/DL (ref 31.4–37.4)
MCV RBC AUTO: 104 FL (ref 82–98)
MONOCYTES # BLD AUTO: 0.73 THOUSAND/ΜL (ref 0.17–1.22)
MONOCYTES NFR BLD AUTO: 10 % (ref 4–12)
NEUTROPHILS # BLD AUTO: 4.39 THOUSANDS/ΜL (ref 1.85–7.62)
NEUTS SEG NFR BLD AUTO: 62 % (ref 43–75)
NRBC BLD AUTO-RTO: 0 /100 WBCS
PLATELET # BLD AUTO: 424 THOUSANDS/UL (ref 149–390)
PMV BLD AUTO: 9.7 FL (ref 8.9–12.7)
POTASSIUM SERPL-SCNC: 4 MMOL/L (ref 3.5–5.3)
RBC # BLD AUTO: 3.58 MILLION/UL (ref 3.81–5.12)
SODIUM SERPL-SCNC: 141 MMOL/L (ref 136–145)
TSH SERPL DL<=0.05 MIU/L-ACNC: 1.71 UIU/ML (ref 0.36–3.74)
WBC # BLD AUTO: 6.99 THOUSAND/UL (ref 4.31–10.16)

## 2020-07-31 PROCEDURE — 80048 BASIC METABOLIC PNL TOTAL CA: CPT

## 2020-07-31 PROCEDURE — 93306 TTE W/DOPPLER COMPLETE: CPT | Performed by: INTERNAL MEDICINE

## 2020-07-31 PROCEDURE — 36415 COLL VENOUS BLD VENIPUNCTURE: CPT

## 2020-07-31 PROCEDURE — 85025 COMPLETE CBC W/AUTO DIFF WBC: CPT

## 2020-07-31 PROCEDURE — 84443 ASSAY THYROID STIM HORMONE: CPT

## 2020-07-31 PROCEDURE — 93306 TTE W/DOPPLER COMPLETE: CPT

## 2020-07-31 PROCEDURE — 71046 X-RAY EXAM CHEST 2 VIEWS: CPT

## 2020-07-31 NOTE — TELEPHONE ENCOUNTER
----- Message from [id number] [facility] sent at 7/31/2020  1:52 PM EDT -----  Please let patient know blood work is overall good  Thyroid function has normalized  Blood count is improving, hemoglobin has increased from 8 3 to 11 4

## 2020-07-31 NOTE — TELEPHONE ENCOUNTER
----- Message from [id number] sent at 7/31/2020  1:50 PM EDT -----  Please let patient know her chest x-ray shows lungs are now clear

## 2020-08-03 ENCOUNTER — TELEPHONE (OUTPATIENT)
Dept: PALLIATIVE MEDICINE | Facility: CLINIC | Age: 55
End: 2020-08-03

## 2020-08-03 ENCOUNTER — OFFICE VISIT (OUTPATIENT)
Dept: PALLIATIVE MEDICINE | Facility: CLINIC | Age: 55
End: 2020-08-03
Payer: COMMERCIAL

## 2020-08-03 VITALS
HEIGHT: 63 IN | BODY MASS INDEX: 23.12 KG/M2 | HEART RATE: 103 BPM | SYSTOLIC BLOOD PRESSURE: 128 MMHG | TEMPERATURE: 98.1 F | WEIGHT: 130.5 LBS | DIASTOLIC BLOOD PRESSURE: 72 MMHG | RESPIRATION RATE: 16 BRPM | OXYGEN SATURATION: 98 %

## 2020-08-03 DIAGNOSIS — C50.212 MALIGNANT NEOPLASM OF UPPER-INNER QUADRANT OF LEFT BREAST IN FEMALE, ESTROGEN RECEPTOR NEGATIVE (HCC): ICD-10-CM

## 2020-08-03 DIAGNOSIS — R11.2 INTRACTABLE NAUSEA AND VOMITING: ICD-10-CM

## 2020-08-03 DIAGNOSIS — F41.9 ANXIETY: ICD-10-CM

## 2020-08-03 DIAGNOSIS — M32.9 SYSTEMIC LUPUS ERYTHEMATOSUS, UNSPECIFIED SLE TYPE, UNSPECIFIED ORGAN INVOLVEMENT STATUS (HCC): Primary | ICD-10-CM

## 2020-08-03 DIAGNOSIS — Z17.1 MALIGNANT NEOPLASM OF UPPER-INNER QUADRANT OF LEFT BREAST IN FEMALE, ESTROGEN RECEPTOR NEGATIVE (HCC): ICD-10-CM

## 2020-08-03 PROCEDURE — 1111F DSCHRG MED/CURRENT MED MERGE: CPT | Performed by: FAMILY MEDICINE

## 2020-08-03 PROCEDURE — 3074F SYST BP LT 130 MM HG: CPT | Performed by: FAMILY MEDICINE

## 2020-08-03 PROCEDURE — 99214 OFFICE O/P EST MOD 30 MIN: CPT | Performed by: FAMILY MEDICINE

## 2020-08-03 PROCEDURE — 3078F DIAST BP <80 MM HG: CPT | Performed by: FAMILY MEDICINE

## 2020-08-03 PROCEDURE — 4004F PT TOBACCO SCREEN RCVD TLK: CPT | Performed by: FAMILY MEDICINE

## 2020-08-03 RX ORDER — PREDNISONE 1 MG/1
5 TABLET ORAL 2 TIMES DAILY WITH MEALS
Start: 2020-08-03 | End: 2020-08-17

## 2020-08-03 RX ORDER — DEXAMETHASONE 0.5 MG/1
0.5 TABLET ORAL
Qty: 30 TABLET | Refills: 0 | Status: SHIPPED | OUTPATIENT
Start: 2020-08-03 | End: 2020-09-08

## 2020-08-03 RX ORDER — ALPRAZOLAM 1 MG/1
1 TABLET ORAL 3 TIMES DAILY PRN
Qty: 90 TABLET | Refills: 0 | Status: SHIPPED | OUTPATIENT
Start: 2020-08-10 | End: 2020-09-08

## 2020-08-03 RX ORDER — OXYCODONE HYDROCHLORIDE 10 MG/1
10-15 TABLET ORAL EVERY 4 HOURS PRN
Qty: 215 TABLET | Refills: 0 | Status: SHIPPED | OUTPATIENT
Start: 2020-08-03 | End: 2020-08-24 | Stop reason: SDUPTHER

## 2020-08-03 NOTE — TELEPHONE ENCOUNTER
Prior Authorization needed for   Oxycodone 10 mg     [address] [place] [address] Melissa  ID [id number]    Pharmacy:  [facility]
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
## 2020-08-06 NOTE — TELEPHONE ENCOUNTER
No response from Insurance, called [facility] and the med was approved and already picked up by patient

## 2020-08-08 ENCOUNTER — LAB REQUISITION (OUTPATIENT)
Dept: LAB | Facility: HOSPITAL | Age: 55
End: 2020-08-08
Payer: COMMERCIAL

## 2020-08-08 ENCOUNTER — HOSPITAL ENCOUNTER (OUTPATIENT)
Dept: INFUSION CENTER | Facility: CLINIC | Age: 55
Discharge: HOME | End: 2020-08-08
Payer: COMMERCIAL

## 2020-08-08 VITALS — TEMPERATURE: 96.7 F

## 2020-08-08 DIAGNOSIS — C50.212 MALIGNANT NEOPLASM OF UPPER-INNER QUADRANT OF LEFT FEMALE BREAST (HCC): ICD-10-CM

## 2020-08-08 DIAGNOSIS — C50.212 MALIGNANT NEOPLASM OF UPPER-INNER QUADRANT OF LEFT BREAST IN FEMALE, ESTROGEN RECEPTOR NEGATIVE (HCC): ICD-10-CM

## 2020-08-08 DIAGNOSIS — Z17.1 MALIGNANT NEOPLASM OF UPPER-INNER QUADRANT OF LEFT BREAST IN FEMALE, ESTROGEN RECEPTOR NEGATIVE (HCC): ICD-10-CM

## 2020-08-08 DIAGNOSIS — Z17.1 ESTROGEN RECEPTOR NEGATIVE STATUS (ER-): ICD-10-CM

## 2020-08-08 DIAGNOSIS — Z95.828 PORT-A-CATH IN PLACE: Primary | ICD-10-CM

## 2020-08-08 LAB
ALBUMIN SERPL BCP-MCNC: 3.4 G/DL (ref 3.5–5)
ALP SERPL-CCNC: 86 U/L (ref 46–116)
ALT SERPL W P-5'-P-CCNC: 14 U/L (ref 12–78)
ANION GAP SERPL CALCULATED.3IONS-SCNC: 8 MMOL/L (ref 4–13)
AST SERPL W P-5'-P-CCNC: 11 U/L (ref 5–45)
BASOPHILS # BLD AUTO: 0.03 THOUSANDS/ΜL (ref 0–0.1)
BASOPHILS NFR BLD AUTO: 0 % (ref 0–1)
BILIRUB SERPL-MCNC: 0.14 MG/DL (ref 0.2–1)
BUN SERPL-MCNC: 10 MG/DL (ref 5–25)
CALCIUM SERPL-MCNC: 8 MG/DL (ref 8.3–10.1)
CHLORIDE SERPL-SCNC: 103 MMOL/L (ref 100–108)
CO2 SERPL-SCNC: 28 MMOL/L (ref 21–32)
CREAT SERPL-MCNC: 0.96 MG/DL (ref 0.6–1.3)
EOSINOPHIL # BLD AUTO: 0.15 THOUSAND/ΜL (ref 0–0.61)
EOSINOPHIL NFR BLD AUTO: 2 % (ref 0–6)
ERYTHROCYTE [DISTWIDTH] IN BLOOD BY AUTOMATED COUNT: 16.6 % (ref 11.6–15.1)
GFR SERPL CREATININE-BSD FRML MDRD: 66 ML/MIN/1.73SQ M
GLUCOSE SERPL-MCNC: 97 MG/DL (ref 65–140)
HCT VFR BLD AUTO: 30.4 % (ref 34.8–46.1)
HGB BLD-MCNC: 9.3 G/DL (ref 11.5–15.4)
IMM GRANULOCYTES # BLD AUTO: 0.04 THOUSAND/UL (ref 0–0.2)
IMM GRANULOCYTES NFR BLD AUTO: 1 % (ref 0–2)
LYMPHOCYTES # BLD AUTO: 1.28 THOUSANDS/ΜL (ref 0.6–4.47)
LYMPHOCYTES NFR BLD AUTO: 15 % (ref 14–44)
MCH RBC QN AUTO: 31.7 PG (ref 26.8–34.3)
MCHC RBC AUTO-ENTMCNC: 30.6 G/DL (ref 31.4–37.4)
MCV RBC AUTO: 104 FL (ref 82–98)
MONOCYTES # BLD AUTO: 0.88 THOUSAND/ΜL (ref 0.17–1.22)
MONOCYTES NFR BLD AUTO: 10 % (ref 4–12)
NEUTROPHILS # BLD AUTO: 6.34 THOUSANDS/ΜL (ref 1.85–7.62)
NEUTS SEG NFR BLD AUTO: 72 % (ref 43–75)
NRBC BLD AUTO-RTO: 0 /100 WBCS
PLATELET # BLD AUTO: 323 THOUSANDS/UL (ref 149–390)
PMV BLD AUTO: 9.8 FL (ref 8.9–12.7)
POTASSIUM SERPL-SCNC: 3.8 MMOL/L (ref 3.5–5.3)
PROT SERPL-MCNC: 6.8 G/DL (ref 6.4–8.2)
RBC # BLD AUTO: 2.93 MILLION/UL (ref 3.81–5.12)
SODIUM SERPL-SCNC: 139 MMOL/L (ref 136–145)
WBC # BLD AUTO: 8.72 THOUSAND/UL (ref 4.31–10.16)

## 2020-08-08 PROCEDURE — 85025 COMPLETE CBC W/AUTO DIFF WBC: CPT | Performed by: SURGERY

## 2020-08-08 PROCEDURE — 80053 COMPREHEN METABOLIC PANEL: CPT | Performed by: SURGERY

## 2020-08-08 PROCEDURE — U0003 INFECTIOUS AGENT DETECTION BY NUCLEIC ACID (DNA OR RNA); SEVERE ACUTE RESPIRATORY SYNDROME CORONAVIRUS 2 (SARS-COV-2) (CORONAVIRUS DISEASE [COVID-19]), AMPLIFIED PROBE TECHNIQUE, MAKING USE OF HIGH THROUGHPUT TECHNOLOGIES AS DESCRIBED BY CMS-2020-01-R: HCPCS

## 2020-08-08 NOTE — PROGRESS NOTES
Pt here for central labs, offers no complaints  Labs drawn and sent to the lab  Port flushed per protocol  Aware of next appointments with provider's office  AVS provided  David Mckeon

## 2020-08-09 LAB — SARS-COV-2 RNA SPEC QL NAA+PROBE: NOT DETECTED

## 2020-08-10 ENCOUNTER — OFFICE VISIT (OUTPATIENT)
Dept: SURGICAL ONCOLOGY | Facility: CLINIC | Age: 55
End: 2020-08-10

## 2020-08-10 VITALS
WEIGHT: 132.5 LBS | BODY MASS INDEX: 23.48 KG/M2 | DIASTOLIC BLOOD PRESSURE: 80 MMHG | HEART RATE: 84 BPM | TEMPERATURE: 97.7 F | HEIGHT: 63 IN | SYSTOLIC BLOOD PRESSURE: 122 MMHG

## 2020-08-10 DIAGNOSIS — Z17.1 MALIGNANT NEOPLASM OF UPPER-INNER QUADRANT OF LEFT BREAST IN FEMALE, ESTROGEN RECEPTOR NEGATIVE (HCC): ICD-10-CM

## 2020-08-10 DIAGNOSIS — C50.212 MALIGNANT NEOPLASM OF UPPER-INNER QUADRANT OF LEFT BREAST IN FEMALE, ESTROGEN RECEPTOR NEGATIVE (HCC): ICD-10-CM

## 2020-08-10 DIAGNOSIS — Z01.818 PREOP EXAMINATION: Primary | ICD-10-CM

## 2020-08-10 PROCEDURE — 3008F BODY MASS INDEX DOCD: CPT | Performed by: SURGERY

## 2020-08-10 PROCEDURE — 3074F SYST BP LT 130 MM HG: CPT | Performed by: SURGERY

## 2020-08-10 PROCEDURE — 3008F BODY MASS INDEX DOCD: CPT | Performed by: FAMILY MEDICINE

## 2020-08-10 PROCEDURE — 3079F DIAST BP 80-89 MM HG: CPT | Performed by: SURGERY

## 2020-08-10 PROCEDURE — 1111F DSCHRG MED/CURRENT MED MERGE: CPT | Performed by: SURGERY

## 2020-08-10 PROCEDURE — PREOP: Performed by: SURGERY

## 2020-08-10 NOTE — PROGRESS NOTES
Surgical Oncology Follow Up       [address]  [facility] [place]  [address]  [place] [address]    Rhoda Rosales Mock  1965  [id number]  [address] [facility]  [facility] [place]  [address] [place] [address]    No chief complaint on file  Assessment & Plan:   Patient presents for a follow-up visit  She was recently hospitalized for UTI, which postpone her surgery  We have already gone through the process of informed consent for left mastectomy without reconstruction  We reviewed this today show all questions were answered the patient's satisfaction  Cancer History:     Oncology History   Malignant neoplasm of upper-inner quadrant of left breast in female, estrogen receptor negative ([facility] [address] )   2/12/2020 Biopsy    A  & B  Right breast stereotactic biopsy with and without calcs; 11 o'clock, 10 cm from nipple  Benign breast glandular parenchyma with fibroadenomatoid stromal hyperplasia  No atypia and no evidence of malignancy     C  Left breast ultrasound-guided biopsy; 10 o'clock, 5 cm from nipple  Benign breast glandular tissue  No atypia and no evidence of malignancy    D  Left breast ultrasound-guided biopsy; 10 o'clock, 4 cm from nipple  Invasive breast carcinoma of no special type (ductal NST)  Grade 3  ER 0  HI 0  HER2 2+; FISH positive    Concordant  Right breast clear  Malignant mass measures 2 3 cm on mammo  Left axilla US negative  Mammographic abnormality with no US correlate  MRI recommended to further evaluate this finding  2/26/2020 Genetic Testing    The following genes were evaluated: LIZZETTE, BRCA1, BRCA2, CDH1, CHEK2, PALB2, PTEN, STK11, TP53  Negative result   No pathogenic sequence variants or deletions/dupllications identified  [facility]     3/24/2020 - 5/25/2020 Chemotherapy    pegfilgrastim (NEULASTA ONPRO) subcutaneous injection kit 6 mg, 6 mg, Subcutaneous, Once, 4 of 6 cycles  Administration: 6 mg (3/24/2020), 6 mg (4/14/2020), 6 mg (5/5/2020)  fosaprepitant (EMEND) 150 mg in sodium chloride 0 9 % 250 mL IVPB, 150 mg, Intravenous, Once, 4 of 6 cycles  Administration: 150 mg (3/24/2020), 150 mg (4/14/2020), 150 mg (5/5/2020)  pertuzumab (PERJETA) 840 mg in sodium chloride 0 9 % 250 mL IVPB, 840 mg, Intravenous, Once, 4 of 6 cycles  Administration: 840 mg (3/24/2020), 420 mg (4/14/2020), 420 mg (5/5/2020)  CARBOplatin (PARAPLATIN) 547 8 mg in sodium chloride 0 9 % 250 mL IVPB, 547 8 mg, Intravenous, Once, 4 of 6 cycles  Administration: 547 8 mg (3/24/2020), 575 4 mg (4/14/2020), 625 8 mg (5/5/2020)  DOCEtaxel (TAXOTERE) 122 2 mg in sodium chloride 0 9 % 250 mL chemo infusion, 75 mg/m2 = 122 2 mg, Intravenous, Once, 4 of 6 cycles  Administration: 122 2 mg (3/24/2020), 122 2 mg (4/14/2020), 122 2 mg (5/5/2020)  trastuzumab (HERCEPTIN) 486 mg in sodium chloride 0 9 % 250 mL chemo infusion, 8 mg/kg = 486 mg, Intravenous, Once, 4 of 18 cycles  Administration: 486 mg (3/24/2020), 364 mg (4/14/2020), 364 mg (5/5/2020)     6/3/2020 -  Chemotherapy    pertuzumab (PERJETA) 420 mg in sodium chloride 0 9 % 250 mL IVPB, 420 mg (50 % of original dose 840 mg), Intravenous, Once, 2 of 6 cycles  Dose modification: 420 mg (original dose 840 mg, Cycle 1, Reason: Dose Not Tolerated)  Administration: 420 mg (6/3/2020), 420 mg (6/25/2020)  PACLitaxel (TAXOL) 127 2 mg in sodium chloride 0 9 % 250 mL chemo IVPB, 80 mg/m2 = 127 2 mg, Intravenous, Once, 2 of 2 cycles  Administration: 127 2 mg (6/3/2020), 127 2 mg (6/10/2020), 127 2 mg (6/17/2020), 127 2 mg (6/25/2020), 127 2 mg (7/1/2020), 127 2 mg (7/7/2020)  trastuzumab (HERCEPTIN) 340 mg in sodium chloride 0 9 % 250 mL chemo infusion, 6 mg/kg = 340 mg (75 % of original dose 8 mg/kg), Intravenous, Once, 2 of 6 cycles  Dose modification: 6 mg/kg (original dose 8 mg/kg, Cycle 1, Reason: Dose Not Tolerated)  Administration: 340 mg (6/3/2020), 340 mg (6/25/2020)     6/4/2020 -  Cancer Staged    Staging form: Breast, AJCC 8th Edition  - Clinical: Stage IIA (cT2, cN0, cM0, G3, ER-, MA-, HER2+) - Signed by Sammy Fay MD on 6/4/2020  Laterality: Left  Histologic grading system: 3 grade system             Interval History:   Patient presents for a follow-up visit post hospitalization  Her surgery was postponed and is currently rescheduled for next week  She prefers a left mastectomy in conjunction with a sentinel lymph node biopsy and possible axillary dissection  All questions were answered the patient's satisfaction  Review of Systems:   Review of Systems   Constitutional: Positive for fatigue  All other systems reviewed and are negative        Past Medical History     Patient Active Problem List   Diagnosis    UTI (urinary tract infection)    Hypertension    Systemic lupus erythematosus ([address] )    Hypothyroidism    Posttraumatic stress disorder    Adult celiac disease    Anxiety    Depression    Esophagitis    Nephrolithiasis    Vitamin D deficiency    Malignant neoplasm of upper-inner quadrant of left breast in female, estrogen receptor negative ([address] )    Chemotherapy induced neutropenia (HCC)    Leukocytosis    Increased anion gap metabolic acidosis    Port-A-Cath in place    Intractable nausea and vomiting    SIRS (systemic inflammatory response syndrome) (HCC)    JEFERSON (acute kidney injury) ([address] )    Hyponatremia    Hypokalemia    Anemia    Hypotension     Past Medical History:   Diagnosis Date    Disease of thyroid gland     Hypertension     Lupus ([address] )     Malignant neoplasm of upper-inner quadrant of left breast in female, estrogen receptor negative ([address] ) 2/25/2020    Nephrolithiasis     PTSD (post-traumatic stress disorder)      Past Surgical History:   Procedure Laterality Date    FEMUR FRACTURE SURGERY      FL GUIDED CENTRAL VENOUS ACCESS DEVICE INSERTION  3/17/2020    HYSTERECTOMY      LEFT OOPHORECTOMY      due to torsion     MAMMO STEREOTACTIC BREAST BIOPSY RIGHT (ALL INC) Right 2/12/2020    MRI BREAST BIOPSY LEFT (ALL INCLUSIVE) Left 3/20/2020    ME INSJ TUNNELED CTR VAD W/SUBQ PORT AGE 5 YR/> N/A 3/17/2020    Procedure: INSERTION VENOUS PORT ( PORT-A-CATH) IR;  Surgeon: Kobe Oconnor DO;  Location: [facility];  Service: Interventional Radiology    US GUIDANCE BREAST BIOPSY LEFT EACH ADDITIONAL Left 2/12/2020    US GUIDED BREAST BIOPSY LEFT COMPLETE Left 2/12/2020    VAGINA SURGERY       Family History   Problem Relation Age of Onset    Diabetes Mother     Hypertension Mother     Nephrolithiasis Mother     Breast cancer Mother 79    Heart disease Father     Hypertension Father     Diabetes Brother     Nephrolithiasis Brother     Breast cancer Maternal Aunt     No Known Problems Maternal Grandmother     No Known Problems Maternal Grandfather     No Known Problems Paternal Grandmother     No Known Problems Paternal Grandfather      Social History     Socioeconomic History    Marital status: /Civil Union     Spouse name: Not on file    Number of children: Not on file    Years of education: Not on file    Highest education level: Not on file   Occupational History    Not on file   Social Needs    Financial resource strain: Not on file    Food insecurity     Worry: Not on file     Inability: Not on file    Transportation needs     Medical: Not on file     Non-medical: Not on file   Tobacco Use    Smoking status: Current Every Day Smoker     Packs/day: 0 25     Types: Cigarettes     Start date: 1990    Smokeless tobacco: Never Used    Tobacco comment:  5 ppd per [employer]   Substance and Sexual Activity    Alcohol use: Not Currently     Alcohol/week: 21 0 standard drinks     Types: 21 Cans of beer per week     Frequency: Never     Comment: pt states she had her last beer 3/22/2020    Drug use: No    Sexual activity: Not on file   Lifestyle    Physical activity     Days per week: Not on file     Minutes per session: Not on file    Stress: Not on file   Relationships    Social connections     Talks on phone: Not on file     Gets together: Not on file     Attends Yarsani service: Not on file     Active member of club or organization: Not on file     Attends meetings of clubs or organizations: Not on file     Relationship status: Not on file    Intimate partner violence     Fear of current or ex partner: Not on file     Emotionally abused: Not on file     Physically abused: Not on file     Forced sexual activity: Not on file   Other Topics Concern    Not on file   Social History Narrative    Not on file       Current Outpatient Medications:     ALPRAZolam (XANAX) 1 mg tablet, Take 1 tablet (1 mg total) by mouth 3 (three) times a day as needed for anxiety, Disp: 90 tablet, Rfl: 0    Cholecalciferol (VITAMIN D3) 44510 units CAPS, Take 50,000 Units by mouth once a week, Disp: , Rfl:     CRANIAL PROSTHESIS, RX,, One wig as needed, Disp: 1 Piece, Rfl: 0    cyclobenzaprine (FLEXERIL) 10 mg tablet, Take 10 mg by mouth daily at bedtime , Disp: , Rfl:     dexamethasone (DECADRON) 0 5 mg tablet, Take 1 tablet (0 5 mg total) by mouth daily with breakfast, Disp: 30 tablet, Rfl: 0    hydroxychloroquine (PLAQUENIL) 200 mg tablet, Take 1 tablet in morning and 1/2 tablet in evening, Disp: , Rfl:     levothyroxine 88 mcg tablet, Take 1 tablet (88 mcg total) by mouth daily, Disp: 30 tablet, Rfl: 5    loperamide (IMODIUM) 2 mg capsule, Take 1 capsule (2 mg total) by mouth every 4 (four) hours as needed for diarrhea, Disp: 30 capsule, Rfl: 0    Nutritional Supplements (OSTEOPOROSIS SUPPORT PO), Take by mouth, Disp: , Rfl:     ondansetron (ZOFRAN) 4 mg tablet, TAKE ONE TABLET BY MOUTH EVERY 8 HOURS AS NEEDED FOR NAUSEA OR VOMITING, Disp: 30 tablet, Rfl: 1    oxyCODONE (ROXICODONE) 10 MG TABS, Take 1-1 5 tablets (10-15 mg total) by mouth every 4 (four) hours as needed (cancer pain and post-op pain  Max of 8 tabs / day ), Disp: 215 tablet, Rfl: 0    predniSONE 5 mg tablet, Take 1 tablet (5 mg total) by mouth 2 (two) times a day with meals PRN Lupus flares, Disp: , Rfl:     prochlorperazine (COMPAZINE) 5 mg tablet, Take 1 tablet (5 mg total) by mouth every 6 (six) hours as needed for nausea or vomiting, Disp: 30 tablet, Rfl: 0    sertraline (ZOLOFT) 100 mg tablet, TAKE 1 & 1/2 TABLETS BY MOUTH ONCE DAILY, Disp: 45 tablet, Rfl: 5    dicyclomine (BENTYL) 20 mg tablet, Take 1 tablet (20 mg total) by mouth every 6 (six) hours as needed (abdominal pain) for up to 10 days, Disp: 30 tablet, Rfl: 0    magnesium oxide (MAG-OX) 400 mg, Take 1 tablet (400 mg total) by mouth 2 (two) times a day, Disp: 60 tablet, Rfl: 0  Allergies   Allergen Reactions    Mobic [Meloxicam] Eye Swelling     Reaction Date: 12Aug2011;     Methotrexate Rash    Sulfa Antibiotics Rash       Physical Exam:     Vitals:    08/10/20 0859   BP: 122/80   Pulse: 84   Temp: 97 7 °F (36 5 °C)     Physical Exam  Vitals signs reviewed  Exam conducted with a chaperone present  Constitutional:       Appearance: She is well-developed  HENT:      Head: Normocephalic and atraumatic  Eyes:      Pupils: Pupils are equal, round, and reactive to light  Neck:      Musculoskeletal: Normal range of motion and neck supple  Thyroid: No thyromegaly  Vascular: No JVD  Trachea: No tracheal deviation  Cardiovascular:      Rate and Rhythm: Normal rate and regular rhythm  Heart sounds: Normal heart sounds  No murmur  No friction rub  No gallop  Pulmonary:      Effort: Pulmonary effort is normal  No respiratory distress  Breath sounds: Normal breath sounds  No wheezing or rales  Chest:      Breasts:         Right: Normal  No swelling, bleeding, inverted nipple, mass, nipple discharge, skin change or tenderness  Left: Normal  No swelling, bleeding, inverted nipple, mass, nipple discharge, skin change or tenderness  Abdominal:      General: There is no distension  Palpations: Abdomen is soft  There is no hepatomegaly or mass  Tenderness: There is no abdominal tenderness  There is no guarding or rebound  Musculoskeletal: Normal range of motion  General: No tenderness  Lymphadenopathy:      Cervical: No cervical adenopathy  Upper Body:      Right upper body: No supraclavicular, axillary or pectoral adenopathy  Left upper body: No supraclavicular, axillary or pectoral adenopathy  Skin:     General: Skin is warm and dry  Findings: No erythema or rash  Neurological:      Mental Status: She is alert and oriented to person, place, and time  Cranial Nerves: No cranial nerve deficit  Psychiatric:         Behavior: Behavior normal            Results & Discussion:   The patient and I have already gone through the process of informed consent  We reviewed the surgery timing  as well as scars and drains  All questions were answered the patient's satisfaction  Advance Care Planning/Advance Directives:  I discussed the disease status, treatment plans and follow-up with the patient

## 2020-08-10 NOTE — H&P (VIEW-ONLY)
Surgical Oncology Follow Up       [address]  [facility] [place]  [address]  [facility] [address]    Ochoa Decent Mock  1965  [id number]  [address]  [facility] [place]  [address] [facility] [address]    No chief complaint on file  Assessment & Plan:   Patient presents for a follow-up visit  She was recently hospitalized for UTI, which postpone her surgery  We have already gone through the process of informed consent for left mastectomy without reconstruction  We reviewed this today show all questions were answered the patient's satisfaction  Cancer History:     Oncology History   Malignant neoplasm of upper-inner quadrant of left breast in female, estrogen receptor negative ([facility] [address] )   2/12/2020 Biopsy    A  & B  Right breast stereotactic biopsy with and without calcs; 11 o'clock, 10 cm from nipple  Benign breast glandular parenchyma with fibroadenomatoid stromal hyperplasia  No atypia and no evidence of malignancy     C  Left breast ultrasound-guided biopsy; 10 o'clock, 5 cm from nipple  Benign breast glandular tissue  No atypia and no evidence of malignancy    D  Left breast ultrasound-guided biopsy; 10 o'clock, 4 cm from nipple  Invasive breast carcinoma of no special type (ductal NST)  Grade 3  ER 0  VA 0  HER2 2+; FISH positive    Concordant  Right breast clear  Malignant mass measures 2 3 cm on mammo  Left axilla US negative  Mammographic abnormality with no US correlate  MRI recommended to further evaluate this finding  2/26/2020 Genetic Testing    The following genes were evaluated: LIZZETTE, BRCA1, BRCA2, CDH1, CHEK2, PALB2, PTEN, STK11, TP53  Negative result   No pathogenic sequence variants or deletions/dupllications identified  [employer]     3/24/2020 - 5/25/2020 Chemotherapy    pegfilgrastim (NEULASTA ONPRO) subcutaneous injection kit 6 mg, 6 mg, Subcutaneous, Once, 4 of 6 cycles  Administration: 6 mg (3/24/2020), 6 mg (4/14/2020), 6 mg (5/5/2020)  fosaprepitant (EMEND) 150 mg in sodium chloride 0 9 % 250 mL IVPB, 150 mg, Intravenous, Once, 4 of 6 cycles  Administration: 150 mg (3/24/2020), 150 mg (4/14/2020), 150 mg (5/5/2020)  pertuzumab (PERJETA) 840 mg in sodium chloride 0 9 % 250 mL IVPB, 840 mg, Intravenous, Once, 4 of 6 cycles  Administration: 840 mg (3/24/2020), 420 mg (4/14/2020), 420 mg (5/5/2020)  CARBOplatin (PARAPLATIN) 547 8 mg in sodium chloride 0 9 % 250 mL IVPB, 547 8 mg, Intravenous, Once, 4 of 6 cycles  Administration: 547 8 mg (3/24/2020), 575 4 mg (4/14/2020), 625 8 mg (5/5/2020)  DOCEtaxel (TAXOTERE) 122 2 mg in sodium chloride 0 9 % 250 mL chemo infusion, 75 mg/m2 = 122 2 mg, Intravenous, Once, 4 of 6 cycles  Administration: 122 2 mg (3/24/2020), 122 2 mg (4/14/2020), 122 2 mg (5/5/2020)  trastuzumab (HERCEPTIN) 486 mg in sodium chloride 0 9 % 250 mL chemo infusion, 8 mg/kg = 486 mg, Intravenous, Once, 4 of 18 cycles  Administration: 486 mg (3/24/2020), 364 mg (4/14/2020), 364 mg (5/5/2020)     6/3/2020 -  Chemotherapy    pertuzumab (PERJETA) 420 mg in sodium chloride 0 9 % 250 mL IVPB, 420 mg (50 % of original dose 840 mg), Intravenous, Once, 2 of 6 cycles  Dose modification: 420 mg (original dose 840 mg, Cycle 1, Reason: Dose Not Tolerated)  Administration: 420 mg (6/3/2020), 420 mg (6/25/2020)  PACLitaxel (TAXOL) 127 2 mg in sodium chloride 0 9 % 250 mL chemo IVPB, 80 mg/m2 = 127 2 mg, Intravenous, Once, 2 of 2 cycles  Administration: 127 2 mg (6/3/2020), 127 2 mg (6/10/2020), 127 2 mg (6/17/2020), 127 2 mg (6/25/2020), 127 2 mg (7/1/2020), 127 2 mg (7/7/2020)  trastuzumab (HERCEPTIN) 340 mg in sodium chloride 0 9 % 250 mL chemo infusion, 6 mg/kg = 340 mg (75 % of original dose 8 mg/kg), Intravenous, Once, 2 of 6 cycles  Dose modification: 6 mg/kg (original dose 8 mg/kg, Cycle 1, Reason: Dose Not Tolerated)  Administration: 340 mg (6/3/2020), 340 mg (6/25/2020)     6/4/2020 -  Cancer Staged    Staging form: Breast, AJCC 8th Edition  - Clinical: Stage IIA (cT2, cN0, cM0, G3, ER-, RI-, HER2+) - Signed by Nettie Pappas MD on 6/4/2020  Laterality: Left  Histologic grading system: 3 grade system             Interval History:   Patient presents for a follow-up visit post hospitalization  Her surgery was postponed and is currently rescheduled for next week  She prefers a left mastectomy in conjunction with a sentinel lymph node biopsy and possible axillary dissection  All questions were answered the patient's satisfaction  Review of Systems:   Review of Systems   Constitutional: Positive for fatigue  All other systems reviewed and are negative        Past Medical History     Patient Active Problem List   Diagnosis    UTI (urinary tract infection)    Hypertension    Systemic lupus erythematosus ([address] )    Hypothyroidism    Posttraumatic stress disorder    Adult celiac disease    Anxiety    Depression    Esophagitis    Nephrolithiasis    Vitamin D deficiency    Malignant neoplasm of upper-inner quadrant of left breast in female, estrogen receptor negative ([address] )    Chemotherapy induced neutropenia (HCC)    Leukocytosis    Increased anion gap metabolic acidosis    Port-A-Cath in place    Intractable nausea and vomiting    SIRS (systemic inflammatory response syndrome) (HCC)    JEFERSON (acute kidney injury) ([address] )    Hyponatremia    Hypokalemia    Anemia    Hypotension     Past Medical History:   Diagnosis Date    Disease of thyroid gland     Hypertension     Lupus ([address] )     Malignant neoplasm of upper-inner quadrant of left breast in female, estrogen receptor negative ([address] ) 2/25/2020    Nephrolithiasis     PTSD (post-traumatic stress disorder)      Past Surgical History:   Procedure Laterality Date    FEMUR FRACTURE SURGERY      FL GUIDED CENTRAL VENOUS ACCESS DEVICE INSERTION  3/17/2020    HYSTERECTOMY      LEFT OOPHORECTOMY      due to torsion     MAMMO STEREOTACTIC BREAST BIOPSY RIGHT (ALL INC) Right 2/12/2020    MRI BREAST BIOPSY LEFT (ALL INCLUSIVE) Left 3/20/2020    FL INSJ TUNNELED CTR VAD W/SUBQ PORT AGE 5 YR/> N/A 3/17/2020    Procedure: INSERTION VENOUS PORT ( PORT-A-CATH) IR;  Surgeon: Josefa Gates DO;  [facility];  Service: Interventional Radiology    US GUIDANCE BREAST BIOPSY LEFT EACH ADDITIONAL Left 2/12/2020    US GUIDED BREAST BIOPSY LEFT COMPLETE Left 2/12/2020    VAGINA SURGERY       Family History   Problem Relation Age of Onset    Diabetes Mother     Hypertension Mother     Nephrolithiasis Mother     Breast cancer Mother 79    Heart disease Father     Hypertension Father     Diabetes Brother     Nephrolithiasis Brother     Breast cancer Maternal Aunt     No Known Problems Maternal Grandmother     No Known Problems Maternal Grandfather     No Known Problems Paternal Grandmother     No Known Problems Paternal Grandfather      Social History     Socioeconomic History    Marital status: /Civil Union     Spouse name: Not on file    Number of children: Not on file    Years of education: Not on file    Highest education level: Not on file   Occupational History    Not on file   Social Needs    Financial resource strain: Not on file    Food insecurity     Worry: Not on file     Inability: Not on file    Transportation needs     Medical: Not on file     Non-medical: Not on file   Tobacco Use    Smoking status: Current Every Day Smoker     Packs/day: 0 25     Types: Cigarettes     Start date: 1990    Smokeless tobacco: Never Used    Tobacco comment:  5 ppd per [employer]   Substance and Sexual Activity    Alcohol use: Not Currently     Alcohol/week: 21 0 standard drinks     Types: 21 Cans of beer per week     Frequency: Never     Comment: pt states she had her last beer 3/22/2020    Drug use: No    Sexual activity: Not on file   Lifestyle    Physical activity     Days per week: Not on file     Minutes per session: Not on file    Stress: Not on file   Relationships    Social connections     Talks on phone: Not on file     Gets together: Not on file     Attends Tenriism service: Not on file     Active member of club or organization: Not on file     Attends meetings of clubs or organizations: Not on file     Relationship status: Not on file    Intimate partner violence     Fear of current or ex partner: Not on file     Emotionally abused: Not on file     Physically abused: Not on file     Forced sexual activity: Not on file   Other Topics Concern    Not on file   Social History Narrative    Not on file       Current Outpatient Medications:     ALPRAZolam (XANAX) 1 mg tablet, Take 1 tablet (1 mg total) by mouth 3 (three) times a day as needed for anxiety, Disp: 90 tablet, Rfl: 0    Cholecalciferol (VITAMIN D3) 56759 units CAPS, Take 50,000 Units by mouth once a week, Disp: , Rfl:     CRANIAL PROSTHESIS, RX,, One wig as needed, Disp: 1 Piece, Rfl: 0    cyclobenzaprine (FLEXERIL) 10 mg tablet, Take 10 mg by mouth daily at bedtime , Disp: , Rfl:     dexamethasone (DECADRON) 0 5 mg tablet, Take 1 tablet (0 5 mg total) by mouth daily with breakfast, Disp: 30 tablet, Rfl: 0    hydroxychloroquine (PLAQUENIL) 200 mg tablet, Take 1 tablet in morning and 1/2 tablet in evening, Disp: , Rfl:     levothyroxine 88 mcg tablet, Take 1 tablet (88 mcg total) by mouth daily, Disp: 30 tablet, Rfl: 5    loperamide (IMODIUM) 2 mg capsule, Take 1 capsule (2 mg total) by mouth every 4 (four) hours as needed for diarrhea, Disp: 30 capsule, Rfl: 0    Nutritional Supplements (OSTEOPOROSIS SUPPORT PO), Take by mouth, Disp: , Rfl:     ondansetron (ZOFRAN) 4 mg tablet, TAKE ONE TABLET BY MOUTH EVERY 8 HOURS AS NEEDED FOR NAUSEA OR VOMITING, Disp: 30 tablet, Rfl: 1    oxyCODONE (ROXICODONE) 10 MG TABS, Take 1-1 5 tablets (10-15 mg total) by mouth every 4 (four) hours as needed (cancer pain and post-op pain  Max of 8 tabs / day ), Disp: 215 tablet, Rfl: 0    predniSONE 5 mg tablet, Take 1 tablet (5 mg total) by mouth 2 (two) times a day with meals PRN Lupus flares, Disp: , Rfl:     prochlorperazine (COMPAZINE) 5 mg tablet, Take 1 tablet (5 mg total) by mouth every 6 (six) hours as needed for nausea or vomiting, Disp: 30 tablet, Rfl: 0    sertraline (ZOLOFT) 100 mg tablet, TAKE 1 & 1/2 TABLETS BY MOUTH ONCE DAILY, Disp: 45 tablet, Rfl: 5    dicyclomine (BENTYL) 20 mg tablet, Take 1 tablet (20 mg total) by mouth every 6 (six) hours as needed (abdominal pain) for up to 10 days, Disp: 30 tablet, Rfl: 0    magnesium oxide (MAG-OX) 400 mg, Take 1 tablet (400 mg total) by mouth 2 (two) times a day, Disp: 60 tablet, Rfl: 0  Allergies   Allergen Reactions    Mobic [Meloxicam] Eye Swelling     Reaction Date: 12Aug2011;     Methotrexate Rash    Sulfa Antibiotics Rash       Physical Exam:     Vitals:    08/10/20 0859   BP: 122/80   Pulse: 84   Temp: 97 7 °F (36 5 °C)     Physical Exam  Vitals signs reviewed  Exam conducted with a chaperone present  Constitutional:       Appearance: She is well-developed  HENT:      Head: Normocephalic and atraumatic  Eyes:      Pupils: Pupils are equal, round, and reactive to light  Neck:      Musculoskeletal: Normal range of motion and neck supple  Thyroid: No thyromegaly  Vascular: No JVD  Trachea: No tracheal deviation  Cardiovascular:      Rate and Rhythm: Normal rate and regular rhythm  Heart sounds: Normal heart sounds  No murmur  No friction rub  No gallop  Pulmonary:      Effort: Pulmonary effort is normal  No respiratory distress  Breath sounds: Normal breath sounds  No wheezing or rales  Chest:      Breasts:         Right: Normal  No swelling, bleeding, inverted nipple, mass, nipple discharge, skin change or tenderness  Left: Normal  No swelling, bleeding, inverted nipple, mass, nipple discharge, skin change or tenderness  Abdominal:      General: There is no distension  Palpations: Abdomen is soft  There is no hepatomegaly or mass  Tenderness: There is no abdominal tenderness  There is no guarding or rebound  Musculoskeletal: Normal range of motion  General: No tenderness  Lymphadenopathy:      Cervical: No cervical adenopathy  Upper Body:      Right upper body: No supraclavicular, axillary or pectoral adenopathy  Left upper body: No supraclavicular, axillary or pectoral adenopathy  Skin:     General: Skin is warm and dry  Findings: No erythema or rash  Neurological:      Mental Status: She is alert and oriented to person, place, and time  Cranial Nerves: No cranial nerve deficit  Psychiatric:         Behavior: Behavior normal            Results & Discussion:   The patient and I have already gone through the process of informed consent  We reviewed the surgery timing  as well as scars and drains  All questions were answered the patient's satisfaction  Advance Care Planning/Advance Directives:  I discussed the disease status, treatment plans and follow-up with the patient

## 2020-08-13 ENCOUNTER — TELEPHONE (OUTPATIENT)
Dept: SURGICAL ONCOLOGY | Facility: CLINIC | Age: 55
End: 2020-08-13

## 2020-08-13 DIAGNOSIS — E03.9 HYPOTHYROIDISM, UNSPECIFIED TYPE: ICD-10-CM

## 2020-08-13 RX ORDER — LEVOTHYROXINE SODIUM 88 UG/1
TABLET ORAL
Qty: 30 TABLET | Refills: 5 | Status: SHIPPED | OUTPATIENT
Start: 2020-08-13 | End: 2021-02-07

## 2020-08-13 NOTE — TELEPHONE ENCOUNTER
Called patient to discuss her needing to obtain medical clearance, again  There was no answer  Left a very detailed message stating that Dr Damaris Hammond and I both spoke to her about needing to obtain medical clearance at her appointment on Monday (810) prior to her surgery on 8/18  Explained to patient again, that Dr Damaris Hammond would need to cancel her surgery and reschedule it if medical clearance was not obtained in time  This was originally discussed with the patient on 7/17  Emphasized multiple times the importance of the patient reaching out to her PCP's office to get cleared prior to her surgery next week with Dr Damaris Hammond  Direct call back number provided if the patient had any questions

## 2020-08-17 ENCOUNTER — TRANSCRIBE ORDERS (OUTPATIENT)
Dept: RADIOLOGY | Facility: HOSPITAL | Age: 55
End: 2020-08-17

## 2020-08-17 ENCOUNTER — ANESTHESIA EVENT (OUTPATIENT)
Dept: PERIOP | Facility: HOSPITAL | Age: 55
End: 2020-08-17
Payer: COMMERCIAL

## 2020-08-17 ENCOUNTER — APPOINTMENT (OUTPATIENT)
Dept: LAB | Facility: CLINIC | Age: 55
End: 2020-08-17
Payer: COMMERCIAL

## 2020-08-17 ENCOUNTER — TELEPHONE (OUTPATIENT)
Dept: FAMILY MEDICINE CLINIC | Facility: CLINIC | Age: 55
End: 2020-08-17

## 2020-08-17 DIAGNOSIS — Z01.818 PRE-OP TESTING: ICD-10-CM

## 2020-08-17 DIAGNOSIS — Z01.818 PRE-OP TESTING: Primary | ICD-10-CM

## 2020-08-17 LAB
APTT PPP: 32 SECONDS (ref 23–37)
INR PPP: 0.92 (ref 0.84–1.19)
PROTHROMBIN TIME: 12.5 SECONDS (ref 11.6–14.5)

## 2020-08-17 PROCEDURE — 85730 THROMBOPLASTIN TIME PARTIAL: CPT

## 2020-08-17 PROCEDURE — 85610 PROTHROMBIN TIME: CPT

## 2020-08-17 PROCEDURE — 36415 COLL VENOUS BLD VENIPUNCTURE: CPT

## 2020-08-17 NOTE — TELEPHONE ENCOUNTER
Received fax requesting medical clearance for surgery tomorrow, left mastectomy  Seen in office on 7/20/20  Please see office visit note  Had lower extremity edema at this time, which patient was contacted today via phone, and reports all edema has resolved  ECG completed 5/12/2020, NSR, possible left atrial enlargement, possible septal infarct  Echo completed 7/31/2020, left ventricle normal size  Ejection fraction 60%  Wall thickness at upper limits of normal   Grade 1 diastolic dysfunction  Atria normal size  No valvular disease  CBC with anemia, 9 3/30 4, will need to be monitored closely  CMP unremarkable except mildly low calcium level 8 0    TSH normal   PT/INR, PTT in normal range  Urine culture 7/20 negative  Acceptable risk to proceed with surgery at scheduled  Form faxed to Surgical Oncology office

## 2020-08-17 NOTE — PRE-PROCEDURE INSTRUCTIONS
Pre-Surgery Instructions:   Medication Instructions    ALPRAZolam (XANAX) 1 mg tablet Instructed patient per Anesthesia Guidelines   Cholecalciferol (VITAMIN D3) 53524 units CAPS Instructed patient per Anesthesia Guidelines   cyclobenzaprine (FLEXERIL) 10 mg tablet Instructed patient per Anesthesia Guidelines   dexamethasone (DECADRON) 0 5 mg tablet Instructed patient per Anesthesia Guidelines   hydroxychloroquine (PLAQUENIL) 200 mg tablet Instructed patient per Anesthesia Guidelines   levothyroxine 88 mcg tablet Instructed patient per Anesthesia Guidelines   magnesium oxide (MAG-OX) 400 mg Instructed patient per Anesthesia Guidelines   Nutritional Supplements (OSTEOPOROSIS SUPPORT PO) Instructed patient per Anesthesia Guidelines   ondansetron (ZOFRAN) 4 mg tablet Instructed patient per Anesthesia Guidelines   oxyCODONE (ROXICODONE) 10 MG TABS Instructed patient per Anesthesia Guidelines   prochlorperazine (COMPAZINE) 5 mg tablet Instructed patient per Anesthesia Guidelines   sertraline (ZOLOFT) 100 mg tablet Instructed patient per Anesthesia Guidelines      Pre op,medications and showering instructions reviewed-Patient has hibiclens

## 2020-08-18 ENCOUNTER — ANESTHESIA (OUTPATIENT)
Dept: PERIOP | Facility: HOSPITAL | Age: 55
End: 2020-08-18
Payer: COMMERCIAL

## 2020-08-18 ENCOUNTER — HOSPITAL ENCOUNTER (OUTPATIENT)
Dept: NUCLEAR MEDICINE | Facility: HOSPITAL | Age: 55
Discharge: HOME | End: 2020-08-18
Attending: SURGERY
Payer: COMMERCIAL

## 2020-08-18 ENCOUNTER — HOSPITAL ENCOUNTER (OUTPATIENT)
Facility: HOSPITAL | Age: 55
Setting detail: OUTPATIENT SURGERY
Discharge: HOME HEALTH | End: 2020-08-19
Attending: SURGERY | Admitting: SURGERY
Payer: COMMERCIAL

## 2020-08-18 DIAGNOSIS — C50.212 MALIGNANT NEOPLASM OF UPPER-INNER QUADRANT OF LEFT BREAST IN FEMALE, ESTROGEN RECEPTOR NEGATIVE (HCC): ICD-10-CM

## 2020-08-18 DIAGNOSIS — Z17.1 MALIGNANT NEOPLASM OF UPPER-INNER QUADRANT OF LEFT BREAST IN FEMALE, ESTROGEN RECEPTOR NEGATIVE (HCC): ICD-10-CM

## 2020-08-18 PROCEDURE — 38792 RA TRACER ID OF SENTINL NODE: CPT

## 2020-08-18 PROCEDURE — 88333 PATH CONSLTJ SURG CYTO XM 1: CPT | Performed by: PATHOLOGY

## 2020-08-18 PROCEDURE — 88341 IMHCHEM/IMCYTCHM EA ADD ANTB: CPT | Performed by: PATHOLOGY

## 2020-08-18 PROCEDURE — 38792 RA TRACER ID OF SENTINL NODE: CPT | Performed by: SURGERY

## 2020-08-18 PROCEDURE — 88342 IMHCHEM/IMCYTCHM 1ST ANTB: CPT | Performed by: PATHOLOGY

## 2020-08-18 PROCEDURE — 19307 MAST MOD RAD: CPT | Performed by: SURGERY

## 2020-08-18 PROCEDURE — 38900 IO MAP OF SENT LYMPH NODE: CPT | Performed by: SURGERY

## 2020-08-18 PROCEDURE — 99024 POSTOP FOLLOW-UP VISIT: CPT | Performed by: PHYSICIAN ASSISTANT

## 2020-08-18 PROCEDURE — 88334 PATH CONSLTJ SURG CYTO XM EA: CPT | Performed by: PATHOLOGY

## 2020-08-18 PROCEDURE — 88307 TISSUE EXAM BY PATHOLOGIST: CPT | Performed by: PATHOLOGY

## 2020-08-18 PROCEDURE — A9541 TC99M SULFUR COLLOID: HCPCS

## 2020-08-18 RX ORDER — MIDAZOLAM HYDROCHLORIDE 2 MG/2ML
INJECTION, SOLUTION INTRAMUSCULAR; INTRAVENOUS AS NEEDED
Status: DISCONTINUED | OUTPATIENT
Start: 2020-08-18 | End: 2020-08-18

## 2020-08-18 RX ORDER — SODIUM CHLORIDE, SODIUM LACTATE, POTASSIUM CHLORIDE, CALCIUM CHLORIDE 600; 310; 30; 20 MG/100ML; MG/100ML; MG/100ML; MG/100ML
100 INJECTION, SOLUTION INTRAVENOUS CONTINUOUS
Status: DISCONTINUED | OUTPATIENT
Start: 2020-08-18 | End: 2020-08-18

## 2020-08-18 RX ORDER — DEXAMETHASONE SODIUM PHOSPHATE 10 MG/ML
INJECTION, SOLUTION INTRAMUSCULAR; INTRAVENOUS AS NEEDED
Status: DISCONTINUED | OUTPATIENT
Start: 2020-08-18 | End: 2020-08-18

## 2020-08-18 RX ORDER — ACETAMINOPHEN 325 MG/1
650 TABLET ORAL EVERY 6 HOURS SCHEDULED
Status: DISCONTINUED | OUTPATIENT
Start: 2020-08-18 | End: 2020-08-19 | Stop reason: HOSPADM

## 2020-08-18 RX ORDER — FENTANYL CITRATE/PF 50 MCG/ML
25 SYRINGE (ML) INJECTION
Status: DISCONTINUED | OUTPATIENT
Start: 2020-08-18 | End: 2020-08-18 | Stop reason: HOSPADM

## 2020-08-18 RX ORDER — CYCLOBENZAPRINE HCL 10 MG
10 TABLET ORAL
Status: DISCONTINUED | OUTPATIENT
Start: 2020-08-18 | End: 2020-08-19 | Stop reason: HOSPADM

## 2020-08-18 RX ORDER — OXYCODONE HYDROCHLORIDE 5 MG/1
5 TABLET ORAL EVERY 4 HOURS PRN
Status: DISCONTINUED | OUTPATIENT
Start: 2020-08-18 | End: 2020-08-19 | Stop reason: HOSPADM

## 2020-08-18 RX ORDER — HYDROXYCHLOROQUINE SULFATE 200 MG/1
200 TABLET, FILM COATED ORAL
Status: DISCONTINUED | OUTPATIENT
Start: 2020-08-19 | End: 2020-08-19 | Stop reason: HOSPADM

## 2020-08-18 RX ORDER — OXYCODONE HYDROCHLORIDE 10 MG/1
10 TABLET ORAL EVERY 4 HOURS PRN
Status: DISCONTINUED | OUTPATIENT
Start: 2020-08-18 | End: 2020-08-19 | Stop reason: HOSPADM

## 2020-08-18 RX ORDER — ONDANSETRON 2 MG/ML
INJECTION INTRAMUSCULAR; INTRAVENOUS AS NEEDED
Status: DISCONTINUED | OUTPATIENT
Start: 2020-08-18 | End: 2020-08-18

## 2020-08-18 RX ORDER — ALPRAZOLAM 0.5 MG/1
1 TABLET ORAL 3 TIMES DAILY PRN
Status: DISCONTINUED | OUTPATIENT
Start: 2020-08-18 | End: 2020-08-19 | Stop reason: HOSPADM

## 2020-08-18 RX ORDER — HYDROMORPHONE HCL/PF 1 MG/ML
0.2 SYRINGE (ML) INJECTION
Status: DISCONTINUED | OUTPATIENT
Start: 2020-08-18 | End: 2020-08-18 | Stop reason: HOSPADM

## 2020-08-18 RX ORDER — SUCCINYLCHOLINE/SOD CL,ISO/PF 100 MG/5ML
SYRINGE (ML) INTRAVENOUS AS NEEDED
Status: DISCONTINUED | OUTPATIENT
Start: 2020-08-18 | End: 2020-08-18

## 2020-08-18 RX ORDER — FENTANYL CITRATE 50 UG/ML
INJECTION, SOLUTION INTRAMUSCULAR; INTRAVENOUS AS NEEDED
Status: DISCONTINUED | OUTPATIENT
Start: 2020-08-18 | End: 2020-08-18

## 2020-08-18 RX ORDER — LEVOTHYROXINE SODIUM 88 UG/1
88 TABLET ORAL
Status: DISCONTINUED | OUTPATIENT
Start: 2020-08-19 | End: 2020-08-19 | Stop reason: HOSPADM

## 2020-08-18 RX ORDER — SODIUM CHLORIDE, SODIUM LACTATE, POTASSIUM CHLORIDE, CALCIUM CHLORIDE 600; 310; 30; 20 MG/100ML; MG/100ML; MG/100ML; MG/100ML
125 INJECTION, SOLUTION INTRAVENOUS CONTINUOUS
Status: DISCONTINUED | OUTPATIENT
Start: 2020-08-18 | End: 2020-08-19 | Stop reason: HOSPADM

## 2020-08-18 RX ORDER — CEFAZOLIN SODIUM 1 G/50ML
1000 SOLUTION INTRAVENOUS ONCE
Status: COMPLETED | OUTPATIENT
Start: 2020-08-18 | End: 2020-08-18

## 2020-08-18 RX ORDER — ONDANSETRON 2 MG/ML
4 INJECTION INTRAMUSCULAR; INTRAVENOUS ONCE AS NEEDED
Status: DISCONTINUED | OUTPATIENT
Start: 2020-08-18 | End: 2020-08-18 | Stop reason: HOSPADM

## 2020-08-18 RX ORDER — SODIUM CHLORIDE, SODIUM LACTATE, POTASSIUM CHLORIDE, CALCIUM CHLORIDE 600; 310; 30; 20 MG/100ML; MG/100ML; MG/100ML; MG/100ML
100 INJECTION, SOLUTION INTRAVENOUS CONTINUOUS
Status: DISCONTINUED | OUTPATIENT
Start: 2020-08-18 | End: 2020-08-19

## 2020-08-18 RX ORDER — SODIUM CHLORIDE, SODIUM LACTATE, POTASSIUM CHLORIDE, CALCIUM CHLORIDE 600; 310; 30; 20 MG/100ML; MG/100ML; MG/100ML; MG/100ML
INJECTION, SOLUTION INTRAVENOUS CONTINUOUS PRN
Status: DISCONTINUED | OUTPATIENT
Start: 2020-08-18 | End: 2020-08-18

## 2020-08-18 RX ORDER — MAGNESIUM HYDROXIDE 1200 MG/15ML
LIQUID ORAL AS NEEDED
Status: DISCONTINUED | OUTPATIENT
Start: 2020-08-18 | End: 2020-08-18 | Stop reason: HOSPADM

## 2020-08-18 RX ORDER — DEXAMETHASONE 0.5 MG/1
0.5 TABLET ORAL
Status: DISCONTINUED | OUTPATIENT
Start: 2020-08-19 | End: 2020-08-19 | Stop reason: HOSPADM

## 2020-08-18 RX ORDER — LIDOCAINE HYDROCHLORIDE 10 MG/ML
INJECTION, SOLUTION EPIDURAL; INFILTRATION; INTRACAUDAL; PERINEURAL AS NEEDED
Status: DISCONTINUED | OUTPATIENT
Start: 2020-08-18 | End: 2020-08-18

## 2020-08-18 RX ORDER — PROCHLORPERAZINE MALEATE 5 MG/1
5 TABLET ORAL EVERY 6 HOURS PRN
Status: DISCONTINUED | OUTPATIENT
Start: 2020-08-18 | End: 2020-08-19 | Stop reason: HOSPADM

## 2020-08-18 RX ORDER — HYDROXYCHLOROQUINE SULFATE 200 MG/1
100 TABLET, FILM COATED ORAL EVERY EVENING
Status: DISCONTINUED | OUTPATIENT
Start: 2020-08-18 | End: 2020-08-19 | Stop reason: HOSPADM

## 2020-08-18 RX ORDER — BUPIVACAINE HYDROCHLORIDE AND EPINEPHRINE 2.5; 5 MG/ML; UG/ML
INJECTION, SOLUTION EPIDURAL; INFILTRATION; INTRACAUDAL; PERINEURAL AS NEEDED
Status: DISCONTINUED | OUTPATIENT
Start: 2020-08-18 | End: 2020-08-18 | Stop reason: HOSPADM

## 2020-08-18 RX ORDER — EPHEDRINE SULFATE 50 MG/ML
INJECTION INTRAVENOUS AS NEEDED
Status: DISCONTINUED | OUTPATIENT
Start: 2020-08-18 | End: 2020-08-18

## 2020-08-18 RX ORDER — HYDROMORPHONE HCL 110MG/55ML
PATIENT CONTROLLED ANALGESIA SYRINGE INTRAVENOUS AS NEEDED
Status: DISCONTINUED | OUTPATIENT
Start: 2020-08-18 | End: 2020-08-18

## 2020-08-18 RX ORDER — ONDANSETRON 2 MG/ML
4 INJECTION INTRAMUSCULAR; INTRAVENOUS EVERY 6 HOURS PRN
Status: DISCONTINUED | OUTPATIENT
Start: 2020-08-18 | End: 2020-08-19 | Stop reason: HOSPADM

## 2020-08-18 RX ORDER — PROPOFOL 10 MG/ML
INJECTION, EMULSION INTRAVENOUS AS NEEDED
Status: DISCONTINUED | OUTPATIENT
Start: 2020-08-18 | End: 2020-08-18

## 2020-08-18 RX ADMIN — EPHEDRINE SULFATE 10 MG: 50 INJECTION, SOLUTION INTRAVENOUS at 16:00

## 2020-08-18 RX ADMIN — EPHEDRINE SULFATE 10 MG: 50 INJECTION, SOLUTION INTRAVENOUS at 16:18

## 2020-08-18 RX ADMIN — MIDAZOLAM HYDROCHLORIDE 2 MG: 1 INJECTION, SOLUTION INTRAMUSCULAR; INTRAVENOUS at 15:45

## 2020-08-18 RX ADMIN — FENTANYL CITRATE 50 MCG: 50 INJECTION INTRAMUSCULAR; INTRAVENOUS at 16:20

## 2020-08-18 RX ADMIN — HYDROMORPHONE HYDROCHLORIDE 0.5 MG: 2 INJECTION INTRAMUSCULAR; INTRAVENOUS; SUBCUTANEOUS at 16:42

## 2020-08-18 RX ADMIN — SODIUM CHLORIDE, SODIUM LACTATE, POTASSIUM CHLORIDE, AND CALCIUM CHLORIDE: .6; .31; .03; .02 INJECTION, SOLUTION INTRAVENOUS at 15:40

## 2020-08-18 RX ADMIN — FENTANYL CITRATE 25 MCG: 50 INJECTION INTRAMUSCULAR; INTRAVENOUS at 18:25

## 2020-08-18 RX ADMIN — OXYCODONE HYDROCHLORIDE 10 MG: 10 TABLET ORAL at 19:23

## 2020-08-18 RX ADMIN — FENTANYL CITRATE 50 MCG: 50 INJECTION INTRAMUSCULAR; INTRAVENOUS at 15:51

## 2020-08-18 RX ADMIN — ACETAMINOPHEN 650 MG: 325 TABLET, FILM COATED ORAL at 23:23

## 2020-08-18 RX ADMIN — FENTANYL CITRATE 25 MCG: 50 INJECTION INTRAMUSCULAR; INTRAVENOUS at 17:50

## 2020-08-18 RX ADMIN — ONDANSETRON 4 MG: 2 INJECTION INTRAMUSCULAR; INTRAVENOUS at 16:49

## 2020-08-18 RX ADMIN — LIDOCAINE HYDROCHLORIDE 50 MG: 10 INJECTION, SOLUTION EPIDURAL; INFILTRATION; INTRACAUDAL; PERINEURAL at 15:51

## 2020-08-18 RX ADMIN — SODIUM CHLORIDE 1000 ML: 0.9 INJECTION, SOLUTION INTRAVENOUS at 23:30

## 2020-08-18 RX ADMIN — CYCLOBENZAPRINE HYDROCHLORIDE 10 MG: 10 TABLET, FILM COATED ORAL at 21:44

## 2020-08-18 RX ADMIN — SODIUM CHLORIDE, SODIUM LACTATE, POTASSIUM CHLORIDE, AND CALCIUM CHLORIDE: .6; .31; .03; .02 INJECTION, SOLUTION INTRAVENOUS at 17:07

## 2020-08-18 RX ADMIN — Medication 80 MG: at 15:51

## 2020-08-18 RX ADMIN — SODIUM CHLORIDE, SODIUM LACTATE, POTASSIUM CHLORIDE, AND CALCIUM CHLORIDE 50 ML/HR: .6; .31; .03; .02 INJECTION, SOLUTION INTRAVENOUS at 19:16

## 2020-08-18 RX ADMIN — DEXAMETHASONE SODIUM PHOSPHATE 4 MG: 10 INJECTION, SOLUTION INTRAMUSCULAR; INTRAVENOUS at 15:55

## 2020-08-18 RX ADMIN — PROPOFOL 120 MG: 10 INJECTION, EMULSION INTRAVENOUS at 15:51

## 2020-08-18 RX ADMIN — CEFAZOLIN SODIUM 1000 MG: 1 SOLUTION INTRAVENOUS at 15:45

## 2020-08-18 RX ADMIN — HYDROXYCHLOROQUINE SULFATE 100 MG: 200 TABLET, FILM COATED ORAL at 21:43

## 2020-08-18 RX ADMIN — ACETAMINOPHEN 650 MG: 325 TABLET, FILM COATED ORAL at 19:23

## 2020-08-18 NOTE — OP NOTE
OPERATIVE REPORT  PATIENT NAME: Sary Sandhu    :  1965  MRN: [id number]  Pt Location: [facility] ROOM 02    SURGERY DATE: 2020    Surgeon(s) and Role:     * Stephen Valle MD - Primary     * Tammi Ackerman MD - Assisting    Preop Diagnosis:  Malignant neoplasm of upper-inner quadrant of left breast in female, estrogen receptor negative ([facility] [address] ) [C50 212, Z17 1]    Post-Op Diagnosis Codes:     * Malignant neoplasm of upper-inner quadrant of left breast in female, estrogen receptor negative ([facility] [address] ) [C50 212, Z17 1]    Procedure(s) (LRB):  BREAST MASTECTOMY WITH SENTINEL LYMPH NODE BIOPSY, LYMPHATIC MAPPING WITH BLUE DYE AND RADIOACTIVE DYE (INJECT AT 1430 BY DR WRIGHT IN THE OR) (Left)    Specimen(s):  ID Type Source Tests Collected by Time Destination   1 : LEFT AXILLARY SENTINEL LYMPH NODE Tissue Lymph Node, [place] TISSUE EXAM Stephen Valle MD 2020 1623    2 : LEFT ADDITIONAL LYMPH NODE TISSUE Tissue Lymph Node, [place] TISSUE EXAM Stephen Valle MD 2020 1634    3 : Sutures oanh: short =superior, long= lateral, medium =medial Tissue Breast, Left TISSUE EXAM Stephen Valle MD 2020 1648        Estimated Blood Loss:   Minimal    Drains:  Closed/Suction Drain Left;Medial Chest Bulb 19 Fr  (Active)   Number of days: 0       Closed/Suction Drain Left;Lateral Chest Bulb 19 Fr  (Active)   Number of days: 0       Anesthesia Type:   General    Operative Indications:  Malignant neoplasm of upper-inner quadrant of left breast in female, estrogen receptor negative ([facility] [address] ) [C50 212, Z17 1]      Operative Findings:  SLN and 1 other lymph node found in our specimen,  Both were negative for carcinoma on touch prep    Complications:   None    Procedure and Technique:  Patient was seen and examined in the preoperative area  History and physical were updated  All questions were answered  Consent was checked to be correct and sign  Patient was then transported to the OR and placed in supine position with both arms extended  General anesthesia was induced with ETT  Next, patient's  left breast was injected intradermally with technetium sulfur colloid followed by 0 3 cc of methylene blue  Patient was subsequently prepped and draped in the usual sterile fashion  An appropriate time-out was called addressing all concerns      We then marked the breasts for incision side with marking pen  The skin was then incised with a 15 blade   Skin flaps were then elevated using electrocautery starting superiorly and then continuing medially, inferiorly and finally laterally   The tail of Kevon was then dissected away from the axilla proper leaving the breast tethered to the underlying musculature        The sentinel lymph node was identified through the mastectomy incision by using both the gamma probe and the blue dye to identify it  It was then removed along with significant surrounding tissue  Additional lymphoid tissue sample was obtained superiorly to the sentinel lymph node and this was sent as a separate specimen for pathology  Two lymph nodes total including the sentinel lymph node was found in both specimens and both were negative by touch prep per pathology      The breast was then removed from the muscles in a sub-facial plane using electrocautery   The breast was oriented with sutures (short=superior; medium=medial; long=lateral)  The breast was sent to pathology in formalin for permanent pathologic evaluation  The wound was inspected thoroughly and hemostasis was achieved with either electrocautery, clips or suture ligation  Once we were satisfied with hemostasis, the wound was then thoroughly irrigated with normal saline  The wound was then dried  The incision was then closed in 2 separate layers  Interrupted 2-0 Vicryl were used to close the subcutaneous tissue, bringing the 2 edges of the wounds together to reduce tension  Subsequently, a running subcuticular 4-0 monocryl sutures were then used to close the skin    Next, the area was then cleaned and dried  Steri-Strips were placed  The drains were then connected to bulb suction and drain sponge was placed  The patient was then successfully extubated without any problems  All counts were correct at the end of the case x2  Patient was then transported to the PACU in good and stable conditions  Dr Rosetta Gleason was present throughout the entire case      Patient Disposition:  PACU     SIGNATURE: Yan Ackerman MD  DATE: August 18, 2020  TIME: 5:56 PM

## 2020-08-18 NOTE — INTERVAL H&P NOTE
H&P reviewed  After examining the patient I find no changes in the patients condition since the H&P had been written      Vitals:    08/18/20 1422   BP: 104/59   Pulse: 76   Resp: 18   Temp: (!) 96 7 °F (35 9 °C)   SpO2: 94%

## 2020-08-18 NOTE — ANESTHESIA PREPROCEDURE EVALUATION
Medical History     History  Comments    Lupus ([address] )     Disease of thyroid gland     Hypertension     Nephrolithiasis     Malignant neoplasm of upper-inner quadrant of left breast in female, estrogen receptor negative ([address] )     PTSD (post-traumatic stress disorder)     Current smoker    7/2020 TTE - EF60%, nml RV, no sig valve abn      Procedure:  BREAST MASTECTOMY WITH SENTINEL LYMPH NODE BIOPSY, LYMPHATIC MAPPING WITH BLUE DYE AND RADIOACTIVE DYE (INJECT AT 1430 BY DR WRIGHT IN THE OR) (Left Breast)    Relevant Problems   CARDIO   (+) Hypertension      ENDO   (+) Hypothyroidism      /RENAL   (+) JEFERSON (acute kidney injury) ([address] )   (+) Nephrolithiasis      GYN   (+) Malignant neoplasm of upper-inner quadrant of left breast in female, estrogen receptor negative (HCC)      HEMATOLOGY   (+) Anemia      MUSCULOSKELETAL   (+) Systemic lupus erythematosus (HCC)      NEURO/PSYCH   (+) Anxiety   (+) Depression   (+) Posttraumatic stress disorder        Physical Exam    Airway    Mallampati score: III  TM Distance: >3 FB  Neck ROM: limited     Dental   No notable dental hx lower dentures and upper dentures,     Cardiovascular  Rate: normal,     Pulmonary  Pulmonary exam normal     Other Findings        Anesthesia Plan  ASA Score- 3     Anesthesia Type- general and regional with ASA Monitors  Additional Monitors:   Airway Plan: ETT  Plan Factors-Exercise tolerance (METS): >4 METS  Chart reviewed  EKG reviewed  Existing labs reviewed  Patient summary reviewed  Patient is a current smoker  Patient instructed to abstain from smoking on day of procedure  Patient did not smoke on day of surgery  Induction- intravenous  Postoperative Plan- Plan for postoperative opioid use  Informed Consent- Anesthetic plan and risks discussed with patient  I personally reviewed this patient with the CRNA  Discussed and agreed on the Anesthesia Plan with the CRNA  Ruchi Mccann

## 2020-08-18 NOTE — ANESTHESIA POSTPROCEDURE EVALUATION
Post-Op Assessment Note    CV Status:  Stable  Pain Score: 0    Pain management: adequate     Mental Status:  Alert and awake   Hydration Status:  Euvolemic   PONV Controlled:  Controlled   Airway Patency:  Patent      Post Op Vitals Reviewed: Yes      Staff: CRNA         No complications documented      BP   140/77   Temp  98   Pulse  88   Resp   16   SpO2   100

## 2020-08-19 VITALS
DIASTOLIC BLOOD PRESSURE: 60 MMHG | RESPIRATION RATE: 18 BRPM | OXYGEN SATURATION: 97 % | SYSTOLIC BLOOD PRESSURE: 101 MMHG | HEIGHT: 63 IN | WEIGHT: 132 LBS | BODY MASS INDEX: 23.39 KG/M2 | TEMPERATURE: 97.7 F | HEART RATE: 65 BPM

## 2020-08-19 LAB
ERYTHROCYTE [DISTWIDTH] IN BLOOD BY AUTOMATED COUNT: 16 % (ref 11.6–15.1)
HCT VFR BLD AUTO: 30.1 % (ref 34.8–46.1)
HGB BLD-MCNC: 9.1 G/DL (ref 11.5–15.4)
MCH RBC QN AUTO: 31.3 PG (ref 26.8–34.3)
MCHC RBC AUTO-ENTMCNC: 30.2 G/DL (ref 31.4–37.4)
MCV RBC AUTO: 103 FL (ref 82–98)
PLATELET # BLD AUTO: 258 THOUSANDS/UL (ref 149–390)
PMV BLD AUTO: 9.3 FL (ref 8.9–12.7)
RBC # BLD AUTO: 2.91 MILLION/UL (ref 3.81–5.12)
WBC # BLD AUTO: 13.76 THOUSAND/UL (ref 4.31–10.16)

## 2020-08-19 PROCEDURE — 99024 POSTOP FOLLOW-UP VISIT: CPT | Performed by: SURGERY

## 2020-08-19 PROCEDURE — 85027 COMPLETE CBC AUTOMATED: CPT | Performed by: SURGERY

## 2020-08-19 RX ADMIN — SERTRALINE HYDROCHLORIDE 150 MG: 100 TABLET ORAL at 08:43

## 2020-08-19 RX ADMIN — OXYCODONE HYDROCHLORIDE 10 MG: 10 TABLET ORAL at 10:14

## 2020-08-19 RX ADMIN — HYDROXYCHLOROQUINE SULFATE 200 MG: 200 TABLET, FILM COATED ORAL at 08:44

## 2020-08-19 RX ADMIN — LEVOTHYROXINE SODIUM 88 MCG: 88 TABLET ORAL at 05:59

## 2020-08-19 RX ADMIN — ALPRAZOLAM 1 MG: 0.5 TABLET ORAL at 07:35

## 2020-08-19 RX ADMIN — SODIUM CHLORIDE, SODIUM LACTATE, POTASSIUM CHLORIDE, AND CALCIUM CHLORIDE 125 ML/HR: .6; .31; .03; .02 INJECTION, SOLUTION INTRAVENOUS at 07:28

## 2020-08-19 RX ADMIN — ACETAMINOPHEN 650 MG: 325 TABLET, FILM COATED ORAL at 05:59

## 2020-08-19 RX ADMIN — DEXAMETHASONE 0.5 MG: 0.5 TABLET ORAL at 07:35

## 2020-08-19 RX ADMIN — OXYCODONE HYDROCHLORIDE 10 MG: 10 TABLET ORAL at 06:06

## 2020-08-19 NOTE — PROGRESS NOTES
Progress Note - Surgical Oncology   Stephen Davey 62 y o  female MRN: [id number]  Unit/Bed#: S -01 Encounter: [id number]    Assessment:  62 y o  female POD1 s/p mastectomy with SLNB    Pt hypotensive overnight to 80/46 manual despite 1L NS per RN hypotension protocol  No clinical signs of anemia, asymptomatic regarding hypotension, no signs of bleeding on physical exam  Hgb stable 9 1 from 9 3, BP now 101/60    Plan:  D/c IVF  Ambulate  Analgesia  D/c today   VNA for LUCIUS drain care  Outpatient surg onc follow-up    If patient remains hypotensive and hgb doesn't suggest bleeding, consider stress-dose steroids given similarity to 7/2020 inpatient hypotension    Subjective/Objective     Subjective: Feels well      Objective:     Vitals: Temp:  [96 7 °F (35 9 °C)-98 3 °F (36 8 °C)] 97 9 °F (36 6 °C)  HR:  [75-83] 75  Resp:  [14-20] 18  BP: ()/(53-66) 90/53  Body mass index is 23 38 kg/m²  I/O       08/17 0701 - 08/18 0700 08/18 0701 - 08/19 0700    I V  (mL/kg)  1000 (16 7)    NG/GT  20    IV Piggyback  1000    Total Intake(mL/kg)  2020 (33 7)    Urine (mL/kg/hr)  700    Drains  63    Total Output  763    Net  +1257                Physical Exam:  GEN: NAD  HEENT: MMM  CV: RRR  Lung: Normal effort  Ab: Soft, NT/ND  Extrem: No CCE  Neuro: A+Ox3  Incisions CDI  No palpable collection/hematoma or ecchymosis  Drain SS     Lab, Imaging and other studies: I have personally reviewed pertinent reports  , CBC with diff: No results found for: WBC, HGB, HCT, MCV, PLT, ADJUSTEDWBC, MCH, MCHC, RDW, MPV, NRBC, BMP/CMP: No results found for: SODIUM, K, CL, CO2, ANIONGAP, BUN, CREATININE, GLUCOSE, CALCIUM, AST, ALT, ALKPHOS, PROT, BILITOT, EGFR  VTE Pharmacologic Prophylaxis: Sequential compression device (Venodyne)   VTE Mechanical Prophylaxis: sequential compression device    No new Assessment & Plan notes have been filed under this hospital service since the last note was generated    Service: Oncology-Surgical      Lance Hernandez MD  8/19/2020 2:08 AM

## 2020-08-19 NOTE — ANESTHESIA PROCEDURE NOTES
Peripheral Block    Patient location during procedure: holding area  Start time: 8/18/2020 4:00 PM  Reason for block: at surgeon's request and post-op pain management  Staffing  Anesthesiologist: Rylie Malcolm MD  Performed: anesthesiologist   Preanesthetic Checklist  Completed: patient identified, site marked, surgical consent, pre-op evaluation, timeout performed, IV checked, risks and benefits discussed and monitors and equipment checked  Peripheral Block  Patient position: supine  Prep: ChloraPrep  Patient monitoring: continuous pulse ox and frequent blood pressure checks  Anesthesia block type: pecs 1 2   Laterality: left  Injection technique: single-shot  Procedures: ultrasound guided, Ultrasound guidance required for the procedure to increase accuracy and safety of medication placement and decrease risk of complications    Ultrasound permanent image saved  Needle  Needle type: Stimuplex   Needle gauge: 22 G  Needle length: 10 cm  Needle localization: anatomical landmarks and ultrasound guidance  Test dose: negative  Assessment  Injection assessment: incremental injection, local visualized surrounding nerve on ultrasound, negative aspiration for heme and no paresthesia on injection  Paresthesia pain: none  Heart rate change: no  Slow fractionated injection: yes  Post-procedure:  site cleaned  patient tolerated the procedure well with no immediate complications  Additional Notes  Single needle pass, needle visualized throughout

## 2020-08-19 NOTE — DISCHARGE INSTRUCTIONS
POST-OPERATIVE BREAST CARE INSTRUCTIONS    Wound Closure/Dressing: Your wound is closed with:   Steri strips-white pieces of tape that hold your incision together   Dissolvable sutures-underneath the skin    Wound care:     If you have gauze over your wound you may remove it the day after surgery  Leave the Steri-strips on, they will fall off on their own in 1-2 weeks   You may shower using soap and water to clean your wound  Gently pat dry  Drain Care: If you do not have a drain, disregard this section   Visiting Nursing should have been arranged upon discharge from hospital   A nurse will call your home to schedule an initial visit  Please call the number below if you have not received a call within 48 hours of hospital discharge   You may shower with the LUCIUS drains  Wash area around the drains with soap and water and pat dry   Record drain outputs on chart given to you at pre op visit and bring that chart to office at post op appt   LUCIUS drains can be removed in the office by a nurse either at post op visit OR when drain output is 30 ccs or less in a 24 hour period for three days in a row   If you had plastic surgery or reconstructive surgery, the plastic surgeon will manage the drains  Other:       You can begin wearing your own bra when it feels comfortable   You may resume using deodorant the day after surgery                 Post-op visit:  your post-op visit is scheduled for:   @9:45 am     Call our office at [phone] if you have any  of the followin  Redness, swelling, heat, unusual drainage or heavy bleeding from wound  2  Fever greater than 101 °  3  Pain not relieved by medication

## 2020-08-19 NOTE — SOCIAL WORK
Pt is POD1 s/p mastectomy with SLNB discharging home with LUCIUS drain  CM consult received for VNA setup  Met with pt to discuss VNA setup but pt declined VNA as pt stated she has 2 neighbors who are nurses that would be able to assist pt with the LUCIUS drain care  Pt also stated she has a large dog that is not "people friendly" and bringing is a VNA would be difficult  She would prefer her neighbors to assist her with the LUCIUS drain care

## 2020-08-19 NOTE — PLAN OF CARE
Problem: PAIN - ADULT  Goal: Verbalizes/displays adequate comfort level or baseline comfort level  Description: Interventions:  - Encourage patient to monitor pain and request assistance  - Assess pain using appropriate pain scale  - Administer analgesics based on type and severity of pain and evaluate response  - Implement non-pharmacological measures as appropriate and evaluate response  - Consider cultural and social influences on pain and pain management  - Notify physician/advanced practitioner if interventions unsuccessful or patient reports new pain  Outcome: Progressing     Problem: INFECTION - ADULT  Goal: Absence or prevention of progression during hospitalization  Description: INTERVENTIONS:  - Assess and monitor for signs and symptoms of infection  - Monitor lab/diagnostic results  - Monitor all insertion sites, i e  indwelling lines, tubes, and drains  - Monitor endotracheal if appropriate and nasal secretions for changes in amount and color  - [place] appropriate cooling/warming therapies per order  - Administer medications as ordered  - Instruct and encourage patient and family to use good hand hygiene technique  - Identify and instruct in appropriate isolation precautions for identified infection/condition  Outcome: Progressing  Goal: Absence of fever/infection during neutropenic period  Description: INTERVENTIONS:  - Monitor WBC    Outcome: Progressing     Problem: Knowledge Deficit  Goal: Patient/family/caregiver demonstrates understanding of disease process, treatment plan, medications, and discharge instructions  Description: Complete learning assessment and assess knowledge base    Interventions:  - Provide teaching at level of understanding  - Provide teaching via preferred learning methods  Outcome: Progressing

## 2020-08-19 NOTE — PROGRESS NOTES
Post Op Check Note -Surgery GRACIE Davey 62 y o  female MRN: [id number]  Unit/Bed#: S -01 Encounter: [id number]    ASSESSMENT/PLAN:  Problem List     UTI (urinary tract infection)    Hypertension    Systemic lupus erythematosus ([facility] )    Hypothyroidism    Posttraumatic stress disorder    Adult celiac disease    Anxiety    Depression    Esophagitis    Nephrolithiasis    Vitamin D deficiency    Malignant neoplasm of upper-inner quadrant of left breast in female, estrogen receptor negative ([facility] )    Chemotherapy induced neutropenia (HCC)    Leukocytosis    Increased anion gap metabolic acidosis    Port-A-Cath in place    Intractable nausea and vomiting    SIRS (systemic inflammatory response syndrome) (HCC)    JEFERSON (acute kidney injury) ([place] [address] )    Hyponatremia    Hypokalemia    Anemia    Hypotension      -diet as tolerated  -monitor drain output  -ambulation  -pain control  -DC in AM    Subjective/Objective     Subjective: Feels well, no complaints  Objective/Physical Exam:   Blood pressure 117/66, pulse 81, temperature 97 9 °F (36 6 °C), temperature source Oral, resp  rate 18, height 5' 3" (1 6 m), weight 59 9 kg (132 lb), SpO2 95 %, not currently breastfeeding  ,Body mass index is 23 38 kg/m²  General appearance: alert and oriented, in no acute distress and alert  Heart: regular rate and rhythm, S1, S2 normal, no murmur, click, rub or gallop   Breast-left breast incision healing well  No hematoma  Drains serosanguinous     Lungs: clear to auscultation bilaterally  Abdomen: soft, non-tender; bowel sounds normal; no masses,  no organomegaly  Neurological: normal without focal findings, mental status, speech normal, alert and oriented x3, VIC and reflexes normal and symmetric      Current Facility-Administered Medications:     acetaminophen (TYLENOL) tablet 650 mg, 650 mg, Oral, Q6H JORGE, Chetan Ackerman MD, 650 mg at 08/18/20 1923    ALPRAZolam Earleen Bump) tablet 1 mg, 1 mg, Oral, TID PRN, Chetan Ackerman MD  [facility] cyclobenzaprine (FLEXERIL) tablet 10 mg, 10 mg, Oral, HS, MD Donya Alicea Frees ON 8/19/2020] dexamethasone (DECADRON) tablet 0 5 mg, 0 5 mg, Oral, Daily With Breakfast, MD Yared Alicea Abt  hydroxychloroquine (PLAQUENIL) tablet 100 mg, 100 mg, Oral, QPM, MD Donya Alicea Frees ON 8/19/2020] hydroxychloroquine (PLAQUENIL) tablet 200 mg, 200 mg, Oral, Daily With Breakfast, Austin Ackerman MD    lactated ringers infusion, 100 mL/hr, Intravenous, Continuous, Lisa Britton CRNA    lactated ringers infusion, 50 mL/hr, Intravenous, Continuous, Austin Ackerman MD, Last Rate: 50 mL/hr at 08/18/20 1916, 50 mL/hr at 08/18/20 1916    [START ON 8/19/2020] levothyroxine tablet 88 mcg, 88 mcg, Oral, Early Morning, Austin Ackerman MD    ondansetron [facility]) injection 4 mg, 4 mg, Intravenous, Q6H PRN, Austin Ackerman MD    oxyCODONE (ROXICODONE) IR tablet 5 mg, 5 mg, Oral, Q4H PRN **OR** oxyCODONE (ROXICODONE) IR tablet 10 mg, 10 mg, Oral, Q4H PRN, Austin Ackerman MD, 10 mg at 08/18/20 1923    prochlorperazine (COMPAZINE) tablet 5 mg, 5 mg, Oral, Q6H PRN, MD Donya Alicea Frees ON 8/19/2020] sertraline (ZOLOFT) tablet 150 mg, 150 mg, Oral, Daily, Austin Ackerman MD      Intake/Output Summary (Last 24 hours) at 8/18/2020 2108  Last data filed at 8/18/2020 1915  Gross per 24 hour   Intake 1020 ml   Output 500 ml   Net 520 ml       Invasive Devices     Central Venous Catheter Line            Port A Cath 03/17/20 Right Subclavian 154 days          Peripheral Intravenous Line            Peripheral IV 08/18/20 Right Hand less than 1 day          Drain            Closed/Suction Drain Left;Lateral Chest Bulb 19 Fr  less than 1 day    Closed/Suction Drain Left;Medial Chest Bulb 19 Fr  less than 1 day                          VTE Pharmacologic Prophylaxis: Sequential compression device (Venodyne)

## 2020-08-24 DIAGNOSIS — Z17.1 MALIGNANT NEOPLASM OF UPPER-INNER QUADRANT OF LEFT BREAST IN FEMALE, ESTROGEN RECEPTOR NEGATIVE (HCC): ICD-10-CM

## 2020-08-24 DIAGNOSIS — C50.212 MALIGNANT NEOPLASM OF UPPER-INNER QUADRANT OF LEFT BREAST IN FEMALE, ESTROGEN RECEPTOR NEGATIVE (HCC): ICD-10-CM

## 2020-08-25 ENCOUNTER — TELEPHONE (OUTPATIENT)
Dept: PALLIATIVE MEDICINE | Facility: CLINIC | Age: 55
End: 2020-08-25

## 2020-08-25 ENCOUNTER — HOSPITAL ENCOUNTER (OUTPATIENT)
Facility: HOSPITAL | Age: 55
Setting detail: OBSERVATION
Discharge: HOME | End: 2020-08-27
Attending: EMERGENCY MEDICINE | Admitting: INTERNAL MEDICINE
Payer: COMMERCIAL

## 2020-08-25 ENCOUNTER — APPOINTMENT (EMERGENCY)
Dept: CT IMAGING | Facility: HOSPITAL | Age: 55
End: 2020-08-25
Payer: COMMERCIAL

## 2020-08-25 DIAGNOSIS — C50.919 BREAST CANCER (HCC): ICD-10-CM

## 2020-08-25 DIAGNOSIS — Z17.1 MALIGNANT NEOPLASM OF UPPER-INNER QUADRANT OF LEFT BREAST IN FEMALE, ESTROGEN RECEPTOR NEGATIVE (HCC): ICD-10-CM

## 2020-08-25 DIAGNOSIS — M32.9 SYSTEMIC LUPUS ERYTHEMATOSUS, UNSPECIFIED SLE TYPE, UNSPECIFIED ORGAN INVOLVEMENT STATUS (HCC): ICD-10-CM

## 2020-08-25 DIAGNOSIS — D72.829 LEUKOCYTOSIS: ICD-10-CM

## 2020-08-25 DIAGNOSIS — C50.212 MALIGNANT NEOPLASM OF UPPER-INNER QUADRANT OF LEFT BREAST IN FEMALE, ESTROGEN RECEPTOR NEGATIVE (HCC): ICD-10-CM

## 2020-08-25 DIAGNOSIS — R11.10 VOMITING: Primary | ICD-10-CM

## 2020-08-25 DIAGNOSIS — G89.18 ACUTE PAIN AFTER MASTECTOMY: ICD-10-CM

## 2020-08-25 DIAGNOSIS — G89.18 ACUTE POST-OPERATIVE PAIN: Primary | ICD-10-CM

## 2020-08-25 DIAGNOSIS — Z90.10 ACUTE PAIN AFTER MASTECTOMY: ICD-10-CM

## 2020-08-25 LAB
ALBUMIN SERPL BCP-MCNC: 3 G/DL (ref 3.5–5)
ALP SERPL-CCNC: 71 U/L (ref 46–116)
ALT SERPL W P-5'-P-CCNC: 10 U/L (ref 12–78)
ANION GAP SERPL CALCULATED.3IONS-SCNC: 10 MMOL/L (ref 4–13)
AST SERPL W P-5'-P-CCNC: 12 U/L (ref 5–45)
ATRIAL RATE: 90 BPM
BASOPHILS # BLD AUTO: 0.04 THOUSANDS/ΜL (ref 0–0.1)
BASOPHILS NFR BLD AUTO: 0 % (ref 0–1)
BILIRUB SERPL-MCNC: 0.17 MG/DL (ref 0.2–1)
BUN SERPL-MCNC: 9 MG/DL (ref 5–25)
CALCIUM SERPL-MCNC: 8.1 MG/DL (ref 8.3–10.1)
CHLORIDE SERPL-SCNC: 110 MMOL/L (ref 100–108)
CO2 SERPL-SCNC: 23 MMOL/L (ref 21–32)
CREAT SERPL-MCNC: 0.74 MG/DL (ref 0.6–1.3)
EOSINOPHIL # BLD AUTO: 0.01 THOUSAND/ΜL (ref 0–0.61)
EOSINOPHIL NFR BLD AUTO: 0 % (ref 0–6)
ERYTHROCYTE [DISTWIDTH] IN BLOOD BY AUTOMATED COUNT: 16 % (ref 11.6–15.1)
GFR SERPL CREATININE-BSD FRML MDRD: 91 ML/MIN/1.73SQ M
GLUCOSE SERPL-MCNC: 133 MG/DL (ref 65–140)
HCT VFR BLD AUTO: 36.1 % (ref 34.8–46.1)
HGB BLD-MCNC: 11.2 G/DL (ref 11.5–15.4)
IMM GRANULOCYTES # BLD AUTO: 0.07 THOUSAND/UL (ref 0–0.2)
IMM GRANULOCYTES NFR BLD AUTO: 1 % (ref 0–2)
LIPASE SERPL-CCNC: 107 U/L (ref 73–393)
LYMPHOCYTES # BLD AUTO: 0.65 THOUSANDS/ΜL (ref 0.6–4.47)
LYMPHOCYTES NFR BLD AUTO: 5 % (ref 14–44)
MCH RBC QN AUTO: 30.6 PG (ref 26.8–34.3)
MCHC RBC AUTO-ENTMCNC: 31 G/DL (ref 31.4–37.4)
MCV RBC AUTO: 99 FL (ref 82–98)
MONOCYTES # BLD AUTO: 0.57 THOUSAND/ΜL (ref 0.17–1.22)
MONOCYTES NFR BLD AUTO: 4 % (ref 4–12)
NEUTROPHILS # BLD AUTO: 12.06 THOUSANDS/ΜL (ref 1.85–7.62)
NEUTS SEG NFR BLD AUTO: 90 % (ref 43–75)
NRBC BLD AUTO-RTO: 0 /100 WBCS
P AXIS: 47 DEGREES
PLATELET # BLD AUTO: 367 THOUSANDS/UL (ref 149–390)
PMV BLD AUTO: 9.9 FL (ref 8.9–12.7)
POTASSIUM SERPL-SCNC: 3.4 MMOL/L (ref 3.5–5.3)
PR INTERVAL: 152 MS
PROT SERPL-MCNC: 6.5 G/DL (ref 6.4–8.2)
QRS AXIS: 77 DEGREES
QRSD INTERVAL: 74 MS
QT INTERVAL: 362 MS
QTC INTERVAL: 433 MS
RBC # BLD AUTO: 3.66 MILLION/UL (ref 3.81–5.12)
SODIUM SERPL-SCNC: 143 MMOL/L (ref 136–145)
T WAVE AXIS: 59 DEGREES
VENTRICULAR RATE: 86 BPM
WBC # BLD AUTO: 13.4 THOUSAND/UL (ref 4.31–10.16)

## 2020-08-25 PROCEDURE — 96376 TX/PRO/DX INJ SAME DRUG ADON: CPT

## 2020-08-25 PROCEDURE — 80053 COMPREHEN METABOLIC PANEL: CPT | Performed by: EMERGENCY MEDICINE

## 2020-08-25 PROCEDURE — 83690 ASSAY OF LIPASE: CPT | Performed by: EMERGENCY MEDICINE

## 2020-08-25 PROCEDURE — 85025 COMPLETE CBC W/AUTO DIFF WBC: CPT | Performed by: EMERGENCY MEDICINE

## 2020-08-25 PROCEDURE — 99285 EMERGENCY DEPT VISIT HI MDM: CPT

## 2020-08-25 PROCEDURE — G1004 CDSM NDSC: HCPCS

## 2020-08-25 PROCEDURE — 99285 EMERGENCY DEPT VISIT HI MDM: CPT | Performed by: EMERGENCY MEDICINE

## 2020-08-25 PROCEDURE — 96361 HYDRATE IV INFUSION ADD-ON: CPT

## 2020-08-25 PROCEDURE — 96374 THER/PROPH/DIAG INJ IV PUSH: CPT

## 2020-08-25 PROCEDURE — 93010 ELECTROCARDIOGRAM REPORT: CPT | Performed by: INTERNAL MEDICINE

## 2020-08-25 PROCEDURE — 99220 PR INITIAL OBSERVATION CARE/DAY 70 MINUTES: CPT | Performed by: INTERNAL MEDICINE

## 2020-08-25 PROCEDURE — 36415 COLL VENOUS BLD VENIPUNCTURE: CPT | Performed by: EMERGENCY MEDICINE

## 2020-08-25 PROCEDURE — 93005 ELECTROCARDIOGRAM TRACING: CPT

## 2020-08-25 PROCEDURE — 70450 CT HEAD/BRAIN W/O DYE: CPT

## 2020-08-25 RX ORDER — ONDANSETRON 2 MG/ML
4 INJECTION INTRAMUSCULAR; INTRAVENOUS ONCE
Status: COMPLETED | OUTPATIENT
Start: 2020-08-25 | End: 2020-08-25

## 2020-08-25 RX ORDER — MORPHINE SULFATE 4 MG/ML
4 INJECTION, SOLUTION INTRAMUSCULAR; INTRAVENOUS EVERY 4 HOURS PRN
Status: DISCONTINUED | OUTPATIENT
Start: 2020-08-25 | End: 2020-08-27 | Stop reason: HOSPADM

## 2020-08-25 RX ORDER — CYCLOBENZAPRINE HCL 10 MG
10 TABLET ORAL
Status: DISCONTINUED | OUTPATIENT
Start: 2020-08-25 | End: 2020-08-27 | Stop reason: HOSPADM

## 2020-08-25 RX ORDER — ACETAMINOPHEN 325 MG/1
650 TABLET ORAL EVERY 6 HOURS PRN
Status: DISCONTINUED | OUTPATIENT
Start: 2020-08-25 | End: 2020-08-27 | Stop reason: HOSPADM

## 2020-08-25 RX ORDER — MAGNESIUM HYDROXIDE/ALUMINUM HYDROXICE/SIMETHICONE 120; 1200; 1200 MG/30ML; MG/30ML; MG/30ML
30 SUSPENSION ORAL EVERY 4 HOURS PRN
Status: DISCONTINUED | OUTPATIENT
Start: 2020-08-25 | End: 2020-08-27 | Stop reason: HOSPADM

## 2020-08-25 RX ORDER — POTASSIUM CHLORIDE 20 MEQ/1
40 TABLET, EXTENDED RELEASE ORAL ONCE
Status: DISCONTINUED | OUTPATIENT
Start: 2020-08-25 | End: 2020-08-26

## 2020-08-25 RX ORDER — OXYCODONE HYDROCHLORIDE 10 MG/1
10-15 TABLET ORAL EVERY 4 HOURS PRN
Qty: 215 TABLET | Refills: 0 | Status: SHIPPED | OUTPATIENT
Start: 2020-08-25 | End: 2020-08-28 | Stop reason: SDUPTHER

## 2020-08-25 RX ORDER — ONDANSETRON 2 MG/ML
4 INJECTION INTRAMUSCULAR; INTRAVENOUS EVERY 6 HOURS PRN
Status: DISCONTINUED | OUTPATIENT
Start: 2020-08-25 | End: 2020-08-27 | Stop reason: HOSPADM

## 2020-08-25 RX ORDER — NICOTINE 21 MG/24HR
1 PATCH, TRANSDERMAL 24 HOURS TRANSDERMAL DAILY
Status: DISCONTINUED | OUTPATIENT
Start: 2020-08-25 | End: 2020-08-27 | Stop reason: HOSPADM

## 2020-08-25 RX ORDER — HYDROXYCHLOROQUINE SULFATE 200 MG/1
200 TABLET, FILM COATED ORAL
Status: DISCONTINUED | OUTPATIENT
Start: 2020-08-26 | End: 2020-08-27 | Stop reason: HOSPADM

## 2020-08-25 RX ORDER — ALPRAZOLAM 0.5 MG/1
1 TABLET ORAL 3 TIMES DAILY PRN
Status: DISCONTINUED | OUTPATIENT
Start: 2020-08-25 | End: 2020-08-27 | Stop reason: HOSPADM

## 2020-08-25 RX ORDER — DEXAMETHASONE 0.5 MG/1
0.5 TABLET ORAL
Status: DISCONTINUED | OUTPATIENT
Start: 2020-08-26 | End: 2020-08-27 | Stop reason: HOSPADM

## 2020-08-25 RX ORDER — OXYCODONE HYDROCHLORIDE 10 MG/1
10 TABLET ORAL EVERY 4 HOURS PRN
Status: DISCONTINUED | OUTPATIENT
Start: 2020-08-25 | End: 2020-08-27 | Stop reason: HOSPADM

## 2020-08-25 RX ORDER — HYDROXYCHLOROQUINE SULFATE 200 MG/1
100 TABLET, FILM COATED ORAL EVERY EVENING
Status: DISCONTINUED | OUTPATIENT
Start: 2020-08-25 | End: 2020-08-27 | Stop reason: HOSPADM

## 2020-08-25 RX ORDER — LEVOTHYROXINE SODIUM 88 UG/1
88 TABLET ORAL
Status: DISCONTINUED | OUTPATIENT
Start: 2020-08-26 | End: 2020-08-27 | Stop reason: HOSPADM

## 2020-08-25 RX ORDER — PROMETHAZINE HYDROCHLORIDE 25 MG/ML
25 INJECTION, SOLUTION INTRAMUSCULAR; INTRAVENOUS EVERY 6 HOURS
Status: DISCONTINUED | OUTPATIENT
Start: 2020-08-25 | End: 2020-08-26

## 2020-08-25 RX ORDER — SODIUM CHLORIDE 9 MG/ML
75 INJECTION, SOLUTION INTRAVENOUS CONTINUOUS
Status: DISPENSED | OUTPATIENT
Start: 2020-08-25 | End: 2020-08-27

## 2020-08-25 RX ORDER — LOPERAMIDE HYDROCHLORIDE 2 MG/1
2 CAPSULE ORAL EVERY 4 HOURS PRN
Status: DISCONTINUED | OUTPATIENT
Start: 2020-08-25 | End: 2020-08-27 | Stop reason: HOSPADM

## 2020-08-25 RX ADMIN — SODIUM CHLORIDE 75 ML/HR: 0.9 INJECTION, SOLUTION INTRAVENOUS at 16:25

## 2020-08-25 RX ADMIN — MORPHINE SULFATE 4 MG: 4 INJECTION INTRAVENOUS at 16:25

## 2020-08-25 RX ADMIN — CYCLOBENZAPRINE HYDROCHLORIDE 10 MG: 10 TABLET, FILM COATED ORAL at 22:56

## 2020-08-25 RX ADMIN — ENOXAPARIN SODIUM 40 MG: 100 INJECTION SUBCUTANEOUS at 16:33

## 2020-08-25 RX ADMIN — SODIUM CHLORIDE 1000 ML: 0.9 INJECTION, SOLUTION INTRAVENOUS at 12:19

## 2020-08-25 RX ADMIN — PROMETHAZINE HYDROCHLORIDE 25 MG: 25 INJECTION INTRAMUSCULAR; INTRAVENOUS at 22:56

## 2020-08-25 RX ADMIN — ONDANSETRON 4 MG: 2 INJECTION INTRAMUSCULAR; INTRAVENOUS at 12:18

## 2020-08-25 RX ADMIN — OXYCODONE HYDROCHLORIDE 15 MG: 10 TABLET ORAL at 20:55

## 2020-08-25 RX ADMIN — MORPHINE SULFATE 4 MG: 4 INJECTION INTRAVENOUS at 23:01

## 2020-08-25 RX ADMIN — ONDANSETRON 4 MG: 2 INJECTION INTRAMUSCULAR; INTRAVENOUS at 20:12

## 2020-08-25 RX ADMIN — PROMETHAZINE HYDROCHLORIDE 25 MG: 25 INJECTION INTRAMUSCULAR; INTRAVENOUS at 16:26

## 2020-08-25 RX ADMIN — ONDANSETRON 4 MG: 2 INJECTION INTRAMUSCULAR; INTRAVENOUS at 13:29

## 2020-08-25 RX ADMIN — ALPRAZOLAM 1 MG: 0.5 TABLET ORAL at 20:56

## 2020-08-25 NOTE — ASSESSMENT & PLAN NOTE
· Patient is status post left mastectomy with sentinel node biopsy with Dr Karry Phoenix  No immediate complications noted  LUCIUS drains noted to be in place with mostly serous/serosanginous drainage   No signs of infection/hematoma   · Follow up with Dr Karry Phoenix as previously recommended   · Continue pain control as ordered   · Monitor serial surgical site exams

## 2020-08-25 NOTE — H&P
[address] Internal Medicine  H&P- [place] 1965, 62 y o  female MRN: [id number]    Unit/Bed#: TR 30A Encounter: [id number]    Primary Care Provider: James Preciado MD   Date and time admitted to hospital: 8/25/2020 11:19 AM        * Intractable nausea and vomiting  Assessment & Plan  · Present on admission, patient had roughly 12 episodes of bilious vomiting today  She reports that the first 1-2 times were blood tinged, however she also reports eating a cherry popsicle  She reports home antiemetics being ineffective  Abdominal exam benign  · Admit patient to med/surg under observation status   · IVF  · Supportive care   · Scheduled Phenergan   · IV Zofran PRN breakthrough nausea   · Mylanta PRN   · Clear liquid, toast, cracker diet   · Monitor Hgb     Malignant neoplasm of upper-inner quadrant of left breast in female, estrogen receptor negative ([facility] )  Assessment & Plan  · Patient is status post left mastectomy with sentinel node biopsy with Dr Geovanna Young  No immediate complications noted  LUCIUS drains noted to be in place with mostly serous/serosanginous drainage  No signs of infection/hematoma   · Follow up with Dr Geovanna Young as previously recommended   · Continue pain control as ordered   · Monitor serial surgical site exams     Hypokalemia  Assessment & Plan  · Mild at 3 4   · Given 40 mEq  PO KCl   · BMP in AM     Systemic lupus erythematosus ([facility] )  Assessment & Plan  · Appears stable   · Continue Plaquenil at home doses       VTE Prophylaxis: Enoxaparin (Lovenox)  / sequential compression device   Code Status: Full Code   POLST: POLST form is not discussed and not completed at this time  Discussion with family: None at bedside     Anticipated Length of Stay:  Patient will be admitted on an Observation basis with an anticipated length of stay of  Less than 2 midnights     Justification for Hospital Stay: As per above assessment and plan     Total Time for Visit, including Counseling / Coordination of Care: 1 hour   Greater than 50% of this total time spent on direct patient counseling and coordination of care  Chief Complaint:   Vomiting     History of Present Illness:    Yessy Love is a 62 y o  female with a history of left breast cancer, HTN, Lupus who presents with vomiting  The patient reports that her vomiting started this morning  She reports roughly 12 episodes of vomiting  She states that the 1st 1-2 episodes were slightly red tinged  She thinks that this may have been blood, but also reports eating a cherry popsicle  She does report nausea that was not relieved by her home antiemetics  She reports diarrhea, however this is baseline for the patient  She does reports chills status post vomiting  She denies any fevers  She denies any sick contacts at home  She denies abdominal pain  Patient does report increased pain at her surgical site  She reports that the characteristic and amount of drainage she has been getting out of her LUCIUS drains has not changed  She reports that most her pain is located in her left axilla with movement of her shoulder  She denies any increased redness at the surgical site or increased drainage from the wound otherwise  She denies any immediate postsurgical complications  Review of Systems:    Review of Systems   Constitutional: Positive for appetite change  Negative for chills, diaphoresis, fatigue and fever  HENT: Negative for congestion, rhinorrhea and sore throat  Eyes: Negative for visual disturbance  Respiratory: Negative for cough, chest tightness, shortness of breath and wheezing  Cardiovascular: Negative for chest pain, palpitations and leg swelling  Gastrointestinal: Positive for nausea and vomiting  Negative for abdominal pain, constipation and diarrhea  Genitourinary: Negative for dysuria  Musculoskeletal: Negative for arthralgias and myalgias     Neurological: Negative for dizziness, syncope, weakness, light-headedness, numbness and headaches  All other systems reviewed and are negative  Past Medical and Surgical History:     Past Medical History:   Diagnosis Date    Disease of thyroid gland     Hypertension     Lupus ([facility] )     Malignant neoplasm of upper-inner quadrant of left breast in female, estrogen receptor negative ([facility] ) 2/25/2020    Nephrolithiasis     PTSD (post-traumatic stress disorder)        Past Surgical History:   Procedure Laterality Date    FEMUR FRACTURE SURGERY      FL GUIDED CENTRAL VENOUS ACCESS DEVICE INSERTION  3/17/2020    HYSTERECTOMY      LEFT OOPHORECTOMY      due to torsion     MAMMO STEREOTACTIC BREAST BIOPSY RIGHT (ALL INC) Right 2/12/2020    MASTECTOMY W/ SENTINEL NODE BIOPSY Left 8/18/2020    Procedure: BREAST MASTECTOMY WITH SENTINEL LYMPH NODE BIOPSY, LYMPHATIC MAPPING WITH BLUE DYE AND RADIOACTIVE DYE (INJECT AT 1430 BY DR WRIGHT IN THE OR); Surgeon: Sony Souza MD;  Location: [facility];  Service: Surgical Oncology    MRI BREAST BIOPSY LEFT (ALL INCLUSIVE) Left 3/20/2020    CT INSJ TUNNELED CTR VAD W/SUBQ PORT AGE 5 YR/> N/A 3/17/2020    Procedure: INSERTION VENOUS PORT ( PORT-A-CATH) IR;  Surgeon: Ottoniel Negron DO;  [facility]: [facility];  Service: Interventional Radiology    US GUIDANCE BREAST BIOPSY LEFT EACH ADDITIONAL Left 2/12/2020    US GUIDED BREAST BIOPSY LEFT COMPLETE Left 2/12/2020    VAGINA SURGERY         Meds/Allergies:    Prior to Admission medications    Medication Sig Start Date End Date Taking?  Authorizing Provider   ALPRAZolam Yvoalistair Meek) 1 mg tablet Take 1 tablet (1 mg total) by mouth 3 (three) times a day as needed for anxiety 8/10/20  Yes Renetta Sandoval MD   Cholecalciferol (VITAMIN D3) 56469 units CAPS Take 50,000 Units by mouth once a week   Yes Historical Provider, MD   CRANIAL PROSTHESIS, RX, One wig as needed 3/11/20  Yes Adelaida Kyle MD   cyclobenzaprine (FLEXERIL) 10 mg tablet Take 10 mg by mouth daily at bedtime    Yes Historical Provider, MD dexamethasone (DECADRON) 0 5 mg tablet Take 1 tablet (0 5 mg total) by mouth daily with breakfast 8/3/20  Yes Irene Galeano MD   hydroxychloroquine (PLAQUENIL) 200 mg tablet Take 1 tablet in morning and 1/2 tablet in evening   Yes Historical Provider, MD   levothyroxine 88 mcg tablet TAKE ONE TABLET BY MOUTH EVERY DAY 2/33/81  Yes Jean Carlos Espinal MD   loperamide (IMODIUM) 2 mg capsule Take 1 capsule (2 mg total) by mouth every 4 (four) hours as needed for diarrhea 5/16/20  Yes Adamaris Agarwal MD   Nutritional Supplements (OSTEOPOROSIS SUPPORT PO) Take by mouth   Yes Historical Provider, MD   ondansetron (ZOFRAN) 4 mg tablet TAKE ONE TABLET BY MOUTH EVERY 8 HOURS AS NEEDED FOR NAUSEA OR VOMITING 4/16/20  Yes Yamilet Johnson MD   oxyCODONE (ROXICODONE) 10 MG TABS Take 1-1 5 tablets (10-15 mg total) by mouth every 4 (four) hours as needed (cancer pain and post-op pain  Max of 8 tabs / day ) 8/25/20  Yes Irene Galeano MD   prochlorperazine (COMPAZINE) 5 mg tablet Take 1 tablet (5 mg total) by mouth every 6 (six) hours as needed for nausea or vomiting 5/16/20  Yes Adamaris Agarwal MD   sertraline (ZOLOFT) 100 mg tablet TAKE 1 & 1/2 TABLETS BY MOUTH ONCE DAILY 6/82/10  Yes Jean Carlos Espinal MD   magnesium oxide (MAG-OX) 400 mg Take 1 tablet (400 mg total) by mouth 2 (two) times a day 5/16/20 8/17/20  Adamaris Agarwal MD     I have reviewed home medications with patient personally  Allergies:    Allergies   Allergen Reactions    Mobic [Meloxicam] Eye Swelling     Reaction Date: 12Aug2011;     Methotrexate Rash    Sulfa Antibiotics Rash       Social History:     Marital Status: /Civil Union   Occupation: Noncontributory   Patient Pre-hospital Living Situation: Home  Patient Pre-hospital Level of Mobility: Full  Patient Pre-hospital Diet Restrictions: None  Substance Use History:   Social History     Substance and Sexual Activity   Alcohol Use Not Currently    Alcohol/week: 21 0 standard drinks    Types: 21 Cans of beer per week    Frequency: Never    Comment: pt states she had her last beer 3/22/2020     Social History     Tobacco Use   Smoking Status Current Every Day Smoker    Packs/day: 0 25    Types: Cigarettes    Start date: 200   Smokeless Tobacco Never Used   Tobacco Comment      5 ppd per Allscripts     Social History     Substance and Sexual Activity   Drug Use No       Family History:    Family History   Problem Relation Age of Onset    Diabetes Mother     Hypertension Mother     Nephrolithiasis Mother     Breast cancer Mother 79    Heart disease Father     Hypertension Father     Diabetes Brother     Nephrolithiasis Brother     Breast cancer Maternal Aunt     No Known Problems Maternal Grandmother     No Known Problems Maternal Grandfather     No Known Problems Paternal Grandmother     No Known Problems Paternal Grandfather        Physical Exam:     Vitals:   Blood Pressure: 160/79 (08/25/20 1145)  Pulse: 82 (08/25/20 1145)  Temperature: 97 6 °F (36 4 °C) (08/25/20 1133)  Temp Source: Oral (08/25/20 1133)  Respirations: 18 (08/25/20 1124)  SpO2: 99 % (08/25/20 1145)    Physical Exam  Constitutional:       General: She is not in acute distress (Mild)  Appearance: Normal appearance  She is normal weight  She is not ill-appearing or diaphoretic  HENT:      Head: Normocephalic and atraumatic  Mouth/Throat:      Mouth: Mucous membranes are dry  Eyes:      General: No scleral icterus  Pupils: Pupils are equal, round, and reactive to light  Cardiovascular:      Rate and Rhythm: Normal rate and regular rhythm  Pulses: Normal pulses  Heart sounds: Normal heart sounds, S1 normal and S2 normal  No murmur  No systolic murmur  No diastolic murmur  No gallop  No S3 or S4 sounds  Pulmonary:      Effort: Pulmonary effort is normal  No accessory muscle usage or respiratory distress  Breath sounds: Normal breath sounds  No stridor   No decreased breath sounds, wheezing, rhonchi or rales  Chest:      Chest wall: No tenderness  Breasts:         Left: Absent  Abdominal:      General: Bowel sounds are normal  There is no distension  Palpations: Abdomen is soft  Tenderness: There is no abdominal tenderness  There is no guarding  Musculoskeletal:      Right lower leg: No edema  Left lower leg: No edema  Skin:     General: Skin is warm and dry  Coloration: Skin is not jaundiced  Neurological:      General: No focal deficit present  Mental Status: She is alert  Mental status is at baseline  Motor: No tremor or seizure activity  Psychiatric:         Behavior: Behavior is cooperative  Additional Data:     Lab Results: I have personally reviewed pertinent reports  Results from last 7 days   Lab Units 08/25/20  1218   WBC Thousand/uL 13 40*   HEMOGLOBIN g/dL 11 2*   HEMATOCRIT % 36 1   PLATELETS Thousands/uL 367   NEUTROS PCT % 90*   LYMPHS PCT % 5*   MONOS PCT % 4   EOS PCT % 0     Results from last 7 days   Lab Units 08/25/20  1218   SODIUM mmol/L 143   POTASSIUM mmol/L 3 4*   CHLORIDE mmol/L 110*   CO2 mmol/L 23   BUN mg/dL 9   CREATININE mg/dL 0 74   ANION GAP mmol/L 10   CALCIUM mg/dL 8 1*   ALBUMIN g/dL 3 0*   TOTAL BILIRUBIN mg/dL 0 17*   ALK PHOS U/L 71   ALT U/L 10*   AST U/L 12   GLUCOSE RANDOM mg/dL 133                       Imaging: I have personally reviewed pertinent reports  CT head without contrast   Final Result by Wilman Mosley MD (08/25 7188)      No acute intracranial abnormality  Workstation performed: [id number]             EKG, Pathology, and Other Studies Reviewed on Admission:   · EKG: NSR, 77 BPM  · CT Head: No acute intracranial abnormality     Allscripts / Epic Records Reviewed: Yes     ** Please Note: This note has been constructed using a voice recognition system   **

## 2020-08-25 NOTE — ED PROVIDER NOTES
History  Chief Complaint   Patient presents with    Vomiting     pt had mastectomy 1 week ago, started with vomitting today     Patient presents to the emergency department with multiple episodes of vomiting that began today  Patient had a mastectomy done 1 week ago with 2 current drains placed  Patient admits to scant diarrhea  She is not on chemotherapy  She denies fever chills  She is not on antibiotics  She denies foreign travel or unusual food ingestions  She denies abdominal or back pain  Denies urinary symptoms  Prior to Admission Medications   Prescriptions Last Dose Informant Patient Reported? Taking?    ALPRAZolam (XANAX) 1 mg tablet   No Yes   Sig: Take 1 tablet (1 mg total) by mouth 3 (three) times a day as needed for anxiety   CRANIAL PROSTHESIS, RX,   No Yes   Sig: One wig as needed   Cholecalciferol (VITAMIN D3) 50565 units CAPS  Self Yes Yes   Sig: Take 50,000 Units by mouth once a week   Nutritional Supplements (OSTEOPOROSIS SUPPORT PO)   Yes Yes   Sig: Take by mouth   cyclobenzaprine (FLEXERIL) 10 mg tablet   Yes Yes   Sig: Take 10 mg by mouth daily at bedtime    dexamethasone (DECADRON) 0 5 mg tablet   No Yes   Sig: Take 1 tablet (0 5 mg total) by mouth daily with breakfast   hydroxychloroquine (PLAQUENIL) 200 mg tablet  Self Yes Yes   Sig: Take 1 tablet in morning and 1/2 tablet in evening   levothyroxine 88 mcg tablet   No Yes   Sig: TAKE ONE TABLET BY MOUTH EVERY DAY   loperamide (IMODIUM) 2 mg capsule   No Yes   Sig: Take 1 capsule (2 mg total) by mouth every 4 (four) hours as needed for diarrhea   magnesium oxide (MAG-OX) 400 mg   No No   Sig: Take 1 tablet (400 mg total) by mouth 2 (two) times a day   ondansetron (ZOFRAN) 4 mg tablet   No Yes   Sig: TAKE ONE TABLET BY MOUTH EVERY 8 HOURS AS NEEDED FOR NAUSEA OR VOMITING   oxyCODONE (ROXICODONE) 10 MG TABS   No Yes   Sig: Take 1-1 5 tablets (10-15 mg total) by mouth every 4 (four) hours as needed (cancer pain and post-op pain   Max of 8 tabs / day )   prochlorperazine (COMPAZINE) 5 mg tablet   No Yes   Sig: Take 1 tablet (5 mg total) by mouth every 6 (six) hours as needed for nausea or vomiting   sertraline (ZOLOFT) 100 mg tablet   No Yes   Sig: TAKE 1 & 1/2 TABLETS BY MOUTH ONCE DAILY      Facility-Administered Medications: None       Past Medical History:   Diagnosis Date    Disease of thyroid gland     Hypertension     [facility] [address] )     Malignant neoplasm of upper-inner quadrant of left breast in female, estrogen receptor negative ([facility] [address] ) 2/25/2020    Nephrolithiasis     PTSD (post-traumatic stress disorder)        Past Surgical History:   Procedure Laterality Date    FEMUR FRACTURE SURGERY      FL GUIDED CENTRAL VENOUS ACCESS DEVICE INSERTION  3/17/2020    HYSTERECTOMY      LEFT OOPHORECTOMY      due to torsion     MAMMO STEREOTACTIC BREAST BIOPSY RIGHT (ALL INC) Right 2/12/2020    MASTECTOMY W/ SENTINEL NODE BIOPSY Left 8/18/2020    Procedure: BREAST MASTECTOMY WITH SENTINEL LYMPH NODE BIOPSY, LYMPHATIC MAPPING WITH BLUE DYE AND RADIOACTIVE DYE (INJECT AT 1430 BY DR WRIGHT IN THE OR);   Surgeon: Jimmie Maguire MD;  Location: [facility];  Service: Surgical Oncology    MRI BREAST BIOPSY LEFT (ALL INCLUSIVE) Left 3/20/2020    OR INSJ TUNNELED CTR VAD W/SUBQ PORT AGE 5 YR/> N/A 3/17/2020    Procedure: INSERTION VENOUS PORT ( PORT-A-CATH) IR;  Surgeon: Martha Solo DO;  [facility]: [facility];  Service: Interventional Radiology    US GUIDANCE BREAST BIOPSY LEFT EACH ADDITIONAL Left 2/12/2020    US GUIDED BREAST BIOPSY LEFT COMPLETE Left 2/12/2020    VAGINA SURGERY         Family History   Problem Relation Age of Onset    Diabetes Mother     Hypertension Mother     Nephrolithiasis Mother     Breast cancer Mother 79    Heart disease Father     Hypertension Father     Diabetes Brother     Nephrolithiasis Brother     Breast cancer Maternal Aunt     No Known Problems Maternal Grandmother     No Known Problems Maternal Grandfather     No Known Problems Paternal Grandmother     No Known Problems Paternal Grandfather      I have reviewed and agree with the history as documented  E-Cigarette/Vaping    E-Cigarette Use Never User      E-Cigarette/Vaping Substances     Social History     Tobacco Use    Smoking status: Current Every Day Smoker     Packs/day: 0 25     Types: Cigarettes     Start date: 1990    Smokeless tobacco: Never Used    Tobacco comment:  5 ppd per [employer]   Substance Use Topics    Alcohol use: Not Currently     Alcohol/week: 21 0 standard drinks     Types: 21 Cans of beer per week     Frequency: Never     Comment: pt states she had her last beer 3/22/2020    Drug use: No       Review of Systems   Constitutional: Positive for fatigue  Negative for activity change, appetite change, chills, diaphoresis and fever  HENT: Negative  Negative for congestion, drooling, ear discharge, facial swelling, rhinorrhea, sinus pressure, sinus pain, sneezing, sore throat, trouble swallowing and voice change  Eyes: Negative  Negative for photophobia and visual disturbance  Respiratory: Negative  Negative for cough, chest tightness, shortness of breath, wheezing and stridor  Cardiovascular: Negative  Negative for chest pain, palpitations and leg swelling  Gastrointestinal: Positive for diarrhea, nausea and vomiting  Negative for abdominal distention, abdominal pain, blood in stool and constipation  Endocrine: Negative  Genitourinary: Negative  Negative for dysuria, flank pain, frequency, urgency, vaginal bleeding, vaginal discharge and vaginal pain  Musculoskeletal: Negative  Negative for back pain, neck pain and neck stiffness  Skin: Negative  Negative for rash and wound  Allergic/Immunologic: Negative  Neurological: Positive for weakness  Negative for dizziness, tremors, seizures, syncope, facial asymmetry, speech difficulty, light-headedness, numbness and headaches  Hematological: Negative  Does not bruise/bleed easily  Psychiatric/Behavioral: Negative  Negative for confusion  Physical Exam  Physical Exam  Vitals signs and nursing note reviewed  Constitutional:       General: She is in acute distress  Appearance: Normal appearance  She is well-developed  She is ill-appearing  She is not toxic-appearing or diaphoretic  Comments: Nontoxic appearance without respiratory distress  Mildly washed out appearance  Able to engage in normal conversation and answer simple questionsappropriately  HENT:      Head: Normocephalic and atraumatic  Right Ear: External ear normal       Left Ear: External ear normal       Nose: Nose normal  No congestion or rhinorrhea  Mouth/Throat:      Mouth: Mucous membranes are moist       Pharynx: Oropharynx is clear  No oropharyngeal exudate or posterior oropharyngeal erythema  Eyes:      Conjunctiva/sclera: Conjunctivae normal       Pupils: Pupils are equal, round, and reactive to light  Neck:      Musculoskeletal: Normal range of motion and neck supple  No neck rigidity or muscular tenderness  Vascular: No carotid bruit  Cardiovascular:      Rate and Rhythm: Normal rate and regular rhythm  Pulses: Normal pulses  Heart sounds: Normal heart sounds  Pulmonary:      Effort: Pulmonary effort is normal  No respiratory distress  Breath sounds: Normal breath sounds  No stridor  No wheezing, rhonchi or rales  Chest:      Chest wall: No tenderness  Abdominal:      General: Bowel sounds are normal  There is no distension  Palpations: Abdomen is soft  There is no mass  Tenderness: There is no abdominal tenderness  There is no right CVA tenderness, left CVA tenderness, guarding or rebound  Hernia: No hernia is present  Musculoskeletal: Normal range of motion  General: No swelling, tenderness, deformity or signs of injury  Right lower leg: No edema        Left lower leg: No edema  Lymphadenopathy:      Cervical: No cervical adenopathy  Skin:     General: Skin is warm and dry  Capillary Refill: Capillary refill takes less than 2 seconds  Findings: No rash  Neurological:      General: No focal deficit present  Mental Status: She is alert and oriented to person, place, and time  Mental status is at baseline  Cranial Nerves: No cranial nerve deficit  Sensory: No sensory deficit  Motor: No weakness  Coordination: Coordination normal       Gait: Gait normal       Deep Tendon Reflexes: Reflexes are normal and symmetric  Reflexes normal    Psychiatric:         Mood and Affect: Mood normal          Behavior: Behavior normal          Thought Content:  Thought content normal          Judgment: Judgment normal          Vital Signs  ED Triage Vitals   Temperature Pulse Respirations Blood Pressure SpO2   08/25/20 1133 08/25/20 1124 08/25/20 1124 08/25/20 1124 08/25/20 1124   97 6 °F (36 4 °C) 79 18 153/73 97 %      Temp Source Heart Rate Source Patient Position - Orthostatic VS BP Location FiO2 (%)   08/25/20 1133 08/25/20 1124 08/25/20 1124 08/25/20 1124 --   Oral Monitor Sitting Right arm       Pain Score       08/25/20 1124       Worst Possible Pain           Vitals:    08/25/20 1124 08/25/20 1130 08/25/20 1145   BP: 153/73 153/73 160/79   Pulse: 79 84 82   Patient Position - Orthostatic VS: Sitting           Visual Acuity      ED Medications  Medications   ondansetron (ZOFRAN) injection 4 mg (4 mg Intravenous Given 8/25/20 1218)   sodium chloride 0 9 % bolus 1,000 mL (1,000 mL Intravenous New Bag 8/25/20 1219)   ondansetron (ZOFRAN) injection 4 mg (4 mg Intravenous Given 8/25/20 1329)       Diagnostic Studies  Results Reviewed     Procedure Component Value Units Date/Time    CBC and differential [[id number]]  (Abnormal) Collected:  08/25/20 1218    Lab Status:  Final result Specimen:  Blood from Central Venous Line Updated:  08/25/20 1255     WBC 13 40 Thousand/uL      RBC 3 66 Million/uL      Hemoglobin 11 2 g/dL      Hematocrit 36 1 %      MCV 99 fL      MCH 30 6 pg      MCHC 31 0 g/dL      RDW 16 0 %      MPV 9 9 fL      Platelets 508 Thousands/uL      nRBC 0 /100 WBCs      Neutrophils Relative 90 %      Immat GRANS % 1 %      Lymphocytes Relative 5 %      Monocytes Relative 4 %      Eosinophils Relative 0 %      Basophils Relative 0 %      Neutrophils Absolute 12 06 Thousands/µL      Immature Grans Absolute 0 07 Thousand/uL      Lymphocytes Absolute 0 65 Thousands/µL      Monocytes Absolute 0 57 Thousand/µL      Eosinophils Absolute 0 01 Thousand/µL      Basophils Absolute 0 04 Thousands/µL     Narrative: This is an appended report  These results have been appended to a previously verified report      Comprehensive metabolic panel [[id number]]  (Abnormal) Collected:  08/25/20 1218    Lab Status:  Final result Specimen:  Blood from Central Venous Line Updated:  08/25/20 1250     Sodium 143 mmol/L      Potassium 3 4 mmol/L      Chloride 110 mmol/L      CO2 23 mmol/L      ANION GAP 10 mmol/L      BUN 9 mg/dL      Creatinine 0 74 mg/dL      Glucose 133 mg/dL      Calcium 8 1 mg/dL      AST 12 U/L      ALT 10 U/L      Alkaline Phosphatase 71 U/L      Total Protein 6 5 g/dL      Albumin 3 0 g/dL      Total Bilirubin 0 17 mg/dL      eGFR 91 ml/min/1 73sq m     Narrative:       [place] guidelines for Chronic Kidney Disease (CKD):     Stage 1 with normal or high GFR (GFR > 90 mL/min/1 73 square meters)    Stage 2 Mild CKD (GFR = 60-89 mL/min/1 73 square meters)    Stage 3A Moderate CKD (GFR = 45-59 mL/min/1 73 square meters)    Stage 3B Moderate CKD (GFR = 30-44 mL/min/1 73 square meters)    Stage 4 Severe CKD (GFR = 15-29 mL/min/1 73 square meters)    Stage 5 End Stage CKD (GFR <15 mL/min/1 73 square meters)  Note: GFR calculation is accurate only with a steady state creatinine    Lipase [[id number]]  (Normal) Collected:  08/25/20 1218    Lab Status:  Final result Specimen:  Blood from Central Venous Line Updated:  08/25/20 1250     Lipase 107 u/L                  CT head without contrast    (Results Pending)              Procedures  Procedures         ED Course  ED Course as of Aug 25 1408   Tue Aug 25, 2020   1406 Case discussed with Dr Hernandez of the hospitalist service was set this patient  Patient improved with fluids and Zofran  Hospitalist's request CT scan of head to rule out metastases  MDM      Disposition  Final diagnoses:   Vomiting   Leukocytosis   Breast cancer ([facility] [address] )   Acute pain after mastectomy     Time reflects when diagnosis was documented in both MDM as applicable and the Disposition within this note     Time User Action Codes Description Comment    8/25/2020  2:07 PM Roman Silence Add [R11 10] Vomiting     8/25/2020  2:07 PM Roman Silence Add [D72 829] Leukocytosis     8/25/2020  2:07 PM Herring, Lendell Harada Add [C50 919] Breast cancer ([facility] [address] )     8/25/2020  2:07 PM Roman Silence Add [G89 18,  Z90 10] Acute pain after mastectomy       ED Disposition     ED Disposition Condition Date/Time Comment    Admit Stable Tue Aug 25, 2020  2:07 PM Case was discussed with Dr Panchito New and the patient's admission status was agreed to be Admission Status: observation status to the service of Dr Jeremiah Castaneda    None         Patient's Medications   Discharge Prescriptions    No medications on file     No discharge procedures on file      PDMP Review       Value Time User    PDMP Reviewed  Yes 8/19/2020  8:17 AM Desiree Mcdonald PA-C          ED Provider  Electronically Signed by           Dany Guerra MD  08/25/20 2193

## 2020-08-25 NOTE — TELEPHONE ENCOUNTER
Prior authorization for pt's   Oxycodone 10 mg  has been initiated via Cover My Meds and sent along with last ICD 10 codes     [facility]   ID [id number]     Awaiting determination

## 2020-08-25 NOTE — ASSESSMENT & PLAN NOTE
· Present on admission, patient had roughly 12 episodes of bilious vomiting today  She reports that the first 1-2 times were blood tinged, however she also reports eating a cherry popsicle  She reports home antiemetics being ineffective  Abdominal exam benign     · Admit patient to med/surg under observation status   · IVF  · Supportive care   · Scheduled Phenergan   · IV Zofran PRN breakthrough nausea   · Mylanta PRN   · Clear liquid, toast, cracker diet   · Monitor Hgb

## 2020-08-26 LAB
ANION GAP SERPL CALCULATED.3IONS-SCNC: 6 MMOL/L (ref 4–13)
BASOPHILS # BLD AUTO: 0.03 THOUSANDS/ΜL (ref 0–0.1)
BASOPHILS NFR BLD AUTO: 0 % (ref 0–1)
BUN SERPL-MCNC: 10 MG/DL (ref 5–25)
CALCIUM SERPL-MCNC: 7.5 MG/DL (ref 8.3–10.1)
CHLORIDE SERPL-SCNC: 108 MMOL/L (ref 100–108)
CO2 SERPL-SCNC: 26 MMOL/L (ref 21–32)
CREAT SERPL-MCNC: 0.74 MG/DL (ref 0.6–1.3)
EOSINOPHIL # BLD AUTO: 0.18 THOUSAND/ΜL (ref 0–0.61)
EOSINOPHIL NFR BLD AUTO: 2 % (ref 0–6)
ERYTHROCYTE [DISTWIDTH] IN BLOOD BY AUTOMATED COUNT: 16.2 % (ref 11.6–15.1)
GFR SERPL CREATININE-BSD FRML MDRD: 91 ML/MIN/1.73SQ M
GLUCOSE SERPL-MCNC: 94 MG/DL (ref 65–140)
HCT VFR BLD AUTO: 32.6 % (ref 34.8–46.1)
HGB BLD-MCNC: 10 G/DL (ref 11.5–15.4)
IMM GRANULOCYTES # BLD AUTO: 0.02 THOUSAND/UL (ref 0–0.2)
IMM GRANULOCYTES NFR BLD AUTO: 0 % (ref 0–2)
LYMPHOCYTES # BLD AUTO: 2.15 THOUSANDS/ΜL (ref 0.6–4.47)
LYMPHOCYTES NFR BLD AUTO: 28 % (ref 14–44)
MCH RBC QN AUTO: 30.5 PG (ref 26.8–34.3)
MCHC RBC AUTO-ENTMCNC: 30.7 G/DL (ref 31.4–37.4)
MCV RBC AUTO: 99 FL (ref 82–98)
MONOCYTES # BLD AUTO: 0.7 THOUSAND/ΜL (ref 0.17–1.22)
MONOCYTES NFR BLD AUTO: 9 % (ref 4–12)
NEUTROPHILS # BLD AUTO: 4.5 THOUSANDS/ΜL (ref 1.85–7.62)
NEUTS SEG NFR BLD AUTO: 61 % (ref 43–75)
NRBC BLD AUTO-RTO: 0 /100 WBCS
PLATELET # BLD AUTO: 309 THOUSANDS/UL (ref 149–390)
PMV BLD AUTO: 9.4 FL (ref 8.9–12.7)
POTASSIUM SERPL-SCNC: 3.4 MMOL/L (ref 3.5–5.3)
RBC # BLD AUTO: 3.28 MILLION/UL (ref 3.81–5.12)
SODIUM SERPL-SCNC: 140 MMOL/L (ref 136–145)
WBC # BLD AUTO: 7.58 THOUSAND/UL (ref 4.31–10.16)

## 2020-08-26 PROCEDURE — 99225 PR SBSQ OBSERVATION CARE/DAY 25 MINUTES: CPT | Performed by: PHYSICIAN ASSISTANT

## 2020-08-26 PROCEDURE — 80048 BASIC METABOLIC PNL TOTAL CA: CPT | Performed by: PHYSICIAN ASSISTANT

## 2020-08-26 PROCEDURE — 85025 COMPLETE CBC W/AUTO DIFF WBC: CPT | Performed by: PHYSICIAN ASSISTANT

## 2020-08-26 RX ORDER — PROMETHAZINE HYDROCHLORIDE 25 MG/1
25 TABLET ORAL EVERY 6 HOURS
Status: DISCONTINUED | OUTPATIENT
Start: 2020-08-26 | End: 2020-08-27 | Stop reason: HOSPADM

## 2020-08-26 RX ORDER — POTASSIUM CHLORIDE 14.9 MG/ML
20 INJECTION INTRAVENOUS ONCE
Status: COMPLETED | OUTPATIENT
Start: 2020-08-26 | End: 2020-08-26

## 2020-08-26 RX ADMIN — SERTRALINE HYDROCHLORIDE 150 MG: 100 TABLET ORAL at 08:07

## 2020-08-26 RX ADMIN — MORPHINE SULFATE 4 MG: 4 INJECTION INTRAVENOUS at 08:07

## 2020-08-26 RX ADMIN — PROMETHAZINE HYDROCHLORIDE 25 MG: 25 INJECTION INTRAMUSCULAR; INTRAVENOUS at 05:10

## 2020-08-26 RX ADMIN — PROMETHAZINE HYDROCHLORIDE 25 MG: 25 TABLET ORAL at 21:27

## 2020-08-26 RX ADMIN — ENOXAPARIN SODIUM 40 MG: 100 INJECTION SUBCUTANEOUS at 08:06

## 2020-08-26 RX ADMIN — HYDROXYCHLOROQUINE SULFATE 100 MG: 200 TABLET, FILM COATED ORAL at 17:13

## 2020-08-26 RX ADMIN — OXYCODONE HYDROCHLORIDE 15 MG: 10 TABLET ORAL at 21:39

## 2020-08-26 RX ADMIN — CYCLOBENZAPRINE HYDROCHLORIDE 10 MG: 10 TABLET, FILM COATED ORAL at 21:27

## 2020-08-26 RX ADMIN — OXYCODONE HYDROCHLORIDE 10 MG: 10 TABLET ORAL at 17:18

## 2020-08-26 RX ADMIN — LEVOTHYROXINE SODIUM 88 MCG: 88 TABLET ORAL at 05:09

## 2020-08-26 RX ADMIN — MORPHINE SULFATE 4 MG: 4 INJECTION INTRAVENOUS at 14:49

## 2020-08-26 RX ADMIN — MAGNESIUM OXIDE 400 MG: 400 TABLET ORAL at 17:12

## 2020-08-26 RX ADMIN — DEXAMETHASONE 0.5 MG: 0.5 TABLET ORAL at 08:08

## 2020-08-26 RX ADMIN — PROMETHAZINE HYDROCHLORIDE 25 MG: 25 TABLET ORAL at 14:49

## 2020-08-26 RX ADMIN — OXYCODONE HYDROCHLORIDE 10 MG: 10 TABLET ORAL at 05:23

## 2020-08-26 RX ADMIN — PROMETHAZINE HYDROCHLORIDE 25 MG: 25 INJECTION INTRAMUSCULAR; INTRAVENOUS at 09:33

## 2020-08-26 RX ADMIN — SODIUM CHLORIDE 75 ML/HR: 0.9 INJECTION, SOLUTION INTRAVENOUS at 05:23

## 2020-08-26 RX ADMIN — ALPRAZOLAM 1 MG: 0.5 TABLET ORAL at 13:53

## 2020-08-26 RX ADMIN — HYDROXYCHLOROQUINE SULFATE 200 MG: 200 TABLET, FILM COATED ORAL at 08:09

## 2020-08-26 RX ADMIN — MAGNESIUM OXIDE 400 MG: 400 TABLET ORAL at 08:08

## 2020-08-26 RX ADMIN — MORPHINE SULFATE 4 MG: 4 INJECTION INTRAVENOUS at 20:02

## 2020-08-26 RX ADMIN — ALPRAZOLAM 1 MG: 0.5 TABLET ORAL at 21:39

## 2020-08-26 RX ADMIN — OXYCODONE HYDROCHLORIDE 15 MG: 10 TABLET ORAL at 09:42

## 2020-08-26 RX ADMIN — POTASSIUM CHLORIDE 20 MEQ: 14.9 INJECTION, SOLUTION INTRAVENOUS at 11:48

## 2020-08-26 NOTE — ASSESSMENT & PLAN NOTE
· Mild at 3 4 x24 hours  · Patient refused 40 mEq PO yesterday  · Give 20 mEq IV x1 today  · BMP in AM

## 2020-08-26 NOTE — TELEPHONE ENCOUNTER
Received prior authorization approval for   Oxycodone 10mg    08/03/20 to  08/03/22    Confirmed with pharmacy  Pt in hospital      Per letter from insurance this wa already in system as of above date  Gilberto Diaz

## 2020-08-26 NOTE — UTILIZATION REVIEW
Initial Clinical Review    Admission: Date/Time/Statement:   Admission Orders (From admission, onward)     Ordered        08/25/20 1407  Place in Observation  Once                   Orders Placed This Encounter   Procedures    Place in Observation     Standing Status:   Standing     Number of Occurrences:   1     Order Specific Question:   Admitting Physician     Answer:   Rocio Keys     Order Specific Question:   Level of Care     Answer:   Med Surg [16]     ED Arrival Information     Expected Arrival Acuity Means of Arrival Escorted By Service Admission Type    - 8/25/2020 11:15 Urgent Wheelchair Family Member General Medicine Elective    Arrival Complaint    SOB/Vomiting ( Mastectomy 8/18/20)        Chief Complaint   Patient presents with    Vomiting     pt had mastectomy 1 week ago, started with vomitting today     Assessment/Plan: 61 yo female to ED from home PMHX stage II left breast cancer ERPR ngative HER2 positve status post mastectomy 1 week with 2 drains and neoadjuvant chemo, HTN, Lupus presents with vomiting & chills  Reports 12 episodes of vomiting with last 1-2 sl pink tinged, however reports recently eating a cherry popsicle  Vomiting not relieved by home antiemetics  Reports increased pain at surgical sistes  & left axilla with shoulder movement  Admit for observation for intractable nausea with vomiting  IV fluids, cl liq diet, toast  Scheduled Phenergan with IV zofran breakthrough  Pain meds as ordered; monitor surgical sites; LUCIUS intact with mostly serous/serosanginous drainage  Replace potassium & bmp in am    8/26 Attending- Intractable N/V: transitioning meds to PO, cont with clear liq consider advancing on tolerance  Cont monitor electrolytes  Exam: Tolerated some crackers this AM  No abdominal pain  Does have some pain around her drain sites which she describes as a pulling sensation   DC home poss tomorrow pending ability to tolerate PO meds & gentle diet    ED Triage Vitals Temperature Pulse Respirations Blood Pressure SpO2   08/25/20 1133 08/25/20 1124 08/25/20 1124 08/25/20 1124 08/25/20 1124   97 6 °F (36 4 °C) 79 18 153/73 97 %      Temp Source Heart Rate Source Patient Position - Orthostatic VS BP Location FiO2 (%)   08/25/20 1133 08/25/20 1124 08/25/20 1124 08/25/20 1124 --   Oral Monitor Sitting Right arm       Pain Score       08/25/20 1124       Worst Possible Pain          Wt Readings from Last 1 Encounters:   08/25/20 56 3 kg (124 lb 1 9 oz)     Additional Vital Signs:   Date/Time   Temp   Pulse   Resp   BP   MAP (mmHg)   SpO2   O2 Device   Patient Position - Orthostatic VS    08/26/20 0700   97 4 °F (36 3 °C)Abnormal     82   18   111/66   84   95 %   None (Room air)   Lying    08/25/20 2215   98 2 °F (36 8 °C)   99   18   141/72   --   97 %   None (Room air)   Lying    08/25/20 2017   --   --   --   --   --   --   None (Room air)   --    08/25/20 1625   --   --   --   --   --   --   None (Room air)   --    08/25/20 1514   98 °F (36 7 °C)   77   18   160/86   --   98 %   None (Room air)   Lying    08/25/20 1145   --   82   --   160/79   110   99 %   --   --    08/25/20 1133   97 6 °F (36 4 °C)   --   --   --   --   --   --   --    08/25/20 1130   --   84   --   153/73   103   100 %   --   --    08/25/20 1124   --   79   18   153/73   --   97 %   None (Room air)   Sitting       Weights (last 14 days)     Date/Time   Weight   Weight Method   Height    08/25/20 1514   56 3 kg (124 lb 1 9 oz)   Bed scale   5' 3" (1 6 m)        Pertinent Labs/Diagnostic Test Results:       Results from last 7 days   Lab Units 08/26/20  0514 08/25/20  1218   WBC Thousand/uL 7 58 13 40*   HEMOGLOBIN g/dL 10 0* 11 2*   HEMATOCRIT % 32 6* 36 1   PLATELETS Thousands/uL 309 367   NEUTROS ABS Thousands/µL 4 50 12 06*         Results from last 7 days   Lab Units 08/26/20  0514 08/25/20  1218   SODIUM mmol/L 140 143   POTASSIUM mmol/L 3 4* 3 4*   CHLORIDE mmol/L 108 110*   CO2 mmol/L 26 23   ANION GAP mmol/L 6 10   BUN mg/dL 10 9   CREATININE mg/dL 0 74 0 74   EGFR ml/min/1 73sq m 91 91   CALCIUM mg/dL 7 5* 8 1*     Results from last 7 days   Lab Units 08/25/20  1218   AST U/L 12   ALT U/L 10*   ALK PHOS U/L 71   TOTAL PROTEIN g/dL 6 5   ALBUMIN g/dL 3 0*   TOTAL BILIRUBIN mg/dL 0 17*         Results from last 7 days   Lab Units 08/26/20  0514 08/25/20  1218   GLUCOSE RANDOM mg/dL 94 133     Results from last 7 days   Lab Units 08/25/20  1218   LIPASE u/L 107   8/25 ct head wo contrast; no acute abn  ekg nsr        ED Treatment:   Medication Administration from 08/25/2020 1114 to 08/25/2020 1513       Date/Time Order Dose Route Action     08/25/2020 1218 ondansetron (ZOFRAN) injection 4 mg 4 mg Intravenous Given     08/25/2020 1219 sodium chloride 0 9 % bolus 1,000 mL 1,000 mL Intravenous New Bag     08/25/2020 1329 ondansetron (ZOFRAN) injection 4 mg 4 mg Intravenous Given        Past Medical History:   Diagnosis Date    Disease of thyroid gland     Hypertension     Lupus ([facility] [address] )     Malignant neoplasm of upper-inner quadrant of left breast in female, estrogen receptor negative ([facility] [address] ) 2/25/2020    Nephrolithiasis     PTSD (post-traumatic stress disorder)      Present on Admission:   Systemic lupus erythematosus (HCC)   Intractable nausea and vomiting   Hypokalemia      Admitting Diagnosis: Leukocytosis [D72 829]  Vomiting [R11 10]  Breast cancer (HCC) [C50 919]  SOB (shortness of breath) [R06 02]  Acute pain after mastectomy [G89 18, Z90 10]  Age/Sex: 62 y o  female  Admission Orders:  Up oob  Scheduled Medications:  cyclobenzaprine, 10 mg, Oral, HS  dexamethasone, 0 5 mg, Oral, Daily With Breakfast  enoxaparin, 40 mg, Subcutaneous, Daily  hydroxychloroquine, 100 mg, Oral, QPM  hydroxychloroquine, 200 mg, Oral, Daily With Breakfast  levothyroxine, 88 mcg, Oral, Early Morning  magnesium oxide, 400 mg, Oral, BID  nicotine, 1 patch, Transdermal, Daily  potassium chloride, 40 mEq, Oral, Once  promethazine, 25 mg, Intramuscular, Q6H  sertraline, 150 mg, Oral, Daily    Continuous IV Infusions:  sodium chloride, 75 mL/hr, Intravenous, Continuous    PRN Meds:  acetaminophen, 650 mg, Oral, Q6H PRN  ALPRAZolam, 1 mg, Oral, TID PRN  aluminum-magnesium hydroxide-simethicone, 30 mL, Oral, Q4H PRN  loperamide, 2 mg, Oral, Q4H PRN  morphine injection, 4 mg, Intravenous, Q4H PRN  ondansetron, 4 mg, Intravenous, Q6H PRN  oxyCODONE, 10 mg, Oral, Q4H PRN  oxyCODONE, 15 mg, Oral, Q6H PRN        None    Network Utilization Review Department  [email or web address]  ATTENTION: Please call with any questions or concerns to [phone] and carefully listen to the prompts so that you are directed to the right person  All voicemails are confidential   Brendan Boyd all requests for admission clinical reviews, approved or denied determinations and any other requests to dedicated fax number below belonging to the campus where the patient is receiving treatment   List of dedicated fax numbers for the Facilities:  [address] DENIALS (Administrative/Medical Necessity) [phone]   [address] (Maternity/NICU/Pediatrics) [phone]   Marcelle Shah [phone]   Avani Murrell [phone]   Neela Gonzales [phone]   Silvia Velasquez [phone]   [address] [phone]   [facility]  [phone]   [address] [facility]  [address] [facility] [address] [facility] [phone]

## 2020-08-26 NOTE — PATIENT INSTRUCTIONS
PRESCRIPTION REFILL REMINDER:  All medication refills should be requested prior to [facility] on Friday  Any refill requests after noon on Friday would be addressed the following Monday  Please protect yourself from the novel Coronavirus (COVID-19)! Even though we STILL do not have a vaccine or good antiviral drugs for this infection, the following strategies can help you stay healthy:    = Wash your hands with soap and water, or hand  with at least 60% alcohol, often  = Avoid touching your face!   = Avoid close contact with others, even if they seem healthy  Avoid gatherings of more than 10 people, and don't travel unnecessarily  = Stay home, except to get medical care or other essentials  If you can eat and drink and breathe and sleep, please consider calling your doctor's office instead of visiting in person, even if you are ill   = Cover your cough with a tissue, or your elbow  = Clean frequently touched surfaces and objects daily (e g , tables, countertops, light switches, doorknobs, and cabinet handles)  Regular household detergent and water are sufficient  Numbers of cases of Coronavirus are spiking in many [place] States  This is not a more dangerous virus, but a sign that more people in a community are spreading the virus  Please check the local disease reports near you if you consider travelling this summer  We do NOT advise travel to any community or State with a rising viral caseload  Check out [employer] for [place] data that are updated daily  http://www [employer]/   Global Epidemics  Org, from [facility] (OUTPATIENT CAMPUS), will give you [id number]-ga-[id number] information on virus cases  Https://globalepidemics  org/

## 2020-08-26 NOTE — PLAN OF CARE
Problem: Potential for Falls  Goal: Patient will remain free of falls  Description: INTERVENTIONS:  - Assess patient frequently for physical needs  -  Identify cognitive and physical deficits and behaviors that affect risk of falls    -  [place] fall precautions as indicated by assessment   - Educate patient/family on patient safety including physical limitations  - Instruct patient to call for assistance with activity based on assessment  - Modify environment to reduce risk of injury  - Consider OT/PT consult to assist with strengthening/mobility  Outcome: Progressing     Problem: PAIN - ADULT  Goal: Verbalizes/displays adequate comfort level or baseline comfort level  Description: Interventions:  - Encourage patient to monitor pain and request assistance  - Assess pain using appropriate pain scale  - Administer analgesics based on type and severity of pain and evaluate response  - Implement non-pharmacological measures as appropriate and evaluate response  - Consider cultural and social influences on pain and pain management  - Notify physician/advanced practitioner if interventions unsuccessful or patient reports new pain  Outcome: Progressing     Problem: INFECTION - ADULT  Goal: Absence or prevention of progression during hospitalization  Description: INTERVENTIONS:  - Assess and monitor for signs and symptoms of infection  - Monitor lab/diagnostic results  - Monitor all insertion sites, i e  indwelling lines, tubes, and drains  - Monitor endotracheal if appropriate and nasal secretions for changes in amount and color  - [place] appropriate cooling/warming therapies per order  - Administer medications as ordered  - Instruct and encourage patient and family to use good hand hygiene technique  - Identify and instruct in appropriate isolation precautions for identified infection/condition  Outcome: Progressing     Problem: SAFETY ADULT  Goal: Patient will remain free of falls  Description: INTERVENTIONS:  - Assess patient frequently for physical needs  -  Identify cognitive and physical deficits and behaviors that affect risk of falls    -  [place] fall precautions as indicated by assessment   - Educate patient/family on patient safety including physical limitations  - Instruct patient to call for assistance with activity based on assessment  - Modify environment to reduce risk of injury  - Consider OT/PT consult to assist with strengthening/mobility  Outcome: Progressing  Goal: Maintain or return to baseline ADL function  Description: INTERVENTIONS:  -  Assess patient's ability to carry out ADLs; assess patient's baseline for ADL function and identify physical deficits which impact ability to perform ADLs (bathing, care of mouth/teeth, toileting, grooming, dressing, etc )  - Assess/evaluate cause of self-care deficits   - Assess range of motion  - Assess patient's mobility; develop plan if impaired  - Assess patient's need for assistive devices and provide as appropriate  - Encourage maximum independence but intervene and supervise when necessary  - Involve family in performance of ADLs  - Assess for home care needs following discharge   - Consider OT consult to assist with ADL evaluation and planning for discharge  - Provide patient education as appropriate  Outcome: Progressing  Goal: Maintain or return mobility status to optimal level  Description: INTERVENTIONS:  - Assess patient's baseline mobility status (ambulation, transfers, stairs, etc )    - Identify cognitive and physical deficits and behaviors that affect mobility  - Identify mobility aids required to assist with transfers and/or ambulation (gait belt, sit-to-stand, lift, walker, cane, etc )  - [place] fall precautions as indicated by assessment  - Record patient progress and toleration of activity level on Mobility SBAR; progress patient to next Phase/Stage  - Instruct patient to call for assistance with activity based on assessment  - Consider rehabilitation consult to assist with strengthening/weightbearing, etc   Outcome: Progressing     Problem: DISCHARGE PLANNING  Goal: Discharge to home or other facility with appropriate resources  Description: INTERVENTIONS:  - Identify barriers to discharge w/patient and caregiver  - Arrange for needed discharge resources and transportation as appropriate  - Identify discharge learning needs (meds, wound care, etc )  - Arrange for interpretive services to assist at discharge as needed  - Refer to Case Management Department for coordinating discharge planning if the patient needs post-hospital services based on physician/advanced practitioner order or complex needs related to functional status, cognitive ability, or social support system  Outcome: Progressing     Problem: Knowledge Deficit  Goal: Patient/family/caregiver demonstrates understanding of disease process, treatment plan, medications, and discharge instructions  Description: Complete learning assessment and assess knowledge base    Interventions:  - Provide teaching at level of understanding  - Provide teaching via preferred learning methods  Outcome: Progressing

## 2020-08-26 NOTE — PROGRESS NOTES
Progress Note - Thea Lynne 1965, 62 y o  female MRN: [id number]    Unit/Bed#: S -01 Encounter: [id number]    Primary Care Provider: Leanne Roman MD   Date and time admitted to hospital: 8/25/2020 11:19 AM    * Intractable nausea and vomiting  Assessment & Plan  · Present on admission, patient had roughly 12 episodes of bilious vomiting yesterday  She reports that the first 1-2 times were blood tinged, however she also reports eating a cherry popsicle  She reports home antiemetics being ineffective  Abdominal exam benign  · Today, N/V is much improved  · IVF  · Supportive care   · Scheduled Phenergan-- transition from IM to PO today  · IV Zofran PRN breakthrough nausea   · Mylanta PRN   · Clear liquid, toast, cracker diet-- continue for now, consider advancing  · Monitor Hgb     Hypokalemia  Assessment & Plan  · Mild at 3 4 x24 hours  · Patient refused 40 mEq PO yesterday  · Give 20 mEq IV x1 today  · BMP in AM      Malignant neoplasm of upper-inner quadrant of left breast in female, estrogen receptor negative ([address] )  Assessment & Plan  · Patient is status post left mastectomy with sentinel node biopsy with Dr Damaris Hammond  No immediate complications noted  LUCIUS drains noted to be in place with mostly serous/serosanginous drainage  Slight erythema surrounding  No signs of infection/hematoma   · Follow up with Dr Damaris Hammond as previously recommended   · Continue pain control as ordered   · Monitor serial surgical site exams     Systemic lupus erythematosus ([address] )  Assessment & Plan  · Appears stable   · Continue Plaquenil at home doses       VTE Pharmacologic Prophylaxis:   Pharmacologic: Enoxaparin (Lovenox)  Mechanical VTE Prophylaxis in Place: Yes    Patient Centered Rounds: I have performed bedside rounds with nursing staff today      Discussions with Specialists or Other Care Team Provider: AURA, RN     Education and Discussions with Family / Patient: patient; declined family update    Time Spent for Care: 27 minutes  More than 50% of total time spent on counseling and coordination of care as described above  Current Length of Stay: 0 day(s)    Current Patient Status: Observation   Certification Statement: The patient, admitted on an observation basis, will now require > 2 midnight hospital stay due to ongoing tolerance of PO medications/foods/corrections of electrolytes    Discharge Plan: d/c to home most likely tomorrow AM pending ability to tolerate PO medications for nausea and gentle diet    Code Status: Level 1 - Full Code      Subjective:   Patient reports feeling better today  No further vomiting  Less nausea than yesterday  Tolerated some crackers this AM  No abdominal pain  Does have some pain around her drain sites which she describes as a pulling sensation  Objective:     Vitals:   Temp (24hrs), Av 8 °F (36 6 °C), Min:97 4 °F (36 3 °C), Max:98 2 °F (36 8 °C)    Temp:  [97 4 °F (36 3 °C)-98 2 °F (36 8 °C)] 97 4 °F (36 3 °C)  HR:  [77-99] 82  Resp:  [18] 18  BP: (111-160)/(66-86) 111/66  SpO2:  [95 %-100 %] 95 %  Body mass index is 21 99 kg/m²  Input and Output Summary (last 24 hours): Intake/Output Summary (Last 24 hours) at 2020 1054  Last data filed at 2020 0759  Gross per 24 hour   Intake 1950 ml   Output 172 ml   Net 1778 ml       Physical Exam:     Physical Exam  Constitutional:       Appearance: Normal appearance  HENT:      Head: Normocephalic and atraumatic  Nose: Nose normal       Mouth/Throat:      Mouth: Mucous membranes are moist    Eyes:      Extraocular Movements: Extraocular movements intact  Pupils: Pupils are equal, round, and reactive to light  Cardiovascular:      Rate and Rhythm: Normal rate and regular rhythm  Heart sounds: No murmur  No friction rub  No gallop  Comments: [place] R chest wall  Pulmonary:      Effort: Pulmonary effort is normal    Abdominal:      General: Abdomen is flat   Bowel sounds are normal       Palpations: Abdomen is soft  Musculoskeletal: Normal range of motion  General: No swelling  Comments: LUCIUS drains with serosang fluid-- mild erythema surrounding   Skin:     General: Skin is warm and dry  Coloration: Skin is not jaundiced  Neurological:      General: No focal deficit present  Mental Status: She is alert and oriented to person, place, and time  Psychiatric:         Mood and Affect: Mood normal          Additional Data:     Labs:    Results from last 7 days   Lab Units 08/26/20  0514   WBC Thousand/uL 7 58   HEMOGLOBIN g/dL 10 0*   HEMATOCRIT % 32 6*   PLATELETS Thousands/uL 309   NEUTROS PCT % 61   LYMPHS PCT % 28   MONOS PCT % 9   EOS PCT % 2     Results from last 7 days   Lab Units 08/26/20  0514 08/25/20  1218   SODIUM mmol/L 140 143   POTASSIUM mmol/L 3 4* 3 4*   CHLORIDE mmol/L 108 110*   CO2 mmol/L 26 23   BUN mg/dL 10 9   CREATININE mg/dL 0 74 0 74   ANION GAP mmol/L 6 10   CALCIUM mg/dL 7 5* 8 1*   ALBUMIN g/dL  --  3 0*   TOTAL BILIRUBIN mg/dL  --  0 17*   ALK PHOS U/L  --  71   ALT U/L  --  10*   AST U/L  --  12   GLUCOSE RANDOM mg/dL 94 133                           * I Have Reviewed All Lab Data Listed Above  * Additional Pertinent Lab Tests Reviewed:  [address] Admission Reviewed    Imaging:    Imaging Reports Reviewed Today Include: CT head  Imaging Personally Reviewed by Myself Includes:  CT head    Recent Cultures (last 7 days):           Last 24 Hours Medication List:   Current Facility-Administered Medications   Medication Dose Route Frequency Provider Last Rate    acetaminophen  650 mg Oral Q6H PRN Shawn Nunzio June, [place]      ALPRAZolam  1 mg Oral TID PRN Marsha WendyDALLAS rock[place]      aluminum-magnesium hydroxide-simethicone  30 mL Oral Q4H PRN Shawn Nunzio JuneDALLAS      cyclobenzaprine  10 mg Oral HS Colemann Nunzio JuneDALLAS      dexamethasone  0 5 mg Oral Daily With Breakfast Shawn Nunzio JuneDALLAS      enoxaparin  40 mg Subcutaneous Daily Coleman Nunzio JuneDALLAS      hydroxychloroquine  100 mg Oral QPM Coleman Kendall, [place]      hydroxychloroquine  200 mg Oral Daily With Breakfast Coleman Kendall PA-C      levothyroxine  88 mcg Oral Early Morning Coleman Kendall, [place]      loperamide  2 mg Oral Q4H PRN Lu Lipscomb, [place]      magnesium oxide  400 mg Oral BID Lu Lipscomb, [place]      morphine injection  4 mg Intravenous Q4H PRN Lu Lipscomb, [place]      nicotine  1 patch Transdermal Daily Coleman Kendall, [place]      ondansetron  4 mg Intravenous Q6H PRN Lu Lipscomb, [place]      oxyCODONE  10 mg Oral Q4H PRN Lu Lipscomb, [place]      oxyCODONE  15 mg Oral Q6H PRN Coleman Kendall, [place]      potassium chloride  20 mEq Intravenous Once Una Bird PA-C      promethazine  25 mg Oral Q6H Una Bird, [place]      sertraline  150 mg Oral Daily Coleman Kendall, [place]      sodium chloride  75 mL/hr Intravenous Continuous Una Bird PA-C 75 mL/hr (08/26/20 0523)        Today, Patient Was Seen By: Yovani Rose PA-C    ** Please Note: Dictation voice to text software may have been used in the creation of this document   **

## 2020-08-26 NOTE — ASSESSMENT & PLAN NOTE
· Present on admission, patient had roughly 12 episodes of bilious vomiting yesterday  She reports that the first 1-2 times were blood tinged, however she also reports eating a cherry popsicle  She reports home antiemetics being ineffective  Abdominal exam benign  · Today, N/V is much improved      · IVF  · Supportive care   · Scheduled Phenergan-- transition from IM to PO today  · IV Zofran PRN breakthrough nausea   · Mylanta PRN   · Clear liquid, toast, cracker diet-- continue for now, consider advancing  · Monitor Hgb

## 2020-08-26 NOTE — ASSESSMENT & PLAN NOTE
· Patient is status post left mastectomy with sentinel node biopsy with Dr Rosetta Gleason  No immediate complications noted  LUCIUS drains noted to be in place with mostly serous/serosanginous drainage  Slight erythema surrounding   No signs of infection/hematoma   · Follow up with Dr Rosetta Gleason as previously recommended   · Continue pain control as ordered   · Monitor serial surgical site exams

## 2020-08-27 ENCOUNTER — TELEPHONE (OUTPATIENT)
Dept: SURGICAL ONCOLOGY | Facility: CLINIC | Age: 55
End: 2020-08-27

## 2020-08-27 VITALS
SYSTOLIC BLOOD PRESSURE: 100 MMHG | BODY MASS INDEX: 21.99 KG/M2 | OXYGEN SATURATION: 93 % | WEIGHT: 124.12 LBS | DIASTOLIC BLOOD PRESSURE: 55 MMHG | HEART RATE: 80 BPM | HEIGHT: 63 IN | TEMPERATURE: 98.6 F | RESPIRATION RATE: 18 BRPM

## 2020-08-27 LAB
ANION GAP SERPL CALCULATED.3IONS-SCNC: 8 MMOL/L (ref 4–13)
BUN SERPL-MCNC: 7 MG/DL (ref 5–25)
CALCIUM SERPL-MCNC: 7.9 MG/DL (ref 8.3–10.1)
CHLORIDE SERPL-SCNC: 108 MMOL/L (ref 100–108)
CO2 SERPL-SCNC: 24 MMOL/L (ref 21–32)
CREAT SERPL-MCNC: 0.72 MG/DL (ref 0.6–1.3)
ERYTHROCYTE [DISTWIDTH] IN BLOOD BY AUTOMATED COUNT: 15.9 % (ref 11.6–15.1)
GFR SERPL CREATININE-BSD FRML MDRD: 94 ML/MIN/1.73SQ M
GLUCOSE SERPL-MCNC: 86 MG/DL (ref 65–140)
HCT VFR BLD AUTO: 32.4 % (ref 34.8–46.1)
HGB BLD-MCNC: 9.8 G/DL (ref 11.5–15.4)
MCH RBC QN AUTO: 30.4 PG (ref 26.8–34.3)
MCHC RBC AUTO-ENTMCNC: 30.2 G/DL (ref 31.4–37.4)
MCV RBC AUTO: 101 FL (ref 82–98)
PLATELET # BLD AUTO: 313 THOUSANDS/UL (ref 149–390)
PMV BLD AUTO: 9.6 FL (ref 8.9–12.7)
POTASSIUM SERPL-SCNC: 3.6 MMOL/L (ref 3.5–5.3)
RBC # BLD AUTO: 3.22 MILLION/UL (ref 3.81–5.12)
SODIUM SERPL-SCNC: 140 MMOL/L (ref 136–145)
WBC # BLD AUTO: 8.31 THOUSAND/UL (ref 4.31–10.16)

## 2020-08-27 PROCEDURE — 85027 COMPLETE CBC AUTOMATED: CPT | Performed by: PHYSICIAN ASSISTANT

## 2020-08-27 PROCEDURE — 80048 BASIC METABOLIC PNL TOTAL CA: CPT | Performed by: PHYSICIAN ASSISTANT

## 2020-08-27 PROCEDURE — 99217 PR OBSERVATION CARE DISCHARGE MANAGEMENT: CPT | Performed by: PHYSICIAN ASSISTANT

## 2020-08-27 RX ORDER — ACETAMINOPHEN 325 MG/1
650 TABLET ORAL EVERY 6 HOURS PRN
Qty: 30 TABLET | Refills: 0
Start: 2020-08-27

## 2020-08-27 RX ORDER — ONDANSETRON 4 MG/1
4 TABLET, FILM COATED ORAL EVERY 8 HOURS PRN
Qty: 30 TABLET | Refills: 0 | Status: SHIPPED | OUTPATIENT
Start: 2020-08-27 | End: 2021-02-20 | Stop reason: SDUPTHER

## 2020-08-27 RX ADMIN — PROMETHAZINE HYDROCHLORIDE 25 MG: 25 TABLET ORAL at 04:53

## 2020-08-27 RX ADMIN — DEXAMETHASONE 0.5 MG: 0.5 TABLET ORAL at 07:47

## 2020-08-27 RX ADMIN — ENOXAPARIN SODIUM 40 MG: 100 INJECTION SUBCUTANEOUS at 10:30

## 2020-08-27 RX ADMIN — MAGNESIUM OXIDE 400 MG: 400 TABLET ORAL at 10:31

## 2020-08-27 RX ADMIN — PROMETHAZINE HYDROCHLORIDE 25 MG: 25 TABLET ORAL at 10:31

## 2020-08-27 RX ADMIN — OXYCODONE HYDROCHLORIDE 10 MG: 10 TABLET ORAL at 10:32

## 2020-08-27 RX ADMIN — HYDROXYCHLOROQUINE SULFATE 200 MG: 200 TABLET, FILM COATED ORAL at 07:48

## 2020-08-27 RX ADMIN — LEVOTHYROXINE SODIUM 88 MCG: 88 TABLET ORAL at 05:00

## 2020-08-27 RX ADMIN — SERTRALINE HYDROCHLORIDE 150 MG: 100 TABLET ORAL at 10:31

## 2020-08-27 RX ADMIN — ALPRAZOLAM 1 MG: 0.5 TABLET ORAL at 07:45

## 2020-08-27 RX ADMIN — OXYCODONE HYDROCHLORIDE 15 MG: 10 TABLET ORAL at 04:59

## 2020-08-27 NOTE — DISCHARGE SUMMARY
Discharge- Mauricio Valente 1965, 62 y o  female MRN: [id number]    Unit/Bed#: S -01 Encounter: [id number]    Primary Care Provider: Lisbeth Agee MD   Date and time admitted to hospital: 8/25/2020 11:19 AM    * Intractable nausea and vomiting  Assessment & Plan  · Present on admission, patient had roughly 12 episodes of bilious vomiting  She reports that the first 1-2 times were blood tinged, however she also reports eating a cherry popsicle  She reports home antiemetics being ineffective  Abdominal exam benign  · She received supportive care with IV fluids and antiemetics  · Today, N/V is much improved  Tolerating advancement in diet    Hypokalemia  Assessment & Plan  · Repleted      Malignant neoplasm of upper-inner quadrant of left breast in female, estrogen receptor negative ([facility] [address] )  Assessment & Plan  · Patient is status post left mastectomy with sentinel node biopsy with Dr Aileen Kendall on August 18th  No immediate complications noted  LUCIUS drains noted to be in place with mostly serous/serosanginous drainage  Slight erythema surrounding  No signs of infection/hematoma   · Follow up with Dr Aileen Kendall as previously recommended   · Continue pain control as ordered, oxycodone      Systemic lupus erythematosus ([facility] [address] )  Assessment & Plan  · Appears stable   · Continue Plaquenil at home doses     Discharging Physician / Practitioner: Alicia Valdes PA-C  PCP: Lisbeth Agee MD  Admission Date:   Admission Orders (From admission, onward)     Ordered        08/25/20 1407  Place in Observation  Once                   Discharge Date: 08/27/20    Resolved Problems  Date Reviewed: 8/27/2020    None        Consultations During Hospital Stay:  · none    Procedures Performed:   · None    Significant Findings / Test Results:   · Head CT was no acute intracranial abnormality    Incidental Findings:   · None    Test Results Pending at Discharge (will require follow up):    · None     Outpatient Tests Requested:  · None    Complications:  None    Reason for Admission:  Nausea and vomiting    Hospital Course:     Angel Fitch is a 62 y o  female patient with underlying lupus and breast cancer who originally presented to the hospital on 8/25/2020 due to intractable nausea and vomiting which was not resolved with home oral antiemetics  Patient also reported concern that it may have been blood tinged but then also admitted she was eating a cherry popsicle  Of note, patient just recently underwent mastectomy on August 18th  Upon arrival to the hospital she received supportive care with IV fluids and IV antiemetics, and was finally able to tolerate advancement in diet  She remains on her same doses of oxycodone for pain control post mastectomy and already has follow-up arranged within this week with her surgeon  Patient remained in observation status despite being in the hospital for greater than 2 nights, at the advisement of utilization review    Please see above list of diagnoses and related plan for additional information  Condition at Discharge: stable     Discharge Day Visit / Exam:     Subjective:  Patient reports she is now tolerating diet and feels the vomiting has completely resolved  She continues to have her typical postoperative pain for which she utilizes oxycodone  Vitals: Blood Pressure: 100/55 (08/27/20 0741)  Pulse: 80 (08/27/20 0741)  Temperature: 98 6 °F (37 °C) (08/27/20 0741)  Temp Source: Oral (08/27/20 0741)  Respirations: 18 (08/27/20 0741)  Height: 5' 3" (160 cm) (08/25/20 1514)  Weight - Scale: 56 3 kg (124 lb 1 9 oz) (08/25/20 1514)  SpO2: 93 % (08/27/20 0741)  Exam:   Physical Exam  Vitals signs reviewed  Constitutional:       General: She is not in acute distress  Appearance: She is not toxic-appearing or diaphoretic  Eyes:      General: No scleral icterus  Right eye: No discharge  Left eye: No discharge        Conjunctiva/sclera: Conjunctivae normal  Cardiovascular:      Rate and Rhythm: Normal rate and regular rhythm  Heart sounds: No murmur  Pulmonary:      Effort: No respiratory distress  Breath sounds: Normal breath sounds  No wheezing  Abdominal:      General: Bowel sounds are normal  There is no distension  Palpations: Abdomen is soft  Tenderness: There is no abdominal tenderness  There is no guarding  Musculoskeletal:      Right lower leg: No edema  Left lower leg: No edema  Skin:     General: Skin is warm and dry  Coloration: Skin is not jaundiced or pale  Findings: Erythema present  No bruising or lesion  Comments: Patient was noted to have some isolated area of erythema on her right upper arm which she attributed to a blood pressure cuff which was too tight  Area of mastectomy with the LUCIUS drains in place, incision clean dry and intact with minimal erythema  Neurological:      Mental Status: She is alert  Comments: Awake alert interactive, pleasant and cooperative   Psychiatric:         Mood and Affect: Mood normal          Behavior: Behavior normal          Thought Content: Thought content normal          Judgment: Judgment normal        Discussion with Family: declined need for call    Discharge instructions/Information to patient and family:   See after visit summary for information provided to patient and family  Provisions for Follow-Up Care:  See after visit summary for information related to follow-up care and any pertinent home health orders  Disposition: home    Planned Readmission: none     Discharge Statement:  I spent 35 minutes discharging the patient  This time was spent on the day of discharge  I had direct contact with the patient on the day of discharge  Greater than 50% of the total time was spent examining patient, answering all patient questions, arranging and discussing plan of care with patient as well as directly providing post-discharge instructions    Additional time then spent on discharge activities  Spoke with nursing and case management    Discharge Medications:  See after visit summary for reconciled discharge medications provided to patient and family        ** Please Note: This note has been constructed using a voice recognition system **

## 2020-08-27 NOTE — ASSESSMENT & PLAN NOTE
· Present on admission, patient had roughly 12 episodes of bilious vomiting  She reports that the first 1-2 times were blood tinged, however she also reports eating a cherry popsicle  She reports home antiemetics being ineffective  Abdominal exam benign  · She received supportive care with IV fluids and antiemetics  · Today, N/V is much improved    Tolerating advancement in diet

## 2020-08-27 NOTE — TELEPHONE ENCOUNTER
Update on Prior Auth for   Oxycodone 10 mg tablet  Lengthy call with [employer] [facility] representative Seferino Cobb communicating between this nurse and Pharmacy Clinical team     Tanner Townsend be filled before 08/29  215 tabs/ 27 days at 8 tabs max  Last filled 08/04/2020    Could override if that script was disposed of at time of recent hospital admission  Call was placed to pt still in hospital  Pt admits to taking increased amt of tablets per day from 08/18/2020 to 08/24 re post mastectomy surgical pain  Per pt was told to " take what I had, and I know I took more than ordered " she has no pills at home  no new orders from surgeons  No override code for this reason  SOLUTION  Pt will be able to fill a script for   Oxycodone 5 mg   180 tablets max for 25 days  (if same instructions 10 to 15 mg every 4 hours as needed max 80 mg day would last 11 days)     She then can fill her Oxycodone 10 mg tab on Saturday 08/29 as entered  Do Not back out of this script        Please send new script to [facility] for Oxycodone 5 mg 180 tabs asap as pt preparing for dc         Thank you

## 2020-08-27 NOTE — PLAN OF CARE
Problem: Potential for Falls  Goal: Patient will remain free of falls  Description: INTERVENTIONS:  - Assess patient frequently for physical needs  -  Identify cognitive and physical deficits and behaviors that affect risk of falls    -  [place] fall precautions as indicated by assessment   - Educate patient/family on patient safety including physical limitations  - Instruct patient to call for assistance with activity based on assessment  - Modify environment to reduce risk of injury  - Consider OT/PT consult to assist with strengthening/mobility  Outcome: Progressing     Problem: PAIN - ADULT  Goal: Verbalizes/displays adequate comfort level or baseline comfort level  Description: Interventions:  - Encourage patient to monitor pain and request assistance  - Assess pain using appropriate pain scale  - Administer analgesics based on type and severity of pain and evaluate response  - Implement non-pharmacological measures as appropriate and evaluate response  - Consider cultural and social influences on pain and pain management  - Notify physician/advanced practitioner if interventions unsuccessful or patient reports new pain  Outcome: Progressing     Problem: INFECTION - ADULT  Goal: Absence or prevention of progression during hospitalization  Description: INTERVENTIONS:  - Assess and monitor for signs and symptoms of infection  - Monitor lab/diagnostic results  - Monitor all insertion sites, i e  indwelling lines, tubes, and drains  - Monitor endotracheal if appropriate and nasal secretions for changes in amount and color  - [place] appropriate cooling/warming therapies per order  - Administer medications as ordered  - Instruct and encourage patient and family to use good hand hygiene technique  - Identify and instruct in appropriate isolation precautions for identified infection/condition  Outcome: Progressing     Problem: SAFETY ADULT  Goal: Patient will remain free of falls  Description: INTERVENTIONS:  - Assess patient frequently for physical needs  -  Identify cognitive and physical deficits and behaviors that affect risk of falls    -  [place] fall precautions as indicated by assessment   - Educate patient/family on patient safety including physical limitations  - Instruct patient to call for assistance with activity based on assessment  - Modify environment to reduce risk of injury  - Consider OT/PT consult to assist with strengthening/mobility  Outcome: Progressing  Goal: Maintain or return to baseline ADL function  Description: INTERVENTIONS:  -  Assess patient's ability to carry out ADLs; assess patient's baseline for ADL function and identify physical deficits which impact ability to perform ADLs (bathing, care of mouth/teeth, toileting, grooming, dressing, etc )  - Assess/evaluate cause of self-care deficits   - Assess range of motion  - Assess patient's mobility; develop plan if impaired  - Assess patient's need for assistive devices and provide as appropriate  - Encourage maximum independence but intervene and supervise when necessary  - Involve family in performance of ADLs  - Assess for home care needs following discharge   - Consider OT consult to assist with ADL evaluation and planning for discharge  - Provide patient education as appropriate  Outcome: Progressing  Goal: Maintain or return mobility status to optimal level  Description: INTERVENTIONS:  - Assess patient's baseline mobility status (ambulation, transfers, stairs, etc )    - Identify cognitive and physical deficits and behaviors that affect mobility  - Identify mobility aids required to assist with transfers and/or ambulation (gait belt, sit-to-stand, lift, walker, cane, etc )  - [place] fall precautions as indicated by assessment  - Record patient progress and toleration of activity level on Mobility SBAR; progress patient to next Phase/Stage  - Instruct patient to call for assistance with activity based on assessment  - Consider rehabilitation consult to assist with strengthening/weightbearing, etc   Outcome: Progressing     Problem: DISCHARGE PLANNING  Goal: Discharge to home or other facility with appropriate resources  Description: INTERVENTIONS:  - Identify barriers to discharge w/patient and caregiver  - Arrange for needed discharge resources and transportation as appropriate  - Identify discharge learning needs (meds, wound care, etc )  - Arrange for interpretive services to assist at discharge as needed  - Refer to Case Management Department for coordinating discharge planning if the patient needs post-hospital services based on physician/advanced practitioner order or complex needs related to functional status, cognitive ability, or social support system  Outcome: Progressing     Problem: Knowledge Deficit  Goal: Patient/family/caregiver demonstrates understanding of disease process, treatment plan, medications, and discharge instructions  Description: Complete learning assessment and assess knowledge base    Interventions:  - Provide teaching at level of understanding  - Provide teaching via preferred learning methods  Outcome: Progressing

## 2020-08-27 NOTE — ASSESSMENT & PLAN NOTE
· Patient is status post left mastectomy with sentinel node biopsy with Dr Aileen Kendall on August 18th  No immediate complications noted  LUCIUS drains noted to be in place with mostly serous/serosanginous drainage  Slight erythema surrounding   No signs of infection/hematoma   · Follow up with Dr Aileen Kendall as previously recommended   · Continue pain control as ordered, oxycodone

## 2020-08-28 ENCOUNTER — TRANSITIONAL CARE MANAGEMENT (OUTPATIENT)
Dept: FAMILY MEDICINE CLINIC | Facility: CLINIC | Age: 55
End: 2020-08-28

## 2020-08-28 RX ORDER — OXYCODONE HYDROCHLORIDE 10 MG/1
10-15 TABLET ORAL EVERY 4 HOURS PRN
Qty: 240 TABLET | Refills: 0 | Status: SHIPPED | OUTPATIENT
Start: 2020-08-29 | End: 2020-09-23 | Stop reason: SDUPTHER

## 2020-08-28 RX ORDER — OXYCODONE HYDROCHLORIDE 5 MG/1
10-15 TABLET ORAL EVERY 4 HOURS PRN
Qty: 90 TABLET | Refills: 0 | Status: SHIPPED | OUTPATIENT
Start: 2020-08-28 | End: 2020-09-21 | Stop reason: HOSPADM

## 2020-08-28 NOTE — TELEPHONE ENCOUNTER
8/28/2020 9:43 AM -  Reviewed request for both Rxs, given pt's insurance barriers  Refills sent for both meds; an emergency fill of 5mg tabs now, and urgent fill of 10mg tabs as early as tomorrow, 8/29  Sent to [facility] as requested  Should pt experience insurance barriers, and wish to use [address], we will need to re-send oxyIR Rxs to local [facility], and confer with our Care Care Counselors

## 2020-08-28 NOTE — TELEPHONE ENCOUNTER
Pt able to  the Oxycodone 5 mg tablets  Called pharmacy to verify pt will be able to  10 mg tomorrow  Keep getting message that cover 231/30 days     The insurance said they will fill the original oxycodone script as written 08/25   215/27 days   Can then reevaluate at next visit

## 2020-08-31 ENCOUNTER — OFFICE VISIT (OUTPATIENT)
Dept: SURGICAL ONCOLOGY | Facility: CLINIC | Age: 55
End: 2020-08-31

## 2020-08-31 ENCOUNTER — OFFICE VISIT (OUTPATIENT)
Dept: PALLIATIVE MEDICINE | Facility: CLINIC | Age: 55
End: 2020-08-31
Payer: COMMERCIAL

## 2020-08-31 VITALS
BODY MASS INDEX: 23.38 KG/M2 | SYSTOLIC BLOOD PRESSURE: 110 MMHG | TEMPERATURE: 98.4 F | HEART RATE: 82 BPM | WEIGHT: 132 LBS | DIASTOLIC BLOOD PRESSURE: 62 MMHG

## 2020-08-31 DIAGNOSIS — Z17.1 MALIGNANT NEOPLASM OF UPPER-INNER QUADRANT OF LEFT BREAST IN FEMALE, ESTROGEN RECEPTOR NEGATIVE (HCC): ICD-10-CM

## 2020-08-31 DIAGNOSIS — F43.10 POSTTRAUMATIC STRESS DISORDER: ICD-10-CM

## 2020-08-31 DIAGNOSIS — Z90.12 STATUS POST LEFT MASTECTOMY: Primary | ICD-10-CM

## 2020-08-31 DIAGNOSIS — Z17.1 MALIGNANT NEOPLASM OF UPPER-INNER QUADRANT OF LEFT BREAST IN FEMALE, ESTROGEN RECEPTOR NEGATIVE (HCC): Primary | ICD-10-CM

## 2020-08-31 DIAGNOSIS — C50.212 MALIGNANT NEOPLASM OF UPPER-INNER QUADRANT OF LEFT BREAST IN FEMALE, ESTROGEN RECEPTOR NEGATIVE (HCC): Primary | ICD-10-CM

## 2020-08-31 DIAGNOSIS — C50.212 MALIGNANT NEOPLASM OF UPPER-INNER QUADRANT OF LEFT BREAST IN FEMALE, ESTROGEN RECEPTOR NEGATIVE (HCC): ICD-10-CM

## 2020-08-31 PROCEDURE — 99024 POSTOP FOLLOW-UP VISIT: CPT | Performed by: SURGERY

## 2020-08-31 PROCEDURE — 1111F DSCHRG MED/CURRENT MED MERGE: CPT | Performed by: FAMILY MEDICINE

## 2020-08-31 PROCEDURE — 99214 OFFICE O/P EST MOD 30 MIN: CPT | Performed by: FAMILY MEDICINE

## 2020-08-31 PROCEDURE — 3074F SYST BP LT 130 MM HG: CPT | Performed by: SURGERY

## 2020-08-31 PROCEDURE — 4004F PT TOBACCO SCREEN RCVD TLK: CPT | Performed by: FAMILY MEDICINE

## 2020-08-31 PROCEDURE — 1111F DSCHRG MED/CURRENT MED MERGE: CPT | Performed by: SURGERY

## 2020-08-31 PROCEDURE — 3078F DIAST BP <80 MM HG: CPT | Performed by: FAMILY MEDICINE

## 2020-08-31 PROCEDURE — 3078F DIAST BP <80 MM HG: CPT | Performed by: SURGERY

## 2020-08-31 PROCEDURE — 3074F SYST BP LT 130 MM HG: CPT | Performed by: FAMILY MEDICINE

## 2020-08-31 NOTE — PROGRESS NOTES
Surgical Oncology Breast Post-Op       [address]  [facility]  [address]  [facility] [id number]    Mittal Jabier Mock  1965  [id number]  [address]  [facility]  [address] [id number]    Chief Complaint:   Monserrat Luciano is seen for a post-operative visit of her recent breast surgery  Oncology History:     Oncology History   Malignant neoplasm of upper-inner quadrant of left breast in female, estrogen receptor negative ([facility] [address] )   2/12/2020 Biopsy    A  & B  Right breast stereotactic biopsy with and without calcs; 11 o'clock, 10 cm from nipple  Benign breast glandular parenchyma with fibroadenomatoid stromal hyperplasia  No atypia and no evidence of malignancy     C  Left breast ultrasound-guided biopsy; 10 o'clock, 5 cm from nipple  Benign breast glandular tissue  No atypia and no evidence of malignancy    D  Left breast ultrasound-guided biopsy; 10 o'clock, 4 cm from nipple  Invasive breast carcinoma of no special type (ductal NST)  Grade 3  ER 0  OK 0  HER2 2+; FISH positive    Concordant  Right breast clear  Malignant mass measures 2 3 cm on mammo  Left axilla US negative  Mammographic abnormality with no US correlate  MRI recommended to further evaluate this finding  2/26/2020 Genetic Testing    The following genes were evaluated: LIZZETTE, BRCA1, BRCA2, CDH1, CHEK2, PALB2, PTEN, STK11, TP53  Negative result   No pathogenic sequence variants or deletions/dupllications identified  [employer]     3/24/2020 - 5/25/2020 Chemotherapy    pegfilgrastim (NEULASTA ONPRO) subcutaneous injection kit 6 mg, 6 mg, Subcutaneous, Once, 4 of 6 cycles  Administration: 6 mg (3/24/2020), 6 mg (4/14/2020), 6 mg (5/5/2020)  fosaprepitant (EMEND) 150 mg in sodium chloride 0 9 % 250 mL IVPB, 150 mg, Intravenous, Once, 4 of 6 cycles  Administration: 150 mg (3/24/2020), 150 mg (4/14/2020), 150 mg (5/5/2020)  pertuzumab (PERJETA) 840 mg in sodium chloride 0 9 % 250 mL IVPB, 840 mg, Intravenous, Once, 4 of 6 cycles  Administration: 840 mg (3/24/2020), 420 mg (4/14/2020), 420 mg (5/5/2020)  CARBOplatin (PARAPLATIN) 547 8 mg in sodium chloride 0 9 % 250 mL IVPB, 547 8 mg, Intravenous, Once, 4 of 6 cycles  Administration: 547 8 mg (3/24/2020), 575 4 mg (4/14/2020), 625 8 mg (5/5/2020)  DOCEtaxel (TAXOTERE) 122 2 mg in sodium chloride 0 9 % 250 mL chemo infusion, 75 mg/m2 = 122 2 mg, Intravenous, Once, 4 of 6 cycles  Administration: 122 2 mg (3/24/2020), 122 2 mg (4/14/2020), 122 2 mg (5/5/2020)  trastuzumab (HERCEPTIN) 486 mg in sodium chloride 0 9 % 250 mL chemo infusion, 8 mg/kg = 486 mg, Intravenous, Once, 4 of 18 cycles  Administration: 486 mg (3/24/2020), 364 mg (4/14/2020), 364 mg (5/5/2020)     6/3/2020 -  Chemotherapy    pertuzumab (PERJETA) 420 mg in sodium chloride 0 9 % 250 mL IVPB, 420 mg (50 % of original dose 840 mg), Intravenous, Once, 2 of 6 cycles  Dose modification: 420 mg (original dose 840 mg, Cycle 1, Reason: Dose Not Tolerated)  Administration: 420 mg (6/3/2020), 420 mg (6/25/2020)  PACLitaxel (TAXOL) 127 2 mg in sodium chloride 0 9 % 250 mL chemo IVPB, 80 mg/m2 = 127 2 mg, Intravenous, Once, 2 of 2 cycles  Administration: 127 2 mg (6/3/2020), 127 2 mg (6/10/2020), 127 2 mg (6/17/2020), 127 2 mg (6/25/2020), 127 2 mg (7/1/2020), 127 2 mg (7/7/2020)  trastuzumab (HERCEPTIN) 340 mg in sodium chloride 0 9 % 250 mL chemo infusion, 6 mg/kg = 340 mg (75 % of original dose 8 mg/kg), Intravenous, Once, 2 of 6 cycles  Dose modification: 6 mg/kg (original dose 8 mg/kg, Cycle 1, Reason: Dose Not Tolerated)  Administration: 340 mg (6/3/2020), 340 mg (6/25/2020)     6/4/2020 -  Cancer Staged    Staging form: Breast, AJCC 8th Edition  - Clinical: Stage IIA (cT2, cN0, cM0, G3, ER-, MO-, HER2+) - Signed by Vijay Murrell MD on 6/4/2020  Laterality: Left  Histologic grading system: 3 grade system       8/18/2020 Surgery    Left breast mastectomy with sentinel lymph node biopsy  No residual carcinoma identified  Prior procedural site changes  0/3 Lymph nodes         Assessment & Plan:   Assessment/Plan    Patient presents for postoperative visit  Her wounds are healing well  Her drains are putting out approximately 15-18 cc of serous fluid  We removed her drains today  Patient's pathology demonstrated complete pathologic response  She had a negative axillary ultrasound at the beginning of treatment and she has 3-lymph nodes  We will make sure she has a follow-up appoint with Dr Shanae Alvarado  She does not have an indication for radiation therapy  We will see her back in 3 months and then a six-month intervals  History of Present Illness:   See Oncology History    Interval History: One week postoperatively the patient developed nausea and vomiting she was admitted to the hospital   She has recovered from that  She thinks this was of unrelated viral issue  She is doing much better today  Review of Systems:   Review of Systems   Constitutional: Negative for activity change, appetite change and fatigue  HENT: Negative  Eyes: Negative  Respiratory: Negative for cough, shortness of breath and wheezing  Cardiovascular: Negative for chest pain and leg swelling  Gastrointestinal: Negative  Endocrine: Negative  Genitourinary: Negative  Musculoskeletal:        No new changes or complaints of bone pain   Skin: Negative  Allergic/Immunologic: Negative  Neurological: Negative  Hematological: Negative  Psychiatric/Behavioral: Negative  All other systems reviewed and are negative        Past Medical History:     Patient Active Problem List   Diagnosis    UTI (urinary tract infection)    Hypertension    Systemic lupus erythematosus ([address] )    Hypothyroidism    Posttraumatic stress disorder    Adult celiac disease    Anxiety    Depression    Esophagitis    Nephrolithiasis    Vitamin D deficiency    Malignant neoplasm of upper-inner quadrant of left breast in female, estrogen receptor negative ([facility] [address] )    Chemotherapy induced neutropenia (HCC)    Leukocytosis    Increased anion gap metabolic acidosis    Port-A-Cath in place    Intractable nausea and vomiting    SIRS (systemic inflammatory response syndrome) (HCC)    JEFERSON (acute kidney injury) ([facility] [address] )    Hyponatremia    Hypokalemia    Anemia    Hypotension     Past Medical History:   Diagnosis Date    Disease of thyroid gland     Hypertension     Lupus ([facility] [address] )     Malignant neoplasm of upper-inner quadrant of left [facility] in female, [facility] 2/25/2020    Nephrolithiasis     PTSD (post-traumatic stress disorder)      Past Surgical History:   Procedure Laterality Date    FEMUR FRACTURE SURGERY      FL GUIDED CENTRAL VENOUS ACCESS DEVICE INSERTION  3/17/2020    HYSTERECTOMY      LEFT OOPHORECTOMY      due to torsion     MAMMO STEREOTACTIC BREAST BIOPSY RIGHT (ALL INC) Right 2/12/2020    MASTECTOMY W/ SENTINEL NODE BIOPSY Left 8/18/2020    Procedure: BREAST MASTECTOMY WITH SENTINEL LYMPH NODE BIOPSY, LYMPHATIC MAPPING WITH BLUE DYE AND RADIOACTIVE DYE (INJECT AT 1430 BY DR WRIGHT IN THE OR);   Surgeon: Subha Hayden MD;  Location: [facility];  Service: Surgical Oncology    MRI BREAST BIOPSY LEFT (ALL INCLUSIVE) Left 3/20/2020    TN INSJ TUNNELED CTR VAD W/SUBQ PORT AGE 5 YR/> N/A 3/17/2020    Procedure: INSERTION VENOUS PORT ( PORT-A-CATH) IR;  Surgeon: Migel Stiles DO;  [facility];  Service: Interventional Radiology    US GUIDANCE BREAST BIOPSY LEFT EACH ADDITIONAL Left 2/12/2020    US GUIDED BREAST BIOPSY LEFT COMPLETE Left 2/12/2020    VAGINA SURGERY       Family History   Problem Relation Age of Onset    Diabetes Mother     Hypertension Mother     Nephrolithiasis Mother     Breast cancer Mother 79    Heart disease Father     Hypertension Father     Diabetes Brother     Nephrolithiasis Brother     Breast cancer Maternal Aunt     No Known Problems Maternal Grandmother     No Known Problems Maternal Grandfather     No Known Problems Paternal Grandmother     No Known Problems Paternal Grandfather      Social History     Socioeconomic History    Marital status: /Civil Union     Spouse name: Not on file    Number of children: Not on file    Years of education: Not on file    Highest education level: Not on file   Occupational History    Not on file   Social Needs    Financial resource strain: Not on file    Food insecurity     Worry: Not on file     Inability: Not on file    Transportation needs     Medical: Not on file     Non-medical: Not on file   Tobacco Use    Smoking status: Current Every Day Smoker     Packs/day: 0 25     Types: Cigarettes     Start date: 1990    Smokeless tobacco: Never Used    Tobacco comment:  5 ppd per [employer]   Substance and Sexual Activity    Alcohol use: Not Currently     Alcohol/week: 21 0 standard drinks     Types: 21 Cans of beer per week     Frequency: Never     Comment: pt states she had her last beer 3/22/2020    Drug use: No    Sexual activity: Not on file   Lifestyle    Physical activity     Days per week: Not on file     Minutes per session: Not on file    Stress: Not on file   Relationships    Social connections     Talks on phone: Not on file     Gets together: Not on file     Attends Congregational service: Not on file     Active member of club or organization: Not on file     Attends meetings of clubs or organizations: Not on file     Relationship status: Not on file    Intimate partner violence     Fear of current or ex partner: Not on file     Emotionally abused: Not on file     Physically abused: Not on file     Forced sexual activity: Not on file   Other Topics Concern    Not on file   Social History Narrative    Not on file       Current Outpatient Medications:     acetaminophen (TYLENOL) 325 mg tablet, Take 2 tablets (650 mg total) by mouth every 6 (six) hours as needed for mild pain, headaches or fever, Disp: 30 tablet, Rfl: 0    ALPRAZolam (XANAX) 1 mg tablet, Take 1 tablet (1 mg total) by mouth 3 (three) times a day as needed for anxiety, Disp: 90 tablet, Rfl: 0    Cholecalciferol (VITAMIN D3) 03631 units CAPS, Take 50,000 Units by mouth once a week , Disp: , Rfl:     CRANIAL PROSTHESIS, RX,, One wig as needed, Disp: 1 Piece, Rfl: 0    cyclobenzaprine (FLEXERIL) 10 mg tablet, Take 10 mg by mouth daily at bedtime , Disp: , Rfl:     dexamethasone (DECADRON) 0 5 mg tablet, Take 1 tablet (0 5 mg total) by mouth daily with breakfast, Disp: 30 tablet, Rfl: 0    hydroxychloroquine (PLAQUENIL) 200 mg tablet, Take 1 tablet in morning and 1/2 tablet in evening, Disp: , Rfl:     levothyroxine 88 mcg tablet, TAKE ONE TABLET BY MOUTH EVERY DAY, Disp: 30 tablet, Rfl: 5    loperamide (IMODIUM) 2 mg capsule, Take 1 capsule (2 mg total) by mouth every 4 (four) hours as needed for diarrhea, Disp: 30 capsule, Rfl: 0    magnesium oxide (MAG-OX) 400 mg, Take 1 tablet (400 mg total) by mouth 2 (two) times a day, Disp: 60 tablet, Rfl: 0    Nutritional Supplements (OSTEOPOROSIS SUPPORT PO), Take by mouth, Disp: , Rfl:     ondansetron (ZOFRAN) 4 mg tablet, Take 1 tablet (4 mg total) by mouth every 8 (eight) hours as needed for nausea or vomiting, Disp: 30 tablet, Rfl: 0    oxyCODONE (ROXICODONE) 10 MG TABS, Take 1-1 5 tablets (10-15 mg total) by mouth every 4 (four) hours as needed (cancer pain and post-op pain    Max of 8 tabs / day ), Disp: 240 tablet, Rfl: 0    oxyCODONE (ROXICODONE) 5 mg immediate release tablet, Take 2-3 tablets (10-15 mg total) by mouth every 4 (four) hours as needed (cancer pain)Max Daily Amount: 90 mg, Disp: 90 tablet, Rfl: 0    sertraline (ZOLOFT) 100 mg tablet, TAKE 1 & 1/2 TABLETS BY MOUTH ONCE DAILY (Patient taking differently: Take 150 mg by mouth daily BY MOUTH ONCE DAILY), Disp: 45 tablet, Rfl: 5  Allergies   Allergen Reactions    Mobic [Meloxicam] Eye Swelling     Reaction Date: 12Aug2011;     Methotrexate Rash    Sulfa Antibiotics Rash       Physical Exams: There were no vitals filed for this visit  Physical Exam  Chest:      Comments: Examination of the left mastectomy site demonstrates mild area of ecchymosis but no evidence of infection  Her drains so serous fluid  Results & Discussion:   Patient is had a complete pathologic response to neoadjuvant chemotherapy  We will see her back in 3 months  She will follow-up with Dr Lauryn Deng to follow-up with anti HER2 therapy
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
## 2020-09-04 ENCOUNTER — TELEPHONE (OUTPATIENT)
Dept: HEMATOLOGY ONCOLOGY | Facility: CLINIC | Age: 55
End: 2020-09-04

## 2020-09-04 ENCOUNTER — DOCUMENTATION (OUTPATIENT)
Dept: RADIATION ONCOLOGY | Facility: HOSPITAL | Age: 55
End: 2020-09-04

## 2020-09-04 NOTE — PROGRESS NOTES
Reviewed pt's DT upon which she rated her distress level as being in the 3-4 level  She noted insurance/financial, depression, fears, nervousness, worry, loss of interest in usual activities as well as several physical concerns including sleep, pain, memory/concentration,and fatigue  Reviewed pt's Epic notes  Att pc to the pt  The call went to  so I left a message introducing myself and my role and invited her to call me as convenient for her

## 2020-09-08 ENCOUNTER — TELEPHONE (OUTPATIENT)
Dept: HEMATOLOGY ONCOLOGY | Facility: CLINIC | Age: 55
End: 2020-09-08

## 2020-09-08 DIAGNOSIS — R11.2 INTRACTABLE NAUSEA AND VOMITING: ICD-10-CM

## 2020-09-08 DIAGNOSIS — F41.9 ANXIETY: ICD-10-CM

## 2020-09-08 RX ORDER — DEXAMETHASONE 0.5 MG/1
TABLET ORAL
Qty: 30 TABLET | Refills: 0 | Status: SHIPPED | OUTPATIENT
Start: 2020-09-08 | End: 2020-10-16

## 2020-09-08 RX ORDER — ALPRAZOLAM 1 MG/1
TABLET ORAL
Qty: 90 TABLET | Refills: 0 | Status: SHIPPED | OUTPATIENT
Start: 2020-09-08 | End: 2020-10-06

## 2020-09-08 NOTE — TELEPHONE ENCOUNTER
Scheduling Appointment     Who Is Calling to Schedule Patient   Doctor Dr April Ch    Date and Time 10/13/2020 10:40 am          Patient verbalized understanding    Yes

## 2020-09-19 ENCOUNTER — APPOINTMENT (EMERGENCY)
Dept: RADIOLOGY | Facility: HOSPITAL | Age: 55
DRG: 392 | End: 2020-09-19
Payer: COMMERCIAL

## 2020-09-19 ENCOUNTER — HOSPITAL ENCOUNTER (INPATIENT)
Facility: HOSPITAL | Age: 55
LOS: 2 days | Discharge: HOME | DRG: 392 | End: 2020-09-21
Attending: EMERGENCY MEDICINE | Admitting: INTERNAL MEDICINE
Payer: COMMERCIAL

## 2020-09-19 ENCOUNTER — APPOINTMENT (EMERGENCY)
Dept: CT IMAGING | Facility: HOSPITAL | Age: 55
DRG: 392 | End: 2020-09-19
Payer: COMMERCIAL

## 2020-09-19 DIAGNOSIS — Z90.12 S/P LEFT MASTECTOMY: ICD-10-CM

## 2020-09-19 DIAGNOSIS — K52.9 COLITIS: ICD-10-CM

## 2020-09-19 DIAGNOSIS — R11.2 NAUSEA & VOMITING: Primary | ICD-10-CM

## 2020-09-19 LAB
ALBUMIN SERPL BCP-MCNC: 3.9 G/DL (ref 3.5–5)
ALP SERPL-CCNC: 80 U/L (ref 46–116)
ALT SERPL W P-5'-P-CCNC: 12 U/L (ref 12–78)
ANION GAP SERPL CALCULATED.3IONS-SCNC: 14 MMOL/L (ref 4–13)
APTT PPP: 29 SECONDS (ref 23–37)
AST SERPL W P-5'-P-CCNC: 14 U/L (ref 5–45)
ATRIAL RATE: 72 BPM
BACTERIA UR QL AUTO: ABNORMAL /HPF
BASOPHILS # BLD AUTO: 0.05 THOUSANDS/ΜL (ref 0–0.1)
BASOPHILS NFR BLD AUTO: 0 % (ref 0–1)
BILIRUB SERPL-MCNC: 0.18 MG/DL (ref 0.2–1)
BILIRUB UR QL STRIP: NEGATIVE
BUN SERPL-MCNC: 8 MG/DL (ref 5–25)
CALCIUM SERPL-MCNC: 9.6 MG/DL (ref 8.3–10.1)
CHLORIDE SERPL-SCNC: 105 MMOL/L (ref 100–108)
CLARITY UR: CLEAR
CO2 SERPL-SCNC: 20 MMOL/L (ref 21–32)
COLOR UR: YELLOW
CREAT SERPL-MCNC: 1.03 MG/DL (ref 0.6–1.3)
CRP SERPL QL: 5.6 MG/L
EOSINOPHIL # BLD AUTO: 0 THOUSAND/ΜL (ref 0–0.61)
EOSINOPHIL NFR BLD AUTO: 0 % (ref 0–6)
ERYTHROCYTE [DISTWIDTH] IN BLOOD BY AUTOMATED COUNT: 15.9 % (ref 11.6–15.1)
ERYTHROCYTE [SEDIMENTATION RATE] IN BLOOD: 47 MM/HOUR (ref 0–29)
GFR SERPL CREATININE-BSD FRML MDRD: 60 ML/MIN/1.73SQ M
GLUCOSE SERPL-MCNC: 127 MG/DL (ref 65–140)
GLUCOSE UR STRIP-MCNC: NEGATIVE MG/DL
HCT VFR BLD AUTO: 42.4 % (ref 34.8–46.1)
HGB BLD-MCNC: 13.3 G/DL (ref 11.5–15.4)
HGB UR QL STRIP.AUTO: NEGATIVE
IMM GRANULOCYTES # BLD AUTO: 0.05 THOUSAND/UL (ref 0–0.2)
IMM GRANULOCYTES NFR BLD AUTO: 0 % (ref 0–2)
INR PPP: 0.94 (ref 0.84–1.19)
KETONES UR STRIP-MCNC: NEGATIVE MG/DL
LEUKOCYTE ESTERASE UR QL STRIP: NEGATIVE
LIPASE SERPL-CCNC: 154 U/L (ref 73–393)
LYMPHOCYTES # BLD AUTO: 0.61 THOUSANDS/ΜL (ref 0.6–4.47)
LYMPHOCYTES NFR BLD AUTO: 5 % (ref 14–44)
MCH RBC QN AUTO: 29.3 PG (ref 26.8–34.3)
MCHC RBC AUTO-ENTMCNC: 31.4 G/DL (ref 31.4–37.4)
MCV RBC AUTO: 93 FL (ref 82–98)
MONOCYTES # BLD AUTO: 0.38 THOUSAND/ΜL (ref 0.17–1.22)
MONOCYTES NFR BLD AUTO: 3 % (ref 4–12)
NEUTROPHILS # BLD AUTO: 10.88 THOUSANDS/ΜL (ref 1.85–7.62)
NEUTS SEG NFR BLD AUTO: 92 % (ref 43–75)
NITRITE UR QL STRIP: NEGATIVE
NON-SQ EPI CELLS URNS QL MICRO: ABNORMAL /HPF
NRBC BLD AUTO-RTO: 0 /100 WBCS
P AXIS: 63 DEGREES
PH UR STRIP.AUTO: 7 [PH] (ref 4.5–8)
PLATELET # BLD AUTO: 389 THOUSANDS/UL (ref 149–390)
PMV BLD AUTO: 10 FL (ref 8.9–12.7)
POTASSIUM SERPL-SCNC: 4 MMOL/L (ref 3.5–5.3)
PR INTERVAL: 178 MS
PROT SERPL-MCNC: 8.1 G/DL (ref 6.4–8.2)
PROT UR STRIP-MCNC: ABNORMAL MG/DL
PROTHROMBIN TIME: 12.7 SECONDS (ref 11.6–14.5)
QRS AXIS: 71 DEGREES
QRSD INTERVAL: 70 MS
QT INTERVAL: 406 MS
QTC INTERVAL: 444 MS
RBC # BLD AUTO: 4.54 MILLION/UL (ref 3.81–5.12)
RBC #/AREA URNS AUTO: ABNORMAL /HPF
SODIUM SERPL-SCNC: 139 MMOL/L (ref 136–145)
SP GR UR STRIP.AUTO: 1.02 (ref 1–1.03)
T WAVE AXIS: 63 DEGREES
TROPONIN I SERPL-MCNC: <0.02 NG/ML
UROBILINOGEN UR QL STRIP.AUTO: 0.2 E.U./DL
VENTRICULAR RATE: 72 BPM
WBC # BLD AUTO: 11.97 THOUSAND/UL (ref 4.31–10.16)
WBC #/AREA URNS AUTO: ABNORMAL /HPF

## 2020-09-19 PROCEDURE — 85025 COMPLETE CBC W/AUTO DIFF WBC: CPT | Performed by: EMERGENCY MEDICINE

## 2020-09-19 PROCEDURE — 96361 HYDRATE IV INFUSION ADD-ON: CPT

## 2020-09-19 PROCEDURE — C9113 INJ PANTOPRAZOLE SODIUM, VIA: HCPCS | Performed by: INTERNAL MEDICINE

## 2020-09-19 PROCEDURE — 96374 THER/PROPH/DIAG INJ IV PUSH: CPT

## 2020-09-19 PROCEDURE — 36415 COLL VENOUS BLD VENIPUNCTURE: CPT | Performed by: EMERGENCY MEDICINE

## 2020-09-19 PROCEDURE — 85610 PROTHROMBIN TIME: CPT | Performed by: EMERGENCY MEDICINE

## 2020-09-19 PROCEDURE — 74177 CT ABD & PELVIS W/CONTRAST: CPT

## 2020-09-19 PROCEDURE — 84484 ASSAY OF TROPONIN QUANT: CPT | Performed by: EMERGENCY MEDICINE

## 2020-09-19 PROCEDURE — 86140 C-REACTIVE PROTEIN: CPT | Performed by: INTERNAL MEDICINE

## 2020-09-19 PROCEDURE — 85652 RBC SED RATE AUTOMATED: CPT | Performed by: INTERNAL MEDICINE

## 2020-09-19 PROCEDURE — 96375 TX/PRO/DX INJ NEW DRUG ADDON: CPT

## 2020-09-19 PROCEDURE — 83690 ASSAY OF LIPASE: CPT | Performed by: EMERGENCY MEDICINE

## 2020-09-19 PROCEDURE — 85730 THROMBOPLASTIN TIME PARTIAL: CPT | Performed by: EMERGENCY MEDICINE

## 2020-09-19 PROCEDURE — 81001 URINALYSIS AUTO W/SCOPE: CPT

## 2020-09-19 PROCEDURE — G1004 CDSM NDSC: HCPCS

## 2020-09-19 PROCEDURE — 71045 X-RAY EXAM CHEST 1 VIEW: CPT

## 2020-09-19 PROCEDURE — 99223 1ST HOSP IP/OBS HIGH 75: CPT | Performed by: INTERNAL MEDICINE

## 2020-09-19 PROCEDURE — 80053 COMPREHEN METABOLIC PANEL: CPT | Performed by: EMERGENCY MEDICINE

## 2020-09-19 PROCEDURE — 99285 EMERGENCY DEPT VISIT HI MDM: CPT | Performed by: EMERGENCY MEDICINE

## 2020-09-19 PROCEDURE — 93005 ELECTROCARDIOGRAM TRACING: CPT

## 2020-09-19 PROCEDURE — 93010 ELECTROCARDIOGRAM REPORT: CPT | Performed by: INTERNAL MEDICINE

## 2020-09-19 PROCEDURE — 99285 EMERGENCY DEPT VISIT HI MDM: CPT

## 2020-09-19 RX ORDER — HYDROXYCHLOROQUINE SULFATE 200 MG/1
100 TABLET, FILM COATED ORAL EVERY EVENING
Status: DISCONTINUED | OUTPATIENT
Start: 2020-09-19 | End: 2020-09-21 | Stop reason: HOSPADM

## 2020-09-19 RX ORDER — METOCLOPRAMIDE HYDROCHLORIDE 5 MG/ML
10 INJECTION INTRAMUSCULAR; INTRAVENOUS EVERY 6 HOURS PRN
Status: DISCONTINUED | OUTPATIENT
Start: 2020-09-19 | End: 2020-09-21 | Stop reason: HOSPADM

## 2020-09-19 RX ORDER — METOCLOPRAMIDE HYDROCHLORIDE 5 MG/ML
10 INJECTION INTRAMUSCULAR; INTRAVENOUS ONCE
Status: COMPLETED | OUTPATIENT
Start: 2020-09-19 | End: 2020-09-19

## 2020-09-19 RX ORDER — HYDRALAZINE HYDROCHLORIDE 20 MG/ML
5 INJECTION INTRAMUSCULAR; INTRAVENOUS EVERY 4 HOURS PRN
Status: DISCONTINUED | OUTPATIENT
Start: 2020-09-19 | End: 2020-09-19

## 2020-09-19 RX ORDER — ONDANSETRON 2 MG/ML
4 INJECTION INTRAMUSCULAR; INTRAVENOUS EVERY 6 HOURS PRN
Status: DISCONTINUED | OUTPATIENT
Start: 2020-09-19 | End: 2020-09-21 | Stop reason: HOSPADM

## 2020-09-19 RX ORDER — LEVOTHYROXINE SODIUM 88 UG/1
88 TABLET ORAL
Status: DISCONTINUED | OUTPATIENT
Start: 2020-09-20 | End: 2020-09-21 | Stop reason: HOSPADM

## 2020-09-19 RX ORDER — ALPRAZOLAM 0.5 MG/1
1 TABLET ORAL 3 TIMES DAILY PRN
Status: DISCONTINUED | OUTPATIENT
Start: 2020-09-19 | End: 2020-09-21 | Stop reason: HOSPADM

## 2020-09-19 RX ORDER — MORPHINE SULFATE 4 MG/ML
4 INJECTION, SOLUTION INTRAMUSCULAR; INTRAVENOUS EVERY 4 HOURS PRN
Status: DISCONTINUED | OUTPATIENT
Start: 2020-09-19 | End: 2020-09-21

## 2020-09-19 RX ORDER — ONDANSETRON 2 MG/ML
4 INJECTION INTRAMUSCULAR; INTRAVENOUS ONCE
Status: COMPLETED | OUTPATIENT
Start: 2020-09-19 | End: 2020-09-19

## 2020-09-19 RX ORDER — DEXAMETHASONE 0.5 MG/1
0.5 TABLET ORAL
Status: DISCONTINUED | OUTPATIENT
Start: 2020-09-20 | End: 2020-09-21 | Stop reason: HOSPADM

## 2020-09-19 RX ORDER — HYDROMORPHONE HCL/PF 1 MG/ML
1 SYRINGE (ML) INJECTION EVERY 4 HOURS PRN
Status: DISCONTINUED | OUTPATIENT
Start: 2020-09-19 | End: 2020-09-21 | Stop reason: HOSPADM

## 2020-09-19 RX ORDER — CIPROFLOXACIN 2 MG/ML
400 INJECTION, SOLUTION INTRAVENOUS EVERY 12 HOURS
Status: DISCONTINUED | OUTPATIENT
Start: 2020-09-19 | End: 2020-09-19

## 2020-09-19 RX ORDER — HYDRALAZINE HYDROCHLORIDE 20 MG/ML
10 INJECTION INTRAMUSCULAR; INTRAVENOUS EVERY 6 HOURS PRN
Status: DISCONTINUED | OUTPATIENT
Start: 2020-09-19 | End: 2020-09-21 | Stop reason: HOSPADM

## 2020-09-19 RX ORDER — HYDROMORPHONE HCL/PF 1 MG/ML
0.5 SYRINGE (ML) INJECTION EVERY 4 HOURS PRN
Status: DISCONTINUED | OUTPATIENT
Start: 2020-09-19 | End: 2020-09-19

## 2020-09-19 RX ORDER — HYDROXYCHLOROQUINE SULFATE 200 MG/1
200 TABLET, FILM COATED ORAL
Status: CANCELLED | OUTPATIENT
Start: 2020-09-20

## 2020-09-19 RX ORDER — HYDROXYCHLOROQUINE SULFATE 200 MG/1
200 TABLET, FILM COATED ORAL
Status: DISCONTINUED | OUTPATIENT
Start: 2020-09-20 | End: 2020-09-21 | Stop reason: HOSPADM

## 2020-09-19 RX ORDER — PANTOPRAZOLE SODIUM 40 MG/1
40 INJECTION, POWDER, FOR SOLUTION INTRAVENOUS
Status: DISCONTINUED | OUTPATIENT
Start: 2020-09-19 | End: 2020-09-21 | Stop reason: HOSPADM

## 2020-09-19 RX ORDER — CYCLOBENZAPRINE HCL 10 MG
10 TABLET ORAL
Status: DISCONTINUED | OUTPATIENT
Start: 2020-09-19 | End: 2020-09-21 | Stop reason: HOSPADM

## 2020-09-19 RX ORDER — HYDROMORPHONE HCL/PF 1 MG/ML
1 SYRINGE (ML) INJECTION ONCE
Status: COMPLETED | OUTPATIENT
Start: 2020-09-19 | End: 2020-09-19

## 2020-09-19 RX ORDER — SODIUM CHLORIDE 9 MG/ML
60 INJECTION, SOLUTION INTRAVENOUS CONTINUOUS
Status: DISCONTINUED | OUTPATIENT
Start: 2020-09-19 | End: 2020-09-21 | Stop reason: HOSPADM

## 2020-09-19 RX ORDER — MORPHINE SULFATE 10 MG/ML
8 INJECTION, SOLUTION INTRAMUSCULAR; INTRAVENOUS EVERY 4 HOURS PRN
Status: DISCONTINUED | OUTPATIENT
Start: 2020-09-19 | End: 2020-09-21

## 2020-09-19 RX ADMIN — MORPHINE SULFATE 4 MG: 4 INJECTION INTRAVENOUS at 18:36

## 2020-09-19 RX ADMIN — SERTRALINE HYDROCHLORIDE 150 MG: 100 TABLET ORAL at 18:09

## 2020-09-19 RX ADMIN — PANTOPRAZOLE SODIUM 40 MG: 40 INJECTION, POWDER, FOR SOLUTION INTRAVENOUS at 18:08

## 2020-09-19 RX ADMIN — HYDROXYCHLOROQUINE SULFATE 100 MG: 200 TABLET, FILM COATED ORAL at 19:33

## 2020-09-19 RX ADMIN — CEFTRIAXONE SODIUM 1000 MG: 10 INJECTION, POWDER, FOR SOLUTION INTRAVENOUS at 19:34

## 2020-09-19 RX ADMIN — ONDANSETRON 4 MG: 2 INJECTION INTRAMUSCULAR; INTRAVENOUS at 13:14

## 2020-09-19 RX ADMIN — HYDROMORPHONE HYDROCHLORIDE 1 MG: 1 INJECTION, SOLUTION INTRAMUSCULAR; INTRAVENOUS; SUBCUTANEOUS at 21:00

## 2020-09-19 RX ADMIN — CYCLOBENZAPRINE HYDROCHLORIDE 10 MG: 10 TABLET, FILM COATED ORAL at 21:00

## 2020-09-19 RX ADMIN — METOCLOPRAMIDE HYDROCHLORIDE 10 MG: 5 INJECTION INTRAMUSCULAR; INTRAVENOUS at 17:44

## 2020-09-19 RX ADMIN — SODIUM CHLORIDE 100 ML/HR: 0.9 INJECTION, SOLUTION INTRAVENOUS at 17:50

## 2020-09-19 RX ADMIN — HYDROMORPHONE HYDROCHLORIDE 1 MG: 1 INJECTION, SOLUTION INTRAMUSCULAR; INTRAVENOUS; SUBCUTANEOUS at 13:14

## 2020-09-19 RX ADMIN — METRONIDAZOLE 500 MG: 500 INJECTION, SOLUTION INTRAVENOUS at 18:12

## 2020-09-19 RX ADMIN — MAGNESIUM OXIDE TAB 400 MG (241.3 MG ELEMENTAL MG) 400 MG: 400 (241.3 MG) TAB at 18:08

## 2020-09-19 RX ADMIN — SODIUM CHLORIDE 1000 ML: 0.9 INJECTION, SOLUTION INTRAVENOUS at 13:09

## 2020-09-19 RX ADMIN — MORPHINE SULFATE 4 MG: 4 INJECTION INTRAVENOUS at 23:55

## 2020-09-19 RX ADMIN — IOHEXOL 100 ML: 350 INJECTION, SOLUTION INTRAVENOUS at 14:29

## 2020-09-19 NOTE — ASSESSMENT & PLAN NOTE
S/p mastectomy 08/18/2020  Scar healing well  Outpatient Pain regimen by Dr Martinez Ahn  Hold here and convert to IV until patient tolerates p o  Nancy Britton

## 2020-09-19 NOTE — ASSESSMENT & PLAN NOTE
Continue Decadron 0 5 mg tablet with breakfast  Continue Plaquenil 200 mg in the a m  And 100 mg in the p m

## 2020-09-19 NOTE — ED PROVIDER NOTES
History  Chief Complaint   Patient presents with    Vomiting     Patient reports she has been vomiting for a few days  Denies fever   Wound Check     Had mastectomy on left side in August; concerned incision site is not healing properly   Dizziness     patient reports also having dizziness for 2 hours  60year old female presents to the emergency department for evaluation of a constellation of symptoms  Patient states that she woke up at 5:00 a m  With nausea and vomiting  Initially she vomited fluid that was consistent with popsicles that she last night however now she is vomiting a fluorescent yellow bile like substance  She reports generalized abdominal pain associated with vomiting  She has not been able to pass gas or move her bowels today  Patient had a recent left mastectomy  She tolerated the procedure well and tolerated chemotherapy prior to her surgery  She is not receiving chemotherapy at this time  She is concerned about an area of her left breast incision that is slow to heal   No drainage from site  No fevers or chills  Patient also reports feeling dizzy described as lightheaded  Patient reports some chest pain, no shortness of breath  No cough  No recent travel or sick contacts  History provided by:  Patient and medical records   used: No    Vomiting   Severity:  Severe  Duration:  8 hours  Timing:  Constant  Quality:  Bilious material  Progression:  Worsening  Chronicity:  Recurrent  Recent urination:  Normal  Relieved by:  Nothing  Worsened by:  Nothing  Ineffective treatments:  None tried  Associated symptoms: abdominal pain    Associated symptoms: no diarrhea    Risk factors: no prior abdominal surgery        Prior to Admission Medications   Prescriptions Last Dose Informant Patient Reported? Taking? ALPRAZolam (XANAX) 1 mg tablet 9/18/2020 at Unknown time  No Yes   Sig: TAKE 1 TABLET BY MOUTH 3 TIMES A DAY AS NEEDED FOR ANXIETY     CRANIAL PROSTHESIS, RX,   No No   Sig: One wig as needed   Cholecalciferol (VITAMIN D3) 24008 units CAPS 9/17/2020 at Unknown time Self Yes Yes   Sig: Take 50,000 Units by mouth once a week    Nutritional Supplements (OSTEOPOROSIS SUPPORT PO) 9/17/2020  Yes No   Sig: Take by mouth   acetaminophen (TYLENOL) 325 mg tablet Past Month at Unknown time  No Yes   Sig: Take 2 tablets (650 mg total) by mouth every 6 (six) hours as needed for mild pain, headaches or fever   cyclobenzaprine (FLEXERIL) 10 mg tablet 9/18/2020 at Unknown time  Yes Yes   Sig: Take 10 mg by mouth daily at bedtime    dexamethasone (DECADRON) 0 5 mg tablet 9/18/2020 at Unknown time  No Yes   Sig: TAKE 1 TABLET BY MOUTH DAILY WITH BREAKFAST    hydroxychloroquine (PLAQUENIL) 200 mg tablet 9/18/2020 at Unknown time Self Yes Yes   Sig: Take 1 tablet in morning and 1/2 tablet in evening   levothyroxine 88 mcg tablet 9/18/2020 at Unknown time  No Yes   Sig: TAKE ONE TABLET BY MOUTH EVERY DAY   magnesium oxide (MAG-OX) 400 mg 9/18/2020 at Unknown time  No Yes   Sig: Take 1 tablet (400 mg total) by mouth 2 (two) times a day   ondansetron (ZOFRAN) 4 mg tablet More than a month at Unknown time  No No   Sig: Take 1 tablet (4 mg total) by mouth every 8 (eight) hours as needed for nausea or vomiting   oxyCODONE (ROXICODONE) 10 MG TABS 9/18/2020 at Unknown time  No Yes   Sig: Take 1-1 5 tablets (10-15 mg total) by mouth every 4 (four) hours as needed (cancer pain and post-op pain    Max of 8 tabs / day )   oxyCODONE (ROXICODONE) 5 mg immediate release tablet 9/18/2020 at Unknown time  No Yes   Sig: Take 2-3 tablets (10-15 mg total) by mouth every 4 (four) hours as needed (cancer pain)Max Daily Amount: 90 mg   sertraline (ZOLOFT) 100 mg tablet 9/18/2020 at Unknown time  No Yes   Sig: TAKE 1 & 1/2 TABLETS BY MOUTH ONCE DAILY   Patient taking differently: Take 150 mg by mouth daily BY MOUTH ONCE DAILY      Facility-Administered Medications: None       Past Medical History: Diagnosis Date    Disease of thyroid gland     Hypertension     Lupus ([facility] [address] )     Malignant neoplasm of upper-inner quadrant of left breast in female, estrogen receptor negative ([facility] [address] ) 2/25/2020    Nephrolithiasis     PTSD (post-traumatic stress disorder)        Past Surgical History:   Procedure Laterality Date    FEMUR FRACTURE SURGERY      FL GUIDED CENTRAL VENOUS ACCESS DEVICE INSERTION  3/17/2020    HYSTERECTOMY      LEFT OOPHORECTOMY      due to torsion     MAMMO STEREOTACTIC BREAST BIOPSY RIGHT (ALL INC) Right 2/12/2020    MASTECTOMY W/ SENTINEL NODE BIOPSY Left 8/18/2020    Procedure: BREAST MASTECTOMY WITH SENTINEL LYMPH NODE BIOPSY, LYMPHATIC MAPPING WITH BLUE DYE AND RADIOACTIVE DYE (INJECT AT 1430 BY DR WRIGHT IN THE OR); Surgeon: Renetta Faye MD;  Location: [facility];  Service: Surgical Oncology    MRI BREAST BIOPSY LEFT (ALL INCLUSIVE) Left 3/20/2020    [place] [facility] TUNNELED CTR VAD W/SUBQ PORT AGE 5 YR/> N/A 3/17/2020    Procedure: INSERTION VENOUS PORT ( PORT-A-CATH) IR;  Surgeon: Lisa Andrews DO;  [facility]: [facility];  Service: Interventional Radiology    US GUIDANCE BREAST BIOPSY LEFT EACH ADDITIONAL Left 2/12/2020    US GUIDED BREAST BIOPSY LEFT COMPLETE Left 2/12/2020    VAGINA SURGERY         Family History   Problem Relation Age of Onset    Diabetes Mother     Hypertension Mother     Nephrolithiasis Mother     Breast cancer Mother 79    Heart disease Father     Hypertension Father     Diabetes Brother     Nephrolithiasis Brother     Breast cancer Maternal Aunt     No Known Problems Maternal Grandmother     No Known Problems Maternal Grandfather     No Known Problems Paternal Grandmother     No Known Problems Paternal Grandfather      I have reviewed and agree with the history as documented      E-Cigarette/Vaping    E-Cigarette Use Never User      E-Cigarette/Vaping Substances     Social History     Tobacco Use    Smoking status: Current Every Day Smoker Packs/day: 0 25     Years: 40 00     Pack years: 10 00     Types: Cigarettes     Start date: 200    Smokeless tobacco: Never Used    Tobacco comment:  5 ppd per [employer]   Substance Use Topics    Alcohol use: Not Currently     Alcohol/week: 21 0 standard drinks     Types: 21 Cans of beer per week     Frequency: Never     Drinks per session: Patient refused     Binge frequency: Never     Comment: pt states she had her last beer 3/22/2020    Drug use: No       Review of Systems   Constitutional: Positive for appetite change  Gastrointestinal: Positive for abdominal pain, nausea and vomiting  Negative for blood in stool and diarrhea  Genitourinary: Negative for flank pain  Musculoskeletal: Negative for back pain  Neurological: Positive for weakness  All other systems reviewed and are negative  Physical Exam  Physical Exam  Vitals signs and nursing note reviewed  Constitutional:       General: She is in acute distress  Appearance: She is well-developed  She is ill-appearing  She is not toxic-appearing or diaphoretic  HENT:      Head: Normocephalic  Nose: Nose normal       Mouth/Throat:      Pharynx: No oropharyngeal exudate  Eyes:      Conjunctiva/sclera: Conjunctivae normal       Pupils: Pupils are equal, round, and reactive to light  Neck:      Musculoskeletal: Normal range of motion and neck supple  Cardiovascular:      Rate and Rhythm: Normal rate and regular rhythm  Heart sounds: Normal heart sounds  Pulmonary:      Effort: Pulmonary effort is normal       Breath sounds: Normal breath sounds  Chest:      Breasts: Breasts are asymmetrical (left mastectomy)  Left: Tenderness present  No swelling or bleeding  Abdominal:      General: Bowel sounds are normal  There is no distension  Palpations: Abdomen is soft  Tenderness: There is abdominal tenderness  There is no right CVA tenderness, left CVA tenderness, guarding or rebound  Musculoskeletal: Normal range of motion  General: No tenderness or deformity  Right lower leg: No edema  Left lower leg: No edema  Lymphadenopathy:      Cervical: No cervical adenopathy  Skin:     General: Skin is warm and dry  Findings: No rash  Neurological:      Mental Status: She is alert and oriented to person, place, and time  Cranial Nerves: No cranial nerve deficit  Sensory: No sensory deficit  Motor: No abnormal muscle tone  Coordination: Coordination normal       Gait: Gait normal       Deep Tendon Reflexes: Reflexes are normal and symmetric  Psychiatric:         Behavior: Behavior normal          Thought Content:  Thought content normal          Judgment: Judgment normal          Vital Signs  ED Triage Vitals [09/19/20 1156]   Temperature Pulse Respirations Blood Pressure SpO2   97 8 °F (36 6 °C) 78 20 (!) 182/85 100 %      Temp Source Heart Rate Source Patient Position - Orthostatic VS BP Location FiO2 (%)   Oral Monitor Lying Right arm --      Pain Score       9           Vitals:    09/19/20 1655 09/19/20 1945 09/19/20 2219 09/20/20 0701   BP: (!) 177/81 121/62 110/60 102/60   Pulse: 78 95 90 88   Patient Position - Orthostatic VS: Lying Lying Lying Sitting         Visual Acuity      ED Medications  Medications   ALPRAZolam (XANAX) tablet 1 mg (1 mg Oral Given 9/20/20 0858)   dexamethasone (DECADRON) tablet 0 5 mg (0 5 mg Oral Given 9/20/20 0751)   levothyroxine tablet 88 mcg (88 mcg Oral Given 9/20/20 0529)   magnesium oxide (MAG-OX) tablet 400 mg (400 mg Oral Given 9/20/20 0858)   sertraline (ZOLOFT) tablet 150 mg (150 mg Oral Given 9/20/20 0858)   cyclobenzaprine (FLEXERIL) tablet 10 mg (10 mg Oral Given 9/19/20 2100)   ondansetron (ZOFRAN) injection 4 mg (has no administration in time range)   sodium chloride 0 9 % infusion (60 mL/hr Intravenous Rate/Dose Change 9/20/20 1113)   enoxaparin (LOVENOX) subcutaneous injection 40 mg (40 mg Subcutaneous Given 9/20/20 0858)   metoclopramide (REGLAN) injection 10 mg (has no administration in time range)   hydroxychloroquine (PLAQUENIL) tablet 200 mg (200 mg Oral Given 9/20/20 1011)     And   hydroxychloroquine (PLAQUENIL) tablet 100 mg (100 mg Oral Given 9/19/20 1933)   HYDROmorphone (DILAUDID) injection 1 mg (1 mg Intravenous Given 9/20/20 1409)   morphine (PF) 4 mg/mL injection 4 mg (4 mg Intravenous Given 9/20/20 0529)   morphine (PF) 10 mg/mL injection 8 mg (8 mg Intravenous Given 9/20/20 1010)   metroNIDAZOLE (FLAGYL) IVPB (premix) 500 mg 100 mL (500 mg Intravenous New Bag 9/20/20 0859)   pantoprazole (PROTONIX) injection 40 mg (40 mg Intravenous Given 9/20/20 0858)   hydrALAZINE (APRESOLINE) injection 10 mg (has no administration in time range)   cefTRIAXone (ROCEPHIN) 1,000 mg in dextrose 5 % 50 mL IVPB (0 mg Intravenous Stopped 9/19/20 2004)   sodium chloride 0 9 % bolus 1,000 mL (1,000 mL Intravenous New Bag 9/19/20 1309)   ondansetron (ZOFRAN) injection 4 mg (4 mg Intravenous Given 9/19/20 1314)   HYDROmorphone (DILAUDID) injection 1 mg (1 mg Intravenous Given 9/19/20 1314)   iohexol (OMNIPAQUE) 350 MG/ML injection (MULTI-DOSE) 100 mL (100 mL Intravenous Given 9/19/20 1429)   metoclopramide (REGLAN) injection 10 mg (10 mg Intravenous Given 9/19/20 1744)   potassium chloride (K-DUR,KLOR-CON) CR tablet 20 mEq (20 mEq Oral Given 9/20/20 0858)       Diagnostic Studies  Results Reviewed     Procedure Component Value Units Date/Time    C-reactive protein [[id number]]  (Abnormal) Collected:  09/19/20 1305    Lab Status:  Final result Specimen:  Blood from Central Venous Line Updated:  09/19/20 1816     CRP 5 6 mg/L     Sedimentation rate, automated [[id number]]  (Abnormal) Collected:  09/19/20 1305    Lab Status:  Final result Specimen:  Blood from Central Venous Line Updated:  09/19/20 1805     Sed Rate 47 mm/hour     Narrative:       New method- Test performed using  automated [employer]    If following a patient's inflammatory disease during treatment, a new baseline should be established  CBC and differential [[id number]]  (Abnormal) Collected:  09/19/20 1305    Lab Status:  Final result Specimen:  Blood from Central Venous Line Updated:  09/19/20 1433     WBC 11 97 Thousand/uL      RBC 4 54 Million/uL      Hemoglobin 13 3 g/dL      Hematocrit 42 4 %      MCV 93 fL      MCH 29 3 pg      MCHC 31 4 g/dL      RDW 15 9 %      MPV 10 0 fL      Platelets 211 Thousands/uL      nRBC 0 /100 WBCs      Neutrophils Relative 92 %      Immat GRANS % 0 %      Lymphocytes Relative 5 %      Monocytes Relative 3 %      Eosinophils Relative 0 %      Basophils Relative 0 %      Neutrophils Absolute 10 88 Thousands/µL      Immature Grans Absolute 0 05 Thousand/uL      Lymphocytes Absolute 0 61 Thousands/µL      Monocytes Absolute 0 38 Thousand/µL      Eosinophils Absolute 0 00 Thousand/µL      Basophils Absolute 0 05 Thousands/µL     Narrative: This is an appended report  These results have been appended to a previously verified report      Urine Microscopic [[id number]]  (Abnormal) Collected:  09/19/20 1323    Lab Status:  Final result Specimen:  Urine, Clean Catch Updated:  09/19/20 1417     RBC, UA 0-1 /hpf      WBC, UA None Seen /hpf      Epithelial Cells Occasional /hpf      Bacteria, UA None Seen /hpf     Troponin I [[id number]]  (Normal) Collected:  09/19/20 1305    Lab Status:  Final result Specimen:  Blood from Central Venous Line Updated:  09/19/20 1341     Troponin I <0 02 ng/mL     Lipase [[id number]]  (Normal) Collected:  09/19/20 1305    Lab Status:  Final result Specimen:  Blood from Central Venous Line Updated:  09/19/20 1339     Lipase 154 u/L     Comprehensive metabolic panel [[id number]]  (Abnormal) Collected:  09/19/20 1305    Lab Status:  Final result Specimen:  Blood from Central Venous Line Updated:  09/19/20 1338     Sodium 139 mmol/L      Potassium 4 0 mmol/L      Chloride 105 mmol/L      CO2 20 mmol/L ANION GAP 14 mmol/L      BUN 8 mg/dL      Creatinine 1 03 mg/dL      Glucose 127 mg/dL      Calcium 9 6 mg/dL      AST 14 U/L      ALT 12 U/L      Alkaline Phosphatase 80 U/L      Total Protein 8 1 g/dL      Albumin 3 9 g/dL      Total Bilirubin 0 18 mg/dL      eGFR 60 ml/min/1 73sq m     Narrative:       National Kidney Disease Foundation guidelines for Chronic Kidney Disease (CKD):     Stage 1 with normal or high GFR (GFR > 90 mL/min/1 73 square meters)    Stage 2 Mild CKD (GFR = 60-89 mL/min/1 73 square meters)    Stage 3A Moderate CKD (GFR = 45-59 mL/min/1 73 square meters)    Stage 3B Moderate CKD (GFR = 30-44 mL/min/1 73 square meters)    Stage 4 Severe CKD (GFR = 15-29 mL/min/1 73 square meters)    Stage 5 End Stage CKD (GFR <15 mL/min/1 73 square meters)  Note: GFR calculation is accurate only with a steady state creatinine    Protime-INR [[id number]]  (Normal) Collected:  09/19/20 1305    Lab Status:  Final result Specimen:  Blood from Central Venous Line Updated:  09/19/20 1332     Protime 12 7 seconds      INR 0 94    APTT [[id number]]  (Normal) Collected:  09/19/20 1305    Lab Status:  Final result Specimen:  Blood from Central Venous Line Updated:  09/19/20 1332     PTT 29 seconds     Urine Macroscopic, POC [[id number]]  (Abnormal) Collected:  09/19/20 1323    Lab Status:  Final result Specimen:  Urine Updated:  09/19/20 1324     Color, UA Yellow     Clarity, UA Clear     pH, UA 7 0     Leukocytes, UA Negative     Nitrite, UA Negative     Protein, UA Trace mg/dl      Glucose, UA Negative mg/dl      Ketones, UA Negative mg/dl      Urobilinogen, UA 0 2 E U /dl      Bilirubin, UA Negative     Blood, UA Negative     Specific Gravity, UA 1 025    Narrative:       CLINITEK RESULT                 CT abdomen pelvis with contrast   Final Result by Rosina Martinez MD (09/19 1530)      Ascending colonic wall thickening could relate to underdistention or mild colitis, correlate clinically        2 mm right renal nonobstructing calculus  Small hiatal hernia  Workstation performed: [id number]         XR chest 1 view portable   ED Interpretation by Diana Delgado DO (09/19 4529)   No acute disease      Final Result by Nereida Scott DO (09/20 7126)      No acute cardiopulmonary disease              Workstation performed: [id number]                    Procedures  ECG 12 Lead Documentation Only    Date/Time: 9/19/2020 2:33 PM  Performed by: Diana Delgado DO  Authorized by: Diana Delgado DO     Indications / Diagnosis:  Vomiting, chest pain  ECG reviewed by me, the ED Provider: yes    Patient location:  ED  Previous ECG:     Previous ECG:  Unavailable  Interpretation:     Interpretation: normal    Rate:     ECG rate:  72    ECG rate assessment: normal    Rhythm:     Rhythm: sinus rhythm    Ectopy:     Ectopy: none    QRS:     QRS axis:  Normal  Conduction:     Conduction: normal    ST segments:     ST segments:  Normal  T waves:     T waves: normal               ED Course                                       MDM  Number of Diagnoses or Management Options  Colitis: new and requires workup  Nausea & vomiting: new and requires workup  S/P left mastectomy: new and requires workup     Amount and/or Complexity of Data Reviewed  Clinical lab tests: ordered and reviewed  Tests in the radiology section of CPT®: ordered and reviewed  Tests in the medicine section of CPT®: reviewed and ordered  Decide to obtain previous medical records or to obtain history from someone other than the patient: yes  Obtain history from someone other than the patient: yes  Discuss the patient with other providers: yes  Independent visualization of images, tracings, or specimens: yes    Patient Progress  Patient progress: stable      Disposition  Final diagnoses:   Nausea & vomiting   Colitis   S/P left mastectomy     Time reflects when diagnosis was documented in both MDM as applicable and the Disposition within this note     Time User Action Codes Description Comment    9/19/2020  4:07 PM Kristen Candice Add [R11 2] Nausea & vomiting     9/19/2020  4:07 PM Candice Peterson Add [K52 9] Colitis     9/19/2020  4:07 PM Candice Peterson Add [Z90 12] S/P left mastectomy       ED Disposition     ED Disposition Condition Date/Time Comment    Admit Stable Sat Sep 19, 2020  4:07 PM Case was discussed with Dr Neftali Sams and the patient's admission status was agreed to be Admission Status: inpatient status to the service of Dr Neftali Sams   Follow-up Information    None         Current Discharge Medication List      CONTINUE these medications which have NOT CHANGED    Details   acetaminophen (TYLENOL) 325 mg tablet Take 2 tablets (650 mg total) by mouth every 6 (six) hours as needed for mild pain, headaches or fever  Qty: 30 tablet, Refills: 0    Associated Diagnoses: Systemic lupus erythematosus, unspecified SLE type, unspecified organ involvement status (HCC)      ALPRAZolam (XANAX) 1 mg tablet TAKE 1 TABLET BY MOUTH 3 TIMES A DAY AS NEEDED FOR ANXIETY  Qty: 90 tablet, Refills: 0    Associated Diagnoses: Anxiety      Cholecalciferol (VITAMIN D3) 16745 units CAPS Take 50,000 Units by mouth once a week       cyclobenzaprine (FLEXERIL) 10 mg tablet Take 10 mg by mouth daily at bedtime       dexamethasone (DECADRON) 0 5 mg tablet TAKE 1 TABLET BY MOUTH DAILY WITH BREAKFAST    Qty: 30 tablet, Refills: 0    Associated Diagnoses: Intractable nausea and vomiting      hydroxychloroquine (PLAQUENIL) 200 mg tablet Take 1 tablet in morning and 1/2 tablet in evening      levothyroxine 88 mcg tablet TAKE ONE TABLET BY MOUTH EVERY DAY  Qty: 30 tablet, Refills: 5    Associated Diagnoses: Hypothyroidism, unspecified type      magnesium oxide (MAG-OX) 400 mg Take 1 tablet (400 mg total) by mouth 2 (two) times a day  Qty: 60 tablet, Refills: 0    Associated Diagnoses: Intractable nausea and vomiting      !! oxyCODONE (ROXICODONE) 10 MG TABS Take 1-1 5 tablets (10-15 mg total) by mouth every 4 (four) hours as needed (cancer pain and post-op pain  Max of 8 tabs / day )  Qty: 240 tablet, Refills: 0    Comments: Urgent fill on 8/29 for severe cancer pain + acute post-op pain  #240 tabs for one-month supply  Associated Diagnoses: Malignant neoplasm of upper-inner quadrant of left breast in female, estrogen receptor negative ([facility] [address] )      ! ! oxyCODONE (ROXICODONE) 5 mg immediate release tablet Take 2-3 tablets (10-15 mg total) by mouth every 4 (four) hours as needed (cancer pain)Max Daily Amount: 90 mg  Qty: 90 tablet, Refills: 0    Comments: Emergency fill for acute post-operative pain on chronic severe cancer pain  #72 tabs for 4 day supply  PLEASE FILL IMMEDIATELY  Associated Diagnoses: Malignant neoplasm of upper-inner quadrant of left breast in female, estrogen receptor negative ([facility] [address] ); Acute post-operative pain      sertraline (ZOLOFT) 100 mg tablet TAKE 1 & 1/2 TABLETS BY MOUTH ONCE DAILY  Qty: 45 tablet, Refills: 5    Associated Diagnoses: Anxiety      CRANIAL PROSTHESIS, RX, One wig as needed  Qty: 1 Piece, Refills: 0    Associated Diagnoses: Malignant neoplasm of upper-inner quadrant of left breast in female, estrogen receptor negative (HCC)      Nutritional Supplements (OSTEOPOROSIS SUPPORT PO) Take by mouth      ondansetron (ZOFRAN) 4 mg tablet Take 1 tablet (4 mg total) by mouth every 8 (eight) hours as needed for nausea or vomiting  Qty: 30 tablet, Refills: 0    Associated Diagnoses: Malignant neoplasm of upper-inner quadrant of left breast in female, estrogen receptor negative ([facility] [address] )       ! ! - Potential duplicate medications found  Please discuss with provider  No discharge procedures on file      PDMP Review       Value Time User    PDMP Reviewed  Yes 8/19/2020  8:17 AM April Morillo PA-C          ED Provider  Electronically Signed by           Magen Adams DO  09/20/20 0895

## 2020-09-19 NOTE — ASSESSMENT & PLAN NOTE
Intractable nausea and vomiting, likely 2/2 colitis  See plan  Had recent episode of intractable vomiting in August   Not tolerating PO meds at this time

## 2020-09-19 NOTE — PLAN OF CARE
Problem: Potential for Falls  Goal: Patient will remain free of falls  Description: INTERVENTIONS:  - Assess patient frequently for physical needs  -  Identify cognitive and physical deficits and behaviors that affect risk of falls  -  [place] fall precautions as indicated by assessment   - Educate patient/family on patient safety including physical limitations  - Instruct patient to call for assistance with activity based on assessment  - Modify environment to reduce risk of injury  - Consider OT/PT consult to assist with strengthening/mobility  Outcome: Progressing     Problem: Nutrition/Hydration-ADULT  Goal: Nutrient/Hydration intake appropriate for improving, restoring or maintaining nutritional needs  Description: Monitor and assess patient's nutrition/hydration status for malnutrition  Collaborate with interdisciplinary team and initiate plan and interventions as ordered  Monitor patient's weight and dietary intake as ordered or per policy  Utilize nutrition screening tool and intervene as necessary  Determine patient's food preferences and provide high-protein, high-caloric foods as appropriate       INTERVENTIONS:  - Monitor oral intake, urinary output, labs, and treatment plans  - Assess nutrition and hydration status and recommend course of action  - Evaluate amount of meals eaten  - Assist patient with eating if necessary   - Allow adequate time for meals  - Recommend/ encourage appropriate diets, oral nutritional supplements, and vitamin/mineral supplements  - Order, calculate, and assess calorie counts as needed  - Recommend, monitor, and adjust tube feedings and TPN/PPN based on assessed needs  - Assess need for intravenous fluids  - Provide specific nutrition/hydration education as appropriate  - Include patient/family/caregiver in decisions related to nutrition  Outcome: Progressing

## 2020-09-19 NOTE — ASSESSMENT & PLAN NOTE
CT scan shows possible mild colitis  Does not meet sirs criteria on presentation  WBC 11 97  No diarrhea  Had normal BM 9/18  Constipated today  Low suspicion for C diff  Will order stool PCR  Not tolerating PO  IVF  IV analgesia  IV antiemetics, alternating agents   on presentation  CLD - advanced as tolerated  If no improvement, consider GI consult

## 2020-09-19 NOTE — ASSESSMENT & PLAN NOTE
Was taken off of her antihypertensives due to hypotension  Is hypertensive at 171/79 here, likely in the setting of pain  Not tolerating PO, therefore will add IV p r n   Antihypertensives

## 2020-09-19 NOTE — H&P
H&P- Mauricio Valente 1965, 60 y o  female MRN: [id number]  Unit/Bed#: BAUTISTA Encounter: [id number]  Primary Care Provider: Lisbeth Agee MD   Date and time admitted to hospital: 9/19/2020 11:52 AM    Intractable nausea and vomiting  Assessment & Plan  Intractable nausea and vomiting, likely 2/2 colitis  See plan  Had recent episode of intractable vomiting in August   Not tolerating PO meds at this time  * Colitis  Assessment & Plan  CT scan shows possible mild colitis  Does not meet sirs criteria on presentation  WBC 11 97  No diarrhea  Had normal BM 9/18  Constipated today  Low suspicion for C diff  Will order stool PCR  Not tolerating PO  IVF  IV analgesia  IV antiemetics, alternating agents   on presentation  CLD - advanced as tolerated  If no improvement, consider GI consult  Port-A-Cath in place  Assessment & Plan  Noted    Malignant neoplasm of upper-inner quadrant of left breast in female, estrogen receptor negative [facility])  Assessment & Plan  S/p mastectomy 08/18/2020  Scar healing well  Outpatient Pain regimen by Dr Jason Lazo  Hold here and convert to IV until patient tolerates p o  Depression  Assessment & Plan  Zoloft 150 mg daily    Systemic lupus erythematosus (HCC)  Assessment & Plan  Continue Decadron 0 5 mg tablet with breakfast  Continue Plaquenil 200 mg in the a m  And 100 mg in the p m  Hypertension  Assessment & Plan  Was taken off of her antihypertensives due to hypotension  Is hypertensive at 171/79 here, likely in the setting of pain  Not tolerating PO, therefore will add IV p r n   Antihypertensives    VTE Prophylaxis: Enoxaparin (Lovenox)  / sequential compression device   Code Status:  Full code  POLST: POLST form is on file already (pre-hospital)  Discussion with family:  Discussed with patient patient's  present at bedside    Anticipated Length of Stay:  Patient will be admitted on an Inpatient basis with an anticipated length of stay of greater than 2 midnights  Justification for Hospital Stay:  Colitis with intractable nausea and vomiting requiring IV medications and fluid    Total Time for Visit, including Counseling / Coordination of Care: 1 hour  Greater than 50% of this total time spent on direct patient counseling and coordination of care  Chief Complaint:   Abdominal pain and bloating with intractable nausea    History of Present Illness:    Jacinta Mao is a 60 y o  female who presents with intractable nausea and vomiting and abdominal   The patient has a past medical history of breast cancer s/p recent mastectomy in August, depression, SLE, and prior history of hypertension but now off medications  She had a recent admission on August 25th for intractable nausea and vomiting which resolved spontaneously with IV antiemetics and IV fluids  Since then, patient has had poor p o  Intake and almost constant nausea  She notes intermittent diarrhea and constipation  She had diarrhea on 09/17 however had well-formed stool and 9/18 and this was not out of her norm  Over last few days, she has felt increasingly bloated with slight, diffuse abdominal tenderness  Due to inability to take her medications or tolerate any food, she presented to the ED  Upon presentation, the patient is afebrile; she has a slight leukocytosis of 11 97  A CT scan shows possible colitis  Her symptoms improved with IV Zofran however she is beginning to feel nauseous again now that this is wearing off  She denies fevers  She states she does feel very cold however this is also her baseline  She will be admitted on inpatient basis for IV fluids with IV antiemetic and analgesic therapy  Will advance diet as possible  Will monitor closely for infectious signs  Will check stool sample  Review of Systems:    Review of Systems   Constitutional: Positive for appetite change and fatigue  Negative for fever  HENT: Negative for congestion      Respiratory: Negative for cough, choking, chest tightness, shortness of breath and wheezing  Cardiovascular: Negative for chest pain, palpitations and leg swelling  Gastrointestinal: Positive for abdominal distention, abdominal pain, nausea and vomiting  Negative for blood in stool  Endocrine: Positive for cold intolerance  Genitourinary: Negative for dysuria  Neurological: Negative for dizziness and headaches  Psychiatric/Behavioral: Negative for agitation  Past Medical and Surgical History:     Past Medical History:   Diagnosis Date    Disease of thyroid gland     Hypertension     Lupus ([facility] [address] )     Malignant neoplasm of upper-inner quadrant of left breast in female, estrogen receptor negative ([facility] [address] ) 2/25/2020    Nephrolithiasis     PTSD (post-traumatic stress disorder)        Past Surgical History:   Procedure Laterality Date    FEMUR FRACTURE SURGERY      FL GUIDED CENTRAL VENOUS ACCESS DEVICE INSERTION  3/17/2020    HYSTERECTOMY      LEFT OOPHORECTOMY      due to torsion     MAMMO STEREOTACTIC BREAST BIOPSY RIGHT (ALL INC) Right 2/12/2020    MASTECTOMY W/ SENTINEL NODE BIOPSY Left 8/18/2020    Procedure: BREAST MASTECTOMY WITH SENTINEL LYMPH NODE BIOPSY, LYMPHATIC MAPPING WITH BLUE DYE AND RADIOACTIVE DYE (INJECT AT 1430 BY DR WRIGHT IN THE OR); Surgeon: Brijesh Woo MD;  Location: [facility];  Service: Surgical Oncology    MRI BREAST BIOPSY LEFT (ALL INCLUSIVE) Left 3/20/2020    LA INSJ TUNNELED CTR VAD W/SUBQ PORT AGE 5 YR/> N/A 3/17/2020    Procedure: INSERTION VENOUS PORT ( PORT-A-CATH) IR;  Surgeon: Piyush Gonzalez DO;  [facility]: [facility];  Service: Interventional Radiology    US GUIDANCE BREAST BIOPSY LEFT EACH ADDITIONAL Left 2/12/2020    US GUIDED BREAST BIOPSY LEFT COMPLETE Left 2/12/2020    VAGINA SURGERY         Meds/Allergies:    Prior to Admission medications    Medication Sig Start Date End Date Taking?  Authorizing Provider   acetaminophen (TYLENOL) 325 mg tablet Take 2 tablets (650 mg total) by mouth every 6 (six) hours as needed for mild pain, headaches or fever 8/27/20   Katheryn Acosta PA-C   ALPRAZolam Forestine itar) 1 mg tablet TAKE 1 TABLET BY MOUTH 3 TIMES A DAY AS NEEDED FOR ANXIETY  9/8/20   Jillian Olivarez MD   Cholecalciferol (VITAMIN D3) 40204 units CAPS Take 50,000 Units by mouth once a week     Historical Provider, MD   CRANIAL PROSTHESIS, RX, One wig as needed 3/11/20   Janette Mcallister MD   cyclobenzaprine (FLEXERIL) 10 mg tablet Take 10 mg by mouth daily at bedtime     Historical Provider, MD   dexamethasone (DECADRON) 0 5 mg tablet TAKE 1 TABLET BY MOUTH DAILY WITH BREAKFAST  9/8/20   Jillian Olivarez MD   hydroxychloroquine (PLAQUENIL) 200 mg tablet Take 1 tablet in morning and 1/2 tablet in evening    Historical Provider, MD   levothyroxine 88 mcg tablet TAKE ONE TABLET BY MOUTH EVERY DAY 0/77/92   Nakul Meza MD   magnesium oxide (MAG-OX) 400 mg Take 1 tablet (400 mg total) by mouth 2 (two) times a day 5/16/20 8/17/20  Dominique Morales MD   Nutritional Supplements (OSTEOPOROSIS SUPPORT PO) Take by mouth    Historical Provider, MD   ondansetron (ZOFRAN) 4 mg tablet Take 1 tablet (4 mg total) by mouth every 8 (eight) hours as needed for nausea or vomiting 8/27/20   Gabrielle Dick PA-C   oxyCODONE (ROXICODONE) 10 MG TABS Take 1-1 5 tablets (10-15 mg total) by mouth every 4 (four) hours as needed (cancer pain and post-op pain    Max of 8 tabs / day ) 8/29/20   Jillian Olivarez MD   oxyCODONE (ROXICODONE) 5 mg immediate release tablet Take 2-3 tablets (10-15 mg total) by mouth every 4 (four) hours as needed (cancer pain)Max Daily Amount: 90 mg 8/28/20   Jillian Olivarez MD   sertraline (ZOLOFT) 100 mg tablet TAKE 1 & 1/2 TABLETS BY MOUTH ONCE DAILY  Patient taking differently: Take 150 mg by mouth daily BY MOUTH ONCE DAILY 2/33/04   Nakul Meza MD   loperamide (IMODIUM) 2 mg capsule Take 1 capsule (2 mg total) by mouth every 4 (four) hours as needed for diarrhea 5/16/20 9/19/20  Lake Sood MD     I have reviewed home medications with patient personally  Allergies: Allergies   Allergen Reactions    Mobic [Meloxicam] Eye Swelling     Reaction Date: 12Aug2011;     Methotrexate Rash    Sulfa Antibiotics Rash       Social History:     Marital Status: /Civil Union     Substance Use History:   Social History     Substance and Sexual Activity   Alcohol Use Not Currently    Alcohol/week: 21 0 standard drinks    Types: 21 Cans of beer per week    Frequency: Never    Comment: pt states she had her last beer 3/22/2020     Social History     Tobacco Use   Smoking Status Current Every Day Smoker    Packs/day: 0 25    Types: Cigarettes    Start date: 200   Smokeless Tobacco Never Used   Tobacco Comment      5 ppd per All[employer]     Social History     Substance and Sexual Activity   Drug Use No       Family History:    non-contributory    Physical Exam:     Vitals:   Blood Pressure: (!) 187/83 (09/19/20 1644)  Pulse: 94 (09/19/20 1644)  Temperature: 97 8 °F (36 6 °C) (09/19/20 1156)  Temp Source: Oral (09/19/20 1156)  Respirations: 18 (09/19/20 1644)  Weight - Scale: 55 4 kg (122 lb 2 2 oz) (09/19/20 1156)  SpO2: 99 % (09/19/20 1644)    Physical Exam  Vitals signs and nursing note reviewed  Exam conducted with a chaperone present  Constitutional:       Appearance: She is not ill-appearing or diaphoretic  Comments: The patient is pleasant  She is frail appearing and older than stated age  She is fully conversational and alert and oriented  Uncomfortable appearing   Eyes:      General:         Right eye: No discharge  Left eye: No discharge  Cardiovascular:      Rate and Rhythm: Normal rate and regular rhythm  Heart sounds: No murmur  Comments: Port-A-Cath in place on right chest wall  Pulmonary:      Effort: Pulmonary effort is normal  No respiratory distress  Breath sounds: Normal breath sounds  No stridor     Abdominal: General: There is no distension  Palpations: There is no mass  Tenderness: There is abdominal tenderness  Hernia: No hernia is present  Comments: Bowel sounds present  Mild, diffuse tenderness  Skin:     General: Skin is warm and dry  Coloration: Skin is not pale  Findings: No bruising  Comments: Well-healing left mastectomy scar   Neurological:      General: No focal deficit present  Mental Status: She is alert and oriented to person, place, and time  Mental status is at baseline  Psychiatric:         Mood and Affect: Mood normal          Behavior: Behavior normal          Additional Data:     Lab Results: I have personally reviewed pertinent reports  Results from last 7 days   Lab Units 09/19/20  1305   WBC Thousand/uL 11 97*   HEMOGLOBIN g/dL 13 3   HEMATOCRIT % 42 4   PLATELETS Thousands/uL 389   NEUTROS PCT % 92*   LYMPHS PCT % 5*   MONOS PCT % 3*   EOS PCT % 0     Results from last 7 days   Lab Units 09/19/20  1305   SODIUM mmol/L 139   POTASSIUM mmol/L 4 0   CHLORIDE mmol/L 105   CO2 mmol/L 20*   BUN mg/dL 8   CREATININE mg/dL 1 03   ANION GAP mmol/L 14*   CALCIUM mg/dL 9 6   ALBUMIN g/dL 3 9   TOTAL BILIRUBIN mg/dL 0 18*   ALK PHOS U/L 80   ALT U/L 12   AST U/L 14   GLUCOSE RANDOM mg/dL 127     Results from last 7 days   Lab Units 09/19/20  1305   INR  0 94                   Imaging: I have personally reviewed pertinent reports  CT abdomen pelvis with contrast   Final Result by Leticia Valdovinos MD (09/19 1530)      Ascending colonic wall thickening could relate to underdistention or mild colitis, correlate clinically  2 mm right renal nonobstructing calculus  Small hiatal hernia  Workstation performed: [id number]         XR chest 1 view portable   ED Interpretation by Ralph King DO (09/19 1385)   No acute disease          EKG, Pathology, and Other Studies Reviewed on Admission:   · EKG:  Reviewed    Allscripts / Epic Records Reviewed:  Yes ** Please Note: This note has been constructed using a voice recognition system   **

## 2020-09-20 LAB
ALBUMIN SERPL BCP-MCNC: 2.9 G/DL (ref 3.5–5)
ALP SERPL-CCNC: 58 U/L (ref 46–116)
ALT SERPL W P-5'-P-CCNC: 9 U/L (ref 12–78)
ANION GAP SERPL CALCULATED.3IONS-SCNC: 11 MMOL/L (ref 4–13)
AST SERPL W P-5'-P-CCNC: 13 U/L (ref 5–45)
BASOPHILS # BLD AUTO: 0.03 THOUSANDS/ΜL (ref 0–0.1)
BASOPHILS NFR BLD AUTO: 0 % (ref 0–1)
BILIRUB SERPL-MCNC: 0.12 MG/DL (ref 0.2–1)
BUN SERPL-MCNC: 6 MG/DL (ref 5–25)
CALCIUM ALBUM COR SERPL-MCNC: 8.4 MG/DL (ref 8.3–10.1)
CALCIUM SERPL-MCNC: 7.5 MG/DL (ref 8.3–10.1)
CHLORIDE SERPL-SCNC: 108 MMOL/L (ref 100–108)
CO2 SERPL-SCNC: 22 MMOL/L (ref 21–32)
CREAT SERPL-MCNC: 0.83 MG/DL (ref 0.6–1.3)
EOSINOPHIL # BLD AUTO: 0.1 THOUSAND/ΜL (ref 0–0.61)
EOSINOPHIL NFR BLD AUTO: 1 % (ref 0–6)
ERYTHROCYTE [DISTWIDTH] IN BLOOD BY AUTOMATED COUNT: 16.2 % (ref 11.6–15.1)
GFR SERPL CREATININE-BSD FRML MDRD: 79 ML/MIN/1.73SQ M
GLUCOSE SERPL-MCNC: 95 MG/DL (ref 65–140)
HCT VFR BLD AUTO: 34.8 % (ref 34.8–46.1)
HGB BLD-MCNC: 10.6 G/DL (ref 11.5–15.4)
IMM GRANULOCYTES # BLD AUTO: 0.03 THOUSAND/UL (ref 0–0.2)
IMM GRANULOCYTES NFR BLD AUTO: 0 % (ref 0–2)
LYMPHOCYTES # BLD AUTO: 2.28 THOUSANDS/ΜL (ref 0.6–4.47)
LYMPHOCYTES NFR BLD AUTO: 29 % (ref 14–44)
MCH RBC QN AUTO: 29.2 PG (ref 26.8–34.3)
MCHC RBC AUTO-ENTMCNC: 30.5 G/DL (ref 31.4–37.4)
MCV RBC AUTO: 96 FL (ref 82–98)
MONOCYTES # BLD AUTO: 0.74 THOUSAND/ΜL (ref 0.17–1.22)
MONOCYTES NFR BLD AUTO: 9 % (ref 4–12)
NEUTROPHILS # BLD AUTO: 4.67 THOUSANDS/ΜL (ref 1.85–7.62)
NEUTS SEG NFR BLD AUTO: 61 % (ref 43–75)
NRBC BLD AUTO-RTO: 0 /100 WBCS
PLATELET # BLD AUTO: 338 THOUSANDS/UL (ref 149–390)
PMV BLD AUTO: 9.4 FL (ref 8.9–12.7)
POTASSIUM SERPL-SCNC: 3.3 MMOL/L (ref 3.5–5.3)
PROCALCITONIN SERPL-MCNC: <0.05 NG/ML
PROT SERPL-MCNC: 6.1 G/DL (ref 6.4–8.2)
RBC # BLD AUTO: 3.63 MILLION/UL (ref 3.81–5.12)
SODIUM SERPL-SCNC: 141 MMOL/L (ref 136–145)
WBC # BLD AUTO: 7.85 THOUSAND/UL (ref 4.31–10.16)

## 2020-09-20 PROCEDURE — 84145 PROCALCITONIN (PCT): CPT | Performed by: INTERNAL MEDICINE

## 2020-09-20 PROCEDURE — 80053 COMPREHEN METABOLIC PANEL: CPT | Performed by: PHYSICIAN ASSISTANT

## 2020-09-20 PROCEDURE — 85025 COMPLETE CBC W/AUTO DIFF WBC: CPT | Performed by: PHYSICIAN ASSISTANT

## 2020-09-20 PROCEDURE — 99232 SBSQ HOSP IP/OBS MODERATE 35: CPT | Performed by: INTERNAL MEDICINE

## 2020-09-20 PROCEDURE — C9113 INJ PANTOPRAZOLE SODIUM, VIA: HCPCS | Performed by: INTERNAL MEDICINE

## 2020-09-20 RX ORDER — POTASSIUM CHLORIDE 20 MEQ/1
20 TABLET, EXTENDED RELEASE ORAL ONCE
Status: COMPLETED | OUTPATIENT
Start: 2020-09-20 | End: 2020-09-20

## 2020-09-20 RX ADMIN — SODIUM CHLORIDE 100 ML/HR: 0.9 INJECTION, SOLUTION INTRAVENOUS at 05:41

## 2020-09-20 RX ADMIN — MORPHINE SULFATE 8 MG: 10 INJECTION INTRAVENOUS at 10:10

## 2020-09-20 RX ADMIN — CYCLOBENZAPRINE HYDROCHLORIDE 10 MG: 10 TABLET, FILM COATED ORAL at 21:08

## 2020-09-20 RX ADMIN — HYDROMORPHONE HYDROCHLORIDE 1 MG: 1 INJECTION, SOLUTION INTRAMUSCULAR; INTRAVENOUS; SUBCUTANEOUS at 14:09

## 2020-09-20 RX ADMIN — SERTRALINE HYDROCHLORIDE 150 MG: 100 TABLET ORAL at 08:58

## 2020-09-20 RX ADMIN — MORPHINE SULFATE 8 MG: 10 INJECTION INTRAVENOUS at 21:08

## 2020-09-20 RX ADMIN — MAGNESIUM OXIDE TAB 400 MG (241.3 MG ELEMENTAL MG) 400 MG: 400 (241.3 MG) TAB at 08:58

## 2020-09-20 RX ADMIN — PANTOPRAZOLE SODIUM 40 MG: 40 INJECTION, POWDER, FOR SOLUTION INTRAVENOUS at 08:58

## 2020-09-20 RX ADMIN — POTASSIUM CHLORIDE 20 MEQ: 1500 TABLET, EXTENDED RELEASE ORAL at 08:58

## 2020-09-20 RX ADMIN — ENOXAPARIN SODIUM 40 MG: 40 INJECTION SUBCUTANEOUS at 08:58

## 2020-09-20 RX ADMIN — LEVOTHYROXINE SODIUM 88 MCG: 88 TABLET ORAL at 05:29

## 2020-09-20 RX ADMIN — HYDROXYCHLOROQUINE SULFATE 100 MG: 200 TABLET, FILM COATED ORAL at 17:34

## 2020-09-20 RX ADMIN — HYDROMORPHONE HYDROCHLORIDE 1 MG: 1 INJECTION, SOLUTION INTRAMUSCULAR; INTRAVENOUS; SUBCUTANEOUS at 07:51

## 2020-09-20 RX ADMIN — ALPRAZOLAM 1 MG: 0.5 TABLET ORAL at 08:58

## 2020-09-20 RX ADMIN — METRONIDAZOLE 500 MG: 500 INJECTION, SOLUTION INTRAVENOUS at 08:59

## 2020-09-20 RX ADMIN — METRONIDAZOLE 500 MG: 500 INJECTION, SOLUTION INTRAVENOUS at 16:48

## 2020-09-20 RX ADMIN — DEXAMETHASONE 0.5 MG: 0.5 TABLET ORAL at 07:51

## 2020-09-20 RX ADMIN — MAGNESIUM OXIDE TAB 400 MG (241.3 MG ELEMENTAL MG) 400 MG: 400 (241.3 MG) TAB at 17:32

## 2020-09-20 RX ADMIN — MORPHINE SULFATE 4 MG: 4 INJECTION INTRAVENOUS at 05:29

## 2020-09-20 RX ADMIN — MORPHINE SULFATE 8 MG: 10 INJECTION INTRAVENOUS at 16:47

## 2020-09-20 RX ADMIN — HYDROXYCHLOROQUINE SULFATE 200 MG: 200 TABLET, FILM COATED ORAL at 10:11

## 2020-09-20 RX ADMIN — METRONIDAZOLE 500 MG: 500 INJECTION, SOLUTION INTRAVENOUS at 02:25

## 2020-09-20 RX ADMIN — ALPRAZOLAM 1 MG: 0.5 TABLET ORAL at 15:23

## 2020-09-20 RX ADMIN — CEFTRIAXONE SODIUM 1000 MG: 10 INJECTION, POWDER, FOR SOLUTION INTRAVENOUS at 17:33

## 2020-09-20 RX ADMIN — HYDROMORPHONE HYDROCHLORIDE 1 MG: 1 INJECTION, SOLUTION INTRAMUSCULAR; INTRAVENOUS; SUBCUTANEOUS at 18:33

## 2020-09-20 NOTE — PLAN OF CARE
Problem: Potential for Falls  Goal: Patient will remain free of falls  Description: INTERVENTIONS:  - Assess patient frequently for physical needs  -  Identify cognitive and physical deficits and behaviors that affect risk of falls  -  [place] fall precautions as indicated by assessment   - Educate patient/family on patient safety including physical limitations  - Instruct patient to call for assistance with activity based on assessment  - Modify environment to reduce risk of injury  - Consider OT/PT consult to assist with strengthening/mobility  Outcome: Progressing     Problem: Nutrition/Hydration-ADULT  Goal: Nutrient/Hydration intake appropriate for improving, restoring or maintaining nutritional needs  Description: Monitor and assess patient's nutrition/hydration status for malnutrition  Collaborate with interdisciplinary team and initiate plan and interventions as ordered  Monitor patient's weight and dietary intake as ordered or per policy  Utilize nutrition screening tool and intervene as necessary  Determine patient's food preferences and provide high-protein, high-caloric foods as appropriate       INTERVENTIONS:  - Monitor oral intake, urinary output, labs, and treatment plans  - Assess nutrition and hydration status and recommend course of action  - Evaluate amount of meals eaten  - Assist patient with eating if necessary   - Allow adequate time for meals  - Recommend/ encourage appropriate diets, oral nutritional supplements, and vitamin/mineral supplements  - Order, calculate, and assess calorie counts as needed  - Recommend, monitor, and adjust tube feedings and TPN/PPN based on assessed needs  - Assess need for intravenous fluids  - Provide specific nutrition/hydration education as appropriate  - Include patient/family/caregiver in decisions related to nutrition  Outcome: Progressing

## 2020-09-20 NOTE — ASSESSMENT & PLAN NOTE
· Most likely due to colitis - treat as above  · Also placed on PPI daily for any acid as patient does describe some GERD symptoms  · Advancement of diet as above  Monitor PO intake  · Zofran PRN  Monitor for nausea symptoms

## 2020-09-20 NOTE — ASSESSMENT & PLAN NOTE
· CT scan showed mild colitis and we are treating empirically as infectious colitis  WBC 11 97 on admission but normalized as of 09/20/2020  · Antibiotics - Ceftriaxone and Flagyl  · Follow up stool PCR and fecal WBC if able to obtain  · IV fluids - decrease rate of IV fluids  · Diet - Advance to full liquids with toast / crackers  · Antiemetics - Zofran PRN  · Pain Control -   · Morphine IV for moderate to severe pain currently  · Dilaudid for breakthrough pain  · Monitor pain levels  · Change to home PO regimen by tomorrow if continues to tolerate PO   · Monitor BMs

## 2020-09-20 NOTE — PROGRESS NOTES
Progress Note - Akash Naik 1965, 60 y o  female MRN: [id number]    Unit/Bed#: S -01 Encounter: [id number]    Primary Care Provider: Jose G Nj MD   Date and time admitted to hospital: 9/19/2020 11:52 AM        * Colitis  Assessment & Plan  · CT scan showed mild colitis and we are treating empirically as infectious colitis  WBC 11 97 on admission but normalized as of 09/20/2020  · Antibiotics - Ceftriaxone and Flagyl  · Follow up stool PCR and fecal WBC if able to obtain  · IV fluids - decrease rate of IV fluids  · Diet - Advance to full liquids with toast / crackers  · Antiemetics - Zofran PRN  · Pain Control -   · Morphine IV for moderate to severe pain currently  · Dilaudid for breakthrough pain  · Monitor pain levels  · Change to home PO regimen by tomorrow if continues to tolerate PO   · Monitor BMs  Intractable nausea and vomiting  Assessment & Plan  · Most likely due to colitis - treat as above  · Also placed on PPI daily for any acid as patient does describe some GERD symptoms  · Advancement of diet as above  Monitor PO intake  · Zofran PRN  Monitor for nausea symptoms  Hypertension  Assessment & Plan  · Hydralazine IV PRN  · Control pain and nausea symptoms from colitis  · Monitor blood pressures  Systemic lupus erythematosus (HCC)  Assessment & Plan  · Continue Decadron 0 5 mg tablet with breakfast  · Continue Plaquenil 200 mg in the a m  And 100 mg in the p m  Malignant neoplasm of upper-inner quadrant of left breast in female, estrogen receptor negative ([facility] [address] )  Assessment & Plan  · S/p mastectomy 08/18/2020  Scar healing well  Does have some pain in this region  · Outpatient Pain regimen by Dr Ritika Sun  Probable transition back to PO regimen by tomorrow if continues to do well with PO intake  · Monitor pain levels  · Outpatient follow up      Depression  Assessment & Plan  · Continue home dose of Zoloft 150 mg daily        VTE Pharmacologic Prophylaxis:   Pharmacologic: Enoxaparin (Lovenox)  Mechanical VTE Prophylaxis in Place: Yes    Patient Centered Rounds: I have performed bedside rounds with nursing staff today  Discussions with Specialists or Other Care Team Provider:  None    Education and Discussions with Family / Patient:  Patient only    Time Spent for Care: 30 minutes  More than 50% of total time spent on counseling and coordination of care as described above  Current Length of Stay: 1 day(s)  Current Patient Status: Inpatient     Certification Statement: The patient will continue to require additional inpatient hospital stay due to Continued treatment and evaluation for colitis with persistent symptoms and need to advance diet and monitor  Discharge Plan / Estimated Discharge Date:  Hopefully able to discharge in the next 24 hours  Code Status: Level 1 - Full Code      Subjective: The patient still has some abdominal pain but feels that it is better than yesterday  The patient also has had no additional vomiting and feels that the nausea is improving  The patient is willing to try advancing the diet slightly to include toast and crackers but does not want to go in to solid foods just yet  She denies any dizziness lightheadedness  She has not yet had a bowel movement  However, she does feel like things are starting to move in her abdomen  The patient denies any shortness of breath, chest pain or palpitations  Objective:     Vitals:   Temp (24hrs), Av 2 °F (36 8 °C), Min:97 8 °F (36 6 °C), Max:98 6 °F (37 °C)    Temp:  [97 8 °F (36 6 °C)-98 6 °F (37 °C)] 98 2 °F (36 8 °C)  HR:  [72-95] 88  Resp:  [18-20] 18  BP: (102-187)/(60-85) 102/60  SpO2:  [97 %-100 %] 97 %  Body mass index is 22 06 kg/m²  Input and Output Summary (last 24 hours):        Intake/Output Summary (Last 24 hours) at 2020 1105  Last data filed at 2020 1000  Gross per 24 hour   Intake 3235 ml   Output 1000 ml   Net 2235 ml       Physical Exam:     Physical Exam  Vitals signs reviewed  HENT:      Mouth/Throat:      Mouth: Mucous membranes are moist       Pharynx: Oropharynx is clear  Neck:      Musculoskeletal: Neck supple  Vascular: No carotid bruit  Cardiovascular:      Rate and Rhythm: Normal rate and regular rhythm  Heart sounds: No murmur  Pulmonary:      Effort: Pulmonary effort is normal       Breath sounds: No wheezing, rhonchi or rales  Abdominal:      General: Abdomen is flat  There is distension ( mild but improved from yesterday)  Palpations: Abdomen is soft  Tenderness: There is abdominal tenderness (Moderate tenderness left side of the abdomen  This is especially present in the left lower quadrant  )  There is no guarding or rebound  Comments: Overactive bowel sounds   Musculoskeletal:         General: No swelling or tenderness  Lymphadenopathy:      Cervical: No cervical adenopathy  Skin:     General: Skin is warm and dry  Coloration: Skin is not jaundiced or pale  Findings: No rash  Neurological:      Mental Status: She is alert  Additional Data:     Labs:    Results from last 7 days   Lab Units 09/20/20  0537   WBC Thousand/uL 7 85   HEMOGLOBIN g/dL 10 6*   HEMATOCRIT % 34 8   PLATELETS Thousands/uL 338   NEUTROS PCT % 61   LYMPHS PCT % 29   MONOS PCT % 9   EOS PCT % 1     Results from last 7 days   Lab Units 09/20/20  0537   POTASSIUM mmol/L 3 3*   CHLORIDE mmol/L 108   CO2 mmol/L 22   BUN mg/dL 6   CREATININE mg/dL 0 83   CALCIUM mg/dL 7 5*   ALK PHOS U/L 58   ALT U/L 9*   AST U/L 13     Results from last 7 days   Lab Units 09/19/20  1305   INR  0 94       * I Have Reviewed All Lab Data Listed Above  * Additional Pertinent Lab Tests Reviewed: All Labs Within Last 24 Hours Reviewed    Imaging:    Imaging Reports Reviewed Today Include: No new imaging since yesterday    Imaging Personally Reviewed by Myself Includes:  None    Recent Cultures (last 7 days):           Last 24 Hours Medication List:   Current Facility-Administered Medications   Medication Dose Route Frequency Provider Last Rate    ALPRAZolam  1 mg Oral TID PRN Leia Gardiner PA-C      cefTRIAXone  1,000 mg Intravenous Q24H Ace Linda DO Stopped (09/19/20 2004)    cyclobenzaprine  10 mg Oral HS Leia Gardiner PA-C      dexamethasone  0 5 mg Oral Daily With Breakfast Leia Gardiner PA-C      enoxaparin  40 mg Subcutaneous Daily Leia Gardiner PA-C      hydrALAZINE  10 mg Intravenous Q6H PRN [place], DO      HYDROmorphone  1 mg Intravenous Q4H PRN [facility], DO      hydroxychloroquine  200 mg Oral Daily With Breakfast Leia Gardiner PA-C      And    hydroxychloroquine  100 mg Oral QPM Leia Gardiner PA-C      levothyroxine  88 mcg Oral Early Morning Leia Gardiner PA-C      magnesium oxide  400 mg Oral BID Leia Gardiner PA-C      metoclopramide  10 mg Intravenous Q6H PRN Leia Gardiner PA-C      metroNIDAZOLE  500 mg Intravenous Q8H Henri Menard,  mg (09/20/20 0859)    morphine injection  8 mg Intravenous Q4H PRN [place], DO      morphine injection  4 mg Intravenous Q4H PRN [facility], DO      ondansetron  4 mg Intravenous Q6H PRN Leia Gardiner PA-C      pantoprazole  40 mg Intravenous Q24H [address] Henri Menard,       sertraline  150 mg Oral Daily Leia Gardiner PA-C      sodium chloride  100 mL/hr Intravenous Continuous Leia Gardiner PA-C 100 mL/hr (09/20/20 0541)        Today, Patient Was Seen By: Ace Linda DO    ** Please Note: This note has been constructed using a voice recognition system   **

## 2020-09-20 NOTE — ASSESSMENT & PLAN NOTE
· S/p mastectomy 08/18/2020  Scar healing well  Does have some pain in this region  · Outpatient Pain regimen by Dr Temo Agosto  Probable transition back to PO regimen by tomorrow if continues to do well with PO intake  · Monitor pain levels  · Outpatient follow up

## 2020-09-20 NOTE — ASSESSMENT & PLAN NOTE
· Continue Decadron 0 5 mg tablet with breakfast  · Continue Plaquenil 200 mg in the a m  And 100 mg in the p m

## 2020-09-21 VITALS
BODY MASS INDEX: 23.09 KG/M2 | DIASTOLIC BLOOD PRESSURE: 65 MMHG | RESPIRATION RATE: 20 BRPM | OXYGEN SATURATION: 96 % | WEIGHT: 130.29 LBS | HEART RATE: 69 BPM | TEMPERATURE: 97.4 F | HEIGHT: 63 IN | SYSTOLIC BLOOD PRESSURE: 126 MMHG

## 2020-09-21 PROBLEM — R11.2 INTRACTABLE NAUSEA AND VOMITING: Status: RESOLVED | Noted: 2020-05-10 | Resolved: 2020-09-21

## 2020-09-21 LAB
ANION GAP SERPL CALCULATED.3IONS-SCNC: 8 MMOL/L (ref 4–13)
BUN SERPL-MCNC: 3 MG/DL (ref 5–25)
CALCIUM SERPL-MCNC: 7.7 MG/DL (ref 8.3–10.1)
CHLORIDE SERPL-SCNC: 111 MMOL/L (ref 100–108)
CO2 SERPL-SCNC: 24 MMOL/L (ref 21–32)
CREAT SERPL-MCNC: 0.68 MG/DL (ref 0.6–1.3)
GFR SERPL CREATININE-BSD FRML MDRD: 97 ML/MIN/1.73SQ M
GLUCOSE SERPL-MCNC: 88 MG/DL (ref 65–140)
MAGNESIUM SERPL-MCNC: 2.1 MG/DL (ref 1.6–2.6)
POTASSIUM SERPL-SCNC: 3.8 MMOL/L (ref 3.5–5.3)
PROCALCITONIN SERPL-MCNC: <0.05 NG/ML
SODIUM SERPL-SCNC: 143 MMOL/L (ref 136–145)

## 2020-09-21 PROCEDURE — 99239 HOSP IP/OBS DSCHRG MGMT >30: CPT | Performed by: INTERNAL MEDICINE

## 2020-09-21 PROCEDURE — 87427 SHIGA-LIKE TOXIN AG IA: CPT

## 2020-09-21 PROCEDURE — 87046 STOOL CULTR AEROBIC BACT EA: CPT

## 2020-09-21 PROCEDURE — 89055 LEUKOCYTE ASSESSMENT FECAL: CPT | Performed by: INTERNAL MEDICINE

## 2020-09-21 PROCEDURE — 83735 ASSAY OF MAGNESIUM: CPT | Performed by: INTERNAL MEDICINE

## 2020-09-21 PROCEDURE — 84145 PROCALCITONIN (PCT): CPT | Performed by: INTERNAL MEDICINE

## 2020-09-21 PROCEDURE — 87045 FECES CULTURE AEROBIC BACT: CPT | Performed by: INTERNAL MEDICINE

## 2020-09-21 PROCEDURE — 80048 BASIC METABOLIC PNL TOTAL CA: CPT | Performed by: INTERNAL MEDICINE

## 2020-09-21 PROCEDURE — C9113 INJ PANTOPRAZOLE SODIUM, VIA: HCPCS | Performed by: INTERNAL MEDICINE

## 2020-09-21 RX ORDER — METRONIDAZOLE 500 MG/1
500 TABLET ORAL EVERY 8 HOURS SCHEDULED
Qty: 15 TABLET | Refills: 0 | Status: SHIPPED | OUTPATIENT
Start: 2020-09-21 | End: 2020-09-26

## 2020-09-21 RX ORDER — CEFDINIR 300 MG/1
300 CAPSULE ORAL EVERY 12 HOURS SCHEDULED
Qty: 10 CAPSULE | Refills: 0 | Status: SHIPPED | OUTPATIENT
Start: 2020-09-21 | End: 2020-09-26

## 2020-09-21 RX ORDER — OXYCODONE HYDROCHLORIDE 10 MG/1
10 TABLET ORAL EVERY 4 HOURS PRN
Status: DISCONTINUED | OUTPATIENT
Start: 2020-09-21 | End: 2020-09-21 | Stop reason: HOSPADM

## 2020-09-21 RX ADMIN — HYDROMORPHONE HYDROCHLORIDE 1 MG: 1 INJECTION, SOLUTION INTRAMUSCULAR; INTRAVENOUS; SUBCUTANEOUS at 07:39

## 2020-09-21 RX ADMIN — PANTOPRAZOLE SODIUM 40 MG: 40 INJECTION, POWDER, FOR SOLUTION INTRAVENOUS at 08:30

## 2020-09-21 RX ADMIN — MORPHINE SULFATE 8 MG: 10 INJECTION INTRAVENOUS at 01:43

## 2020-09-21 RX ADMIN — ENOXAPARIN SODIUM 40 MG: 40 INJECTION SUBCUTANEOUS at 08:30

## 2020-09-21 RX ADMIN — OXYCODONE HYDROCHLORIDE 15 MG: 10 TABLET ORAL at 10:00

## 2020-09-21 RX ADMIN — HYDROXYCHLOROQUINE SULFATE 200 MG: 200 TABLET, FILM COATED ORAL at 08:31

## 2020-09-21 RX ADMIN — MAGNESIUM OXIDE TAB 400 MG (241.3 MG ELEMENTAL MG) 400 MG: 400 (241.3 MG) TAB at 08:30

## 2020-09-21 RX ADMIN — METRONIDAZOLE 500 MG: 500 INJECTION, SOLUTION INTRAVENOUS at 08:46

## 2020-09-21 RX ADMIN — LEVOTHYROXINE SODIUM 88 MCG: 88 TABLET ORAL at 05:02

## 2020-09-21 RX ADMIN — SERTRALINE HYDROCHLORIDE 150 MG: 100 TABLET ORAL at 08:30

## 2020-09-21 RX ADMIN — MORPHINE SULFATE 8 MG: 10 INJECTION INTRAVENOUS at 06:03

## 2020-09-21 RX ADMIN — METRONIDAZOLE 500 MG: 500 INJECTION, SOLUTION INTRAVENOUS at 01:48

## 2020-09-21 RX ADMIN — ALPRAZOLAM 1 MG: 0.5 TABLET ORAL at 08:30

## 2020-09-21 RX ADMIN — DEXAMETHASONE 0.5 MG: 0.5 TABLET ORAL at 08:35

## 2020-09-21 NOTE — UTILIZATION REVIEW
Initial Clinical Review    Admission: Date/Time/Statement:   Admission Orders (From admission, onward)     Ordered        09/19/20 1609  Inpatient Admission  Once                   Orders Placed This Encounter   Procedures    Inpatient Admission     Standing Status:   Standing     Number of Occurrences:   1     Order Specific Question:   Admitting Physician     Answer:   Carine Alexander [[id number]]     Order Specific Question:   Level of Care     Answer:   Med Surg [[id number]]     Order Specific Question:   Estimated length of stay     Answer:   More than 2 Midnights     Order Specific Question:   Certification     Answer:   I certify that inpatient services are medically necessary for this patient for a duration of greater than two midnights  See H&P and MD Progress Notes for additional information about the patient's course of treatment  ED Arrival Information     Expected Arrival Acuity Means of Arrival Escorted By Service Admission Type    - 9/19/2020 11:43 Urgent Wheelchair Spouse General Medicine Urgent    Arrival Complaint    Vomiting        Chief Complaint   Patient presents with    Vomiting     Patient reports she has been vomiting for a few days  Denies fever   Wound Check     Had mastectomy on left side in August; concerned incision site is not healing properly   Dizziness     patient reports also having dizziness for 2 hours  Assessment/Plan: 61 yo female w/hx breast ca, depression, htn, and immunosuppressed from lupus treatment to ED from home admitted as inpatient due to presumed infectious colitis  Presented with inability to tolerate food and medications due to constant nausea with intermittent diarrhea/constipation since August  Was admitted then for intractable n/v, improved at that time with IV antiemetics and IVF  Sx returned  Feels very cold  Exam reveals diffuse abd tenderness  Imaging revealed colitis  Wbc slightly elevated   Sx initially improved with zofran, but nausea returned after it wore off  Continue IVF, IV antiemetics, IV analgesics, double IV antbx, advance diet as tolerated  Monitoring for infection, checking c  Diff      9/20: c/o persistent abd pain, but better than yesterday  Nausea present but improving  No further vomiting  No BM yet  Exam reveals abd distension & tenderness LLQ  Overactive bowel sounds  Hypokalemic with replacement given  Wbc improved  IVF decreased, advanced to full liquids with toast/crackers  Double IV antbx continue  9/21: K improved, still has L abd pain, but much improved from admission day  Abd is tender on the L to mod/deep palp, and tender on R to deep palp  Tolerating diet, advancing to low fiber/low residue  Will need total 7 days antbx  Had elevated bp's, likely caused by pain and vomiting  bp improved once sx were improved       ED Triage Vitals [09/19/20 1156]   Temperature Pulse Respirations Blood Pressure SpO2   97 8 °F (36 6 °C) 78 20 (!) 182/85 100 %      Temp Source Heart Rate Source Patient Position - Orthostatic VS BP Location FiO2 (%)   Oral Monitor Lying Right arm --      Pain Score       9          Wt Readings from Last 1 Encounters:   09/21/20 59 1 kg (130 lb 4 7 oz)     Additional Vital Signs:   Date/Time   Temp   Pulse   Resp   BP   MAP (mmHg)   SpO2   O2 Device   Patient Position - Orthostatic VS    09/21/20 0555   97 4 °F (36 3 °C)Abnormal     69   20   126/65      96 %   None (Room air)       09/21/20 0300                     None (Room air)       09/20/20 2241   97 9 °F (36 6 °C)   68      100/57      95 %   None (Room air)   Lying    09/20/20 1500   98 2 °F (36 8 °C)   78      133/65      99 %   None (Room air)   Lying    09/20/20 0701   98 2 °F (36 8 °C)   88   18   102/60      97 %   None (Room air)   Sitting    09/19/20 2219   98 3 °F (36 8 °C)   90   18   110/60      97 %   None (Room air)   Lying    09/19/20 1945   98 6 °F (37 °C)   95   18   121/62   86         Lying    09/19/20 1724                   None (Room air)       09/19/20 1655   98 °F (36 7 °C)   78   20   177/81Abnormal        99 %   None (Room air)   Lying    09/19/20 1644      94   18   187/83Abnormal     119   99 %   None (Room air)   Lying    09/19/20 1330      80   20   171/79Abnormal     113   98 %          09/19/20 1300      72   20   175/78Abnormal     112   100 %   None (Room air)       09/19/20 1245      76   20   166/79   113   100 %   None (Room air)          Pertinent Labs/Diagnostic Test Results:   9/19 EKG: nsr, age undetermined septal infarct    CT abdomen pelvis with contrast   Final Result by Diane Rodney MD (09/19 1530)       Ascending colonic wall thickening could relate to underdistention or mild colitis, correlate clinically        2 mm right renal nonobstructing calculus        Small hiatal hernia          XR chest 1 view portable   ED Interpretation by Jose Angel DO (09/19 1252)   No acute disease           Results from last 7 days   Lab Units 09/20/20  0537 09/19/20  1305   WBC Thousand/uL 7 85 11 97*   HEMOGLOBIN g/dL 10 6* 13 3   HEMATOCRIT % 34 8 42 4   PLATELETS Thousands/uL 338 389   NEUTROS ABS Thousands/µL 4 67 10 88*         Results from last 7 days   Lab Units 09/21/20  0501 09/20/20  0537 09/19/20  1305   SODIUM mmol/L 143 141 139   POTASSIUM mmol/L 3 8 3 3* 4 0   CHLORIDE mmol/L 111* 108 105   CO2 mmol/L 24 22 20*   ANION GAP mmol/L 8 11 14*   BUN mg/dL 3* 6 8   CREATININE mg/dL 0 68 0 83 1 03   EGFR ml/min/1 73sq m 97 79 60   CALCIUM mg/dL 7 7* 7 5* 9 6   MAGNESIUM mg/dL 2 1  --   --      Results from last 7 days   Lab Units 09/20/20  0537 09/19/20  1305   AST U/L 13 14   ALT U/L 9* 12   ALK PHOS U/L 58 80   TOTAL PROTEIN g/dL 6 1* 8 1   ALBUMIN g/dL 2 9* 3 9   TOTAL BILIRUBIN mg/dL 0 12* 0 18*         Results from last 7 days   Lab Units 09/21/20  0501 09/20/20  0537 09/19/20  1305   GLUCOSE RANDOM mg/dL 88 95 127       Results from last 7 days   Lab Units 09/19/20  1305   TROPONIN I ng/mL <0 02 Results from last 7 days   Lab Units 09/19/20  1305   PROTIME seconds 12 7   INR  0 94   PTT seconds 29         Results from last 7 days   Lab Units 09/20/20  0537   PROCALCITONIN ng/ml <0 05     Results from last 7 days   Lab Units 09/19/20  1305   LIPASE u/L 154     Results from last 7 days   Lab Units 09/19/20  1305   CRP mg/L 5 6*   SED RATE mm/hour 47*         Results from last 7 days   Lab Units 09/19/20  1323   CLARITY UA  Clear   COLOR UA  Yellow   SPEC GRAV UA  1 025   PH UA  7 0   GLUCOSE UA mg/dl Negative   KETONES UA mg/dl Negative   BLOOD UA  Negative   PROTEIN UA mg/dl Trace*   NITRITE UA  Negative   BILIRUBIN UA  Negative   UROBILINOGEN UA E U /dl 0 2   LEUKOCYTES UA  Negative   WBC UA /hpf None Seen   RBC UA /hpf 0-1*   BACTERIA UA /hpf None Seen   EPITHELIAL CELLS WET PREP /hpf Occasional     ED Treatment:   Medication Administration from 09/19/2020 1143 to 09/19/2020 1658       Date/Time Order Dose Route Action     09/19/2020 1309 sodium chloride 0 9 % bolus 1,000 mL 1,000 mL Intravenous New Bag     09/19/2020 1314 ondansetron (ZOFRAN) injection 4 mg 4 mg Intravenous Given     09/19/2020 1314 HYDROmorphone (DILAUDID) injection 1 mg 1 mg Intravenous Given        Past Medical History:   Diagnosis Date    Disease of thyroid gland     Hypertension     Lupus ([facility] )     Malignant neoplasm of upper-inner quadrant of left breast in female, estrogen receptor negative ([facility] ) 2/25/2020    Nephrolithiasis     PTSD (post-traumatic stress disorder)      Present on Admission:   Systemic lupus erythematosus ([address] )   (Resolved) Intractable nausea and vomiting   Hypertension   Depression      Admitting Diagnosis: Colitis [K52 9]  Vomiting [R11 10]  Nausea & vomiting [R11 2]  S/P left mastectomy [Z90 12]  Age/Sex: 60 y o  female  Admission Orders:  Scheduled Medications:  cefTRIAXone, 1,000 mg, Intravenous, Q24H  cyclobenzaprine, 10 mg, Oral, HS  dexamethasone, 0 5 mg, Oral, Daily With Breakfast  enoxaparin, 40 mg, Subcutaneous, Daily  hydroxychloroquine, 200 mg, Oral, Daily With Breakfast    And  hydroxychloroquine, 100 mg, Oral, QPM  levothyroxine, 88 mcg, Oral, Early Morning  magnesium oxide, 400 mg, Oral, BID  metroNIDAZOLE, 500 mg, Intravenous, Q8H  pantoprazole, 40 mg, Intravenous, Q24H JORGE  sertraline, 150 mg, Oral, Daily      Continuous IV Infusions:  sodium chloride, 60 mL/hr, Intravenous, Continuous      PRN Meds:  ALPRAZolam, 1 mg, Oral, TID PRN  hydrALAZINE, 10 mg, Intravenous, Q6H PRN  HYDROmorphone, 1 mg, Intravenous, Q4H PRN  metoclopramide, 10 mg, Intravenous, Q6H PRN  ondansetron, 4 mg, Intravenous, Q6H PRN  oxyCODONE, 10 mg, Oral, Q4H PRN  oxyCODONE, 15 mg, Oral, Q4H PRN      SCD's  Activity as deidre  Clear liquids, adv as tolerated      Network Utilization Review Department  [email or web address]  ATTENTION: Please call with any questions or concerns to [phone] and carefully listen to the prompts so that you are directed to the right person  All voicemails are confidential   [facility] all requests for admission clinical reviews, approved or denied determinations and any other requests to dedicated fax number below belonging to the campus where the patient is receiving treatment   List of dedicated fax numbers for the Facilities:  [address] DENIALS (Administrative/Medical Necessity) [phone]   [address] (Maternity/NICU/Pediatrics) [phone]   Barak Thacker [phone]   Alix Valle [phone]   Genny Kearney [phone]   Laura Maldonado [phone]   [address] [facility] [address] [facility] [phone]   [facility]  [phone]   [address] [facility]  [address] [facility] [address] [facility] [phone]

## 2020-09-21 NOTE — DISCHARGE SUMMARY
Discharge Summary - [facility] Internal Medicine    Patient Information: Serene Serrano 60 y o  female MRN: [id number]  Unit/Bed#: S -01 Encounter: [id number]    Discharging Physician / Practitioner: Darlene Browne DO  PCP: Gerri Tamayo MD  Admission Date: 9/19/2020  Discharge Date: 09/21/20    Disposition:     Home    Reason for Admission:  Left-sided colitis    Discharge Diagnoses:     Principal Problem:    Colitis  Active Problems:    Hypertension    Systemic lupus erythematosus ([facility] )    Malignant neoplasm of upper-inner quadrant of left breast in female, estrogen receptor negative ([facility] )    Depression  Resolved Problems:    Intractable nausea and vomiting      Consultations During Hospital Stay:  · None    Procedures Performed:     · None    Significant Findings / Test Results:     · CT abdomen pelvis 09/19/2020 - ascending colonic wall thickening could relate to under distention or mild colitis  2 mm right renal nonobstructing calculus  Small hiatal hernia noted  · Chest x-ray 09/20/2020 - no acute cardiopulmonary disease  · Metabolic profiles grossly normal during this admission  Of note the creatinine was 1 03 on admission and 0 68 when checked this morning  Transient hypokalemia yesterday with a potassium of 3 3  This is resolved today with a potassium of 3 8  Magnesium is 2 1   · LFTs are within normal limits  Lipase is 154  · Troponin was less than 0 02  · White blood cell count on admission was 11 97  The following day it came down to 7 85  Hemoglobin on admission was 13 3 but I do believe that this was heme concentrated  Of note when I review patient's older labs hemoglobin typically is in the 9-10 range  Hemoglobin when last checked on 09/20/2020 was 10 6 after IV fluids  · ESR was 47, CRP was 5 6, and procalcitonin was less than 0 05   · Urinalysis was negative aside from trace protein      Incidental Findings:   · None     Test Results Pending at Discharge (will require follow up):   · Stool bacterial PCR panel and fecal leukocytes     Outpatient Tests Requested:  · None    Complications:  None    Hospital Course:     Mariaa Koroma is a 60 y o  female patient who originally presented to the hospital on 9/19/2020 due to nausea and vomiting with new onset of fecal urgency which had been present off and on over the past 1 week where she was having a mix of loose stools and regular stools but denied any ace watery stools  The patient denies any blood in the stools, coffee-ground emesis, or hematemesis  The patient has had no fevers or chills  The patient is relatively immunosuppressed due to treatment for her lupus  The patient presented to the emergency department and a CT scan of the abdomen was done which was concerning for colitis related to the ascending colon  Initially it seemed as though the patient more tenderness on the right side compared to the left side which seem to correlate with the CT findings  Later during the hospitalization it seemed like the patient had some more tenderness on the left side of the abdomen  Nonetheless, the decision was made to treat the patient as a presumed infectious colitis especially in the setting of chronic immunosuppressive therapies  The patient was written for ceftriaxone and Flagyl and we wrote to obtain a bacterial stool PCR and fecal leukocytes  Unfortunately, as the patient had not had a bowel movement until last night, we were slow to collect this sample  The patient had already been on antibiotics for least 24 hours prior to collecting the sample  The patient's ESR and CRP were modestly elevated and the procalcitonin was negative  The patient was then monitored on IV antibiotics and showed continued improvement over the last few days  The patient states that she feels world better than when she came in    During the hospitalization the patient's oral opioid therapy was held due to concern that she may vomit this up and she was on an IV based pain medication regimen using 4-8 mg of morphine  Initially the patient was on a clear liquid diet but this was advanced to a full liquid diet with toast and crackers yesterday  This morning, as the patient feels much better, we are going to advance her to a low-fiber low residue diet and see how she does with this diet  If she tolerates the diet through lunch, then we will feel comfortable with releasing her from the hospital   Additionally, as the patient is feeling better and has had no additional nausea vomiting, we do feel comfortable transition the patient's pain medications back to her usual home regimen of oral pain pills  The patient will require 7 total days of antibiotics which includes another 5 days of antibiotics in the outpatient setting  I will prescribe cefdinir and Flagyl  On admission, the patient did have elevated blood pressures which would classify her as hypertensive urgency  There is no evidence of end-organ damage  It was felt that most likely the cause of the elevated blood pressures was the pain and vomiting  Once we would control the patient's symptoms, her blood pressures did improve and currently the blood pressures which were 182/85 on admission are now down to a range of 100/57 to 133/65  Treatment for the patient's other remaining conditions including the lupus, depression, and breast cancer remain unchanged  Condition at Discharge: stable     Discharge Day Visit / Exam:     Subjective: The patient feels well overall  She states that she does still have some pain in the left abdomen but it is so much better than when she came in  The patient denies any nausea vomiting  So far she is keeping down full liquid diet with toast and crackers  At the time of this documentation, we have switched her to a low-fiber/low residue diet and will monitor her on this diet before actually discharging her    As long she tolerates this diet she will be discharged today   Vitals: Blood Pressure: 126/65 (09/21/20 0555)  Pulse: 69 (09/21/20 0555)  Temperature: (!) 97 4 °F (36 3 °C) (09/21/20 0555)  Temp Source: Oral (09/21/20 0555)  Respirations: 20 (09/21/20 0555)  Height: 5' 3" (160 cm) (09/19/20 1655)  Weight - Scale: 59 1 kg (130 lb 4 7 oz) (09/21/20 0600)  SpO2: 96 % (09/21/20 0555)  Exam:   Physical Exam  Constitutional:       General: She is not in acute distress  Appearance: She is not ill-appearing or diaphoretic  HENT:      Mouth/Throat:      Mouth: Mucous membranes are moist       Pharynx: Oropharynx is clear  No oropharyngeal exudate  Eyes:      Pupils: Pupils are equal, round, and reactive to light  Cardiovascular:      Rate and Rhythm: Normal rate and regular rhythm  Heart sounds: No murmur  Pulmonary:      Effort: Pulmonary effort is normal       Breath sounds: Normal breath sounds  No wheezing  Abdominal:      General: Bowel sounds are normal  There is no distension  Palpations: Abdomen is soft  Tenderness: There is abdominal tenderness (left abdomen with moderate to deep palpation and mild tenderness of the right abdomen with deep palpation  )  There is no guarding  Musculoskeletal:         General: No swelling or tenderness  Skin:     General: Skin is warm and dry  Coloration: Skin is not jaundiced or pale  Findings: No rash  Neurological:      General: No focal deficit present  Mental Status: She is alert and oriented to person, place, and time  Discussion with Family: Patient only  No family update desired  Discharge instructions/Information to patient and family:   See after visit summary for information provided to patient and family  Provisions for Follow-Up Care:  See after visit summary for information related to follow-up care and any pertinent home health orders  Planned Readmission: None     Discharge Statement:  I spent 35 minutes discharging the patient   This time was spent on the day of discharge  I had direct contact with the patient on the day of discharge  Greater than 50% of the total time was spent examining patient, answering all patient questions, arranging and discussing plan of care with patient as well as directly providing post-discharge instructions  Additional time then spent on discharge activities  Discharge Medications:  See after visit summary for reconciled discharge medications provided to patient and family        ** Please Note: This note has been constructed using a voice recognition system **

## 2020-09-21 NOTE — DISCHARGE INSTR - AVS FIRST PAGE
Dear Dana Muniz,     It was our pleasure to care for you here at [facility]  It is our hope that we were always able to exceed the expected standards for your care during your stay  You were hospitalized due to colitis, treated as infectious colitis  You were cared for on the [facility] 4th floor under the service of Dung Sanchez, DO with the [facility] who covers for your primary care physician (PCP), Real Valencia MD, while you were hospitalized  If you have any questions or concerns related to this hospitalization, you may contact us at [phone]  For follow up as well as medication refills, we recommend that you follow up with your primary care physician  A registered nurse will reach out to you by phone within a few days after your discharge to answer any additional questions that you may have after going home  However, at this time we provide for you here, the most important instructions / recommendations at discharge:     · Notable Medication Adjustments -   · Antibiotics include cefdinir (Omnicef) 300 mg twice daily and metronidazole (Flagyl) 500 mg three times daily  These should be taken for 5 more days to complete a total treatment course of 7 days  Do not stop taking prematurely even if feeling better as this can lead to development of resistant organisms and recurrent infection  · Your zofran (odansetron) can be used for any nausea symptoms and your oxycodone will help with any residual pain symptoms  · Testing Required after Discharge -   · None  · Important follow up information -   · Follow up is recommended within the next 1 week  · Other Instructions -   · Reasons to return to hospital - if you develop fevers > 101, have severe worsening of abdominal pain, profound abdominal distention, or inability to eat / drink to keep hydrated  · Follow a low fiber / low residue diet    See separate portion of these instructions later on   · Please review this entire after visit summary as additional general instructions including medication list, appointments, activity, diet, any pertinent wound care, and other additional recommendations from your care team that may be provided for you        Sincerely,     Sabi Babin, DO

## 2020-09-21 NOTE — PLAN OF CARE
Problem: Potential for Falls  Goal: Patient will remain free of falls  Description: INTERVENTIONS:  - Assess patient frequently for physical needs  -  Identify cognitive and physical deficits and behaviors that affect risk of falls  -  [place] fall precautions as indicated by assessment   - Educate patient/family on patient safety including physical limitations  - Instruct patient to call for assistance with activity based on assessment  - Modify environment to reduce risk of injury  - Consider OT/PT consult to assist with strengthening/mobility  Outcome: Progressing     Problem: Nutrition/Hydration-ADULT  Goal: Nutrient/Hydration intake appropriate for improving, restoring or maintaining nutritional needs  Description: Monitor and assess patient's nutrition/hydration status for malnutrition  Collaborate with interdisciplinary team and initiate plan and interventions as ordered  Monitor patient's weight and dietary intake as ordered or per policy  Utilize nutrition screening tool and intervene as necessary  Determine patient's food preferences and provide high-protein, high-caloric foods as appropriate       INTERVENTIONS:  - Monitor oral intake, urinary output, labs, and treatment plans  - Assess nutrition and hydration status and recommend course of action  - Evaluate amount of meals eaten  - Assist patient with eating if necessary   - Allow adequate time for meals  - Recommend/ encourage appropriate diets, oral nutritional supplements, and vitamin/mineral supplements  - Order, calculate, and assess calorie counts as needed  - Recommend, monitor, and adjust tube feedings and TPN/PPN based on assessed needs  - Assess need for intravenous fluids  - Provide specific nutrition/hydration education as appropriate  - Include patient/family/caregiver in decisions related to nutrition  Outcome: Progressing

## 2020-09-21 NOTE — DISCHARGE INSTRUCTIONS
Low Fiber Diet   WHAT YOU NEED TO KNOW:   A low-fiber diet limits foods that are high in fiber  Fiber is the part of fruits, vegetables, and grains that is not broken down by your body  You may need to follow this diet after surgery on your intestines  You may also need to follow this diet for certain conditions such as Crohn disease or ulcerative colitis  Ask your healthcare provider or dietitian how much fiber you can have each day  DISCHARGE INSTRUCTIONS:   Foods to include:  Read food labels to check the amount of fiber that are found in foods  Some foods that are low in fiber include the following:  · Grains:  Choose grains that have less than 2 grams of fiber in each serving   Examples include the following:     ¨ Cream of wheat and finely ground grits    ¨ Dry cereal made from rice     ¨ White bread, white pasta, and white rice    ¨ Crackers, bagels, and rolls made from white or refined flour    · Other foods:      ¨ Canned and well-cooked fruit without skins or seeds, and juice without pulp    ¨ Ripe bananas and melons    ¨ Canned and well-cooked vegetables without skins or seeds, and vegetable juice    ¨ Cow's milk, lactose-free milk, soy milk, and rice milk    ¨ Yogurt without nuts, fruit, or granola    ¨ Eggs, poultry (such as chicken and turkey), fish, and tender, ground, well-cooked beef     ¨ Tofu and smooth peanut butter    ¨ Broth and strained soups made of low-fiber foods  Foods to avoid:   · Breads, cereals, crackers, and pasta made with whole wheat or whole grains (such as whole oats)    · [place] [employer] [place], wild rice, quinoa, kasha, and barley    · All fresh fruit with skin, except banana and melons     · Dried fruits and fruit juice with pulp    · Canned pineapple    · Raw vegetables    · Nuts, seeds, and popcorn    · Beans, nuts, peas, and lentils    · Tough meats    · Coconut and avocado  What else you should know about a low-fiber diet:  A low-fiber diet can decrease the amount of bowel movements you have  Drink liquids as directed to avoid constipation  Ask how much liquid to drink each day and which liquids are best for you  © 2017 [employer] Information is for End User's use only and may not be sold, redistributed or otherwise used for commercial purposes  All illustrations and images included in CareNotes® are the copyrighted property of [employer]  or [employer]  The above information is an  only  It is not intended as medical advice for individual conditions or treatments  Talk to your doctor, nurse or pharmacist before following any medical regimen to see if it is safe and effective for you

## 2020-09-22 ENCOUNTER — TRANSITIONAL CARE MANAGEMENT (OUTPATIENT)
Dept: FAMILY MEDICINE CLINIC | Facility: CLINIC | Age: 55
End: 2020-09-22

## 2020-09-22 LAB — WBC SPEC QL GRAM STN: NORMAL

## 2020-09-23 DIAGNOSIS — Z17.1 MALIGNANT NEOPLASM OF UPPER-INNER QUADRANT OF LEFT BREAST IN FEMALE, ESTROGEN RECEPTOR NEGATIVE (HCC): ICD-10-CM

## 2020-09-23 DIAGNOSIS — C50.212 MALIGNANT NEOPLASM OF UPPER-INNER QUADRANT OF LEFT BREAST IN FEMALE, ESTROGEN RECEPTOR NEGATIVE (HCC): ICD-10-CM

## 2020-09-23 RX ORDER — CYCLOBENZAPRINE HCL 5 MG
10 TABLET ORAL
COMMUNITY
Start: 2020-09-03

## 2020-09-23 NOTE — TELEPHONE ENCOUNTER
PDMP last fill dates    Oxycodone 10 mg   08/29 217/27    Has fu visit with Shaneka Mcwilliams  09/30     Pt asking who should refill Mag Oxide  Will instruct pt to have pcp refill  Pt has  scheduled appointment 09/25

## 2020-09-24 ENCOUNTER — TELEPHONE (OUTPATIENT)
Dept: PALLIATIVE MEDICINE | Facility: CLINIC | Age: 55
End: 2020-09-24

## 2020-09-24 RX ORDER — OXYCODONE HYDROCHLORIDE 10 MG/1
10-15 TABLET ORAL EVERY 4 HOURS PRN
Qty: 240 TABLET | Refills: 0 | Status: SHIPPED | OUTPATIENT
Start: 2020-09-24 | End: 2020-10-20 | Stop reason: SDUPTHER

## 2020-09-24 NOTE — TELEPHONE ENCOUNTER
Prior authorization for pt's Oxycodone 10 mg  has been initiated verbally with representative [place]mele Gleason  [facility]  ID [id number]  Ph 4     Request urgent   Awaiting determination

## 2020-09-25 ENCOUNTER — OFFICE VISIT (OUTPATIENT)
Dept: FAMILY MEDICINE CLINIC | Facility: CLINIC | Age: 55
End: 2020-09-25
Payer: COMMERCIAL

## 2020-09-25 VITALS
HEART RATE: 106 BPM | RESPIRATION RATE: 16 BRPM | DIASTOLIC BLOOD PRESSURE: 80 MMHG | BODY MASS INDEX: 22.75 KG/M2 | OXYGEN SATURATION: 98 % | SYSTOLIC BLOOD PRESSURE: 114 MMHG | TEMPERATURE: 97.8 F | HEIGHT: 63 IN | WEIGHT: 128.4 LBS

## 2020-09-25 DIAGNOSIS — F41.9 ANXIETY: ICD-10-CM

## 2020-09-25 DIAGNOSIS — R11.2 INTRACTABLE NAUSEA AND VOMITING: ICD-10-CM

## 2020-09-25 DIAGNOSIS — I10 ESSENTIAL HYPERTENSION: Primary | ICD-10-CM

## 2020-09-25 PROCEDURE — 99495 TRANSJ CARE MGMT MOD F2F 14D: CPT | Performed by: FAMILY MEDICINE

## 2020-09-25 NOTE — ASSESSMENT & PLAN NOTE
Breast cancer  Patient status post mastectomy with incisional scar still healing    Patient was instructed to perform home exercise regimen of stretching arm as instructed to prevent any limitations in movement or developing scar tissue of the area

## 2020-09-25 NOTE — PROGRESS NOTES
FAMILY PRACTICE OFFICE VISIT       NAME: Issa Davey  AGE: 60 y o  SEX: female       : 1965        MRN: [id number]    DATE: 2020  TIME: 1:46 PM    Assessment and Plan     Problem List Items Addressed This Visit        Cardiovascular and Mediastinum    Hypertension - Primary     Hypertension  Patient blood pressure is stable even after discontinuing her blood pressure medicine over the past month as per hospital instructions            Other    Anxiety     Anxiety  Patient continues to have contact with her counselor on a regular basis  She continues to use her sertraline and Xanax as ordered           Other Visit Diagnoses     Intractable nausea and vomiting        Relevant Medications    magnesium oxide (MAG-OX) 400 mg        TCM Call (since 2020)     Date and time call was made  2020  8:28 AM    Hospital care reviewed  Records reviewed    Patient was hospitialized at  [address] [place] [address]    Date of Admission  20    Date of discharge  20    Diagnosis   Colitis    Disposition  Home    Were the patients medications reviewed and updated  No    Current Symptoms  Neausea; Loose Stools    Loose stool severity  Mild    Neausea severity  Mild      TCM Call (since 2020)     Post hospital issues  Reduced activity    Should patient be enrolled in anticoag monitoring? No    Scheduled for follow up?   Yes    Did you obtain your prescribed medications  Yes    Do you need help managing your prescriptions or medications  No    Is transportation to your appointment needed  No    I have advised the patient to call PCP with any new or worsening symptoms  Kay Larson 45 or Significiant other    Support System  Spouse    Are you recieving any outpatient services  No    What type of services  Infusions-Oncology    Are you recieving home care services  No    Interperter language line needed  No    Counseling  Patient    Counseling topics  Importance of RX compliance    Comments  Apt scheduled for 09/25/20 at [facility] is a 60 y o  female patient who originally presented to the hospital on 9/19/2020 due to nausea and vomiting with new onset of fecal urgency which had been present off and on over the past 1 week where she was having a mix of loose stools and regular stools but denied any ace watery stools  The patient denies any blood in the stools, coffee-ground emesis, or hematemesis  The patient has had no fevers or chills  The patient is relatively immunosuppressed due to treatment for her lupus  The patient presented to the emergency department and a CT scan of the abdomen was done which was concerning for colitis related to the ascending colon  Initially it seemed as though the patient more tenderness on the right side compared to the left side which seem to correlate with the CT findings  Later during the hospitalization it seemed like the patient had some more tenderness on the left side of the abdomen  Nonetheless, the decision was made to treat the patient as a presumed infectious colitis especially in the setting of chronic immunosuppressive therapies  The patient was written for ceftriaxone and Flagyl and we wrote to obtain a bacterial stool PCR and fecal leukocytes  Unfortunately, as the patient had not had a bowel movement until last night, we were slow to collect this sample  The patient had already been on antibiotics for least 24 hours prior to collecting the sample  The patient's ESR and CRP were modestly elevated and the procalcitonin was negative  The patient was then monitored on IV antibiotics and showed continued improvement over the last few days  The patient states that she feels world better than when she came in  During the hospitalization the patient's oral opioid therapy was held due to concern that she may vomit this up and she was on an IV based pain medication regimen using 4-8 mg of morphine  Initially the patient was on a clear liquid diet but this was advanced to a full liquid diet with toast and crackers yesterday  This morning, as the patient feels much better, we are going to advance her to a low-fiber low residue diet and see how she does with this diet  If she tolerates the diet through lunch, then we will feel comfortable with releasing her from the hospital   Additionally, as the patient is feeling better and has had no additional nausea vomiting, we do feel comfortable transition the patient's pain medications back to her usual home regimen of oral pain pills  The patient will require 7 total days of antibiotics which includes another 5 days of antibiotics in the outpatient setting  I will prescribe cefdinir and Flagyl  On admission, the patient did have elevated blood pressures which would classify her as hypertensive urgency  There is no evidence of end-organ damage  It was felt that most likely the cause of the elevated blood pressures was the pain and vomiting  Once we would control the patient's symptoms, her blood pressures did improve and currently the blood pressures which were 182/85 on admission are now down to a range of 100/57 to 133/65  Treatment for the patient's other remaining conditions       Chief Complaint     Chief Complaint   Patient presents with    Transition of Care Management     Maria A Vieyra 9/19-9/21 colitis       History of Present Illness     I reviewed the patient's discharge summary from her latest hospitalization  Overall she is feeling much better since her discharge  She is still on her oral antibiotics as ordered from hospital   She is still having some loose bowel movements but only perhaps once daily  She is slowly dancing her diet  States she is still in touch with her behavioral counselor on a regular basis  Unfortunately she is still awaiting a decision from social security in regards to her application for permanent disability    She does have financial concerns if decision takes much longer since her  was also recently laid off for work  Review of Systems   Review of Systems   Constitutional: Positive for fatigue  Negative for fever  Respiratory: Negative  Cardiovascular: Negative  Gastrointestinal:        As per HPI   Genitourinary: Negative  Musculoskeletal: Positive for arthralgias and myalgias  Skin:        Patient still has tenderness along incisions side of left mastectomy which she had in August 2020  Neurological: Negative  Psychiatric/Behavioral: Positive for decreased concentration, dysphoric mood and sleep disturbance  The patient is nervous/anxious          Active Problem List     Patient Active Problem List   Diagnosis    UTI (urinary tract infection)    Hypertension    Systemic lupus erythematosus ([address] )    Hypothyroidism    Posttraumatic stress disorder    Adult celiac disease    Anxiety    Depression    Esophagitis    Nephrolithiasis    Vitamin D deficiency    Malignant neoplasm of upper-inner quadrant of left breast in female, estrogen receptor negative ([address] )    Chemotherapy induced neutropenia (HCC)    Leukocytosis    Increased anion gap metabolic acidosis    Port-A-Cath in place    SIRS (systemic inflammatory response syndrome) (HCC)    JEFERSON (acute kidney injury) ([address] )    Hyponatremia    Hypokalemia    Anemia    Hypotension    Colitis       Past Medical History:  Past Medical History:   Diagnosis Date    Disease of thyroid gland     Hypertension     Lupus ([address] )     Malignant neoplasm of upper-inner quadrant of left breast in female, estrogen receptor negative ([facility] [facility] ) 2/25/2020    Nephrolithiasis     PTSD (post-traumatic stress disorder)        Past Surgical History:  Past Surgical History:   Procedure Laterality Date    FEMUR FRACTURE SURGERY      FL GUIDED CENTRAL VENOUS ACCESS DEVICE INSERTION  3/17/2020    HYSTERECTOMY      LEFT OOPHORECTOMY      due to torsion     MAMMO STEREOTACTIC BREAST BIOPSY RIGHT (ALL INC) Right 2/12/2020    MASTECTOMY W/ SENTINEL NODE BIOPSY Left 8/18/2020    Procedure: BREAST MASTECTOMY WITH SENTINEL LYMPH NODE BIOPSY, LYMPHATIC MAPPING WITH BLUE DYE AND RADIOACTIVE DYE (INJECT AT 1430 BY DR WRIGHT IN THE OR);   Surgeon: Richard Garzon MD;  [facility];  Service: Surgical Oncology    MRI BREAST BIOPSY LEFT (ALL INCLUSIVE) Left 3/20/2020    AK INSJ TUNNELED CTR VAD W/SUBQ PORT AGE 5 YR/> N/A 3/17/2020    Procedure: INSERTION VENOUS PORT ( PORT-A-CATH) IR;  Surgeon: Adonis Cormier DO;  [facility];  Service: Interventional Radiology    US GUIDANCE BREAST BIOPSY LEFT EACH ADDITIONAL Left 2/12/2020    US GUIDED BREAST BIOPSY LEFT COMPLETE Left 2/12/2020    VAGINA SURGERY         Family History:  Family History   Problem Relation Age of Onset    Diabetes Mother     Hypertension Mother     Nephrolithiasis Mother     Breast cancer Mother 79    Heart disease Father     Hypertension Father     Diabetes Brother     Nephrolithiasis Brother     Breast cancer Maternal Aunt     No Known Problems Maternal Grandmother     No Known Problems Maternal Grandfather     No Known Problems Paternal Grandmother     No Known Problems Paternal Grandfather        Social History:  Social History     Socioeconomic History    Marital status: /Civil Union     Spouse name: Not on file    Number of children: Not on file    Years of education: Not on file    Highest education level: Not on file   Occupational History    Not on file   Social Needs    Financial resource strain: Not on file    Food insecurity     Worry: Not on file     Inability: Not on file   [employer] needs     Medical: Not on file     Non-medical: Not on file   Tobacco Use    Smoking status: Current Every Day Smoker     Packs/day: 0 25     Years: 40 00     Pack years: 10 00     Types: Cigarettes     Start date: 1990    Smokeless tobacco: Never Used    Tobacco comment:  5 ppd per [employer]   Substance and Sexual Activity    Alcohol use: Not Currently     Alcohol/week: 21 0 standard drinks     Types: 21 Cans of beer per week     Frequency: Never     Drinks per session: Patient refused     Binge frequency: Never     Comment: pt states she had her last beer 3/22/2020    Drug use: No    Sexual activity: Not Currently     Partners: Male     Birth control/protection: None   Lifestyle    Physical activity     Days per week: Not on file     Minutes per session: Not on file    Stress: Not on file   Relationships    Social connections     Talks on phone: Not on file     Gets together: Not on file     Attends Gnosticism service: Not on file     Active member of club or organization: Not on file     Attends meetings of clubs or organizations: Not on file     Relationship status: Not on file    Intimate partner violence     Fear of current or ex partner: Not on file     Emotionally abused: Not on file     Physically abused: Not on file     Forced sexual activity: Not on file   Other Topics Concern    Not on file   Social History Narrative    Not on file       Objective     Vitals:    09/25/20 1001   BP: 114/80   Pulse: (!) 106   Resp: 16   Temp: 97 8 °F (36 6 °C)   SpO2: 98%     Wt Readings from Last 3 Encounters:   09/25/20 58 2 kg (128 lb 6 4 oz)   09/21/20 59 1 kg (130 lb 4 7 oz)   08/31/20 59 9 kg (132 lb)       Physical Exam  Constitutional:       Appearance: Normal appearance  Cardiovascular:      Heart sounds: Normal heart sounds  Comments: Regular rate and rhythm with no murmurs  Pulmonary:      Breath sounds: Normal breath sounds  Comments: Lungs are clear to auscultation without wheezes,rales, or rhonchi  Abdominal:      Comments: Abdomen is soft, nontender with positive bowel sounds  There is no rebound or guarding   No masses palpated   Musculoskeletal:      Comments: Patient has decreased range of motion of left arm above 90° due to pain from mastectomy scar  Skin:     Comments: Incision along left upper chest status post mastectomy appears to be healing well  There is a small 2 cm segment that appears to still require closure  There is no significant redness around this site  Skin appears to be granulating and there is no active discharge noted  The area is tender to palpation  Neurological:      Mental Status: She is alert  Psychiatric:      Comments: Patient had a somber mood but appear to have clear thought and judgment         Pertinent Laboratory/Diagnostic Studies:  Lab Results   Component Value Date    GLUCOSE 97 04/22/2015    BUN 3 (L) 09/21/2020    CREATININE 0 68 09/21/2020    CALCIUM 7 7 (L) 09/21/2020     (L) 04/22/2015    K 3 8 09/21/2020    CO2 24 09/21/2020     (H) 09/21/2020     Lab Results   Component Value Date    ALT 9 (L) 09/20/2020    AST 13 09/20/2020    ALKPHOS 58 09/20/2020    BILITOT 0 2 04/22/2015       Lab Results   Component Value Date    WBC 7 85 09/20/2020    HGB 10 6 (L) 09/20/2020    HCT 34 8 09/20/2020    MCV 96 09/20/2020     09/20/2020       No results found for: TSH    No results found for: CHOL  No results found for: TRIG  No results found for: HDL  No results found for: LDLCALC  No results found for: HGBA1C    Results for orders placed or performed during the hospital encounter of 09/19/20   Fecal leukocytes    Specimen: Per Rectum; Stool   Result Value Ref Range    White Blood Cells, Stool No white blood cells seen   None Seen   CBC and differential   Result Value Ref Range    WBC 11 97 (H) 4 31 - 10 16 Thousand/uL    RBC 4 54 3 81 - 5 12 Million/uL    Hemoglobin 13 3 11 5 - 15 4 g/dL    Hematocrit 42 4 34 8 - 46 1 %    MCV 93 82 - 98 fL    MCH 29 3 26 8 - 34 3 pg    MCHC 31 4 31 4 - 37 4 g/dL    RDW 15 9 (H) 11 6 - 15 1 %    MPV 10 0 8 9 - 12 7 fL    Platelets 552 632 - 035 Thousands/uL    nRBC 0 /100 WBCs    Neutrophils Relative 92 (H) 43 - 75 %    Immat GRANS % 0 0 - 2 % Lymphocytes Relative 5 (L) 14 - 44 %    Monocytes Relative 3 (L) 4 - 12 %    Eosinophils Relative 0 0 - 6 %    Basophils Relative 0 0 - 1 %    Neutrophils Absolute 10 88 (H) 1 85 - 7 62 Thousands/µL    Immature Grans Absolute 0 05 0 00 - 0 20 Thousand/uL    Lymphocytes Absolute 0 61 0 60 - 4 47 Thousands/µL    Monocytes Absolute 0 38 0 17 - 1 22 Thousand/µL    Eosinophils Absolute 0 00 0 00 - 0 61 Thousand/µL    Basophils Absolute 0 05 0 00 - 0 10 Thousands/µL   Protime-INR   Result Value Ref Range    Protime 12 7 11 6 - 14 5 seconds    INR 0 94 0 84 - 1 19   APTT   Result Value Ref Range    PTT 29 23 - 37 seconds   Comprehensive metabolic panel   Result Value Ref Range    Sodium 139 136 - 145 mmol/L    Potassium 4 0 3 5 - 5 3 mmol/L    Chloride 105 100 - 108 mmol/L    CO2 20 (L) 21 - 32 mmol/L    ANION GAP 14 (H) 4 - 13 mmol/L    BUN 8 5 - 25 mg/dL    Creatinine 1 03 0 60 - 1 30 mg/dL    Glucose 127 65 - 140 mg/dL    Calcium 9 6 8 3 - 10 1 mg/dL    AST 14 5 - 45 U/L    ALT 12 12 - 78 U/L    Alkaline Phosphatase 80 46 - 116 U/L    Total Protein 8 1 6 4 - 8 2 g/dL    Albumin 3 9 3 5 - 5 0 g/dL    Total Bilirubin 0 18 (L) 0 20 - 1 00 mg/dL    eGFR 60 ml/min/1 73sq m   Lipase   Result Value Ref Range    Lipase 154 73 - 393 u/L   Troponin I   Result Value Ref Range    Troponin I <0 02 <=0 04 ng/mL   Urine Microscopic   Result Value Ref Range    RBC, UA 0-1 (A) None Seen, 0-5 /hpf    WBC, UA None Seen None Seen, 0-5, 5-55, 5-65 /hpf    Epithelial Cells Occasional None Seen, Occasional /hpf    Bacteria, UA None Seen None Seen, Occasional /hpf   C-reactive protein   Result Value Ref Range    CRP 5 6 (H) <3 0 mg/L   Sedimentation rate, automated   Result Value Ref Range    Sed Rate 47 (H) 0 - 29 mm/hour   CBC and differential   Result Value Ref Range    WBC 7 85 4 31 - 10 16 Thousand/uL    RBC 3 63 (L) 3 81 - 5 12 Million/uL    Hemoglobin 10 6 (L) 11 5 - 15 4 g/dL    Hematocrit 34 8 34 8 - 46 1 %    MCV 96 82 - 98 fL    MCH 29 2 26 8 - 34 3 pg    MCHC 30 5 (L) 31 4 - 37 4 g/dL    RDW 16 2 (H) 11 6 - 15 1 %    MPV 9 4 8 9 - 12 7 fL    Platelets 350 301 - 161 Thousands/uL    nRBC 0 /100 WBCs    Neutrophils Relative 61 43 - 75 %    Immat GRANS % 0 0 - 2 %    Lymphocytes Relative 29 14 - 44 %    Monocytes Relative 9 4 - 12 %    Eosinophils Relative 1 0 - 6 %    Basophils Relative 0 0 - 1 %    Neutrophils Absolute 4 67 1 85 - 7 62 Thousands/µL    Immature Grans Absolute 0 03 0 00 - 0 20 Thousand/uL    Lymphocytes Absolute 2 28 0 60 - 4 47 Thousands/µL    Monocytes Absolute 0 74 0 17 - 1 22 Thousand/µL    Eosinophils Absolute 0 10 0 00 - 0 61 Thousand/µL    Basophils Absolute 0 03 0 00 - 0 10 Thousands/µL   Comprehensive metabolic panel   Result Value Ref Range    Sodium 141 136 - 145 mmol/L    Potassium 3 3 (L) 3 5 - 5 3 mmol/L    Chloride 108 100 - 108 mmol/L    CO2 22 21 - 32 mmol/L    ANION GAP 11 4 - 13 mmol/L    BUN 6 5 - 25 mg/dL    Creatinine 0 83 0 60 - 1 30 mg/dL    Glucose 95 65 - 140 mg/dL    Calcium 7 5 (L) 8 3 - 10 1 mg/dL    Corrected Calcium 8 4 8 3 - 10 1 mg/dL    AST 13 5 - 45 U/L    ALT 9 (L) 12 - 78 U/L    Alkaline Phosphatase 58 46 - 116 U/L    Total Protein 6 1 (L) 6 4 - 8 2 g/dL    Albumin 2 9 (L) 3 5 - 5 0 g/dL    Total Bilirubin 0 12 (L) 0 20 - 1 00 mg/dL    eGFR 79 ml/min/1 73sq m   Procalcitonin   Result Value Ref Range    Procalcitonin <0 05 <=0 25 ng/ml   Procalcitonin Reflex   Result Value Ref Range    Procalcitonin <0 05 <=0 25 ng/ml   Basic metabolic panel   Result Value Ref Range    Sodium 143 136 - 145 mmol/L    Potassium 3 8 3 5 - 5 3 mmol/L    Chloride 111 (H) 100 - 108 mmol/L    CO2 24 21 - 32 mmol/L    ANION GAP 8 4 - 13 mmol/L    BUN 3 (L) 5 - 25 mg/dL    Creatinine 0 68 0 60 - 1 30 mg/dL    Glucose 88 65 - 140 mg/dL    Calcium 7 7 (L) 8 3 - 10 1 mg/dL    eGFR 97 ml/min/1 73sq m   Magnesium   Result Value Ref Range    Magnesium 2 1 1 6 - 2 6 mg/dL   ECG 12 lead   Result Value Ref Range    Ventricular Rate 72 BPM    Atrial Rate 72 BPM    DC Interval 178 ms    QRSD Interval 70 ms    QT Interval 406 ms    QTC Interval 444 ms    P Axis 63 degrees    QRS Axis 71 degrees    T Wave Axis 63 degrees   Urine Macroscopic, POC   Result Value Ref Range    Color, UA Yellow     Clarity, UA Clear     pH, UA 7 0 4 5 - 8 0    Leukocytes, UA Negative Negative    Nitrite, UA Negative Negative    Protein, UA Trace (A) Negative mg/dl    Glucose, UA Negative Negative mg/dl    Ketones, UA Negative Negative mg/dl    Urobilinogen, UA 0 2 0 2, 1 0 E U /dl E U /dl    Bilirubin, UA Negative Negative    Blood, UA Negative Negative    Specific Gravity, UA 1 025 1 003 - 1 030       No orders of the defined types were placed in this encounter  ALLERGIES:  Allergies   Allergen Reactions    Mobic [Meloxicam] Eye Swelling     Reaction Date: 12Aug2011;     Methotrexate Rash    Sulfa Antibiotics Rash       Current Medications     Current Outpatient Medications   Medication Sig Dispense Refill    acetaminophen (TYLENOL) 325 mg tablet Take 2 tablets (650 mg total) by mouth every 6 (six) hours as needed for mild pain, headaches or fever 30 tablet 0    ALPRAZolam (XANAX) 1 mg tablet TAKE 1 TABLET BY MOUTH 3 TIMES A DAY AS NEEDED FOR ANXIETY  90 tablet 0    cefdinir (OMNICEF) 300 mg capsule Take 1 capsule (300 mg total) by mouth every 12 (twelve) hours for 5 days 10 capsule 0    Cholecalciferol (VITAMIN D3) 99478 units CAPS Take 50,000 Units by mouth once a week       cyclobenzaprine (FLEXERIL) 5 mg tablet Take 10 mg by mouth daily at bedtime as needed      dexamethasone (DECADRON) 0 5 mg tablet TAKE 1 TABLET BY MOUTH DAILY WITH BREAKFAST   30 tablet 0    hydroxychloroquine (PLAQUENIL) 200 mg tablet Take 1 tablet in morning and 1/2 tablet in evening      levothyroxine 88 mcg tablet TAKE ONE TABLET BY MOUTH EVERY DAY 30 tablet 5    metroNIDAZOLE (FLAGYL) 500 mg tablet Take 1 tablet (500 mg total) by mouth every 8 (eight) hours for 5 days 15 tablet 0    Nutritional Supplements (OSTEOPOROSIS SUPPORT PO) Take by mouth      ondansetron (ZOFRAN) 4 mg tablet Take 1 tablet (4 mg total) by mouth every 8 (eight) hours as needed for nausea or vomiting 30 tablet 0    oxyCODONE (ROXICODONE) 10 MG TABS Take 1-1 5 tablets (10-15 mg total) by mouth every 4 (four) hours as needed (cancer pain and post-op pain  Max of 8 tabs / day ) 240 tablet 0    sertraline (ZOLOFT) 100 mg tablet TAKE 1 & 1/2 TABLETS BY MOUTH ONCE DAILY (Patient taking differently: Take 150 mg by mouth daily BY MOUTH ONCE DAILY) 45 tablet 5    CRANIAL PROSTHESIS, RX, One wig as needed (Patient not taking: Reported on 9/25/2020) 1 Piece 0    cyclobenzaprine (FLEXERIL) 10 mg tablet Take 10 mg by mouth daily at bedtime       magnesium oxide (MAG-OX) 400 mg Take 1 tablet (400 mg total) by mouth 2 (two) times a day 60 tablet 5     No current facility-administered medications for this visit  Health Maintenance     Health Maintenance   Topic Date Due    Hepatitis C Screening  1965    Pneumococcal Vaccine: Pediatrics (0 to 5 Years) and At-Risk Patients (6 to 59 Years) (1 of 1 - PPSV23) 08/29/1969    HIV Screening  08/29/1978    Annual Physical  08/29/1981    DTaP,Tdap,and Td Vaccines (1 - Tdap) 08/29/1984    Influenza Vaccine  07/01/2020    MAMMOGRAM  02/04/2021    BMI: Adult  09/25/2021    Colorectal Cancer Screening  10/09/2027    HIB Vaccine  Aged Out    Hepatitis B Vaccine  Aged Out    IPV Vaccine  Aged Out    Hepatitis A Vaccine  Aged Out    Meningococcal ACWY Vaccine  Aged Out    HPV Vaccine  Aged Out     There is no immunization history for the selected administration types on file for this patient      Paulo Hassan MD

## 2020-09-25 NOTE — ASSESSMENT & PLAN NOTE
Anxiety  Patient continues to have contact with her counselor on a regular basis    She continues to use her sertraline and Xanax as ordered

## 2020-09-25 NOTE — ASSESSMENT & PLAN NOTE
Hypertension    Patient blood pressure is stable even after discontinuing her blood pressure medicine over the past month as per hospital instructions

## 2020-09-27 LAB — MISCELLANEOUS LAB TEST RESULT: NORMAL

## 2020-09-30 ENCOUNTER — OFFICE VISIT (OUTPATIENT)
Dept: PALLIATIVE MEDICINE | Facility: CLINIC | Age: 55
End: 2020-09-30
Payer: COMMERCIAL

## 2020-09-30 VITALS
SYSTOLIC BLOOD PRESSURE: 110 MMHG | RESPIRATION RATE: 16 BRPM | HEART RATE: 90 BPM | DIASTOLIC BLOOD PRESSURE: 78 MMHG | TEMPERATURE: 96.1 F | HEIGHT: 63 IN | WEIGHT: 130.95 LBS | OXYGEN SATURATION: 97 % | BODY MASS INDEX: 23.2 KG/M2

## 2020-09-30 DIAGNOSIS — C50.212 MALIGNANT NEOPLASM OF UPPER-INNER QUADRANT OF LEFT BREAST IN FEMALE, ESTROGEN RECEPTOR NEGATIVE (HCC): ICD-10-CM

## 2020-09-30 DIAGNOSIS — F41.9 ANXIETY: Primary | ICD-10-CM

## 2020-09-30 DIAGNOSIS — Z17.1 MALIGNANT NEOPLASM OF UPPER-INNER QUADRANT OF LEFT BREAST IN FEMALE, ESTROGEN RECEPTOR NEGATIVE (HCC): ICD-10-CM

## 2020-09-30 DIAGNOSIS — G89.3 CANCER RELATED PAIN: ICD-10-CM

## 2020-09-30 PROCEDURE — 99213 OFFICE O/P EST LOW 20 MIN: CPT | Performed by: NURSE PRACTITIONER

## 2020-09-30 PROCEDURE — 3078F DIAST BP <80 MM HG: CPT | Performed by: NURSE PRACTITIONER

## 2020-09-30 PROCEDURE — 3074F SYST BP LT 130 MM HG: CPT | Performed by: NURSE PRACTITIONER

## 2020-09-30 NOTE — PROGRESS NOTES
Outpatient Follow-Up - Palliative and Supportive Care   Yessy Love 60 y o  female [id number]    Assessment & Plan  1  Anxiety    2  Malignant neoplasm of upper-inner quadrant of left breast in female, estrogen receptor negative ([facility] [address] )    3  Cancer related pain        Medications adjusted this encounter:  Requested Prescriptions      No prescriptions requested or ordered in this encounter     No orders of the defined types were placed in this encounter  There are no discontinued medications  Ms Shaina Price was seen today for symptoms and planning cares related to above illnesses  I have reviewed the patient's controlled substance dispensing history in the Prescription Drug Monitoring Program in compliance with the [facility] regulations before prescribing any controlled substances  She is invited to continue to follow with us  If there are questions or concerns, please contact us through our clinic/answering service 24 hours a day, seven days a week  April ANNETTE Yu  [facility] Palliative and Supportive Care        Visit Information    Accompanied By: No one    Source of History: Self    History Limitations: None      Follow up visit for:  symptom management, pain, neoplasm related    History of Present Illness     Donya is a patient currently undergoing treatment for stage IIa right breast cancer and is under the care of Dr Renetta Faye  She was diagnosed in February and underwent chemotherapy prior to having a right mastectomy in August   Her past medical history is complicated by Lupus and PTSD  Donya reports her pain managed well on her current regimen  She uses oxycodone 10 mg - 15mg  Q 4 hours as needed for pain, she reports taking 15 mg in the mornings as this is when her pain is the worst   Depending on her plans for the day she may take oxycodone 2-3 time a day, typically 3 times a day when she is more active and performing tasks such as cleaning which make the pain worse         She is sleeping well and attributes that to using flexeril for joint muscle pain prior to sleep  Previously she would wake up frequently secondary to pain associated with fibromyalgia which is being treated by Dr Elier Lockhart       She expresses concern about weight gain and abdominal distention, which she discussed with her PCP, Dr Germán Schroeder  She continues with counseling regularly and finds it very beneficial and describes a good rapport with her counselor  She feels like she is close to being back to "[place]" as she would describe herself to be prior to trauma  No medications changes made  Past medical, surgical, social, and family histories are reviewed and pertinent updates are made  Review of Systems   Constitution: Positive for weight gain  Gastrointestinal:        Inconsistent bowel pattern          Vital Signs    LMP  (LMP Unknown)     Physical Exam and Objective Data  Physical Exam  Constitutional:       General: She is awake  Cardiovascular:      Rate and Rhythm: Normal rate  Pulmonary:      Effort: Pulmonary effort is normal    Abdominal:      Palpations: Abdomen is soft  Skin:     General: Skin is warm and dry  Neurological:      General: No focal deficit present  Mental Status: She is alert and oriented to person, place, and time  Psychiatric:         Attention and Perception: Attention normal          Mood and Affect: Mood normal          Speech: Speech normal          Behavior: Behavior is cooperative

## 2020-09-30 NOTE — PATIENT INSTRUCTIONS
PRESCRIPTION REFILL REMINDER:  All medication refills should be requested prior to [facility] on Friday  Any refill requests after noon on Friday would be addressed the following Monday  Please protect yourself from the novel Coronavirus (COVID-19)! Even though we STILL do not have a vaccine or good antiviral drugs for this infection, the following strategies can help you stay healthy:    = Wash your hands! Soap and water, or hand  with at least 60% alcohol, are both effective at killing the virus  = Wear a mask! This will help protect others from any virus particles you might spread  Your mouth and nose BOTH need to be covered  = Keep the distance! Keep 6 feet of distance from others people, even if they seem healthy  Keeping distance protects you from the other person's virus spread  = Clean frequently touched surfaces and objects daily (e g , tables, countertops, light switches, doorknobs, and cabinet handles)  Regular household detergent and water are sufficient  Numbers of Coronavirus cases are spiking in many [place] States  This is not a more dangerous virus, but a sign that more people in a community are spreading the virus  Please check the local disease reports near you if you consider travelling  We do NOT advise travel to any community or State with a rising viral caseload  Check out [employer] for Elizabeth data that are updated daily:    http://www [employer]/     Global Epidemics  Org, from [facility] (OUTPATIENT CAMPUS), will give you [id number] information on virus cases:    Https://globalepidemics  org/

## 2020-10-06 DIAGNOSIS — F41.9 ANXIETY: ICD-10-CM

## 2020-10-06 RX ORDER — ALPRAZOLAM 1 MG/1
TABLET ORAL
Qty: 90 TABLET | Refills: 0 | Status: SHIPPED | OUTPATIENT
Start: 2020-10-06 | End: 2020-11-02

## 2020-10-13 ENCOUNTER — OFFICE VISIT (OUTPATIENT)
Dept: HEMATOLOGY ONCOLOGY | Facility: CLINIC | Age: 55
End: 2020-10-13
Payer: COMMERCIAL

## 2020-10-13 VITALS
TEMPERATURE: 97 F | RESPIRATION RATE: 18 BRPM | SYSTOLIC BLOOD PRESSURE: 122 MMHG | HEART RATE: 116 BPM | OXYGEN SATURATION: 99 % | HEIGHT: 64 IN | BODY MASS INDEX: 23.56 KG/M2 | WEIGHT: 138 LBS | DIASTOLIC BLOOD PRESSURE: 64 MMHG

## 2020-10-13 DIAGNOSIS — T45.1X5A CHEMOTHERAPY INDUCED NEUTROPENIA (HCC): ICD-10-CM

## 2020-10-13 DIAGNOSIS — C50.212 MALIGNANT NEOPLASM OF UPPER-INNER QUADRANT OF LEFT BREAST IN FEMALE, ESTROGEN RECEPTOR NEGATIVE (HCC): Primary | ICD-10-CM

## 2020-10-13 DIAGNOSIS — Z17.1 MALIGNANT NEOPLASM OF UPPER-INNER QUADRANT OF LEFT BREAST IN FEMALE, ESTROGEN RECEPTOR NEGATIVE (HCC): Primary | ICD-10-CM

## 2020-10-13 DIAGNOSIS — D70.1 CHEMOTHERAPY INDUCED NEUTROPENIA (HCC): ICD-10-CM

## 2020-10-13 PROCEDURE — 4004F PT TOBACCO SCREEN RCVD TLK: CPT | Performed by: INTERNAL MEDICINE

## 2020-10-13 PROCEDURE — 99215 OFFICE O/P EST HI 40 MIN: CPT | Performed by: INTERNAL MEDICINE

## 2020-10-13 RX ORDER — SODIUM CHLORIDE 9 MG/ML
20 INJECTION, SOLUTION INTRAVENOUS ONCE
Status: CANCELLED | OUTPATIENT
Start: 2021-02-22

## 2020-10-13 RX ORDER — SODIUM CHLORIDE 9 MG/ML
20 INJECTION, SOLUTION INTRAVENOUS ONCE
Status: CANCELLED | OUTPATIENT
Start: 2020-12-21

## 2020-10-13 RX ORDER — SODIUM CHLORIDE 9 MG/ML
20 INJECTION, SOLUTION INTRAVENOUS ONCE
Status: CANCELLED | OUTPATIENT
Start: 2021-02-01

## 2020-10-13 RX ORDER — SODIUM CHLORIDE 9 MG/ML
20 INJECTION, SOLUTION INTRAVENOUS ONCE
Status: CANCELLED | OUTPATIENT
Start: 2021-01-11

## 2020-10-15 DIAGNOSIS — R11.2 INTRACTABLE NAUSEA AND VOMITING: ICD-10-CM

## 2020-10-16 RX ORDER — DEXAMETHASONE 0.5 MG/1
TABLET ORAL
Qty: 30 TABLET | Refills: 0 | Status: SHIPPED | OUTPATIENT
Start: 2020-10-16 | End: 2020-11-16 | Stop reason: SDUPTHER

## 2020-10-18 ENCOUNTER — APPOINTMENT (EMERGENCY)
Dept: CT IMAGING | Facility: HOSPITAL | Age: 55
DRG: 392 | End: 2020-10-18
Payer: COMMERCIAL

## 2020-10-18 ENCOUNTER — HOSPITAL ENCOUNTER (INPATIENT)
Facility: HOSPITAL | Age: 55
LOS: 2 days | Discharge: HOME | DRG: 392 | End: 2020-10-21
Attending: EMERGENCY MEDICINE | Admitting: FAMILY MEDICINE
Payer: COMMERCIAL

## 2020-10-18 DIAGNOSIS — K92.1 MELENA: ICD-10-CM

## 2020-10-18 DIAGNOSIS — R11.2 INTRACTABLE NAUSEA AND VOMITING: ICD-10-CM

## 2020-10-18 DIAGNOSIS — R11.2 NAUSEA & VOMITING: Primary | ICD-10-CM

## 2020-10-18 DIAGNOSIS — N17.9 AKI (ACUTE KIDNEY INJURY) (HCC): ICD-10-CM

## 2020-10-18 LAB
ALBUMIN SERPL BCP-MCNC: 4.2 G/DL (ref 3.5–5)
ALP SERPL-CCNC: 82 U/L (ref 46–116)
ALT SERPL W P-5'-P-CCNC: 13 U/L (ref 12–78)
ANION GAP SERPL CALCULATED.3IONS-SCNC: 18 MMOL/L (ref 4–13)
AST SERPL W P-5'-P-CCNC: 16 U/L (ref 5–45)
BASOPHILS # BLD AUTO: 0.05 THOUSANDS/ΜL (ref 0–0.1)
BASOPHILS NFR BLD AUTO: 1 % (ref 0–1)
BILIRUB SERPL-MCNC: 0.26 MG/DL (ref 0.2–1)
BUN SERPL-MCNC: 10 MG/DL (ref 5–25)
CALCIUM SERPL-MCNC: 9.5 MG/DL (ref 8.3–10.1)
CHLORIDE SERPL-SCNC: 102 MMOL/L (ref 100–108)
CO2 SERPL-SCNC: 20 MMOL/L (ref 21–32)
CREAT SERPL-MCNC: 1.22 MG/DL (ref 0.6–1.3)
EOSINOPHIL # BLD AUTO: 0 THOUSAND/ΜL (ref 0–0.61)
EOSINOPHIL NFR BLD AUTO: 0 % (ref 0–6)
ERYTHROCYTE [DISTWIDTH] IN BLOOD BY AUTOMATED COUNT: 16.2 % (ref 11.6–15.1)
GFR SERPL CREATININE-BSD FRML MDRD: 49 ML/MIN/1.73SQ M
GLUCOSE SERPL-MCNC: 126 MG/DL (ref 65–140)
HCT VFR BLD AUTO: 43.1 % (ref 34.8–46.1)
HGB BLD-MCNC: 14 G/DL (ref 11.5–15.4)
IMM GRANULOCYTES # BLD AUTO: 0.03 THOUSAND/UL (ref 0–0.2)
IMM GRANULOCYTES NFR BLD AUTO: 0 % (ref 0–2)
LIPASE SERPL-CCNC: 103 U/L (ref 73–393)
LYMPHOCYTES # BLD AUTO: 0.85 THOUSANDS/ΜL (ref 0.6–4.47)
LYMPHOCYTES NFR BLD AUTO: 8 % (ref 14–44)
MCH RBC QN AUTO: 29.5 PG (ref 26.8–34.3)
MCHC RBC AUTO-ENTMCNC: 32.5 G/DL (ref 31.4–37.4)
MCV RBC AUTO: 91 FL (ref 82–98)
MONOCYTES # BLD AUTO: 0.39 THOUSAND/ΜL (ref 0.17–1.22)
MONOCYTES NFR BLD AUTO: 4 % (ref 4–12)
NEUTROPHILS # BLD AUTO: 9.73 THOUSANDS/ΜL (ref 1.85–7.62)
NEUTS SEG NFR BLD AUTO: 87 % (ref 43–75)
NRBC BLD AUTO-RTO: 0 /100 WBCS
PLATELET # BLD AUTO: 352 THOUSANDS/UL (ref 149–390)
PMV BLD AUTO: 10.1 FL (ref 8.9–12.7)
POTASSIUM SERPL-SCNC: 4 MMOL/L (ref 3.5–5.3)
PROT SERPL-MCNC: 8.2 G/DL (ref 6.4–8.2)
RBC # BLD AUTO: 4.75 MILLION/UL (ref 3.81–5.12)
SODIUM SERPL-SCNC: 140 MMOL/L (ref 136–145)
TROPONIN I SERPL-MCNC: <0.02 NG/ML
WBC # BLD AUTO: 11.05 THOUSAND/UL (ref 4.31–10.16)

## 2020-10-18 PROCEDURE — 85025 COMPLETE CBC W/AUTO DIFF WBC: CPT | Performed by: EMERGENCY MEDICINE

## 2020-10-18 PROCEDURE — G1004 CDSM NDSC: HCPCS

## 2020-10-18 PROCEDURE — 74177 CT ABD & PELVIS W/CONTRAST: CPT

## 2020-10-18 PROCEDURE — 96375 TX/PRO/DX INJ NEW DRUG ADDON: CPT

## 2020-10-18 PROCEDURE — 36415 COLL VENOUS BLD VENIPUNCTURE: CPT | Performed by: EMERGENCY MEDICINE

## 2020-10-18 PROCEDURE — 96361 HYDRATE IV INFUSION ADD-ON: CPT

## 2020-10-18 PROCEDURE — 84484 ASSAY OF TROPONIN QUANT: CPT | Performed by: EMERGENCY MEDICINE

## 2020-10-18 PROCEDURE — 99226 PR SBSQ OBSERVATION CARE/DAY 35 MINUTES: CPT | Performed by: FAMILY MEDICINE

## 2020-10-18 PROCEDURE — 99285 EMERGENCY DEPT VISIT HI MDM: CPT

## 2020-10-18 PROCEDURE — 96374 THER/PROPH/DIAG INJ IV PUSH: CPT

## 2020-10-18 PROCEDURE — 83690 ASSAY OF LIPASE: CPT | Performed by: EMERGENCY MEDICINE

## 2020-10-18 PROCEDURE — 96376 TX/PRO/DX INJ SAME DRUG ADON: CPT

## 2020-10-18 PROCEDURE — 93005 ELECTROCARDIOGRAM TRACING: CPT

## 2020-10-18 PROCEDURE — 99285 EMERGENCY DEPT VISIT HI MDM: CPT | Performed by: EMERGENCY MEDICINE

## 2020-10-18 PROCEDURE — 80053 COMPREHEN METABOLIC PANEL: CPT | Performed by: EMERGENCY MEDICINE

## 2020-10-18 RX ORDER — HYDROMORPHONE HCL/PF 1 MG/ML
1 SYRINGE (ML) INJECTION EVERY 4 HOURS PRN
Status: DISCONTINUED | OUTPATIENT
Start: 2020-10-18 | End: 2020-10-21 | Stop reason: HOSPADM

## 2020-10-18 RX ORDER — ALPRAZOLAM 0.5 MG/1
1 TABLET ORAL 3 TIMES DAILY PRN
Status: DISCONTINUED | OUTPATIENT
Start: 2020-10-18 | End: 2020-10-21 | Stop reason: HOSPADM

## 2020-10-18 RX ORDER — CYCLOBENZAPRINE HCL 10 MG
10 TABLET ORAL
Status: DISCONTINUED | OUTPATIENT
Start: 2020-10-18 | End: 2020-10-21 | Stop reason: HOSPADM

## 2020-10-18 RX ORDER — HYDROMORPHONE HCL/PF 1 MG/ML
1 SYRINGE (ML) INJECTION ONCE
Status: COMPLETED | OUTPATIENT
Start: 2020-10-18 | End: 2020-10-18

## 2020-10-18 RX ORDER — LEVOTHYROXINE SODIUM 88 UG/1
88 TABLET ORAL
Status: DISCONTINUED | OUTPATIENT
Start: 2020-10-19 | End: 2020-10-21 | Stop reason: HOSPADM

## 2020-10-18 RX ORDER — HYDROXYCHLOROQUINE SULFATE 200 MG/1
100 TABLET, FILM COATED ORAL EVERY EVENING
Status: DISCONTINUED | OUTPATIENT
Start: 2020-10-18 | End: 2020-10-21 | Stop reason: HOSPADM

## 2020-10-18 RX ORDER — HYDROXYCHLOROQUINE SULFATE 200 MG/1
200 TABLET, FILM COATED ORAL
Status: DISCONTINUED | OUTPATIENT
Start: 2020-10-19 | End: 2020-10-21 | Stop reason: HOSPADM

## 2020-10-18 RX ORDER — ONDANSETRON 2 MG/ML
4 INJECTION INTRAMUSCULAR; INTRAVENOUS EVERY 4 HOURS PRN
Status: DISCONTINUED | OUTPATIENT
Start: 2020-10-18 | End: 2020-10-21 | Stop reason: HOSPADM

## 2020-10-18 RX ORDER — ONDANSETRON 2 MG/ML
4 INJECTION INTRAMUSCULAR; INTRAVENOUS ONCE
Status: COMPLETED | OUTPATIENT
Start: 2020-10-18 | End: 2020-10-18

## 2020-10-18 RX ORDER — SODIUM CHLORIDE 9 MG/ML
100 INJECTION, SOLUTION INTRAVENOUS CONTINUOUS
Status: DISCONTINUED | OUTPATIENT
Start: 2020-10-18 | End: 2020-10-20

## 2020-10-18 RX ORDER — DEXAMETHASONE 0.5 MG/1
0.5 TABLET ORAL
Status: DISCONTINUED | OUTPATIENT
Start: 2020-10-19 | End: 2020-10-21 | Stop reason: HOSPADM

## 2020-10-18 RX ORDER — OXYCODONE HYDROCHLORIDE 10 MG/1
10 TABLET ORAL EVERY 4 HOURS PRN
Status: DISCONTINUED | OUTPATIENT
Start: 2020-10-18 | End: 2020-10-21 | Stop reason: HOSPADM

## 2020-10-18 RX ORDER — OXYCODONE HYDROCHLORIDE 5 MG/1
5 TABLET ORAL EVERY 4 HOURS PRN
Status: DISCONTINUED | OUTPATIENT
Start: 2020-10-18 | End: 2020-10-21 | Stop reason: HOSPADM

## 2020-10-18 RX ORDER — ACETAMINOPHEN 325 MG/1
650 TABLET ORAL EVERY 6 HOURS PRN
Status: DISCONTINUED | OUTPATIENT
Start: 2020-10-18 | End: 2020-10-21 | Stop reason: HOSPADM

## 2020-10-18 RX ADMIN — OXYCODONE HYDROCHLORIDE 10 MG: 10 TABLET ORAL at 20:02

## 2020-10-18 RX ADMIN — ONDANSETRON 4 MG: 2 INJECTION INTRAMUSCULAR; INTRAVENOUS at 16:02

## 2020-10-18 RX ADMIN — ALPRAZOLAM 1 MG: 0.5 TABLET ORAL at 20:09

## 2020-10-18 RX ADMIN — CYCLOBENZAPRINE HYDROCHLORIDE 10 MG: 10 TABLET, FILM COATED ORAL at 22:47

## 2020-10-18 RX ADMIN — HYDROMORPHONE HYDROCHLORIDE 1 MG: 1 INJECTION, SOLUTION INTRAMUSCULAR; INTRAVENOUS; SUBCUTANEOUS at 16:03

## 2020-10-18 RX ADMIN — MAGNESIUM OXIDE 400 MG: 400 TABLET ORAL at 20:04

## 2020-10-18 RX ADMIN — ONDANSETRON 4 MG: 2 INJECTION INTRAMUSCULAR; INTRAVENOUS at 17:59

## 2020-10-18 RX ADMIN — HYDROMORPHONE HYDROCHLORIDE 1 MG: 1 INJECTION, SOLUTION INTRAMUSCULAR; INTRAVENOUS; SUBCUTANEOUS at 22:47

## 2020-10-18 RX ADMIN — SODIUM CHLORIDE 1000 ML: 0.9 INJECTION, SOLUTION INTRAVENOUS at 16:02

## 2020-10-18 RX ADMIN — SODIUM CHLORIDE 1000 ML: 0.9 INJECTION, SOLUTION INTRAVENOUS at 17:54

## 2020-10-18 RX ADMIN — HYDROXYCHLOROQUINE SULFATE 100 MG: 200 TABLET, FILM COATED ORAL at 20:04

## 2020-10-18 RX ADMIN — SODIUM CHLORIDE 125 ML/HR: 0.9 INJECTION, SOLUTION INTRAVENOUS at 21:09

## 2020-10-18 RX ADMIN — IOHEXOL 100 ML: 350 INJECTION, SOLUTION INTRAVENOUS at 16:38

## 2020-10-19 ENCOUNTER — HOSPITAL ENCOUNTER (OUTPATIENT)
Dept: INFUSION CENTER | Facility: CLINIC | Age: 55
Discharge: HOME | End: 2020-10-19

## 2020-10-19 ENCOUNTER — ANESTHESIA EVENT (INPATIENT)
Dept: GASTROENTEROLOGY | Facility: HOSPITAL | Age: 55
DRG: 392 | End: 2020-10-19
Payer: COMMERCIAL

## 2020-10-19 PROBLEM — Z72.0 TOBACCO ABUSE: Status: ACTIVE | Noted: 2020-10-19

## 2020-10-19 PROBLEM — K92.1 MELENA: Status: ACTIVE | Noted: 2020-10-19

## 2020-10-19 PROBLEM — F11.20 CONTINUOUS OPIOID DEPENDENCE (HCC): Status: ACTIVE | Noted: 2020-10-19

## 2020-10-19 LAB
ANION GAP SERPL CALCULATED.3IONS-SCNC: 7 MMOL/L (ref 4–13)
BUN SERPL-MCNC: 7 MG/DL (ref 5–25)
CALCIUM SERPL-MCNC: 7.3 MG/DL (ref 8.3–10.1)
CHLORIDE SERPL-SCNC: 110 MMOL/L (ref 100–108)
CO2 SERPL-SCNC: 24 MMOL/L (ref 21–32)
CREAT SERPL-MCNC: 0.77 MG/DL (ref 0.6–1.3)
ERYTHROCYTE [DISTWIDTH] IN BLOOD BY AUTOMATED COUNT: 16.6 % (ref 11.6–15.1)
GFR SERPL CREATININE-BSD FRML MDRD: 86 ML/MIN/1.73SQ M
GLUCOSE P FAST SERPL-MCNC: 87 MG/DL (ref 65–99)
GLUCOSE SERPL-MCNC: 87 MG/DL (ref 65–140)
HCT VFR BLD AUTO: 34.5 % (ref 34.8–46.1)
HCT VFR BLD AUTO: 37.2 % (ref 34.8–46.1)
HGB BLD-MCNC: 10.9 G/DL (ref 11.5–15.4)
HGB BLD-MCNC: 11.5 G/DL (ref 11.5–15.4)
MCH RBC QN AUTO: 29.5 PG (ref 26.8–34.3)
MCHC RBC AUTO-ENTMCNC: 31.6 G/DL (ref 31.4–37.4)
MCV RBC AUTO: 93 FL (ref 82–98)
PLATELET # BLD AUTO: 265 THOUSANDS/UL (ref 149–390)
PMV BLD AUTO: 9.8 FL (ref 8.9–12.7)
POTASSIUM SERPL-SCNC: 4.1 MMOL/L (ref 3.5–5.3)
RBC # BLD AUTO: 3.7 MILLION/UL (ref 3.81–5.12)
SODIUM SERPL-SCNC: 141 MMOL/L (ref 136–145)
WBC # BLD AUTO: 7.64 THOUSAND/UL (ref 4.31–10.16)

## 2020-10-19 PROCEDURE — 99223 1ST HOSP IP/OBS HIGH 75: CPT | Performed by: PHYSICIAN ASSISTANT

## 2020-10-19 PROCEDURE — 85027 COMPLETE CBC AUTOMATED: CPT | Performed by: FAMILY MEDICINE

## 2020-10-19 PROCEDURE — C9113 INJ PANTOPRAZOLE SODIUM, VIA: HCPCS | Performed by: PHYSICIAN ASSISTANT

## 2020-10-19 PROCEDURE — 80048 BASIC METABOLIC PNL TOTAL CA: CPT | Performed by: FAMILY MEDICINE

## 2020-10-19 PROCEDURE — 92610 EVALUATE SWALLOWING FUNCTION: CPT

## 2020-10-19 PROCEDURE — 85018 HEMOGLOBIN: CPT | Performed by: PHYSICIAN ASSISTANT

## 2020-10-19 PROCEDURE — 99232 SBSQ HOSP IP/OBS MODERATE 35: CPT | Performed by: PHYSICIAN ASSISTANT

## 2020-10-19 PROCEDURE — 85014 HEMATOCRIT: CPT | Performed by: PHYSICIAN ASSISTANT

## 2020-10-19 RX ORDER — PANTOPRAZOLE SODIUM 40 MG/1
40 INJECTION, POWDER, FOR SOLUTION INTRAVENOUS EVERY 12 HOURS SCHEDULED
Status: DISCONTINUED | OUTPATIENT
Start: 2020-10-19 | End: 2020-10-20

## 2020-10-19 RX ADMIN — OXYCODONE HYDROCHLORIDE 10 MG: 10 TABLET ORAL at 14:23

## 2020-10-19 RX ADMIN — HYDROMORPHONE HYDROCHLORIDE 1 MG: 1 INJECTION, SOLUTION INTRAMUSCULAR; INTRAVENOUS; SUBCUTANEOUS at 16:41

## 2020-10-19 RX ADMIN — OXYCODONE HYDROCHLORIDE 5 MG: 5 TABLET ORAL at 10:39

## 2020-10-19 RX ADMIN — HYDROMORPHONE HYDROCHLORIDE 1 MG: 1 INJECTION, SOLUTION INTRAMUSCULAR; INTRAVENOUS; SUBCUTANEOUS at 22:15

## 2020-10-19 RX ADMIN — DEXAMETHASONE 0.5 MG: 0.5 TABLET ORAL at 10:30

## 2020-10-19 RX ADMIN — OXYCODONE HYDROCHLORIDE 10 MG: 10 TABLET ORAL at 21:18

## 2020-10-19 RX ADMIN — PANTOPRAZOLE SODIUM 40 MG: 40 INJECTION, POWDER, FOR SOLUTION INTRAVENOUS at 10:30

## 2020-10-19 RX ADMIN — SODIUM CHLORIDE 100 ML/HR: 0.9 INJECTION, SOLUTION INTRAVENOUS at 14:21

## 2020-10-19 RX ADMIN — ALPRAZOLAM 1 MG: 0.5 TABLET ORAL at 10:40

## 2020-10-19 RX ADMIN — PANTOPRAZOLE SODIUM 40 MG: 40 INJECTION, POWDER, FOR SOLUTION INTRAVENOUS at 21:18

## 2020-10-19 RX ADMIN — ENOXAPARIN SODIUM 40 MG: 40 INJECTION SUBCUTANEOUS at 08:40

## 2020-10-19 RX ADMIN — LEVOTHYROXINE SODIUM 88 MCG: 88 TABLET ORAL at 05:16

## 2020-10-19 RX ADMIN — CYCLOBENZAPRINE HYDROCHLORIDE 10 MG: 10 TABLET, FILM COATED ORAL at 21:42

## 2020-10-19 RX ADMIN — OXYCODONE HYDROCHLORIDE 10 MG: 10 TABLET ORAL at 05:17

## 2020-10-19 RX ADMIN — MAGNESIUM OXIDE 400 MG: 400 TABLET ORAL at 16:42

## 2020-10-19 RX ADMIN — HYDROXYCHLOROQUINE SULFATE 100 MG: 200 TABLET, FILM COATED ORAL at 16:42

## 2020-10-19 RX ADMIN — HYDROXYCHLOROQUINE SULFATE 200 MG: 200 TABLET, FILM COATED ORAL at 10:30

## 2020-10-19 RX ADMIN — HYDROMORPHONE HYDROCHLORIDE 1 MG: 1 INJECTION, SOLUTION INTRAMUSCULAR; INTRAVENOUS; SUBCUTANEOUS at 08:41

## 2020-10-19 RX ADMIN — ALPRAZOLAM 1 MG: 0.5 TABLET ORAL at 21:42

## 2020-10-19 RX ADMIN — SERTRALINE HYDROCHLORIDE 150 MG: 100 TABLET ORAL at 08:41

## 2020-10-19 RX ADMIN — MAGNESIUM OXIDE 400 MG: 400 TABLET ORAL at 08:41

## 2020-10-19 RX ADMIN — SODIUM CHLORIDE 125 ML/HR: 0.9 INJECTION, SOLUTION INTRAVENOUS at 05:22

## 2020-10-20 ENCOUNTER — TELEPHONE (OUTPATIENT)
Dept: GASTROENTEROLOGY | Facility: AMBULARY SURGERY CENTER | Age: 55
End: 2020-10-20

## 2020-10-20 ENCOUNTER — APPOINTMENT (INPATIENT)
Dept: GASTROENTEROLOGY | Facility: HOSPITAL | Age: 55
DRG: 392 | End: 2020-10-20
Payer: COMMERCIAL

## 2020-10-20 ENCOUNTER — ANESTHESIA (INPATIENT)
Dept: GASTROENTEROLOGY | Facility: HOSPITAL | Age: 55
DRG: 392 | End: 2020-10-20
Payer: COMMERCIAL

## 2020-10-20 VITALS — HEART RATE: 85 BPM

## 2020-10-20 DIAGNOSIS — C50.212 MALIGNANT NEOPLASM OF UPPER-INNER QUADRANT OF LEFT BREAST IN FEMALE, ESTROGEN RECEPTOR NEGATIVE (HCC): ICD-10-CM

## 2020-10-20 DIAGNOSIS — Z17.1 MALIGNANT NEOPLASM OF UPPER-INNER QUADRANT OF LEFT BREAST IN FEMALE, ESTROGEN RECEPTOR NEGATIVE (HCC): ICD-10-CM

## 2020-10-20 PROBLEM — IMO0001 SMOKING: Status: ACTIVE | Noted: 2020-10-19

## 2020-10-20 PROBLEM — F17.200 SMOKING: Status: ACTIVE | Noted: 2020-10-19

## 2020-10-20 LAB
ANION GAP SERPL CALCULATED.3IONS-SCNC: 9 MMOL/L (ref 4–13)
ATRIAL RATE: 77 BPM
BUN SERPL-MCNC: 5 MG/DL (ref 5–25)
CALCIUM SERPL-MCNC: 7.4 MG/DL (ref 8.3–10.1)
CHLORIDE SERPL-SCNC: 112 MMOL/L (ref 100–108)
CO2 SERPL-SCNC: 22 MMOL/L (ref 21–32)
CREAT SERPL-MCNC: 0.68 MG/DL (ref 0.6–1.3)
GFR SERPL CREATININE-BSD FRML MDRD: 97 ML/MIN/1.73SQ M
GLUCOSE SERPL-MCNC: 81 MG/DL (ref 65–140)
HCT VFR BLD AUTO: 36.1 % (ref 34.8–46.1)
HGB BLD-MCNC: 11 G/DL (ref 11.5–15.4)
P AXIS: 41 DEGREES
POTASSIUM SERPL-SCNC: 3.9 MMOL/L (ref 3.5–5.3)
PR INTERVAL: 154 MS
QRS AXIS: 77 DEGREES
QRSD INTERVAL: 72 MS
QT INTERVAL: 430 MS
QTC INTERVAL: 486 MS
SODIUM SERPL-SCNC: 143 MMOL/L (ref 136–145)
T WAVE AXIS: 59 DEGREES
VENTRICULAR RATE: 77 BPM

## 2020-10-20 PROCEDURE — 85014 HEMATOCRIT: CPT | Performed by: PHYSICIAN ASSISTANT

## 2020-10-20 PROCEDURE — 0DB48ZX EXCISION OF ESOPHAGOGASTRIC JUNCTION, VIA NATURAL OR ARTIFICIAL OPENING ENDOSCOPIC, DIAGNOSTIC: ICD-10-PCS | Performed by: INTERNAL MEDICINE

## 2020-10-20 PROCEDURE — 85018 HEMOGLOBIN: CPT | Performed by: PHYSICIAN ASSISTANT

## 2020-10-20 PROCEDURE — 43239 EGD BIOPSY SINGLE/MULTIPLE: CPT | Performed by: INTERNAL MEDICINE

## 2020-10-20 PROCEDURE — 80048 BASIC METABOLIC PNL TOTAL CA: CPT | Performed by: PHYSICIAN ASSISTANT

## 2020-10-20 PROCEDURE — 99232 SBSQ HOSP IP/OBS MODERATE 35: CPT | Performed by: PHYSICIAN ASSISTANT

## 2020-10-20 PROCEDURE — 88305 TISSUE EXAM BY PATHOLOGIST: CPT | Performed by: PATHOLOGY

## 2020-10-20 PROCEDURE — 88313 SPECIAL STAINS GROUP 2: CPT | Performed by: PATHOLOGY

## 2020-10-20 PROCEDURE — 43248 EGD GUIDE WIRE INSERTION: CPT | Performed by: INTERNAL MEDICINE

## 2020-10-20 PROCEDURE — 88341 IMHCHEM/IMCYTCHM EA ADD ANTB: CPT | Performed by: PATHOLOGY

## 2020-10-20 PROCEDURE — 0D748ZZ DILATION OF ESOPHAGOGASTRIC JUNCTION, VIA NATURAL OR ARTIFICIAL OPENING ENDOSCOPIC: ICD-10-PCS | Performed by: INTERNAL MEDICINE

## 2020-10-20 PROCEDURE — 93010 ELECTROCARDIOGRAM REPORT: CPT | Performed by: INTERNAL MEDICINE

## 2020-10-20 PROCEDURE — 88342 IMHCHEM/IMCYTCHM 1ST ANTB: CPT | Performed by: PATHOLOGY

## 2020-10-20 RX ORDER — LIDOCAINE HYDROCHLORIDE 10 MG/ML
INJECTION, SOLUTION EPIDURAL; INFILTRATION; INTRACAUDAL; PERINEURAL AS NEEDED
Status: DISCONTINUED | OUTPATIENT
Start: 2020-10-20 | End: 2020-10-20

## 2020-10-20 RX ORDER — GLYCOPYRROLATE 0.2 MG/ML
INJECTION INTRAMUSCULAR; INTRAVENOUS AS NEEDED
Status: DISCONTINUED | OUTPATIENT
Start: 2020-10-20 | End: 2020-10-20

## 2020-10-20 RX ORDER — PANTOPRAZOLE SODIUM 40 MG/1
40 TABLET, DELAYED RELEASE ORAL
Status: DISCONTINUED | OUTPATIENT
Start: 2020-10-20 | End: 2020-10-21 | Stop reason: HOSPADM

## 2020-10-20 RX ORDER — PROPOFOL 10 MG/ML
INJECTION, EMULSION INTRAVENOUS AS NEEDED
Status: DISCONTINUED | OUTPATIENT
Start: 2020-10-20 | End: 2020-10-20

## 2020-10-20 RX ORDER — SODIUM CHLORIDE 9 MG/ML
INJECTION, SOLUTION INTRAVENOUS CONTINUOUS PRN
Status: DISCONTINUED | OUTPATIENT
Start: 2020-10-20 | End: 2020-10-20

## 2020-10-20 RX ADMIN — HYDROMORPHONE HYDROCHLORIDE 1 MG: 1 INJECTION, SOLUTION INTRAMUSCULAR; INTRAVENOUS; SUBCUTANEOUS at 22:47

## 2020-10-20 RX ADMIN — OXYCODONE HYDROCHLORIDE 10 MG: 10 TABLET ORAL at 09:29

## 2020-10-20 RX ADMIN — PROPOFOL 20 MG: 10 INJECTION, EMULSION INTRAVENOUS at 13:14

## 2020-10-20 RX ADMIN — LEVOTHYROXINE SODIUM 88 MCG: 88 TABLET ORAL at 06:09

## 2020-10-20 RX ADMIN — SODIUM CHLORIDE: 0.9 INJECTION, SOLUTION INTRAVENOUS at 13:01

## 2020-10-20 RX ADMIN — HYDROMORPHONE HYDROCHLORIDE 1 MG: 1 INJECTION, SOLUTION INTRAMUSCULAR; INTRAVENOUS; SUBCUTANEOUS at 06:12

## 2020-10-20 RX ADMIN — ALPRAZOLAM 1 MG: 0.5 TABLET ORAL at 18:02

## 2020-10-20 RX ADMIN — MAGNESIUM OXIDE 400 MG: 400 TABLET ORAL at 17:01

## 2020-10-20 RX ADMIN — PROPOFOL 150 MG: 10 INJECTION, EMULSION INTRAVENOUS at 13:04

## 2020-10-20 RX ADMIN — PROPOFOL 20 MG: 10 INJECTION, EMULSION INTRAVENOUS at 13:12

## 2020-10-20 RX ADMIN — OXYCODONE HYDROCHLORIDE 10 MG: 10 TABLET ORAL at 20:04

## 2020-10-20 RX ADMIN — PROPOFOL 20 MG: 10 INJECTION, EMULSION INTRAVENOUS at 13:10

## 2020-10-20 RX ADMIN — PROPOFOL 30 MG: 10 INJECTION, EMULSION INTRAVENOUS at 13:06

## 2020-10-20 RX ADMIN — HYDROMORPHONE HYDROCHLORIDE 1 MG: 1 INJECTION, SOLUTION INTRAMUSCULAR; INTRAVENOUS; SUBCUTANEOUS at 16:56

## 2020-10-20 RX ADMIN — PROPOFOL 20 MG: 10 INJECTION, EMULSION INTRAVENOUS at 13:16

## 2020-10-20 RX ADMIN — LIDOCAINE HYDROCHLORIDE 100 MG: 10 INJECTION, SOLUTION EPIDURAL; INFILTRATION; INTRACAUDAL at 13:04

## 2020-10-20 RX ADMIN — SODIUM CHLORIDE 100 ML/HR: 0.9 INJECTION, SOLUTION INTRAVENOUS at 02:55

## 2020-10-20 RX ADMIN — OXYCODONE HYDROCHLORIDE 10 MG: 10 TABLET ORAL at 14:44

## 2020-10-20 RX ADMIN — HYDROXYCHLOROQUINE SULFATE 100 MG: 200 TABLET, FILM COATED ORAL at 17:01

## 2020-10-20 RX ADMIN — CYCLOBENZAPRINE HYDROCHLORIDE 10 MG: 10 TABLET, FILM COATED ORAL at 22:48

## 2020-10-20 RX ADMIN — GLYCOPYRROLATE 0.2 MG: 0.2 INJECTION, SOLUTION INTRAMUSCULAR; INTRAVENOUS at 13:03

## 2020-10-20 RX ADMIN — PANTOPRAZOLE SODIUM 40 MG: 40 TABLET, DELAYED RELEASE ORAL at 17:01

## 2020-10-20 RX ADMIN — PROPOFOL 30 MG: 10 INJECTION, EMULSION INTRAVENOUS at 13:08

## 2020-10-20 RX ADMIN — OXYCODONE HYDROCHLORIDE 10 MG: 10 TABLET ORAL at 02:51

## 2020-10-21 VITALS
DIASTOLIC BLOOD PRESSURE: 71 MMHG | SYSTOLIC BLOOD PRESSURE: 127 MMHG | HEIGHT: 63 IN | RESPIRATION RATE: 16 BRPM | HEART RATE: 68 BPM | OXYGEN SATURATION: 98 % | BODY MASS INDEX: 23.04 KG/M2 | WEIGHT: 130 LBS | TEMPERATURE: 98 F

## 2020-10-21 LAB
ANION GAP SERPL CALCULATED.3IONS-SCNC: 7 MMOL/L (ref 4–13)
BASOPHILS # BLD AUTO: 0.05 THOUSANDS/ΜL (ref 0–0.1)
BASOPHILS NFR BLD AUTO: 1 % (ref 0–1)
BUN SERPL-MCNC: 5 MG/DL (ref 5–25)
CALCIUM SERPL-MCNC: 8.5 MG/DL (ref 8.3–10.1)
CHLORIDE SERPL-SCNC: 109 MMOL/L (ref 100–108)
CO2 SERPL-SCNC: 25 MMOL/L (ref 21–32)
CREAT SERPL-MCNC: 0.68 MG/DL (ref 0.6–1.3)
EOSINOPHIL # BLD AUTO: 0.14 THOUSAND/ΜL (ref 0–0.61)
EOSINOPHIL NFR BLD AUTO: 2 % (ref 0–6)
ERYTHROCYTE [DISTWIDTH] IN BLOOD BY AUTOMATED COUNT: 16.2 % (ref 11.6–15.1)
GFR SERPL CREATININE-BSD FRML MDRD: 97 ML/MIN/1.73SQ M
GLUCOSE SERPL-MCNC: 81 MG/DL (ref 65–140)
HCT VFR BLD AUTO: 38.2 % (ref 34.8–46.1)
HGB BLD-MCNC: 11.8 G/DL (ref 11.5–15.4)
IMM GRANULOCYTES # BLD AUTO: 0.02 THOUSAND/UL (ref 0–0.2)
IMM GRANULOCYTES NFR BLD AUTO: 0 % (ref 0–2)
LYMPHOCYTES # BLD AUTO: 1.54 THOUSANDS/ΜL (ref 0.6–4.47)
LYMPHOCYTES NFR BLD AUTO: 27 % (ref 14–44)
MCH RBC QN AUTO: 29 PG (ref 26.8–34.3)
MCHC RBC AUTO-ENTMCNC: 30.9 G/DL (ref 31.4–37.4)
MCV RBC AUTO: 94 FL (ref 82–98)
MONOCYTES # BLD AUTO: 0.43 THOUSAND/ΜL (ref 0.17–1.22)
MONOCYTES NFR BLD AUTO: 8 % (ref 4–12)
NEUTROPHILS # BLD AUTO: 3.55 THOUSANDS/ΜL (ref 1.85–7.62)
NEUTS SEG NFR BLD AUTO: 62 % (ref 43–75)
NRBC BLD AUTO-RTO: 0 /100 WBCS
PLATELET # BLD AUTO: 269 THOUSANDS/UL (ref 149–390)
PMV BLD AUTO: 9.9 FL (ref 8.9–12.7)
POTASSIUM SERPL-SCNC: 3.7 MMOL/L (ref 3.5–5.3)
RBC # BLD AUTO: 4.07 MILLION/UL (ref 3.81–5.12)
SODIUM SERPL-SCNC: 141 MMOL/L (ref 136–145)
WBC # BLD AUTO: 5.73 THOUSAND/UL (ref 4.31–10.16)

## 2020-10-21 PROCEDURE — 99232 SBSQ HOSP IP/OBS MODERATE 35: CPT | Performed by: PHYSICIAN ASSISTANT

## 2020-10-21 PROCEDURE — 99239 HOSP IP/OBS DSCHRG MGMT >30: CPT | Performed by: PHYSICIAN ASSISTANT

## 2020-10-21 PROCEDURE — 80048 BASIC METABOLIC PNL TOTAL CA: CPT | Performed by: PHYSICIAN ASSISTANT

## 2020-10-21 PROCEDURE — 85025 COMPLETE CBC W/AUTO DIFF WBC: CPT | Performed by: PHYSICIAN ASSISTANT

## 2020-10-21 RX ORDER — PANTOPRAZOLE SODIUM 40 MG/1
40 TABLET, DELAYED RELEASE ORAL
Qty: 60 TABLET | Refills: 0 | Status: SHIPPED | OUTPATIENT
Start: 2020-10-21 | End: 2021-03-05 | Stop reason: SDUPTHER

## 2020-10-21 RX ADMIN — HYDROXYCHLOROQUINE SULFATE 200 MG: 200 TABLET, FILM COATED ORAL at 08:57

## 2020-10-21 RX ADMIN — MAGNESIUM OXIDE 400 MG: 400 TABLET ORAL at 08:56

## 2020-10-21 RX ADMIN — OXYCODONE HYDROCHLORIDE 10 MG: 10 TABLET ORAL at 12:08

## 2020-10-21 RX ADMIN — OXYCODONE HYDROCHLORIDE 5 MG: 5 TABLET ORAL at 08:54

## 2020-10-21 RX ADMIN — DEXAMETHASONE 0.5 MG: 0.5 TABLET ORAL at 08:56

## 2020-10-21 RX ADMIN — PANTOPRAZOLE SODIUM 40 MG: 40 TABLET, DELAYED RELEASE ORAL at 06:02

## 2020-10-21 RX ADMIN — LEVOTHYROXINE SODIUM 88 MCG: 88 TABLET ORAL at 06:02

## 2020-10-21 RX ADMIN — SERTRALINE HYDROCHLORIDE 150 MG: 100 TABLET ORAL at 08:54

## 2020-10-21 RX ADMIN — OXYCODONE HYDROCHLORIDE 10 MG: 10 TABLET ORAL at 06:02

## 2020-10-22 RX ORDER — OXYCODONE HYDROCHLORIDE 10 MG/1
10-15 TABLET ORAL EVERY 4 HOURS PRN
Qty: 240 TABLET | Refills: 0 | Status: SHIPPED | OUTPATIENT
Start: 2020-10-22 | End: 2020-11-16 | Stop reason: SDUPTHER

## 2020-10-23 ENCOUNTER — TRANSITIONAL CARE MANAGEMENT (OUTPATIENT)
Dept: FAMILY MEDICINE CLINIC | Facility: CLINIC | Age: 55
End: 2020-10-23

## 2020-10-28 DIAGNOSIS — K29.00 ACUTE SUPERFICIAL GASTRITIS WITHOUT HEMORRHAGE: Primary | ICD-10-CM

## 2020-10-28 PROBLEM — K29.70 GASTRITIS: Status: ACTIVE | Noted: 2020-10-28

## 2020-10-31 DIAGNOSIS — F41.9 ANXIETY: ICD-10-CM

## 2020-11-02 RX ORDER — ALPRAZOLAM 1 MG/1
TABLET ORAL
Qty: 90 TABLET | Refills: 0 | Status: SHIPPED | OUTPATIENT
Start: 2020-11-02 | End: 2020-11-30

## 2020-11-05 DIAGNOSIS — T45.1X5A CHEMOTHERAPY INDUCED NEUTROPENIA (HCC): Primary | ICD-10-CM

## 2020-11-05 DIAGNOSIS — C50.212 MALIGNANT NEOPLASM OF UPPER-INNER QUADRANT OF LEFT BREAST IN FEMALE, ESTROGEN RECEPTOR NEGATIVE (HCC): ICD-10-CM

## 2020-11-05 DIAGNOSIS — D70.1 CHEMOTHERAPY INDUCED NEUTROPENIA (HCC): Primary | ICD-10-CM

## 2020-11-05 DIAGNOSIS — Z17.1 MALIGNANT NEOPLASM OF UPPER-INNER QUADRANT OF LEFT BREAST IN FEMALE, ESTROGEN RECEPTOR NEGATIVE (HCC): ICD-10-CM

## 2020-11-05 RX ORDER — SODIUM CHLORIDE 9 MG/ML
20 INJECTION, SOLUTION INTRAVENOUS ONCE
Status: CANCELLED | OUTPATIENT
Start: 2020-11-09

## 2020-11-06 ENCOUNTER — TELEPHONE (OUTPATIENT)
Dept: HEMATOLOGY ONCOLOGY | Facility: MEDICAL CENTER | Age: 55
End: 2020-11-06

## 2020-11-09 ENCOUNTER — PATIENT OUTREACH (OUTPATIENT)
Dept: CASE MANAGEMENT | Facility: HOSPITAL | Age: 55
End: 2020-11-09

## 2020-11-09 ENCOUNTER — TELEPHONE (OUTPATIENT)
Dept: HEMATOLOGY ONCOLOGY | Facility: CLINIC | Age: 55
End: 2020-11-09

## 2020-11-09 ENCOUNTER — HOSPITAL ENCOUNTER (OUTPATIENT)
Dept: INFUSION CENTER | Facility: CLINIC | Age: 55
Discharge: HOME | End: 2020-11-09
Payer: COMMERCIAL

## 2020-11-09 VITALS
WEIGHT: 129 LBS | RESPIRATION RATE: 18 BRPM | HEART RATE: 94 BPM | SYSTOLIC BLOOD PRESSURE: 126 MMHG | DIASTOLIC BLOOD PRESSURE: 67 MMHG | BODY MASS INDEX: 22.85 KG/M2 | TEMPERATURE: 97.3 F

## 2020-11-09 DIAGNOSIS — Z17.1 MALIGNANT NEOPLASM OF UPPER-INNER QUADRANT OF LEFT BREAST IN FEMALE, ESTROGEN RECEPTOR NEGATIVE (HCC): Primary | ICD-10-CM

## 2020-11-09 DIAGNOSIS — Z17.1 MALIGNANT NEOPLASM OF UPPER-INNER QUADRANT OF LEFT BREAST IN FEMALE, ESTROGEN RECEPTOR NEGATIVE (HCC): ICD-10-CM

## 2020-11-09 DIAGNOSIS — T45.1X5A CHEMOTHERAPY INDUCED NEUTROPENIA (HCC): Primary | ICD-10-CM

## 2020-11-09 DIAGNOSIS — C50.212 MALIGNANT NEOPLASM OF UPPER-INNER QUADRANT OF LEFT BREAST IN FEMALE, ESTROGEN RECEPTOR NEGATIVE (HCC): Primary | ICD-10-CM

## 2020-11-09 DIAGNOSIS — Z85.3 HISTORY OF BREAST CANCER: Primary | ICD-10-CM

## 2020-11-09 DIAGNOSIS — C50.212 MALIGNANT NEOPLASM OF UPPER-INNER QUADRANT OF LEFT BREAST IN FEMALE, ESTROGEN RECEPTOR NEGATIVE (HCC): ICD-10-CM

## 2020-11-09 DIAGNOSIS — D70.1 CHEMOTHERAPY INDUCED NEUTROPENIA (HCC): Primary | ICD-10-CM

## 2020-11-09 PROCEDURE — 96417 CHEMO IV INFUS EACH ADDL SEQ: CPT

## 2020-11-09 PROCEDURE — 96413 CHEMO IV INFUSION 1 HR: CPT

## 2020-11-09 RX ORDER — SODIUM CHLORIDE 9 MG/ML
20 INJECTION, SOLUTION INTRAVENOUS ONCE
Status: COMPLETED | OUTPATIENT
Start: 2020-11-09 | End: 2020-11-09

## 2020-11-09 RX ADMIN — SODIUM CHLORIDE 20 ML/HR: 0.9 INJECTION, SOLUTION INTRAVENOUS at 14:35

## 2020-11-09 RX ADMIN — TRASTUZUMAB 376 MG: 150 INJECTION, POWDER, LYOPHILIZED, FOR SOLUTION INTRAVENOUS at 14:49

## 2020-11-09 RX ADMIN — PERTUZUMAB 420 MG: 30 INJECTION, SOLUTION, CONCENTRATE INTRAVENOUS at 14:09

## 2020-11-11 ENCOUNTER — DOCUMENTATION (OUTPATIENT)
Dept: HEMATOLOGY ONCOLOGY | Facility: CLINIC | Age: 55
End: 2020-11-11

## 2020-11-16 ENCOUNTER — PATIENT OUTREACH (OUTPATIENT)
Dept: CASE MANAGEMENT | Facility: HOSPITAL | Age: 55
End: 2020-11-16

## 2020-11-16 ENCOUNTER — TELEPHONE (OUTPATIENT)
Dept: OTHER | Facility: OTHER | Age: 55
End: 2020-11-16

## 2020-11-16 DIAGNOSIS — C50.212 MALIGNANT NEOPLASM OF UPPER-INNER QUADRANT OF LEFT BREAST IN FEMALE, ESTROGEN RECEPTOR NEGATIVE (HCC): ICD-10-CM

## 2020-11-16 DIAGNOSIS — R11.2 INTRACTABLE NAUSEA AND VOMITING: ICD-10-CM

## 2020-11-16 DIAGNOSIS — Z17.1 MALIGNANT NEOPLASM OF UPPER-INNER QUADRANT OF LEFT BREAST IN FEMALE, ESTROGEN RECEPTOR NEGATIVE (HCC): ICD-10-CM

## 2020-11-16 RX ORDER — DEXAMETHASONE 0.5 MG/1
0.5 TABLET ORAL
Qty: 30 TABLET | Refills: 0 | Status: SHIPPED | OUTPATIENT
Start: 2020-11-16 | End: 2020-11-20

## 2020-11-16 RX ORDER — OXYCODONE HYDROCHLORIDE 10 MG/1
10-15 TABLET ORAL EVERY 4 HOURS PRN
Qty: 240 TABLET | Refills: 0 | Status: SHIPPED | OUTPATIENT
Start: 2020-11-16 | End: 2020-12-09 | Stop reason: SDUPTHER

## 2020-11-17 ENCOUNTER — PATIENT OUTREACH (OUTPATIENT)
Dept: CASE MANAGEMENT | Facility: HOSPITAL | Age: 55
End: 2020-11-17

## 2020-11-18 ENCOUNTER — PATIENT OUTREACH (OUTPATIENT)
Dept: CASE MANAGEMENT | Facility: HOSPITAL | Age: 55
End: 2020-11-18

## 2020-11-19 ENCOUNTER — PATIENT OUTREACH (OUTPATIENT)
Dept: CASE MANAGEMENT | Facility: HOSPITAL | Age: 55
End: 2020-11-19

## 2020-11-20 ENCOUNTER — OFFICE VISIT (OUTPATIENT)
Dept: PALLIATIVE MEDICINE | Facility: CLINIC | Age: 55
End: 2020-11-20
Payer: COMMERCIAL

## 2020-11-20 VITALS
OXYGEN SATURATION: 97 % | BODY MASS INDEX: 24.3 KG/M2 | WEIGHT: 137.13 LBS | RESPIRATION RATE: 18 BRPM | DIASTOLIC BLOOD PRESSURE: 78 MMHG | SYSTOLIC BLOOD PRESSURE: 124 MMHG | TEMPERATURE: 97.3 F | HEIGHT: 63 IN | HEART RATE: 111 BPM

## 2020-11-20 DIAGNOSIS — F11.20 CONTINUOUS OPIOID DEPENDENCE (HCC): ICD-10-CM

## 2020-11-20 DIAGNOSIS — R11.2 INTRACTABLE NAUSEA AND VOMITING: Primary | ICD-10-CM

## 2020-11-20 DIAGNOSIS — C50.212 MALIGNANT NEOPLASM OF UPPER-INNER QUADRANT OF LEFT BREAST IN FEMALE, ESTROGEN RECEPTOR NEGATIVE (HCC): ICD-10-CM

## 2020-11-20 DIAGNOSIS — G89.3 CANCER RELATED PAIN: ICD-10-CM

## 2020-11-20 DIAGNOSIS — Z17.1 MALIGNANT NEOPLASM OF UPPER-INNER QUADRANT OF LEFT BREAST IN FEMALE, ESTROGEN RECEPTOR NEGATIVE (HCC): ICD-10-CM

## 2020-11-20 PROCEDURE — 99214 OFFICE O/P EST MOD 30 MIN: CPT | Performed by: NURSE PRACTITIONER

## 2020-11-23 ENCOUNTER — PATIENT OUTREACH (OUTPATIENT)
Dept: CASE MANAGEMENT | Facility: HOSPITAL | Age: 55
End: 2020-11-23

## 2020-11-23 DIAGNOSIS — T45.1X5A CHEMOTHERAPY INDUCED NEUTROPENIA (HCC): Primary | ICD-10-CM

## 2020-11-23 DIAGNOSIS — C50.212 MALIGNANT NEOPLASM OF UPPER-INNER QUADRANT OF LEFT BREAST IN FEMALE, ESTROGEN RECEPTOR NEGATIVE (HCC): ICD-10-CM

## 2020-11-23 DIAGNOSIS — Z17.1 MALIGNANT NEOPLASM OF UPPER-INNER QUADRANT OF LEFT BREAST IN FEMALE, ESTROGEN RECEPTOR NEGATIVE (HCC): ICD-10-CM

## 2020-11-23 DIAGNOSIS — D70.1 CHEMOTHERAPY INDUCED NEUTROPENIA (HCC): Primary | ICD-10-CM

## 2020-11-23 RX ORDER — SODIUM CHLORIDE 9 MG/ML
20 INJECTION, SOLUTION INTRAVENOUS ONCE
Status: CANCELLED | OUTPATIENT
Start: 2021-02-02

## 2020-11-23 RX ORDER — SODIUM CHLORIDE 9 MG/ML
20 INJECTION, SOLUTION INTRAVENOUS ONCE
Status: CANCELLED | OUTPATIENT
Start: 2020-12-21

## 2020-11-23 RX ORDER — SODIUM CHLORIDE 9 MG/ML
20 INJECTION, SOLUTION INTRAVENOUS ONCE
Status: CANCELLED | OUTPATIENT
Start: 2021-01-11

## 2020-11-23 RX ORDER — SODIUM CHLORIDE 9 MG/ML
20 INJECTION, SOLUTION INTRAVENOUS ONCE
Status: CANCELLED | OUTPATIENT
Start: 2020-11-30

## 2020-11-30 ENCOUNTER — OFFICE VISIT (OUTPATIENT)
Dept: SURGICAL ONCOLOGY | Facility: CLINIC | Age: 55
End: 2020-11-30
Payer: COMMERCIAL

## 2020-11-30 ENCOUNTER — HOSPITAL ENCOUNTER (OUTPATIENT)
Dept: INFUSION CENTER | Facility: CLINIC | Age: 55
Discharge: HOME | End: 2020-11-30
Payer: COMMERCIAL

## 2020-11-30 VITALS
TEMPERATURE: 98 F | SYSTOLIC BLOOD PRESSURE: 128 MMHG | HEART RATE: 110 BPM | WEIGHT: 132 LBS | BODY MASS INDEX: 23.39 KG/M2 | RESPIRATION RATE: 16 BRPM | DIASTOLIC BLOOD PRESSURE: 80 MMHG | HEIGHT: 63 IN

## 2020-11-30 VITALS
RESPIRATION RATE: 16 BRPM | WEIGHT: 134.5 LBS | SYSTOLIC BLOOD PRESSURE: 108 MMHG | HEIGHT: 63 IN | HEART RATE: 95 BPM | BODY MASS INDEX: 23.83 KG/M2 | DIASTOLIC BLOOD PRESSURE: 68 MMHG | OXYGEN SATURATION: 96 % | TEMPERATURE: 97.3 F

## 2020-11-30 DIAGNOSIS — T45.1X5A CHEMOTHERAPY INDUCED NEUTROPENIA (HCC): Primary | ICD-10-CM

## 2020-11-30 DIAGNOSIS — C50.212 MALIGNANT NEOPLASM OF UPPER-INNER QUADRANT OF LEFT BREAST IN FEMALE, ESTROGEN RECEPTOR NEGATIVE (HCC): Primary | ICD-10-CM

## 2020-11-30 DIAGNOSIS — F41.9 ANXIETY: ICD-10-CM

## 2020-11-30 DIAGNOSIS — D70.1 CHEMOTHERAPY INDUCED NEUTROPENIA (HCC): Primary | ICD-10-CM

## 2020-11-30 DIAGNOSIS — C50.212 MALIGNANT NEOPLASM OF UPPER-INNER QUADRANT OF LEFT BREAST IN FEMALE, ESTROGEN RECEPTOR NEGATIVE (HCC): ICD-10-CM

## 2020-11-30 DIAGNOSIS — Z17.1 MALIGNANT NEOPLASM OF UPPER-INNER QUADRANT OF LEFT BREAST IN FEMALE, ESTROGEN RECEPTOR NEGATIVE (HCC): Primary | ICD-10-CM

## 2020-11-30 DIAGNOSIS — Z17.1 MALIGNANT NEOPLASM OF UPPER-INNER QUADRANT OF LEFT BREAST IN FEMALE, ESTROGEN RECEPTOR NEGATIVE (HCC): ICD-10-CM

## 2020-11-30 PROCEDURE — 96413 CHEMO IV INFUSION 1 HR: CPT

## 2020-11-30 PROCEDURE — 99214 OFFICE O/P EST MOD 30 MIN: CPT | Performed by: SURGERY

## 2020-11-30 PROCEDURE — 3008F BODY MASS INDEX DOCD: CPT | Performed by: INTERNAL MEDICINE

## 2020-11-30 PROCEDURE — 96417 CHEMO IV INFUS EACH ADDL SEQ: CPT

## 2020-11-30 RX ORDER — SODIUM CHLORIDE 9 MG/ML
20 INJECTION, SOLUTION INTRAVENOUS ONCE
Status: COMPLETED | OUTPATIENT
Start: 2020-11-30 | End: 2020-11-30

## 2020-11-30 RX ORDER — ALPRAZOLAM 1 MG/1
TABLET ORAL
Qty: 90 TABLET | Refills: 0 | Status: SHIPPED | OUTPATIENT
Start: 2020-11-30 | End: 2020-12-28 | Stop reason: SDUPTHER

## 2020-11-30 RX ADMIN — TRASTUZUMAB 376 MG: 150 INJECTION, POWDER, LYOPHILIZED, FOR SOLUTION INTRAVENOUS at 13:56

## 2020-11-30 RX ADMIN — SODIUM CHLORIDE 20 ML/HR: 0.9 INJECTION, SOLUTION INTRAVENOUS at 12:45

## 2020-11-30 RX ADMIN — PERTUZUMAB 420 MG: 30 INJECTION, SOLUTION, CONCENTRATE INTRAVENOUS at 13:16

## 2020-12-03 ENCOUNTER — PATIENT OUTREACH (OUTPATIENT)
Dept: CASE MANAGEMENT | Facility: HOSPITAL | Age: 55
End: 2020-12-03

## 2020-12-09 DIAGNOSIS — Z17.1 MALIGNANT NEOPLASM OF UPPER-INNER QUADRANT OF LEFT BREAST IN FEMALE, ESTROGEN RECEPTOR NEGATIVE (HCC): ICD-10-CM

## 2020-12-09 DIAGNOSIS — C50.212 MALIGNANT NEOPLASM OF UPPER-INNER QUADRANT OF LEFT BREAST IN FEMALE, ESTROGEN RECEPTOR NEGATIVE (HCC): ICD-10-CM

## 2020-12-09 RX ORDER — OXYCODONE HYDROCHLORIDE 10 MG/1
10-15 TABLET ORAL EVERY 4 HOURS PRN
Qty: 240 TABLET | Refills: 0 | Status: SHIPPED | OUTPATIENT
Start: 2020-12-09 | End: 2020-12-28 | Stop reason: SDUPTHER

## 2020-12-10 ENCOUNTER — PATIENT OUTREACH (OUTPATIENT)
Dept: CASE MANAGEMENT | Facility: HOSPITAL | Age: 55
End: 2020-12-10

## 2020-12-21 ENCOUNTER — HOSPITAL ENCOUNTER (OUTPATIENT)
Dept: INFUSION CENTER | Facility: CLINIC | Age: 55
Discharge: HOME | End: 2020-12-21
Payer: COMMERCIAL

## 2020-12-21 ENCOUNTER — PATIENT OUTREACH (OUTPATIENT)
Dept: CASE MANAGEMENT | Facility: HOSPITAL | Age: 55
End: 2020-12-21

## 2020-12-21 VITALS
HEART RATE: 90 BPM | OXYGEN SATURATION: 97 % | DIASTOLIC BLOOD PRESSURE: 70 MMHG | TEMPERATURE: 97.1 F | RESPIRATION RATE: 16 BRPM | SYSTOLIC BLOOD PRESSURE: 108 MMHG | WEIGHT: 135 LBS | BODY MASS INDEX: 23.91 KG/M2

## 2020-12-21 DIAGNOSIS — T45.1X5A CHEMOTHERAPY INDUCED NEUTROPENIA (HCC): Primary | ICD-10-CM

## 2020-12-21 DIAGNOSIS — C50.212 MALIGNANT NEOPLASM OF UPPER-INNER QUADRANT OF LEFT BREAST IN FEMALE, ESTROGEN RECEPTOR NEGATIVE (HCC): ICD-10-CM

## 2020-12-21 DIAGNOSIS — Z17.1 MALIGNANT NEOPLASM OF UPPER-INNER QUADRANT OF LEFT BREAST IN FEMALE, ESTROGEN RECEPTOR NEGATIVE (HCC): ICD-10-CM

## 2020-12-21 DIAGNOSIS — D70.1 CHEMOTHERAPY INDUCED NEUTROPENIA (HCC): Primary | ICD-10-CM

## 2020-12-21 PROCEDURE — 96413 CHEMO IV INFUSION 1 HR: CPT

## 2020-12-21 PROCEDURE — 96417 CHEMO IV INFUS EACH ADDL SEQ: CPT

## 2020-12-21 RX ORDER — SODIUM CHLORIDE 9 MG/ML
20 INJECTION, SOLUTION INTRAVENOUS ONCE
Status: COMPLETED | OUTPATIENT
Start: 2020-12-21 | End: 2020-12-21

## 2020-12-21 RX ADMIN — PERTUZUMAB 420 MG: 30 INJECTION, SOLUTION, CONCENTRATE INTRAVENOUS at 13:59

## 2020-12-21 RX ADMIN — TRASTUZUMAB 376 MG: 150 INJECTION, POWDER, LYOPHILIZED, FOR SOLUTION INTRAVENOUS at 14:37

## 2020-12-21 RX ADMIN — SODIUM CHLORIDE 20 ML/HR: 0.9 INJECTION, SOLUTION INTRAVENOUS at 13:30

## 2020-12-28 DIAGNOSIS — F41.9 ANXIETY: ICD-10-CM

## 2020-12-28 DIAGNOSIS — Z17.1 MALIGNANT NEOPLASM OF UPPER-INNER QUADRANT OF LEFT BREAST IN FEMALE, ESTROGEN RECEPTOR NEGATIVE (HCC): ICD-10-CM

## 2020-12-28 DIAGNOSIS — C50.212 MALIGNANT NEOPLASM OF UPPER-INNER QUADRANT OF LEFT BREAST IN FEMALE, ESTROGEN RECEPTOR NEGATIVE (HCC): ICD-10-CM

## 2020-12-28 RX ORDER — OXYCODONE HYDROCHLORIDE 10 MG/1
10-15 TABLET ORAL EVERY 4 HOURS PRN
Qty: 240 TABLET | Refills: 0 | Status: SHIPPED | OUTPATIENT
Start: 2021-01-06 | End: 2021-02-03 | Stop reason: SDUPTHER

## 2020-12-28 RX ORDER — ALPRAZOLAM 1 MG/1
1 TABLET ORAL 3 TIMES DAILY PRN
Qty: 90 TABLET | Refills: 0 | Status: SHIPPED | OUTPATIENT
Start: 2020-12-28 | End: 2021-01-25 | Stop reason: SDUPTHER

## 2020-12-30 ENCOUNTER — PATIENT OUTREACH (OUTPATIENT)
Dept: CASE MANAGEMENT | Facility: HOSPITAL | Age: 55
End: 2020-12-30

## 2021-01-06 ENCOUNTER — TELEPHONE (OUTPATIENT)
Dept: HEMATOLOGY ONCOLOGY | Facility: CLINIC | Age: 56
End: 2021-01-06

## 2021-01-06 DIAGNOSIS — D70.1 CHEMOTHERAPY INDUCED NEUTROPENIA (HCC): Primary | ICD-10-CM

## 2021-01-06 DIAGNOSIS — C50.212 MALIGNANT NEOPLASM OF UPPER-INNER QUADRANT OF LEFT BREAST IN FEMALE, ESTROGEN RECEPTOR NEGATIVE (HCC): ICD-10-CM

## 2021-01-06 DIAGNOSIS — T45.1X5A CHEMOTHERAPY INDUCED NEUTROPENIA (HCC): Primary | ICD-10-CM

## 2021-01-06 DIAGNOSIS — Z17.1 MALIGNANT NEOPLASM OF UPPER-INNER QUADRANT OF LEFT BREAST IN FEMALE, ESTROGEN RECEPTOR NEGATIVE (HCC): ICD-10-CM

## 2021-01-06 NOTE — TELEPHONE ENCOUNTER
Patient was supposed to have this echo done back in October, but it was not scheduled  Can you please schedule this for her?

## 2021-01-06 NOTE — TELEPHONE ENCOUNTER
Patient would like to know whether she needs to have a Echo done before her appointment with Kate Fontaine?  She can be reached back at [phone]

## 2021-01-07 ENCOUNTER — PATIENT OUTREACH (OUTPATIENT)
Dept: CASE MANAGEMENT | Facility: HOSPITAL | Age: 56
End: 2021-01-07

## 2021-01-07 NOTE — PROGRESS NOTES
LSW received call back from pt  Pt stated she has bee feeling poorly for the last week or so but is feeling better  Pt reported she received paperwork regarding her application for assistance and is confused as to the content  LSW will meet with pt and her  on Monday 1/11 to review paperwork  Pt shared that she was overwhelmed with the donations her family received from the holiday fund given through the physical therapy department at [facility]  Pt reported the help from the staff and other pts has been a tremendous assistance for her and her , they are greatly appreciative

## 2021-01-11 ENCOUNTER — HOSPITAL ENCOUNTER (OUTPATIENT)
Dept: INFUSION CENTER | Facility: CLINIC | Age: 56
Discharge: HOME | End: 2021-01-11
Payer: COMMERCIAL

## 2021-01-11 ENCOUNTER — PATIENT OUTREACH (OUTPATIENT)
Dept: CASE MANAGEMENT | Facility: HOSPITAL | Age: 56
End: 2021-01-11

## 2021-01-11 VITALS
TEMPERATURE: 96.6 F | SYSTOLIC BLOOD PRESSURE: 102 MMHG | BODY MASS INDEX: 22.59 KG/M2 | WEIGHT: 127.5 LBS | HEART RATE: 99 BPM | RESPIRATION RATE: 16 BRPM | DIASTOLIC BLOOD PRESSURE: 64 MMHG | OXYGEN SATURATION: 95 %

## 2021-01-11 DIAGNOSIS — Z17.1 MALIGNANT NEOPLASM OF UPPER-INNER QUADRANT OF LEFT BREAST IN FEMALE, ESTROGEN RECEPTOR NEGATIVE (HCC): ICD-10-CM

## 2021-01-11 DIAGNOSIS — C50.212 MALIGNANT NEOPLASM OF UPPER-INNER QUADRANT OF LEFT BREAST IN FEMALE, ESTROGEN RECEPTOR NEGATIVE (HCC): ICD-10-CM

## 2021-01-11 DIAGNOSIS — T45.1X5A CHEMOTHERAPY INDUCED NEUTROPENIA (HCC): Primary | ICD-10-CM

## 2021-01-11 DIAGNOSIS — D70.1 CHEMOTHERAPY INDUCED NEUTROPENIA (HCC): Primary | ICD-10-CM

## 2021-01-11 PROCEDURE — 96417 CHEMO IV INFUS EACH ADDL SEQ: CPT

## 2021-01-11 PROCEDURE — 96413 CHEMO IV INFUSION 1 HR: CPT

## 2021-01-11 RX ORDER — SODIUM CHLORIDE 9 MG/ML
20 INJECTION, SOLUTION INTRAVENOUS ONCE
Status: COMPLETED | OUTPATIENT
Start: 2021-01-11 | End: 2021-01-11

## 2021-01-11 RX ADMIN — SODIUM CHLORIDE 20 ML/HR: 0.9 INJECTION, SOLUTION INTRAVENOUS at 13:28

## 2021-01-11 RX ADMIN — PERTUZUMAB 420 MG: 30 INJECTION, SOLUTION, CONCENTRATE INTRAVENOUS at 13:55

## 2021-01-11 RX ADMIN — TRASTUZUMAB 376 MG: 150 INJECTION, POWDER, LYOPHILIZED, FOR SOLUTION INTRAVENOUS at 14:31

## 2021-01-11 NOTE — PROGRESS NOTES
Pt  Here for chemotherapy  Pt offers no new complaints r/t treatment  Pt reports that her back pain has increased-believes it is a lupus flair  Plans on f u with palliative care and rheumatologist for further recommendations  Vitals stable upon admission  EF reviewed from 7/31/2020-scheduled for ECHO 1/14  Call bell in [facility], will continue to monitor

## 2021-01-11 NOTE — PROGRESS NOTES
Pt tolerated treatment well without any adverse reactions  Aware of next appointment 2/2  AVS provided

## 2021-01-11 NOTE — PROGRESS NOTES
LSW met with pt and her , Soledad Hopper to answer questions  Pt stated she has been approved for [employer] and also for disability  Pt was wondering if she can collect both  LSW informed pt to call [employer] to advise them of her disability income and to see if she is still entitled to any state benefits  Pt also stated she is having issues with her former employer stating she is still employed, pt is fearful this will affect her disability payments  Pt stated she has attempted to contact her  to handle this last issue but  has not replied as of yet  Pt appeared upbeat and pleasant as always  LSW will continue to provide emotional support as needed for pt and her

## 2021-01-15 ENCOUNTER — HOSPITAL ENCOUNTER (OUTPATIENT)
Dept: NON INVASIVE DIAGNOSTICS | Facility: HOSPITAL | Age: 56
Discharge: HOME | End: 2021-01-15
Attending: INTERNAL MEDICINE
Payer: COMMERCIAL

## 2021-01-15 DIAGNOSIS — C50.212 MALIGNANT NEOPLASM OF UPPER-INNER QUADRANT OF LEFT BREAST IN FEMALE, ESTROGEN RECEPTOR NEGATIVE (HCC): ICD-10-CM

## 2021-01-15 DIAGNOSIS — T45.1X5A CHEMOTHERAPY INDUCED NEUTROPENIA (HCC): ICD-10-CM

## 2021-01-15 DIAGNOSIS — D70.1 CHEMOTHERAPY INDUCED NEUTROPENIA (HCC): ICD-10-CM

## 2021-01-15 DIAGNOSIS — Z17.1 MALIGNANT NEOPLASM OF UPPER-INNER QUADRANT OF LEFT BREAST IN FEMALE, ESTROGEN RECEPTOR NEGATIVE (HCC): ICD-10-CM

## 2021-01-15 PROCEDURE — 93306 TTE W/DOPPLER COMPLETE: CPT | Performed by: INTERNAL MEDICINE

## 2021-01-15 PROCEDURE — 93306 TTE W/DOPPLER COMPLETE: CPT

## 2021-01-15 RX ADMIN — PERFLUTREN 0.4 ML/MIN: 6.52 INJECTION, SUSPENSION INTRAVENOUS at 09:18

## 2021-01-20 ENCOUNTER — TELEPHONE (OUTPATIENT)
Dept: HEMATOLOGY ONCOLOGY | Facility: CLINIC | Age: 56
End: 2021-01-20

## 2021-01-20 NOTE — TELEPHONE ENCOUNTER
Reschedule Appointment     Who is calling in Patient    Doctor Appointment Scheduled with Dr Chetan Nunez date and time 01/20 at 1:20pm    New date and time 02/10 at 8:40am    Location Jacobo Angry   Patient verbalized understanding    yes

## 2021-01-25 DIAGNOSIS — F41.9 ANXIETY: ICD-10-CM

## 2021-01-25 RX ORDER — ALPRAZOLAM 1 MG/1
1 TABLET ORAL 3 TIMES DAILY PRN
Qty: 90 TABLET | Refills: 0 | Status: SHIPPED | OUTPATIENT
Start: 2021-01-25 | End: 2021-03-01

## 2021-01-25 NOTE — QUICK NOTE
Therapy Activity Session  Performed by Rehab Aide staff     TEP: AcuteTherapy Extention Program, activity plan was established by a licensed therapist and performed under the guidance of a licensed therapist.      Pt seen on 12T nursing unit                                                                                         PT Frequency Frequency Comments: X PT T TH TEP MWF 1/22                                                                                  OT Frequency Frequency Comments: X M  T (1-2 sessions)    SLP Frequency      Availability:  Attempted to work with patient this date, unable to due to  patient is off the unit for a test or procedure, will reattempt as able.     Tolerance/Participation  Attempted, Not seen.     SESSION    Activities/Exercises/Mobility completed this session:  Physical Therapy Exercises    TEP Follow Up Needed: Yes  Therapy Extender Program Discipline: PT        PT Frequency: TEP MWF PT T TH    PT Task 1: ambulate at least 150' with 2ww - pt has specific lap he likes to do, will ambulate to lobby and back to his room  PT Reps for Task 1: 1  PT Sets for Task 1: 2-4  PT Resistance for Task 1: has B foot drop, will  his feet to compensate - MWF are his KD days, PLEASE work around                                                          Occupational Therapy Exercises    TEP Follow Up Needed: Yes  Therapy Extender Program Discipline: PT                                                                   Speech Therapy Exercises    TEP Follow Up Needed: Yes                                                                        Repeat Na at 1200 pm remains stable at 136 following IV n saline blouses  Will continue to monitor BMP closely to avoid over correction  Patient is requesting pain medications due to cancer related pain  Currently pain is 8/10  Concern is her low blood pressure  Will recheck her blood pressure now and if improved will start her oxycodone   If blood pressure is still low, will discuss with my attending on alternate pain meds

## 2021-02-02 ENCOUNTER — HOSPITAL ENCOUNTER (OUTPATIENT)
Dept: INFUSION CENTER | Facility: CLINIC | Age: 56
Discharge: HOME | End: 2021-02-02
Payer: COMMERCIAL

## 2021-02-02 VITALS
HEART RATE: 90 BPM | SYSTOLIC BLOOD PRESSURE: 132 MMHG | TEMPERATURE: 96.8 F | RESPIRATION RATE: 18 BRPM | OXYGEN SATURATION: 92 % | DIASTOLIC BLOOD PRESSURE: 72 MMHG | BODY MASS INDEX: 22.14 KG/M2 | WEIGHT: 125 LBS

## 2021-02-02 DIAGNOSIS — Z17.1 MALIGNANT NEOPLASM OF UPPER-INNER QUADRANT OF LEFT BREAST IN FEMALE, ESTROGEN RECEPTOR NEGATIVE (HCC): ICD-10-CM

## 2021-02-02 DIAGNOSIS — C50.212 MALIGNANT NEOPLASM OF UPPER-INNER QUADRANT OF LEFT BREAST IN FEMALE, ESTROGEN RECEPTOR NEGATIVE (HCC): ICD-10-CM

## 2021-02-02 DIAGNOSIS — T45.1X5A CHEMOTHERAPY INDUCED NEUTROPENIA (HCC): Primary | ICD-10-CM

## 2021-02-02 DIAGNOSIS — D70.1 CHEMOTHERAPY INDUCED NEUTROPENIA (HCC): Primary | ICD-10-CM

## 2021-02-02 PROCEDURE — 96417 CHEMO IV INFUS EACH ADDL SEQ: CPT

## 2021-02-02 PROCEDURE — 96413 CHEMO IV INFUSION 1 HR: CPT

## 2021-02-02 RX ORDER — SODIUM CHLORIDE 9 MG/ML
20 INJECTION, SOLUTION INTRAVENOUS ONCE
Status: COMPLETED | OUTPATIENT
Start: 2021-02-02 | End: 2021-02-02

## 2021-02-02 RX ADMIN — PERTUZUMAB 420 MG: 30 INJECTION, SOLUTION, CONCENTRATE INTRAVENOUS at 15:19

## 2021-02-02 RX ADMIN — SODIUM CHLORIDE 20 ML/HR: 0.9 INJECTION, SOLUTION INTRAVENOUS at 14:45

## 2021-02-02 RX ADMIN — TRASTUZUMAB 376 MG: 150 INJECTION, POWDER, LYOPHILIZED, FOR SOLUTION INTRAVENOUS at 15:54

## 2021-02-02 NOTE — PROGRESS NOTES
Pt here for herceptin/perjeta  Pt reports she is doing well, but her normal pains are not doing great, pt rates pain 8/10  Pt reports she has oxy at home from palliative care, but it does not work well  Spoke with [employer], pt recommended to f/u with palliative care for pain management  Pt made aware  Vitals stable upon admission    EF from 1/15 reviewed-WNL  Call bell in reach, will continue to monitor

## 2021-02-03 DIAGNOSIS — Z17.1 MALIGNANT NEOPLASM OF UPPER-INNER QUADRANT OF LEFT BREAST IN FEMALE, ESTROGEN RECEPTOR NEGATIVE (HCC): ICD-10-CM

## 2021-02-03 DIAGNOSIS — C50.212 MALIGNANT NEOPLASM OF UPPER-INNER QUADRANT OF LEFT BREAST IN FEMALE, ESTROGEN RECEPTOR NEGATIVE (HCC): ICD-10-CM

## 2021-02-03 NOTE — TELEPHONE ENCOUNTER
PDMP last fill date     01/06  240/ 30     Next appointment 2/15/21    Per pt has new insurance  SS/Disability  May need Prior auth

## 2021-02-05 RX ORDER — OXYCODONE HYDROCHLORIDE 10 MG/1
10-15 TABLET ORAL EVERY 4 HOURS PRN
Qty: 120 TABLET | Refills: 0 | Status: ON HOLD | OUTPATIENT
Start: 2021-02-05 | End: 2021-02-18 | Stop reason: SDUPTHER

## 2021-02-05 NOTE — TELEPHONE ENCOUNTER
Pt has only 2 tablets remaining  Pt has appointment 2/15/21 with Shaneka Hawk     Please fill asap      Thank you

## 2021-02-07 DIAGNOSIS — E03.9 HYPOTHYROIDISM, UNSPECIFIED TYPE: ICD-10-CM

## 2021-02-07 RX ORDER — LEVOTHYROXINE SODIUM 88 UG/1
TABLET ORAL
Qty: 30 TABLET | Refills: 5 | Status: SHIPPED | OUTPATIENT
Start: 2021-02-07 | End: 2021-04-23

## 2021-02-08 ENCOUNTER — TELEPHONE (OUTPATIENT)
Dept: HEMATOLOGY ONCOLOGY | Facility: CLINIC | Age: 56
End: 2021-02-08

## 2021-02-10 ENCOUNTER — OFFICE VISIT (OUTPATIENT)
Dept: HEMATOLOGY ONCOLOGY | Facility: CLINIC | Age: 56
End: 2021-02-10
Payer: COMMERCIAL

## 2021-02-10 VITALS
HEART RATE: 113 BPM | RESPIRATION RATE: 18 BRPM | TEMPERATURE: 98.2 F | HEIGHT: 63 IN | DIASTOLIC BLOOD PRESSURE: 64 MMHG | BODY MASS INDEX: 23.39 KG/M2 | OXYGEN SATURATION: 95 % | SYSTOLIC BLOOD PRESSURE: 118 MMHG | WEIGHT: 132 LBS

## 2021-02-10 DIAGNOSIS — T45.1X5A CHEMOTHERAPY INDUCED NEUTROPENIA (HCC): ICD-10-CM

## 2021-02-10 DIAGNOSIS — D70.1 CHEMOTHERAPY INDUCED NEUTROPENIA (HCC): ICD-10-CM

## 2021-02-10 DIAGNOSIS — C50.212 MALIGNANT NEOPLASM OF UPPER-INNER QUADRANT OF LEFT BREAST IN FEMALE, ESTROGEN RECEPTOR NEGATIVE (HCC): Primary | ICD-10-CM

## 2021-02-10 DIAGNOSIS — Z17.1 MALIGNANT NEOPLASM OF UPPER-INNER QUADRANT OF LEFT BREAST IN FEMALE, ESTROGEN RECEPTOR NEGATIVE (HCC): Primary | ICD-10-CM

## 2021-02-10 PROCEDURE — 99214 OFFICE O/P EST MOD 30 MIN: CPT | Performed by: INTERNAL MEDICINE

## 2021-02-10 RX ORDER — SODIUM CHLORIDE 9 MG/ML
20 INJECTION, SOLUTION INTRAVENOUS ONCE
Status: CANCELLED | OUTPATIENT
Start: 2021-06-28

## 2021-02-10 RX ORDER — SODIUM CHLORIDE 9 MG/ML
20 INJECTION, SOLUTION INTRAVENOUS ONCE
Status: CANCELLED | OUTPATIENT
Start: 2021-02-23

## 2021-02-10 RX ORDER — SODIUM CHLORIDE 9 MG/ML
20 INJECTION, SOLUTION INTRAVENOUS ONCE
Status: CANCELLED | OUTPATIENT
Start: 2021-05-17

## 2021-02-10 RX ORDER — SODIUM CHLORIDE 9 MG/ML
20 INJECTION, SOLUTION INTRAVENOUS ONCE
Status: CANCELLED | OUTPATIENT
Start: 2021-06-07

## 2021-02-10 RX ORDER — SODIUM CHLORIDE 9 MG/ML
20 INJECTION, SOLUTION INTRAVENOUS ONCE
Status: CANCELLED | OUTPATIENT
Start: 2021-04-06

## 2021-02-10 RX ORDER — SODIUM CHLORIDE 9 MG/ML
20 INJECTION, SOLUTION INTRAVENOUS ONCE
Status: CANCELLED | OUTPATIENT
Start: 2021-03-16

## 2021-02-10 RX ORDER — SODIUM CHLORIDE 9 MG/ML
20 INJECTION, SOLUTION INTRAVENOUS ONCE
Status: CANCELLED | OUTPATIENT
Start: 2021-04-26

## 2021-02-10 NOTE — PROGRESS NOTES
Hematology / Oncology Outpatient Follow Up Note    Birdie Lefort 60 y o  female :1965 [id number]         Date:  2/10/2021    Assessment / Plan:  A 70-year-old postmenopausal woman with clinical stage II left breast cancer, grade 3, ER negative, AL negative, HER2 fish positive disease  She underwent neoadjuvant chemotherapy with TCH with pertuzumab which produced severe toxicity  She therefore, she subsequently or on weekly paclitaxel, trastuzumab and pertuzumab for 6 weeks  She achieved complete pathological response when she underwent mastectomy and sentinel lymph node biopsy in 2020  She is currently on adjuvant trastuzumab and pertuzumab with excellent tolerance without cardiac toxicity  Clinically, she has no evidence recurrent disease  I recommended her to continue with trastuzumab and pertuzumab every 3 weeks until 2021  I am going to obtain echocardiogram in 2021 for cardiac monitoring  Regarding her bilateral ruperto ankle swelling, I suspect this could be related to her alcohol abuse in the past   I recommended her to discuss this with her primary care physician  She is in agreement with my recommendations        Subjective:      HPI:  A 70-year-old postmenopausal woman who felt a lump in her left breast which she brought to medical attention  Gabby Zaragoza was found to have large left breast mass for which she underwent biopsy recently which showed invasive ductal carcinoma, grade 3   This was ER negative, AL negative, HER2 2+ disease   Solomon Simmons fish was positive for gene application with ratio of 2 2   She was subsequently seen by Dr Leila Fitch on MRI, she had 2 7 x 2 1 x 2 3 cm of left breast mass with no enlarged axillary lymph node   Bone scan was negative for osseous metastasis   CT scan of chest abdomen pelvis showed no evidence of distant metastasis   She was referred to me to discuss neoadjuvant chemotherapy   She presents today with her   Gabby Zaragoza has no breast related symptomatology   She denied any pain   She has no respiratory symptoms   Her weight is stable   She has extensive history of smoking with 1 and half pack per day for 40 years  Paul Boss also drinks 4 beers every day for many years  Paul Boss has lupus for which she is on low-dose prednisone   She also has hypothyroidism as well as well-controlled hypertension  Paul Boss has a mother and maternal and had breast cancer above the age of 61  She has no family history of ovarian cancer   Her performance status is 0 to 1/4 on the ECOG scale            Interval History:  A 57-year-old postmenopausal woman with clinical stage II left breast cancer, grade 3, ER negative, NC negative, HER2 fish positive disease  She had 3 cycle of neoadjuvant chemotherapy with TCH with pertuzumab with significant toxicities   She has multiple hospitalization due to the severe nausea vomiting as well as deconditioning  Due to the toxicity, treatment regimen was changed to weekly paclitaxel, try weekly pertuzumab and trastuzumab which she has been tolerating well  She completed neoadjuvant chemotherapy in July 2020, resulting in complete clinical response  Subsequently, she underwent mastectomy and sentinel lymph node biopsy by Dr Carrie Arellano in August 2020 which showed no evidence of residual carcinoma  3 sentinel lymph nodes were all negative for metastatic disease, consistent with complete pathological response  She is currently on adjuvant trastuzumab and pertuzumab with no cardiac toxicity  Her echocardiogram in January 2021 showed ejection fraction 60%  She has no shortness of breath  However, she has mild ruperto ankle swelling bilaterally  She has no complaint of pain  Her weight is stable  She is still smoker with half pack per day  She quit drinking alcohol when she was diagnosed with breast cancer    Her performance status is normal        Objective:      Primary Diagnosis:     Clinical stage II left breast cancer, grade 3, ER negative, NC negative, HER2 fish positive disease, diagnosed in March 2020       Cancer Staging:  Cancer Staging  No matching staging information was found for the patient         Previous Hematologic/ Oncologic Treatment:       1  Neoadjuvant chemotherapy with TCH with pertuzumab x3 cycles, completed in early May 2020       2  Neoadjuvant chemotherapy with weekly paclitaxel, try weekly trastuzumab and pertuzumab x6 weeks, completed in July 2020       Current Hematologic/ Oncologic Treatment:       Adjuvant therapy with trastuzumab and pertuzumab  To be completed in June 2021      Disease Status:      1  Clinical complete response      2  Complete pathological response      3  HARVEY status post mastectomy and sentinel lymph node biopsy      Test Results:     Pathology:     Left breast biopsy showed invasive ductal carcinoma, grade 3, ER negative, MS negative, HER2 fish positive disease with ratio of 2 2      In August 2020, mastectomy specimen showed no evidence of residual carcinoma  3 sentinel lymph nodes were all negative for metastatic disease      Radiology:     Bone scan was negative for osseous metastasis      CT scan of chest abdomen pelvis showed no evidence of distant metastasis      Echocardiogram showed ejection fraction 60% in  January 2021     Laboratory:     See below      Physical Exam:        General Appearance:    Alert, oriented          Eyes:    PERRL   Ears:    Normal external ear canals, both ears   Nose:   Nares normal, septum midline   Throat:   Mucosa moist  Pharynx without injection  Neck:   Supple         Lungs:     Clear to auscultation bilaterally   Chest Wall:    No tenderness or deformity    Heart:    Regular rate and rhythm         Abdomen:     Soft, non-tender, bowel sounds +, no organomegaly               Extremities:   Extremities no cyanosis or edema         Skin:   no rash or icterus      Lymph nodes:   Cervical, supraclavicular, and axillary nodes normal   Neurologic:   CNII-XII intact, normal strength, sensation and reflexes     Throughout             Breast exam:   status post left mastectomy without reconstruction  No palpable abnormality in her left chest wall  Right breast exam is negative  ROS: Review of Systems   All other systems reviewed and are negative  Imaging: No results found  Labs:   Lab Results   Component Value Date    WBC 5 73 10/21/2020    HGB 11 8 10/21/2020    HCT 38 2 10/21/2020    MCV 94 10/21/2020     10/21/2020     Lab Results   Component Value Date     (L) 04/22/2015    K 3 7 10/21/2020     (H) 10/21/2020    CO2 25 10/21/2020    ANIONGAP 11 04/22/2015    BUN 5 10/21/2020    CREATININE 0 68 10/21/2020    GLUCOSE 97 04/22/2015    GLUF 87 10/19/2020    CALCIUM 8 5 10/21/2020    CORRECTEDCA 8 4 09/20/2020    AST 16 10/18/2020    ALT 13 10/18/2020    ALKPHOS 82 10/18/2020    PROT 7 2 04/22/2015    BILITOT 0 2 04/22/2015    EGFR 97 10/21/2020       Current Medications: Reviewed  Allergies: Reviewed  PMH/FH/SH:  Reviewed      Vital Sign:    Body surface area is 1 62 meters squared      Wt Readings from Last 3 Encounters:   02/10/21 59 9 kg (132 lb)   02/02/21 56 7 kg (125 lb)   01/11/21 57 8 kg (127 lb 8 oz)        Temp Readings from Last 3 Encounters:   02/10/21 98 2 °F (36 8 °C) (Tympanic Core)   02/02/21 (!) 96 8 °F (36 °C) (Temporal)   01/11/21 (!) 96 6 °F (35 9 °C) (Temporal)        BP Readings from Last 3 Encounters:   02/10/21 118/64   02/02/21 132/72   01/11/21 102/64         Pulse Readings from Last 3 Encounters:   02/10/21 (!) 113   02/02/21 90   01/11/21 99     @LASTSAO2(3)@

## 2021-02-12 ENCOUNTER — PATIENT OUTREACH (OUTPATIENT)
Dept: CASE MANAGEMENT | Facility: HOSPITAL | Age: 56
End: 2021-02-12

## 2021-02-12 NOTE — PROGRESS NOTES
LSW reached out to pt for follow up counseling  Pt is doing well and does not have any specific needs at this time

## 2021-02-16 ENCOUNTER — TELEPHONE (OUTPATIENT)
Dept: PALLIATIVE MEDICINE | Facility: CLINIC | Age: 56
End: 2021-02-16

## 2021-02-17 ENCOUNTER — APPOINTMENT (EMERGENCY)
Dept: CT IMAGING | Facility: HOSPITAL | Age: 56
DRG: 242 | End: 2021-02-17
Payer: COMMERCIAL

## 2021-02-17 ENCOUNTER — HOSPITAL ENCOUNTER (INPATIENT)
Facility: HOSPITAL | Age: 56
LOS: 1 days | Discharge: HOME | DRG: 242 | End: 2021-02-20
Attending: EMERGENCY MEDICINE | Admitting: FAMILY MEDICINE
Payer: COMMERCIAL

## 2021-02-17 ENCOUNTER — APPOINTMENT (EMERGENCY)
Dept: RADIOLOGY | Facility: HOSPITAL | Age: 56
DRG: 242 | End: 2021-02-17
Payer: COMMERCIAL

## 2021-02-17 DIAGNOSIS — R11.2 NAUSEA AND VOMITING: ICD-10-CM

## 2021-02-17 DIAGNOSIS — R11.10 VOMITING: ICD-10-CM

## 2021-02-17 DIAGNOSIS — G89.3 CANCER-RELATED PAIN: ICD-10-CM

## 2021-02-17 DIAGNOSIS — K29.70 GASTRITIS: ICD-10-CM

## 2021-02-17 DIAGNOSIS — R13.10 ODYNOPHAGIA: ICD-10-CM

## 2021-02-17 DIAGNOSIS — C50.212 MALIGNANT NEOPLASM OF UPPER-INNER QUADRANT OF LEFT BREAST IN FEMALE, ESTROGEN RECEPTOR NEGATIVE (HCC): ICD-10-CM

## 2021-02-17 DIAGNOSIS — E87.2 LACTIC ACIDOSIS: ICD-10-CM

## 2021-02-17 DIAGNOSIS — R10.9 ABDOMINAL PAIN: ICD-10-CM

## 2021-02-17 DIAGNOSIS — B37.81 CANDIDA ESOPHAGITIS (HCC): ICD-10-CM

## 2021-02-17 DIAGNOSIS — R07.9 CHEST PAIN: Primary | ICD-10-CM

## 2021-02-17 DIAGNOSIS — R11.2 INTRACTABLE VOMITING WITH NAUSEA, UNSPECIFIED VOMITING TYPE: ICD-10-CM

## 2021-02-17 DIAGNOSIS — Z17.1 MALIGNANT NEOPLASM OF UPPER-INNER QUADRANT OF LEFT BREAST IN FEMALE, ESTROGEN RECEPTOR NEGATIVE (HCC): ICD-10-CM

## 2021-02-17 DIAGNOSIS — K20.90 ESOPHAGITIS: ICD-10-CM

## 2021-02-17 LAB
ALBUMIN SERPL BCP-MCNC: 3.4 G/DL (ref 3.5–5)
ALP SERPL-CCNC: 74 U/L (ref 46–116)
ALT SERPL W P-5'-P-CCNC: 11 U/L (ref 12–78)
ANION GAP SERPL CALCULATED.3IONS-SCNC: 16 MMOL/L (ref 4–13)
AST SERPL W P-5'-P-CCNC: 16 U/L (ref 5–45)
BASOPHILS # BLD AUTO: 0.03 THOUSANDS/ΜL (ref 0–0.1)
BASOPHILS NFR BLD AUTO: 0 % (ref 0–1)
BILIRUB SERPL-MCNC: 0.35 MG/DL (ref 0.2–1)
BUN SERPL-MCNC: 10 MG/DL (ref 5–25)
CALCIUM ALBUM COR SERPL-MCNC: 9.5 MG/DL (ref 8.3–10.1)
CALCIUM SERPL-MCNC: 9 MG/DL (ref 8.3–10.1)
CHLORIDE SERPL-SCNC: 101 MMOL/L (ref 100–108)
CO2 SERPL-SCNC: 20 MMOL/L (ref 21–32)
CREAT SERPL-MCNC: 0.92 MG/DL (ref 0.6–1.3)
EOSINOPHIL # BLD AUTO: 0.02 THOUSAND/ΜL (ref 0–0.61)
EOSINOPHIL NFR BLD AUTO: 0 % (ref 0–6)
ERYTHROCYTE [DISTWIDTH] IN BLOOD BY AUTOMATED COUNT: 17.3 % (ref 11.6–15.1)
GFR SERPL CREATININE-BSD FRML MDRD: 69 ML/MIN/1.73SQ M
GLUCOSE SERPL-MCNC: 106 MG/DL (ref 65–140)
HCT VFR BLD AUTO: 44.5 % (ref 34.8–46.1)
HGB BLD-MCNC: 13.9 G/DL (ref 11.5–15.4)
IMM GRANULOCYTES # BLD AUTO: 0.04 THOUSAND/UL (ref 0–0.2)
IMM GRANULOCYTES NFR BLD AUTO: 0 % (ref 0–2)
LACTATE SERPL-SCNC: 1 MMOL/L (ref 0.5–2)
LACTATE SERPL-SCNC: 2.5 MMOL/L (ref 0.5–2)
LIPASE SERPL-CCNC: 119 U/L (ref 73–393)
LYMPHOCYTES # BLD AUTO: 1.27 THOUSANDS/ΜL (ref 0.6–4.47)
LYMPHOCYTES NFR BLD AUTO: 13 % (ref 14–44)
MAGNESIUM SERPL-MCNC: 1.9 MG/DL (ref 1.6–2.6)
MCH RBC QN AUTO: 29 PG (ref 26.8–34.3)
MCHC RBC AUTO-ENTMCNC: 31.2 G/DL (ref 31.4–37.4)
MCV RBC AUTO: 93 FL (ref 82–98)
MONOCYTES # BLD AUTO: 0.8 THOUSAND/ΜL (ref 0.17–1.22)
MONOCYTES NFR BLD AUTO: 8 % (ref 4–12)
NEUTROPHILS # BLD AUTO: 7.36 THOUSANDS/ΜL (ref 1.85–7.62)
NEUTS SEG NFR BLD AUTO: 79 % (ref 43–75)
NRBC BLD AUTO-RTO: 0 /100 WBCS
PHOSPHATE SERPL-MCNC: 1.8 MG/DL (ref 2.7–4.5)
PLATELET # BLD AUTO: 516 THOUSANDS/UL (ref 149–390)
PMV BLD AUTO: 9.7 FL (ref 8.9–12.7)
POTASSIUM SERPL-SCNC: 3.4 MMOL/L (ref 3.5–5.3)
PROT SERPL-MCNC: 7.6 G/DL (ref 6.4–8.2)
RBC # BLD AUTO: 4.8 MILLION/UL (ref 3.81–5.12)
SODIUM SERPL-SCNC: 137 MMOL/L (ref 136–145)
TROPONIN I SERPL-MCNC: 0.03 NG/ML
WBC # BLD AUTO: 9.52 THOUSAND/UL (ref 4.31–10.16)

## 2021-02-17 PROCEDURE — 99285 EMERGENCY DEPT VISIT HI MDM: CPT

## 2021-02-17 PROCEDURE — 96375 TX/PRO/DX INJ NEW DRUG ADDON: CPT

## 2021-02-17 PROCEDURE — 83605 ASSAY OF LACTIC ACID: CPT | Performed by: EMERGENCY MEDICINE

## 2021-02-17 PROCEDURE — 96376 TX/PRO/DX INJ SAME DRUG ADON: CPT

## 2021-02-17 PROCEDURE — 96374 THER/PROPH/DIAG INJ IV PUSH: CPT

## 2021-02-17 PROCEDURE — 87209 SMEAR COMPLEX STAIN: CPT | Performed by: INTERNAL MEDICINE

## 2021-02-17 PROCEDURE — 93005 ELECTROCARDIOGRAM TRACING: CPT

## 2021-02-17 PROCEDURE — 36415 COLL VENOUS BLD VENIPUNCTURE: CPT | Performed by: EMERGENCY MEDICINE

## 2021-02-17 PROCEDURE — 83690 ASSAY OF LIPASE: CPT | Performed by: EMERGENCY MEDICINE

## 2021-02-17 PROCEDURE — 74177 CT ABD & PELVIS W/CONTRAST: CPT

## 2021-02-17 PROCEDURE — 84100 ASSAY OF PHOSPHORUS: CPT | Performed by: EMERGENCY MEDICINE

## 2021-02-17 PROCEDURE — 71260 CT THORAX DX C+: CPT

## 2021-02-17 PROCEDURE — 0241U HB NFCT DS VIR RESP RNA 4 TRGT: CPT | Performed by: INTERNAL MEDICINE

## 2021-02-17 PROCEDURE — 80053 COMPREHEN METABOLIC PANEL: CPT | Performed by: EMERGENCY MEDICINE

## 2021-02-17 PROCEDURE — 71045 X-RAY EXAM CHEST 1 VIEW: CPT

## 2021-02-17 PROCEDURE — 87493 C DIFF AMPLIFIED PROBE: CPT | Performed by: INTERNAL MEDICINE

## 2021-02-17 PROCEDURE — 87177 OVA AND PARASITES SMEARS: CPT | Performed by: INTERNAL MEDICINE

## 2021-02-17 PROCEDURE — 99219 PR INITIAL OBSERVATION CARE/DAY 50 MINUTES: CPT | Performed by: FAMILY MEDICINE

## 2021-02-17 PROCEDURE — 96361 HYDRATE IV INFUSION ADD-ON: CPT

## 2021-02-17 PROCEDURE — 84484 ASSAY OF TROPONIN QUANT: CPT | Performed by: EMERGENCY MEDICINE

## 2021-02-17 PROCEDURE — 99285 EMERGENCY DEPT VISIT HI MDM: CPT | Performed by: EMERGENCY MEDICINE

## 2021-02-17 PROCEDURE — 87505 NFCT AGENT DETECTION GI: CPT | Performed by: INTERNAL MEDICINE

## 2021-02-17 PROCEDURE — 83735 ASSAY OF MAGNESIUM: CPT | Performed by: EMERGENCY MEDICINE

## 2021-02-17 PROCEDURE — 85025 COMPLETE CBC W/AUTO DIFF WBC: CPT | Performed by: EMERGENCY MEDICINE

## 2021-02-17 RX ORDER — ONDANSETRON 2 MG/ML
4 INJECTION INTRAMUSCULAR; INTRAVENOUS ONCE
Status: COMPLETED | OUTPATIENT
Start: 2021-02-17 | End: 2021-02-17

## 2021-02-17 RX ORDER — CYCLOBENZAPRINE HCL 10 MG
10 TABLET ORAL
Status: DISCONTINUED | OUTPATIENT
Start: 2021-02-17 | End: 2021-02-20 | Stop reason: HOSPADM

## 2021-02-17 RX ORDER — ACETAMINOPHEN 325 MG/1
650 TABLET ORAL EVERY 6 HOURS PRN
Status: DISCONTINUED | OUTPATIENT
Start: 2021-02-17 | End: 2021-02-20 | Stop reason: HOSPADM

## 2021-02-17 RX ORDER — LEVOTHYROXINE SODIUM 88 UG/1
88 TABLET ORAL
Status: DISCONTINUED | OUTPATIENT
Start: 2021-02-18 | End: 2021-02-20 | Stop reason: HOSPADM

## 2021-02-17 RX ORDER — ALPRAZOLAM 0.5 MG/1
1 TABLET ORAL 3 TIMES DAILY PRN
Status: DISCONTINUED | OUTPATIENT
Start: 2021-02-17 | End: 2021-02-20 | Stop reason: HOSPADM

## 2021-02-17 RX ORDER — HYDROXYCHLOROQUINE SULFATE 200 MG/1
200 TABLET, FILM COATED ORAL
Status: DISCONTINUED | OUTPATIENT
Start: 2021-02-18 | End: 2021-02-20 | Stop reason: HOSPADM

## 2021-02-17 RX ORDER — FENTANYL CITRATE 50 UG/ML
50 INJECTION, SOLUTION INTRAMUSCULAR; INTRAVENOUS ONCE
Status: COMPLETED | OUTPATIENT
Start: 2021-02-17 | End: 2021-02-17

## 2021-02-17 RX ORDER — PANTOPRAZOLE SODIUM 40 MG/1
40 TABLET, DELAYED RELEASE ORAL
Status: DISCONTINUED | OUTPATIENT
Start: 2021-02-18 | End: 2021-02-19

## 2021-02-17 RX ORDER — POTASSIUM CHLORIDE 20 MEQ/1
20 TABLET, EXTENDED RELEASE ORAL ONCE
Status: COMPLETED | OUTPATIENT
Start: 2021-02-17 | End: 2021-02-17

## 2021-02-17 RX ORDER — OXYCODONE HYDROCHLORIDE 10 MG/1
10 TABLET ORAL EVERY 4 HOURS PRN
Status: DISCONTINUED | OUTPATIENT
Start: 2021-02-17 | End: 2021-02-19

## 2021-02-17 RX ORDER — DICYCLOMINE HYDROCHLORIDE 10 MG/1
10 CAPSULE ORAL
Status: DISCONTINUED | OUTPATIENT
Start: 2021-02-17 | End: 2021-02-20 | Stop reason: HOSPADM

## 2021-02-17 RX ORDER — SODIUM CHLORIDE 9 MG/ML
75 INJECTION, SOLUTION INTRAVENOUS CONTINUOUS
Status: DISCONTINUED | OUTPATIENT
Start: 2021-02-17 | End: 2021-02-20 | Stop reason: HOSPADM

## 2021-02-17 RX ORDER — HYDROXYCHLOROQUINE SULFATE 200 MG/1
100 TABLET, FILM COATED ORAL
Status: DISCONTINUED | OUTPATIENT
Start: 2021-02-17 | End: 2021-02-20 | Stop reason: HOSPADM

## 2021-02-17 RX ORDER — OXYCODONE HYDROCHLORIDE 10 MG/1
10-15 TABLET ORAL EVERY 4 HOURS PRN
Qty: 120 TABLET | Refills: 0 | OUTPATIENT
Start: 2021-02-17

## 2021-02-17 RX ORDER — ONDANSETRON 2 MG/ML
4 INJECTION INTRAMUSCULAR; INTRAVENOUS EVERY 6 HOURS PRN
Status: DISCONTINUED | OUTPATIENT
Start: 2021-02-17 | End: 2021-02-19

## 2021-02-17 RX ORDER — NICOTINE 21 MG/24HR
1 PATCH, TRANSDERMAL 24 HOURS TRANSDERMAL DAILY
Status: DISCONTINUED | OUTPATIENT
Start: 2021-02-18 | End: 2021-02-20 | Stop reason: HOSPADM

## 2021-02-17 RX ADMIN — IOHEXOL 100 ML: 350 INJECTION, SOLUTION INTRAVENOUS at 20:42

## 2021-02-17 RX ADMIN — HYDROXYCHLOROQUINE SULFATE 100 MG: 200 TABLET, FILM COATED ORAL at 23:40

## 2021-02-17 RX ADMIN — FENTANYL CITRATE 50 MCG: 50 INJECTION INTRAMUSCULAR; INTRAVENOUS at 21:05

## 2021-02-17 RX ADMIN — FENTANYL CITRATE 50 MCG: 50 INJECTION INTRAMUSCULAR; INTRAVENOUS at 18:38

## 2021-02-17 RX ADMIN — FENTANYL CITRATE 50 MCG: 50 INJECTION INTRAMUSCULAR; INTRAVENOUS at 17:08

## 2021-02-17 RX ADMIN — OXYCODONE HYDROCHLORIDE 10 MG: 10 TABLET ORAL at 23:11

## 2021-02-17 RX ADMIN — ONDANSETRON 4 MG: 2 INJECTION INTRAMUSCULAR; INTRAVENOUS at 17:08

## 2021-02-17 RX ADMIN — DICYCLOMINE HYDROCHLORIDE 10 MG: 10 CAPSULE ORAL at 23:11

## 2021-02-17 RX ADMIN — SODIUM CHLORIDE 1000 ML: 0.9 INJECTION, SOLUTION INTRAVENOUS at 18:15

## 2021-02-17 RX ADMIN — IOHEXOL 50 ML: 240 INJECTION, SOLUTION INTRATHECAL; INTRAVASCULAR; INTRAVENOUS; ORAL at 18:50

## 2021-02-17 RX ADMIN — ONDANSETRON 4 MG: 2 INJECTION INTRAMUSCULAR; INTRAVENOUS at 18:37

## 2021-02-17 RX ADMIN — SODIUM CHLORIDE 1000 ML: 0.9 INJECTION, SOLUTION INTRAVENOUS at 16:36

## 2021-02-17 RX ADMIN — CYCLOBENZAPRINE HYDROCHLORIDE 10 MG: 10 TABLET, FILM COATED ORAL at 23:11

## 2021-02-17 RX ADMIN — ONDANSETRON 4 MG: 2 INJECTION INTRAMUSCULAR; INTRAVENOUS at 21:04

## 2021-02-17 RX ADMIN — POTASSIUM CHLORIDE 20 MEQ: 1500 TABLET, EXTENDED RELEASE ORAL at 23:51

## 2021-02-17 NOTE — TELEPHONE ENCOUNTER
PDMP last fill date    Oxycodone 10 mg      02/05  120/14    No show last ov  Needs to be rescheduled

## 2021-02-17 NOTE — TELEPHONE ENCOUNTER
Pt has been scheduled with Dr Singer Reveal 02/18  Virtual  Pt has been notified that scripts on hold until this office visit completed  Pt offers c/o nausea vomiting "since my surgery"    Instructed pt to discuss at tomorrow visit

## 2021-02-17 NOTE — Clinical Note
Case was discussed with JOON and the patient's admission status was agreed to be Admission Status: inpatient status to the service of

## 2021-02-17 NOTE — TELEPHONE ENCOUNTER
Called and left message with pt's spouse, who answered, to have pt call office back to discuss and consider scheduling options

## 2021-02-17 NOTE — ED PROVIDER NOTES
History  Chief Complaint   Patient presents with    Chest Pain     Patient reports starting to vomit for a day, feeling "bad" for two  Patient now states it feels like burning in the middle of my chest and sometimes it hurts more than other  Some diarrhea noted  61 y/o female presents today for evaluation of vomiting, chest pain/abdominal pain that started last night  Patient is difficult to get a history from due to active vomiting/dry heaving  Most of the history is from the significant other in the room and her medical record  She is currently being treated with chemotherapy for breast cancer  She says this has never happened before, however there are several ED visits over the last year for abdominal pain, nausea and vomiting  It is difficult to ascertain from the patient what is different about this time  History provided by:  Patient  Chest Pain  Pain location:  Substernal area and epigastric  Pain quality: burning and sharp    Pain radiates to:  Does not radiate  Pain radiates to the back: no    Pain severity:  Severe  Onset quality:  Unable to specify  Duration:  2 days  Timing:  Constant  Progression:  Unchanged  Worsened by:  Nothing tried  Ineffective treatments:  None tried  Associated symptoms: nausea and vomiting    Associated symptoms: no abdominal pain, no altered mental status, no dizziness, no lower extremity edema, no orthopnea and no shortness of breath    Risk factors: smoking    Risk factors: not obese        Prior to Admission Medications   Prescriptions Last Dose Informant Patient Reported? Taking?    ALPRAZolam (XANAX) 1 mg tablet   No No   Sig: Take 1 tablet (1 mg total) by mouth 3 (three) times a day as needed for anxiety   Cholecalciferol (VITAMIN D3) 43853 units CAPS  Self Yes No   Sig: Take 50,000 Units by mouth once a week    Nutritional Supplements (OSTEOPOROSIS SUPPORT PO)   Yes No   Sig: Take by mouth   acetaminophen (TYLENOL) 325 mg tablet   No No   Sig: Take 2 tablets (650 mg total) by mouth every 6 (six) hours as needed for mild pain, headaches or fever   cyclobenzaprine (FLEXERIL) 10 mg tablet   Yes No   Sig: Take 10 mg by mouth daily at bedtime    cyclobenzaprine (FLEXERIL) 5 mg tablet   Yes No   Sig: Take 10 mg by mouth daily at bedtime as needed   hydroxychloroquine (PLAQUENIL) 200 mg tablet  Self Yes No   Sig: Take 1 tablet in morning and 1/2 tablet in evening   levothyroxine 88 mcg tablet   No No   Sig: TAKE ONE TABLET BY MOUTH EVERY DAY   magnesium oxide (MAG-OX) 400 mg   No No   Sig: Take 1 tablet (400 mg total) by mouth 2 (two) times a day   ondansetron (ZOFRAN) 4 mg tablet   No No   Sig: Take 1 tablet (4 mg total) by mouth every 8 (eight) hours as needed for nausea or vomiting   oxyCODONE (ROXICODONE) 10 MG TABS   No No   Sig: Take 1-1 5 tablets (10-15 mg total) by mouth every 4 (four) hours as needed (cancer pain    Max of 8 tabs / day ) NO FURTHER FILLS WITHOUT VISIT TO PALLIATIVE CLINIC   pantoprazole (PROTONIX) 40 mg tablet   No No   Sig: Take 1 tablet (40 mg total) by mouth 2 (two) times a day before meals   sertraline (ZOLOFT) 100 mg tablet   No No   Sig: TAKE 1 & 1/2 TABLETS BY MOUTH ONCE DAILY   Patient taking differently: Take 150 mg by mouth daily BY MOUTH ONCE DAILY      Facility-Administered Medications: None       Past Medical History:   Diagnosis Date    Disease of thyroid gland     Hypertension     Lupus ([facility] [address] )     Malignant neoplasm of upper-inner quadrant of left breast in female, estrogen receptor negative ([facility] [address] ) 2/25/2020    Nephrolithiasis     PTSD (post-traumatic stress disorder)        Past Surgical History:   Procedure Laterality Date    FEMUR FRACTURE SURGERY      FL GUIDED CENTRAL VENOUS ACCESS DEVICE INSERTION  3/17/2020    HYSTERECTOMY      LEFT OOPHORECTOMY      due to torsion     MAMMO STEREOTACTIC BREAST BIOPSY RIGHT (ALL INC) Right 2/12/2020    MASTECTOMY W/ SENTINEL NODE BIOPSY Left 8/18/2020    Procedure: BREAST MASTECTOMY WITH SENTINEL LYMPH NODE BIOPSY, LYMPHATIC MAPPING WITH BLUE DYE AND RADIOACTIVE DYE (INJECT AT 1430 BY DR WRIGHT IN THE OR); Surgeon: Augustine Mullins MD;  Location: [facility];  Service: Surgical Oncology    MRI BREAST BIOPSY LEFT (ALL INCLUSIVE) Left 3/20/2020    LA INSJ TUNNELED CTR VAD W/SUBQ PORT AGE 5 YR/> N/A 3/17/2020    Procedure: INSERTION VENOUS PORT ( PORT-A-CATH) IR;  Surgeon: Liz Davis DO;  [facility];  Service: Interventional Radiology    US GUIDANCE BREAST BIOPSY LEFT EACH ADDITIONAL Left 2/12/2020    US GUIDED BREAST BIOPSY LEFT COMPLETE Left 2/12/2020    VAGINA SURGERY         Family History   Problem Relation Age of Onset    Diabetes Mother     Hypertension Mother     Nephrolithiasis Mother     Breast cancer Mother 79    Heart disease Father     Hypertension Father     Diabetes Brother     Nephrolithiasis Brother     Breast cancer Maternal Aunt     No Known Problems Maternal Grandmother     No Known Problems Maternal Grandfather     No Known Problems Paternal Grandmother     No Known Problems Paternal Grandfather      I have reviewed and agree with the history as documented  E-Cigarette/Vaping    E-Cigarette Use Never User      E-Cigarette/Vaping Substances     Social History     Tobacco Use    Smoking status: Current Every Day Smoker     Packs/day: 0 50     Years: 40 00     Pack years: 20 00     Types: Cigarettes     Start date: 1990    Smokeless tobacco: Never Used   Substance Use Topics    Alcohol use: Not Currently     Alcohol/week: 21 0 standard drinks     Types: 21 Cans of beer per week     Frequency: Never     Drinks per session: Patient refused     Binge frequency: Never     Comment: pt states she had her last beer 3/22/2020    Drug use: No       Review of Systems   Unable to perform ROS: Acuity of condition   Respiratory: Negative for shortness of breath  Cardiovascular: Positive for chest pain  Negative for orthopnea  Gastrointestinal: Positive for nausea and vomiting  Negative for abdominal pain  Neurological: Negative for dizziness  Physical Exam  Physical Exam  Vitals signs and nursing note reviewed  Constitutional:       Appearance: She is well-developed  HENT:      Head: Normocephalic and atraumatic  Mouth/Throat:      Mouth: Mucous membranes are moist       Pharynx: Uvula midline  Tonsils: No tonsillar exudate  Eyes:      Pupils: Pupils are equal, round, and reactive to light  Neck:      Musculoskeletal: Normal range of motion and neck supple  Cardiovascular:      Rate and Rhythm: Normal rate and regular rhythm  Pulmonary:      Effort: Pulmonary effort is normal       Breath sounds: Normal breath sounds  Abdominal:      General: Bowel sounds are normal       Palpations: Abdomen is soft  Tenderness: There is no abdominal tenderness  There is no guarding or rebound  Musculoskeletal:         General: No tenderness or deformity  Right lower leg: No edema  Left lower leg: No edema  Skin:     General: Skin is warm and dry  Capillary Refill: Capillary refill takes less than 2 seconds  Neurological:      General: No focal deficit present  Mental Status: She is alert and oriented to person, place, and time  Comments: Patient moving all extremities equally, no focal neuro deficits noted         Psychiatric:         Mood and Affect: Mood normal          Behavior: Behavior normal          Vital Signs  ED Triage Vitals [02/17/21 1608]   Temperature Pulse Respirations Blood Pressure SpO2   (!) 97 2 °F (36 2 °C) 88 16 144/88 98 %      Temp Source Heart Rate Source Patient Position - Orthostatic VS BP Location FiO2 (%)   Temporal Monitor Lying Right arm --      Pain Score       Worst Possible Pain           Vitals:    02/17/21 1745 02/17/21 1757 02/17/21 1929 02/17/21 2058   BP:  (!) 173/98 156/86 151/70   Pulse: 80 78 76 72   Patient Position - Orthostatic VS:  Lying Sitting Lying         Visual Acuity      ED Medications  Medications   sodium chloride 0 9 % bolus 1,000 mL (0 mL Intravenous Stopped 2/17/21 1758)   fentanyl citrate (PF) 100 MCG/2ML 50 mcg (50 mcg Intravenous Given 2/17/21 1708)   ondansetron (ZOFRAN) injection 4 mg (4 mg Intravenous Given 2/17/21 1708)   sodium chloride 0 9 % bolus 1,000 mL (0 mL Intravenous Stopped 2/17/21 1929)   ondansetron (ZOFRAN) injection 4 mg (4 mg Intravenous Given 2/17/21 1837)   fentanyl citrate (PF) 100 MCG/2ML 50 mcg (50 mcg Intravenous Given 2/17/21 1838)   iohexol (OMNIPAQUE) 240 MG/ML solution 50 mL (50 mL Oral Given 2/17/21 1850)   iohexol (OMNIPAQUE) 350 MG/ML injection (SINGLE-DOSE) 100 mL (100 mL Intravenous Given 2/17/21 2042)   ondansetron (ZOFRAN) injection 4 mg (4 mg Intravenous Given 2/17/21 2104)   fentanyl citrate (PF) 100 MCG/2ML 50 mcg (50 mcg Intravenous Given 2/17/21 2105)       Diagnostic Studies  Results Reviewed     Procedure Component Value Units Date/Time    Lactic acid 2 Hours [[id number]] Collected: 02/17/21 2055    Lab Status: In process Specimen: Blood from Arm, Right Updated: 02/17/21 2102    Lactic acid [[id number]]  (Abnormal) Collected: 02/17/21 1635    Lab Status: Final result Specimen: Blood from Hand, Right Updated: 02/17/21 1740     LACTIC ACID 2 5 mmol/L     Narrative:      Result may be elevated if tourniquet was used during collection      Comprehensive metabolic panel [[id number]]  (Abnormal) Collected: 02/17/21 1635    Lab Status: Final result Specimen: Blood from Hand, Right Updated: 02/17/21 1720     Sodium 137 mmol/L      Potassium 3 4 mmol/L      Chloride 101 mmol/L      CO2 20 mmol/L      ANION GAP 16 mmol/L      BUN 10 mg/dL      Creatinine 0 92 mg/dL      Glucose 106 mg/dL      Calcium 9 0 mg/dL      Corrected Calcium 9 5 mg/dL      AST 16 U/L      ALT 11 U/L      Alkaline Phosphatase 74 U/L      Total Protein 7 6 g/dL      Albumin 3 4 g/dL      Total Bilirubin 0 35 mg/dL      eGFR 69 ml/min/1 73sq m     Narrative:      National Kidney Disease Foundation guidelines for Chronic Kidney Disease (CKD):     Stage 1 with normal or high GFR (GFR > 90 mL/min/1 73 square meters)    Stage 2 Mild CKD (GFR = 60-89 mL/min/1 73 square meters)    Stage 3A Moderate CKD (GFR = 45-59 mL/min/1 73 square meters)    Stage 3B Moderate CKD (GFR = 30-44 mL/min/1 73 square meters)    Stage 4 Severe CKD (GFR = 15-29 mL/min/1 73 square meters)    Stage 5 End Stage CKD (GFR <15 mL/min/1 73 square meters)  Note: GFR calculation is accurate only with a steady state creatinine    Lipase [[id number]]  (Normal) Collected: 02/17/21 1635    Lab Status: Final result Specimen: Blood from Hand, Right Updated: 02/17/21 1720     Lipase 119 u/L     Phosphorus [[id number]]  (Abnormal) Collected: 02/17/21 1635    Lab Status: Final result Specimen: Blood from Hand, Right Updated: 02/17/21 1720     Phosphorus 1 8 mg/dL     Magnesium [[id number]]  (Normal) Collected: 02/17/21 1635    Lab Status: Final result Specimen: Blood from Hand, Right Updated: 02/17/21 1720     Magnesium 1 9 mg/dL     Troponin I [[id number]]  (Normal) Collected: 02/17/21 1635    Lab Status: Final result Specimen: Blood from Arm, Right Updated: 02/17/21 1709     Troponin I 0 03 ng/mL     CBC and differential [[id number]]  (Abnormal) Collected: 02/17/21 1635    Lab Status: Final result Specimen: Blood from Hand, Right Updated: 02/17/21 1649     WBC 9 52 Thousand/uL      RBC 4 80 Million/uL      Hemoglobin 13 9 g/dL      Hematocrit 44 5 %      MCV 93 fL      MCH 29 0 pg      MCHC 31 2 g/dL      RDW 17 3 %      MPV 9 7 fL      Platelets 118 Thousands/uL      nRBC 0 /100 WBCs      Neutrophils Relative 79 %      Immat GRANS % 0 %      Lymphocytes Relative 13 %      Monocytes Relative 8 %      Eosinophils Relative 0 %      Basophils Relative 0 %      Neutrophils Absolute 7 36 Thousands/µL      Immature Grans Absolute 0 04 Thousand/uL      Lymphocytes Absolute 1 27 Thousands/µL      Monocytes Absolute 0 80 Thousand/µL      Eosinophils Absolute 0 02 Thousand/µL      Basophils Absolute 0 03 Thousands/µL                  CT chest abdomen pelvis w contrast   Final Result by Avel Cole MD (02/17 2112)         1  No evidence of esophagitis, mediastinitis or acute cardiopulmonary process  2   No evidence of gastritis, colitis or bowel obstruction            Workstation performed: [id number]         XR chest 1 view portable   Final Result by Serene Roy MD (02/17 1827)      No acute cardiopulmonary disease  Workstation performed: [id number]                    Procedures  ECG 12 Lead Documentation Only    Date/Time: 2/17/2021 4:34 PM  Performed by: Lisset Zelaya DO  Authorized by: Marin Marquis DO     Indications / Diagnosis:  Chest pain  ECG reviewed by me, the ED Provider: no    Patient location:  ED  Previous ECG:     Previous ECG:  Compared to current  Comments:      Sinus rhythm at 93 beats per minute  Normal axis, normal intervals, increased amplitude of the T-waves V3 through V6 as well as 2 3 and AVF which is new when compared to prior from 10/18/2020  ED Course             HEART Risk Score      Most Recent Value   Heart Score Risk Calculator   History  0 Filed at: 02/17/2021 1822   ECG  1 Filed at: 02/17/2021 1822   Age  1 Filed at: 02/17/2021 1822   Risk Factors  1 Filed at: 02/17/2021 1822   Troponin  0 Filed at: 02/17/2021 1822   HEART Score  3 Filed at: 02/17/2021 1822                      SBIRT 22yo+      Most Recent Value   SBIRT (23 yo +)   In order to provide better care to our patients, we are screening all of our patients for alcohol and drug use  Would it be okay to ask you these screening questions?   Unable to answer at this time Filed at: 02/17/2021 1929                    MDM  Number of Diagnoses or Management Options  Abdominal pain: new and requires workup  Chest pain: new and requires workup  Lactic acidosis: Vomiting: new and requires workup  Diagnosis management comments: 6:05 PM  Patient feeling a little better after zofran and fentanyl  After review of her medical record, she has a history of a Schatzki's ring that required dilation last year and also a mucosal tear that required a clip  Will attempt to have patient drink PO contrast to do a CT scan to rule out esophageal rupture, like a Boerhaave's syndrome and reevaluate  6:17 PM  Patient only has a 22G IV in her hand  Unable to give IV contrast for CT through that  Patient requesting we access her port  She is getting active chemo through the port at this time  Will try to only use the port access for the CT scan and use the IV in her hand for everything else parenteral we need to give  9:16 PM  CT negative for esophageal rupture  Patient continues to have pain and nausea  Is still unable to tolerate PO  Will discuss with [facility] for admission  MDM: Patient presents to the Emergency Department and was diagnosed with acute abdominal pain and intractable vomiting with lactic acidosis  This is a new problem that will require additional planned work-up in a hospitalized setting  Clinical laboratory testing, radiology imaging, and medical testing (EKG) were ordered  I independently reviewed the radiologic imaging, EKG, and laboratory studies  This case is considered high risk secondary to the above listed disease process that poses a threat to bodily function that requires further diagnostic testing and management which may include the administration of parenteral controlled substances  Discussed with JOON  We had a detailed discussion of the patient's condition and case,  including need for admission  Accepts to his/her service  Bed request/bridging orders placed             Amount and/or Complexity of Data Reviewed  Clinical lab tests: ordered and reviewed  Tests in the radiology section of CPT®: ordered and reviewed  Tests in the medicine section of CPT®: ordered and reviewed  Review and summarize past medical records: yes  Discuss the patient with other providers: yes  Independent visualization of images, tracings, or specimens: yes    Risk of Complications, Morbidity, and/or Mortality  Presenting problems: high  Diagnostic procedures: high  Management options: high    Patient Progress  Patient progress: stable      Disposition  Final diagnoses:   Chest pain   Abdominal pain   Vomiting   Lactic acidosis     Time reflects when diagnosis was documented in both MDM as applicable and the Disposition within this note     Time User Action Codes Description Comment    2/17/2021  6:19 PM Tremaine Castillo Add [R07 9] Chest pain     2/17/2021  6:19 PM Tremaine Castillo Add [R10 9] Abdominal pain     2/17/2021  9:15 PM Mandeep Marquis [R11 10] Vomiting     2/17/2021  9:17 PM Leticia Marquis Add [E87 2] Lactic acidosis       ED Disposition     ED Disposition Condition Date/Time Comment    Admit Stable Wed Feb 17, 2021  9:18 PM Case was discussed with JOON and the patient's admission status was agreed to be Admission Status: inpatient status to the service of Dr Kimberli Cortes   Follow-up Information    None         Patient's Medications   Discharge Prescriptions    No medications on file     No discharge procedures on file      PDMP Review       Value Time User    PDMP Reviewed  Yes 2/5/2021  4:15 PM Walker Ventura MD          ED Provider  Electronically Signed by           Oscar Kraft DO  02/17/21 9460

## 2021-02-17 NOTE — TELEPHONE ENCOUNTER
At this time, oxycodone will not be refilled  The patient has not kept her past two scheduled appointments with palliative care and was told with her last refill, the medication would not be refilled without a clinic visit

## 2021-02-18 LAB
ALBUMIN SERPL BCP-MCNC: 2.8 G/DL (ref 3.5–5)
ALP SERPL-CCNC: 58 U/L (ref 46–116)
ALT SERPL W P-5'-P-CCNC: <6 U/L (ref 12–78)
ANION GAP SERPL CALCULATED.3IONS-SCNC: 11 MMOL/L (ref 4–13)
AST SERPL W P-5'-P-CCNC: 15 U/L (ref 5–45)
ATRIAL RATE: 93 BPM
BILIRUB SERPL-MCNC: 0.23 MG/DL (ref 0.2–1)
BUN SERPL-MCNC: 10 MG/DL (ref 5–25)
C DIFF TOX B TCDB STL QL NAA+PROBE: NEGATIVE
CALCIUM ALBUM COR SERPL-MCNC: 8.3 MG/DL (ref 8.3–10.1)
CALCIUM SERPL-MCNC: 7.3 MG/DL (ref 8.3–10.1)
CAMPYLOBACTER DNA SPEC NAA+PROBE: NORMAL
CHLORIDE SERPL-SCNC: 106 MMOL/L (ref 100–108)
CO2 SERPL-SCNC: 22 MMOL/L (ref 21–32)
CREAT SERPL-MCNC: 0.68 MG/DL (ref 0.6–1.3)
ERYTHROCYTE [DISTWIDTH] IN BLOOD BY AUTOMATED COUNT: 17.2 % (ref 11.6–15.1)
FLUAV RNA RESP QL NAA+PROBE: NEGATIVE
FLUBV RNA RESP QL NAA+PROBE: NEGATIVE
GFR SERPL CREATININE-BSD FRML MDRD: 97 ML/MIN/1.73SQ M
GLUCOSE SERPL-MCNC: 86 MG/DL (ref 65–140)
HCT VFR BLD AUTO: 32.7 % (ref 34.8–46.1)
HGB BLD-MCNC: 10.1 G/DL (ref 11.5–15.4)
MCH RBC QN AUTO: 28.5 PG (ref 26.8–34.3)
MCHC RBC AUTO-ENTMCNC: 30.9 G/DL (ref 31.4–37.4)
MCV RBC AUTO: 92 FL (ref 82–98)
P AXIS: 75 DEGREES
PLATELET # BLD AUTO: 404 THOUSANDS/UL (ref 149–390)
PMV BLD AUTO: 9.6 FL (ref 8.9–12.7)
POTASSIUM SERPL-SCNC: 2.8 MMOL/L (ref 3.5–5.3)
PR INTERVAL: 148 MS
PROT SERPL-MCNC: 6.2 G/DL (ref 6.4–8.2)
QRS AXIS: 76 DEGREES
QRSD INTERVAL: 78 MS
QT INTERVAL: 374 MS
QTC INTERVAL: 465 MS
RBC # BLD AUTO: 3.55 MILLION/UL (ref 3.81–5.12)
RSV RNA RESP QL NAA+PROBE: NEGATIVE
SALMONELLA DNA SPEC QL NAA+PROBE: NORMAL
SARS-COV-2 RNA RESP QL NAA+PROBE: NEGATIVE
SHIGA TOXIN STX GENE SPEC NAA+PROBE: NORMAL
SHIGELLA DNA SPEC QL NAA+PROBE: NORMAL
SODIUM SERPL-SCNC: 139 MMOL/L (ref 136–145)
T WAVE AXIS: 76 DEGREES
VENTRICULAR RATE: 93 BPM
WBC # BLD AUTO: 8.07 THOUSAND/UL (ref 4.31–10.16)

## 2021-02-18 PROCEDURE — 93010 ELECTROCARDIOGRAM REPORT: CPT | Performed by: INTERNAL MEDICINE

## 2021-02-18 PROCEDURE — 85027 COMPLETE CBC AUTOMATED: CPT | Performed by: INTERNAL MEDICINE

## 2021-02-18 PROCEDURE — 99225 PR SBSQ OBSERVATION CARE/DAY 25 MINUTES: CPT | Performed by: FAMILY MEDICINE

## 2021-02-18 PROCEDURE — 80053 COMPREHEN METABOLIC PANEL: CPT | Performed by: FAMILY MEDICINE

## 2021-02-18 RX ORDER — POTASSIUM CHLORIDE 14.9 MG/ML
20 INJECTION INTRAVENOUS ONCE
Status: COMPLETED | OUTPATIENT
Start: 2021-02-18 | End: 2021-02-18

## 2021-02-18 RX ORDER — POTASSIUM CHLORIDE 20 MEQ/1
40 TABLET, EXTENDED RELEASE ORAL ONCE
Status: DISCONTINUED | OUTPATIENT
Start: 2021-02-18 | End: 2021-02-18

## 2021-02-18 RX ORDER — POTASSIUM CHLORIDE 14.9 MG/ML
20 INJECTION INTRAVENOUS ONCE
Status: COMPLETED | OUTPATIENT
Start: 2021-02-18 | End: 2021-02-19

## 2021-02-18 RX ORDER — OXYCODONE HYDROCHLORIDE 5 MG/1
5 TABLET ORAL ONCE
Status: COMPLETED | OUTPATIENT
Start: 2021-02-18 | End: 2021-02-18

## 2021-02-18 RX ORDER — POTASSIUM CHLORIDE 20 MEQ/1
40 TABLET, EXTENDED RELEASE ORAL ONCE
Status: COMPLETED | OUTPATIENT
Start: 2021-02-18 | End: 2021-02-18

## 2021-02-18 RX ORDER — SUCRALFATE 1 G/1
1 TABLET ORAL
Status: DISCONTINUED | OUTPATIENT
Start: 2021-02-18 | End: 2021-02-20 | Stop reason: HOSPADM

## 2021-02-18 RX ORDER — KETOROLAC TROMETHAMINE 30 MG/ML
15 INJECTION, SOLUTION INTRAMUSCULAR; INTRAVENOUS ONCE
Status: COMPLETED | OUTPATIENT
Start: 2021-02-18 | End: 2021-02-18

## 2021-02-18 RX ORDER — OXYCODONE HYDROCHLORIDE 10 MG/1
10-15 TABLET ORAL EVERY 4 HOURS PRN
Qty: 60 TABLET | Refills: 0 | Status: SHIPPED | OUTPATIENT
Start: 2021-02-18 | End: 2021-02-25 | Stop reason: SDUPTHER

## 2021-02-18 RX ADMIN — POTASSIUM CHLORIDE 20 MEQ: 14.9 INJECTION, SOLUTION INTRAVENOUS at 17:11

## 2021-02-18 RX ADMIN — ONDANSETRON 4 MG: 2 INJECTION INTRAMUSCULAR; INTRAVENOUS at 00:25

## 2021-02-18 RX ADMIN — POTASSIUM CHLORIDE 20 MEQ: 14.9 INJECTION, SOLUTION INTRAVENOUS at 19:35

## 2021-02-18 RX ADMIN — SUCRALFATE 1 G: 1 TABLET ORAL at 21:12

## 2021-02-18 RX ADMIN — OXYCODONE HYDROCHLORIDE 10 MG: 10 TABLET ORAL at 16:01

## 2021-02-18 RX ADMIN — KETOROLAC TROMETHAMINE 15 MG: 30 INJECTION, SOLUTION INTRAMUSCULAR at 19:34

## 2021-02-18 RX ADMIN — SERTRALINE HYDROCHLORIDE 150 MG: 100 TABLET ORAL at 08:31

## 2021-02-18 RX ADMIN — POTASSIUM CHLORIDE 20 MEQ: 1500 TABLET, EXTENDED RELEASE ORAL at 09:22

## 2021-02-18 RX ADMIN — DICYCLOMINE HYDROCHLORIDE 10 MG: 10 CAPSULE ORAL at 16:02

## 2021-02-18 RX ADMIN — PANTOPRAZOLE SODIUM 40 MG: 40 TABLET, DELAYED RELEASE ORAL at 16:02

## 2021-02-18 RX ADMIN — OXYCODONE HYDROCHLORIDE 10 MG: 10 TABLET ORAL at 05:37

## 2021-02-18 RX ADMIN — SODIUM CHLORIDE 75 ML/HR: 0.9 INJECTION, SOLUTION INTRAVENOUS at 14:35

## 2021-02-18 RX ADMIN — ENOXAPARIN SODIUM 40 MG: 40 INJECTION SUBCUTANEOUS at 08:31

## 2021-02-18 RX ADMIN — HYDROXYCHLOROQUINE SULFATE 100 MG: 200 TABLET, FILM COATED ORAL at 22:17

## 2021-02-18 RX ADMIN — DICYCLOMINE HYDROCHLORIDE 10 MG: 10 CAPSULE ORAL at 21:12

## 2021-02-18 RX ADMIN — POTASSIUM PHOSPHATE, MONOBASIC AND POTASSIUM PHOSPHATE, DIBASIC 21 MMOL: 224; 236 INJECTION, SOLUTION, CONCENTRATE INTRAVENOUS at 16:04

## 2021-02-18 RX ADMIN — HYDROXYCHLOROQUINE SULFATE 200 MG: 200 TABLET, FILM COATED ORAL at 09:22

## 2021-02-18 RX ADMIN — DICYCLOMINE HYDROCHLORIDE 10 MG: 10 CAPSULE ORAL at 08:31

## 2021-02-18 RX ADMIN — LEVOTHYROXINE SODIUM 88 MCG: 88 TABLET ORAL at 05:34

## 2021-02-18 RX ADMIN — ALPRAZOLAM 1 MG: 0.5 TABLET ORAL at 08:30

## 2021-02-18 RX ADMIN — OXYCODONE HYDROCHLORIDE 5 MG: 5 TABLET ORAL at 00:43

## 2021-02-18 RX ADMIN — ONDANSETRON 4 MG: 2 INJECTION INTRAMUSCULAR; INTRAVENOUS at 08:36

## 2021-02-18 RX ADMIN — SUCRALFATE 1 G: 1 TABLET ORAL at 16:02

## 2021-02-18 RX ADMIN — ALPRAZOLAM 1 MG: 0.5 TABLET ORAL at 00:43

## 2021-02-18 RX ADMIN — PANTOPRAZOLE SODIUM 40 MG: 40 TABLET, DELAYED RELEASE ORAL at 08:31

## 2021-02-18 RX ADMIN — OXYCODONE HYDROCHLORIDE 10 MG: 10 TABLET ORAL at 11:22

## 2021-02-18 RX ADMIN — ACETAMINOPHEN 650 MG: 325 TABLET, FILM COATED ORAL at 00:43

## 2021-02-18 RX ADMIN — CYCLOBENZAPRINE HYDROCHLORIDE 10 MG: 10 TABLET, FILM COATED ORAL at 21:12

## 2021-02-18 RX ADMIN — DICYCLOMINE HYDROCHLORIDE 10 MG: 10 CAPSULE ORAL at 11:22

## 2021-02-18 RX ADMIN — ONDANSETRON 4 MG: 2 INJECTION INTRAMUSCULAR; INTRAVENOUS at 14:39

## 2021-02-18 RX ADMIN — POTASSIUM CHLORIDE 20 MEQ: 14.9 INJECTION, SOLUTION INTRAVENOUS at 14:36

## 2021-02-18 RX ADMIN — OXYCODONE HYDROCHLORIDE 10 MG: 10 TABLET ORAL at 21:12

## 2021-02-18 RX ADMIN — ALPRAZOLAM 1 MG: 0.5 TABLET ORAL at 18:08

## 2021-02-18 NOTE — ASSESSMENT & PLAN NOTE
· Patient presents with nausea and vomiting, abdominal spasms, episodes of nonbloody, loose stools  · Denies fevers or chills, endorses chest and abdominal pain due to dry heaving, otherwise no shortness of breath and no URI sxs  No dizziness, lightheadedness  Able to tolerate a bit of oral intake  · Consider viral gastroenteritis, possible chemotherapy induced  · No leukocytosis, afebrile, BP stable   Lactic acid initially elevated improved with IVF  · Will place on clears for now, advance diet as tolerated  · IV fluids  · Supportive management with antinausea medication, antispasmodic  · Will check COVID-19  · Obtain C diff and stool studies Received supine in NAD +R cryocuff, IV hep, EKG, B SCD. Left as found Received supine pain R knee, 7/10 +R cryocuff, IV hep, EKG, L SCD. Left as found, lightheaded +RN ESTHER Shelley, call rick

## 2021-02-18 NOTE — TELEPHONE ENCOUNTER
2/18/2021 10:46 AM -  Given inclement weahter and clinic closures this week, will send a victoriano fill of pt's meds for one week  She will need fup with myself or Ms Buck Danger

## 2021-02-18 NOTE — ASSESSMENT & PLAN NOTE
Current active smoker however has denied NRT in hospital  States she hasnt felt desire to smoke here     · Will encourage her tobacco cessation efforts and provide educational material

## 2021-02-18 NOTE — PLAN OF CARE
Problem: Potential for Falls  Goal: Patient will remain free of falls  Description: INTERVENTIONS:  - Assess patient frequently for physical needs  -  Identify cognitive and physical deficits and behaviors that affect risk of falls    -  [place] fall precautions as indicated by assessment   - Educate patient/family on patient safety including physical limitations  - Instruct patient to call for assistance with activity based on assessment  - Modify environment to reduce risk of injury  - Consider OT/PT consult to assist with strengthening/mobility  Outcome: Progressing     Problem: PAIN - ADULT  Goal: Verbalizes/displays adequate comfort level or baseline comfort level  Description: Interventions:  - Encourage patient to monitor pain and request assistance  - Assess pain using appropriate pain scale  - Administer analgesics based on type and severity of pain and evaluate response  - Implement non-pharmacological measures as appropriate and evaluate response  - Consider cultural and social influences on pain and pain management  - Notify physician/advanced practitioner if interventions unsuccessful or patient reports new pain  Outcome: Progressing     Problem: INFECTION - ADULT  Goal: Absence or prevention of progression during hospitalization  Description: INTERVENTIONS:  - Assess and monitor for signs and symptoms of infection  - Monitor lab/diagnostic results  - Monitor all insertion sites, i e  indwelling lines, tubes, and drains  - Monitor endotracheal if appropriate and nasal secretions for changes in amount and color  - [place] appropriate cooling/warming therapies per order  - Administer medications as ordered  - Instruct and encourage patient and family to use good hand hygiene technique  - Identify and instruct in appropriate isolation precautions for identified infection/condition  Outcome: Progressing     Problem: SAFETY ADULT  Goal: Patient will remain free of falls  Description: INTERVENTIONS:  - Assess patient frequently for physical needs  -  Identify cognitive and physical deficits and behaviors that affect risk of falls    -  [place] fall precautions as indicated by assessment   - Educate patient/family on patient safety including physical limitations  - Instruct patient to call for assistance with activity based on assessment  - Modify environment to reduce risk of injury  - Consider OT/PT consult to assist with strengthening/mobility  Outcome: Progressing  Goal: Maintain or return to baseline ADL function  Description: INTERVENTIONS:  -  Assess patient's ability to carry out ADLs; assess patient's baseline for ADL function and identify physical deficits which impact ability to perform ADLs (bathing, care of mouth/teeth, toileting, grooming, dressing, etc )  - Assess/evaluate cause of self-care deficits   - Assess range of motion  - Assess patient's mobility; develop plan if impaired  - Assess patient's need for assistive devices and provide as appropriate  - Encourage maximum independence but intervene and supervise when necessary  - Involve family in performance of ADLs  - Assess for home care needs following discharge   - Consider OT consult to assist with ADL evaluation and planning for discharge  - Provide patient education as appropriate  Outcome: Progressing  Goal: Maintain or return mobility status to optimal level  Description: INTERVENTIONS:  - Assess patient's baseline mobility status (ambulation, transfers, stairs, etc )    - Identify cognitive and physical deficits and behaviors that affect mobility  - Identify mobility aids required to assist with transfers and/or ambulation (gait belt, sit-to-stand, lift, walker, cane, etc )  - [place] fall precautions as indicated by assessment  - Record patient progress and toleration of activity level on Mobility SBAR; progress patient to next Phase/Stage  - Instruct patient to call for assistance with activity based on assessment  - Consider rehabilitation consult to assist with strengthening/weightbearing, etc   Outcome: Progressing     Problem: DISCHARGE PLANNING  Goal: Discharge to home or other facility with appropriate resources  Description: INTERVENTIONS:  - Identify barriers to discharge w/patient and caregiver  - Arrange for needed discharge resources and transportation as appropriate  - Identify discharge learning needs (meds, wound care, etc )  - Arrange for interpretive services to assist at discharge as needed  - Refer to Case Management Department for coordinating discharge planning if the patient needs post-hospital services based on physician/advanced practitioner order or complex needs related to functional status, cognitive ability, or social support system  Outcome: Progressing     Problem: Knowledge Deficit  Goal: Patient/family/caregiver demonstrates understanding of disease process, treatment plan, medications, and discharge instructions  Description: Complete learning assessment and assess knowledge base    Interventions:  - Provide teaching at level of understanding  - Provide teaching via preferred learning methods  Outcome: Progressing

## 2021-02-18 NOTE — ASSESSMENT & PLAN NOTE
· Noted on EGD last 10/2020  With mild gastritis with evidence of a Schatzki's ring which did require dilatation and clips  · CT chest/abd/pelvis with po contrast: No evidence of esophagitis, mediastinitis or acute cardiopulmonary process   No evidence of gastritis, colitis or bowel obstruction  · Follows with GI as outpatient  · Will closely monitor, if with changes can consider GI consult

## 2021-02-18 NOTE — UTILIZATION REVIEW
Initial Clinical Review    Admission: Date/Time/Statement:   Admission Orders (From admission, onward)     Ordered        02/17/21 2119  Place in Observation  Once                   Orders Placed This Encounter   Procedures    Place in Observation     Standing Status:   Standing     Number of Occurrences:   1     Order Specific Question:   Level of Care     Answer:   Med Surg [16]     ED Arrival Information     Expected Arrival Acuity Means of Arrival Escorted By Service Admission Type    - 2/17/2021 15:58 Urgent Wheelchair Friend General Medicine Urgent    Arrival Complaint    Chest Pain        Chief Complaint   Patient presents with    Chest Pain     Patient reports starting to vomit for a day, feeling "bad" for two  Patient now states it feels like burning in the middle of my chest and sometimes it hurts more than other  Some diarrhea noted  Assessment/Plan: 61 yo female pmhx PMH of breast CA s/p mastectomy currently undergoing chemotherapy, hypothyroidism, history of gastritis and Schatzki's ring, SLE, cancer related pain presents with nausea and vomiting to ED from [facility] admitted as Observation due to nausea & vomiting  Reports chest & abdominal pain due to dry heaves; able to tolerate some PO but has issues keeping food down  Report episodes of loose watery nonbloody stools with some issues with swallowing despite adjusting diet  Receiving CHEMO treatment last 2 weeks prior  Reports nausea w vomiting post chemo managing w antinausea meds at home  Cont ongoing  IVF, supportive management w antinausea meds; antispasmodics  Obtain COVID pcr, C DIFF & stool studies  Cont w OP Oncology, monitor gastritis  Cont pain management, Plaquenil, home meds     2/18/2021        ED Triage Vitals [02/17/21 1608]   Temperature Pulse Respirations Blood Pressure SpO2   (!) 97 2 °F (36 2 °C) 88 16 144/88 98 %      Temp Source Heart Rate Source Patient Position - Orthostatic VS BP Location FiO2 (%)   Temporal Monitor Lying Right arm --      Pain Score       Worst Possible Pain          Wt Readings from Last 1 Encounters:   02/17/21 59 9 kg (132 lb)     Additional Vital Signs:   Date/Time  Temp  Pulse  Resp  BP  MAP (mmHg)  SpO2  O2 Device  Patient Position - Orthostatic VS   02/18/21 0700  98 1 °F (36 7 °C)  81  18  129/68  --  97 %  None (Room air)  Lying   02/18/21 0105  98 1 °F (36 7 °C)  85  17  128/75  97  99 %  None (Room air)  Lying   02/18/21 0050  98 °F (36 7 °C)  70  16  141/72  --  95 %  None (Room air)  Lying   02/17/21 2329  98 1 °F (36 7 °C)  72  16  145/70  --  96 %  None (Room air)  Sitting   02/17/21 2221  98 °F (36 7 °C)  70  16  141/77  --  95 %  None (Room air)  Lying   02/17/21 2058  98 2 °F (36 8 °C)  72  16  151/70  --  97 %  None (Room air)  Lying   02/17/21 1929  98 °F (36 7 °C)  76  16  156/86  --  96 %  None (Room air)  Sitting   02/17/21 1757  --  78  16  173/98Abnormal   --  98 %  None (Room air)  Lying   02/17/21 1745  --  80  --  --  --  99 %  None (Room air)  --   02/17/21 1608  97 2 °F (36 2 °C)Abnormal   88  16  144/88  --  98 %  None (Room air)  Lying      Weights (last 14 days)    Date/Time  Weight  Height   02/17/21 1608  59 9 kg (132 lb)  5' 3" (1 6 m)       Pertinent Labs/Diagnostic Test Results:   Results from last 7 days   Lab Units 02/17/21  2337   SARS-COV-2  Negative     Results from last 7 days   Lab Units 02/18/21  0452 02/17/21  1635   WBC Thousand/uL 8 07 9 52   HEMOGLOBIN g/dL 10 1* 13 9   HEMATOCRIT % 32 7* 44 5   PLATELETS Thousands/uL 404* 516*   NEUTROS ABS Thousands/µL  --  7 36         Results from last 7 days   Lab Units 02/17/21  1635   SODIUM mmol/L 137   POTASSIUM mmol/L 3 4*   CHLORIDE mmol/L 101   CO2 mmol/L 20*   ANION GAP mmol/L 16*   BUN mg/dL 10   CREATININE mg/dL 0 92   EGFR ml/min/1 73sq m 69   CALCIUM mg/dL 9 0   MAGNESIUM mg/dL 1 9   PHOSPHORUS mg/dL 1 8*     Results from last 7 days   Lab Units 02/17/21  1635   AST U/L 16   ALT U/L 11*   ALK PHOS U/L 74   TOTAL PROTEIN g/dL 7 6   ALBUMIN g/dL 3 4*   TOTAL BILIRUBIN mg/dL 0 35         Results from last 7 days   Lab Units 02/17/21  1635   GLUCOSE RANDOM mg/dL 106             No results found for: BETA-HYDROXYBUTYRATE                   Results from last 7 days   Lab Units 02/17/21  1635   TROPONIN I ng/mL 0 03                     Results from last 7 days   Lab Units 02/17/21  2055 02/17/21  1635   LACTIC ACID mmol/L 1 0 2 5*                         Results from last 7 days   Lab Units 02/17/21  1635   LIPASE u/L 119                 Results from last 7 days   Lab Units 02/17/21  2337   INFLUENZA A PCR  Negative   INFLUENZA B PCR  Negative   RSV PCR  Negative                 Results from last 7 days   Lab Units 02/17/21  2350   C DIFF TOXIN B BY PCR  Negative     2/17    CT chest abdomen pelvis w contrast   Final Result  (02/17 2112)         1  No evidence of esophagitis, mediastinitis or acute cardiopulmonary process  2   No evidence of gastritis, colitis or bowel obstruction   XR chest 1 view portable   Final Result (02/17 1827)      No acute cardiopulmonary disease     2/17 ekg nsr      ED Treatment:   Medication Administration from 02/17/2021 1557 to 02/18/2021 0104       Date/Time Order Dose Route Action     02/17/2021 1758 sodium chloride 0 9 % bolus 1,000 mL 0 mL Intravenous Stopped     02/17/2021 1636 sodium chloride 0 9 % bolus 1,000 mL 1,000 mL Intravenous New Bag     02/17/2021 1708 fentanyl citrate (PF) 100 MCG/2ML 50 mcg 50 mcg Intravenous Given     02/17/2021 1708 ondansetron (ZOFRAN) injection 4 mg 4 mg Intravenous Given     02/17/2021 1929 sodium chloride 0 9 % bolus 1,000 mL 0 mL Intravenous Stopped     02/17/2021 1815 sodium chloride 0 9 % bolus 1,000 mL 1,000 mL Intravenous New Bag     02/17/2021 1837 ondansetron (ZOFRAN) injection 4 mg 4 mg Intravenous Given     02/17/2021 1838 fentanyl citrate (PF) 100 MCG/2ML 50 mcg 50 mcg Intravenous Given     02/17/2021 1850 iohexol (OMNIPAQUE) 240 MG/ML solution 50 mL 50 mL Oral Given     02/17/2021 2104 ondansetron (ZOFRAN) injection 4 mg 4 mg Intravenous Given     02/17/2021 2105 fentanyl citrate (PF) 100 MCG/2ML 50 mcg 50 mcg Intravenous Given     02/18/2021 0043 ALPRAZolam (XANAX) tablet 1 mg 1 mg Oral Given     02/17/2021 2311 oxyCODONE (ROXICODONE) immediate release tablet 10 mg 10 mg Oral Given     02/17/2021 2340 hydroxychloroquine (PLAQUENIL) tablet 100 mg 100 mg Oral Given     02/17/2021 2311 cyclobenzaprine (FLEXERIL) tablet 10 mg 10 mg Oral Given     02/18/2021 0025 sodium chloride 0 9 % infusion 75 mL/hr Intravenous Restarted     02/17/2021 2311 sodium chloride 0 9 % infusion 0 mL/hr Intravenous Hold     02/18/2021 0043 acetaminophen (TYLENOL) tablet 650 mg 650 mg Oral Given     02/18/2021 0025 ondansetron (ZOFRAN) injection 4 mg 4 mg Intravenous Given     02/17/2021 2311 ondansetron (ZOFRAN) injection 4 mg 4 mg Intravenous Not Given     02/17/2021 2311 dicyclomine (BENTYL) capsule 10 mg 10 mg Oral Given     02/17/2021 2351 potassium chloride (K-DUR,KLOR-CON) CR tablet 20 mEq 20 mEq Oral Given     02/18/2021 0043 oxyCODONE (ROXICODONE) IR tablet 5 mg 5 mg Oral Given        Past Medical History:   Diagnosis Date    Disease of thyroid gland     Hypertension     Lup[facility] ([facility] )     Malignant ne[facility] of upper-inner quadrant of left [facility] in female, [facility] 2/25/[facility]    Nephrolithiasis     PTSD (post-traumatic stress disorder)      Present on Admission:   Systemic lupus erythematosus ([facility] )   Hypothyroidism   Depression   Cancer related pain   Gastritis      Admitting Diagnosis: Lactic acidosis [E87 2]  Vomiting [R11 10]  Chest pain [R07 9]  Abdominal pain [R10 9]  Age/Sex: 60 y o  female  Admission Orders:  Clear liquids   scd      Scheduled Medications:  cyclobenzaprine, 10 mg, Oral, HS  dicyclomine, 10 mg, Oral, 4x Daily (AC & HS)  enoxaparin, 40 mg, Subcutaneous, Daily  hydroxychloroquine, 100 mg, Oral, HS  hydroxychloroquine, 200 mg, Oral, Daily With Breakfast  levothyroxine, 88 mcg, Oral, Early Morning  nicotine, 1 patch, Transdermal, Daily  pantoprazole, 40 mg, Oral, BID AC  sertraline, 150 mg, Oral, Daily      Continuous IV Infusions:  sodium chloride, 75 mL/hr, Intravenous, Continuous      PRN Meds:  acetaminophen, 650 mg, Oral, Q6H PRN  ALPRAZolam, 1 mg, Oral, TID PRN  ondansetron, 4 mg, Intravenous, Q6H PRN  oxyCODONE, 10 mg, Oral, Q4H PRN    Network Utilization Review Department  ATTENTION: Please call with any questions or concerns to [phone] and carefully listen to the prompts so that you are directed to the right person  All voicemails are confidential   Chrystine Can all requests for admission clinical reviews, approved or denied determinations and any other requests to dedicated fax number below belonging to the campus where the patient is receiving treatment   List of dedicated fax numbers for the Facilities:  [address] DENIALS (Administrative/Medical Necessity) [phone]   [address] (Maternity/NICU/Pediatrics) [phone]   [address] [place] [address] [address] [place] [address] (Joevya Honey) [address] [place] [address] Karan [address] [place] [address] [phone]

## 2021-02-18 NOTE — ASSESSMENT & PLAN NOTE
EGD 10/2020  With mild gastritis with evidence of a Schatzki's ring which did require dilatation and clips  · CT chest/abd/pelvis with po contrast: No evidence of esophagitis, mediastinitis or acute cardiopulmonary process   No evidence of gastritis, colitis or bowel obstruction  · Follows with GI as outpatient  · Will closely monitor, if with changes can consider GI consult  · Added Carafate today

## 2021-02-18 NOTE — ASSESSMENT & PLAN NOTE
Lab Results   Component Value Date    SODIUM 139 02/18/2021    K 2 8 (L) 02/18/2021     02/18/2021    CO2 22 02/18/2021    BUN 10 02/18/2021    CREATININE 0 68 02/18/2021    GLUC 86 02/18/2021    CALCIUM 7 3 (L) 02/18/2021       Lab Results   Component Value Date    CALCIUM 7 3 (L) 02/18/2021    PHOS 1 8 (L) 02/17/2021     Patient unable to tolerate PO K 2/2 to nausea and pain, difficulty with swallowing  · Placed on tele, denies cp, palpitations, sob, diaphoresis   · Patient to receive 20IVPB Potassium x3 = 60mg IV  · In addition, patients phosphorus decreased   Also to be replaced IV which includes 30mEq of K   · Overall K repletion today 90mEq K   · Rpt BMP at 9pm this evening

## 2021-02-18 NOTE — PROGRESS NOTES
Progress Note - Brianna Putnam 1965, 60 y o  female MRN: [id number]    Unit/Bed#: S -01 Encounter: [id number]    Primary Care Provider: Ashanti Peres MD   Date and time admitted to hospital: 2/17/2021  4:03 PM        * Nausea and vomiting  Assessment & Plan  Patient continues to have nausea and vomiting even with clear liquid diet  C/o both dysphagia and odynophagia  · No leukocytosis, afebrile, BP stable  Lactic acid initially elevated improved with IVF  · IVF @75ml/hr  Will continue  · Will place on clears for now, advance diet as tolerated  · Cdiff negative  · Carafate and magic mouthwash for symptom relief   · Will continue supportive management with antinausea medication, antispasmodic    Hypokalemia  Assessment & Plan    Lab Results   Component Value Date    SODIUM 139 02/18/2021    K 2 8 (L) 02/18/2021     02/18/2021    CO2 22 02/18/2021    BUN 10 02/18/2021    CREATININE 0 68 02/18/2021    GLUC 86 02/18/2021    CALCIUM 7 3 (L) 02/18/2021       Lab Results   Component Value Date    CALCIUM 7 3 (L) 02/18/2021    PHOS 1 8 (L) 02/17/2021     Patient unable to tolerate PO K 2/2 to nausea and pain, difficulty with swallowing  · Placed on tele, denies cp, palpitations, sob, diaphoresis   · Patient to receive 20IVPB Potassium x3 = 60mg IV  · In addition, patients phosphorus decreased  Also to be replaced IV which includes 30mEq of K   · Overall K repletion today 90mEq K   · Rpt BMP at 9pm this evening    Gastritis  Assessment & Plan  EGD 10/2020  With mild gastritis with evidence of a Schatzki's ring which did require dilatation and clips  · CT chest/abd/pelvis with po contrast: No evidence of esophagitis, mediastinitis or acute cardiopulmonary process   No evidence of gastritis, colitis or bowel obstruction  · Follows with GI as outpatient  · Will closely monitor, if with changes can consider GI consult  · Added Carafate today    Smoking  Assessment & Plan  Current active smoker however has denied NRT in hospital  States she hasnt felt desire to smoke here  · Will encourage her tobacco cessation efforts and provide educational material      Cancer related pain  Assessment & Plan  Patient follows w/ Dr Ralph Langley, palliative care  · Continue regimen of oxycodone 10-15 mg q 4 hours p r n  for cancer-related pain    Depression  Assessment & Plan  Hx of anxiety and depression  · Continue Zoloft, Xanax p r n  Hypothyroidism  Assessment & Plan  · Continue home levothyroxine    Systemic lupus erythematosus ([address] )  Assessment & Plan  Hx of Lupus  · Takes Plaquenil 200 mg in the morning, 100 mg at night  · Continue home meds      VTE Pharmacologic Prophylaxis:   Pharmacologic: Enoxaparin (Lovenox)  Mechanical VTE Prophylaxis in Place: Yes    Discussions with Specialists or Other Care Team Provider: Palliative following     Education and Discussions with Family / Patient: Patient and  Caralee Lefort who was at bedside    Current Length of Stay: 0 day(s)    Current Patient Status: Observation     Discharge Plan / Estimated Discharge Date: 24-48hrs    Code Status: Level 1 - Full Code      Subjective:   States she is feeling much better than yesterday  However, she is still relatively unable to tolerate anything other than liquids and doesn't have much of an appetite 2/2 to nausea  Vomiting a little - non bloody  Diarrhea has improved and remains non bloody/dark  Zofran has helped significantly  Denies chest pain, palpitations, headache, cough, fever, chills, diaphoresis  Objective:     Vitals:   Temp (24hrs), Av 1 °F (36 7 °C), Min:98 °F (36 7 °C), Max:98 2 °F (36 8 °C)    Temp:  [98 °F (36 7 °C)-98 2 °F (36 8 °C)] 98 2 °F (36 8 °C)  HR:  [70-85] 82  Resp:  [16-18] 18  BP: (128-173)/(68-98) 140/72  SpO2:  [95 %-99 %] 95 %  Body mass index is 23 38 kg/m²  Input and Output Summary (last 24 hours):        Intake/Output Summary (Last 24 hours) at 2021 1733  Last data filed at 2021 1227  Gross per 24 hour   Intake 2100 ml   Output 0 ml   Net 2100 ml       Physical Exam:     Physical Exam  Vitals signs and nursing note reviewed  Constitutional:       General: She is in acute distress  Appearance: She is ill-appearing  She is not toxic-appearing  HENT:      Head: Normocephalic and atraumatic  Nose: Nose normal       Mouth/Throat:      Mouth: Mucous membranes are dry  Eyes:      General: No scleral icterus  Right eye: No discharge  Left eye: No discharge  Pupils: Pupils are equal, round, and reactive to light  Cardiovascular:      Rate and Rhythm: Normal rate and regular rhythm  Pulses: Normal pulses  Heart sounds: Normal heart sounds  Pulmonary:      Effort: Pulmonary effort is normal       Breath sounds: Normal breath sounds  Abdominal:      General: Bowel sounds are normal       Palpations: Abdomen is soft  Tenderness: There is abdominal tenderness (mid epigastric )  Musculoskeletal:      Right lower leg: No edema  Left lower leg: No edema  Skin:     General: Skin is warm  Coloration: Skin is pale  Skin is not jaundiced  Neurological:      General: No focal deficit present  Mental Status: She is alert and oriented to person, place, and time  Motor: Weakness present     Psychiatric:         Mood and Affect: Mood normal          Behavior: Behavior normal        Additional Data:     Labs:    Results from last 7 days   Lab Units 02/18/21  0452 02/17/21  1635   WBC Thousand/uL 8 07 9 52   HEMOGLOBIN g/dL 10 1* 13 9   HEMATOCRIT % 32 7* 44 5   PLATELETS Thousands/uL 404* 516*   NEUTROS PCT %  --  79*   LYMPHS PCT %  --  13*   MONOS PCT %  --  8   EOS PCT %  --  0     Results from last 7 days   Lab Units 02/18/21  1026   POTASSIUM mmol/L 2 8*   CHLORIDE mmol/L 106   CO2 mmol/L 22   BUN mg/dL 10   CREATININE mg/dL 0 68   CALCIUM mg/dL 7 3*   ALK PHOS U/L 58   ALT U/L <6*   AST U/L 15           * I Have Reviewed All Lab Data Listed Above   * Additional Pertinent Lab Tests Reviewed:  [address] Admission Reviewed    Imaging:    Imaging Reports Reviewed Today Include: None  Imaging Personally Reviewed by Myself Includes:  None    Recent Cultures (last 7 days):     Results from last 7 days   Lab Units 02/17/21  2350   C DIFF TOXIN B BY PCR  Negative       Last 24 Hours Medication List:   Current Facility-Administered Medications   Medication Dose Route Frequency Provider Last Rate    acetaminophen  650 mg Oral Q6H PRN Amanda Comment, MD      al mag oxide-diphenhydramine-lidocaine viscous  10 mL Swish & Swallow Q4H PRN Kaitlyn Ta MD      ALPRAZolam  1 mg Oral TID PRN Berdine Comment, MD      cyclobenzaprine  10 mg Oral HS Berdine Comment, MD      dicyclomine  10 mg Oral 4x Daily (AC & HS) Amanda Medellin, MD      enoxaparin  40 mg Subcutaneous Daily Berdine Comment, MD      hydroxychloroquine  100 mg Oral HS Berdine Comment, MD      hydroxychloroquine  200 mg Oral Daily With Breakfast Juan Carlosdine Comment, MD      levothyroxine  88 mcg Oral Early Morning Berdine Comment, MD      nicotine  1 patch Transdermal Daily Paulina Jj MD      ondansetron  4 mg Intravenous Q6H PRN Berdine Comment, MD      oxyCODONE  10 mg Oral Q4H PRN Berdine Comment, MD      pantoprazole  40 mg Oral BID AC Jarrett Jj MD      potassium chloride  20 mEq Intravenous Once Kaitlyn Ta MD 20 mEq (02/18/21 1711)    potassium chloride  20 mEq Intravenous Once Kaitlyn Ta MD      potassium phosphate  21 mmol Intravenous Once Brayden Meza MD 21 mmol (02/18/21 1604)    sertraline  150 mg Oral Daily Amanda Medellin MD      sodium chloride  75 mL/hr Intravenous Continuous Amanda Medellin MD 75 mL/hr (02/18/21 1435)    sucralfate  1 g Oral 4x Daily (AC & HS) Brayden Meza MD          Today, Patient Was Seen By: Kaitlyn Ta MD    ** Please Note: This note has been constructed using a voice recognition system   **        Frantz Archer MD  Family Medicine PGY-1  2/18/2021  5:33 PM

## 2021-02-18 NOTE — H&P
H&P- Dennise Barrera 1965, 60 y o  female MRN: [id number]    Unit/Bed#: ED 25 Encounter: [id number]    Primary Care Provider: Pamila Mcburney, MD   Date and time admitted to hospital: 2/17/2021  4:03 PM    * Nausea and vomiting  Assessment & Plan  · Patient presents with nausea and vomiting, abdominal spasms, episodes of nonbloody, loose stools  · Denies fevers or chills, endorses chest and abdominal pain due to dry heaving, otherwise no shortness of breath and no URI sxs  No dizziness, lightheadedness  Able to tolerate a bit of oral intake  · Consider viral gastroenteritis, possible chemotherapy induced  · No leukocytosis, afebrile, BP stable  Lactic acid initially elevated improved with IVF  · Will place on clears for now, advance diet as tolerated  · IV fluids  · Supportive management with antinausea medication, antispasmodic  · Will check COVID-19  · Obtain C diff and stool studies    Malignant neoplasm of upper-inner quadrant of left breast in female, estrogen receptor negative ([facility] [address] )  Assessment & Plan  · S/p mastectomy, currently undergoing chemotherapy  Last infusion around 2 and half weeks ago  · Does report some nausea and vomiting post chemo, has antinausea meds at home which helps  · Continue outpatient follow-up with Oncology    Gastritis  Assessment & Plan  · Noted on EGD last 10/2020  With mild gastritis with evidence of a Schatzki's ring which did require dilatation and clips  · CT chest/abd/pelvis with po contrast: No evidence of esophagitis, mediastinitis or acute cardiopulmonary process  No evidence of gastritis, colitis or bowel obstruction  · Follows with GI as outpatient  · Will closely monitor, if with changes can consider GI consult    Cancer related pain  Assessment & Plan  · Also palliative care  · Continue regimen of oxycodone 10-15 mg q 4 hours p r n   For cancer-related pain    Depression  Assessment & Plan  · Also with anxiety  · Continue Zoloft, Xanax p r n  Hypothyroidism  Assessment & Plan  · Continue home levothyroxine    Systemic lupus erythematosus (HCC)  Assessment & Plan  · Takes Plaquenil 200 mg in the morning, 100 mg at night  · Continue home meds    VTE Prophylaxis: Enoxaparin (Lovenox)  / sequential compression device   Code Status:  Full code  POLST: POLST form is not discussed and not completed at this time  Anticipated Length of Stay:  Patient will be admitted on an Observation basis with an anticipated length of stay of  < 2 midnights  Justification for Hospital Stay:  Further evaluation and management of above problems    Chief Complaint:   Nausea and vomiting    History of Present Illness:    David Wright is a 60 y o  female with PMH of breast CA s/p mastectomy currently undergoing chemotherapy, hypothyroidism, history of gastritis and Schatzki's ring, SLE, cancer related pain presents with nausea and vomiting  Patient reports symptoms started around 3 days ago, accompanied by chest and abdominal pain due to dry heaving  Patient reports that she is able to tolerate some p o  Intake however has been having issues keeping things down  Patient also endorses episodes of loose watery stools, nonbloody  Patient denies fevers or chills  Denies shortness of breath  Denies URI symptoms  No dysuria or hematuria  No hematemesis  Patient reports history of gastritis and Schatzki's ring for which she underwent dilation per GI last year, since then she's been reporting some issues with swallowing however has been adjusting her diet and currently no noted worsening    Currently patient is undergoing chemotherapy, last infusion was around 2 weeks ago  She does endorse some nausea and vomiting post chemo which is mostly controlled with her anti nausea meds at home  Review of Systems:    Review of Systems   Constitutional: Negative for chills and fever  HENT: Negative for congestion and tinnitus      Eyes: Negative for pain and visual disturbance  Respiratory: Negative for cough and shortness of breath  Cardiovascular: Negative for palpitations  Gastrointestinal: Positive for abdominal pain, diarrhea, nausea and vomiting  Negative for blood in stool  Genitourinary: Negative for dysuria and hematuria  Musculoskeletal: Positive for arthralgias  Negative for neck pain  Skin: Negative for color change, pallor and rash  Neurological: Negative for dizziness and light-headedness  Hematological: Negative for adenopathy  Psychiatric/Behavioral: Negative for confusion and hallucinations  All other systems reviewed and are negative  Past Medical and Surgical History:     Past Medical History:   Diagnosis Date    Disease of thyroid gland     Hypertension     Lupus ([facility] [address] )     Malignant neoplasm of upper-inner quadrant of left breast in female, estrogen receptor negative ([facility] [address] ) 2/25/2020    Nephrolithiasis     PTSD (post-traumatic stress disorder)        Past Surgical History:   Procedure Laterality Date    FEMUR FRACTURE SURGERY      FL GUIDED CENTRAL VENOUS ACCESS DEVICE INSERTION  3/17/2020    HYSTERECTOMY      LEFT OOPHORECTOMY      due to torsion     MAMMO STEREOTACTIC BREAST BIOPSY RIGHT (ALL INC) Right 2/12/2020    MASTECTOMY W/ SENTINEL NODE BIOPSY Left 8/18/2020    Procedure: BREAST MASTECTOMY WITH SENTINEL LYMPH NODE BIOPSY, LYMPHATIC MAPPING WITH BLUE DYE AND RADIOACTIVE DYE (INJECT AT 1430 BY DR WRIGHT IN THE OR);   Surgeon: Yusra Talamantes MD;  Location: [facility];  Service: Surgical Oncology    MRI BREAST BIOPSY LEFT (ALL INCLUSIVE) Left 3/20/2020    AR INSJ TUNNELED CTR VAD W/SUBQ PORT AGE 5 YR/> N/A 3/17/2020    Procedure: INSERTION VENOUS PORT ( PORT-A-CATH) IR;  Surgeon: Laci Loaiza DO;  [facility]: [facility];  Service: Interventional Radiology    US GUIDANCE BREAST BIOPSY LEFT EACH ADDITIONAL Left 2/12/2020    US GUIDED BREAST BIOPSY LEFT COMPLETE Left 2/12/2020    VAGINA SURGERY Meds/Allergies:    Prior to Admission medications    Medication Sig Start Date End Date Taking? Authorizing Provider   acetaminophen (TYLENOL) 325 mg tablet Take 2 tablets (650 mg total) by mouth every 6 (six) hours as needed for mild pain, headaches or fever 8/27/20   Parish Toribio PA-C   ALPRAZolam Eric Wilhelm) 1 mg tablet Take 1 tablet (1 mg total) by mouth 3 (three) times a day as needed for anxiety 1/25/21 April D ANNETTE Hawk   Cholecalciferol (VITAMIN D3) 82293 units CAPS Take 50,000 Units by mouth once a week     Historical Provider, MD   cyclobenzaprine (FLEXERIL) 10 mg tablet Take 10 mg by mouth daily at bedtime     Historical Provider, MD   cyclobenzaprine (FLEXERIL) 5 mg tablet Take 10 mg by mouth daily at bedtime as needed 9/3/20   Historical Provider, MD   hydroxychloroquine (PLAQUENIL) 200 mg tablet Take 1 tablet in morning and 1/2 tablet in evening    Historical Provider, MD   levothyroxine 88 mcg tablet TAKE ONE TABLET BY MOUTH EVERY DAY 5/4/80   Sissy Salguero MD   magnesium oxide (MAG-OX) 400 mg Take 1 tablet (400 mg total) by mouth 2 (two) times a day 9/25/20 68/04/72  Sissy Salguero MD   Nutritional Supplements (OSTEOPOROSIS SUPPORT PO) Take by mouth    Historical Provider, MD   ondansetron (ZOFRAN) 4 mg tablet Take 1 tablet (4 mg total) by mouth every 8 (eight) hours as needed for nausea or vomiting 8/27/20   Gabrielle Dick PA-C   oxyCODONE (ROXICODONE) 10 MG TABS Take 1-1 5 tablets (10-15 mg total) by mouth every 4 (four) hours as needed (cancer pain    Max of 8 tabs / day ) NO FURTHER FILLS WITHOUT VISIT TO PALLIATIVE CLINIC 2/5/21   Pearl Garrido MD   pantoprazole (PROTONIX) 40 mg tablet Take 1 tablet (40 mg total) by mouth 2 (two) times a day before meals 10/21/20   Gabrielle Dick PA-C   sertraline (ZOLOFT) 100 mg tablet TAKE 1 & 1/2 TABLETS BY MOUTH ONCE DAILY  Patient taking differently: Take 150 mg by mouth daily BY MOUTH ONCE DAILY 6/93/62   Sissy Salguero MD     I have reviewed home medications with patient personally  Allergies: Allergies   Allergen Reactions    Mobic [Meloxicam] Eye Swelling     Reaction Date: 12Aug2011;     Methotrexate Rash    Sulfa Antibiotics Rash       Social History:     Marital Status: /Civil Union   Occupation:  Noncontributory  Patient Pre-hospital Living Situation:  Lives at home  Patient Pre-hospital Level of Mobility:  Ambulatory  Patient Pre-hospital Diet Restrictions:  None  Substance Use History:   Social History     Substance and Sexual Activity   Alcohol Use Not Currently    Alcohol/week: 21 0 standard drinks    Types: 21 Cans of beer per week    Frequency: Never    Drinks per session: Patient refused    Binge frequency: Never    Comment: pt states she had her last beer 3/22/2020     Social History     Tobacco Use   Smoking Status Current Every Day Smoker    Packs/day: 0 50    Years: 40 00    Pack years: 20 00    Types: Cigarettes    Start date: 1990   Smokeless Tobacco Never Used     Social History     Substance and Sexual Activity   Drug Use No       Family History:    Family History   Problem Relation Age of Onset    Diabetes Mother     Hypertension Mother     Nephrolithiasis Mother     Breast cancer Mother 79    Heart disease Father     Hypertension Father     Diabetes Brother     Nephrolithiasis Brother     Breast cancer Maternal Aunt     No Known Problems Maternal Grandmother     No Known Problems Maternal Grandfather     No Known Problems Paternal Grandmother     No Known Problems Paternal Grandfather        Physical Exam:     Vitals:   Blood Pressure: 145/70 (02/17/21 2329)  Pulse: 72 (02/17/21 2329)  Temperature: 98 1 °F (36 7 °C) (02/17/21 2329)  Temp Source: Oral (02/17/21 2329)  Respirations: 16 (02/17/21 2329)  Height: 5' 3" (160 cm) (02/17/21 1608)  Weight - Scale: 59 9 kg (132 lb) (02/17/21 1608)  SpO2: 96 % (02/17/21 2329)    Physical Exam  Vitals signs and nursing note reviewed     Constitutional: General: She is not in acute distress  Appearance: She is not toxic-appearing or diaphoretic  Comments: Frail appearing, pleasant female   HENT:      Head: Normocephalic and atraumatic  Mouth/Throat:      Mouth: Mucous membranes are moist       Pharynx: Oropharynx is clear  Eyes:      General: No scleral icterus  Conjunctiva/sclera: Conjunctivae normal       Pupils: Pupils are equal, round, and reactive to light  Neck:      Musculoskeletal: Normal range of motion  Cardiovascular:      Rate and Rhythm: Normal rate and regular rhythm  Pulses: Normal pulses  Pulmonary:      Effort: Pulmonary effort is normal  No respiratory distress  Breath sounds: Normal breath sounds  Abdominal:      General: Bowel sounds are normal       Palpations: Abdomen is soft  Tenderness: There is abdominal tenderness (minimal, generalized)  There is no guarding or rebound  Musculoskeletal: Normal range of motion  General: No swelling or tenderness  Lymphadenopathy:      Cervical: No cervical adenopathy  Skin:     General: Skin is warm and dry  Neurological:      General: No focal deficit present  Mental Status: She is alert and oriented to person, place, and time  Mental status is at baseline  Psychiatric:         Mood and Affect: Mood normal          Thought Content: Thought content normal        Additional Data:     Lab Results: I have personally reviewed pertinent reports  Results from last 7 days   Lab Units 02/17/21  1635   WBC Thousand/uL 9 52   HEMOGLOBIN g/dL 13 9   HEMATOCRIT % 44 5   PLATELETS Thousands/uL 516*   NEUTROS PCT % 79*   LYMPHS PCT % 13*   MONOS PCT % 8   EOS PCT % 0     Results from last 7 days   Lab Units 02/17/21  1635   POTASSIUM mmol/L 3 4*   CHLORIDE mmol/L 101   CO2 mmol/L 20*   BUN mg/dL 10   CREATININE mg/dL 0 92   CALCIUM mg/dL 9 0   ALK PHOS U/L 74   ALT U/L 11*   AST U/L 16           Imaging: I have personally reviewed pertinent reports  Xr Chest 1 View Portable    Result Date: 2/17/2021  Narrative: CHEST INDICATION:   chest pain  Smoker  History of breast cancer  COMPARISON:  9/19/2020 EXAM PERFORMED/VIEWS:  XR CHEST PORTABLE FINDINGS:  Right chest port unchanged  Cardiomediastinal silhouette appears unremarkable  The lungs are clear  No pneumothorax or pleural effusion  Osseous structures appear within normal limits for patient age  Status post left mastectomy with left axillary clips  Impression: No acute cardiopulmonary disease  Workstation performed: [id number]     Ct Chest Abdomen Pelvis W Contrast    Result Date: 2/17/2021  Narrative: CT CHEST, ABDOMEN AND PELVIS WITH IV CONTRAST INDICATION:   Vomiting and chest pain  COMPARISON:  2/17/2021  TECHNIQUE: CT examination of the chest, abdomen and pelvis was performed  Axial, sagittal, and coronal 2D reformatted images were created from the source data and submitted for interpretation  Radiation dose length product (DLP) for this visit:  462 mGy-cm   This examination, like all CT scans performed in the [facility], was performed utilizing techniques to minimize radiation dose exposure, including the use of iterative reconstruction and automated exposure control  IV Contrast:  100 mL of iohexol (OMNIPAQUE) Enteric Contrast: Enteric contrast was administered  FINDINGS: CHEST LUNGS:  Lungs are clear  There is no tracheal or endobronchial lesion  PLEURA:  No effusion  HEART/GREAT VESSELS:  Mild cardiac degree  No thoracic aortic aneurysm or dissection  MEDIASTINUM AND FACUNDO:  No evidence of esophagitis, mediastinitis, gas or fluid collection or  CHEST WALL AND LOWER NECK:   Unremarkable  ABDOMEN LIVER/BILIARY TREE:  Unremarkable  GALLBLADDER:  No calcified gallstones  No pericholecystic inflammatory change  SPLEEN:  Unremarkable  PANCREAS:  Unremarkable  ADRENAL GLANDS:  Unremarkable  KIDNEYS/URETERS:  No pyelonephritis or obstructive uropathy  Right renal 1 1 cm cyst  STOMACH AND BOWEL:  Contrast in the stomach, small bowel and colon  No evidence of gastritis or colitis  No findings to suggest bowel obstruction  APPENDIX:  Normal appendix  ABDOMINOPELVIC CAVITY:  No free intraperitoneal air or fluid collection  VESSELS:  No abdominal aortic aneurysm or dissection  PELVIS REPRODUCTIVE ORGANS:  Prior hysterectomy  URINARY BLADDER:  Unremarkable  ABDOMINAL WALL/INGUINAL REGIONS:  Unremarkable  OSSEOUS STRUCTURES:  Chronic changes in the left greater trochanter  No evidence of fracture, lytic or blastic lesion  Impression: 1  No evidence of esophagitis, mediastinitis or acute cardiopulmonary process  2   No evidence of gastritis, colitis or bowel obstruction Workstation performed: [id number]       EKG, Pathology, and Other Studies Reviewed on Admission:   · EKG: SR, no significant ST-T wave changes noted    Epic / Care Everywhere Records Reviewed: Yes     ** Please Note: This note has been constructed using a voice recognition system   **

## 2021-02-18 NOTE — ASSESSMENT & PLAN NOTE
Patient follows w/ Dr Humphrey End, palliative care    · Continue regimen of oxycodone 10-15 mg q 4 hours p r n  for cancer-related pain

## 2021-02-18 NOTE — ASSESSMENT & PLAN NOTE
Patient continues to have nausea and vomiting even with clear liquid diet  C/o both dysphagia and odynophagia  · No leukocytosis, afebrile, BP stable  Lactic acid initially elevated improved with IVF  · IVF @75ml/hr   Will continue  · Will place on clears for now, advance diet as tolerated  · Cdiff negative  · Carafate and magic mouthwash for symptom relief   · Will continue supportive management with antinausea medication, antispasmodic

## 2021-02-18 NOTE — PLAN OF CARE
Problem: Potential for Falls  Goal: Patient will remain free of falls  Description: INTERVENTIONS:  - Assess patient frequently for physical needs  -  Identify cognitive and physical deficits and behaviors that affect risk of falls    -  [place] fall precautions as indicated by assessment   - Educate patient/family on patient safety including physical limitations  - Instruct patient to call for assistance with activity based on assessment  - Modify environment to reduce risk of injury  - Consider OT/PT consult to assist with strengthening/mobility  Outcome: Progressing     Problem: PAIN - ADULT  Goal: Verbalizes/displays adequate comfort level or baseline comfort level  Description: Interventions:  - Encourage patient to monitor pain and request assistance  - Assess pain using appropriate pain scale  - Administer analgesics based on type and severity of pain and evaluate response  - Implement non-pharmacological measures as appropriate and evaluate response  - Consider cultural and social influences on pain and pain management  - Notify physician/advanced practitioner if interventions unsuccessful or patient reports new pain  Outcome: Progressing     Problem: INFECTION - ADULT  Goal: Absence or prevention of progression during hospitalization  Description: INTERVENTIONS:  - Assess and monitor for signs and symptoms of infection  - Monitor lab/diagnostic results  - Monitor all insertion sites, i e  indwelling lines, tubes, and drains  - Monitor endotracheal if appropriate and nasal secretions for changes in amount and color  - [place] appropriate cooling/warming therapies per order  - Administer medications as ordered  - Instruct and encourage patient and family to use good hand hygiene technique  - Identify and instruct in appropriate isolation precautions for identified infection/condition  Outcome: Progressing     Problem: SAFETY ADULT  Goal: Patient will remain free of falls  Description: INTERVENTIONS:  - Assess patient frequently for physical needs  -  Identify cognitive and physical deficits and behaviors that affect risk of falls    -  [place] fall precautions as indicated by assessment   - Educate patient/family on patient safety including physical limitations  - Instruct patient to call for assistance with activity based on assessment  - Modify environment to reduce risk of injury  - Consider OT/PT consult to assist with strengthening/mobility  Outcome: Progressing  Goal: Maintain or return to baseline ADL function  Description: INTERVENTIONS:  -  Assess patient's ability to carry out ADLs; assess patient's baseline for ADL function and identify physical deficits which impact ability to perform ADLs (bathing, care of mouth/teeth, toileting, grooming, dressing, etc )  - Assess/evaluate cause of self-care deficits   - Assess range of motion  - Assess patient's mobility; develop plan if impaired  - Assess patient's need for assistive devices and provide as appropriate  - Encourage maximum independence but intervene and supervise when necessary  - Involve family in performance of ADLs  - Assess for home care needs following discharge   - Consider OT consult to assist with ADL evaluation and planning for discharge  - Provide patient education as appropriate  Outcome: Progressing  Goal: Maintain or return mobility status to optimal level  Description: INTERVENTIONS:  - Assess patient's baseline mobility status (ambulation, transfers, stairs, etc )    - Identify cognitive and physical deficits and behaviors that affect mobility  - Identify mobility aids required to assist with transfers and/or ambulation (gait belt, sit-to-stand, lift, walker, cane, etc )  - [place] fall precautions as indicated by assessment  - Record patient progress and toleration of activity level on Mobility SBAR; progress patient to next Phase/Stage  - Instruct patient to call for assistance with activity based on assessment  - Consider rehabilitation consult to assist with strengthening/weightbearing, etc   Outcome: Progressing     Problem: DISCHARGE PLANNING  Goal: Discharge to home or other facility with appropriate resources  Description: INTERVENTIONS:  - Identify barriers to discharge w/patient and caregiver  - Arrange for needed discharge resources and transportation as appropriate  - Identify discharge learning needs (meds, wound care, etc )  - Arrange for interpretive services to assist at discharge as needed  - Refer to Case Management Department for coordinating discharge planning if the patient needs post-hospital services based on physician/advanced practitioner order or complex needs related to functional status, cognitive ability, or social support system  Outcome: Progressing     Problem: Knowledge Deficit  Goal: Patient/family/caregiver demonstrates understanding of disease process, treatment plan, medications, and discharge instructions  Description: Complete learning assessment and assess knowledge base    Interventions:  - Provide teaching at level of understanding  - Provide teaching via preferred learning methods  Outcome: Progressing     Problem: GASTROINTESTINAL - ADULT  Goal: Minimal or absence of nausea and/or vomiting  Description: INTERVENTIONS:  - Administer IV fluids if ordered to ensure adequate hydration  - Maintain NPO status until nausea and vomiting are resolved  - Nasogastric tube if ordered  - Administer ordered antiemetic medications as needed  - Provide nonpharmacologic comfort measures as appropriate  - Advance diet as tolerated, if ordered  - Consider nutrition services referral to assist patient with adequate nutrition and appropriate food choices  Outcome: Progressing  Goal: Maintains or returns to baseline bowel function  Description: INTERVENTIONS:  - Assess bowel function  - Encourage oral fluids to ensure adequate hydration  - Administer IV fluids if ordered to ensure adequate hydration  - Administer ordered medications as needed  - Encourage mobilization and activity  - Consider nutritional services referral to assist patient with adequate nutrition and appropriate food choices  Outcome: Progressing  Goal: Maintains adequate nutritional intake  Description: INTERVENTIONS:  - Monitor percentage of each meal consumed  - Identify factors contributing to decreased intake, treat as appropriate  - Assist with meals as needed  - Monitor I&O, weight, and lab values if indicated  - Obtain nutrition services referral as needed  Outcome: Progressing     Problem: METABOLIC, FLUID AND ELECTROLYTES - ADULT  Goal: Electrolytes maintained within normal limits  Description: INTERVENTIONS:  - Monitor labs and assess patient for signs and symptoms of electrolyte imbalances  - Administer electrolyte replacement as ordered  - Monitor response to electrolyte replacements, including repeat lab results as appropriate  - Instruct patient on fluid and nutrition as appropriate  Outcome: Progressing

## 2021-02-18 NOTE — ASSESSMENT & PLAN NOTE
· S/p mastectomy, currently undergoing chemotherapy  Last infusion around 2 and half weeks ago    · Does report some nausea and vomiting post chemo, has antinausea meds at home which helps  · Continue outpatient follow-up with Oncology

## 2021-02-18 NOTE — ASSESSMENT & PLAN NOTE
· Also palliative care  · Continue regimen of oxycodone 10-15 mg q 4 hours p r n   For cancer-related pain

## 2021-02-19 ENCOUNTER — ANESTHESIA EVENT (OUTPATIENT)
Dept: GASTROENTEROLOGY | Facility: HOSPITAL | Age: 56
DRG: 242 | End: 2021-02-19
Payer: COMMERCIAL

## 2021-02-19 ENCOUNTER — APPOINTMENT (OUTPATIENT)
Dept: GASTROENTEROLOGY | Facility: HOSPITAL | Age: 56
DRG: 242 | End: 2021-02-19
Payer: COMMERCIAL

## 2021-02-19 ENCOUNTER — ANESTHESIA (OUTPATIENT)
Dept: GASTROENTEROLOGY | Facility: HOSPITAL | Age: 56
DRG: 242 | End: 2021-02-19
Payer: COMMERCIAL

## 2021-02-19 VITALS — HEART RATE: 78 BPM

## 2021-02-19 LAB
ALBUMIN SERPL BCP-MCNC: 2.5 G/DL (ref 3.5–5)
ALP SERPL-CCNC: 49 U/L (ref 46–116)
ALT SERPL W P-5'-P-CCNC: 11 U/L (ref 12–78)
ANION GAP SERPL CALCULATED.3IONS-SCNC: 10 MMOL/L (ref 4–13)
ANION GAP SERPL CALCULATED.3IONS-SCNC: 11 MMOL/L (ref 4–13)
AST SERPL W P-5'-P-CCNC: 11 U/L (ref 5–45)
ATRIAL RATE: 75 BPM
BASOPHILS # BLD AUTO: 0.03 THOUSANDS/ΜL (ref 0–0.1)
BASOPHILS NFR BLD AUTO: 1 % (ref 0–1)
BILIRUB SERPL-MCNC: 0.22 MG/DL (ref 0.2–1)
BUN SERPL-MCNC: 8 MG/DL (ref 5–25)
BUN SERPL-MCNC: 9 MG/DL (ref 5–25)
CALCIUM ALBUM COR SERPL-MCNC: 8.3 MG/DL (ref 8.3–10.1)
CALCIUM SERPL-MCNC: 7.1 MG/DL (ref 8.3–10.1)
CALCIUM SERPL-MCNC: 7.2 MG/DL (ref 8.3–10.1)
CHLORIDE SERPL-SCNC: 107 MMOL/L (ref 100–108)
CHLORIDE SERPL-SCNC: 107 MMOL/L (ref 100–108)
CO2 SERPL-SCNC: 20 MMOL/L (ref 21–32)
CO2 SERPL-SCNC: 22 MMOL/L (ref 21–32)
CREAT SERPL-MCNC: 0.7 MG/DL (ref 0.6–1.3)
CREAT SERPL-MCNC: 0.73 MG/DL (ref 0.6–1.3)
EOSINOPHIL # BLD AUTO: 0.12 THOUSAND/ΜL (ref 0–0.61)
EOSINOPHIL NFR BLD AUTO: 2 % (ref 0–6)
ERYTHROCYTE [DISTWIDTH] IN BLOOD BY AUTOMATED COUNT: 17.2 % (ref 11.6–15.1)
GFR SERPL CREATININE-BSD FRML MDRD: 92 ML/MIN/1.73SQ M
GFR SERPL CREATININE-BSD FRML MDRD: 96 ML/MIN/1.73SQ M
GLUCOSE SERPL-MCNC: 76 MG/DL (ref 65–140)
GLUCOSE SERPL-MCNC: 80 MG/DL (ref 65–140)
HCT VFR BLD AUTO: 33.1 % (ref 34.8–46.1)
HGB BLD-MCNC: 10.3 G/DL (ref 11.5–15.4)
IMM GRANULOCYTES # BLD AUTO: 0.02 THOUSAND/UL (ref 0–0.2)
IMM GRANULOCYTES NFR BLD AUTO: 0 % (ref 0–2)
LYMPHOCYTES # BLD AUTO: 1.77 THOUSANDS/ΜL (ref 0.6–4.47)
LYMPHOCYTES NFR BLD AUTO: 31 % (ref 14–44)
MAGNESIUM SERPL-MCNC: 1.7 MG/DL (ref 1.6–2.6)
MCH RBC QN AUTO: 28.9 PG (ref 26.8–34.3)
MCHC RBC AUTO-ENTMCNC: 31.1 G/DL (ref 31.4–37.4)
MCV RBC AUTO: 93 FL (ref 82–98)
MONOCYTES # BLD AUTO: 0.62 THOUSAND/ΜL (ref 0.17–1.22)
MONOCYTES NFR BLD AUTO: 11 % (ref 4–12)
NEUTROPHILS # BLD AUTO: 3.1 THOUSANDS/ΜL (ref 1.85–7.62)
NEUTS SEG NFR BLD AUTO: 55 % (ref 43–75)
NRBC BLD AUTO-RTO: 0 /100 WBCS
O+P STL CONC: NORMAL
P AXIS: 56 DEGREES
PHOSPHATE SERPL-MCNC: 2.8 MG/DL (ref 2.7–4.5)
PLATELET # BLD AUTO: 427 THOUSANDS/UL (ref 149–390)
PMV BLD AUTO: 9.7 FL (ref 8.9–12.7)
POTASSIUM SERPL-SCNC: 4.3 MMOL/L (ref 3.5–5.3)
POTASSIUM SERPL-SCNC: 5.2 MMOL/L (ref 3.5–5.3)
PR INTERVAL: 150 MS
PROT SERPL-MCNC: 5.3 G/DL (ref 6.4–8.2)
QRS AXIS: 55 DEGREES
QRSD INTERVAL: 76 MS
QT INTERVAL: 404 MS
QTC INTERVAL: 451 MS
RBC # BLD AUTO: 3.56 MILLION/UL (ref 3.81–5.12)
SODIUM SERPL-SCNC: 138 MMOL/L (ref 136–145)
SODIUM SERPL-SCNC: 139 MMOL/L (ref 136–145)
T WAVE AXIS: 50 DEGREES
T4 FREE SERPL-MCNC: 1.01 NG/DL (ref 0.76–1.46)
TROPONIN I SERPL-MCNC: <0.02 NG/ML
TSH SERPL DL<=0.05 MIU/L-ACNC: 13.41 UIU/ML (ref 0.36–3.74)
VENTRICULAR RATE: 75 BPM
WBC # BLD AUTO: 5.66 THOUSAND/UL (ref 4.31–10.16)

## 2021-02-19 PROCEDURE — 84100 ASSAY OF PHOSPHORUS: CPT | Performed by: FAMILY MEDICINE

## 2021-02-19 PROCEDURE — 88305 TISSUE EXAM BY PATHOLOGIST: CPT | Performed by: PATHOLOGY

## 2021-02-19 PROCEDURE — 0DB78ZX EXCISION OF STOMACH, PYLORUS, VIA NATURAL OR ARTIFICIAL OPENING ENDOSCOPIC, DIAGNOSTIC: ICD-10-PCS | Performed by: INTERNAL MEDICINE

## 2021-02-19 PROCEDURE — 88342 IMHCHEM/IMCYTCHM 1ST ANTB: CPT | Performed by: PATHOLOGY

## 2021-02-19 PROCEDURE — 88112 CYTOPATH CELL ENHANCE TECH: CPT | Performed by: PATHOLOGY

## 2021-02-19 PROCEDURE — 99232 SBSQ HOSP IP/OBS MODERATE 35: CPT | Performed by: FAMILY MEDICINE

## 2021-02-19 PROCEDURE — 84439 ASSAY OF FREE THYROXINE: CPT | Performed by: PHYSICIAN ASSISTANT

## 2021-02-19 PROCEDURE — 93010 ELECTROCARDIOGRAM REPORT: CPT | Performed by: INTERNAL MEDICINE

## 2021-02-19 PROCEDURE — 0DD58ZX EXTRACTION OF ESOPHAGUS, VIA NATURAL OR ARTIFICIAL OPENING ENDOSCOPIC, DIAGNOSTIC: ICD-10-PCS | Performed by: INTERNAL MEDICINE

## 2021-02-19 PROCEDURE — C9113 INJ PANTOPRAZOLE SODIUM, VIA: HCPCS | Performed by: PHYSICIAN ASSISTANT

## 2021-02-19 PROCEDURE — 84484 ASSAY OF TROPONIN QUANT: CPT | Performed by: FAMILY MEDICINE

## 2021-02-19 PROCEDURE — 43239 EGD BIOPSY SINGLE/MULTIPLE: CPT | Performed by: INTERNAL MEDICINE

## 2021-02-19 PROCEDURE — 93005 ELECTROCARDIOGRAM TRACING: CPT

## 2021-02-19 PROCEDURE — NC001 PR NO CHARGE: Performed by: NURSE PRACTITIONER

## 2021-02-19 PROCEDURE — 0DB98ZX EXCISION OF DUODENUM, VIA NATURAL OR ARTIFICIAL OPENING ENDOSCOPIC, DIAGNOSTIC: ICD-10-PCS | Performed by: INTERNAL MEDICINE

## 2021-02-19 PROCEDURE — 0DB38ZX EXCISION OF LOWER ESOPHAGUS, VIA NATURAL OR ARTIFICIAL OPENING ENDOSCOPIC, DIAGNOSTIC: ICD-10-PCS | Performed by: INTERNAL MEDICINE

## 2021-02-19 PROCEDURE — 80053 COMPREHEN METABOLIC PANEL: CPT | Performed by: FAMILY MEDICINE

## 2021-02-19 PROCEDURE — 84443 ASSAY THYROID STIM HORMONE: CPT | Performed by: PHYSICIAN ASSISTANT

## 2021-02-19 PROCEDURE — 88341 IMHCHEM/IMCYTCHM EA ADD ANTB: CPT | Performed by: PATHOLOGY

## 2021-02-19 PROCEDURE — 99255 IP/OBS CONSLTJ NEW/EST HI 80: CPT | Performed by: INTERNAL MEDICINE

## 2021-02-19 PROCEDURE — 83735 ASSAY OF MAGNESIUM: CPT | Performed by: FAMILY MEDICINE

## 2021-02-19 PROCEDURE — 80048 BASIC METABOLIC PNL TOTAL CA: CPT | Performed by: FAMILY MEDICINE

## 2021-02-19 PROCEDURE — 85025 COMPLETE CBC W/AUTO DIFF WBC: CPT | Performed by: FAMILY MEDICINE

## 2021-02-19 PROCEDURE — 88312 SPECIAL STAINS GROUP 1: CPT | Performed by: PATHOLOGY

## 2021-02-19 PROCEDURE — 0DB28ZX EXCISION OF MIDDLE ESOPHAGUS, VIA NATURAL OR ARTIFICIAL OPENING ENDOSCOPIC, DIAGNOSTIC: ICD-10-PCS | Performed by: INTERNAL MEDICINE

## 2021-02-19 RX ORDER — PANTOPRAZOLE SODIUM 40 MG/1
40 INJECTION, POWDER, FOR SOLUTION INTRAVENOUS EVERY 12 HOURS SCHEDULED
Status: DISCONTINUED | OUTPATIENT
Start: 2021-02-19 | End: 2021-02-20 | Stop reason: HOSPADM

## 2021-02-19 RX ORDER — PROPOFOL 10 MG/ML
INJECTION, EMULSION INTRAVENOUS AS NEEDED
Status: DISCONTINUED | OUTPATIENT
Start: 2021-02-19 | End: 2021-02-19

## 2021-02-19 RX ORDER — HYDROMORPHONE HCL/PF 1 MG/ML
0.5 SYRINGE (ML) INJECTION
Status: DISCONTINUED | OUTPATIENT
Start: 2021-02-19 | End: 2021-02-20 | Stop reason: HOSPADM

## 2021-02-19 RX ORDER — FLUCONAZOLE 100 MG/1
400 TABLET ORAL ONCE
Status: COMPLETED | OUTPATIENT
Start: 2021-02-19 | End: 2021-02-19

## 2021-02-19 RX ORDER — LIDOCAINE HYDROCHLORIDE 10 MG/ML
INJECTION, SOLUTION EPIDURAL; INFILTRATION; INTRACAUDAL; PERINEURAL AS NEEDED
Status: DISCONTINUED | OUTPATIENT
Start: 2021-02-19 | End: 2021-02-19

## 2021-02-19 RX ORDER — FLUCONAZOLE 100 MG/1
200 TABLET ORAL DAILY
Status: DISCONTINUED | OUTPATIENT
Start: 2021-02-20 | End: 2021-02-20 | Stop reason: HOSPADM

## 2021-02-19 RX ORDER — SODIUM CHLORIDE, SODIUM LACTATE, POTASSIUM CHLORIDE, CALCIUM CHLORIDE 600; 310; 30; 20 MG/100ML; MG/100ML; MG/100ML; MG/100ML
INJECTION, SOLUTION INTRAVENOUS CONTINUOUS PRN
Status: DISCONTINUED | OUTPATIENT
Start: 2021-02-19 | End: 2021-02-19

## 2021-02-19 RX ORDER — GLYCOPYRROLATE 0.2 MG/ML
INJECTION INTRAMUSCULAR; INTRAVENOUS AS NEEDED
Status: DISCONTINUED | OUTPATIENT
Start: 2021-02-19 | End: 2021-02-19

## 2021-02-19 RX ORDER — OXYCODONE HYDROCHLORIDE 10 MG/1
10 TABLET ORAL EVERY 4 HOURS PRN
Status: DISCONTINUED | OUTPATIENT
Start: 2021-02-19 | End: 2021-02-20 | Stop reason: HOSPADM

## 2021-02-19 RX ADMIN — DICYCLOMINE HYDROCHLORIDE 10 MG: 10 CAPSULE ORAL at 06:10

## 2021-02-19 RX ADMIN — LEVOTHYROXINE SODIUM 88 MCG: 88 TABLET ORAL at 06:10

## 2021-02-19 RX ADMIN — PROPOFOL 30 MG: 10 INJECTION, EMULSION INTRAVENOUS at 15:18

## 2021-02-19 RX ADMIN — SODIUM CHLORIDE, SODIUM LACTATE, POTASSIUM CHLORIDE, AND CALCIUM CHLORIDE: .6; .31; .03; .02 INJECTION, SOLUTION INTRAVENOUS at 15:10

## 2021-02-19 RX ADMIN — PANTOPRAZOLE SODIUM 40 MG: 40 INJECTION, POWDER, FOR SOLUTION INTRAVENOUS at 10:07

## 2021-02-19 RX ADMIN — FLUCONAZOLE 400 MG: 100 TABLET ORAL at 19:40

## 2021-02-19 RX ADMIN — PROPOFOL 50 MG: 10 INJECTION, EMULSION INTRAVENOUS at 15:15

## 2021-02-19 RX ADMIN — ONDANSETRON 4 MG: 2 INJECTION INTRAMUSCULAR; INTRAVENOUS at 07:14

## 2021-02-19 RX ADMIN — PANTOPRAZOLE SODIUM 40 MG: 40 TABLET, DELAYED RELEASE ORAL at 06:09

## 2021-02-19 RX ADMIN — PROPOFOL 100 MG: 10 INJECTION, EMULSION INTRAVENOUS at 15:14

## 2021-02-19 RX ADMIN — ALPRAZOLAM 1 MG: 0.5 TABLET ORAL at 21:36

## 2021-02-19 RX ADMIN — PROPOFOL 30 MG: 10 INJECTION, EMULSION INTRAVENOUS at 15:21

## 2021-02-19 RX ADMIN — OXYCODONE HYDROCHLORIDE 10 MG: 10 TABLET ORAL at 21:35

## 2021-02-19 RX ADMIN — ALPRAZOLAM 1 MG: 0.5 TABLET ORAL at 10:06

## 2021-02-19 RX ADMIN — TRIMETHOBENZAMIDE HYDROCHLORIDE 200 MG: 100 INJECTION INTRAMUSCULAR at 17:53

## 2021-02-19 RX ADMIN — HYDROMORPHONE HYDROCHLORIDE 0.5 MG: 1 INJECTION, SOLUTION INTRAMUSCULAR; INTRAVENOUS; SUBCUTANEOUS at 22:12

## 2021-02-19 RX ADMIN — SERTRALINE HYDROCHLORIDE 150 MG: 100 TABLET ORAL at 10:06

## 2021-02-19 RX ADMIN — OXYCODONE HYDROCHLORIDE 10 MG: 10 TABLET ORAL at 17:13

## 2021-02-19 RX ADMIN — DICYCLOMINE HYDROCHLORIDE 10 MG: 10 CAPSULE ORAL at 21:35

## 2021-02-19 RX ADMIN — SUCRALFATE 1 G: 1 TABLET ORAL at 06:09

## 2021-02-19 RX ADMIN — OXYCODONE HYDROCHLORIDE 10 MG: 10 TABLET ORAL at 10:06

## 2021-02-19 RX ADMIN — PANTOPRAZOLE SODIUM 40 MG: 40 INJECTION, POWDER, FOR SOLUTION INTRAVENOUS at 21:35

## 2021-02-19 RX ADMIN — TRIMETHOBENZAMIDE HYDROCHLORIDE 200 MG: 100 INJECTION INTRAMUSCULAR at 09:06

## 2021-02-19 RX ADMIN — LIDOCAINE HYDROCHLORIDE 50 MG: 10 INJECTION, SOLUTION EPIDURAL; INFILTRATION; INTRACAUDAL at 15:14

## 2021-02-19 RX ADMIN — TRIMETHOBENZAMIDE HYDROCHLORIDE 200 MG: 100 INJECTION INTRAMUSCULAR at 23:38

## 2021-02-19 RX ADMIN — HYDROXYCHLOROQUINE SULFATE 100 MG: 200 TABLET, FILM COATED ORAL at 21:36

## 2021-02-19 RX ADMIN — PROPOFOL 30 MG: 10 INJECTION, EMULSION INTRAVENOUS at 15:25

## 2021-02-19 RX ADMIN — CYCLOBENZAPRINE HYDROCHLORIDE 10 MG: 10 TABLET, FILM COATED ORAL at 21:36

## 2021-02-19 RX ADMIN — HYDROMORPHONE HYDROCHLORIDE 0.5 MG: 1 INJECTION, SOLUTION INTRAMUSCULAR; INTRAVENOUS; SUBCUTANEOUS at 17:45

## 2021-02-19 RX ADMIN — OXYCODONE HYDROCHLORIDE 10 MG: 10 TABLET ORAL at 02:07

## 2021-02-19 RX ADMIN — PROPOFOL 30 MG: 10 INJECTION, EMULSION INTRAVENOUS at 15:23

## 2021-02-19 RX ADMIN — SUCRALFATE 1 G: 1 TABLET ORAL at 21:36

## 2021-02-19 RX ADMIN — SODIUM CHLORIDE 75 ML/HR: 0.9 INJECTION, SOLUTION INTRAVENOUS at 09:09

## 2021-02-19 RX ADMIN — GLYCOPYRROLATE 0.2 MG: 0.2 INJECTION, SOLUTION INTRAMUSCULAR; INTRAVENOUS at 15:17

## 2021-02-19 NOTE — UTILIZATION REVIEW
Continued Stay Review  Date: 2/19/2021                          OBSERVATION ADMISSION 2/17/2021 2119 CONVERTED TO INPATIENT ADMISSION 2/19/2021 1523 DUE TO INTRACTABLE VOMITING WITH PAINFUL SWALLOWING REQ FURTHER DIAGNOSTIC FOR MANAGEMENT REQUIRES  TOLERATION OF DIET  REQ IVF, UNABLE TO TOLERATE ADVANCE DIET   02/19/21 1524  Inpatient Admission Once     Question Answer Comment   Level of Care Med Surg    Estimated length of stay More than 2 Midnights    Certification I certify that inpatient services are medically necessary for this patient for a duration of greater than two midnights  See H&P and MD Progress Notes for additional information about the patient's course of treatment  02/19/21 1523      Current Patient Class: Inpatient     Current Level of Care: med surg     HPI:55 y o  female initially admitted on  OBS 2/17/2021 due to nausea & vomiting  Reports chest & abdominal pain due to dry heaves; able to tolerate some PO but has issues keeping food down  Report episodes of loose watery nonbloody stools with some issues with swallowing despite adjusting diet  Receiving CHEMO treatment last 2 weeks prior    Assessment/Plan:   2/18/2021 PM note IVF @ 75 ml/HR, cont w nausea/vomiting  C/O both dysphagia & odynophagia  On clear liq diet; adv as tolerated  C DIFF negative  Cont w potation deplete; w PHOS decrease; replace 20 IV Potassium x3 (60 IV) & phosphorus  Repeat BMP @ 2100  On telemetry  Added Carafate for gastritis; No evidence of esophagitis, mediastinitis or acute cardiopulmonary process  No evidence of gastritis, colitis or bowel obstruction  Cont home meds for Lupus, cancer related pain  2/19/2021 Provider Continues  with nausea and vomiting even w clear liquid diet; c/o both Dysphagia & odynophagia  On IVF gentle rate; Only clear liq diet; CDIFF negative; carafate, cont antiemetic, antispasmodic  GI consult- NPO sips for NOW  EGD 10/2020   With mild gastritis with evidence of a Schatzki's ring which did require dilatation and clips  CT chest/abd/pelvis with po contrast: No evidence of esophagitis, mediastinitis or acute cardiopulmonary process  No evidence of gastritis, colitis or bowel obstruction  2/19/2021 GI: N/V had EGD in 10/2020 w some gastritis; path negative for H pylori but still concerning  NPO for EGD  PPI IV BID, carafate QID   Pain control & antiemetic per primary team    Pertinent Labs/Diagnostic Results:   Results from last 7 days   Lab Units 02/17/21  2337   SARS-COV-2  Negative     Results from last 7 days   Lab Units 02/19/21  0547 02/18/21  0452 02/17/21  1635   WBC Thousand/uL 5 66 8 07 9 52   HEMOGLOBIN g/dL 10 3* 10 1* 13 9   HEMATOCRIT % 33 1* 32 7* 44 5   PLATELETS Thousands/uL 427* 404* 516*   NEUTROS ABS Thousands/µL 3 10  --  7 36         Results from last 7 days   Lab Units 02/19/21  0546 02/19/21  0048 02/18/21  1026 02/17/21  1635   SODIUM mmol/L 139 138 139 137   POTASSIUM mmol/L 4 3 5 2 2 8* 3 4*   CHLORIDE mmol/L 107 107 106 101   CO2 mmol/L 22 20* 22 20*   ANION GAP mmol/L 10 11 11 16*   BUN mg/dL 8 9 10 10   CREATININE mg/dL 0 73 0 70 0 68 0 92   EGFR ml/min/1 73sq m 92 96 97 69   CALCIUM mg/dL 7 1* 7 2* 7 3* 9 0   MAGNESIUM mg/dL 1 7  --   --  1 9   PHOSPHORUS mg/dL 2 8  --   --  1 8*     Results from last 7 days   Lab Units 02/19/21  0546 02/18/21  1026 02/17/21  1635   AST U/L 11 15 16   ALT U/L 11* <6* 11*   ALK PHOS U/L 49 58 74   TOTAL PROTEIN g/dL 5 3* 6 2* 7 6   ALBUMIN g/dL 2 5* 2 8* 3 4*   TOTAL BILIRUBIN mg/dL 0 22 0 23 0 35         Results from last 7 days   Lab Units 02/19/21  0546 02/19/21  0048 02/18/21  1026 02/17/21  1635   GLUCOSE RANDOM mg/dL 80 76 86 106           Results from last 7 days   Lab Units 02/17/21  1635   TROPONIN I ng/mL 0 03                     Results from last 7 days   Lab Units 02/17/21  2055 02/17/21  1635   LACTIC ACID mmol/L 1 0 2 5*         Results from last 7 days   Lab Units 02/17/21  1635   LIPASE u/L 119 Results from last 7 days   Lab Units 02/17/21  2337   INFLUENZA A PCR  Negative   INFLUENZA B PCR  Negative   RSV PCR  Negative                 Results from last 7 days   Lab Units 02/17/21  2350   C DIFF TOXIN B BY PCR  Negative     Results from last 7 days   Lab Units 02/17/21  2350   SALMONELLA SP PCR  None Detected   SHIGELLA SP/ENTEROINVASIVE E  COLI (EIEC)  None Detected   CAMPYLOBACTER SP (JEJUNI AND COLI)  None Detected   SHIGA TOXIN 1/SHIGA TOXIN 2  None Detected     2/19/2021 EGD-   DATE OF SERVICE:  2/19/21   INDICATION:  Odynophagia, Intractable vomiting with nausea, unspecified vomiting type   ANESTHESIA INFORMATION:  ASA: III  Anesthesia Type: IV Sedation with Anesthesia  FINDINGS:  · Mild, patchy erythematous mucosa in the 1st part of the duodenum; performed cold forceps biopsy  · Mild, patchy erythematous mucosa in the antrum; performed cold forceps biopsy  · Small sliding hiatal hernia (type I hiatal hernia) without Audrey High lesions present - GE junction 34 cm from the incisors, diaphragmatic impression 36 cm from the incisors  · Mild, generalized erythematous and ulcerated mucosa with erosion and plaque in the middle third of the esophagus and lower third of the esophagus; performed cold forceps biopsy; collected sample to send to pathology with brush  IMPRESSION:  White esophageal plaques with mild erosions on ulcerations esophagus status post brushings and biopsies  Mild gastric antral erythema status post random gastric biopsies rule out H pylori  Mild duodenal bulb erythema status post biopsies to rule out celiac disease  RECOMMENDATION:  Follow-up pathology 1-2 weeks  Start fluconazole 400 mg today and then 200 mg daily for total of 3 weeks for Candida esophagitis  Resume prior diet           Vital Signs:   Date/Time  Temp  Pulse  Resp  BP  MAP (mmHg)  SpO2  O2 Device  Patient Position - Orthostatic VS   02/19/21 0700  98 1 °F (36 7 °C)  80  16  --  --  100 %  None (Room air)  Sitting 02/18/21 2200  98 2 °F (36 8 °C)  78  18  121/68  --  95 %  None (Room air         Medications:   Scheduled Medications:  cyclobenzaprine, 10 mg, Oral, HS  dicyclomine, 10 mg, Oral, 4x Daily (AC & HS)  enoxaparin, 40 mg, Subcutaneous, Daily  hydroxychloroquine, 100 mg, Oral, HS  hydroxychloroquine, 200 mg, Oral, Daily With Breakfast  levothyroxine, 88 mcg, Oral, Early Morning  nicotine, 1 patch, Transdermal, Daily  pantoprazole, 40 mg, Oral, BID AC  sertraline, 150 mg, Oral, Daily  sucralfate, 1 g, Oral, 4x Daily (AC & HS)    Continuous IV Infusions:  sodium chloride, 75 mL/hr, Intravenous, Continuous    PRN Meds:  acetaminophen, 650 mg, Oral, Q6H PRN  al mag oxide-diphenhydramine-lidocaine viscous, 10 mL, Swish & Swallow, Q4H PRN  ALPRAZolam, 1 mg, Oral, TID PRN  ondansetron, 4 mg, Intravenous, Q6H PRN  oxyCODONE, 10 mg, Oral, Q4H PRN    Discharge Plan: d  Network Utilization Review Department  ATTENTION: Please call with any questions or concerns to [phone] and carefully listen to the prompts so that you are directed to the right person  All voicemails are confidential   Maykel Cifuentes all requests for admission clinical reviews, approved or denied determinations and any other requests to dedicated fax number below belonging to the campus where the patient is receiving treatment   List of dedicated fax numbers for the Facilities:  [address] DENIALS (Administrative/Medical Necessity) [phone]   [address] (Maternity/NICU/Pediatrics) [phone]   [address] Adriana Marcus 5258 (Claire Kruse) [address] [place] [address] Karan Arroyo 1481 [address] [place]) [facility] 951 [phone]

## 2021-02-19 NOTE — ASSESSMENT & PLAN NOTE
Patient continues to have nausea and vomiting even with clear liquid diet  C/o both dysphagia and odynophagia  · No leukocytosis, afebrile, BP stable  Lactic acid initially elevated improved with IVF  · IVF @75ml/hr   Will continue  · Will place on clears for now, advance diet as tolerated  · Cdiff negative  · Carafate and magic mouthwash for symptom relief   · Will continue supportive management with antinausea medication (tigan), antispasmodic  · GI consulted, pt will be NPO sips with meds

## 2021-02-19 NOTE — ASSESSMENT & PLAN NOTE
EGD 10/2020  With mild gastritis with evidence of a Schatzki's ring which did require dilatation and clips  · CT chest/abd/pelvis with po contrast: No evidence of esophagitis, mediastinitis or acute cardiopulmonary process   No evidence of gastritis, colitis or bowel obstruction  · Follows with GI as outpatient  · Will closely monitor  · Added Carafate  · GI consulted

## 2021-02-19 NOTE — ANESTHESIA PREPROCEDURE EVALUATION
Procedure:  EGD  INTRACTABLE VOMITING  Relevant Problems   CARDIO   (+) Hypertension      ENDO   (+) Hypothyroidism      /RENAL   (+) JEFERSON (acute kidney injury) ([facility] [address] )   (+) Nephrolithiasis      GYN   (+) Malignant neoplasm of upper-inner quadrant of left breast in female, estrogen receptor negative (HCC)      HEMATOLOGY   (+) Anemia      MUSCULOSKELETAL   (+) Systemic lupus erythematosus (HCC)      NEURO/PSYCH   (+) Anxiety   (+) Continuous opioid dependence (HCC)   (+) Depression   (+) Posttraumatic stress disorder      PULMONARY   (+) Smoking             Anesthesia Plan  ASA Score- 3     Anesthesia Type- IV sedation with anesthesia with ASA Monitors  Additional Monitors:   Airway Plan:           Plan Factors-    Chart reviewed  EKG reviewed  Patient is a current smoker  Induction-     Postoperative Plan-     Informed Consent- Anesthetic plan and risks discussed with patient                Lab Results   Component Value Date    GLUC 80 02/19/2021    GLUF 87 10/19/2020    ALT 11 (L) 02/19/2021    AST 11 02/19/2021    BUN 8 02/19/2021    CALCIUM 7 1 (L) 02/19/2021     02/19/2021    CO2 22 02/19/2021    CREATININE 0 73 02/19/2021    INR 0 94 09/19/2020    HCT 33 1 (L) 02/19/2021    HGB 10 3 (L) 02/19/2021    PROT 7 2 04/22/2015    MG 1 7 02/19/2021    PHOS 2 8 02/19/2021     (H) 02/19/2021    K 4 3 02/19/2021     (L) 04/22/2015    WBC 5 66 02/19/2021

## 2021-02-19 NOTE — ANESTHESIA POSTPROCEDURE EVALUATION
Post-Op Assessment Note    CV Status:  Stable  Pain Score: 0    Pain management: adequate     Mental Status:  Sleepy and arousable   Hydration Status:  Euvolemic   PONV Controlled:  Controlled   Airway Patency:  Patent      Post Op Vitals Reviewed: Yes      Staff: CRNA   Comments: vss, sv        No complications documented      BP   131/65   Temp   97 7   Pulse  86   Resp   14   SpO2   100%

## 2021-02-19 NOTE — ASSESSMENT & PLAN NOTE
S/p mastectomy, currently undergoing chemotherapy  Last infusion around 2 and half weeks ago    · Does report some nausea and vomiting post chemo, has antinausea meds at home which helps  · Continue outpatient follow-up with Oncology

## 2021-02-19 NOTE — CONSULTS
Consultation - Palliative & Supportive Care     Attempted to see patient 3 times today    1  Patient was on phone and requested I return later  2  Patient was with another provider and requested I return at a later time  3  Returned in the afternoon, S/O sleeping in the chair and patient was at EGD  Left my contact information  Unfortunately, patient canceled or no showed for her last 2 palliative medicine visits so she has not been seen outpatient since November  Reviewed case with JOON Seo and formal consult to be completed Monday due to difficulties connecting with patient today  N/V  Would recommend scheduling Zofran q6h with PRN phenergan  Pain   Would recommend maximizing current opioid regimen  Could considering increasing oxycodone to   Oxycodone 15mg for moderate pain  Oxycodone 20mg for severe pain   Dilaudid 0 5mg IV for breakthrough pain

## 2021-02-19 NOTE — ASSESSMENT & PLAN NOTE
EGD 10/2020  With mild gastritis with evidence of a Schatzki's ring which did require dilatation and clips  · CT chest/abd/pelvis with po contrast: No evidence of esophagitis, mediastinitis or acute cardiopulmonary process   No evidence of gastritis, colitis or bowel obstruction  · Follows with GI as outpatient  · Will closely monitor  · Added Carafate  · GI onboard - see candida esophagitis

## 2021-02-19 NOTE — ASSESSMENT & PLAN NOTE
Patient continues to have nausea and vomiting even with clear liquid diet  C/o both dysphagia and odynophagia  · No leukocytosis, afebrile, BP stable  Lactic acid initially elevated improved with IVF  · IVF @75ml/hr   Will continue  · Will place on clears for now, advance diet as tolerated  · Cdiff negative  · Carafate and magic mouthwash for symptom relief   · Will continue supportive management with antinausea medication (tigan), antispasmodic  · GI onboard, EGD yesterday revealed candida esophagitis

## 2021-02-19 NOTE — PROGRESS NOTES
Progress Note - Arias Galvan 1965, 60 y o  female MRN: [id number]    Unit/Bed#: S -01 Encounter: [id number]    Primary Care Provider: Rosemary Martinez MD   Date and time admitted to hospital: 2/17/2021  4:03 PM        * Nausea and vomiting  Assessment & Plan  Patient continues to have nausea and vomiting even with clear liquid diet  C/o both dysphagia and odynophagia  · No leukocytosis, afebrile, BP stable  Lactic acid initially elevated improved with IVF  · IVF @75ml/hr  Will continue  · Will place on clears for now, advance diet as tolerated  · Cdiff negative  · Carafate and magic mouthwash for symptom relief   · Will continue supportive management with antinausea medication (tigan), antispasmodic  · GI consulted, pt will be NPO sips with meds     Malignant neoplasm of upper-inner quadrant of left breast in female, estrogen receptor negative ([facility] [address] )  Assessment & Plan  S/p mastectomy, currently undergoing chemotherapy  Last infusion around 2 and half weeks ago  · Does report some nausea and vomiting post chemo, has antinausea meds at home which helps  · Continue outpatient follow-up with Oncology    Gastritis  Assessment & Plan  EGD 10/2020  With mild gastritis with evidence of a Schatzki's ring which did require dilatation and clips  · CT chest/abd/pelvis with po contrast: No evidence of esophagitis, mediastinitis or acute cardiopulmonary process  No evidence of gastritis, colitis or bowel obstruction  · Follows with GI as outpatient  · Will closely monitor  · Added Carafate  · GI consulted    Hypokalemia  Assessment & Plan    Lab Results   Component Value Date    SODIUM 139 02/19/2021    K 4 3 02/19/2021     02/19/2021    CO2 22 02/19/2021    BUN 8 02/19/2021    CREATININE 0 73 02/19/2021    GLUC 80 02/19/2021    CALCIUM 7 1 (L) 02/19/2021       Lab Results   Component Value Date    CALCIUM 7 1 (L) 02/19/2021    PHOS 2 8 02/19/2021     Resolved     · Overall K repletion yesterday 90mEq K Smoking  Assessment & Plan  Current active smoker however has denied NRT in hospital  States she hasnt felt desire to smoke here  · Will encourage her tobacco cessation efforts and provide educational material      Cancer related pain  Assessment & Plan  Patient follows w/ Dr Santo Hameed, palliative care  · Continue regimen of oxycodone 10-15 mg q 4 hours p r n  for cancer-related pain  · Palliative consult placed    Depression  Assessment & Plan  Hx of anxiety and depression  · Continue Zoloft, Xanax p r n  Hypothyroidism  Assessment & Plan  · Continue home levothyroxine    Systemic lupus erythematosus ([facility] [address] )  Assessment & Plan  Hx of Lupus  · Takes Plaquenil 200 mg in the morning, 100 mg at night  · Continue home meds        VTE Pharmacologic Prophylaxis:   Pharmacologic: Enoxaparin (Lovenox)  Mechanical VTE Prophylaxis in Place: Yes    Discussions with Specialists or Other Care Team Provider: GI    Education and Discussions with Family / Patient: Patient and     Current Length of Stay: 0 day(s)    Current Patient Status: Observation     Discharge Plan / Estimated Discharge Date: Patient is not medically stable at this point     Code Status: Level 1 - Full Code      Subjective:   Patient continues to be uncomfortable, endorsing nausea and vomiting  Continues to c/o chest pain that she feels is heartburn, unchanged from pain on admission  Abdominal pain remains the same  Denies cp, palpitations, cough, SOB, diaphoresis  Objective:     Vitals:   Temp (24hrs), Av 2 °F (36 8 °C), Min:98 1 °F (36 7 °C), Max:98 2 °F (36 8 °C)    Temp:  [98 1 °F (36 7 °C)-98 2 °F (36 8 °C)] 98 1 °F (36 7 °C)  HR:  [78-82] 80  Resp:  [16-18] 16  BP: (121-140)/(68-72) 121/68  SpO2:  [95 %-100 %] 100 %  Body mass index is 23 38 kg/m²  Input and Output Summary (last 24 hours):        Intake/Output Summary (Last 24 hours) at 2021 1008  Last data filed at 2021 0600  Gross per 24 hour   Intake 340 ml Output 0 ml   Net 340 ml       Physical Exam:     Physical Exam  Vitals signs and nursing note reviewed  Constitutional:       General: She is in acute distress  Appearance: She is ill-appearing  She is not toxic-appearing  Comments: Awake but appears fatigued   HENT:      Head: Normocephalic and atraumatic  Nose: Nose normal       Mouth/Throat:      Mouth: Mucous membranes are dry  Eyes:      General: No scleral icterus  Right eye: No discharge  Left eye: No discharge  Pupils: Pupils are equal, round, and reactive to light  Neck:      Comments: No visible adenopathy  Cardiovascular:      Rate and Rhythm: Normal rate and regular rhythm  Pulses: Normal pulses  Heart sounds: Normal heart sounds  Pulmonary:      Effort: Pulmonary effort is normal  No respiratory distress  Breath sounds: Normal breath sounds  Abdominal:      General: Bowel sounds are normal       Palpations: Abdomen is soft  Tenderness: There is abdominal tenderness (mid epigastric)  There is no guarding or rebound  Musculoskeletal:      Right lower leg: No edema  Left lower leg: No edema  Skin:     General: Skin is warm  Coloration: Skin is pale  Skin is not jaundiced  Neurological:      General: No focal deficit present  Mental Status: She is alert and oriented to person, place, and time  Motor: Weakness present     Psychiatric:         Mood and Affect: Mood normal          Behavior: Behavior normal          Additional Data:     Labs:    Results from last 7 days   Lab Units 02/19/21  0547   WBC Thousand/uL 5 66   HEMOGLOBIN g/dL 10 3*   HEMATOCRIT % 33 1*   PLATELETS Thousands/uL 427*   NEUTROS PCT % 55   LYMPHS PCT % 31   MONOS PCT % 11   EOS PCT % 2     Results from last 7 days   Lab Units 02/19/21  0546   POTASSIUM mmol/L 4 3   CHLORIDE mmol/L 107   CO2 mmol/L 22   BUN mg/dL 8   CREATININE mg/dL 0 73   CALCIUM mg/dL 7 1*   ALK PHOS U/L 49   ALT U/L 11*   AST U/L 11           * I Have Reviewed All Lab Data Listed Above  * Additional Pertinent Lab Tests Reviewed: [address] Admission Reviewed    Imaging:    Imaging Reports Reviewed Today Include: None  Imaging Personally Reviewed by Myself Includes:  None    Recent Cultures (last 7 days):     Results from last 7 days   Lab Units 02/17/21  2350   C DIFF TOXIN B BY PCR  Negative       Last 24 Hours Medication List:   Current Facility-Administered Medications   Medication Dose Route Frequency Provider Last Rate    acetaminophen  650 mg Oral Q6H PRN Anel Erazo MD      al mag oxide-diphenhydramine-lidocaine viscous  10 mL Swish & Swallow Q4H PRN Giovanni Valderrama MD      ALPRAZolam  1 mg Oral TID PRN Anel Erazo MD      cyclobenzaprine  10 mg Oral HS Anel Erazo MD      dicyclomine  10 mg Oral 4x Daily (AC & HS) Anel Erazo MD      enoxaparin  40 mg Subcutaneous Daily Anel Erazo MD      hydroxychloroquine  100 mg Oral HS Anel Erazo MD      hydroxychloroquine  200 mg Oral Daily With Breakfast Anel Erazo MD      levothyroxine  88 mcg Oral Early Morning Anel Erazo MD      nicotine  1 patch Transdermal Daily Anel Erazo MD      oxyCODONE  10 mg Oral Q4H PRN Anel Erazo MD      pantoprazole  40 mg Intravenous Q12H [address] Gabrielle Castro PA-C      sertraline  150 mg Oral Daily Anel Erazo MD      sodium chloride  75 mL/hr Intravenous Continuous Anel Eraoz MD 75 mL/hr (02/19/21 0909)    sucralfate  1 g Oral 4x Daily (AC & HS) Rik Coley MD      trimethobenzamide  200 mg Intramuscular Q6H PRN Giovanni Valderrama MD          Today, Patient Was Seen By: Giovanni Valderrama MD    ** Please Note: This note has been constructed using a voice recognition system   **]    Giovanni Valderrama MD  Family Medicine PGY-1  2/19/2021  10:08 AM

## 2021-02-19 NOTE — CONSULTS
[facility] - New Consultation  Oswaldo Davey 60 y o  female MRN: [id number]  Encounter: [id number]          ASSESSMENT AND PLAN:      1  Intractable N/V, odynophagia, dysphagia:  Patient presenting with intractable nausea and vomiting, approximately 2 weeks after last chemotherapy  Reports odynophagia with liquids and any foods, and occasional oropharyngeal dysphagia of solids  DDx includes esophagitis (candidal versus reflux) vs  GERD vs  Gastritis vs  PUD  Also with indeterminate history regarding H pylori   - NPO for EGD today  - IV PPI b i d   - Carafate 1g PO QID  - check stool H Pylori  - defer pain and nausea control to primary team    Recommendations relayed to Dr Flavia Mora, primary team   ______________________________________________________________________    HPI:  Akhil Holman is a 55F with PMH of breast CA status post mastectomy with sentinel node biopsy 08/2020, on adjuvant chemotherapy, depression, anxiety, SLE (reportedly on low dose prednisone), alcohol abuse, history of Schatzki's ring and history of gastritis who presents to the hospital with intractable nausea, vomiting, and odynophagia x3 days  Patient reports that she often has issues with swallowing breads and some pills that feel as if they get stuck in her throat  Reports odynophagia, and states that sips of water at present time burn in her throat, esophagus and chest   Patient reports that her last chemotherapy was 2 5 weeks ago, states that she was feeling well, and approximately 3 days ago began to have nausea and intractable vomiting of nonbloody nonbilious material   Patient reports that she tried Zofran at home, without improvement in her nausea and vomiting  Denies recent sick contacts, new or unusual foods, or recent antibiotic use  Denies hematemesis, coffee-ground emesis, black or bloody stool  Denies NSAID use  Reports history of alcohol abuse, states that she quit in March 2020   Smokes half a pack of cigarettes per day at present time  Denies history of radiation to the chest or neck  Patient also reports intermittent dysuria over the past week  Most recent lab significant for CMP with hypoalbuminemia, normal LFTs otherwise, CBC with thrombocythemia platelet count of 353, stable hemoglobin of 10 3 g/dL, no significant leukocytosis  CT scan of chest abdomen pelvis performed with IV and enteric contrast did not show any obvious abnormalities to explain current symptoms  Lactate 2 5-->1  1  Troponin  0 03  Last EGD performed 10/20/2020 showing a Schatzki's ring which was dilated, and mild gastritis, biopsies taken negative for H pylori though overall features are concerning for an H pylori infection" on pathology report  Patient was advised to have a stool H pylori test performed, though this does not appear to have been done  REVIEW OF SYSTEMS:    CONSTITUTIONAL: Denies any fever, chills, rigors  HEENT: No earache or tinnitus  Denies hearing loss or visual disturbances  CARDIOVASCULAR:  +chest discomfort  Denies palpitations  RESPIRATORY: Denies any cough, hemoptysis, shortness of breath or dyspnea on exertion  GASTROINTESTINAL: As noted in the History of Present Illness  GENITOURINARY: +dysuria  Denies hematuria  NEUROLOGIC: No dizziness or vertigo, denies headaches  MUSCULOSKELETAL: Denies any muscle or joint pain  SKIN: Denies skin rashes or itching  ENDOCRINE: Denies excessive thirst  Denies intolerance to heat or cold  PSYCHOSOCIAL: Denies depression or anxiety  Denies any recent memory loss         Historical Information   Past Medical History:   Diagnosis Date    Disease of thyroid gland     Hypertension     Lupus ([address] )     Malignant neoplasm of upper-inner quadrant of left breast in female, estrogen receptor negative ([facility] [address] ) 2/25/2020    Nephrolithiasis     PTSD (post-traumatic stress disorder)      Past Surgical History:   Procedure Laterality Date    FEMUR FRACTURE SURGERY      FL GUIDED CENTRAL VENOUS ACCESS DEVICE INSERTION  3/17/2020    HYSTERECTOMY      LEFT OOPHORECTOMY      due to torsion     MAMMO STEREOTACTIC BREAST BIOPSY RIGHT (ALL INC) Right 2/12/2020    MASTECTOMY W/ SENTINEL NODE BIOPSY Left 8/18/2020    Procedure: BREAST MASTECTOMY WITH SENTINEL LYMPH NODE BIOPSY, LYMPHATIC MAPPING WITH BLUE DYE AND RADIOACTIVE DYE (INJECT AT 1430 BY DR WRIGHT IN THE OR);   Surgeon: Andree Kirk MD;  Location: [facility];  Service: Surgical Oncology    MRI BREAST BIOPSY LEFT (ALL INCLUSIVE) Left 3/20/2020    MI INSJ TUNNELED CTR VAD W/SUBQ PORT AGE 5 YR/> N/A 3/17/2020    Procedure: INSERTION VENOUS PORT ( PORT-A-CATH) IR;  Surgeon: Ekaterina Ballesteros DO;  [facility];  Service: Interventional Radiology    US GUIDANCE BREAST BIOPSY LEFT EACH ADDITIONAL Left 2/12/2020    US GUIDED BREAST BIOPSY LEFT COMPLETE Left 2/12/2020    VAGINA SURGERY       Social History   Social History     Substance and Sexual Activity   Alcohol Use Not Currently    Alcohol/week: 21 0 standard drinks    Types: 21 Cans of beer per week    Frequency: Never    Drinks per session: Patient refused    Binge frequency: Never    Comment: pt states she had her last beer 3/22/2020     Social History     Substance and Sexual Activity   Drug Use No     Social History     Tobacco Use   Smoking Status Current Every Day Smoker    Packs/day: 0 50    Years: 40 00    Pack years: 20 00    Types: Cigarettes    Start date: 1990   Smokeless Tobacco Never Used     Family History   Problem Relation Age of Onset    Diabetes Mother     Hypertension Mother     Nephrolithiasis Mother     Breast cancer Mother 79    Heart disease Father     Hypertension Father     Diabetes Brother     Nephrolithiasis Brother     Breast cancer Maternal Aunt     No Known Problems Maternal Grandmother     No Known Problems Maternal Grandfather     No Known Problems Paternal Grandmother     No Known Problems Paternal Grandfather        Meds/Allergies       Current Facility-Administered Medications:     acetaminophen (TYLENOL) tablet 650 mg, 650 mg, Oral, Q6H PRN, 650 mg at 02/18/21 0043    al mag oxide-diphenhydramine-lidocaine viscous (MAGIC MOUTHWASH) suspension 10 mL, 10 mL, Swish & Swallow, Q4H PRN    ALPRAZolam (XANAX) tablet 1 mg, 1 mg, Oral, TID PRN, 1 mg at 02/19/21 1006    cyclobenzaprine (FLEXERIL) tablet 10 mg, 10 mg, Oral, HS, 10 mg at 02/18/21 2112    dicyclomine (BENTYL) capsule 10 mg, 10 mg, Oral, 4x Daily (AC & HS), Stopped at 02/19/21 1147    enoxaparin (LOVENOX) subcutaneous injection 40 mg, 40 mg, Subcutaneous, Daily, 40 mg at 02/18/21 0831    HYDROmorphone (DILAUDID) injection 0 5 mg, 0 5 mg, Intravenous, Q3H PRN    hydroxychloroquine (PLAQUENIL) tablet 100 mg, 100 mg, Oral, HS, 100 mg at 02/18/21 2217    hydroxychloroquine (PLAQUENIL) tablet 200 mg, 200 mg, Oral, Daily With Breakfast, 200 mg at 02/18/21 9932    levothyroxine tablet 88 mcg, 88 mcg, Oral, Early Morning, 88 mcg at 02/19/21 0610    nicotine (NICODERM CQ) 14 mg/24hr TD 24 hr patch 1 patch, 1 patch, Transdermal, Daily    oxyCODONE (ROXICODONE) immediate release tablet 10 mg, 10 mg, Oral, Q4H PRN    pantoprazole (PROTONIX) injection 40 mg, 40 mg, Intravenous, Q12H JORGE, 40 mg at 02/19/21 1007    sertraline (ZOLOFT) tablet 150 mg, 150 mg, Oral, Daily, 150 mg at 02/19/21 1006    sodium chloride 0 9 % infusion, 75 mL/hr, Intravenous, Continuous, 75 mL/hr at 02/19/21 0909    sucralfate (CARAFATE) tablet 1 g, 1 g, Oral, 4x Daily (AC & HS), Stopped at 02/19/21 1147    trimethobenzamide (TIGAN) IM injection 200 mg, 200 mg, Intramuscular, Q6H PRN, 200 mg at 02/19/21 0906    Allergies   Allergen Reactions    Mobic [Meloxicam] Eye Swelling     Reaction Date: 12Aug2011;     Methotrexate Rash    Sulfa Antibiotics Rash           Objective     Blood pressure 121/68, pulse 80, temperature 98 1 °F (36 7 °C), temperature source Oral, resp  rate 16, height 5' 3" (1 6 m), weight 59 9 kg (132 lb), SpO2 100 %, not currently breastfeeding  Body mass index is 23 38 kg/m²  PHYSICAL EXAM:      General Appearance:   Alert, cooperative, no distress, thin female   HEENT:   +very few punctate white spots on tongue; +dentures  Normocephalic, atraumatic, anicteric   Neck:  Supple, symmetrical, trachea midline   Lungs/Chest:   S/p L mastectomy; clear to auscultation bilaterally; respirations even and unlabored   Heart:   Regular rate and rhythm; +S1/+S2   Abdomen:   Soft, non-tender, non-distended; normal bowel sounds; no masses, no organomegaly    Genitalia:   Deferred    Rectal:   Deferred    Extremities:  No cyanosis, clubbing or edema    Pulses:  2+ and symmetric    Skin:  R  PAC;  No jaundice, rashes, or lesions          Lab Results:   Admission on 02/17/2021   Component Date Value    WBC 02/17/2021 9 52     RBC 02/17/2021 4 80     Hemoglobin 02/17/2021 13 9     Hematocrit 02/17/2021 44 5     MCV 02/17/2021 93     MCH 02/17/2021 29 0     MCHC 02/17/2021 31 2*    RDW 02/17/2021 17 3*    MPV 02/17/2021 9 7     Platelets 98/06/4039 516*    nRBC 02/17/2021 0     Neutrophils Relative 02/17/2021 79*    Immat GRANS % 02/17/2021 0     Lymphocytes Relative 02/17/2021 13*    Monocytes Relative 02/17/2021 8     Eosinophils Relative 02/17/2021 0     Basophils Relative 02/17/2021 0     Neutrophils Absolute 02/17/2021 7 36     Immature Grans Absolute 02/17/2021 0 04     Lymphocytes Absolute 02/17/2021 1 27     Monocytes Absolute 02/17/2021 0 80     Eosinophils Absolute 02/17/2021 0 02     Basophils Absolute 02/17/2021 0 03     Sodium 02/17/2021 137     Potassium 02/17/2021 3 4*    Chloride 02/17/2021 101     CO2 02/17/2021 20*    ANION GAP 02/17/2021 16*    BUN 02/17/2021 10     Creatinine 02/17/2021 0 92     Glucose 02/17/2021 106     Calcium 02/17/2021 9 0     Corrected Calcium 02/17/2021 9 5     AST 02/17/2021 16     ALT 02/17/2021 11*    Alkaline Phosphatase 02/17/2021 74     Total Protein 02/17/2021 7 6     Albumin 02/17/2021 3 4*    Total Bilirubin 02/17/2021 0 35     eGFR 02/17/2021 69     Lipase 02/17/2021 119     Troponin I 02/17/2021 0 03     Phosphorus 02/17/2021 1 8*    Magnesium 02/17/2021 1 9     LACTIC ACID 02/17/2021 2 5*    LACTIC ACID 02/17/2021 1 0     SARS-CoV-2 02/17/2021 Negative     INFLUENZA A PCR 02/17/2021 Negative     INFLUENZA B PCR 02/17/2021 Negative     RSV PCR 02/17/2021 Negative      C difficile toxin by PC* 02/17/2021 Negative     Salmonella sp PCR 02/17/2021 None Detected     Shigella sp/Enteroinvasi* 02/17/2021 None Detected     Campylobacter sp (jejuni* 02/17/2021 None Detected     Shiga toxin 1/Shiga toxi* 02/17/2021 None Detected     WBC 02/18/2021 8 07     RBC 02/18/2021 3 55*    Hemoglobin 02/18/2021 10 1*    Hematocrit 02/18/2021 32 7*    MCV 02/18/2021 92     MCH 02/18/2021 28 5     MCHC 02/18/2021 30 9*    RDW 02/18/2021 17 2*    Platelets 91/76/7841 404*    MPV 02/18/2021 9 6     Sodium 02/18/2021 139     Potassium 02/18/2021 2 8*    Chloride 02/18/2021 106     CO2 02/18/2021 22     ANION GAP 02/18/2021 11     BUN 02/18/2021 10     Creatinine 02/18/2021 0 68     Glucose 02/18/2021 86     Calcium 02/18/2021 7 3*    Corrected Calcium 02/18/2021 8 3     AST 02/18/2021 15     ALT 02/18/2021 <6*    Alkaline Phosphatase 02/18/2021 58     Total Protein 02/18/2021 6 2*    Albumin 02/18/2021 2 8*    Total Bilirubin 02/18/2021 0 23     eGFR 02/18/2021 97     Ventricular Rate 02/17/2021 93     Atrial Rate 02/17/2021 93     KS Interval 02/17/2021 148     QRSD Interval 02/17/2021 78     QT Interval 02/17/2021 374     QTC Interval 02/17/2021 465     P Axis 02/17/2021 75     QRS Axis 02/17/2021 76     T Wave Axis 02/17/2021 76     Sodium 02/19/2021 138     Potassium 02/19/2021 5 2     Chloride 02/19/2021 107     CO2 02/19/2021 20*    ANION GAP 02/19/2021 11     BUN 02/19/2021 9     Creatinine 02/19/2021 0 70     Glucose 02/19/2021 76     Calcium 02/19/2021 7 2*    eGFR 02/19/2021 96     WBC 02/19/2021 5 66     RBC 02/19/2021 3 56*    Hemoglobin 02/19/2021 10 3*    Hematocrit 02/19/2021 33 1*    MCV 02/19/2021 93     MCH 02/19/2021 28 9     MCHC 02/19/2021 31 1*    RDW 02/19/2021 17 2*    MPV 02/19/2021 9 7     Platelets 60/92/6333 427*    nRBC 02/19/2021 0     Neutrophils Relative 02/19/2021 55     Immat GRANS % 02/19/2021 0     Lymphocytes Relative 02/19/2021 31     Monocytes Relative 02/19/2021 11     Eosinophils Relative 02/19/2021 2     Basophils Relative 02/19/2021 1     Neutrophils Absolute 02/19/2021 3 10     Immature Grans Absolute 02/19/2021 0 02     Lymphocytes Absolute 02/19/2021 1 77     Monocytes Absolute 02/19/2021 0 62     Eosinophils Absolute 02/19/2021 0 12     Basophils Absolute 02/19/2021 0 03     Sodium 02/19/2021 139     Potassium 02/19/2021 4 3     Chloride 02/19/2021 107     CO2 02/19/2021 22     ANION GAP 02/19/2021 10     BUN 02/19/2021 8     Creatinine 02/19/2021 0 73     Glucose 02/19/2021 80     Calcium 02/19/2021 7 1*    Corrected Calcium 02/19/2021 8 3     AST 02/19/2021 11     ALT 02/19/2021 11*    Alkaline Phosphatase 02/19/2021 49     Total Protein 02/19/2021 5 3*    Albumin 02/19/2021 2 5*    Total Bilirubin 02/19/2021 0 22     eGFR 02/19/2021 92     Magnesium 02/19/2021 1 7     Phosphorus 02/19/2021 2 8     TSH 3RD GENERATON 02/19/2021 13 413*         Radiology Results:   Xr Chest 1 View Portable    Result Date: 2/17/2021  Narrative: CHEST INDICATION:   chest pain  Smoker  History of breast cancer  COMPARISON:  9/19/2020 EXAM PERFORMED/VIEWS:  XR CHEST PORTABLE FINDINGS:  Right chest port unchanged  Cardiomediastinal silhouette appears unremarkable  The lungs are clear  No pneumothorax or pleural effusion  Osseous structures appear within normal limits for patient age  Status post left mastectomy with left axillary clips  Impression: No acute cardiopulmonary disease  Workstation performed: [id number]     Ct Chest Abdomen Pelvis W Contrast    Result Date: 2/17/2021  Narrative: CT CHEST, ABDOMEN AND PELVIS WITH IV CONTRAST INDICATION:   Vomiting and chest pain  COMPARISON:  2/17/2021  TECHNIQUE: CT examination of the chest, abdomen and pelvis was performed  Axial, sagittal, and coronal 2D reformatted images were created from the source data and submitted for interpretation  Radiation dose length product (DLP) for this visit:  462 mGy-cm   This examination, like all CT scans performed in the [facility], was performed utilizing techniques to minimize radiation dose exposure, including the use of iterative reconstruction and automated exposure control  IV Contrast:  100 mL of iohexol (OMNIPAQUE) Enteric Contrast: Enteric contrast was administered  FINDINGS: CHEST LUNGS:  Lungs are clear  There is no tracheal or endobronchial lesion  PLEURA:  No effusion  HEART/GREAT VESSELS:  Mild cardiac degree  No thoracic aortic aneurysm or dissection  MEDIASTINUM AND FACUNDO:  No evidence of esophagitis, mediastinitis, gas or fluid collection or  CHEST WALL AND LOWER NECK:   Unremarkable  ABDOMEN LIVER/BILIARY TREE:  Unremarkable  GALLBLADDER:  No calcified gallstones  No pericholecystic inflammatory change  SPLEEN:  Unremarkable  PANCREAS:  Unremarkable  ADRENAL GLANDS:  Unremarkable  KIDNEYS/URETERS:  No pyelonephritis or obstructive uropathy  Right renal 1 1 cm cyst  STOMACH AND BOWEL:  Contrast in the stomach, small bowel and colon  No evidence of gastritis or colitis  No findings to suggest bowel obstruction  APPENDIX:  Normal appendix  ABDOMINOPELVIC CAVITY:  No free intraperitoneal air or fluid collection  VESSELS:  No abdominal aortic aneurysm or dissection  PELVIS REPRODUCTIVE ORGANS:  Prior hysterectomy  URINARY BLADDER:  Unremarkable   ABDOMINAL WALL/INGUINAL REGIONS:  Unremarkable  OSSEOUS STRUCTURES:  Chronic changes in the left greater trochanter  No evidence of fracture, lytic or blastic lesion  Impression: 1  No evidence of esophagitis, mediastinitis or acute cardiopulmonary process  2   No evidence of gastritis, colitis or bowel obstruction Workstation performed: [id number]       The patient was seen and examined by Dr Marilee Pool, all holly medical decisions were made with Dr Marilee Pool  Thank you for allowing us to participate in the care of this pleasant patient  We will follow up with you closely

## 2021-02-19 NOTE — ASSESSMENT & PLAN NOTE
Lab Results   Component Value Date    [id number] 13 413 (H) 02/19/2021     Likely 2/2 to patient unable to tolerate PO intake over the past couple of days     · Will continue home levothyroxine for now   · Rpt and follow up outpt

## 2021-02-19 NOTE — ASSESSMENT & PLAN NOTE
Lab Results   Component Value Date    SODIUM 139 02/19/2021    K 4 3 02/19/2021     02/19/2021    CO2 22 02/19/2021    BUN 8 02/19/2021    CREATININE 0 73 02/19/2021    GLUC 80 02/19/2021    CALCIUM 7 1 (L) 02/19/2021       Lab Results   Component Value Date    CALCIUM 7 1 (L) 02/19/2021    PHOS 2 8 02/19/2021     Resolved     · Overall K repletion yesterday 90mEq K

## 2021-02-19 NOTE — ASSESSMENT & PLAN NOTE
Patient follows w/ Dr Ralph Langley, palliative care    · Dilaudid 0 5 for breakthrough pain   · Roxicodone 10 mg moderate to severe pain  · Palliative onboard

## 2021-02-19 NOTE — ASSESSMENT & PLAN NOTE
Lab Results   Component Value Date    SODIUM 139 02/19/2021    K 4 3 02/19/2021     02/19/2021    CO2 22 02/19/2021    BUN 8 02/19/2021    CREATININE 0 73 02/19/2021    GLUC 80 02/19/2021    CALCIUM 7 1 (L) 02/19/2021       Lab Results   Component Value Date    CALCIUM 7 1 (L) 02/19/2021    PHOS 2 8 02/19/2021     Resolved     · Will f/u BMP today

## 2021-02-19 NOTE — PLAN OF CARE
Problem: Potential for Falls  Goal: Patient will remain free of falls  Description: INTERVENTIONS:  - Assess patient frequently for physical needs  -  Identify cognitive and physical deficits and behaviors that affect risk of falls    -  [place] fall precautions as indicated by assessment   - Educate patient/family on patient safety including physical limitations  - Instruct patient to call for assistance with activity based on assessment  - Modify environment to reduce risk of injury  - Consider OT/PT consult to assist with strengthening/mobility  Outcome: Progressing     Problem: PAIN - ADULT  Goal: Verbalizes/displays adequate comfort level or baseline comfort level  Description: Interventions:  - Encourage patient to monitor pain and request assistance  - Assess pain using appropriate pain scale  - Administer analgesics based on type and severity of pain and evaluate response  - Implement non-pharmacological measures as appropriate and evaluate response  - Consider cultural and social influences on pain and pain management  - Notify physician/advanced practitioner if interventions unsuccessful or patient reports new pain  Outcome: Progressing     Problem: INFECTION - ADULT  Goal: Absence or prevention of progression during hospitalization  Description: INTERVENTIONS:  - Assess and monitor for signs and symptoms of infection  - Monitor lab/diagnostic results  - Monitor all insertion sites, i e  indwelling lines, tubes, and drains  - Monitor endotracheal if appropriate and nasal secretions for changes in amount and color  - [place] appropriate cooling/warming therapies per order  - Administer medications as ordered  - Instruct and encourage patient and family to use good hand hygiene technique  - Identify and instruct in appropriate isolation precautions for identified infection/condition  Outcome: Progressing     Problem: SAFETY ADULT  Goal: Patient will remain free of falls  Description: INTERVENTIONS:  - Assess patient frequently for physical needs  -  Identify cognitive and physical deficits and behaviors that affect risk of falls    -  [place] fall precautions as indicated by assessment   - Educate patient/family on patient safety including physical limitations  - Instruct patient to call for assistance with activity based on assessment  - Modify environment to reduce risk of injury  - Consider OT/PT consult to assist with strengthening/mobility  Outcome: Progressing  Goal: Maintain or return to baseline ADL function  Description: INTERVENTIONS:  -  Assess patient's ability to carry out ADLs; assess patient's baseline for ADL function and identify physical deficits which impact ability to perform ADLs (bathing, care of mouth/teeth, toileting, grooming, dressing, etc )  - Assess/evaluate cause of self-care deficits   - Assess range of motion  - Assess patient's mobility; develop plan if impaired  - Assess patient's need for assistive devices and provide as appropriate  - Encourage maximum independence but intervene and supervise when necessary  - Involve family in performance of ADLs  - Assess for home care needs following discharge   - Consider OT consult to assist with ADL evaluation and planning for discharge  - Provide patient education as appropriate  Outcome: Progressing  Goal: Maintain or return mobility status to optimal level  Description: INTERVENTIONS:  - Assess patient's baseline mobility status (ambulation, transfers, stairs, etc )    - Identify cognitive and physical deficits and behaviors that affect mobility  - Identify mobility aids required to assist with transfers and/or ambulation (gait belt, sit-to-stand, lift, walker, cane, etc )  - [place] fall precautions as indicated by assessment  - Record patient progress and toleration of activity level on Mobility SBAR; progress patient to next Phase/Stage  - Instruct patient to call for assistance with activity based on assessment  - Consider rehabilitation consult to assist with strengthening/weightbearing, etc   Outcome: Progressing     Problem: DISCHARGE PLANNING  Goal: Discharge to home or other facility with appropriate resources  Description: INTERVENTIONS:  - Identify barriers to discharge w/patient and caregiver  - Arrange for needed discharge resources and transportation as appropriate  - Identify discharge learning needs (meds, wound care, etc )  - Arrange for interpretive services to assist at discharge as needed  - Refer to Case Management Department for coordinating discharge planning if the patient needs post-hospital services based on physician/advanced practitioner order or complex needs related to functional status, cognitive ability, or social support system  Outcome: Progressing     Problem: Knowledge Deficit  Goal: Patient/family/caregiver demonstrates understanding of disease process, treatment plan, medications, and discharge instructions  Description: Complete learning assessment and assess knowledge base    Interventions:  - Provide teaching at level of understanding  - Provide teaching via preferred learning methods  Outcome: Progressing     Problem: GASTROINTESTINAL - ADULT  Goal: Minimal or absence of nausea and/or vomiting  Description: INTERVENTIONS:  - Administer IV fluids if ordered to ensure adequate hydration  - Maintain NPO status until nausea and vomiting are resolved  - Nasogastric tube if ordered  - Administer ordered antiemetic medications as needed  - Provide nonpharmacologic comfort measures as appropriate  - Advance diet as tolerated, if ordered  - Consider nutrition services referral to assist patient with adequate nutrition and appropriate food choices  Outcome: Progressing  Goal: Maintains or returns to baseline bowel function  Description: INTERVENTIONS:  - Assess bowel function  - Encourage oral fluids to ensure adequate hydration  - Administer IV fluids if ordered to ensure adequate hydration  - Administer ordered medications as needed  - Encourage mobilization and activity  - Consider nutritional services referral to assist patient with adequate nutrition and appropriate food choices  Outcome: Progressing  Goal: Maintains adequate nutritional intake  Description: INTERVENTIONS:  - Monitor percentage of each meal consumed  - Identify factors contributing to decreased intake, treat as appropriate  - Assist with meals as needed  - Monitor I&O, weight, and lab values if indicated  - Obtain nutrition services referral as needed  Outcome: Progressing     Problem: METABOLIC, FLUID AND ELECTROLYTES - ADULT  Goal: Electrolytes maintained within normal limits  Description: INTERVENTIONS:  - Monitor labs and assess patient for signs and symptoms of electrolyte imbalances  - Administer electrolyte replacement as ordered  - Monitor response to electrolyte replacements, including repeat lab results as appropriate  - Instruct patient on fluid and nutrition as appropriate  Outcome: Progressing

## 2021-02-19 NOTE — ASSESSMENT & PLAN NOTE
Patient follows w/ Dr Laron Varma, palliative care    · Continue regimen of oxycodone 10-15 mg q 4 hours p r n  for cancer-related pain

## 2021-02-20 VITALS
HEIGHT: 63 IN | DIASTOLIC BLOOD PRESSURE: 65 MMHG | BODY MASS INDEX: 23.39 KG/M2 | OXYGEN SATURATION: 96 % | WEIGHT: 132 LBS | RESPIRATION RATE: 18 BRPM | HEART RATE: 79 BPM | TEMPERATURE: 97.9 F | SYSTOLIC BLOOD PRESSURE: 117 MMHG

## 2021-02-20 DIAGNOSIS — Z17.1 MALIGNANT NEOPLASM OF UPPER-INNER QUADRANT OF LEFT BREAST IN FEMALE, ESTROGEN RECEPTOR NEGATIVE (HCC): ICD-10-CM

## 2021-02-20 DIAGNOSIS — C50.212 MALIGNANT NEOPLASM OF UPPER-INNER QUADRANT OF LEFT BREAST IN FEMALE, ESTROGEN RECEPTOR NEGATIVE (HCC): ICD-10-CM

## 2021-02-20 PROBLEM — B37.81 CANDIDA ESOPHAGITIS (HCC): Status: ACTIVE | Noted: 2021-02-20

## 2021-02-20 PROBLEM — E87.6 HYPOKALEMIA: Status: RESOLVED | Noted: 2020-05-12 | Resolved: 2021-02-20

## 2021-02-20 PROCEDURE — 99238 HOSP IP/OBS DSCHRG MGMT 30/<: CPT | Performed by: FAMILY MEDICINE

## 2021-02-20 PROCEDURE — C9113 INJ PANTOPRAZOLE SODIUM, VIA: HCPCS | Performed by: PHYSICIAN ASSISTANT

## 2021-02-20 PROCEDURE — 99232 SBSQ HOSP IP/OBS MODERATE 35: CPT | Performed by: PHYSICIAN ASSISTANT

## 2021-02-20 RX ORDER — FLUCONAZOLE 200 MG/1
200 TABLET ORAL DAILY
Qty: 12 TABLET | Refills: 0 | Status: SHIPPED | OUTPATIENT
Start: 2021-02-21 | End: 2021-03-05

## 2021-02-20 RX ORDER — ONDANSETRON 4 MG/1
4 TABLET, ORALLY DISINTEGRATING ORAL EVERY 6 HOURS PRN
Status: DISCONTINUED | OUTPATIENT
Start: 2021-02-20 | End: 2021-02-20 | Stop reason: HOSPADM

## 2021-02-20 RX ORDER — SUCRALFATE 1 G/1
1 TABLET ORAL
Qty: 90 TABLET | Refills: 1 | Status: SHIPPED | OUTPATIENT
Start: 2021-02-20 | End: 2021-03-05

## 2021-02-20 RX ADMIN — HYDROXYCHLOROQUINE SULFATE 200 MG: 200 TABLET, FILM COATED ORAL at 08:29

## 2021-02-20 RX ADMIN — TRIMETHOBENZAMIDE HYDROCHLORIDE 200 MG: 100 INJECTION INTRAMUSCULAR at 05:45

## 2021-02-20 RX ADMIN — FLUCONAZOLE 200 MG: 100 TABLET ORAL at 08:29

## 2021-02-20 RX ADMIN — ENOXAPARIN SODIUM 40 MG: 40 INJECTION SUBCUTANEOUS at 08:28

## 2021-02-20 RX ADMIN — SERTRALINE HYDROCHLORIDE 150 MG: 100 TABLET ORAL at 08:29

## 2021-02-20 RX ADMIN — DICYCLOMINE HYDROCHLORIDE 10 MG: 10 CAPSULE ORAL at 11:55

## 2021-02-20 RX ADMIN — PANTOPRAZOLE SODIUM 40 MG: 40 INJECTION, POWDER, FOR SOLUTION INTRAVENOUS at 08:28

## 2021-02-20 RX ADMIN — ALPRAZOLAM 1 MG: 0.5 TABLET ORAL at 10:05

## 2021-02-20 RX ADMIN — HYDROMORPHONE HYDROCHLORIDE 0.5 MG: 1 INJECTION, SOLUTION INTRAMUSCULAR; INTRAVENOUS; SUBCUTANEOUS at 11:54

## 2021-02-20 RX ADMIN — DICYCLOMINE HYDROCHLORIDE 10 MG: 10 CAPSULE ORAL at 05:46

## 2021-02-20 RX ADMIN — SUCRALFATE 1 G: 1 TABLET ORAL at 11:55

## 2021-02-20 RX ADMIN — LEVOTHYROXINE SODIUM 88 MCG: 88 TABLET ORAL at 05:45

## 2021-02-20 RX ADMIN — SUCRALFATE 1 G: 1 TABLET ORAL at 05:46

## 2021-02-20 RX ADMIN — HYDROMORPHONE HYDROCHLORIDE 0.5 MG: 1 INJECTION, SOLUTION INTRAMUSCULAR; INTRAVENOUS; SUBCUTANEOUS at 08:27

## 2021-02-20 RX ADMIN — OXYCODONE HYDROCHLORIDE 10 MG: 10 TABLET ORAL at 05:45

## 2021-02-20 NOTE — DISCHARGE INSTR - AVS FIRST PAGE
Dear David Wright,     It was our pleasure to care for you here at [facility]  It is our hope that we were always able to exceed the expected standards for your care during your stay  You were hospitalized due to intractable nausea and vomitting  You were cared for on the 3rd floor by Soniya Ladd MD under the service of Huy Johnson MD with the [facility] who covers for your primary care physician (PCP), Patti Pop MD, while you were hospitalized  If you have any questions or concerns related to this hospitalization, you may contact us at [phone]  For follow up as well as any medication refills, we recommend that you follow up with your primary care physician  A registered nurse will reach out to you by phone within a few days after your discharge to answer any additional questions that you may have after going home  However, at this time we provide for you here, the most important instructions / recommendations at discharge:     · Notable Medication Adjustments - Diagnosed with Candida Esophagitis   · Complete course of fluconazole   · Added Carafate  · Daily Protonix   · Magic mouth wash as needed   · Testing Required after Discharge -   · Please complete Hpylori stool antigen test   · Important follow up information -   · GI, Dr Lina Torres  · Other Instructions -   · Please f/u with GI and Dr Lina Torres, PCP   · Please review this entire after visit summary as additional general instructions including medication list, appointments, activity, diet, any pertinent wound care, and other additional recommendations from your care team that may be provided for you        Sincerely,     Soniya Ladd MD

## 2021-02-20 NOTE — DISCHARGE INSTRUCTIONS
Esophagitis   WHAT YOU NEED TO KNOW:   Esophagitis is inflammation or irritation of the lining of the esophagus  DISCHARGE INSTRUCTIONS:   Call your local emergency number (911 in the [address] [facility]) for any of the following:   · You have any of the following signs of a heart attack:      ? Squeezing, pressure, or pain in your chest    ? You may  also have any of the following:     § Discomfort or pain in your back, neck, jaw, stomach, or arm    § Shortness of breath    § Nausea or vomiting    § Lightheadedness or a sudden cold sweat      Return to the emergency department if:   · You feel like you have food stuck in your throat and you cannot cough it out  Call your doctor if:   · You have new or worsening symptoms, even after treatment  · You have questions or concerns about your condition or care  Medicines:   · Medicines  may be given to fight an infection or to control stomach acid  · Take your medicine as directed  Contact your healthcare provider if you think your medicine is not helping or if you have side effects  Tell him of her if you are allergic to any medicine  Keep a list of the medicines, vitamins, and herbs you take  Include the amounts, and when and why you take them  Bring the list or the pill bottles to follow-up visits  Carry your medicine list with you in case of an emergency  Manage or prevent esophagitis:   · Do not smoke  Nicotine and other chemicals in cigarettes and cigars can irritate and damage your esophagus  Ask your healthcare provider for information if you currently smoke and need help to quit  E-cigarettes or smokeless tobacco still contain nicotine  Talk to your healthcare provider before you use these products  · Do not drink alcohol  Alcohol can irritate your esophagus  Talk to your healthcare provider if you need help to stop drinking  · Limit or do not eat foods that can lead to esophagitis  Foods such as oranges and salsa can irritate your esophagus  Caffeine and chocolate can cause acid reflux  High-fat and fried foods make your stomach digest food more slowly  This increases the amount of stomach acid your esophagus is exposed to  Eat small meals, and drink water with your meals  Soft foods such as yogurt and applesauce may help soothe your throat  Do not eat for at least 3 hours before you go to bed  · Drink more liquid when you take pills  Drink a full glass of water when you take your pills  Ask your healthcare provider if you can take your pills at least an hour before you go to bed  · Prevent acid reflux  Do not bend over unless it is necessary  Acid may back up into your esophagus when you bend over  If possible, keep the head of your bed elevated while you sleep  This will help keep acid from backing up  Manage stress  Stress can make your symptoms worse or cause stomach acid to back up  · Keep batteries and similar objects out of the reach of children  Babies often put items in their mouths to explore them  Button batteries are easy to swallow and can cause serious damage  Keep the battery covers of electronic devices such as remote controls taped closed  Store all batteries and toxic materials where children cannot get to them  Use childproof locks to keep children away from dangerous materials  Follow up with your doctor as directed: Your doctor may refer you to a stomach specialist, allergist, or dietitian  You may need ongoing tests or treatment  Write down your questions so you remember to ask them during your visits  © Copyright [address] Information is for End User's use only and may not be sold, redistributed or otherwise used for commercial purposes  All illustrations and images included in CareNotes® are the copyrighted property of [employer]  or [facility] Jewell Edwards   The above information is an  only  It is not intended as medical advice for individual conditions or treatments   Talk to your doctor, nurse or pharmacist before following any medical regimen to see if it is safe and effective for you

## 2021-02-20 NOTE — PLAN OF CARE
Problem: Potential for Falls  Goal: Patient will remain free of falls  Description: INTERVENTIONS:  - Assess patient frequently for physical needs  -  Identify cognitive and physical deficits and behaviors that affect risk of falls  -  [place] fall precautions as indicated by assessment   - Educate patient/family on patient safety including physical limitations  - Instruct patient to call for assistance with activity based on assessment  - Modify environment to reduce risk of injury  - Consider OT/PT consult to assist with strengthening/mobility  Outcome: Progressing     Problem: PAIN - ADULT  Goal: Verbalizes/displays adequate comfort level or baseline comfort level  Description: Interventions:  - Encourage patient to monitor pain and request assistance  - Assess pain using appropriate pain scale  - Administer analgesics based on type and severity of pain and evaluate response  - Implement non-pharmacological measures as appropriate and evaluate response  - Consider cultural and social influences on pain and pain management  - Notify physician/advanced practitioner if interventions unsuccessful or patient reports new pain  Outcome: Progressing     Problem: SAFETY ADULT  Goal: Patient will remain free of falls  Description: INTERVENTIONS:  - Assess patient frequently for physical needs  -  Identify cognitive and physical deficits and behaviors that affect risk of falls    -  [place] fall precautions as indicated by assessment   - Educate patient/family on patient safety including physical limitations  - Instruct patient to call for assistance with activity based on assessment  - Modify environment to reduce risk of injury  - Consider OT/PT consult to assist with strengthening/mobility  Outcome: Progressing  Goal: Maintain or return to baseline ADL function  Description: INTERVENTIONS:  -  Assess patient's ability to carry out ADLs; assess patient's baseline for ADL function and identify physical deficits which impact ability to perform ADLs (bathing, care of mouth/teeth, toileting, grooming, dressing, etc )  - Assess/evaluate cause of self-care deficits   - Assess range of motion  - Assess patient's mobility; develop plan if impaired  - Assess patient's need for assistive devices and provide as appropriate  - Encourage maximum independence but intervene and supervise when necessary  - Involve family in performance of ADLs  - Assess for home care needs following discharge   - Consider OT consult to assist with ADL evaluation and planning for discharge  - Provide patient education as appropriate  Outcome: Progressing  Goal: Maintain or return mobility status to optimal level  Description: INTERVENTIONS:  - Assess patient's baseline mobility status (ambulation, transfers, stairs, etc )    - Identify cognitive and physical deficits and behaviors that affect mobility  - Identify mobility aids required to assist with transfers and/or ambulation (gait belt, sit-to-stand, lift, walker, cane, etc )  - [place] fall precautions as indicated by assessment  - Record patient progress and toleration of activity level on Mobility SBAR; progress patient to next Phase/Stage  - Instruct patient to call for assistance with activity based on assessment  - Consider rehabilitation consult to assist with strengthening/weightbearing, etc   Outcome: Progressing     Problem: DISCHARGE PLANNING  Goal: Discharge to home or other facility with appropriate resources  Description: INTERVENTIONS:  - Identify barriers to discharge w/patient and caregiver  - Arrange for needed discharge resources and transportation as appropriate  - Identify discharge learning needs (meds, wound care, etc )  - Arrange for interpretive services to assist at discharge as needed  - Refer to Case Management Department for coordinating discharge planning if the patient needs post-hospital services based on physician/advanced practitioner order or complex needs related to functional status, cognitive ability, or social support system  Outcome: Progressing     Problem: Knowledge Deficit  Goal: Patient/family/caregiver demonstrates understanding of disease process, treatment plan, medications, and discharge instructions  Description: Complete learning assessment and assess knowledge base    Interventions:  - Provide teaching at level of understanding  - Provide teaching via preferred learning methods  Outcome: Progressing     Problem: GASTROINTESTINAL - ADULT  Goal: Minimal or absence of nausea and/or vomiting  Description: INTERVENTIONS:  - Administer IV fluids if ordered to ensure adequate hydration  - Maintain NPO status until nausea and vomiting are resolved  - Nasogastric tube if ordered  - Administer ordered antiemetic medications as needed  - Provide nonpharmacologic comfort measures as appropriate  - Advance diet as tolerated, if ordered  - Consider nutrition services referral to assist patient with adequate nutrition and appropriate food choices  Outcome: Progressing  Goal: Maintains or returns to baseline bowel function  Description: INTERVENTIONS:  - Assess bowel function  - Encourage oral fluids to ensure adequate hydration  - Administer IV fluids if ordered to ensure adequate hydration  - Administer ordered medications as needed  - Encourage mobilization and activity  - Consider nutritional services referral to assist patient with adequate nutrition and appropriate food choices  Outcome: Progressing  Goal: Maintains adequate nutritional intake  Description: INTERVENTIONS:  - Monitor percentage of each meal consumed  - Identify factors contributing to decreased intake, treat as appropriate  - Assist with meals as needed  - Monitor I&O, weight, and lab values if indicated  - Obtain nutrition services referral as needed  Outcome: Progressing     Problem: METABOLIC, FLUID AND ELECTROLYTES - ADULT  Goal: Electrolytes maintained within normal limits  Description: INTERVENTIONS:  - Monitor labs and assess patient for signs and symptoms of electrolyte imbalances  - Administer electrolyte replacement as ordered  - Monitor response to electrolyte replacements, including repeat lab results as appropriate  - Instruct patient on fluid and nutrition as appropriate  Outcome: Progressing

## 2021-02-20 NOTE — UTILIZATION REVIEW
Notification of Inpatient Admission/Inpatient Authorization Request   This is a Notification of Inpatient Admission for [facility]  Be advised that this patient was admitted to our facility under Inpatient Status  Contact Leia Munoz at [phone] for additional admission information  [facility]  DEDICATED [phone]  Patient Name:   Shi Phipps   YOB: 1965       [address]   [address]:   [place]  Tax ID: [id number]  NPI: [id number] Attending Provider/NPI:  Address:  Phone: Mark Chavez, Deana Moreno Martaregino [[id number]]  Same as the facility  [phone]   Place of Service Code: 24 Place of Service Name:  [address] [place] [address]   Start Date: 2/19/21 1524     Discharge Date & Time: No discharge date for patient encounter  Type of Admission: Inpatient Status Discharge Disposition   (if discharged): Home/Self Care   Patient Diagnoses: Lactic acidosis [E87 2]  Vomiting [R11 10]  Chest pain [R07 9]  Abdominal pain [R10 9]     Orders: Admission Orders (From admission, onward)     Ordered        02/19/21 1523  Inpatient Admission  Once         02/17/21 2119  Place in Observation  Once                    Assigned Utilization Review Contact: Leia Munoz  Utilization   Network Utilization Review Department  Phone: [phone]; Fax [phone]  Email: [email or web address]   ATTENTION PAYERS: Please call the assigned Utilization  directly with any questions or concerns ALL voicemails in the department are confidential  Send all requests for admission clinical reviews, approved or denied determinations and any other requests to dedicated fax number belonging to the campus where the patient is receiving treatment

## 2021-02-20 NOTE — DISCHARGE SUMMARY
Discharge- Abdulaziz Bowman 1965, 60 y o  female MRN: [id number]    Unit/Bed#: S -01 Encounter: [id number]    Primary Care Provider: Samantha Baird MD   Date and time admitted to hospital: 2/17/2021  4:03 PM        * Nausea and vomiting  Assessment & Plan  Patient continues to have nausea and vomiting even with clear liquid diet  C/o both dysphagia and odynophagia  · No leukocytosis, afebrile, BP stable  Lactic acid initially elevated improved with IVF  · IVF @75ml/hr  Will continue  · Will place on clears for now, advance diet as tolerated  · Cdiff negative  · Carafate and magic mouthwash for symptom relief   · Will continue supportive management with antinausea medication (tigan), antispasmodic  · GI onboard, EGD yesterday revealed candida esophagitis    [facility] )  Assessment & Plan  D/t patients intractable nausea / vomiting, odynophagia and dysphagia, patient underwent EGD yesterday by GI  Dx - Candida Esophagitis  · Patient currently undergoing chemo therapy creating immunocompromised state which makes patient more suscpetible to development of this condition  · Gi recommends: "Start fluconazole 400 mg today and then 200 mg daily for total of 3 weeks for Candida esophagitis "  · Biopsys were also taken to r/o Hpylori and Celiac disease   · Will follow up outpt regarding results    Gastritis  Assessment & Plan  EGD 10/2020  With mild gastritis with evidence of a Schatzki's ring which did require dilatation and clips  · CT chest/abd/pelvis with po contrast: No evidence of esophagitis, mediastinitis or acute cardiopulmonary process  No evidence of gastritis, colitis or bowel obstruction  · Follows with GI as outpatient  · Will closely monitor  · Added Carafate  · GI onboard - see candida esophagitis     Malignant neoplasm of upper-inner quadrant of left breast in female, estrogen receptor negative ([facility] )  Assessment & Plan  S/p mastectomy, currently undergoing chemotherapy    Last infusion around 2 and half weeks ago  · Does report some nausea and vomiting post chemo, has antinausea meds at home which helps  · Continue outpatient follow-up with Oncology    Hypokalemia-resolved as of 2/20/2021  Assessment & Plan    Lab Results   Component Value Date    SODIUM 139 02/19/2021    K 4 3 02/19/2021     02/19/2021    CO2 22 02/19/2021    BUN 8 02/19/2021    CREATININE 0 73 02/19/2021    GLUC 80 02/19/2021    CALCIUM 7 1 (L) 02/19/2021       Lab Results   Component Value Date    CALCIUM 7 1 (L) 02/19/2021    PHOS 2 8 02/19/2021     Resolved  · Will f/u BMP today      Smoking  Assessment & Plan  Current active smoker however has denied NRT in hospital  States she hasnt felt desire to smoke here  · Will encourage her tobacco cessation efforts and provide educational material      Cancer related pain  Assessment & Plan  Patient follows w/ Dr Alondra Stein, palliative care  · Dilaudid 0 5 for breakthrough pain   · Roxicodone 10 mg moderate to severe pain  · Palliative onboard    Depression  Assessment & Plan  Hx of anxiety and depression  · Continue Zoloft, Xanax p r n  Hypothyroidism  Assessment & Plan  Lab Results   Component Value Date    [id number] 13 413 (H) 02/19/2021     Likely 2/2 to patient unable to tolerate PO intake over the past couple of days  · Will continue home levothyroxine for now   · Rpt and follow up outpt    Systemic lupus erythematosus ([facility] )  Assessment & Plan  Hx of Lupus    · Takes Plaquenil 200 mg in the morning, 100 mg at night  · Continue home meds        Discharging Resident Physician: Tammy Gillette MD  Attending: Nadir Partida MD  PCP: Mary Ellen Silva MD  Admission Date: 2/17/2021  Discharge Date: 02/20/21    Disposition:     Home    Reason for Admission: Nausea and Vomiting     Consultations During Hospital Stay:  · GI  · Palliative    Procedures Performed:     · EGD    Significant Findings / Test Results:   EGD report:   White esophageal plaques with mild erosions on ulcerations esophagus status post brushings and biopsies  Mild gastric antral erythema status post random gastric biopsies rule out H pylori  Mild duodenal bulb erythema status post biopsies to rule out celiac disease  Follow-up pathology 1-2 weeks  Start fluconazole 400 mg today and then 200 mg daily for total of 3 weeks for Candida esophagitis  Incidental Findings:   · none     Test Results Pending at Discharge (will require follow up): · Tissue studies from EGD     Outpatient Tests Requested:  · Hpylori stool antigen     Complications:  None    Hospital Course:     Pablo Dawson is a 60 y o  female patient with a past medical history of breast cancer status post mastectomy currently undergoing chemotherapy, history of gastritis and Schatzki's ring, lupus, cancer-related pain, who originally presented to the hospital on 2/17/2021 due to intractable nausea and vomiting  She states that she usually gets her post chemo nausea and vomiting about 3 days after treatment however her last treatment about 2 weeks ago  Patient was given IV fluids and electrolyte replacement  She remained hemodynamically stable throughout admission  Patient was unable to tolerate p o  Intake therefore GI was consulted and recommended EGD which revealed candidal esophagitis  Patient's diet was advanced today and she is ready to go home  Will follow outpatient with GI  Condition at Discharge: good     Discharge Day Visit / Exam:     Subjective:  "Better" States shes feeling a lot better  Able to tolerate jello without difficulty     Vitals: Blood Pressure: 117/65 (02/20/21 0724)  Pulse: 79 (02/20/21 0724)  Temperature: 97 9 °F (36 6 °C) (02/20/21 0724)  Temp Source: Oral (02/20/21 0724)  Respirations: 18 (02/20/21 0724)  Height: 5' 3" (160 cm) (02/19/21 1410)  Weight - Scale: 59 9 kg (132 lb) (02/19/21 1410)  SpO2: 96 % (02/20/21 0724)    Exam:   Physical Exam  Vitals signs and nursing note reviewed  Constitutional:       General: She is not in acute distress  Appearance: She is ill-appearing  She is not toxic-appearing  HENT:      Head: Normocephalic and atraumatic  Nose: Nose normal       Mouth/Throat:      Mouth: Mucous membranes are dry  Eyes:      General: No scleral icterus  Right eye: No discharge  Left eye: No discharge  Pupils: Pupils are equal, round, and reactive to light  Neck:      Comments: No visible adenopathy  Cardiovascular:      Rate and Rhythm: Normal rate and regular rhythm  Pulses: Normal pulses  Heart sounds: Normal heart sounds  Pulmonary:      Effort: Pulmonary effort is normal  No respiratory distress  Breath sounds: Normal breath sounds  Abdominal:      General: Bowel sounds are normal       Palpations: Abdomen is soft  Tenderness: There is abdominal tenderness (improved)  There is no guarding or rebound  Musculoskeletal:      Right lower leg: No edema  Left lower leg: No edema  Skin:     General: Skin is warm  Coloration: Skin is pale  Skin is not jaundiced  Neurological:      General: No focal deficit present  Mental Status: She is alert and oriented to person, place, and time  Psychiatric:         Mood and Affect: Mood normal          Behavior: Behavior normal          Discussion with Family: Patient and      Discharge instructions/Information to patient and family:   See after visit summary for information provided to patient and family  Provisions for Follow-Up Care:  See after visit summary for information related to follow-up care and any pertinent home health orders  Planned Readmission: No     Discharge Medications:  See after visit summary for reconciled discharge medications provided to patient and family        ** Please Note: This note has been constructed using a voice recognition system **    Day Carrillo MD  Family Medicine PGY-1  2/20/2021  1:55 PM

## 2021-02-20 NOTE — ASSESSMENT & PLAN NOTE
D/t patients intractable nausea / vomiting, odynophagia and dysphagia, patient underwent EGD yesterday by GI  Dx - Candida Esophagitis     · Patient currently undergoing chemo therapy creating immunocompromised state which makes patient more suscpetible to development of this condition  · Gi recommends: "Start fluconazole 400 mg today and then 200 mg daily for total of 3 weeks for Candida esophagitis "  · Biopsys were also taken to r/o Hpylori and Celiac disease   · Will follow up outpt regarding results

## 2021-02-20 NOTE — PROGRESS NOTES
Progress Note - Danyelle Crespo 60 y o  female MRN: [id number]    Unit/Bed#: S -01 Encounter: [id number]    Assessment and Plan:   Principal Problem:    Nausea and vomiting  Active Problems:    Systemic lupus erythematosus ([facility] )    Hypothyroidism    Depression    Malignant neoplasm of upper-inner quadrant of left breast in female, estrogen receptor negative (HCC)    Hypokalemia    Cancer related pain    Smoking    Gastritis    Candida esophagitis ([facility] )    #1  Candidal esophagitis with nausea, vomiting, dysphagia and odynophagia: feels a bit improved more each day, is tolerating small amounts of soft foods  No vomiting today  Pain with swallowing improved  Did feel like a pill got stuck this Am but she took all her pills at once mistakenly    -continue PPI  -continue carafate  -complete course of fluconazole  -can always have viscous lidocaine PRN if needed  -soft diet  -antiemetics as needed  -follow up bx and brushings    -call GI if worsening of symptoms, otherwise continue above  We will sign off        ----------------------------------------------------------------------------------------------------------------    Subjective:     Feels a little better today, less pain with swallowing  No vomiting today  Objective:     Vitals: Blood pressure 117/65, pulse 79, temperature 97 9 °F (36 6 °C), temperature source Oral, resp  rate 18, height 5' 3" (1 6 m), weight 59 9 kg (132 lb), SpO2 96 %, not currently breastfeeding  ,Body mass index is 23 38 kg/m²        Intake/Output Summary (Last 24 hours) at 2/20/2021 1246  Last data filed at 2/19/2021 1815  Gross per 24 hour   Intake 150 ml   Output --   Net 150 ml       Physical Exam:     General Appearance: Alert, appears stated age and cooperative  Lungs: Clear to auscultation bilaterally, no rales or rhonchi, no labored breathing/accessory muscle use; L mastectomy, port in R chest  Heart: Regular rate and rhythm, S1, S2 normal, no murmur, click, rub or gallop  Abdomen: Soft, non-tender, non-distended; bowel sounds normal; no masses or no organomegaly  Extremities: No cyanosis, clubbing, or edema    Invasive Devices     Central Venous Catheter Line            Port A Cath 03/17/20 Right Chest 340 days          Drain            Closed/Suction Drain Left;Lateral Chest Bulb 19 Fr  185 days    Closed/Suction Drain Left;Medial Chest Bulb 19 Fr  185 days                Lab Results:  Results from last 7 days   Lab Units 02/19/21  0547   WBC Thousand/uL 5 66   HEMOGLOBIN g/dL 10 3*   HEMATOCRIT % 33 1*   PLATELETS Thousands/uL 427*   NEUTROS PCT % 55   LYMPHS PCT % 31   MONOS PCT % 11   EOS PCT % 2     Results from last 7 days   Lab Units 02/19/21  0546   POTASSIUM mmol/L 4 3   CHLORIDE mmol/L 107   CO2 mmol/L 22   BUN mg/dL 8   CREATININE mg/dL 0 73   CALCIUM mg/dL 7 1*   ALK PHOS U/L 49   ALT U/L 11*   AST U/L 11     Invalid input(s): BILI      Results from last 7 days   Lab Units 02/17/21  1635   LIPASE u/L 119       Imaging Studies: I have personally reviewed pertinent imaging studies  Xr Chest 1 View Portable    Result Date: 2/17/2021  Impression: No acute cardiopulmonary disease  Workstation performed: [id number]     Ct Chest Abdomen Pelvis W Contrast    Result Date: 2/17/2021  Impression: 1  No evidence of esophagitis, mediastinitis or acute cardiopulmonary process   2   No evidence of gastritis, colitis or bowel obstruction Workstation performed: [id number]

## 2021-02-21 ENCOUNTER — TELEPHONE (OUTPATIENT)
Dept: GASTROENTEROLOGY | Facility: AMBULARY SURGERY CENTER | Age: 56
End: 2021-02-21

## 2021-02-22 ENCOUNTER — TELEPHONE (OUTPATIENT)
Dept: PALLIATIVE MEDICINE | Facility: CLINIC | Age: 56
End: 2021-02-22

## 2021-02-22 ENCOUNTER — TRANSITIONAL CARE MANAGEMENT (OUTPATIENT)
Dept: FAMILY MEDICINE CLINIC | Facility: CLINIC | Age: 56
End: 2021-02-22

## 2021-02-22 RX ORDER — ONDANSETRON 4 MG/1
4 TABLET, FILM COATED ORAL EVERY 8 HOURS PRN
Qty: 30 TABLET | Refills: 3 | Status: SHIPPED | OUTPATIENT
Start: 2021-02-22 | End: 2021-05-27 | Stop reason: ALTCHOICE

## 2021-02-22 NOTE — TELEPHONE ENCOUNTER
Please call patient and schedule a hospital follow with Dr Kobe Chadwick  She was admitted at [facility] last week and we received a consult for SM on Friday however I was unable to complete consult because patient was at procedure or with other providers, then she got discharged over the weekend  She has missed last 2 follow appointments with Dr Kobe Chadwick  No med refill were given when discharged from [facility]

## 2021-02-22 NOTE — TELEPHONE ENCOUNTER
Patient is requesting a refill for zofran  Requested Prescriptions     Pending Prescriptions Disp Refills    ondansetron (ZOFRAN) 4 mg tablet 30 tablet 0     Sig: Take 1 tablet (4 mg total) by mouth every 8 (eight) hours as needed for nausea or vomiting       This was given from an ED visit on 08/25/2020  Last OV: 09/25/2020    Would you like patient to schedule an appointment?      Please review and advise     Ordering User:  Parish Toribio PA-C      Cosigner:  Nan Meraz MD Signed:  9/6/2020 19:29           Diagnosis Association: Malignant neoplasm of upper-inner quadrant of left breast in female, estrogen receptor negative ([facility] ) (C50 212 , Z17 1)      Original Order:  ondansetron (ZOFRAN) 4 mg tablet [[id number]]      Pharmacy:  [place] [id number]   Phone:  [phone]   Fax:  [phone]   Address:  [address] [place] [address]

## 2021-02-23 ENCOUNTER — HOSPITAL ENCOUNTER (OUTPATIENT)
Dept: INFUSION CENTER | Facility: CLINIC | Age: 56
Discharge: HOME | End: 2021-02-23
Payer: COMMERCIAL

## 2021-02-23 ENCOUNTER — PATIENT OUTREACH (OUTPATIENT)
Dept: CASE MANAGEMENT | Facility: HOSPITAL | Age: 56
End: 2021-02-23

## 2021-02-23 VITALS
SYSTOLIC BLOOD PRESSURE: 166 MMHG | RESPIRATION RATE: 18 BRPM | WEIGHT: 124 LBS | TEMPERATURE: 96.1 F | BODY MASS INDEX: 21.97 KG/M2 | HEART RATE: 90 BPM | OXYGEN SATURATION: 96 % | DIASTOLIC BLOOD PRESSURE: 58 MMHG

## 2021-02-23 DIAGNOSIS — T45.1X5A CHEMOTHERAPY INDUCED NEUTROPENIA (HCC): Primary | ICD-10-CM

## 2021-02-23 DIAGNOSIS — Z17.1 MALIGNANT NEOPLASM OF UPPER-INNER QUADRANT OF LEFT BREAST IN FEMALE, ESTROGEN RECEPTOR NEGATIVE (HCC): ICD-10-CM

## 2021-02-23 DIAGNOSIS — C50.212 MALIGNANT NEOPLASM OF UPPER-INNER QUADRANT OF LEFT BREAST IN FEMALE, ESTROGEN RECEPTOR NEGATIVE (HCC): ICD-10-CM

## 2021-02-23 DIAGNOSIS — D70.1 CHEMOTHERAPY INDUCED NEUTROPENIA (HCC): Primary | ICD-10-CM

## 2021-02-23 PROCEDURE — 96417 CHEMO IV INFUS EACH ADDL SEQ: CPT

## 2021-02-23 PROCEDURE — 96413 CHEMO IV INFUSION 1 HR: CPT

## 2021-02-23 RX ORDER — SODIUM CHLORIDE 9 MG/ML
20 INJECTION, SOLUTION INTRAVENOUS ONCE
Status: COMPLETED | OUTPATIENT
Start: 2021-02-23 | End: 2021-02-23

## 2021-02-23 RX ADMIN — TRASTUZUMAB 337 MG: 150 INJECTION, POWDER, LYOPHILIZED, FOR SOLUTION INTRAVENOUS at 15:35

## 2021-02-23 RX ADMIN — PERTUZUMAB 420 MG: 30 INJECTION, SOLUTION, CONCENTRATE INTRAVENOUS at 14:54

## 2021-02-23 RX ADMIN — SODIUM CHLORIDE 20 ML/HR: 0.9 INJECTION, SOLUTION INTRAVENOUS at 15:40

## 2021-02-23 NOTE — TELEPHONE ENCOUNTER
LMOM for pt to call office back so we may schedule Hospital Follow up appt  Please make additional attempts to schedule appt

## 2021-02-23 NOTE — PROGRESS NOTES
Patient presents today for treatment with Perjeta and Herceptin  Patient offers no complaints  VSS  Port accessed without incident with excellent blood return noted

## 2021-02-23 NOTE — PROGRESS NOTES
Patient tolerated treatment without incident  Port flushed and de-accessed as per protocol  Patient's next appointment confirmed  AVS offered and declined

## 2021-02-23 NOTE — PROGRESS NOTES
LSW met with pt in [facility] infusion unit during pts treatment  LSW met with pt to discuss her recent hospitalization and if any issues have come up that the family needs assistance with  Pt stated she went to hospital due to chest pain and it was discovered she has an infection in her throat  Pt is being medicated for infection and stated she is starting to feel better, just very tired  Pt shared that she and her  are doing well financially since she has begun receiving SSD  Pt stated he  is still on unemployment  Pt appeared very tired, unable to keep her eyes open at times and fading in and out  LSW advised pt to call for needs or concerns pt agreed

## 2021-02-24 NOTE — UTILIZATION REVIEW
Notification of Discharge  This is a Notification of Discharge from our facility [address]  Please be advised that this patient has been discharge from our facility  Below you will find the admission and discharge date and time including the patients disposition  PRESENTATION DATE: 2/17/2021  4:03 PM  OBS ADMISSION DATE:   IP ADMISSION DATE: 2/19/21 1524   DISCHARGE DATE: 2/20/2021  2:32 PM  DISPOSITION: Home/Self Care Home/Self Care   Admission Orders listed below:  Admission Orders (From admission, onward)     Ordered        02/19/21 1523  Inpatient Admission  Once         02/17/21 2119  Place in Observation  Once                   Please contact the UR Department if additional information is required to close this patient's authorization/case  [address] [facility] Utilization Review Department  Main: [phone] x carefully listen to the prompts  All voicemails are confidential   [email or web address]  Send all requests for admission clinical reviews, approved or denied determinations and any other requests to dedicated fax number below belonging to the campus where the patient is receiving treatment   List of dedicated fax numbers:  [address] DENIALS (Administrative/Medical Necessity) [phone]   [address]  (Maternity/NICU/Pediatrics) [phone]   [facility] [phone]   Kevin Yates [phone]   Leslie Pate [phone]   [facility] [address] [place] [address] [phone]   [facility]  [phone]   [address] [facility], [facility]  [address] [facility] [address] [facility] [phone]

## 2021-02-25 ENCOUNTER — OFFICE VISIT (OUTPATIENT)
Dept: FAMILY MEDICINE CLINIC | Facility: CLINIC | Age: 56
End: 2021-02-25
Payer: COMMERCIAL

## 2021-02-25 VITALS
DIASTOLIC BLOOD PRESSURE: 64 MMHG | BODY MASS INDEX: 22.39 KG/M2 | HEART RATE: 84 BPM | SYSTOLIC BLOOD PRESSURE: 112 MMHG | RESPIRATION RATE: 18 BRPM | WEIGHT: 126.4 LBS | TEMPERATURE: 98 F | OXYGEN SATURATION: 93 % | HEIGHT: 63 IN

## 2021-02-25 DIAGNOSIS — Z17.1 MALIGNANT NEOPLASM OF UPPER-INNER QUADRANT OF LEFT BREAST IN FEMALE, ESTROGEN RECEPTOR NEGATIVE (HCC): ICD-10-CM

## 2021-02-25 DIAGNOSIS — C50.212 MALIGNANT NEOPLASM OF UPPER-INNER QUADRANT OF LEFT BREAST IN FEMALE, ESTROGEN RECEPTOR NEGATIVE (HCC): ICD-10-CM

## 2021-02-25 DIAGNOSIS — R60.9 EDEMA, UNSPECIFIED TYPE: Primary | ICD-10-CM

## 2021-02-25 DIAGNOSIS — K20.90 ESOPHAGITIS: ICD-10-CM

## 2021-02-25 DIAGNOSIS — I10 ESSENTIAL HYPERTENSION: ICD-10-CM

## 2021-02-25 DIAGNOSIS — F41.9 ANXIETY: ICD-10-CM

## 2021-02-25 PROCEDURE — 99495 TRANSJ CARE MGMT MOD F2F 14D: CPT | Performed by: FAMILY MEDICINE

## 2021-02-25 RX ORDER — FUROSEMIDE 20 MG/1
20 TABLET ORAL DAILY
Qty: 10 TABLET | Refills: 0 | Status: SHIPPED | OUTPATIENT
Start: 2021-02-25 | End: 2021-03-19 | Stop reason: HOSPADM

## 2021-02-25 RX ORDER — OXYCODONE HYDROCHLORIDE 10 MG/1
10-15 TABLET ORAL EVERY 4 HOURS PRN
Qty: 180 TABLET | Refills: 0 | Status: SHIPPED | OUTPATIENT
Start: 2021-02-25 | End: 2021-03-19 | Stop reason: SDUPTHER

## 2021-02-25 NOTE — PROGRESS NOTES
Depression Screening Follow-up Plan: Patient's depression screening was positive with a PHQ-2 score of 2  Their PHQ-9 score was 9  Patient assessed for underlying major depression  They have no active suicidal ideations  Brief counseling provided and recommend additional follow-up/re-evaluation next office visit  Patient declines further evaluation by mental health professional and/or medications  They have no active suicidal ideations  Brief counseling provided and recommend additional follow-up/re-evaluation at next office visit  FAMILY PRACTICE OFFICE VISIT       NAME: Pool Davey  AGE: 60 y o  SEX: female       : 1965        MRN: [id number]    DATE: 2021  TIME: 7:03 AM    Assessment and Plan     Problem List Items Addressed This Visit        Digestive    Esophagitis       Esophagitis  Patient with esophagitis relates of overgrowth of fungus  Patient completed course of antifungal medication and will follow-up with gastroenterologist for repeat EGD in the near future            Cardiovascular and Mediastinum    Hypertension      Hypertension  Patient blood pressure is stable at this time she will continue with current regimen of medications         Relevant Medications    furosemide (LASIX) 20 mg tablet       Other    Malignant neoplasm of upper-inner quadrant of left breast in female, estrogen receptor negative (HCC)      Breast cancer  Patient was given a refill on her oxycodone medication to use as directed    She will follow-up with her palliative care physician for further monitoring of her condition         Relevant Medications    oxyCODONE (ROXICODONE) 10 MG TABS      Other Visit Diagnoses     Edema, unspecified type    -  Primary    Relevant Medications    furosemide (LASIX) 20 mg tablet        TCM Call (since 2021)     Date and time call was made  2021  3:52 PM    Hospital care reviewed  Records reviewed    Patient was hospitialized at  [address] [place] [address]        Date of Admission  02/17/21    Date of discharge  02/20/21    Diagnosis  N/V, Candida esophagitis, Gastritis, Malignant neoplasm of Lt breast, Hypokalemia    Disposition  Home    Were the patients medications reviewed and updated  No    Current Symptoms  Fatigue    Fatigue severity  Moderate      TCM Call (since 1/26/2021)     Post hospital issues  Poor medication adherence    Should patient be enrolled in anticoag monitoring? No    Scheduled for follow up? Yes    Patients specialists  Other (comment)    Other specialists names  [facility]- 03/05/21    Other specialists contcat #  [phone]    Did you obtain your prescribed medications  Yes    Do you need help managing your prescriptions or medications  No    Is transportation to your appointment needed  Yes    Specify why  Pt's  transports pt to apts  I have advised the patient to call PCP with any new or worsening symptoms  Ethyl Bigger, CMA    Living Arrangements  Spouse or Significiant other    Support System  Spouse    Are you recieving any outpatient services  Yes    What type of services  Chemotherapy    Are you recieving home care services  No    Interperter language line needed  No    Counseling  Patient    Counseling topics  Importance of RX compliance    Comments  Apt scheduled for 2/25th at 4pm                            ED to Hosp-Admission  Discharged     2/17/2021 - 2/20/2021 (3 days)   Malathi Charles MD  Last attending  Treatment team Nausea and vomiting  Principal problem        Discharge Summary  Rip Vernon MD (Resident) [facility]   Cosigned by: Anita Howe MD at 2/21/2021 8:40 PM     Attestation signed by Anita Howe MD at 2/21/2021 8:40 PM   [facility] Hospitalist Service Attending Physician Attestation Note - Discharge   I have seen and examined Dennise Barrera personally and have reviewed the medical record independently   I have reviewed the case with the resident physician including all assessments and the plan of care for each  I agree with the resident physician and offer the following addendum to the below statements by the resident physician:   Patient with ER (-) breast cancer admitted for intractable nausea and vomiting with odynophagia  Seen and examined 2/20/21, mid-morning  Patient appears well and symptoms are well-controlled with analgesia and nausea regimen  Found to have evidence of candidal esophagitis on EGD per GI, continue PO fluconazole as outline below  Stable for discharge  Discharge Statement:   I spent 30 minutes discharging the patient  This time was spent on the day of discharge  I had direct contact with the patient on the day of discharge  Greater than 50% of the total time was spent examining patient, answering all patient questions, arranging and discussing plan of care with patient as well as directly providing post-discharge instructions  Additional time then spent on discharge activities  For detailed history, assessment, and plan of care, please review the statements below by Dr Shima Geiger MD    Discharge- Jorgito Jasso Mock 1965, 60 y o  female MRN: [id number]   Unit/Bed#: S -01 Encounter: [id number]   Primary Care Provider: Ashanti Peres MD   Date and time admitted to hospital: 2/17/2021 4:03 PM   * Nausea and vomiting   Assessment & Plan   Patient continues to have nausea and vomiting even with clear liquid diet  C/o both dysphagia and odynophagia  No leukocytosis, afebrile, BP stable  Lactic acid initially elevated improved with IVF   IVF @75ml/hr   Will continue   Will place on clears for now, advance diet as tolerated   Cdiff negative   Carafate and magic mouthwash for symptom relief   Will continue supportive management with antinausea medication (tigan), antispasmodic   GI onboard, EGD yesterday revealed candida esophagitis  Candida esophagitis ([facility] [address] )   Assessment & Plan   D/t patients intractable nausea / vomiting, odynophagia and dysphagia, patient underwent EGD yesterday by GI  Dx - Candida Esophagitis  Patient currently undergoing chemo therapy creating immunocompromised state which makes patient more suscpetible to development of this condition   Gi recommends: "Start fluconazole 400 mg today and then 200 mg daily for total of 3 weeks for Candida esophagitis "   Biopsys were also taken to r/o Hpylori and Celiac disease   Will follow up outpt regarding results  Gastritis   Assessment & Plan   EGD 10/2020  With mild gastritis with evidence of a Schatzki's ring which did require dilatation and clips   CT chest/abd/pelvis with po contrast: No evidence of esophagitis, mediastinitis or acute cardiopulmonary process  No evidence of gastritis, colitis or bowel obstruction   Follows with GI as outpatient   Will closely monitor   Added Carafate   GI onboard - see candida esophagitis   Malignant neoplasm of upper-inner quadrant of left breast in female, estrogen receptor negative ([facility] [address] )   Assessment & Plan   S/p mastectomy, currently undergoing chemotherapy  Last infusion around 2 and half weeks ago  Does report some nausea and vomiting post chemo, has antinausea meds at home which helps   Continue outpatient follow-up with Oncology  Hypokalemia-resolved as of 2/20/2021   Assessment & Plan            Lab Results    Component Value Date     SODIUM 139 02/19/2021     K 4 3 02/19/2021      02/19/2021     CO2 22 02/19/2021     BUN 8 02/19/2021     CREATININE 0 73 02/19/2021     GLUC 80 02/19/2021     CALCIUM 7 1 (L) 02/19/2021            Lab Results     Component Value Date      CALCIUM 7 1 (L) 02/19/2021      PHOS 2 8 02/19/2021     Resolved  Will f/u BMP today  Smoking   Assessment & Plan   Current active smoker however has denied NRT in hospital  States she hasnt felt desire to smoke here     Will encourage her tobacco cessation efforts and provide educational material   Cancer related pain   Assessment & Plan   Patient follows w/   [place], palliative care  Dilaudid 0 5 for breakthrough pain   Roxicodone 10 mg moderate to severe pain   Palliative onboard  Depression   Assessment & Plan   Hx of anxiety and depression  Continue Zoloft, Xanax p r n  Hypothyroidism   Assessment & Plan              Lab Results     Component Value Date      YTZ3AFMVMARG 13 413 (H) 02/19/2021     Likely 2/2 to patient unable to tolerate PO intake over the past couple of days  Will continue home levothyroxine for now   Rpt and follow up outpt  Systemic lupus erythematosus ([facility] [address] )   Assessment & Plan   Hx of Lupus  Takes Plaquenil 200 mg in the morning, 100 mg at night   Continue home meds    Discharging Resident Physician: Kaitlyn Ta MD   Attending: Brayden Meza MD   PCP: Pennie Cervantes MD   Admission Date: 2/17/2021   Discharge Date: 02/20/21   Disposition:   Home   Reason for Admission: Nausea and Vomiting   Consultations During Hospital Stay:   GI   Palliative  Procedures Performed:   EGD  Significant Findings / Test Results:   EGD report:   White esophageal plaques with mild erosions on ulcerations esophagus status post brushings and biopsies  Mild gastric antral erythema status post random gastric biopsies rule out H pylori  Mild duodenal bulb erythema status post biopsies to rule out celiac disease  Follow-up pathology 1-2 weeks  Start fluconazole 400 mg today and then 200 mg daily for total of 3 weeks for Candida esophagitis  Incidental Findings:   none   Test Results Pending at Discharge (will require follow up): Tissue studies from EGD  Outpatient Tests Requested:   Hpylori stool antigen   Complications: None   Hospital Course:   Kristen Shaikh is a 60 y o  female patient with a past medical history of breast cancer status post mastectomy currently undergoing chemotherapy, history of gastritis and Schatzki's ring, lupus, cancer-related pain, who originally presented to the hospital on 2/17/2021 due to intractable nausea and vomiting   She states that she usually gets her post chemo nausea and vomiting about 3 days after treatment however her last treatment about 2 weeks ago  Patient was given IV fluids and electrolyte replacement  She remained hemodynamically stable throughout admission  Patient was unable to tolerate p o  Intake therefore GI was consulted and recommended EGD which revealed candidal esophagitis  Patient's diet was advanced today and she is ready to go home  Will follow outpatient with GI  Condition at Discharge: good   Discharge Day Visit / Exam:   Subjective: "Better" States shes feeling a lot better  Able to tolerate jello without difficulty   Vitals: Blood Pressure: 117/65 (02/20/21 0724)   Pulse: 79 (02/20/21 0724)   Temperature: 97 9 °F (36 6 °C) (02/20/21 0724)   Temp Source: Oral (02/20/21 0724)   Respirations: 18 (02/20/21 0724)   Height: 5' 3" (160 cm) (02/19/21 1410)   Weight - Scale: 59 9 kg (132 lb) (02/19/21 1410)   SpO2: 96 % (02/20/21 0724)   Exam:   Physical Exam   Vitals signs and nursing note reviewed  Constitutional:   General: She is not in acute distress  Appearance: She is ill-appearing  She is not toxic-appearing  HENT:   Head: Normocephalic and atraumatic  Nose: Nose normal    Mouth/Throat:   Mouth: Mucous membranes are dry  Eyes:   General: No scleral icterus  Right eye: No discharge  Left eye: No discharge  Pupils: Pupils are equal, round, and reactive to light  Neck:   Comments: No visible adenopathy  Cardiovascular:   Rate and Rhythm: Normal rate and regular rhythm  Pulses: Normal pulses  Heart sounds: Normal heart sounds  Pulmonary:   Effort: Pulmonary effort is normal  No respiratory distress  Breath sounds: Normal breath sounds  Abdominal:   General: Bowel sounds are normal    Palpations: Abdomen is soft  Tenderness: There is abdominal tenderness (improved)  There is no guarding or rebound  Musculoskeletal:   Right lower leg: No edema  Left lower leg: No edema  Skin:   General: Skin is warm  Coloration: Skin is pale  Skin is not jaundiced  Neurological:   General: No focal deficit present  Mental Status: She is alert and oriented to person, place, and time  Psychiatric:   Mood and Affect: Mood normal    Behavior: Behavior normal      Discussion with Family: Patient and    Discharge instructions/Information to patient and family:   See after visit summary for information provided to patient and family  Provisions for Follow-Up Care:   See after visit summary for information related to follow-up care and any pertinent home health orders  Planned Readmission: No   Discharge Medications:   See after visit summary for reconciled discharge medications provided to patient and family     ** Please Note: This note has been constructed using a voice recognition system **   Yousif Jenkins MD   Family Medicine PGY-1   2/20/2021   1:55 PM      Other Notes  All notes     H&P from Mari Vieyra MD (Hospitalist)     Consults from Aiden Hernandez PA-C (Gastroenterology)     Consults from Fiorella Wing (Palliative Care)               Additional Orders and Documentation    Results   Pathology   Imaging   Microbiology    Meds     Orders   Procedures    Flowsheets     Encounter Info:  History,   Allergies,   Education,   Care Plan,   Detailed Report           Communications    Universal Report with UR sent to [facility] Notification of Admission   Sent 2/20/2021 by David Farmer     Summary of care document sent to Sabra Jordan MD   Sent 2/20/2021 by Jasmin Patel MD     Admiss/Disch Notification sent to [facility] Notification of Admission   Sent 2/24/2021 by Herminio Covington [place]  Anesthesia Documents - Scan on 2/20/2021 2:14 PM:    Procedure Consents - Scan on 2/20/2021 2:14 PM:    EKG Preliminary - Scan on 2/20/2021 9:10 AM:    Telemetry Strips - Scan on 2/19/2021 2:57 PM:    Medicare Outpatient Observation Notice - English - Electronic signature on 2/17/2021 9:29 PM - [facility] Problem List  Systemic lupus erythematosus ([facility] [address] )   Hypothyroidism   Depression   Malignant neoplasm of upper-inner quadrant of left breast in female, estrogen receptor negative (HCC)     Nausea and vomiting   Cancer related pain   Smoking   Gastritis   Candida esophagitis ([facility] [address] )   Hypokalemia Resolved     Care Timeline  02/18   Admitted from ED (Observation) 0104   02/19   Admitted [phone]   02/20   Discharged 1432        Discharge    Home/Self Care          AVS - Discharge to Home (Printed 2/20/2021)   Follow-Ups   Call Rober Mccord MD (Family Medicine) in 1 week (2/27/2021)   Follow up with Bennie Conti MD (Internal Medicine)   Call Rashi Painter MD (Gastroenterology) in 1 week (2/27/2021)        Medication List at Discharge  Acetaminophen 650 mg Oral Every 6 hours PRN   ALPRAZolam 1 mg Oral 3 times daily PRN     Cholecalciferol 50,000 Units Oral Weekly   Cyclobenzaprine HCl     10 mg Oral Daily at bedtime     10 mg Oral Daily at bedtime PRN  diphenhydrAMINE HCl,Aluminum & Magnesium    1:1:1 10 mL Swish & Swallow Every 4 hours PRN   Fluconazole 200 mg Oral Daily     Hydroxychloroquine Sulfate 200 mg Take 1 tablet in morning and 1/2 tablet in evening   Levothyroxine Sodium 88 mcg TAKE ONE TABLET BY MOUTH EVERY DAY     Nutritional Supplements 1 capsule Oral Daily   Ondansetron HCl 4 mg Oral Every 8 hours PRN   oxyCODONE HCl 10-15 mg Oral Every 4 hours PRN, NO FURTHER FILLS WITHOUT VISIT TO PALLIATIVE CLINIC   Pantoprazole Sodium 40 mg Oral 2 times daily before meals   Sertraline HCl 100 mg TAKE 1 & 1/2 TABLETS BY MOUTH ONCE DAILY   Patient taking differently: Take 150 mg by mouth daily BY MOUTH ONCE DAILY   Sucralfate 1 g Oral 4 times daily (before meals and at bedtime)               Chief Complaint     Chief Complaint   Patient presents with    Transition of Care Management       History of Present Illness      I reviewed the patient's discharge summary for her most recent hospitalization  She states currently she is feeling much better since being discharged from hospital   She still in process of receiving her chemotherapy infusions every several weeks  She states her chest pain that she  Originally had due to  Fungal infection has since improved with medication  Unfortunately she continues to have chronic pains especially he lymph nodes bilaterally along axillary area  She states she has been recovering well from recent mastectomy surgery  She states she was discharged from the hospital with an sufficient supply of her pain medication  She is scheduled to follow-up with her palliative care physician next week  Review of Systems   Review of Systems   Constitutional: Negative  HENT: Negative  Respiratory: Negative  Cardiovascular: Positive for chest pain  Negative for palpitations and leg swelling  Gastrointestinal: Negative  Genitourinary: Negative  Musculoskeletal: Positive for arthralgias, back pain and myalgias  Neurological: Negative  Psychiatric/Behavioral: Positive for sleep disturbance  The patient is nervous/anxious          Active Problem List     Patient Active Problem List   Diagnosis    Hypertension    Systemic lupus erythematosus ([facility] [address] )    Hypothyroidism    Posttraumatic stress disorder    Adult celiac disease    Anxiety    Depression    Esophagitis    Nephrolithiasis    Vitamin D deficiency    Malignant neoplasm of upper-inner quadrant of left breast in female, estrogen receptor negative ([facility] [address] )    Chemotherapy induced neutropenia (HCC)    Leukocytosis    Increased anion gap metabolic acidosis    Port-A-Cath in place    Breast cancer ([address]    SIRS (systemic inflammatory response syndrome) (HCC)    JEFERSON (acute kidney injury) ([address]    Hyponatremia    Anemia    Colitis    Cancer related pain    Continuous opioid dependence (HCC)    Smoking    Gastritis    Candida esophagitis ([facility] [address] )       Past Medical History:  Past Medical History:   Diagnosis Date    Disease of thyroid gland     Hypertension     Lupus ([facility] [address] )     Malignant neoplasm of upper-inner quadrant of left breast in female, estrogen receptor negative ([facility] [address] ) 2/25/2020    Nephrolithiasis     PTSD (post-traumatic stress disorder)        Past Surgical History:  Past Surgical History:   Procedure Laterality Date    FEMUR FRACTURE SURGERY      FL GUIDED CENTRAL VENOUS ACCESS DEVICE INSERTION  3/17/2020    HYSTERECTOMY      LEFT OOPHORECTOMY      due to torsion     MAMMO STEREOTACTIC BREAST BIOPSY RIGHT (ALL INC) Right 2/12/2020    MASTECTOMY W/ SENTINEL NODE BIOPSY Left 8/18/2020    Procedure: BREAST MASTECTOMY WITH SENTINEL LYMPH NODE BIOPSY, LYMPHATIC MAPPING WITH BLUE DYE AND RADIOACTIVE DYE (INJECT AT 1430 BY DR WRIGHT IN THE OR);   Surgeon: Effie Grijalva MD;  Location: [facility];  Service: Surgical Oncology    MRI BREAST BIOPSY LEFT (ALL INCLUSIVE) Left 3/20/2020    MS INSJ TUNNELED CTR VAD W/SUBQ PORT AGE 5 YR/> N/A 3/17/2020    Procedure: INSERTION VENOUS PORT ( PORT-A-CATH) IR;  Surgeon: Merced Telles DO;  [facility]: [facility];  Service: Interventional Radiology    US GUIDANCE BREAST BIOPSY LEFT EACH ADDITIONAL Left 2/12/2020    US GUIDED BREAST BIOPSY LEFT COMPLETE Left 2/12/2020    VAGINA SURGERY         Family History:  Family History   Problem Relation Age of Onset    Diabetes Mother     Hypertension Mother     Nephrolithiasis Mother     Breast cancer Mother 79    Heart disease Father     Hypertension Father     Diabetes Brother     Nephrolithiasis Brother     Breast cancer Maternal Aunt     No Known Problems Maternal Grandmother     No Known Problems Maternal Grandfather     No Known Problems Paternal Grandmother     No Known Problems Paternal Grandfather        Social History:  Social History     Socioeconomic History    Marital status: /Civil Union     Spouse name: Not on file    Number of children: Not on file    Years of education: Not on file    Highest education level: Not on file   Occupational History    Not on file   Social Needs    Financial resource strain: Not on file    Food insecurity     Worry: Not on file     Inability: Not on file    Transportation needs     Medical: Not on file     Non-medical: Not on file   Tobacco Use    Smoking status: Current Every Day Smoker     Packs/day: 0 50     Years: 40 00     Pack years: 20 00     Types: Cigarettes     Start date: 200    Smokeless tobacco: Never Used   Substance and Sexual Activity    Alcohol use: Not Currently     Alcohol/week: 21 0 standard drinks     Types: 21 Cans of beer per week     Frequency: Never     Drinks per session: Patient refused     Binge frequency: Never     Comment: pt states she had her last beer 3/22/2020    Drug use: No    Sexual activity: Not Currently     Partners: Male     Birth control/protection: None   Lifestyle    Physical activity     Days per week: Not on file     Minutes per session: Not on file    Stress: Not on file   Relationships    Social connections     Talks on phone: Not on file     Gets together: Not on file     Attends Congregation service: Not on file     Active member of club or organization: Not on file     Attends meetings of clubs or organizations: Not on file     Relationship status: Not on file    Intimate partner violence     Fear of current or ex partner: Not on file     Emotionally abused: Not on file     Physically abused: Not on file     Forced sexual activity: Not on file   Other Topics Concern    Not on file   Social History Narrative    Not on file       Objective     Vitals:    02/25/21 1610   BP: 112/64   Pulse: 84   Resp: 18   Temp: 98 °F (36 7 °C)   SpO2: 93%     Wt Readings from Last 3 Encounters:   02/25/21 57 3 kg (126 lb 6 4 oz)   02/23/21 56 2 kg (124 lb)   02/19/21 59 9 kg (132 lb)       Physical Exam  Constitutional:       General: She is not in acute distress  Appearance: Normal appearance  She is not ill-appearing  HENT:      Head: Normocephalic and atraumatic  Eyes:      General:         Right eye: No discharge  Left eye: No discharge  Extraocular Movements: Extraocular movements intact  Conjunctiva/sclera: Conjunctivae normal       Pupils: Pupils are equal, round, and reactive to light  Cardiovascular:      Heart sounds: Normal heart sounds  Comments: Regular rate and rhythm with no murmurs  Pulmonary:      Breath sounds: Normal breath sounds  Comments: Lungs are clear to auscultation without wheezes,rales, or rhonchi  Musculoskeletal:      Right lower leg: No edema  Left lower leg: No edema  Neurological:      General: No focal deficit present  Mental Status: She is alert and oriented to person, place, and time  Mental status is at baseline  Psychiatric:         Mood and Affect: Mood normal          Behavior: Behavior normal          Thought Content:  Thought content normal          Judgment: Judgment normal          Pertinent Laboratory/Diagnostic Studies:  Lab Results   Component Value Date    GLUCOSE 97 04/22/2015    BUN 8 02/19/2021    CREATININE 0 73 02/19/2021    CALCIUM 7 1 (L) 02/19/2021     (L) 04/22/2015    K 4 3 02/19/2021    CO2 22 02/19/2021     02/19/2021     Lab Results   Component Value Date    ALT 11 (L) 02/19/2021    AST 11 02/19/2021    ALKPHOS 49 02/19/2021    BILITOT 0 2 04/22/2015       Lab Results   Component Value Date    WBC 5 66 02/19/2021    HGB 10 3 (L) 02/19/2021    HCT 33 1 (L) 02/19/2021    MCV 93 02/19/2021     (H) 02/19/2021       No results found for: TSH    No results found for: CHOL  No results found for: TRIG  No results found for: HDL  No results found for: LDLCALC  No results found for: HGBA1C    Results for orders placed or performed during the hospital encounter of 02/17/21   [facility], [facility]/B, [facility]    Specimen: Nasopharyngeal Swab; Nares   Result Value Ref Range    SARS-CoV-2 Negative Negative    INFLUENZA A PCR Negative Negative    INFLUENZA B PCR Negative Negative    RSV PCR Negative Negative   Clostridium difficile toxin by PCR with EIA    Specimen: Rectum; Stool   Result Value Ref Range     C difficile toxin by PCR  Negative Negative   Stool Enteric Bacterial Panel by PCR    Specimen: Rectum; Stool   Result Value Ref Range    Salmonella sp PCR None Detected None Detected    Shigella sp/Enteroinvasive E  coli (EIEC) PCR None Detected None Detected    Campylobacter sp (jejuni and coli) PCR None Detected None Detected    Shiga toxin 1/Shiga toxin 2 genes PCR None Detected None Detected   Ova and parasite examination    Specimen: Rectum; Stool   Result Value Ref Range    Ova + Parasite Exam       No ova, cysts, or parasites seen     One negative specimen does not rule out the possibility of a  parasitic infection     CBC and differential   Result Value Ref Range    WBC 9 52 4 31 - 10 16 Thousand/uL    RBC 4 80 3 81 - 5 12 Million/uL    Hemoglobin 13 9 11 5 - 15 4 g/dL    Hematocrit 44 5 34 8 - 46 1 %    MCV 93 82 - 98 fL    MCH 29 0 26 8 - 34 3 pg    MCHC 31 2 (L) 31 4 - 37 4 g/dL    RDW 17 3 (H) 11 6 - 15 1 %    MPV 9 7 8 9 - 12 7 fL    Platelets 291 (H) 818 - 390 Thousands/uL    nRBC 0 /100 WBCs    Neutrophils Relative 79 (H) 43 - 75 %    Immat GRANS % 0 0 - 2 %    Lymphocytes Relative 13 (L) 14 - 44 %    Monocytes Relative 8 4 - 12 %    Eosinophils Relative 0 0 - 6 %    Basophils Relative 0 0 - 1 %    Neutrophils Absolute 7 36 1 85 - 7 62 Thousands/µL    Immature Grans Absolute 0 04 0 00 - 0 20 Thousand/uL    Lymphocytes Absolute 1 27 0 60 - 4 47 Thousands/µL    Monocytes Absolute 0 80 0 17 - 1 22 Thousand/µL    Eosinophils Absolute 0 02 0 00 - 0 61 Thousand/µL    Basophils Absolute 0 03 0 00 - 0 10 Thousands/µL   Comprehensive metabolic panel   Result Value Ref Range    Sodium 137 136 - 145 mmol/L    Potassium 3 4 (L) 3 5 - 5 3 mmol/L    Chloride 101 100 - 108 mmol/L    CO2 20 (L) 21 - 32 mmol/L    ANION GAP 16 (H) 4 - 13 mmol/L    BUN 10 5 - 25 mg/dL    Creatinine 0 92 0 60 - 1 30 mg/dL    Glucose 106 65 - 140 mg/dL    Calcium 9 0 8 3 - 10 1 mg/dL    Corrected Calcium 9 5 8 3 - 10 1 mg/dL    AST 16 5 - 45 U/L    ALT 11 (L) 12 - 78 U/L    Alkaline Phosphatase 74 46 - 116 U/L    Total Protein 7 6 6 4 - 8 2 g/dL    Albumin 3 4 (L) 3 5 - 5 0 g/dL    Total Bilirubin 0 35 0 20 - 1 00 mg/dL    eGFR 69 ml/min/1 73sq m   Lipase   Result Value Ref Range    Lipase 119 73 - 393 u/L   Troponin I   Result Value Ref Range    Troponin I 0 03 <=0 04 ng/mL   Phosphorus   Result Value Ref Range    Phosphorus 1 8 (L) 2 7 - 4 5 mg/dL   Magnesium   Result Value Ref Range    Magnesium 1 9 1 6 - 2 6 mg/dL   Lactic acid   Result Value Ref Range    LACTIC ACID 2 5 (HH) 0 5 - 2 0 mmol/L   Lactic acid 2 Hours   Result Value Ref Range    LACTIC ACID 1 0 0 5 - 2 0 mmol/L   CBC (With Platelets)   Result Value Ref Range    WBC 8 07 4 31 - 10 16 Thousand/uL    RBC 3 55 (L) 3 81 - 5 12 Million/uL    Hemoglobin 10 1 (L) 11 5 - 15 4 g/dL    Hematocrit 32 7 (L) 34 8 - 46 1 %    MCV 92 82 - 98 fL    MCH 28 5 26 8 - 34 3 pg    MCHC 30 9 (L) 31 4 - 37 4 g/dL    RDW 17 2 (H) 11 6 - 15 1 %    Platelets 685 (H) 101 - 390 Thousands/uL    MPV 9 6 8 9 - 12 7 fL   Comprehensive metabolic panel   Result Value Ref Range    Sodium 139 136 - 145 mmol/L    Potassium 2 8 (L) 3 5 - 5 3 mmol/L    Chloride 106 100 - 108 mmol/L    CO2 22 21 - 32 mmol/L    ANION GAP 11 4 - 13 mmol/L    BUN 10 5 - 25 mg/dL    Creatinine 0 68 0 60 - 1 30 mg/dL    Glucose 86 65 - 140 mg/dL    Calcium 7 3 (L) 8 3 - 10 1 mg/dL    Corrected Calcium 8 3 8 3 - 10 1 mg/dL    AST 15 5 - 45 U/L    ALT <6 (L) 12 - 78 U/L    Alkaline Phosphatase 58 46 - 116 U/L    Total Protein 6 2 (L) 6 4 - 8 2 g/dL    Albumin 2 8 (L) 3 5 - 5 0 g/dL    Total Bilirubin 0 23 0 20 - 1 00 mg/dL    eGFR 97 ml/min/1 73sq m   Basic metabolic panel   Result Value Ref Range    Sodium 138 136 - 145 mmol/L    Potassium 5 2 3 5 - 5 3 mmol/L    Chloride 107 100 - 108 mmol/L    CO2 20 (L) 21 - 32 mmol/L    ANION GAP 11 4 - 13 mmol/L    BUN 9 5 - 25 mg/dL    Creatinine 0 70 0 60 - 1 30 mg/dL    Glucose 76 65 - 140 mg/dL    Calcium 7 2 (L) 8 3 - 10 1 mg/dL    eGFR 96 ml/min/1 73sq m   CBC and differential   Result Value Ref Range    WBC 5 66 4 31 - 10 16 Thousand/uL    RBC 3 56 (L) 3 81 - 5 12 Million/uL    Hemoglobin 10 3 (L) 11 5 - 15 4 g/dL    Hematocrit 33 1 (L) 34 8 - 46 1 %    MCV 93 82 - 98 fL    MCH 28 9 26 8 - 34 3 pg    MCHC 31 1 (L) 31 4 - 37 4 g/dL    RDW 17 2 (H) 11 6 - 15 1 %    MPV 9 7 8 9 - 12 7 fL    Platelets 206 (H) 783 - 390 Thousands/uL    nRBC 0 /100 WBCs    Neutrophils Relative 55 43 - 75 %    Immat GRANS % 0 0 - 2 %    Lymphocytes Relative 31 14 - 44 %    Monocytes Relative 11 4 - 12 %    Eosinophils Relative 2 0 - 6 %    Basophils Relative 1 0 - 1 %    Neutrophils Absolute 3 10 1 85 - 7 62 Thousands/µL    Immature Grans Absolute 0 02 0 00 - 0 20 Thousand/uL    Lymphocytes Absolute 1 77 0 60 - 4 47 Thousands/µL    Monocytes Absolute 0 62 0 17 - 1 22 Thousand/µL    Eosinophils Absolute 0 12 0 00 - 0 61 Thousand/µL    Basophils Absolute 0 03 0 00 - 0 10 Thousands/µL   Comprehensive metabolic panel   Result Value Ref Range    Sodium 139 136 - 145 mmol/L    Potassium 4 3 3 5 - 5 3 mmol/L    Chloride 107 100 - 108 mmol/L    CO2 22 21 - 32 mmol/L    ANION GAP 10 4 - 13 mmol/L    BUN 8 5 - 25 mg/dL    Creatinine 0 73 0 60 - 1 30 mg/dL    Glucose 80 65 - 140 mg/dL    Calcium 7 1 (L) 8 3 - 10 1 mg/dL    Corrected Calcium 8 3 8 3 - 10 1 mg/dL    AST 11 5 - 45 U/L    ALT 11 (L) 12 - 78 U/L    Alkaline Phosphatase 49 46 - 116 U/L    Total Protein 5 3 (L) 6 4 - 8 2 g/dL    Albumin 2 5 (L) 3 5 - 5 0 g/dL    Total Bilirubin 0 22 0 20 - 1 00 mg/dL    eGFR 92 ml/min/1 73sq m   Magnesium Result Value Ref Range    Magnesium 1 7 1 6 - 2 6 mg/dL   Phosphorus   Result Value Ref Range    Phosphorus 2 8 2 7 - 4 5 mg/dL   TSH, 3rd generation with Free T4 reflex   Result Value Ref Range    TSH 3RD GENERATON 13 413 (H) 0 358 - 3 740 uIU/mL   T4, free   Result Value Ref Range    Free T4 1 01 0 76 - 1 46 ng/dL   Troponin I   Result Value Ref Range    Troponin I <0 02 <=0 04 ng/mL   Troponin I   Result Value Ref Range    Troponin I <0 02 <=0 04 ng/mL   Troponin I   Result Value Ref Range    Troponin I <0 02 <=0 04 ng/mL   ECG 12 lead   Result Value Ref Range    Ventricular Rate 93 BPM    Atrial Rate 93 BPM    ID Interval 148 ms    QRSD Interval 78 ms    QT Interval 374 ms    QTC Interval 465 ms    P Axis 75 degrees    QRS Axis 76 degrees    T Wave Axis 76 degrees   ECG 12 lead   Result Value Ref Range    Ventricular Rate 75 BPM    Atrial Rate 75 BPM    ID Interval 150 ms    QRSD Interval 76 ms    QT Interval 404 ms    QTC Interval 451 ms    P Axis 56 degrees    QRS Axis 55 degrees    T Wave Axis 50 degrees   Tissue Exam   Result Value Ref Range    Case Report       Surgical Pathology Report                         Case: [id number]                                   Authorizing Provider:  Edna Lombardi MD  Collected:           02/19/2021 1519              Ordering Location:     [facility]        Received:            02/19/2021 [address] [place] [address] Med                                                                             Surg Unit                                                                    Pathologist:           Craig Mccollum MD                                                        Specimens:   A) - Duodenum                                                                                       B) - Stomach, gastric                                                                               C) - Esophagus Final Diagnosis       A  Duodenum, biopsy:  -  Benign duodenal mucosa  -  No villous atrophy, intraepithelial lymphocytosis or crypt hyperplasia; negative for features of malabsorptive enteropathy   -  Negative for chronic or active duodenitis, dysplasia or malignancy  B  Stomach, biopsy:  -  Chronic inactive oxyntic and antral gastritis  -  Negative for Helicobacter pylori, confirmed by immunohistochemical stain, evaluated with appropriate positive controls  -  Negative for atrophy, intestinal metaplasia, dysplasia or carcinoma  C  Esophagus, biopsy:  -  Fragments of necroinflammatory debris with a very small portion of poorly fixed and preserved surface glandular epithelium    -  Negative for features of dysplasia or carcinoma in a limited sample  -  Immunohistochemical stains performed with appropriate controls shows scattered disordered surface epithelium staining for keratin AE1/3 with no evidence of CMV or HSV 1/2 infection; GMS stain is negative for fungal elements  -  The focal glandular epithelium shows features of intestinal-type mucin, cannot exclude Gaspar's mucosa of the esophagus versus carryover/contamination from the duodenum; correlation with clinical and endoscopic impression is recommended  Additional Information       All reported additional testing was performed with appropriately reactive controls  These tests were developed and their performance characteristics determined by [facility] or appropriate performing facility, though some tests may be performed on tissues which have not been validated for performance characteristics (such as staining performed on alcohol exposed cell blocks and decalcified tissues)  Results should be interpreted with caution and in the context of the patients clinical condition  These tests may not be cleared or approved by the [place]   Food and Drug Administration, though the FDA has determined that such clearance or approval is not necessary  These tests are used for clinical purposes and they should not be regarded as investigational or for research  This laboratory has been approved by [place] [address], designated as a high-complexity laboratory and is qualified to perform these tests  Shavon Melara Description          A  The specimen is received in formalin, labeled with the patient's name and hospital number, and is designated "duodenum  The specimen consists of an aggregate of tan-pink soft tissue fragments measuring 0 7 x 0 4 x 0 2 cm  The specimen is entirely submitted in a screened cassette  B  The specimen is received in formalin, labeled with the patient's name and hospital number, and is designated "gastric biopsy  The specimen consists of 5 tan-red soft tissue fragments ranging from 0 3-0 6 cm in greatest dimension  The specimen is entirely submitted in a screened cassette  C  The specimen is received in formalin, labeled with the patient's name and hospital number, and is designated "esophagus  The specimen consists of an aggregate of red-white, focally friable soft tissue fragments measuring 0 8 x 0 4 x 0 2 cm  The specimen is entirely submitted in a screened cassette  Note: The estimated total formalin fixation time based upon information provided by the submitting clinician and the standard processing schedule is under 72 hours    MO MENCHACA         Non-gynecologic cytology   Result Value Ref Range    Case Report       Non-gynecologic Cytology                          Case: [id number]                                  Authorizing Provider:  Laura Schneider MD  Collected:           02/19/2021 1524              Ordering Location:     [facility]        Received:            02/19/2021 [address] [place] [address] Med                                                                             Surg Unit Pathologist:           Dillon Hills DO                                                     Specimen:    Brushing, gastroesophageal                                                                 Final Diagnosis       A  Esophagus, Brushing (ThinPrep):  - Negative for malignancy  - Reactive squamous cells, squamous metaplastic cells, rare glandular cells, and mixed inflammatory cells present  Note       The endoscopic report was reviewed  See also [id number]  Gross Description          A   20ML, colorless, clear, brush received in [employer]           Additional Information       [employer]'s FDA approved ,  and ThinPrep Imaging Duo System are utilized with strict adherence to the 's instruction manual to prepare gynecologic and non-gynecologic cytology specimens for the production of ThinPrep slides as well as for gynecologic ThinPrep imaging  These processes have been validated by our laboratory and/or by the   These tests were developed and their performance characteristics determined by [facility] [address] [place] [address] or [address] [place] [address]  They may not be cleared or approved by the [place]  Food and Drug Administration  The FDA has determined that such clearance or approval is not necessary  These tests are used for clinical purposes  They should not be regarded as investigational or for research  This laboratory has been approved by [facility], designated as a high-complexity laboratory and is qualified to perform these tests  Interpretation performed at [facility], [address] [place] [address]         No orders of the defined types were placed in this encounter        ALLERGIES:  Allergies   Allergen Reactions    Mobic [Meloxicam] Eye Swelling     Reaction Date: 12Aug2011;     Methotrexate Rash    Sulfa Antibiotics Rash       Current Medications     Current Outpatient Medications   Medication Sig Dispense Refill    acetaminophen (TYLENOL) 325 mg tablet Take 2 tablets (650 mg total) by mouth every 6 (six) hours as needed for mild pain, headaches or fever 30 tablet 0    al mag oxide-diphenhydramine-lidocaine viscous (MAGIC MOUTHWASH) 1:1:1 suspension Swish and swallow 10 mL every 4 (four) hours as needed for mouth pain or discomfort 1 Bottle 0    ALPRAZolam (XANAX) 1 mg tablet Take 1 tablet (1 mg total) by mouth 3 (three) times a day as needed for anxiety 90 tablet 0    Cholecalciferol (VITAMIN D3) 50363 units CAPS Take 50,000 Units by mouth once a week       cyclobenzaprine (FLEXERIL) 10 mg tablet Take 10 mg by mouth daily at bedtime       hydroxychloroquine (PLAQUENIL) 200 mg tablet Take 1 tablet in morning and 1/2 tablet in evening      levothyroxine 88 mcg tablet TAKE ONE TABLET BY MOUTH EVERY DAY 30 tablet 5    Nutritional Supplements (OSTEOPOROSIS SUPPORT PO) Take 1 capsule by mouth daily       ondansetron (ZOFRAN) 4 mg tablet Take 1 tablet (4 mg total) by mouth every 8 (eight) hours as needed for nausea or vomiting 30 tablet 3    oxyCODONE (ROXICODONE) 10 MG TABS Take 1-1 5 tablets (10-15 mg total) by mouth every 4 (four) hours as needed (cancer pain    Max of 8 tabs / day ) NO FURTHER FILLS WITHOUT VISIT TO PALLIATIVE CLINIC 180 tablet 0    pantoprazole (PROTONIX) 40 mg tablet Take 1 tablet (40 mg total) by mouth 2 (two) times a day before meals 60 tablet 0    sertraline (ZOLOFT) 100 mg tablet TAKE 1 & 1/2 TABLETS BY MOUTH ONCE DAILY (Patient taking differently: Take 150 mg by mouth daily BY MOUTH ONCE DAILY) 45 tablet 5    cyclobenzaprine (FLEXERIL) 5 mg tablet Take 10 mg by mouth daily at bedtime as needed      fluconazole (DIFLUCAN) 200 mg tablet Take 1 tablet (200 mg total) by mouth daily for 12 doses (Patient not taking: Reported on 2/25/2021) 12 tablet 0    furosemide (LASIX) 20 mg tablet Take 1 tablet (20 mg total) by mouth daily 10 tablet 0    sucralfate (CARAFATE) 1 g tablet Take 1 tablet (1 g total) by mouth 4 (four) times a day (before meals and at bedtime) (Patient not taking: Reported on 2/25/2021) 90 tablet 1     No current facility-administered medications for this visit  Health Maintenance     Health Maintenance   Topic Date Due    Hepatitis C Screening  1965    Pneumococcal Vaccine: Pediatrics (0 to 5 Years) and At-Risk Patients (6 to 59 Years) (1 of 1 - PPSV23) 08/29/1969    HIV Screening  08/29/1978    Annual Physical  08/29/1981    DTaP,Tdap,and Td Vaccines (1 - Tdap) 08/29/1984    Influenza Vaccine (1) 06/30/2021 (Originally 9/1/2020)    MAMMOGRAM  02/04/2022    BMI: Adult  02/25/2022    Depression Remission PHQ  02/25/2022    Colorectal Cancer Screening  10/09/2027    HIB Vaccine  Aged Out    Hepatitis B Vaccine  Aged Out    IPV Vaccine  Aged Out    Hepatitis A Vaccine  Aged Out    Meningococcal ACWY Vaccine  Aged Out    HPV Vaccine  Aged Out     There is no immunization history for the selected administration types on file for this patient      Yobani Zee MD

## 2021-02-26 ENCOUNTER — TELEPHONE (OUTPATIENT)
Dept: PALLIATIVE MEDICINE | Facility: CLINIC | Age: 56
End: 2021-02-26

## 2021-02-26 PROBLEM — N39.0 UTI (URINARY TRACT INFECTION): Status: RESOLVED | Noted: 2017-08-04 | Resolved: 2021-02-26

## 2021-02-26 PROBLEM — K92.1 MELENA: Status: RESOLVED | Noted: 2020-10-19 | Resolved: 2021-02-26

## 2021-02-26 PROBLEM — R11.2 NAUSEA AND VOMITING: Status: RESOLVED | Noted: 2020-05-10 | Resolved: 2021-02-26

## 2021-02-26 PROBLEM — I95.9 HYPOTENSION: Status: RESOLVED | Noted: 2020-07-13 | Resolved: 2021-02-26

## 2021-02-26 NOTE — ASSESSMENT & PLAN NOTE
Breast cancer  Patient was given a refill on her oxycodone medication to use as directed    She will follow-up with her palliative care physician for further monitoring of her condition

## 2021-02-26 NOTE — ASSESSMENT & PLAN NOTE
Esophagitis  Patient with esophagitis relates of overgrowth of fungus    Patient completed course of antifungal medication and will follow-up with gastroenterologist for repeat EGD in the near future

## 2021-02-26 NOTE — TELEPHONE ENCOUNTER
Patient no-showed 02/15/21 appointment  No show letter #2 has been sent to home address  An additional no show may result in a termination letter  Please assist in needs if necessary  Please advise

## 2021-03-01 ENCOUNTER — TELEPHONE (OUTPATIENT)
Dept: PALLIATIVE MEDICINE | Facility: CLINIC | Age: 56
End: 2021-03-01

## 2021-03-01 RX ORDER — ALPRAZOLAM 1 MG/1
TABLET ORAL
Qty: 30 TABLET | Refills: 0 | Status: SHIPPED | OUTPATIENT
Start: 2021-03-01 | End: 2021-03-22 | Stop reason: SDUPTHER

## 2021-03-01 NOTE — TELEPHONE ENCOUNTER
I reached out to patient, no answer  Left message for patient to please give the office a call back to schedule a hospital follow up

## 2021-03-05 ENCOUNTER — OFFICE VISIT (OUTPATIENT)
Dept: GASTROENTEROLOGY | Facility: AMBULARY SURGERY CENTER | Age: 56
End: 2021-03-05
Payer: COMMERCIAL

## 2021-03-05 VITALS
DIASTOLIC BLOOD PRESSURE: 62 MMHG | WEIGHT: 120.6 LBS | BODY MASS INDEX: 21.37 KG/M2 | HEIGHT: 63 IN | SYSTOLIC BLOOD PRESSURE: 110 MMHG

## 2021-03-05 DIAGNOSIS — R13.19 ESOPHAGEAL DYSPHAGIA: ICD-10-CM

## 2021-03-05 DIAGNOSIS — K20.90 ESOPHAGITIS: ICD-10-CM

## 2021-03-05 DIAGNOSIS — R19.7 INTERMITTENT DIARRHEA: Primary | ICD-10-CM

## 2021-03-05 PROCEDURE — 99213 OFFICE O/P EST LOW 20 MIN: CPT | Performed by: PHYSICIAN ASSISTANT

## 2021-03-05 PROCEDURE — 1111F DSCHRG MED/CURRENT MED MERGE: CPT | Performed by: PHYSICIAN ASSISTANT

## 2021-03-05 RX ORDER — PANTOPRAZOLE SODIUM 40 MG/1
40 TABLET, DELAYED RELEASE ORAL DAILY
Qty: 30 TABLET | Refills: 5 | Status: SHIPPED | OUTPATIENT
Start: 2021-03-05 | End: 2021-09-07

## 2021-03-05 NOTE — PATIENT INSTRUCTIONS
Can use magic mouthwash as needed    Please make sure you are taking pantoprazole 40 mg daily in the morning on an empty stomach    Repeat endoscopy in 6 months

## 2021-03-05 NOTE — PROGRESS NOTES
Assessment and Plan    #1  Dysphagia secondary to esophagitis in the setting of chemotherapy: patient is s/p EGD in February, had a course of fluconazole, bx negative for viral infection, were inconclusive for nguyen's  Reports odynophagia and N/V better, still occasional dysphagia to big pills  -continue PPI once daily  -magic mouthwash as needed  -repeat EGD in 6 months to rule out nguyen's  -can use pudding to help take large pills if they cannot be cut  -encouraged ensure if she is not hungry to get adequate nutrition  -call with any new or worsening symptoms    #2  Intermittent diarrhea: likely chemo related as this started around that time  Some days normal, then a few days of diarrhea  -can trial imodium as needed  -if worsening, can consider colonoscopy at same time as EGD but will hold off on this for now    Follow up in 3-4 months  --------------------------------------------------------------------------------------------------------------------    Chief Complaint: f/u dysphagia    HPI: Dennise Barrera is a 60 y o  female  With a history of breast cancer status post mastectomy on adjuvant chemotherapy, depression, anxiety, alcohol abuse history, history of Schatzki's ring, history of gastritis who was recently seen in the  Hospital for odynophagia and dysphagia  Patient presents for follow-up today for her symptoms  She had an upper endoscopy performed in the hospital which was notable for some esophagitis and esophageal ulcer  Biopsies were negative for HSV, CMV, and fungal infection however patient did get a course of Diflucan after she was discharged from the hospital due to white plaque in the esophagus  She was sent home on PPI, Carafate, and Magic mouthwash  Patient is not sure if she is actively taking the PPI and reports running out of Magic mouthwash  She reports that since going home she no longer is having odynophagia or nausea and vomiting    She still has a very poor appetite which has been chronic while on chemotherapy  She takes small amounts of food and typically is eating more full liquids than solids  She has not been using Ensure  She states that sometimes she tries to swallow very large pill she will still feel like it gets stuck but otherwise the food is going down okay  She denies any significant abdominal pain but occasionally does get some right-sided discomfort  She reports that her bowels are either normal or sometimes she will get a few days of diarrhea which started once she started chemotherapy  She denies any blood in the stool or black tarry stools  She has not taken anything for the diarrhea  She reports her last colonoscopy was in 2017  Records show that this was completely normal in recommended repeat in 10 years  She continues to smoke cigarettes but has not had any alcohol for approximately 1 year      Patient also has swelling of her left lower extremity which she saw her PCP for and was started on diuretics    Review of Systems:   General: negative for fatigue, fever, night sweats or unexpected weight loss  Psychological: negative for anxiety or depression  Ophthalmic: negative for blurry vision or scleral icterus  ENT: negative for headaches, oral lesions, sore throat, vocal changes; +dysphagia  Hematological and Lymphatic: negative for pallor or swollen lymph nodes  Respiratory: negative for cough, shortness of breath or wheezing  Cardiovascular: negative for chest pain, edema or murmur  Gastrointestinal: as mentioned in HPI  Genito-Urinary: negative for dysuria or incontinence  Musculoskeletal: negative for joint pain, joint stiffness or joint swelling  Dermatological: negative for pruritus, rash, or jaundice    Current Medications  Current Outpatient Medications   Medication Sig Dispense Refill    acetaminophen (TYLENOL) 325 mg tablet Take 2 tablets (650 mg total) by mouth every 6 (six) hours as needed for mild pain, headaches or fever 30 tablet 0    ALPRAZolam (XANAX) 1 mg tablet TAKE ONE TABLET BY MOUTH THREE TIMES A DAY AS NEEDED FOR ANXIETY 30 tablet 0    Cholecalciferol (VITAMIN D3) 29419 units CAPS Take 50,000 Units by mouth once a week       cyclobenzaprine (FLEXERIL) 10 mg tablet Take 10 mg by mouth daily at bedtime       furosemide (LASIX) 20 mg tablet Take 1 tablet (20 mg total) by mouth daily 10 tablet 0    hydroxychloroquine (PLAQUENIL) 200 mg tablet Take 1 tablet in morning and 1/2 tablet in evening      levothyroxine 88 mcg tablet TAKE ONE TABLET BY MOUTH EVERY DAY 30 tablet 5    Nutritional Supplements (OSTEOPOROSIS SUPPORT PO) Take 1 capsule by mouth daily       ondansetron (ZOFRAN) 4 mg tablet Take 1 tablet (4 mg total) by mouth every 8 (eight) hours as needed for nausea or vomiting 30 tablet 3    oxyCODONE (ROXICODONE) 10 MG TABS Take 1-1 5 tablets (10-15 mg total) by mouth every 4 (four) hours as needed (cancer pain  Max of 8 tabs / day ) NO FURTHER FILLS WITHOUT VISIT TO PALLIATIVE CLINIC 180 tablet 0    sertraline (ZOLOFT) 100 mg tablet TAKE 1 & 1/2 TABLETS BY MOUTH ONCE DAILY (Patient taking differently: Take 150 mg by mouth daily BY MOUTH ONCE DAILY) 45 tablet 5    al mag oxide-diphenhydramine-lidocaine viscous (MAGIC MOUTHWASH) 1:1:1 suspension Swish and swallow 10 mL every 6 (six) hours as needed for mouth pain or discomfort or mucositis 1 Bottle 2    cyclobenzaprine (FLEXERIL) 5 mg tablet Take 10 mg by mouth daily at bedtime as needed      fluconazole (DIFLUCAN) 200 mg tablet Take 1 tablet (200 mg total) by mouth daily for 12 doses (Patient not taking: Reported on 2/25/2021) 12 tablet 0    pantoprazole (PROTONIX) 40 mg tablet Take 1 tablet (40 mg total) by mouth daily 30 tablet 5     No current facility-administered medications for this visit          Past Medical History  Past Medical History:   Diagnosis Date    Disease of thyroid gland     Hypertension     Lup[facility] ([facility] [address] )     Malignant neoplasm of upper-inner quadrant of left breast in female, estrogen receptor negative ([facility] [address] ) 2/25/2020    Nephrolithiasis     PTSD (post-traumatic stress disorder)        Past Surgical History  Past Surgical History:   Procedure Laterality Date    FEMUR FRACTURE SURGERY      FL GUIDED CENTRAL VENOUS ACCESS DEVICE INSERTION  3/17/2020    HYSTERECTOMY      LEFT OOPHORECTOMY      due to torsion     MAMMO STEREOTACTIC BREAST BIOPSY RIGHT (ALL INC) Right 2/12/2020    MASTECTOMY W/ SENTINEL NODE BIOPSY Left 8/18/2020    Procedure: BREAST MASTECTOMY WITH SENTINEL LYMPH NODE BIOPSY, LYMPHATIC MAPPING WITH BLUE DYE AND RADIOACTIVE DYE (INJECT AT 1430 BY DR WRIGHT IN THE OR);   Surgeon: Rocky Johnson MD;  Location: [facility];  Service: Surgical Oncology    MRI BREAST BIOPSY LEFT (ALL INCLUSIVE) Left 3/20/2020    ND INSJ TUNNELED CTR VAD W/SUBQ PORT AGE 5 YR/> N/A 3/17/2020    Procedure: INSERTION VENOUS PORT ( PORT-A-CATH) IR;  Surgeon: Huang Case DO;  [facility]: [facility];  Service: Interventional Radiology    US GUIDANCE BREAST BIOPSY LEFT EACH ADDITIONAL Left 2/12/2020    US GUIDED BREAST BIOPSY LEFT COMPLETE Left 2/12/2020    VAGINA SURGERY         Past Social History   Social History     Socioeconomic History    Marital status: /Civil Union     Spouse name: None    Number of children: None    Years of education: None    Highest education level: None   Occupational History    None   Social Needs    Financial resource strain: None    Food insecurity     Worry: None     Inability: None    Transportation needs     Medical: None     Non-medical: None   Tobacco Use    Smoking status: Current Every Day Smoker     Packs/day: 0 50     Years: 40 00     Pack years: 20 00     Types: Cigarettes     Start date: 1990    Smokeless tobacco: Never Used   Substance and Sexual Activity    Alcohol use: Not Currently     Alcohol/week: 21 0 standard drinks     Types: 21 Cans of beer per week     Frequency: Never     Drinks per session: Patient refused     Binge frequency: Never     Comment: pt states she had her last beer 3/22/2020    Drug use: No    Sexual activity: Not Currently     Partners: Male     Birth control/protection: None   Lifestyle    Physical activity     Days per week: None     Minutes per session: None    Stress: None   Relationships    Social connections     Talks on phone: None     Gets together: None     Attends Christian service: None     Active member of club or organization: None     Attends meetings of clubs or organizations: None     Relationship status: None    Intimate partner violence     Fear of current or ex partner: None     Emotionally abused: None     Physically abused: None     Forced sexual activity: None   Other Topics Concern    None   Social History Narrative    None       The following portions of the patient's history were reviewed and updated as appropriate: allergies, current medications, past family history, past medical history, past social history, past surgical history and problem list     Vital Signs  Vitals:    03/05/21 1047   BP: 110/62   Weight: 54 7 kg (120 lb 9 6 oz)   Height: 5' 3" (1 6 m)       Physical Exam:  General appearance: alert, cooperative, no distress; chronically ill appearing  HEENT: normocephalic, anicteric, no eye erythema or discharge, no oropharyngeal thrush noted  Neck: supple, trachea midline, no adenopathy  Lungs: CTA b/l, no rales, rhonchi, or wheezing, unlabored respirations  Heart: RRR, no murmur, rubs, or gallops  Abdomen: soft, mild right sided abdominal discomfort, non-distended, normal bowel sounds, no masses or organomegaly  Rectal: deferred  Extremities: no cyanosis, clubbing; significant LLE edema  Musculoskeletal: slight unsteady gait  Skin: color and texture normal, no jaundice, no rashes or lesions  Psychiatric: alert and oriented, normal affect and behavior

## 2021-03-10 ENCOUNTER — OFFICE VISIT (OUTPATIENT)
Dept: PALLIATIVE MEDICINE | Facility: CLINIC | Age: 56
End: 2021-03-10
Payer: COMMERCIAL

## 2021-03-10 VITALS
DIASTOLIC BLOOD PRESSURE: 70 MMHG | TEMPERATURE: 97.6 F | HEART RATE: 67 BPM | RESPIRATION RATE: 16 BRPM | BODY MASS INDEX: 21.25 KG/M2 | SYSTOLIC BLOOD PRESSURE: 112 MMHG | HEIGHT: 63 IN | WEIGHT: 119.93 LBS

## 2021-03-10 DIAGNOSIS — G89.3 CANCER RELATED PAIN: ICD-10-CM

## 2021-03-10 DIAGNOSIS — C50.212 MALIGNANT NEOPLASM OF UPPER-INNER QUADRANT OF LEFT BREAST IN FEMALE, ESTROGEN RECEPTOR NEGATIVE (HCC): Primary | ICD-10-CM

## 2021-03-10 DIAGNOSIS — Z17.1 MALIGNANT NEOPLASM OF UPPER-INNER QUADRANT OF LEFT BREAST IN FEMALE, ESTROGEN RECEPTOR NEGATIVE (HCC): Primary | ICD-10-CM

## 2021-03-10 PROCEDURE — 1111F DSCHRG MED/CURRENT MED MERGE: CPT | Performed by: NURSE PRACTITIONER

## 2021-03-10 PROCEDURE — 99213 OFFICE O/P EST LOW 20 MIN: CPT | Performed by: NURSE PRACTITIONER

## 2021-03-10 NOTE — PROGRESS NOTES
Outpatient Follow-Up - Palliative and Supportive Care   Julia Houston 60 y o  female [id number]    Assessment & Plan  1  Malignant neoplasm of upper-inner quadrant of left breast in female, estrogen receptor negative ([address] )    2  Cancer related pain      - Counseling on health screening and disease prevention, COVID-19 specific (CPT V65 49)    Medications adjusted this encounter:  Requested Prescriptions      No prescriptions requested or ordered in this encounter     No orders of the defined types were placed in this encounter  There are no discontinued medications  Julia Houston was seen today for symptoms and planning cares related to above illnesses  I have reviewed the patient's controlled substance dispensing history in the Prescription Drug Monitoring Program in compliance with the [facility] regulations before prescribing any controlled substances  They are invited to continue to follow with us  If there are questions or concerns, please contact us through our clinic/answering service 24 hours a day, seven days a week  April ANNETTE Yu  [facility] Palliative and Supportive Care        Visit Information    Accompanied By: No one    Source of History: Self    History Limitations: None      History of Present Illness      Julia Houston is a 60 y o  female who presents in follow up of symptoms related to breast cancer pain  She experiences pain in her incision site approximately 3 times/month  Overall her cancer pain has significantly improved  She is still having generalized aches and pains that she associates with her Lupus diagnosis  She states taking acetaminophen for that pain is ineffective  She still sees her counselor fairly regularly  She finds support with her counselor who is helping her deal with PTSD  She takes xanax approximately 1-2 times/day  She reports she is sleeping relatively well  She wakes up intermittently to go to the bathroom        The pain she experiences is chronic shoulder pain and hip pain  Explained role of palliative medicine in dealing with chronic pain  The patient will follow up with her PCP and her rheumatologist for pain medications going forward  Her symptoms related to her cancer diagnosis are mainly resolved  She is happy to have progressed well following mastectomy and cancer treatment  She is encouraged her to call our office if she needs an appointment sooner than 6 months  Past medical, surgical, social, and family histories are reviewed and pertinent updates are made  Review of Systems   Constitution: Negative for decreased appetite and malaise/fatigue  Musculoskeletal: Positive for joint pain  Psychiatric/Behavioral: The patient is nervous/anxious  Vital Signs    /70 (BP Location: Right arm, Patient Position: Sitting, Cuff Size: Standard)   Pulse 67   Temp 97 6 °F (36 4 °C) (Temporal)   Resp 16   Ht 5' 3" (1 6 m)   Wt 54 4 kg (119 lb 14 9 oz)   LMP  (LMP Unknown)   BMI 21 24 kg/m²     Physical Exam and Objective Data  Physical Exam  Vitals signs and nursing note reviewed  Constitutional:       Appearance: Normal appearance  HENT:      Head: Normocephalic and atraumatic  Eyes:      Extraocular Movements: Extraocular movements intact  Pupils: Pupils are equal, round, and reactive to light  Neck:      Musculoskeletal: Normal range of motion  Cardiovascular:      Rate and Rhythm: Normal rate and regular rhythm  Pulses: Normal pulses  Neurological:      General: No focal deficit present  Mental Status: She is alert and oriented to person, place, and time  Mental status is at baseline     Psychiatric:         Attention and Perception: Attention normal          Mood and Affect: Mood normal          Behavior: Behavior normal            20+ minutes was spent face to face with Nolan Chi with greater than 50% of the time spent in counseling or coordination of care including discussions of review of symptoms, support   Additional time was also spent in discussing coronavirus vaccine indications, availability, and logistical challenges  All of the patient's or agent's questions were answered during this discussion

## 2021-03-12 DIAGNOSIS — F41.9 ANXIETY: ICD-10-CM

## 2021-03-16 ENCOUNTER — HOSPITAL ENCOUNTER (INPATIENT)
Facility: HOSPITAL | Age: 56
LOS: 3 days | Discharge: HOME | DRG: 249 | End: 2021-03-19
Attending: EMERGENCY MEDICINE | Admitting: INTERNAL MEDICINE
Payer: COMMERCIAL

## 2021-03-16 ENCOUNTER — HOSPITAL ENCOUNTER (OUTPATIENT)
Dept: INFUSION CENTER | Facility: CLINIC | Age: 56
Discharge: HOME | DRG: 249 | End: 2021-03-16
Payer: COMMERCIAL

## 2021-03-16 ENCOUNTER — TELEPHONE (OUTPATIENT)
Dept: HEMATOLOGY ONCOLOGY | Facility: CLINIC | Age: 56
End: 2021-03-16

## 2021-03-16 ENCOUNTER — TELEPHONE (OUTPATIENT)
Dept: CT IMAGING | Facility: HOSPITAL | Age: 56
End: 2021-03-16

## 2021-03-16 ENCOUNTER — APPOINTMENT (EMERGENCY)
Dept: CT IMAGING | Facility: HOSPITAL | Age: 56
DRG: 249 | End: 2021-03-16
Payer: COMMERCIAL

## 2021-03-16 VITALS
SYSTOLIC BLOOD PRESSURE: 126 MMHG | OXYGEN SATURATION: 100 % | HEART RATE: 97 BPM | TEMPERATURE: 97.8 F | RESPIRATION RATE: 20 BRPM | DIASTOLIC BLOOD PRESSURE: 90 MMHG

## 2021-03-16 DIAGNOSIS — Z17.1 MALIGNANT NEOPLASM OF UPPER-INNER QUADRANT OF LEFT BREAST IN FEMALE, ESTROGEN RECEPTOR NEGATIVE (HCC): ICD-10-CM

## 2021-03-16 DIAGNOSIS — T45.1X5A CHEMOTHERAPY INDUCED NEUTROPENIA (HCC): ICD-10-CM

## 2021-03-16 DIAGNOSIS — D70.1 CHEMOTHERAPY INDUCED NEUTROPENIA (HCC): ICD-10-CM

## 2021-03-16 DIAGNOSIS — R60.9 EDEMA, UNSPECIFIED TYPE: ICD-10-CM

## 2021-03-16 DIAGNOSIS — K52.9 COLITIS: Primary | ICD-10-CM

## 2021-03-16 DIAGNOSIS — C50.212 MALIGNANT NEOPLASM OF UPPER-INNER QUADRANT OF LEFT BREAST IN FEMALE, ESTROGEN RECEPTOR NEGATIVE (HCC): ICD-10-CM

## 2021-03-16 LAB
ALBUMIN SERPL BCP-MCNC: 3.3 G/DL (ref 3.5–5)
ALP SERPL-CCNC: 83 U/L (ref 46–116)
ALT SERPL W P-5'-P-CCNC: 14 U/L (ref 12–78)
ANION GAP SERPL CALCULATED.3IONS-SCNC: 18 MMOL/L (ref 4–13)
AST SERPL W P-5'-P-CCNC: 26 U/L (ref 5–45)
BACTERIA UR QL AUTO: ABNORMAL /HPF
BASOPHILS # BLD AUTO: 0.03 THOUSANDS/ΜL (ref 0–0.1)
BASOPHILS NFR BLD AUTO: 0 % (ref 0–1)
BILIRUB SERPL-MCNC: 0.3 MG/DL (ref 0.2–1)
BILIRUB UR QL STRIP: ABNORMAL
BUN SERPL-MCNC: 6 MG/DL (ref 5–25)
CALCIUM ALBUM COR SERPL-MCNC: 9.6 MG/DL (ref 8.3–10.1)
CALCIUM SERPL-MCNC: 9 MG/DL (ref 8.3–10.1)
CHLORIDE SERPL-SCNC: 99 MMOL/L (ref 100–108)
CLARITY UR: CLEAR
CO2 SERPL-SCNC: 20 MMOL/L (ref 21–32)
COLOR UR: YELLOW
CREAT SERPL-MCNC: 0.75 MG/DL (ref 0.6–1.3)
EOSINOPHIL # BLD AUTO: 0 THOUSAND/ΜL (ref 0–0.61)
EOSINOPHIL NFR BLD AUTO: 0 % (ref 0–6)
ERYTHROCYTE [DISTWIDTH] IN BLOOD BY AUTOMATED COUNT: 16.2 % (ref 11.6–15.1)
FLUAV RNA RESP QL NAA+PROBE: NEGATIVE
FLUBV RNA RESP QL NAA+PROBE: NEGATIVE
GFR SERPL CREATININE-BSD FRML MDRD: 89 ML/MIN/1.73SQ M
GLUCOSE SERPL-MCNC: 98 MG/DL (ref 65–140)
GLUCOSE UR STRIP-MCNC: NEGATIVE MG/DL
HCT VFR BLD AUTO: 39.5 % (ref 34.8–46.1)
HGB BLD-MCNC: 12.7 G/DL (ref 11.5–15.4)
HGB UR QL STRIP.AUTO: ABNORMAL
HYALINE CASTS #/AREA URNS LPF: ABNORMAL /LPF
IMM GRANULOCYTES # BLD AUTO: 0.03 THOUSAND/UL (ref 0–0.2)
IMM GRANULOCYTES NFR BLD AUTO: 0 % (ref 0–2)
KETONES UR STRIP-MCNC: ABNORMAL MG/DL
LACTATE SERPL-SCNC: 1.1 MMOL/L (ref 0.5–2)
LEUKOCYTE ESTERASE UR QL STRIP: NEGATIVE
LIPASE SERPL-CCNC: 73 U/L (ref 73–393)
LYMPHOCYTES # BLD AUTO: 0.83 THOUSANDS/ΜL (ref 0.6–4.47)
LYMPHOCYTES NFR BLD AUTO: 11 % (ref 14–44)
MCH RBC QN AUTO: 28.2 PG (ref 26.8–34.3)
MCHC RBC AUTO-ENTMCNC: 32.2 G/DL (ref 31.4–37.4)
MCV RBC AUTO: 88 FL (ref 82–98)
MONOCYTES # BLD AUTO: 0.58 THOUSAND/ΜL (ref 0.17–1.22)
MONOCYTES NFR BLD AUTO: 8 % (ref 4–12)
MUCOUS THREADS UR QL AUTO: ABNORMAL
NEUTROPHILS # BLD AUTO: 5.87 THOUSANDS/ΜL (ref 1.85–7.62)
NEUTS SEG NFR BLD AUTO: 81 % (ref 43–75)
NITRITE UR QL STRIP: NEGATIVE
NON-SQ EPI CELLS URNS QL MICRO: ABNORMAL /HPF
NRBC BLD AUTO-RTO: 0 /100 WBCS
PH UR STRIP.AUTO: 8.5 [PH] (ref 4.5–8)
PLATELET # BLD AUTO: 556 THOUSANDS/UL (ref 149–390)
PMV BLD AUTO: 10.7 FL (ref 8.9–12.7)
POTASSIUM SERPL-SCNC: 3.7 MMOL/L (ref 3.5–5.3)
PROCALCITONIN SERPL-MCNC: <0.05 NG/ML
PROT SERPL-MCNC: 7.1 G/DL (ref 6.4–8.2)
PROT UR STRIP-MCNC: ABNORMAL MG/DL
RBC # BLD AUTO: 4.51 MILLION/UL (ref 3.81–5.12)
RBC #/AREA URNS AUTO: ABNORMAL /HPF
RSV RNA RESP QL NAA+PROBE: NEGATIVE
SARS-COV-2 RNA RESP QL NAA+PROBE: NEGATIVE
SODIUM SERPL-SCNC: 137 MMOL/L (ref 136–145)
SP GR UR STRIP.AUTO: 1.02 (ref 1–1.03)
UROBILINOGEN UR QL STRIP.AUTO: 0.2 E.U./DL
WBC # BLD AUTO: 7.34 THOUSAND/UL (ref 4.31–10.16)
WBC #/AREA URNS AUTO: ABNORMAL /HPF

## 2021-03-16 PROCEDURE — 83605 ASSAY OF LACTIC ACID: CPT | Performed by: EMERGENCY MEDICINE

## 2021-03-16 PROCEDURE — 99285 EMERGENCY DEPT VISIT HI MDM: CPT | Performed by: EMERGENCY MEDICINE

## 2021-03-16 PROCEDURE — 96374 THER/PROPH/DIAG INJ IV PUSH: CPT

## 2021-03-16 PROCEDURE — 99223 1ST HOSP IP/OBS HIGH 75: CPT | Performed by: INTERNAL MEDICINE

## 2021-03-16 PROCEDURE — 87040 BLOOD CULTURE FOR BACTERIA: CPT | Performed by: EMERGENCY MEDICINE

## 2021-03-16 PROCEDURE — 81001 URINALYSIS AUTO W/SCOPE: CPT

## 2021-03-16 PROCEDURE — 96375 TX/PRO/DX INJ NEW DRUG ADDON: CPT

## 2021-03-16 PROCEDURE — 84145 PROCALCITONIN (PCT): CPT | Performed by: EMERGENCY MEDICINE

## 2021-03-16 PROCEDURE — 96361 HYDRATE IV INFUSION ADD-ON: CPT

## 2021-03-16 PROCEDURE — G1004 CDSM NDSC: HCPCS

## 2021-03-16 PROCEDURE — 74177 CT ABD & PELVIS W/CONTRAST: CPT

## 2021-03-16 PROCEDURE — 99285 EMERGENCY DEPT VISIT HI MDM: CPT

## 2021-03-16 PROCEDURE — 96376 TX/PRO/DX INJ SAME DRUG ADON: CPT

## 2021-03-16 PROCEDURE — 0241U HB NFCT DS VIR RESP RNA 4 TRGT: CPT | Performed by: EMERGENCY MEDICINE

## 2021-03-16 PROCEDURE — 36415 COLL VENOUS BLD VENIPUNCTURE: CPT | Performed by: EMERGENCY MEDICINE

## 2021-03-16 PROCEDURE — 85025 COMPLETE CBC W/AUTO DIFF WBC: CPT | Performed by: EMERGENCY MEDICINE

## 2021-03-16 PROCEDURE — 83690 ASSAY OF LIPASE: CPT | Performed by: EMERGENCY MEDICINE

## 2021-03-16 PROCEDURE — 80053 COMPREHEN METABOLIC PANEL: CPT | Performed by: EMERGENCY MEDICINE

## 2021-03-16 RX ORDER — ONDANSETRON 2 MG/ML
4 INJECTION INTRAMUSCULAR; INTRAVENOUS ONCE
Status: COMPLETED | OUTPATIENT
Start: 2021-03-16 | End: 2021-03-16

## 2021-03-16 RX ORDER — ALPRAZOLAM 0.5 MG/1
1 TABLET ORAL 3 TIMES DAILY PRN
Status: DISCONTINUED | OUTPATIENT
Start: 2021-03-16 | End: 2021-03-19 | Stop reason: HOSPADM

## 2021-03-16 RX ORDER — SODIUM CHLORIDE 9 MG/ML
125 INJECTION, SOLUTION INTRAVENOUS CONTINUOUS
Status: DISCONTINUED | OUTPATIENT
Start: 2021-03-16 | End: 2021-03-16

## 2021-03-16 RX ORDER — PANTOPRAZOLE SODIUM 40 MG/1
40 TABLET, DELAYED RELEASE ORAL DAILY
Status: DISCONTINUED | OUTPATIENT
Start: 2021-03-17 | End: 2021-03-19 | Stop reason: HOSPADM

## 2021-03-16 RX ORDER — LEVOTHYROXINE SODIUM 88 UG/1
88 TABLET ORAL DAILY
Status: DISCONTINUED | OUTPATIENT
Start: 2021-03-17 | End: 2021-03-19 | Stop reason: HOSPADM

## 2021-03-16 RX ORDER — METOCLOPRAMIDE HYDROCHLORIDE 5 MG/ML
10 INJECTION INTRAMUSCULAR; INTRAVENOUS EVERY 6 HOURS PRN
Status: DISCONTINUED | OUTPATIENT
Start: 2021-03-16 | End: 2021-03-19 | Stop reason: HOSPADM

## 2021-03-16 RX ORDER — HYDROMORPHONE HCL/PF 1 MG/ML
0.2 SYRINGE (ML) INJECTION ONCE
Status: COMPLETED | OUTPATIENT
Start: 2021-03-16 | End: 2021-03-16

## 2021-03-16 RX ORDER — OXYCODONE HYDROCHLORIDE 10 MG/1
10 TABLET ORAL EVERY 6 HOURS PRN
Status: DISCONTINUED | OUTPATIENT
Start: 2021-03-16 | End: 2021-03-19 | Stop reason: HOSPADM

## 2021-03-16 RX ORDER — SODIUM CHLORIDE, SODIUM LACTATE, POTASSIUM CHLORIDE, CALCIUM CHLORIDE 600; 310; 30; 20 MG/100ML; MG/100ML; MG/100ML; MG/100ML
50 INJECTION, SOLUTION INTRAVENOUS CONTINUOUS
Status: DISCONTINUED | OUTPATIENT
Start: 2021-03-16 | End: 2021-03-19 | Stop reason: HOSPADM

## 2021-03-16 RX ORDER — LEVOFLOXACIN 5 MG/ML
750 INJECTION, SOLUTION INTRAVENOUS EVERY 24 HOURS
Status: DISCONTINUED | OUTPATIENT
Start: 2021-03-16 | End: 2021-03-19 | Stop reason: HOSPADM

## 2021-03-16 RX ORDER — HYDROXYCHLOROQUINE SULFATE 200 MG/1
100 TABLET, FILM COATED ORAL
Status: DISCONTINUED | OUTPATIENT
Start: 2021-03-16 | End: 2021-03-19 | Stop reason: HOSPADM

## 2021-03-16 RX ORDER — CYCLOBENZAPRINE HCL 10 MG
10 TABLET ORAL
Status: DISCONTINUED | OUTPATIENT
Start: 2021-03-16 | End: 2021-03-19 | Stop reason: HOSPADM

## 2021-03-16 RX ORDER — HYDROMORPHONE HCL/PF 1 MG/ML
1 SYRINGE (ML) INJECTION
Status: DISCONTINUED | OUTPATIENT
Start: 2021-03-16 | End: 2021-03-19 | Stop reason: HOSPADM

## 2021-03-16 RX ORDER — LEVOFLOXACIN 5 MG/ML
750 INJECTION, SOLUTION INTRAVENOUS ONCE
Status: DISCONTINUED | OUTPATIENT
Start: 2021-03-16 | End: 2021-03-16

## 2021-03-16 RX ORDER — HYDROMORPHONE HCL/PF 1 MG/ML
0.5 SYRINGE (ML) INJECTION ONCE
Status: COMPLETED | OUTPATIENT
Start: 2021-03-16 | End: 2021-03-16

## 2021-03-16 RX ORDER — HYDROXYCHLOROQUINE SULFATE 200 MG/1
200 TABLET, FILM COATED ORAL
Status: DISCONTINUED | OUTPATIENT
Start: 2021-03-17 | End: 2021-03-19 | Stop reason: HOSPADM

## 2021-03-16 RX ADMIN — ALTEPLASE 2 MG: 2.2 INJECTION, POWDER, LYOPHILIZED, FOR SOLUTION INTRAVENOUS at 20:48

## 2021-03-16 RX ADMIN — HYDROMORPHONE HYDROCHLORIDE 0.5 MG: 1 INJECTION, SOLUTION INTRAMUSCULAR; INTRAVENOUS; SUBCUTANEOUS at 16:05

## 2021-03-16 RX ADMIN — HYDROMORPHONE HYDROCHLORIDE 1 MG: 1 INJECTION, SOLUTION INTRAMUSCULAR; INTRAVENOUS; SUBCUTANEOUS at 20:29

## 2021-03-16 RX ADMIN — ONDANSETRON 4 MG: 2 INJECTION INTRAMUSCULAR; INTRAVENOUS at 11:13

## 2021-03-16 RX ADMIN — ONDANSETRON 4 MG: 2 INJECTION INTRAMUSCULAR; INTRAVENOUS at 16:05

## 2021-03-16 RX ADMIN — METRONIDAZOLE 500 MG: 500 INJECTION, SOLUTION INTRAVENOUS at 16:29

## 2021-03-16 RX ADMIN — HYDROMORPHONE HYDROCHLORIDE 0.2 MG: 1 INJECTION, SOLUTION INTRAMUSCULAR; INTRAVENOUS; SUBCUTANEOUS at 11:48

## 2021-03-16 RX ADMIN — HYDROXYCHLOROQUINE SULFATE 100 MG: 200 TABLET, FILM COATED ORAL at 22:30

## 2021-03-16 RX ADMIN — HYDROMORPHONE HYDROCHLORIDE 0.2 MG: 1 INJECTION, SOLUTION INTRAMUSCULAR; INTRAVENOUS; SUBCUTANEOUS at 14:22

## 2021-03-16 RX ADMIN — LEVOFLOXACIN 750 MG: 5 INJECTION, SOLUTION INTRAVENOUS at 17:58

## 2021-03-16 RX ADMIN — IOHEXOL 100 ML: 350 INJECTION, SOLUTION INTRAVENOUS at 14:06

## 2021-03-16 RX ADMIN — SODIUM CHLORIDE, SODIUM LACTATE, POTASSIUM CHLORIDE, AND CALCIUM CHLORIDE 100 ML/HR: .6; .31; .03; .02 INJECTION, SOLUTION INTRAVENOUS at 20:34

## 2021-03-16 RX ADMIN — ONDANSETRON 4 MG: 2 INJECTION INTRAMUSCULAR; INTRAVENOUS at 11:56

## 2021-03-16 RX ADMIN — SODIUM CHLORIDE 1000 ML: 0.9 INJECTION, SOLUTION INTRAVENOUS at 11:13

## 2021-03-16 RX ADMIN — ALPRAZOLAM 1 MG: 0.5 TABLET ORAL at 22:35

## 2021-03-16 NOTE — TELEPHONE ENCOUNTER
Patients  calling to report that patient started vomiting last night around 9:30 and have been "up sick all night"  Patient has not tried anti nausea medication as she can not keep anything down   Patient is scheduled to go to the infusion center today at 09:30   is asking what patient should do - should she still report to the infusion center?     Will send to RN to discuss with Dr Cassandria Lombard   Call back number for [place]

## 2021-03-16 NOTE — ASSESSMENT & PLAN NOTE
· Acute onset last evening   Patient reports inability to tolerate any food/liquid  · Recent admission for candidal esophagitis-- completed course of fluconazole and she did have improvement in sx of N/V/odynophagia after this  · N/V today could be 2/2 colitis   · Anti-emetics  · CLD  · Consider GI consult

## 2021-03-16 NOTE — ASSESSMENT & PLAN NOTE
· Anion gap of 18  · Likely 2/2 ongoing N/V/poor PO intake  · IVF  · Anti-emetics  · Daily BMP to eval for resolution

## 2021-03-16 NOTE — ED PROVIDER NOTES
History  Chief Complaint   Patient presents with    Vomiting     pt sent from infusion center for further eval of n/v which started yesterday  57-year-old female presents to the emergency department having been referred over from the infusion center, after access of her port though prior to receipt of today scheduled chemotherapy  She has had nausea and vomiting with inability to keep anything down since 20:00 last night  She has felt poorly the last couple of days with nausea and loss of appetite  No fevers  Today she appreciates pain in the right lower abdomen which has been ongoing  She has noted a number of things going on with her body recently  She has had intermittent dysuria which made her suspicious for UTI and other days without any symptoms  For the last 3 months stools have waxed and waned from normal to very loose  Over the past couple of days these are extremely thin  She has not appreciated blood in them  She does have lupus and relates that her right foot became quite swollen approximately 2 weeks ago without identified provocation  This improved over approximately a week and then seemed to jump to the other side    She describes similar swelling in the left foot which has gone down over the last week  No associated calf pain  No associated change in activity which would have triggered this  She has not had this is a symptom of her lupus in the past   Medical history otherwise significant for hypertension and breast cancer  She underwent surgical treatment for this and has been receiving chemotherapy regularly  Abdominal surgical history significant for partial hysterectomy and oophorectomy diagnosis of ovarian cyst   She believes that the other ovary has also been removed  Does have hiatal hernia and known small right kidney stone  Has been hospitalized recently for UTI  Prior records briefly reviewed    She was hospitalized in February with chest discomfort, nausea and vomiting  EGD performed at that time revealing candidal esophagitis  Prior to Admission Medications   Prescriptions Last Dose Informant Patient Reported? Taking? ALPRAZolam (XANAX) 1 mg tablet   No No   Sig: TAKE ONE TABLET BY MOUTH THREE TIMES A DAY AS NEEDED FOR ANXIETY   Cholecalciferol (VITAMIN D3) 26456 units CAPS  Self Yes No   Sig: Take 50,000 Units by mouth once a week    Nutritional Supplements (OSTEOPOROSIS SUPPORT PO)   Yes No   Sig: Take 1 capsule by mouth daily    acetaminophen (TYLENOL) 325 mg tablet   No No   Sig: Take 2 tablets (650 mg total) by mouth every 6 (six) hours as needed for mild pain, headaches or fever   al mag oxide-diphenhydramine-lidocaine viscous (MAGIC MOUTHWASH) 1:1:1 suspension   No No   Sig: Swish and swallow 10 mL every 6 (six) hours as needed for mouth pain or discomfort or mucositis   cyclobenzaprine (FLEXERIL) 10 mg tablet   Yes No   Sig: Take 10 mg by mouth daily at bedtime    cyclobenzaprine (FLEXERIL) 5 mg tablet   Yes No   Sig: Take 10 mg by mouth daily at bedtime as needed   furosemide (LASIX) 20 mg tablet   No No   Sig: Take 1 tablet (20 mg total) by mouth daily   hydroxychloroquine (PLAQUENIL) 200 mg tablet  Self Yes No   Sig: Take 1 tablet in morning and 1/2 tablet in evening   levothyroxine 88 mcg tablet   No No   Sig: TAKE ONE TABLET BY MOUTH EVERY DAY   ondansetron (ZOFRAN) 4 mg tablet   No No   Sig: Take 1 tablet (4 mg total) by mouth every 8 (eight) hours as needed for nausea or vomiting   oxyCODONE (ROXICODONE) 10 MG TABS   No No   Sig: Take 1-1 5 tablets (10-15 mg total) by mouth every 4 (four) hours as needed (cancer pain    Max of 8 tabs / day ) NO FURTHER FILLS WITHOUT VISIT TO PALLIATIVE CLINIC   pantoprazole (PROTONIX) 40 mg tablet   No No   Sig: Take 1 tablet (40 mg total) by mouth daily   sertraline (ZOLOFT) 100 mg tablet   No No   Sig: TAKE 1 & 1/2 TABLETS BY MOUTH ONCE DAILY   Patient taking differently: Take 150 mg by mouth daily BY MOUTH ONCE DAILY      Facility-Administered Medications: None       Past Medical History:   Diagnosis Date    Disease of thyroid gland     Hypertension     Lupus ([facility] [address] )     Malignant neoplasm of upper-inner quadrant of left breast in female, estrogen receptor negative ([facility] [address] ) 2/25/2020    Nephrolithiasis     PTSD (post-traumatic stress disorder)        Past Surgical History:   Procedure Laterality Date    FEMUR FRACTURE SURGERY      FL GUIDED CENTRAL VENOUS ACCESS DEVICE INSERTION  3/17/2020    HYSTERECTOMY      LEFT OOPHORECTOMY      due to torsion     MAMMO STEREOTACTIC BREAST BIOPSY RIGHT (ALL INC) Right 2/12/2020    MASTECTOMY W/ SENTINEL NODE BIOPSY Left 8/18/2020    Procedure: BREAST MASTECTOMY WITH SENTINEL LYMPH NODE BIOPSY, LYMPHATIC MAPPING WITH BLUE DYE AND RADIOACTIVE DYE (INJECT AT 1430 BY DR WRIGHT IN THE OR); Surgeon: Andree Kirk MD;  Location: [facility];  Service: Surgical Oncology    MRI BREAST BIOPSY LEFT (ALL INCLUSIVE) Left 3/20/2020    OH INSJ TUNNELED CTR VAD W/SUBQ PORT AGE 5 YR/> N/A 3/17/2020    Procedure: INSERTION VENOUS PORT ( PORT-A-CATH) IR;  Surgeon: Ekaterina Ballesteros DO;  [facility];  Service: Interventional Radiology    US GUIDANCE BREAST BIOPSY LEFT EACH ADDITIONAL Left 2/12/2020    US GUIDED BREAST BIOPSY LEFT COMPLETE Left 2/12/2020    VAGINA SURGERY         Family History   Problem Relation Age of Onset    Diabetes Mother     Hypertension Mother     Nephrolithiasis Mother     Breast cancer Mother 79    Heart disease Father     Hypertension Father     Diabetes Brother     Nephrolithiasis Brother     Breast cancer Maternal Aunt     No Known Problems Maternal Grandmother     No Known Problems Maternal Grandfather     No Known Problems Paternal Grandmother     No Known Problems Paternal Grandfather      I have reviewed and agree with the history as documented      E-Cigarette/Vaping    E-Cigarette Use Never User      E-Cigarette/Vaping Substances Social History     Tobacco Use    Smoking status: Current Every Day Smoker     Packs/day: 0 50     Years: 40 00     Pack years: 20 00     Types: Cigarettes     Start date: 200    Smokeless tobacco: Never Used   Substance Use Topics    Alcohol use: Not Currently     Alcohol/week: 21 0 standard drinks     Types: 21 Cans of beer per week     Frequency: Never     Drinks per session: Patient refused     Binge frequency: Never     Comment: pt states she had her last beer 3/22/2020    Drug use: No       Review of Systems   All other systems reviewed and are negative  Physical Exam  Physical Exam  Vitals signs and nursing note reviewed  Constitutional:       General: She is in acute distress  Appearance: She is ill-appearing  Comments: Patient frail in ill appearing  Mucous membranes are slightly dry   HENT:      Mouth/Throat:      Mouth: Mucous membranes are dry  Eyes:      General: No scleral icterus  Extraocular Movements: Extraocular movements intact  Conjunctiva/sclera: Conjunctivae normal    Cardiovascular:      Rate and Rhythm: Normal rate and regular rhythm  Pulmonary:      Effort: Pulmonary effort is normal       Breath sounds: Normal breath sounds  Abdominal:      General: Bowel sounds are decreased  Palpations: Abdomen is soft  Tenderness: There is generalized abdominal tenderness (Maximal in the right lower quadrant)  There is right CVA tenderness and left CVA tenderness  There is no guarding or rebound  Musculoskeletal: Normal range of motion  Comments: No edema of the calf/ankles  The sites are nontender  Plus two PT pulses bilaterally   Skin:     General: Skin is warm and dry  Neurological:      General: No focal deficit present  Mental Status: She is oriented to person, place, and time     Psychiatric:         Mood and Affect: Mood normal          Vital Signs  ED Triage Vitals   Temperature Pulse Respirations Blood Pressure SpO2   03/16/21 1023 03/16/21 1023 03/16/21 1023 03/16/21 1023 03/16/21 1023   98 2 °F (36 8 °C) 70 18 169/76 100 %      Temp Source Heart Rate Source Patient Position - Orthostatic VS BP Location FiO2 (%)   03/16/21 1023 03/16/21 1023 03/16/21 1023 03/16/21 1023 --   Oral Monitor Sitting Right arm       Pain Score       03/16/21 1422       7           Vitals:    03/16/21 1023 03/16/21 1430 03/16/21 1655 03/16/21 1855   BP: 169/76 133/73 142/71 147/79   Pulse: 70 80 82 89   Patient Position - Orthostatic VS: Sitting  Lying Lying         Visual Acuity      ED Medications  Medications   sertraline (ZOLOFT) tablet 150 mg (has no administration in time range)   pantoprazole (PROTONIX) EC tablet 40 mg (has no administration in time range)   oxyCODONE (ROXICODONE) immediate release tablet 10 mg (has no administration in time range)   oxyCODONE (ROXICODONE) IR tablet 15 mg (has no administration in time range)   levothyroxine tablet 88 mcg (has no administration in time range)   hydroxychloroquine (PLAQUENIL) tablet 200 mg (has no administration in time range)   hydroxychloroquine (PLAQUENIL) tablet 100 mg (has no administration in time range)   cyclobenzaprine (FLEXERIL) tablet 10 mg (has no administration in time range)   al mag oxide-diphenhydramine-lidocaine viscous (MAGIC MOUTHWASH) suspension 10 mL (has no administration in time range)   ALPRAZolam (XANAX) tablet 1 mg (has no administration in time range)   lactated ringers infusion (100 mL/hr Intravenous New Bag 3/16/21 2034)   nicotine (NICODERM CQ) 7 mg/24hr TD 24 hr patch 1 patch (has no administration in time range)   ondansetron (ZOFRAN) 8 mg in sodium chloride 0 9 % 50 mL IVPB (has no administration in time range)   metoclopramide (REGLAN) injection 10 mg (has no administration in time range)   enoxaparin (LOVENOX) subcutaneous injection 40 mg (has no administration in time range)   levofloxacin (LEVAQUIN) IVPB (premix in dextrose) 750 mg 150 mL (750 mg Intravenous New Bag 3/16/21 1758)   metroNIDAZOLE (FLAGYL) IVPB (premix) 500 mg 100 mL (has no administration in time range)   HYDROmorphone (DILAUDID) injection 1 mg (1 mg Intravenous Given 3/16/21 2029)   sodium chloride 0 9 % bolus 1,000 mL (0 mL Intravenous Stopped 3/16/21 1651)   ondansetron (ZOFRAN) injection 4 mg (4 mg Intravenous Given 3/16/21 1113)   HYDROmorphone (DILAUDID) injection 0 2 mg (0 2 mg Intravenous Given 3/16/21 1148)   ondansetron (ZOFRAN) injection 4 mg (4 mg Intravenous Given 3/16/21 1156)   HYDROmorphone (DILAUDID) injection 0 2 mg (0 2 mg Intravenous Given 3/16/21 1422)   iohexol (OMNIPAQUE) 350 MG/ML injection (SINGLE-DOSE) 100 mL (100 mL Intravenous Given 3/16/21 1406)   ondansetron (ZOFRAN) injection 4 mg (4 mg Intravenous Given 3/16/21 1605)   HYDROmorphone (DILAUDID) injection 0 5 mg (0 5 mg Intravenous Given 3/16/21 1605)   metroNIDAZOLE (FLAGYL) IVPB (premix) 500 mg 100 mL (0 mg Intravenous Stopped 3/16/21 1651)   alteplase (CATHFLO) injection 2 mg (2 mg Intracatheter Given 3/16/21 2048)       Diagnostic Studies  Results Reviewed     Procedure Component Value Units Date/Time    Blood culture #1 [[id number]] Collected: 03/16/21 1151    Lab Status: Preliminary result Specimen: Blood from Arm, Right Updated: 03/16/21 1601     Blood Culture Received in Microbiology Lab  Culture in Progress  Blood culture #2 [[id number]] Collected: 03/16/21 1151    Lab Status: Preliminary result Specimen: Blood from Hand, Right Updated: 03/16/21 1601     Blood Culture Received in Microbiology Lab  Culture in Progress      Procalcitonin with AM Reflex [[id number]]  (Normal) Collected: 03/16/21 1151    Lab Status: Final result Specimen: Blood from Central Venous Line Updated: 03/16/21 1544     Procalcitonin <0 05 ng/ml     Procalcitonin Reflex [[id number]]     Lab Status: No result Specimen: Blood     COVID[facility], [facility]/B, [facility] [[id number]]  (Normal) Collected: 03/16/21 1151    Lab Status: Final result Specimen: Nares from Nasopharyngeal Swab Updated: 03/16/21 1237     SARS-CoV-2 Negative     INFLUENZA A PCR Negative     INFLUENZA B PCR Negative     RSV PCR Negative    Narrative: This test has been authorized by FDA under an EUA (Emergency Use Assay) for use by authorized laboratories  Clinical caution and judgement should be used with the interpretation of these results with consideration of the clinical impression and other laboratory testing  Testing reported as "Positive" or "Negative" has been proven to be accurate according to standard laboratory validation requirements  All testing is performed with control materials showing appropriate reactivity at standard intervals  Urine Microscopic [[id number]]  (Abnormal) Collected: 03/16/21 1131    Lab Status: Final result Specimen: Urine, Clean Catch Updated: 03/16/21 1228     RBC, UA 1-2 /hpf      WBC, UA 2-4 /hpf      Epithelial Cells Occasional /hpf      Bacteria, UA Occasional /hpf      Hyaline Casts, UA 0-1 /lpf      MUCUS THREADS Moderate    Lactic acid [[id number]]  (Normal) Collected: 03/16/21 1115    Lab Status: Final result Specimen: Blood from Central Venous Line Updated: 03/16/21 1142     LACTIC ACID 1 1 mmol/L     Narrative:      Result may be elevated if tourniquet was used during collection      Comprehensive metabolic panel [[id number]]  (Abnormal) Collected: 03/16/21 1115    Lab Status: Final result Specimen: Blood from Central Venous Line Updated: 03/16/21 1137     Sodium 137 mmol/L      Potassium 3 7 mmol/L      Chloride 99 mmol/L      CO2 20 mmol/L      ANION GAP 18 mmol/L      BUN 6 mg/dL      Creatinine 0 75 mg/dL      Glucose 98 mg/dL      Calcium 9 0 mg/dL      Corrected Calcium 9 6 mg/dL      AST 26 U/L      ALT 14 U/L      Alkaline Phosphatase 83 U/L      Total Protein 7 1 g/dL      Albumin 3 3 g/dL      Total Bilirubin 0 30 mg/dL      eGFR 89 ml/min/1 73sq m     Narrative:      [place] guidelines for Chronic Kidney Disease (CKD):     Stage 1 with normal or high GFR (GFR > 90 mL/min/1 73 square meters)    Stage 2 Mild CKD (GFR = 60-89 mL/min/1 73 square meters)    Stage 3A Moderate CKD (GFR = 45-59 mL/min/1 73 square meters)    Stage 3B Moderate CKD (GFR = 30-44 mL/min/1 73 square meters)    Stage 4 Severe CKD (GFR = 15-29 mL/min/1 73 square meters)    Stage 5 End Stage CKD (GFR <15 mL/min/1 73 square meters)  Note: GFR calculation is accurate only with a steady state creatinine    Lipase [[id number]]  (Normal) Collected: 03/16/21 1115    Lab Status: Final result Specimen: Blood from Central Venous Line Updated: 03/16/21 1137     Lipase 73 u/L     Urine Macroscopic, POC [[id number]]  (Abnormal) Collected: 03/16/21 1131    Lab Status: Final result Specimen: Urine Updated: 03/16/21 1133     Color, UA Yellow     Clarity, UA Clear     pH, UA 8 5     Leukocytes, UA Negative     Nitrite, UA Negative     Protein, UA 30 (1+) mg/dl      Glucose, UA Negative mg/dl      Ketones, UA 80 (3+) mg/dl      Urobilinogen, UA 0 2 E U /dl      Bilirubin, UA Interference- unable to analyze     Blood, UA Trace     Specific [place], UA 1 025    Narrative:      CLINITEK RESULT    CBC and differential [[id number]]  (Abnormal) Collected: 03/16/21 1115    Lab Status: Final result Specimen: Blood from Central Venous Line Updated: 03/16/21 1123     WBC 7 34 Thousand/uL      RBC 4 51 Million/uL      Hemoglobin 12 7 g/dL      Hematocrit 39 5 %      MCV 88 fL      MCH 28 2 pg      MCHC 32 2 g/dL      RDW 16 2 %      MPV 10 7 fL      Platelets 387 Thousands/uL      nRBC 0 /100 WBCs      Neutrophils Relative 81 %      Immat GRANS % 0 %      Lymphocytes Relative 11 %      Monocytes Relative 8 %      Eosinophils Relative 0 %      Basophils Relative 0 %      Neutrophils Absolute 5 87 Thousands/µL      Immature Grans Absolute 0 03 Thousand/uL      Lymphocytes Absolute 0 83 Thousands/µL      Monocytes Absolute 0 58 Thousand/µL      Eosinophils Absolute 0 00 Thousand/µL      Basophils Absolute 0 03 Thousands/µL     Clostridium difficile toxin by PCR with EIA [[id number]]     Lab Status: No result Specimen: Stool     Stool Enteric Bacterial Panel by PCR [[id number]]     Lab Status: No result Specimen: Stool                  CT abdomen pelvis with contrast   Final Result by Yariel Sorenson MD (03/16 1423)      Mild mucosal enhancement in the colon with associated mild colonic wall thickening suggests colitis   No bowel obstruction      No acute appendicitis      Workstation performed: [id number]                    Procedures  Procedures         ED Course  ED Course as of Mar 16 2111   Tue Mar 16, 2021   1209 UA reveals evidence of dehydration  Leukocyte esterase and nitrite negative suggests away from UTI      1314 Nausea much improved at this time  Still reports good amount of discomfort across the lower abdomen  Additional hydromorphone ordered  1640 Patient has had return discomfort and nausea  She appreciates that her mouth is quite dry and wishes to try ice chips in small sips fluid  Reviewed colitis diagnosis and plan for admission  Initial Sepsis Screening     Row Name 03/16/21 1616                Is the patient's history suggestive of a new or worsening infection? (!) Yes (Proceed)  -SZ        Suspected source of infection  acute abdominal infection  -SZ        Are two or more of the following signs & symptoms of infection both present and new to the patient? No  -SZ        Indicate SIRS criteria  --        If the answer is yes to both questions, suspicion of sepsis is present  --        If severe sepsis is present AND tissue hypoperfusion perists in the hour after fluid resuscitation or lactate > 4, the patient meets criteria for SEPTIC SHOCK  --        Are any of the following organ dysfunction criteria present within 6 hours of suspected infection and SIRS criteria that are NOT considered to be chronic conditions?   -- Organ dysfunction  --        Date of presentation of severe sepsis  --        Time of presentation of severe sepsis  --        Tissue hypoperfusion persists in the hour after crystalloid fluid administration, evidenced, by either:  --        Was hypotension present within one hour of the conclusion of crystalloid fluid administration?  --        Date of presentation of septic shock  --        Time of presentation of septic shock  --          User Key  (r) = Recorded By, (t) = Taken By, (c) = Cosigned By    [address] Name Provider Type    ALEXI Wright MD Physician                        MDM    Disposition  Final diagnoses:   Colitis     Time reflects when diagnosis was documented in both MDM as applicable and the Disposition within this note     Time User Action Codes Description Comment    3/16/2021  4:38 PM Christa GIRON Add [K52 9] Colitis       ED Disposition     ED Disposition Condition Date/Time Comment    Admit Stable Tue Mar 16, 2021  4:38 PM Case was discussed with GRACIE Dawkins and the patient's admission status was agreed to be Admission Status: inpatient status to the service of Dr Suzanna Faustin          Follow-up Information    None         Current Discharge Medication List      CONTINUE these medications which have NOT CHANGED    Details   acetaminophen (TYLENOL) 325 mg tablet Take 2 tablets (650 mg total) by mouth every 6 (six) hours as needed for mild pain, headaches or fever  Qty: 30 tablet, Refills: 0    Associated Diagnoses: Systemic lupus erythematosus, unspecified SLE type, unspecified organ involvement status (HCC)      al mag oxide-diphenhydramine-lidocaine viscous (MAGIC MOUTHWASH) 1:1:1 suspension Swish and swallow 10 mL every 6 (six) hours as needed for mouth pain or discomfort or mucositis  Qty: 1 Bottle, Refills: 2    Associated Diagnoses: Esophagitis      ALPRAZolam (XANAX) 1 mg tablet TAKE ONE TABLET BY MOUTH THREE TIMES A DAY AS NEEDED FOR ANXIETY  Qty: 30 tablet, Refills: 0    Associated Diagnoses: Anxiety      Cholecalciferol (VITAMIN D3) 06096 units CAPS Take 50,000 Units by mouth once a week       !! cyclobenzaprine (FLEXERIL) 10 mg tablet Take 10 mg by mouth daily at bedtime       !! cyclobenzaprine (FLEXERIL) 5 mg tablet Take 10 mg by mouth daily at bedtime as needed      furosemide (LASIX) 20 mg tablet Take 1 tablet (20 mg total) by mouth daily  Qty: 10 tablet, Refills: 0    Associated Diagnoses: Edema, unspecified type      hydroxychloroquine (PLAQUENIL) 200 mg tablet Take 1 tablet in morning and 1/2 tablet in evening      levothyroxine 88 mcg tablet TAKE ONE TABLET BY MOUTH EVERY DAY  Qty: 30 tablet, Refills: 5    Associated Diagnoses: Hypothyroidism, unspecified type      Nutritional Supplements (OSTEOPOROSIS SUPPORT PO) Take 1 capsule by mouth daily       ondansetron (ZOFRAN) 4 mg tablet Take 1 tablet (4 mg total) by mouth every 8 (eight) hours as needed for nausea or vomiting  Qty: 30 tablet, Refills: 3    Associated Diagnoses: Malignant neoplasm of upper-inner quadrant of left breast in female, estrogen receptor negative (HCC)      oxyCODONE (ROXICODONE) 10 MG TABS Take 1-1 5 tablets (10-15 mg total) by mouth every 4 (four) hours as needed (cancer pain  Max of 8 tabs / day ) NO FURTHER FILLS WITHOUT VISIT TO PALLIATIVE CLINIC  Qty: 180 tablet, Refills: 0    Comments: #60 tabs for 1week supply  Associated Diagnoses: Malignant neoplasm of upper-inner quadrant of left breast in female, estrogen receptor negative (HCC)      pantoprazole (PROTONIX) 40 mg tablet Take 1 tablet (40 mg total) by mouth daily  Qty: 30 tablet, Refills: 5    Associated Diagnoses: Esophageal dysphagia      sertraline (ZOLOFT) 100 mg tablet TAKE 1 & 1/2 TABLETS BY MOUTH ONCE DAILY  Qty: 45 tablet, Refills: 5    Associated Diagnoses: Anxiety       ! ! - Potential duplicate medications found  Please discuss with provider  No discharge procedures on file      PDMP Review Value Time User    PDMP Reviewed  Yes 2/20/2021  1:29 PM Kristin Murphy MD          ED Provider  Electronically Signed by           Adriana Moraes MD  03/16/21 2111

## 2021-03-16 NOTE — ED NOTES
Pt given allergy band and limb restriction band  She also received pj pants and socks  Report given to Charge RN and was advised that pt had meds in the med cabinet in the room         Aylin Grajeda  03/16/21 [id number]

## 2021-03-16 NOTE — ASSESSMENT & PLAN NOTE
· Dx March 2020  · S/P Neoadjuvant chemotherapy with [facility] with pertuzumab x3 cycles, completed in early May 2020; S/P mastectomy and sentinel lymph node biopsy August 2020   · Currently receiving adjuvant trastuzumab and pertuzumab with no cardiac toxicity-- Q3 weeks-- plans to complete June 2021  · Last treatment 2/23/21  · Follows w/ Dr Racquel Alvarez

## 2021-03-16 NOTE — H&P
[facility]  H&P- Raciel Esparza 1965, 60 y o  female MRN: [id number]  Unit/Bed#: BAUTISTA Encounter: [id number]  Primary Care Provider: Ramses Martínez MD   Date and time admitted to hospital: 3/16/2021 10:15 AM    * Colitis  Assessment & Plan  · Patient w/ RLQ abdominal pain for the last few days  Reported N/V started yesterday around 8PM and patient has not been able to keep any food/drink down   · Patient's bowel habits have oscillated between normal BM and a few days loose/watery stool since being on chemo for breast CA  · CT A/P:  Mild mucosal enhancement in the colon with associated mild colonic wall thickening suggests colitis  · DDx: chemo induced vs  Infectious vs  Viral-- low suspicion for ischemic colitis   · IVF  · Pain control   · CLD-- advance as tolerated  · Check stool studies: c diff and enteric bacteria  · Received levaquin and flagyl in ED-- continue at this time  · First PCT negative; repeat tomorrow AM  · Consider GI consult    Nausea and vomiting  Assessment & Plan  · Acute onset last evening   Patient reports inability to tolerate any food/liquid  · Recent admission for candidal esophagitis-- completed course of fluconazole and she did have improvement in sx of N/V/odynophagia after this  · N/V today could be 2/2 colitis   · Anti-emetics  · CLD  · Consider GI consult    Cancer related pain  Assessment & Plan  · Patient follows w/ palliative care  · Current regimen, reviewed in PDMP: oxy IR 10-15mg PO Q4 PRN     Increased anion gap metabolic acidosis  Assessment & Plan  · Anion gap of 18  · Likely 2/2 ongoing N/V/poor PO intake  · IVF  · Anti-emetics  · Daily BMP to eval for resolution    Malignant neoplasm of upper-inner quadrant of left breast in female, estrogen receptor negative ([facility] [address] )  Assessment & Plan  · Dx March 2020  · S/P Neoadjuvant chemotherapy with MEZA FABIAN with pertuzumab x3 cycles, completed in early May 2020; S/P mastectomy and sentinel lymph node biopsy August 2020   · Currently receiving adjuvant trastuzumab and pertuzumab with no cardiac toxicity-- Q3 weeks-- plans to complete June 2021  · Last treatment 2/23/21  · Follows w/ Dr Reina Sinha stress disorder  Assessment & Plan  · Patient follows w/ a counselor   · Continue w/ zoloft and xanax PRN    Systemic lupus erythematosus ([address] )  Assessment & Plan  · Follows w/ rheumatology   · On methotrexate      VTE Prophylaxis: Enoxaparin (Lovenox)  / sequential compression device   Code Status: level 1  POLST: POLST form is not discussed and not completed at this time  Discussion with family:  aware of need for admission    Anticipated Length of Stay:  Patient will be admitted on an Inpatient basis with an anticipated length of stay of  > 2 midnights  Justification for Hospital Stay: tx and eval of colitis, N/V    Total Time for Visit, including Counseling / Coordination of Care: 30 minutes  Greater than 50% of this total time spent on direct patient counseling and coordination of care  Chief Complaint:   Abdominal pain, N/V    History of Present Illness:    Birdie Lefort is a 60 y o  female with PMH significant for breast CA, SLE, PTSD presents to the ED for eval of abdominal pain/N/V  Patient reports she suddenly developed N/V last night around 8PM  She has not been able to keep any food down in the last near 24 hours despite anti-emetic therapy  She reports that she started to have abdominal pain a few days ago  Located in RLQ, constant pain  Non-radiating  She reports change in bowel habits since starting chemo-- she oscillates between normal stools and diarrhea  She denies any fevers/chills  She is unsure if she has been on abx recently  She reports her last chemo session was 3 weeks ago-- she did go to her scheduled appointment today, but given her sx they referred her to the ED  Since arriving in the ED and getting IVF, anti-emetics, and pain medication, she is feeling somewhat better  Review of Systems:    Review of Systems   Constitutional: Negative  HENT: Negative  Eyes: Negative  Respiratory: Negative  Cardiovascular: Positive for leg swelling  Gastrointestinal: Positive for abdominal pain, diarrhea, nausea and vomiting  Genitourinary: Negative  Musculoskeletal: Negative  Skin: Negative  Neurological: Negative  Hematological: Negative  Psychiatric/Behavioral: Negative  Past Medical and Surgical History:     Past Medical History:   Diagnosis Date    Disease of thyroid gland     Hypertension     Lupus ([facility] [address] )     Malignant neoplasm of upper-inner quadrant of left breast in female, estrogen receptor negative ([facility] [address] ) 2/25/2020    Nephrolithiasis     PTSD (post-traumatic stress disorder)        Past Surgical History:   Procedure Laterality Date    FEMUR FRACTURE SURGERY      FL GUIDED CENTRAL VENOUS ACCESS DEVICE INSERTION  3/17/2020    HYSTERECTOMY      LEFT OOPHORECTOMY      due to torsion     MAMMO STEREOTACTIC BREAST BIOPSY RIGHT (ALL INC) Right 2/12/2020    MASTECTOMY W/ SENTINEL NODE BIOPSY Left 8/18/2020    Procedure: BREAST MASTECTOMY WITH SENTINEL LYMPH NODE BIOPSY, LYMPHATIC MAPPING WITH BLUE DYE AND RADIOACTIVE DYE (INJECT AT 1430 BY DR WRIGHT IN THE OR); Surgeon: Mica Martinez MD;  Location: [facility];  Service: Surgical Oncology    MRI BREAST BIOPSY LEFT (ALL INCLUSIVE) Left 3/20/2020    IN INSJ TUNNELED CTR VAD W/SUBQ PORT AGE 5 YR/> N/A 3/17/2020    Procedure: INSERTION VENOUS PORT ( PORT-A-CATH) IR;  Surgeon: Lynn Guzman DO;  [facility]: [facility];  Service: Interventional Radiology    US GUIDANCE BREAST BIOPSY LEFT EACH ADDITIONAL Left 2/12/2020    US GUIDED BREAST BIOPSY LEFT COMPLETE Left 2/12/2020    VAGINA SURGERY         Meds/Allergies:    Prior to Admission medications    Medication Sig Start Date End Date Taking?  Authorizing Provider   acetaminophen (TYLENOL) 325 mg tablet Take 2 tablets (650 mg total) by mouth every 6 (six) hours as needed for mild pain, headaches or fever 8/27/20   Hernandez Sellers PA-C   al mag oxide-diphenhydramine-lidocaine viscous (MAGIC MOUTHWASH) 1:1:1 suspension Swish and swallow 10 mL every 6 (six) hours as needed for mouth pain or discomfort or mucositis 3/5/21   [facility] [place]   ALPRAZolam Azell ) 1 mg tablet TAKE ONE TABLET BY MOUTH THREE TIMES A DAY AS NEEDED FOR ANXIETY 3/1/21   April D ANNETTE Hawk   Cholecalciferol (VITAMIN D3) 18997 units CAPS Take 50,000 Units by mouth once a week     Historical Provider, MD   cyclobenzaprine (FLEXERIL) 10 mg tablet Take 10 mg by mouth daily at bedtime     Historical Provider, MD   cyclobenzaprine (FLEXERIL) 5 mg tablet Take 10 mg by mouth daily at bedtime as needed 9/3/20   Historical Provider, MD   furosemide (LASIX) 20 mg tablet Take 1 tablet (20 mg total) by mouth daily 2/29/58   Daisy Begum MD   hydroxychloroquine (PLAQUENIL) 200 mg tablet Take 1 tablet in morning and 1/2 tablet in evening    Historical Provider, MD   levothyroxine 88 mcg tablet TAKE ONE TABLET BY MOUTH EVERY DAY 7/4/53   Daisy Begum MD   Nutritional Supplements (OSTEOPOROSIS SUPPORT PO) Take 1 capsule by mouth daily     Historical Provider, MD   ondansetron (ZOFRAN) 4 mg tablet Take 1 tablet (4 mg total) by mouth every 8 (eight) hours as needed for nausea or vomiting 7/64/71   Daisy Begum MD   oxyCODONE (ROXICODONE) 10 MG TABS Take 1-1 5 tablets (10-15 mg total) by mouth every 4 (four) hours as needed (cancer pain  Max of 8 tabs / day ) NO FURTHER FILLS WITHOUT VISIT TO PALLIATIVE CLINIC 4/84/58   Daisy Begum MD   pantoprazole (PROTONIX) 40 mg tablet Take 1 tablet (40 mg total) by mouth daily 3/5/21   [facility]   sertraline (ZOLOFT) 100 mg tablet TAKE 1 & 1/2 TABLETS BY MOUTH ONCE DAILY  Patient taking differently: Take 150 mg by mouth daily BY MOUTH ONCE DAILY 6/23/85   Daisy Begum MD     I have reviewed home medications with patient personally      Allergies: Allergies   Allergen Reactions    Mobic [Meloxicam] Eye Swelling     Reaction Date: 12Aug2011;     Methotrexate Rash    Sulfa Antibiotics Rash       Social History:     Marital Status: /Civil Union   Occupation: retired  Patient Pre-hospital Living Situation: home w/ family   Patient Pre-hospital Level of Mobility: independnet  Patient Pre-hospital Diet Restrictions: none  Substance Use History:   Social History     Substance and Sexual Activity   Alcohol Use Not Currently    Alcohol/week: 21 0 standard drinks    Types: 21 Cans of beer per week    Frequency: Never    Drinks per session: Patient refused    Binge frequency: Never    Comment: pt states she had her last beer 3/22/2020     Social History     Tobacco Use   Smoking Status Current Every Day Smoker    Packs/day: 0 50    Years: 40 00    Pack years: 20 00    Types: Cigarettes    Start date: 1990   Smokeless Tobacco Never Used     Social History     Substance and Sexual Activity   Drug Use No       Family History:    Family History   Problem Relation Age of Onset    Diabetes Mother     Hypertension Mother     Nephrolithiasis Mother     Breast cancer Mother 79    Heart disease Father     Hypertension Father     Diabetes Brother     Nephrolithiasis Brother     Breast cancer Maternal Aunt     No Known Problems Maternal Grandmother     No Known Problems Maternal Grandfather     No Known Problems Paternal Grandmother     No Known Problems Paternal Grandfather        Physical Exam:     Vitals:   Blood Pressure: 142/71 (03/16/21 1655)  Pulse: 82 (03/16/21 1655)  Temperature: 98 1 °F (36 7 °C) (03/16/21 1655)  Temp Source: Oral (03/16/21 1655)  Respirations: 14 (03/16/21 1655)  SpO2: 99 % (03/16/21 1655)    Physical Exam  Constitutional:       Appearance: Normal appearance  She is ill-appearing (chronically )  HENT:      Head: Normocephalic  Mouth/Throat:      Mouth: Mucous membranes are dry     Eyes:      Pupils: Pupils are equal, round, and reactive to light  Cardiovascular:      Rate and Rhythm: Normal rate and regular rhythm  Heart sounds: No murmur  No friction rub  No gallop  Pulmonary:      Effort: Pulmonary effort is normal       Breath sounds: Normal breath sounds  Abdominal:      General: Abdomen is flat  Bowel sounds are normal  There is no distension  Palpations: Abdomen is soft  Tenderness: There is abdominal tenderness (RLQ, RUQ)  Musculoskeletal: Normal range of motion  Right lower leg: No edema  Left lower leg: No edema  Skin:     General: Skin is warm and dry  Neurological:      General: No focal deficit present  Mental Status: She is alert and oriented to person, place, and time  Additional Data:     Lab Results: I have personally reviewed pertinent reports  Results from last 7 days   Lab Units 03/16/21  1115   WBC Thousand/uL 7 34   HEMOGLOBIN g/dL 12 7   HEMATOCRIT % 39 5   PLATELETS Thousands/uL 556*   NEUTROS PCT % 81*   LYMPHS PCT % 11*   MONOS PCT % 8   EOS PCT % 0     Results from last 7 days   Lab Units 03/16/21  1115   SODIUM mmol/L 137   POTASSIUM mmol/L 3 7   CHLORIDE mmol/L 99*   CO2 mmol/L 20*   BUN mg/dL 6   CREATININE mg/dL 0 75   ANION GAP mmol/L 18*   CALCIUM mg/dL 9 0   ALBUMIN g/dL 3 3*   TOTAL BILIRUBIN mg/dL 0 30   ALK PHOS U/L 83   ALT U/L 14   AST U/L 26   GLUCOSE RANDOM mg/dL 98                 Results from last 7 days   Lab Units 03/16/21  1151 03/16/21  1115   LACTIC ACID mmol/L  --  1 1   PROCALCITONIN ng/ml <0 05  --        Imaging: I have personally reviewed pertinent reports        CT abdomen pelvis with contrast   Final Result by Shannon Allen MD (03/16 0463)      Mild mucosal enhancement in the colon with associated mild colonic wall thickening suggests colitis   No bowel obstruction      No acute appendicitis      Workstation performed: [id number]             EKG, Pathology, and Other Studies Reviewed on Admission:   · EKG: not available     [facility] Yes     ** Please Note: This note has been constructed using a voice recognition system   **

## 2021-03-16 NOTE — PROGRESS NOTES
Patient reports vomiting since last night  Pt presents with shaking and weakness  Also c/o SOB  Patient states she has been unable to keep any food or drink down  Nuha RN made aware  Patient transported to ED by RN  Port accessed prior to transport to ED   aware

## 2021-03-16 NOTE — ED NOTES
Patient transported to [facility] [address] [facility], RN  03/16/21 8010

## 2021-03-16 NOTE — ASSESSMENT & PLAN NOTE
· Patient w/ RLQ abdominal pain for the last few days   Reported N/V started yesterday around 8PM and patient has not been able to keep any food/drink down   · Patient's bowel habits have oscillated between normal BM and a few days loose/watery stool since being on chemo for breast CA  · CT A/P:  Mild mucosal enhancement in the colon with associated mild colonic wall thickening suggests colitis  · DDx: chemo induced vs  Infectious vs  Viral-- low suspicion for ischemic colitis   · IVF  · Pain control   · CLD-- advance as tolerated  · Check stool studies: c diff and enteric bacteria  · Received levaquin and flagyl in ED-- continue at this time  · First PCT negative; repeat tomorrow AM  · Consider GI consult

## 2021-03-16 NOTE — TELEPHONE ENCOUNTER
Discussed with Dr Luanne Cervantes and this nausea/vomiting should not be related to the chemotherapy  Patient should still try to get to the infusion center for treatment  I added zofran IVPB to her plan to help with the vomiting  Made patient's  aware and he verbalized understanding and agreed to plan  I made infusion center aware that he may be a little late and that I added zofran to her plan

## 2021-03-17 LAB
ALBUMIN SERPL BCP-MCNC: 2.7 G/DL (ref 3.5–5)
ALP SERPL-CCNC: 64 U/L (ref 46–116)
ALT SERPL W P-5'-P-CCNC: 11 U/L (ref 12–78)
ANION GAP SERPL CALCULATED.3IONS-SCNC: 8 MMOL/L (ref 4–13)
AST SERPL W P-5'-P-CCNC: 18 U/L (ref 5–45)
BILIRUB SERPL-MCNC: 0.22 MG/DL (ref 0.2–1)
BUN SERPL-MCNC: 5 MG/DL (ref 5–25)
C DIFF TOX B TCDB STL QL NAA+PROBE: NEGATIVE
CALCIUM ALBUM COR SERPL-MCNC: 9.2 MG/DL (ref 8.3–10.1)
CALCIUM SERPL-MCNC: 8.2 MG/DL (ref 8.3–10.1)
CHLORIDE SERPL-SCNC: 103 MMOL/L (ref 100–108)
CO2 SERPL-SCNC: 26 MMOL/L (ref 21–32)
CREAT SERPL-MCNC: 0.8 MG/DL (ref 0.6–1.3)
ERYTHROCYTE [DISTWIDTH] IN BLOOD BY AUTOMATED COUNT: 16.2 % (ref 11.6–15.1)
GFR SERPL CREATININE-BSD FRML MDRD: 82 ML/MIN/1.73SQ M
GLUCOSE SERPL-MCNC: 87 MG/DL (ref 65–140)
HCT VFR BLD AUTO: 35.2 % (ref 34.8–46.1)
HGB BLD-MCNC: 11.3 G/DL (ref 11.5–15.4)
MCH RBC QN AUTO: 28.5 PG (ref 26.8–34.3)
MCHC RBC AUTO-ENTMCNC: 32.1 G/DL (ref 31.4–37.4)
MCV RBC AUTO: 89 FL (ref 82–98)
PLATELET # BLD AUTO: 452 THOUSANDS/UL (ref 149–390)
PMV BLD AUTO: 9.5 FL (ref 8.9–12.7)
POTASSIUM SERPL-SCNC: 2.9 MMOL/L (ref 3.5–5.3)
PROCALCITONIN SERPL-MCNC: <0.05 NG/ML
PROT SERPL-MCNC: 6.1 G/DL (ref 6.4–8.2)
RBC # BLD AUTO: 3.97 MILLION/UL (ref 3.81–5.12)
SODIUM SERPL-SCNC: 137 MMOL/L (ref 136–145)
WBC # BLD AUTO: 6.01 THOUSAND/UL (ref 4.31–10.16)

## 2021-03-17 PROCEDURE — 84145 PROCALCITONIN (PCT): CPT | Performed by: PHYSICIAN ASSISTANT

## 2021-03-17 PROCEDURE — 89055 LEUKOCYTE ASSESSMENT FECAL: CPT | Performed by: FAMILY MEDICINE

## 2021-03-17 PROCEDURE — 87493 C DIFF AMPLIFIED PROBE: CPT | Performed by: PHYSICIAN ASSISTANT

## 2021-03-17 PROCEDURE — 85027 COMPLETE CBC AUTOMATED: CPT | Performed by: PHYSICIAN ASSISTANT

## 2021-03-17 PROCEDURE — 80053 COMPREHEN METABOLIC PANEL: CPT | Performed by: PHYSICIAN ASSISTANT

## 2021-03-17 PROCEDURE — 99232 SBSQ HOSP IP/OBS MODERATE 35: CPT | Performed by: FAMILY MEDICINE

## 2021-03-17 RX ORDER — LACTOBACILLUS ACIDOPHILUS / LACTOBACILLUS BULGARICUS 100 MILLION CFU STRENGTH
1 GRANULES ORAL
Status: DISCONTINUED | OUTPATIENT
Start: 2021-03-17 | End: 2021-03-19 | Stop reason: HOSPADM

## 2021-03-17 RX ORDER — POTASSIUM CHLORIDE 20 MEQ/1
20 TABLET, EXTENDED RELEASE ORAL
Status: COMPLETED | OUTPATIENT
Start: 2021-03-17 | End: 2021-03-17

## 2021-03-17 RX ADMIN — Medication 1 PACKET: at 09:33

## 2021-03-17 RX ADMIN — POTASSIUM CHLORIDE 20 MEQ: 1500 TABLET, EXTENDED RELEASE ORAL at 16:19

## 2021-03-17 RX ADMIN — HYDROXYCHLOROQUINE SULFATE 200 MG: 200 TABLET, FILM COATED ORAL at 08:09

## 2021-03-17 RX ADMIN — LEVOTHYROXINE SODIUM 88 MCG: 88 TABLET ORAL at 06:37

## 2021-03-17 RX ADMIN — OXYCODONE HYDROCHLORIDE 15 MG: 10 TABLET ORAL at 01:47

## 2021-03-17 RX ADMIN — LEVOFLOXACIN 750 MG: 5 INJECTION, SOLUTION INTRAVENOUS at 19:08

## 2021-03-17 RX ADMIN — METRONIDAZOLE 500 MG: 500 INJECTION, SOLUTION INTRAVENOUS at 16:19

## 2021-03-17 RX ADMIN — ENOXAPARIN SODIUM 40 MG: 40 INJECTION SUBCUTANEOUS at 08:10

## 2021-03-17 RX ADMIN — SODIUM CHLORIDE, SODIUM LACTATE, POTASSIUM CHLORIDE, AND CALCIUM CHLORIDE 100 ML/HR: .6; .31; .03; .02 INJECTION, SOLUTION INTRAVENOUS at 17:43

## 2021-03-17 RX ADMIN — SERTRALINE HYDROCHLORIDE 150 MG: 100 TABLET ORAL at 08:10

## 2021-03-17 RX ADMIN — HYDROXYCHLOROQUINE SULFATE 100 MG: 200 TABLET, FILM COATED ORAL at 21:50

## 2021-03-17 RX ADMIN — SODIUM CHLORIDE, SODIUM LACTATE, POTASSIUM CHLORIDE, AND CALCIUM CHLORIDE 100 ML/HR: .6; .31; .03; .02 INJECTION, SOLUTION INTRAVENOUS at 06:45

## 2021-03-17 RX ADMIN — METRONIDAZOLE 500 MG: 500 INJECTION, SOLUTION INTRAVENOUS at 08:10

## 2021-03-17 RX ADMIN — ALUMINA, MAGNESIA, AND SIMETHICONE ORAL SUSPENSION REGULAR STRENGTH 10 ML: 1200; 1200; 120 SUSPENSION ORAL at 08:13

## 2021-03-17 RX ADMIN — POTASSIUM CHLORIDE 20 MEQ: 1500 TABLET, EXTENDED RELEASE ORAL at 13:39

## 2021-03-17 RX ADMIN — ONDANSETRON 8 MG: 2 INJECTION INTRAMUSCULAR; INTRAVENOUS at 18:21

## 2021-03-17 RX ADMIN — METRONIDAZOLE 500 MG: 500 INJECTION, SOLUTION INTRAVENOUS at 01:47

## 2021-03-17 RX ADMIN — OXYCODONE HYDROCHLORIDE 15 MG: 10 TABLET ORAL at 16:18

## 2021-03-17 RX ADMIN — ALPRAZOLAM 1 MG: 0.5 TABLET ORAL at 13:40

## 2021-03-17 RX ADMIN — POTASSIUM CHLORIDE 20 MEQ: 1500 TABLET, EXTENDED RELEASE ORAL at 09:33

## 2021-03-17 RX ADMIN — OXYCODONE HYDROCHLORIDE 15 MG: 10 TABLET ORAL at 06:37

## 2021-03-17 RX ADMIN — OXYCODONE HYDROCHLORIDE 15 MG: 10 TABLET ORAL at 20:39

## 2021-03-17 RX ADMIN — PANTOPRAZOLE SODIUM 40 MG: 40 TABLET, DELAYED RELEASE ORAL at 06:37

## 2021-03-17 RX ADMIN — ALPRAZOLAM 1 MG: 0.5 TABLET ORAL at 21:50

## 2021-03-17 RX ADMIN — OXYCODONE HYDROCHLORIDE 15 MG: 10 TABLET ORAL at 11:18

## 2021-03-17 NOTE — PROGRESS NOTES
[facility]  Progress Note - [facility] 1965, 60 y o  female MRN: [id number]  Unit/Bed#: S -01 Encounter: [id number]  Primary Care Provider: Jenniffer Escobedo MD   Date and time admitted to hospital: 3/16/2021 10:15 AM    * Colitis  Assessment & Plan  · Patient w/ RLQ abdominal pain for the last few days  Reported N/V started yesterday around 8PM and patient has not been able to keep any food/drink down   · Patient's bowel habits have oscillated between normal BM and a few days loose/watery stool since being on chemo for breast CA  · CT A/P:  Mild mucosal enhancement in the colon with associated mild colonic wall thickening suggests colitis  · DDx: chemo induced vs  Infectious vs  Viral-- low suspicion for ischemic colitis   · Possible viral etiology, however she's immunocompromised (hydroxychloroquine and history of cancer) therefore will continue Levaquin and Flagyl & follow up stool studies; Specifically fecal leukocytes to help differentiate chemo induced versus infection    Increased anion gap metabolic acidosis  Assessment & Plan  · Anion gap of 18  · Resolved    Cancer related pain  Assessment & Plan  · Patient follows w/ palliative care  · Current regimen, reviewed in PDMP: oxy IR 10-15mg PO Q4 PRN     Hypokalemia  Assessment & Plan  Replete orally, check Mag tomorrow    Systemic lupus erythematosus ([facility] [address] )  Assessment & Plan  · Follows w/ rheumatology   · On Plaquenil        Progress Note - [facility] 60 y o  female MRN: [id number]    Unit/Bed#: S -01 Encounter: [id number]        Subjective:   Patient seen and examined at bedside, she offers complaints of right lower quadrant pain similar to yesterday, improving nausea without emesis    Objective:     Vitals:   Vitals:    03/17/21 0758   BP: 136/63   Pulse: 87   Resp: 18   Temp: 98 4 °F (36 9 °C)   SpO2: 95%     There is no height or weight on file to calculate BMI      Intake/Output Summary (Last 24 hours) at 3/17/2021 0827  Last data filed at 3/16/2021 1651  Gross per 24 hour   Intake 1100 ml   Output --   Net 1100 ml       Physical Exam:   /63 (BP Location: Right arm)   Pulse 87   Temp 98 4 °F (36 9 °C) (Oral)   Resp 18   LMP  (LMP Unknown)   SpO2 95%   General appearance: alert and oriented, in no acute distress  Head: Normocephalic, without obvious abnormality, atraumatic  Lungs: clear to auscultation bilaterally  Heart: regular rate and rhythm, S1, S2 normal, no murmur, click, rub or gallop  Abdomen:  Mild right lower quadrant pain without guarding rigidity  Extremities: extremities normal, warm and well-perfused; no cyanosis, clubbing, or edema  Pulses: 2+ and symmetric  Neurologic: Grossly normal     Invasive Devices     Central Venous Catheter Line            Port A Cath 03/17/20 Right Chest 364 days          Drain            Closed/Suction Drain Left;Lateral Chest Bulb 19 Fr  210 days    Closed/Suction Drain Left;Medial Chest Bulb 19 Fr  210 days                Results from last 7 days   Lab Units 03/17/21  0652 03/16/21  1115   WBC Thousand/uL 6 01 7 34   HEMOGLOBIN g/dL 11 3* 12 7   HEMATOCRIT % 35 2 39 5   PLATELETS Thousands/uL 452* 556*       Results from last 7 days   Lab Units 03/17/21  0652 03/16/21  1115   POTASSIUM mmol/L 2 9* 3 7   CHLORIDE mmol/L 103 99*   CO2 mmol/L 26 20*   BUN mg/dL 5 6   CREATININE mg/dL 0 80 0 75   CALCIUM mg/dL 8 2* 9 0   ALK PHOS U/L 64 83   ALT U/L 11* 14   AST U/L 18 26       Medication Administration - last 24 hours from 03/16/2021 0827 to 03/17/2021 0827       Date/Time Order Dose Route Action Action by     03/16/2021 1651 sodium chloride 0 9 % bolus 1,000 mL 0 mL Intravenous Stopped Ally Sauceda RN     03/16/2021 1113 sodium chloride 0 9 % bolus 1,000 mL 1,000 mL Intravenous [address] Lisandro Jara RN     03/16/2021 1113 ondansetron (ZOFRAN) injection 4 mg 4 mg Intravenous Given Lisandro Jara RN     03/16/2021 1148 HYDROmorphone (DILAUDID) injection 0 2 mg 0 2 mg Intravenous Given Roro Saez, HUMPHREY     03/16/2021 1156 ondansetron (ZOFRAN) injection 4 mg 4 mg Intravenous Given Roro Saez, HUMPHREY     03/16/2021 1422 HYDROmorphone (DILAUDID) injection 0 2 mg 0 2 mg Intravenous Given Korin Delano, RN     03/16/2021 1406 iohexol (OMNIPAQUE) 350 MG/ML injection (SINGLE-DOSE) 100 mL 100 mL Intravenous Given Carlyn Martinez Amador     03/16/2021 1605 ondansetron (ZOFRAN) injection 4 mg 4 mg Intravenous Given Korin Palin, RN     03/16/2021 1605 HYDROmorphone (DILAUDID) injection 0 5 mg 0 5 mg Intravenous Given Korin Palin, RN     03/16/2021 2236 levofloxacin (LEVAQUIN) IVPB (premix in dextrose) 750 mg 150 mL 750 mg Intravenous Not Given Nereida Guzman, HUMPHREY     03/16/2021 1651 metroNIDAZOLE (FLAGYL) IVPB (premix) 500 mg 100 mL 0 mg Intravenous Stopped Korin Delano, RN     03/16/2021 1629 metroNIDAZOLE (FLAGYL) IVPB (premix) 500 mg 100 mL 500 mg Intravenous [address] [facility], RN     03/17/2021 0810 sertraline (ZOLOFT) tablet 150 mg 150 mg Oral Given Vivian Nunez     03/17/2021 0637 pantoprazole (PROTONIX) EC tablet 40 mg 40 mg Oral Given Shaun Montalvo, HUMPHREY     03/17/2021 7662 oxyCODONE (ROXICODONE) IR tablet 15 mg 15 mg Oral Given Shaun Montalvo RN     03/17/2021 0147 oxyCODONE (ROXICODONE) IR tablet 15 mg 15 mg Oral Given Nereida Guzman RN     03/17/2021 2337 levothyroxine tablet 88 mcg 88 mcg Oral Given Shaun Montalvo RN     03/17/2021 0809 hydroxychloroquine (PLAQUENIL) tablet 200 mg 200 mg Oral Given Weston Moscoso     03/16/2021 2230 hydroxychloroquine (PLAQUENIL) tablet 100 mg 100 mg Oral Given Nereida Guzman RN     03/17/2021 0813 al mag oxide-diphenhydramine-lidocaine viscous (MAGIC MOUTHWASH) suspension 10 mL 10 mL Swish & Swallow Given Weston Magic     03/16/2021 2233 ALPRAZolam Davida Ree) tablet 1 mg 1 mg Oral Given Nereida Guzman RN     03/17/2021 0645 lactated ringers infusion 100 mL/hr Intravenous [address] Shaun Montalvo RN     03/16/2021 2034 lactated ringers infusion 100 mL/hr Intravenous [address] Carmela Willett [facility]     03/17/2021 0810 nicotine (NICODERM CQ) 7 mg/24hr TD 24 hr patch 1 patch 1 patch Transdermal Not Given Christine Vasquez     03/17/2021 0810 enoxaparin (LOVENOX) subcutaneous injection 40 mg 40 mg Subcutaneous Given Vivian [facility]     03/16/2021 1758 levofloxacin (LEVAQUIN) IVPB (premix in dextrose) 750 mg 150 mL 750 mg Intravenous [address] Carmela Willett RN     03/17/2021 0810 metroNIDAZOLE (FLAGYL) IVPB (premix) 500 mg 100 mL 500 mg Intravenous [facility]     03/17/2021 0147 metroNIDAZOLE (FLAGYL) IVPB (premix) 500 mg 100 mL 500 mg Intravenous New [address] Carmela Willett RN     03/16/2021 2029 HYDROmorphone (DILAUDID) injection 1 mg 1 mg Intravenous Given Carmela Willett RN     03/16/2021 2048 alteplase (CATHFLO) injection 2 mg 2 mg Intracatheter Given Carmela Willett RN            Lab, Imaging and other studies: I have personally reviewed pertinent reports      VTE Pharmacologic Prophylaxis:  Heparin  VTE Mechanical Prophylaxis: sequential compression device     Chris Allen MD  3/17/2021,8:27 AM

## 2021-03-17 NOTE — ASSESSMENT & PLAN NOTE
· Patient w/ RLQ abdominal pain for the last few days   Reported N/V started yesterday around 8PM and patient has not been able to keep any food/drink down   · Patient's bowel habits have oscillated between normal BM and a few days loose/watery stool since being on chemo for breast CA  · CT A/P:  Mild mucosal enhancement in the colon with associated mild colonic wall thickening suggests colitis  · DDx: chemo induced vs  Infectious vs  Viral-- low suspicion for ischemic colitis   · Possible viral etiology, however she's immunocompromised (hydroxychloroquine and history of cancer) therefore will continue Levaquin and Flagyl & follow up stool studies; Specifically fecal leukocytes to help differentiate chemo induced versus infection

## 2021-03-17 NOTE — UTILIZATION REVIEW
Notification of Inpatient Admission/Inpatient Authorization Request   This is a Notification of Inpatient Admission for [place]  Be advised that this patient was admitted to our facility under Inpatient Status  Contact Maura Chau at [phone] for additional admission information  [facility] DEPT  DEDICATED [phone]  Patient Name:   Morris Donovan   YOB: 1965       [address]   [address]:   [address]  Tax ID: [id number]  NPI: [id number] Attending Provider/NPI:  Address:  Phone: Deana Stoner [[id number]]  Same as the facility  [phone]   Place of Service Code: 24 Place of Service Name:  [address] [place] [address]   Start Date: 3/16/21 1639     Discharge Date & Time: No discharge date for patient encounter  Type of Admission: Inpatient Status Discharge Disposition   (if discharged): Home/Self Care   Patient Diagnoses: Colitis [K52 9]  Vomiting [R11 10]     Orders: Admission Orders (From admission, onward)     Ordered        03/16/21 1639  Inpatient Admission  Once                    Assigned Utilization Review Contact: Maura Chau  Utilization   Network Utilization Review Department  Phone: [phone]; Fax [phone]  Email: [email or web address]   ATTENTION PAYERS: Please call the assigned Utilization  directly with any questions or concerns ALL voicemails in the department are confidential  Send all requests for admission clinical reviews, approved or denied determinations and any other requests to dedicated fax number belonging to the campus where the patient is receiving treatment

## 2021-03-17 NOTE — MALNUTRITION/BMI
This medical record reflects one or more clinical indicators suggestive of malnutrition    Malnutrition Findings:   Adult Malnutrition type: Chronic illness(Related to medical condition and chemotherapy as evidenced by 10% weight loss over the past month and <75% energy intake needs met >1 month treated with diet and oral nutrition supplements)  Adult Degree of Malnutrition: Other severe protein calorie malnutrition  Malnutrition Characteristics: Inadequate energy, Weight loss      See Nutrition note dated 3/17/21 for additional details  Completed nutrition assessment is viewable in the nutrition documentation

## 2021-03-17 NOTE — UTILIZATION REVIEW
Initial Clinical Review    Admission: Date/Time/Statement:   Admission Orders (From admission, onward)     Ordered        03/16/21 1639  Inpatient Admission  Once                   Orders Placed This Encounter   Procedures    Inpatient Admission     Standing Status:   Standing     Number of Occurrences:   1     Order Specific Question:   Level of Care     Answer:   Med Surg [16]     Order Specific Question:   Estimated length of stay     Answer:   More than 2 Midnights     Order Specific Question:   Certification     Answer:   I certify that inpatient services are medically necessary for this patient for a duration of greater than two midnights  See H&P and MD Progress Notes for additional information about the patient's course of treatment  ED Arrival Information     Expected Arrival Acuity Means of Arrival Escorted By Service Admission Type    - 3/16/2021 10:15 Urgent Walk-In Self Hospitalist Urgent    Arrival Complaint    vomiting        Chief Complaint   Patient presents with    Vomiting     pt sent from infusion center for further eval of n/v which started yesterday  Assessment/Plan:   58y Female to ED presents with abdominal pain, nausea and vomiting  Unable to tolerate food for last 24hrs, no relief from antiemetics  Per pt, notes change in bowel habits since starting chemo-- she oscillates between normal stools and diarrhea  Last chemo 3 wks ago, she arrived for session today but due to symptoms was sent to ED today instead  PMH for breast CA, SLE, PTSD  In ED given IVF, Iv antibiotics,  anti-emeitcs and pain meds with minor relief  Admit Inpatient level of care for Colitis, Nausea and vomiting, Cancer related pain, Increase anion gap metabolic acidosis  CT A/P:  Mild mucosal enhancement in the colon with associated mild colonic wall thickening suggests colitis  Continue IVFs and Iv antibiotics  Pain control  Clear diet advance as tolerated  F/o C diff and enteric bacteria   First PCT neg and repeat tomorrow am 3/17  Daily BMP  Follows with Palliative care  Currently receiving adjuvant trastuzumab and pertuzumab with no cardiac toxicity-- Q3 weeks-- plans to complete June 2021  3/17 Progress notes; Chemo induced vs  Infectious vs  Viral-- low suspicion for ischemic colitis  Possible viral etiology, however she's immunocompromised (hydroxychloroquine and history of cancer) therefore will continue Levaquin and Flagyl & follow up stool studies; Specifically fecal leukocytes to help differentiate chemo induced versus infection  Anion gap of 18  K 2 9 today and replace orally  Check Mag tomorrow 3/18  Still c/o RLQ abdominal pain same as yesterday, nausea improving, no vomiting       ED Triage Vitals   Temperature Pulse Respirations Blood Pressure SpO2   03/16/21 1023 03/16/21 1023 03/16/21 1023 03/16/21 1023 03/16/21 1023   98 2 °F (36 8 °C) 70 18 169/76 100 %      Temp Source Heart Rate Source Patient Position - Orthostatic VS BP Location FiO2 (%)   03/16/21 1023 03/16/21 1023 03/16/21 1023 03/16/21 1023 --   Oral Monitor Sitting Right arm       Pain Score       03/16/21 1422       7          Wt Readings from Last 1 Encounters:   03/10/21 54 4 kg (119 lb 14 9 oz)     Additional Vital Signs:   03/17/21 0758  98 4 °F (36 9 °C)  87  18  136/63  --  95 %  None (Room air)  Lying   03/16/21 2205  97 7 °F (36 5 °C)  92  18  133/75  --  96 %  None (Room air)  Lying   03/16/21 1855  98 4 °F (36 9 °C)  89  16  147/79  --  96 %  None (Room air)  Lying   03/16/21 1655  98 1 °F (36 7 °C)  82  14  142/71  97  99 %  None (Room air)       Pertinent Labs/Diagnostic Test Results:   3/16 CT Abd/Pelvis - Mild mucosal enhancement in the colon with associated mild colonic wall thickening suggests colitis  No bowel obstruction  No acute appendicitis    Results from last 7 days   Lab Units 03/16/21  1151   SARS-COV-2  Negative     Results from last 7 days   Lab Units 03/17/21  0652 03/16/21  1115   WBC Thousand/uL 6 01 7 34 HEMOGLOBIN g/dL 11 3* 12 7   HEMATOCRIT % 35 2 39 5   PLATELETS Thousands/uL 452* 556*   NEUTROS ABS Thousands/µL  --  5 87         Results from last 7 days   Lab Units 03/17/21  0652 03/16/21  1115   SODIUM mmol/L 137 137   POTASSIUM mmol/L 2 9* 3 7   CHLORIDE mmol/L 103 99*   CO2 mmol/L 26 20*   ANION GAP mmol/L 8 18*   BUN mg/dL 5 6   CREATININE mg/dL 0 80 0 75   EGFR ml/min/1 73sq m 82 89   CALCIUM mg/dL 8 2* 9 0     Results from last 7 days   Lab Units 03/17/21  0652 03/16/21  1115   AST U/L 18 26   ALT U/L 11* 14   ALK PHOS U/L 64 83   TOTAL PROTEIN g/dL 6 1* 7 1   ALBUMIN g/dL 2 7* 3 3*   TOTAL BILIRUBIN mg/dL 0 22 0 30         Results from last 7 days   Lab Units 03/17/21  0652 03/16/21  1115   GLUCOSE RANDOM mg/dL 87 98       Results from last 7 days   Lab Units 03/16/21  1151   PROCALCITONIN ng/ml <0 05     Results from last 7 days   Lab Units 03/16/21  1115   LACTIC ACID mmol/L 1 1       Results from last 7 days   Lab Units 03/16/21  1115   LIPASE u/L 73             Results from last 7 days   Lab Units 03/16/21  1131   CLARITY UA  Clear   COLOR UA  Yellow   SPEC GRAV UA  1 025   PH UA  8 5*   GLUCOSE UA mg/dl Negative   KETONES UA mg/dl 80 (3+)*   BLOOD UA  Trace*   PROTEIN UA mg/dl 30 (1+)*   NITRITE UA  Negative   BILIRUBIN UA  Interference- unable to analyze*   UROBILINOGEN UA E U /dl 0 2   LEUKOCYTES UA  Negative   WBC UA /hpf 2-4   RBC UA /hpf 1-2   BACTERIA UA /hpf Occasional   EPITHELIAL CELLS WET PREP /hpf Occasional   MUCUS THREADS  Moderate*     Results from last 7 days   Lab Units 03/16/21  1151   INFLUENZA A PCR  Negative   INFLUENZA B PCR  Negative   RSV PCR  Negative       Results from last 7 days   Lab Units 03/16/21  1151   BLOOD CULTURE  Received in Microbiology Lab  Culture in Progress  Received in Microbiology Lab  Culture in Progress         ED Treatment:   Medication Administration from 03/16/2021 1015 to 03/16/2021 1733       Date/Time Order Dose Route Action     03/16/2021 1113 sodium chloride 0 9 % bolus 1,000 mL 1,000 mL Intravenous New Bag     03/16/2021 1113 ondansetron (ZOFRAN) injection 4 mg 4 mg Intravenous Given     03/16/2021 1148 HYDROmorphone (DILAUDID) injection 0 2 mg 0 2 mg Intravenous Given     03/16/2021 1156 ondansetron (ZOFRAN) injection 4 mg 4 mg Intravenous Given     03/16/2021 1422 HYDROmorphone (DILAUDID) injection 0 2 mg 0 2 mg Intravenous Given     03/16/2021 1406 iohexol (OMNIPAQUE) 350 MG/ML injection (SINGLE-DOSE) 100 mL 100 mL Intravenous Given     03/16/2021 1605 ondansetron (ZOFRAN) injection 4 mg 4 mg Intravenous Given     03/16/2021 1605 HYDROmorphone (DILAUDID) injection 0 5 mg 0 5 mg Intravenous Given     03/16/2021 1629 metroNIDAZOLE (FLAGYL) IVPB (premix) 500 mg 100 mL 500 mg Intravenous New Bag        Past Medical History:   Diagnosis Date    Disease of thyroid gland     Hypertension     [facility] )     Malignant neoplasm of upper-inner quadrant of left breast in female, [facility] receptor [facility] 2/25/2020    Nephrolithiasis     PTSD (post-traumatic stress disorder)      Present on Admission:   Systemic lupus erythematosus ([facility] )   Posttraumatic stress disorder   Increased anion gap metabolic acidosis   Colitis   Cancer related pain      Admitting Diagnosis: Colitis [K52 9]  Vomiting [R11 10]  Age/Sex: 60 y o  female     Admission Orders:  Scheduled Medications:  enoxaparin, 40 mg, Subcutaneous, Daily  hydroxychloroquine, 100 mg, Oral, HS  hydroxychloroquine, 200 mg, Oral, Daily With Breakfast  lactobacillus acidophilus-bulgaricus, 1 packet, Oral, TID With Meals  levofloxacin, 750 mg, Intravenous, Q24H  levothyroxine, 88 mcg, Oral, Daily  metroNIDAZOLE, 500 mg, Intravenous, Q8H  nicotine, 1 patch, Transdermal, Daily  pantoprazole, 40 mg, Oral, Daily  potassium chloride, 20 mEq, Oral, TID With Meals  sertraline, 150 mg, Oral, Daily      Continuous IV Infusions:  lactated ringers, 100 mL/hr, Intravenous, Continuous      PRN Meds:  al [place] Apt oxide-diphenhydramine-lidocaine viscous, 10 mL, Swish & Swallow, Q6H PRN  ALPRAZolam, 1 mg, Oral, TID PRN  cyclobenzaprine, 10 mg, Oral, HS PRN  HYDROmorphone, 1 mg, Intravenous, Q3H PRN  metoclopramide, 10 mg, Intravenous, Q6H PRN  ondansetron, 8 mg, Intravenous, Q6H PRN  oxyCODONE, 10 mg, Oral, Q6H PRN  oxyCODONE, 15 mg, Oral, Q4H PRN 3/17 x2      Clear liquid diet  Fecal leukocytes  R/o c diff  [place] enteric bacterial panel  Bld cultures x2    Network Utilization Review Department  ATTENTION: Please call with any questions or concerns to [phone] and carefully listen to the prompts so that you are directed to the right person  All voicemails are confidential   Jazmin Muss all requests for admission clinical reviews, approved or denied determinations and any other requests to dedicated fax number below belonging to the campus where the patient is receiving treatment   List of dedicated fax numbers for the Facilities:  [address] DENIALS (Administrative/Medical Necessity) [phone]   [address] (Maternity/NICU/Pediatrics) [phone]   [address] [address] [facility] April Marcus 3496 (Kay De Paz "Karen" 103) [address] [place] [address] Karan [address]o [address] [place] [address] [phone]     ,me

## 2021-03-18 PROBLEM — E83.42 HYPOMAGNESEMIA: Status: ACTIVE | Noted: 2021-03-18

## 2021-03-18 PROBLEM — E43 SEVERE PROTEIN-CALORIE MALNUTRITION (HCC): Status: ACTIVE | Noted: 2021-03-18

## 2021-03-18 LAB
ALBUMIN SERPL BCP-MCNC: 2.4 G/DL (ref 3.5–5)
ALP SERPL-CCNC: 55 U/L (ref 46–116)
ALT SERPL W P-5'-P-CCNC: 8 U/L (ref 12–78)
ANION GAP SERPL CALCULATED.3IONS-SCNC: 8 MMOL/L (ref 4–13)
AST SERPL W P-5'-P-CCNC: 15 U/L (ref 5–45)
BASOPHILS # BLD AUTO: 0.04 THOUSANDS/ΜL (ref 0–0.1)
BASOPHILS NFR BLD AUTO: 1 % (ref 0–1)
BILIRUB SERPL-MCNC: 0.18 MG/DL (ref 0.2–1)
BUN SERPL-MCNC: 4 MG/DL (ref 5–25)
CALCIUM ALBUM COR SERPL-MCNC: 10 MG/DL (ref 8.3–10.1)
CALCIUM SERPL-MCNC: 8.7 MG/DL (ref 8.3–10.1)
CHLORIDE SERPL-SCNC: 107 MMOL/L (ref 100–108)
CO2 SERPL-SCNC: 25 MMOL/L (ref 21–32)
CREAT SERPL-MCNC: 0.75 MG/DL (ref 0.6–1.3)
EOSINOPHIL # BLD AUTO: 0.1 THOUSAND/ΜL (ref 0–0.61)
EOSINOPHIL NFR BLD AUTO: 2 % (ref 0–6)
ERYTHROCYTE [DISTWIDTH] IN BLOOD BY AUTOMATED COUNT: 16.2 % (ref 11.6–15.1)
GFR SERPL CREATININE-BSD FRML MDRD: 89 ML/MIN/1.73SQ M
GLUCOSE SERPL-MCNC: 92 MG/DL (ref 65–140)
HCT VFR BLD AUTO: 31.8 % (ref 34.8–46.1)
HGB BLD-MCNC: 9.9 G/DL (ref 11.5–15.4)
IMM GRANULOCYTES # BLD AUTO: 0.02 THOUSAND/UL (ref 0–0.2)
IMM GRANULOCYTES NFR BLD AUTO: 0 % (ref 0–2)
LYMPHOCYTES # BLD AUTO: 1.32 THOUSANDS/ΜL (ref 0.6–4.47)
LYMPHOCYTES NFR BLD AUTO: 25 % (ref 14–44)
MAGNESIUM SERPL-MCNC: 1.5 MG/DL (ref 1.6–2.6)
MCH RBC QN AUTO: 27.9 PG (ref 26.8–34.3)
MCHC RBC AUTO-ENTMCNC: 31.1 G/DL (ref 31.4–37.4)
MCV RBC AUTO: 90 FL (ref 82–98)
MONOCYTES # BLD AUTO: 0.62 THOUSAND/ΜL (ref 0.17–1.22)
MONOCYTES NFR BLD AUTO: 12 % (ref 4–12)
NEUTROPHILS # BLD AUTO: 3.17 THOUSANDS/ΜL (ref 1.85–7.62)
NEUTS SEG NFR BLD AUTO: 60 % (ref 43–75)
NRBC BLD AUTO-RTO: 0 /100 WBCS
PLATELET # BLD AUTO: 397 THOUSANDS/UL (ref 149–390)
PMV BLD AUTO: 9.6 FL (ref 8.9–12.7)
POTASSIUM SERPL-SCNC: 3.7 MMOL/L (ref 3.5–5.3)
PROT SERPL-MCNC: 5.4 G/DL (ref 6.4–8.2)
RBC # BLD AUTO: 3.55 MILLION/UL (ref 3.81–5.12)
SODIUM SERPL-SCNC: 140 MMOL/L (ref 136–145)
WBC # BLD AUTO: 5.27 THOUSAND/UL (ref 4.31–10.16)
WBC SPEC QL GRAM STN: NORMAL

## 2021-03-18 PROCEDURE — 80053 COMPREHEN METABOLIC PANEL: CPT | Performed by: FAMILY MEDICINE

## 2021-03-18 PROCEDURE — 83735 ASSAY OF MAGNESIUM: CPT | Performed by: FAMILY MEDICINE

## 2021-03-18 PROCEDURE — 99232 SBSQ HOSP IP/OBS MODERATE 35: CPT | Performed by: NURSE PRACTITIONER

## 2021-03-18 PROCEDURE — 85025 COMPLETE CBC W/AUTO DIFF WBC: CPT | Performed by: FAMILY MEDICINE

## 2021-03-18 RX ORDER — MAGNESIUM SULFATE 1 G/100ML
1 INJECTION INTRAVENOUS ONCE
Status: COMPLETED | OUTPATIENT
Start: 2021-03-18 | End: 2021-03-18

## 2021-03-18 RX ADMIN — METRONIDAZOLE 500 MG: 500 INJECTION, SOLUTION INTRAVENOUS at 07:49

## 2021-03-18 RX ADMIN — HYDROXYCHLOROQUINE SULFATE 100 MG: 200 TABLET, FILM COATED ORAL at 21:57

## 2021-03-18 RX ADMIN — ALPRAZOLAM 1 MG: 0.5 TABLET ORAL at 08:51

## 2021-03-18 RX ADMIN — MAGNESIUM SULFATE HEPTAHYDRATE 1 G: 1 INJECTION, SOLUTION INTRAVENOUS at 09:57

## 2021-03-18 RX ADMIN — HYDROXYCHLOROQUINE SULFATE 200 MG: 200 TABLET, FILM COATED ORAL at 08:53

## 2021-03-18 RX ADMIN — METRONIDAZOLE 500 MG: 500 INJECTION, SOLUTION INTRAVENOUS at 16:47

## 2021-03-18 RX ADMIN — SODIUM CHLORIDE, SODIUM LACTATE, POTASSIUM CHLORIDE, AND CALCIUM CHLORIDE 50 ML/HR: .6; .31; .03; .02 INJECTION, SOLUTION INTRAVENOUS at 17:59

## 2021-03-18 RX ADMIN — PANTOPRAZOLE SODIUM 40 MG: 40 TABLET, DELAYED RELEASE ORAL at 05:02

## 2021-03-18 RX ADMIN — OXYCODONE HYDROCHLORIDE 15 MG: 10 TABLET ORAL at 12:01

## 2021-03-18 RX ADMIN — OXYCODONE HYDROCHLORIDE 15 MG: 10 TABLET ORAL at 03:13

## 2021-03-18 RX ADMIN — Medication 1 PACKET: at 08:52

## 2021-03-18 RX ADMIN — OXYCODONE HYDROCHLORIDE 15 MG: 10 TABLET ORAL at 21:57

## 2021-03-18 RX ADMIN — LEVOFLOXACIN 750 MG: 5 INJECTION, SOLUTION INTRAVENOUS at 17:49

## 2021-03-18 RX ADMIN — OXYCODONE HYDROCHLORIDE 15 MG: 10 TABLET ORAL at 07:48

## 2021-03-18 RX ADMIN — ALPRAZOLAM 1 MG: 0.5 TABLET ORAL at 19:28

## 2021-03-18 RX ADMIN — ENOXAPARIN SODIUM 40 MG: 40 INJECTION SUBCUTANEOUS at 08:52

## 2021-03-18 RX ADMIN — SODIUM CHLORIDE, SODIUM LACTATE, POTASSIUM CHLORIDE, AND CALCIUM CHLORIDE 100 ML/HR: .6; .31; .03; .02 INJECTION, SOLUTION INTRAVENOUS at 05:00

## 2021-03-18 RX ADMIN — METRONIDAZOLE 500 MG: 500 INJECTION, SOLUTION INTRAVENOUS at 00:41

## 2021-03-18 RX ADMIN — LEVOTHYROXINE SODIUM 88 MCG: 88 TABLET ORAL at 05:02

## 2021-03-18 RX ADMIN — Medication 1 PACKET: at 12:01

## 2021-03-18 RX ADMIN — HYDROMORPHONE HYDROCHLORIDE 1 MG: 1 INJECTION, SOLUTION INTRAMUSCULAR; INTRAVENOUS; SUBCUTANEOUS at 15:27

## 2021-03-18 RX ADMIN — Medication 1 PACKET: at 16:46

## 2021-03-18 RX ADMIN — SERTRALINE HYDROCHLORIDE 150 MG: 100 TABLET ORAL at 09:58

## 2021-03-18 NOTE — PROGRESS NOTES
[facility]  Progress Note - Raciel Esparza 1965, 60 y o  female MRN: [id number]  Unit/Bed#: S -01 Encounter: [id number]  Primary Care Provider: Ramses Martíenz MD   Date and time admitted to hospital: 3/16/2021 10:15 AM    * Colitis  Assessment & Plan  · Patient w/ RLQ abdominal pain for several days prior to admission  Reported N/V one day prior to [place] and not being able to tolerate oral intake  · Patient's bowel habits have oscillated between normal BM and a few days loose/watery stool since being on chemo for breast CA  · CT A/P:  Mild mucosal enhancement in the colon with associated mild colonic wall thickening suggests colitis  · DDx: chemo induced vs  Infectious vs  Viral-- low suspicion for ischemic colitis   · Suspect viral etiology, however she's immunocompromised (hydroxychloroquine and history of cancer) therefore will continue Levaquin and Flagyl :  · cdiff negative  · leukocytes pending   · Monitor     Hypomagnesemia  Assessment & Plan  · Replete  · Monitor mag level    Severe protein-calorie malnutrition (HCC)  Assessment & Plan  Malnutrition Findings:   Adult Malnutrition type: Chronic illness(Related to medical condition and chemotherapy as evidenced by 10% weight loss over the past month and <75% energy intake needs met >1 month treated with diet and oral nutrition supplements)  Adult Degree of Malnutrition: Other severe protein calorie malnutrition    BMI Findings: Body mass index is 21 24 kg/m²  Cancer related pain  Assessment & Plan  · Patient follows w/ palliative care  · Current regimen, reviewed in PDMP: oxy IR 10-15mg PO Q4 PRN     Hypokalemia  Assessment & Plan  · Resolved  · monitor BMP    Nausea and vomiting  Assessment & Plan  · Acute onset night prior to admission   Patient reports inability to tolerate oral intake  · Recent admission for candidal esophagitis-- completed course of fluconazole and she did have improvement in sx of N/V/odynophagia after this  · N/V today could be 2/2 colitis   · Continue antiemetics PRN  · Diet as tolerated  · Consider GI consult if sx worsen    Increased anion gap metabolic acidosis  Assessment & Plan  · Anion gap of 18  · Resolved    Malignant neoplasm of upper-inner quadrant of left breast in female, estrogen receptor negative ([facility] )  Assessment & Plan  · Dx March 2020  · S/P Neoadjuvant chemotherapy with [facility] with pertuzumab x3 cycles, completed in early May 2020; S/P mastectomy and sentinel lymph node biopsy August 2020   · Currently receiving adjuvant trastuzumab and pertuzumab with no cardiac toxicity-- Q3 weeks-- plans to complete June 2021  · Last treatment 2/23/21  · Follows w/ Dr Gene Haji stress disorder  Assessment & Plan  · Patient follows w/ a counselor   · Continue w/ zoloft and xanax PRN    [facility]mat[facility] ([facility] )  Assessment & Plan  · Follows w/ rheumatology   · On Plaquenil      VTE Pharmacologic Prophylaxis:   Pharmacologic: Enoxaparin (Lovenox)  Mechanical VTE Prophylaxis in Place: Yes    Patient Centered Rounds: I have performed bedside rounds with nursing staff today  Discussions with Specialists or Other Care Team Provider:     Education and Discussions with Family / Patient: patient    Time Spent for Care: 30 minutes  More than 50% of total time spent on counseling and coordination of care as described above  Current Length of Stay: 2 day(s)    Current Patient Status: Inpatient   Certification Statement: The patient will continue to require additional inpatient hospital stay due to need for continue IVF, monitor stools, await leuks result, advance diet    Discharge Plan: likely in AM if deidre oral intake and stools decrease    Code Status: Level 1 - Full Code      Subjective:   Reports 3 BM since last night  Feels better overall  deidre clear liquid diet, would like to try solids  Feels weak and tired   Pain chronic    Objective:     Vitals:   Temp (24hrs), Av 8 °F (36 6 °C), Min:97 7 °F (36 5 °C), Max:98 °F (36 7 °C)    Temp:  [97 7 °F (36 5 °C)-98 °F (36 7 °C)] 97 7 °F (36 5 °C)  HR:  [64-68] 68  Resp:  [18] 18  BP: (121-160)/(61-77) 160/77  SpO2:  [95 %-99 %] 95 %  Body mass index is 21 24 kg/m²  Input and Output Summary (last 24 hours): Intake/Output Summary (Last 24 hours) at 3/18/2021 1450  Last data filed at 3/18/2021 1158  Gross per 24 hour   Intake 1546 67 ml   Output --   Net 1546 67 ml       Physical Exam:     Physical Exam  Vitals signs reviewed  Constitutional:       General: She is not in acute distress  Appearance: She is not ill-appearing  Comments: Weak, thin, and frail appearing   HENT:      Head: Normocephalic  Mouth/Throat:      Mouth: Mucous membranes are moist    Neck:      Musculoskeletal: Normal range of motion and neck supple  Cardiovascular:      Rate and Rhythm: Normal rate and regular rhythm  Pulmonary:      Effort: Pulmonary effort is normal  No respiratory distress  Breath sounds: Normal breath sounds  Abdominal:      General: Abdomen is flat  Bowel sounds are normal       Palpations: Abdomen is soft  Musculoskeletal: Normal range of motion  Right lower leg: No edema  Left lower leg: No edema  Skin:     General: Skin is warm and dry  Neurological:      General: No focal deficit present  Mental Status: She is alert and oriented to person, place, and time     Psychiatric:         Mood and Affect: Mood normal          Behavior: Behavior normal          Additional Data:     Labs:    Results from last 7 days   Lab Units 21  0453   WBC Thousand/uL 5 27   HEMOGLOBIN g/dL 9 9*   HEMATOCRIT % 31 8*   PLATELETS Thousands/uL 397*   NEUTROS PCT % 60   LYMPHS PCT % 25   MONOS PCT % 12   EOS PCT % 2     Results from last 7 days   Lab Units 21  0453   POTASSIUM mmol/L 3 7   CHLORIDE mmol/L 107   CO2 mmol/L 25   BUN mg/dL 4*   CREATININE mg/dL 0 75   CALCIUM mg/dL 8 7   ALK PHOS U/L 55   ALT U/L 8*   AST U/L 15           * I Have Reviewed All Lab Data Listed Above  * Additional Pertinent Lab Tests Reviewed: [address] Admission Reviewed    Imaging:    Imaging Reports Reviewed Today Include: CT abd paelvis  Imaging Personally Reviewed by Myself Includes:      Recent Cultures (last 7 days):     Results from last 7 days   Lab Units 03/17/21  0936 03/16/21  1151   BLOOD CULTURE   --  No Growth at 24 hrs  No Growth at 24 hrs     C DIFF TOXIN B BY PCR  Negative  --        Last 24 Hours Medication List:   Current Facility-Administered Medications   Medication Dose Route Frequency Provider Last Rate    al mag oxide-diphenhydramine-lidocaine viscous  10 mL Swish & Swallow Q6H PRN Una Bird PA-C      ALPRAZolam  1 mg Oral TID PRN Una Bird PA-C      cyclobenzaprine  10 mg Oral HS PRN Una Bird PA-C      enoxaparin  40 mg Subcutaneous Daily Una Bird PA-C      HYDROmorphone  1 mg Intravenous Q3H PRN Una Bird PA-C      hydroxychloroquine  100 mg Oral HS Una Bird PA-C      hydroxychloroquine  200 mg Oral Daily With Breakfast Una Bird PA-C      lactated ringers  50 mL/hr Intravenous Continuous ANNETTE Garcia 50 mL/hr (03/18/21 1158)    lactobacillus acidophilus-bulgaricus  1 packet Oral TID With Meals Heath Powell MD      levofloxacin  750 mg Intravenous Q24H Una iBrd PA-C 750 mg (03/17/21 1908)    levothyroxine  88 mcg Oral Daily Una Bird PA-C      metoclopramide  10 mg Intravenous Q6H PRN Una Bird PA-C      metroNIDAZOLE  500 mg Intravenous Q8H Una Bird PA-C 500 mg (03/18/21 0749)    nicotine  1 patch Transdermal Daily Una Bird PA-C      ondansetron  8 mg Intravenous Q6H PRN Una Bird PA-C 8 mg (03/17/21 1821)    oxyCODONE  10 mg Oral Q6H PRN Una Bird PA-C      oxyCODONE  15 mg Oral Q4H PRN Naveed GIRON DALLAS Bird      pantoprazole  40 mg Oral Daily Una Bird PA-C      sertraline  150 mg Oral Daily Una Bird PA-C          Today, Patient Was Seen By: ANNETTE Mahan    ** Please Note: Dictation voice to text software may have been used in the creation of this document   **

## 2021-03-18 NOTE — CASE MANAGEMENT
LOS: 2 days  Readmission: yes  Risk of readmission: Yellow  Bundle: No    CM met with pt at bedside and introduced self/role with dcp  Pt reports she resides with her , Dago Villanueva, in a 2 story home with 6-7 JESUS through front or back entrance  Pt reports independent with ADLs and ambulation PTA  Pt reports she is able to drive but her  normally provides transportation  Pt reports her  is home to help her  Pt reports no hx of VNA or STR  Denies MH or drug/alcohol abuse  Pharmacy: [facility] in [place]  No POA or AD  Pt reports her  is her primary contact   to transport at d/c     CM reviewed d/c planning process including the following: identifying help at home, patient preference for d/c planning needs, Discharge Lounge, [facility] Meds to Bed program, availability of treatment team to discuss questions or concerns patient and/or family may have regarding understanding medications and recognizing signs and symptoms once discharged  CM also encouraged patient to follow up with all recommended appointments after discharge  Patient advised of importance for patient and family to participate in managing patients medical well being

## 2021-03-18 NOTE — ASSESSMENT & PLAN NOTE
Malnutrition Findings:   Adult Malnutrition type: Chronic illness(Related to medical condition and chemotherapy as evidenced by 10% weight loss over the past month and <75% energy intake needs met >1 month treated with diet and oral nutrition supplements)  Adult Degree of Malnutrition: Other severe protein calorie malnutrition    BMI Findings: Body mass index is 21 24 kg/m²

## 2021-03-18 NOTE — ASSESSMENT & PLAN NOTE
· Acute onset night prior to admission   Patient reports inability to tolerate oral intake  · Recent admission for candidal esophagitis-- completed course of fluconazole and she did have improvement in sx of N/V/odynophagia after this  · N/V today could be 2/2 colitis   · Continue antiemetics PRN  · Diet as tolerated  · Consider GI consult if sx worsen

## 2021-03-18 NOTE — ASSESSMENT & PLAN NOTE
· Patient w/ RLQ abdominal pain for several days prior to admission   Reported N/V one day prior to [place] and not being able to tolerate oral intake  · Patient's bowel habits have oscillated between normal BM and a few days loose/watery stool since being on chemo for breast CA  · CT A/P:  Mild mucosal enhancement in the colon with associated mild colonic wall thickening suggests colitis  · DDx: chemo induced vs  Infectious vs  Viral-- low suspicion for ischemic colitis   · Suspect viral etiology, however she's immunocompromised (hydroxychloroquine and history of cancer) therefore will continue Levaquin and Flagyl :  · cdiff negative  · leukocytes pending   · Monitor

## 2021-03-18 NOTE — ASSESSMENT & PLAN NOTE
· Dx March 2020  · S/P Neoadjuvant chemotherapy with [facility] with pertuzumab x3 cycles, completed in early May 2020; S/P mastectomy and sentinel lymph node biopsy August 2020   · Currently receiving adjuvant trastuzumab and pertuzumab with no cardiac toxicity-- Q3 weeks-- plans to complete June 2021  · Last treatment 2/23/21  · Follows w/ Dr Juan R Martinez

## 2021-03-19 VITALS
BODY MASS INDEX: 21.24 KG/M2 | OXYGEN SATURATION: 95 % | DIASTOLIC BLOOD PRESSURE: 63 MMHG | TEMPERATURE: 98.1 F | SYSTOLIC BLOOD PRESSURE: 130 MMHG | RESPIRATION RATE: 18 BRPM | HEART RATE: 67 BPM | HEIGHT: 63 IN

## 2021-03-19 DIAGNOSIS — Z17.1 MALIGNANT NEOPLASM OF UPPER-INNER QUADRANT OF LEFT BREAST IN FEMALE, ESTROGEN RECEPTOR NEGATIVE (HCC): ICD-10-CM

## 2021-03-19 DIAGNOSIS — C50.212 MALIGNANT NEOPLASM OF UPPER-INNER QUADRANT OF LEFT BREAST IN FEMALE, ESTROGEN RECEPTOR NEGATIVE (HCC): ICD-10-CM

## 2021-03-19 PROCEDURE — 99239 HOSP IP/OBS DSCHRG MGMT >30: CPT | Performed by: PHYSICIAN ASSISTANT

## 2021-03-19 RX ORDER — OXYCODONE HYDROCHLORIDE 10 MG/1
10-15 TABLET ORAL EVERY 4 HOURS PRN
Qty: 180 TABLET | Refills: 0 | Status: SHIPPED | OUTPATIENT
Start: 2021-03-19 | End: 2021-03-29 | Stop reason: SDUPTHER

## 2021-03-19 RX ORDER — LEVOFLOXACIN 750 MG/1
750 TABLET ORAL EVERY 24 HOURS
Qty: 4 TABLET | Refills: 0 | Status: SHIPPED | OUTPATIENT
Start: 2021-03-19 | End: 2021-03-23

## 2021-03-19 RX ORDER — LACTOBACILLUS ACIDOPHILUS / LACTOBACILLUS BULGARICUS 100 MILLION CFU STRENGTH
1 GRANULES ORAL
Qty: 30 PACKET | Refills: 0 | Status: SHIPPED | OUTPATIENT
Start: 2021-03-19 | End: 2021-05-27 | Stop reason: ALTCHOICE

## 2021-03-19 RX ORDER — METRONIDAZOLE 500 MG/1
500 TABLET ORAL EVERY 8 HOURS SCHEDULED
Qty: 12 TABLET | Refills: 0 | Status: SHIPPED | OUTPATIENT
Start: 2021-03-19 | End: 2021-03-23

## 2021-03-19 RX ADMIN — METRONIDAZOLE 500 MG: 500 INJECTION, SOLUTION INTRAVENOUS at 01:15

## 2021-03-19 RX ADMIN — ALPRAZOLAM 1 MG: 0.5 TABLET ORAL at 08:03

## 2021-03-19 RX ADMIN — SERTRALINE HYDROCHLORIDE 150 MG: 100 TABLET ORAL at 09:16

## 2021-03-19 RX ADMIN — PANTOPRAZOLE SODIUM 40 MG: 40 TABLET, DELAYED RELEASE ORAL at 06:31

## 2021-03-19 RX ADMIN — OXYCODONE HYDROCHLORIDE 10 MG: 10 TABLET ORAL at 10:21

## 2021-03-19 RX ADMIN — HYDROXYCHLOROQUINE SULFATE 200 MG: 200 TABLET, FILM COATED ORAL at 08:04

## 2021-03-19 RX ADMIN — HYDROMORPHONE HYDROCHLORIDE 1 MG: 1 INJECTION, SOLUTION INTRAMUSCULAR; INTRAVENOUS; SUBCUTANEOUS at 01:11

## 2021-03-19 RX ADMIN — LEVOTHYROXINE SODIUM 88 MCG: 88 TABLET ORAL at 06:31

## 2021-03-19 RX ADMIN — ENOXAPARIN SODIUM 40 MG: 40 INJECTION SUBCUTANEOUS at 09:16

## 2021-03-19 RX ADMIN — Medication 1 PACKET: at 08:04

## 2021-03-19 RX ADMIN — OXYCODONE HYDROCHLORIDE 15 MG: 10 TABLET ORAL at 06:31

## 2021-03-19 RX ADMIN — METRONIDAZOLE 500 MG: 500 INJECTION, SOLUTION INTRAVENOUS at 09:16

## 2021-03-19 NOTE — ASSESSMENT & PLAN NOTE
· Patient w/ RLQ abdominal pain for several days prior to admission   Reported N/V one day prior to [place] and not being able to tolerate oral intake  · Patient's bowel habits have oscillated between normal BM and a few days loose/watery stool since being on chemo for breast CA  · CT A/P:  Mild mucosal enhancement in the colon with associated mild colonic wall thickening suggests colitis  · DDx: chemo induced vs  Infectious vs  Viral-- low suspicion for ischemic colitis   · Suspect viral etiology, however she's immunocompromised (hydroxychloroquine and history of cancer)  · Recommended that she follow-up with GI as an outpatient   · will continue Levaquin and Flagyl to complete a full 7 day course  · cdiff negative, fecal leukocytes negative

## 2021-03-19 NOTE — DISCHARGE SUMMARY
[address] [place] [address]  Discharge- Danyelle Zak 1965, 60 y o  female MRN: [id number]  Unit/Bed#: S -01 Encounter: [id number]  Primary Care Provider: Nam Doll MD   Date and time admitted to hospital: 3/16/2021 10:15 AM    * Colitis  Assessment & Plan  · Patient w/ RLQ abdominal pain for several days prior to admission  Reported N/V one day prior to [place] and not being able to tolerate oral intake  · Patient's bowel habits have oscillated between normal BM and a few days loose/watery stool since being on chemo for breast CA  · CT A/P:  Mild mucosal enhancement in the colon with associated mild colonic wall thickening suggests colitis  · DDx: chemo induced vs  Infectious vs  Viral-- low suspicion for ischemic colitis   · Suspect viral etiology, however she's immunocompromised (hydroxychloroquine and history of cancer)  · Recommended that she follow-up with GI as an outpatient   · will continue Levaquin and Flagyl to complete a full 7 day course  · cdiff negative, fecal leukocytes negative      Hypokalemia  Assessment & Plan  · Resolved      Nausea and vomiting  Assessment & Plan  · Acute onset night prior to admission   Patient reported inability to tolerate oral intake, now improved  · Recent admission for candidal esophagitis-- completed course of fluconazole and she did have improvement in sx of N/V/odynophagia after this  · N/V today could be 2/2 colitis       Malignant neoplasm of upper-inner quadrant of left breast in female, estrogen receptor negative [facility])  Assessment & Plan  · Dx March 2020  · S/P Neoadjuvant chemotherapy with MEZA [facility] with pertuzumab x3 cycles, completed in early May 2020; S/P mastectomy and sentinel lymph node biopsy August 2020   · Currently receiving adjuvant trastuzumab and pertuzumab with no cardiac toxicity-- Q3 weeks-- plans to complete June 2021  · Last treatment 2/23/21  · Follows w/ Dr Brii Elliott    Hypomagnesemia  Assessment & Plan  · Repleted    Systemic lupus erythematosus ([facility] [address] )  Assessment & Plan  · Follows w/ rheumatology   · On Plaquenil    Cancer related pain  Assessment & Plan  · Patient follows w/ palliative care  · Current regimen, reviewed in PDMP: oxy IR 10-15mg PO Q4 PRN     Severe protein-calorie malnutrition ([facility] [address] )  Assessment & Plan  Malnutrition Findings:   Adult Malnutrition type: Chronic illness(Related to medical condition and chemotherapy as evidenced by 10% weight loss over the past month and <75% energy intake needs met >1 month treated with diet and oral nutrition supplements)  Adult Degree of Malnutrition: Other severe protein calorie malnutrition    BMI Findings: Body mass index is 21 24 kg/m²  Discharging Physician / Practitioner: Shaista Hilario PA-C  PCP: Antonia Kim MD  Admission Date:   Admission Orders (From admission, onward)     Ordered        03/16/21 1639  Inpatient Admission  Once                   Discharge Date: 03/19/21    Resolved Problems  Date Reviewed: 3/19/2021    None          Consultations During Hospital Stay:  · None    Procedures Performed:   · CT abdomen and pelvis    Significant Findings / Test Results:   · As above    Incidental Findings:   · None    Test Results Pending at Discharge (will require follow up): · None     Outpatient Tests Requested:  · Meet with GI to discuss if you are due for repeat endoscopy/colonoscopy    Complications:  None    Reason for Admission:  Nausea vomiting diarrhea    Hospital Course:     Moses Bravo is a 60 y o  female patient with underlying history of breast cancer on chemotherapy who originally presented to the hospital on 3/16/2021 due to abdominal pain, nausea, vomiting, diarrhea  Upon mention to the hospital patient had a CT scan of the abdomen and pelvis which showed concern for colitis  She tested negative for COVID  She was initiated on Levaquin and Flagyl with supportive care with IV fluids and diet was advanced as tolerated  She tested negative for C diff and though it is possible her symptoms were chemotherapy versus related, given that she is immune compromised, she was recommended to complete a 7 day course of antibiotics and follow-up with her regular gastroenterologist    Please see above list of diagnoses and related plan for additional information  Condition at Discharge: stable     Discharge Day Visit / Exam:     Subjective:  Patient states she is eager to go home today  She reported tolerance of current diet and states she would love to go home and eat some grits  She also reported some mild left ankle swelling but it was not painful or red and no injury was reported  She is not having any nausea, vomiting, or diarrhea and in fact reports she is now feeling like she may be getting a little constipated over the past couple days  She reports she uses uncomfortable from being in this bed for too many days, which is causing her some pain but otherwise is feeling well  Vitals: Blood Pressure: 130/63 (03/19/21 0710)  Pulse: 67 (03/19/21 0710)  Temperature: 98 1 °F (36 7 °C) (03/18/21 2234)  Temp Source: Oral (03/19/21 0710)  Respirations: 18 (03/19/21 0710)  Height: 5' 3" (160 cm) (03/17/21 1101)  SpO2: 95 % (03/19/21 0710)  Exam:   Physical Exam  Vitals signs reviewed  Constitutional:       General: She is not in acute distress  Appearance: She is not ill-appearing, toxic-appearing or diaphoretic  Comments: Thin female in no distress seen in bed   Eyes:      General: No scleral icterus  Right eye: No discharge  Left eye: No discharge  Conjunctiva/sclera: Conjunctivae normal    Cardiovascular:      Rate and Rhythm: Normal rate and regular rhythm  Heart sounds: No murmur  Pulmonary:      Effort: Pulmonary effort is normal  No respiratory distress  Breath sounds: No stridor  No wheezing or rhonchi        Comments: Port in place in chest wall with no surrounding erythema  Abdominal: General: Bowel sounds are normal  There is no distension  Palpations: Abdomen is soft  Tenderness: There is no abdominal tenderness  There is no guarding  Musculoskeletal:         General: Swelling (Mild left ankle swelling) present  No tenderness, deformity or signs of injury  Right lower leg: No edema  Left lower leg: No edema  Skin:     General: Skin is warm and dry  Coloration: Skin is not jaundiced or pale  Findings: No bruising, erythema, lesion or rash  Neurological:      General: No focal deficit present  Mental Status: She is alert  Comments: Awake alert interactive she is an excellent historian, recalls having met me on a previous admission   Psychiatric:         Mood and Affect: Mood normal          Behavior: Behavior normal          Thought Content: Thought content normal          Judgment: Judgment normal          Discussion with Family:  Spoke with  over the phone, all questions and concerns addressed    Discharge instructions/Information to patient and family:   See after visit summary for information provided to patient and family  Provisions for Follow-Up Care:  See after visit summary for information related to follow-up care and any pertinent home health orders  Disposition:  Home with no needs    Planned Readmission: none     Discharge Statement:  I spent 30 minutes discharging the patient  This time was spent on the day of discharge  I had direct contact with the patient on the day of discharge  Greater than 50% of the total time was spent examining patient, answering all patient questions, arranging and discussing plan of care with patient as well as directly providing post-discharge instructions  Additional time then spent on discharge activities    Bedside nursing rounds performed and case was discussed with case management    Discharge Medications:  See after visit summary for reconciled discharge medications provided to patient and family        ** Please Note: This note has been constructed using a voice recognition system **

## 2021-03-19 NOTE — ASSESSMENT & PLAN NOTE
· Dx March 2020  · S/P Neoadjuvant chemotherapy with MEZA [facility] with pertuzumab x3 cycles, completed in early May 2020; S/P mastectomy and sentinel lymph node biopsy August 2020   · Currently receiving adjuvant trastuzumab and pertuzumab with no cardiac toxicity-- Q3 weeks-- plans to complete June 2021  · Last treatment 2/23/21  · Follows w/ Dr Karla Duncan

## 2021-03-19 NOTE — DISCHARGE INSTR - AVS FIRST PAGE
Dear Cheikh Hernandez,     It was our pleasure to care for you here at [facility] [place]  It is our hope that we were always able to exceed the expected standards for your care during your stay  You were hospitalized due to colitis  You were cared for on the 4th floor by Shelbi Pittman PA-C under the service of Álvaro Uriostegui MD with the [facility] who covers for your primary care physician (PCP), Ziyad Vazquez MD, while you were hospitalized  If you have any questions or concerns related to this hospitalization, you may contact us at [phone]  For follow up as well as any medication refills, we recommend that you follow up with your primary care physician  A registered nurse will reach out to you by phone within a few days after your discharge to answer any additional questions that you may have after going home  However, at this time we provide for you here, the most important instructions / recommendations at discharge:     · Notable Medication Adjustments -   · Flagyl and levaquin starting today for the next 4 days to finish a 7 day course  · Testing Required after Discharge -   · Routine bmp  · Important follow up information -   · Make an appointment with your GI doctor as soon as possible  · Other Instructions -   · Call to schedule your COVID vaccine  · Please review this entire after visit summary as additional general instructions including medication list, appointments, activity, diet, any pertinent wound care, and other additional recommendations from your care team that may be provided for you        Sincerely,     Shelbi Pittman PA-C

## 2021-03-19 NOTE — ASSESSMENT & PLAN NOTE
· Acute onset night prior to admission   Patient reported inability to tolerate oral intake, now improved  · Recent admission for candidal esophagitis-- completed course of fluconazole and she did have improvement in sx of N/V/odynophagia after this  · N/V today could be 2/2 colitis

## 2021-03-21 LAB
BACTERIA BLD CULT: NORMAL
BACTERIA BLD CULT: NORMAL

## 2021-03-22 ENCOUNTER — TRANSITIONAL CARE MANAGEMENT (OUTPATIENT)
Dept: FAMILY MEDICINE CLINIC | Facility: CLINIC | Age: 56
End: 2021-03-22

## 2021-03-22 ENCOUNTER — TELEPHONE (OUTPATIENT)
Dept: FAMILY MEDICINE CLINIC | Facility: CLINIC | Age: 56
End: 2021-03-22

## 2021-03-22 DIAGNOSIS — F41.9 ANXIETY: ICD-10-CM

## 2021-03-22 RX ORDER — ALPRAZOLAM 1 MG/1
1 TABLET ORAL 3 TIMES DAILY PRN
Qty: 90 TABLET | Refills: 0 | Status: SHIPPED | OUTPATIENT
Start: 2021-03-22 | End: 2021-04-17

## 2021-03-22 NOTE — TELEPHONE ENCOUNTER
Called Pt for TCM apt  Pt is scheduled for this Thursday, 3/25th  She reports she is out of her Xanax  She is asking if you can please send in a RF for Xanax to [facility] in [place]

## 2021-03-23 NOTE — UTILIZATION REVIEW
Notification of Discharge  This is a Notification of Discharge from our facility [address]  Please be advised that this patient has been discharge from our facility  Below you will find the admission and discharge date and time including the patients disposition  PRESENTATION DATE: 3/16/2021 10:15 AM  OBS ADMISSION DATE:   IP ADMISSION DATE: 3/16/21 1639   DISCHARGE DATE: 3/19/2021 12:31 PM  DISPOSITION: Home/Self Care Home/Self Care   Admission Orders listed below:  Admission Orders (From admission, onward)     Ordered        03/16/21 1639  Inpatient Admission  Once                   Please contact the UR Department if additional information is required to close this patient's authorization/case  [address] [facility] Utilization Review Department  Main: [phone] x carefully listen to the prompts  All voicemails are confidential   [email or web address]  Send all requests for admission clinical reviews, approved or denied determinations and any other requests to dedicated fax number below belonging to the campus where the patient is receiving treatment   List of dedicated fax numbers:  [address] DENIALS (Administrative/Medical Necessity) [phone]   [address] (Maternity/NICU/Pediatrics) [phone]   Jitendra Allen [phone]   Gisel Wilson [phone]   Dagmar Stephens [phone]   Pete Landaverde [facility] [address] [place] [address] [phone]   [facility]  [phone]   [address] [facility]  [address] [phone]

## 2021-03-25 ENCOUNTER — TELEPHONE (OUTPATIENT)
Dept: GASTROENTEROLOGY | Facility: CLINIC | Age: 56
End: 2021-03-25

## 2021-03-25 ENCOUNTER — HOSPITAL ENCOUNTER (INPATIENT)
Facility: HOSPITAL | Age: 56
LOS: 2 days | Discharge: HOME | DRG: 249 | End: 2021-03-27
Attending: EMERGENCY MEDICINE | Admitting: INTERNAL MEDICINE
Payer: COMMERCIAL

## 2021-03-25 ENCOUNTER — APPOINTMENT (EMERGENCY)
Dept: CT IMAGING | Facility: HOSPITAL | Age: 56
DRG: 249 | End: 2021-03-25
Payer: COMMERCIAL

## 2021-03-25 ENCOUNTER — OFFICE VISIT (OUTPATIENT)
Dept: FAMILY MEDICINE CLINIC | Facility: CLINIC | Age: 56
End: 2021-03-25
Payer: COMMERCIAL

## 2021-03-25 VITALS
SYSTOLIC BLOOD PRESSURE: 116 MMHG | HEIGHT: 63 IN | TEMPERATURE: 97.8 F | DIASTOLIC BLOOD PRESSURE: 64 MMHG | RESPIRATION RATE: 18 BRPM | OXYGEN SATURATION: 100 % | BODY MASS INDEX: 17.89 KG/M2 | HEART RATE: 100 BPM | WEIGHT: 101 LBS

## 2021-03-25 DIAGNOSIS — E86.0 DEHYDRATION: Primary | ICD-10-CM

## 2021-03-25 DIAGNOSIS — E87.6 HYPOKALEMIA: ICD-10-CM

## 2021-03-25 DIAGNOSIS — K52.9 COLITIS: Primary | ICD-10-CM

## 2021-03-25 DIAGNOSIS — K52.9 GASTROENTERITIS: ICD-10-CM

## 2021-03-25 PROBLEM — R79.89 ELEVATED LACTIC ACID LEVEL: Status: ACTIVE | Noted: 2021-03-25

## 2021-03-25 LAB
ALBUMIN SERPL BCP-MCNC: 3.6 G/DL (ref 3.5–5)
ALP SERPL-CCNC: 76 U/L (ref 46–116)
ALT SERPL W P-5'-P-CCNC: 11 U/L (ref 12–78)
ANION GAP SERPL CALCULATED.3IONS-SCNC: 15 MMOL/L (ref 4–13)
AST SERPL W P-5'-P-CCNC: 12 U/L (ref 5–45)
BASOPHILS # BLD AUTO: 0.05 THOUSANDS/ΜL (ref 0–0.1)
BASOPHILS NFR BLD AUTO: 0 % (ref 0–1)
BILIRUB SERPL-MCNC: 0.29 MG/DL (ref 0.2–1)
BUN SERPL-MCNC: 8 MG/DL (ref 5–25)
CALCIUM SERPL-MCNC: 9.3 MG/DL (ref 8.3–10.1)
CHLORIDE SERPL-SCNC: 108 MMOL/L (ref 100–108)
CO2 SERPL-SCNC: 19 MMOL/L (ref 21–32)
CREAT SERPL-MCNC: 0.94 MG/DL (ref 0.6–1.3)
EOSINOPHIL # BLD AUTO: 0 THOUSAND/ΜL (ref 0–0.61)
EOSINOPHIL NFR BLD AUTO: 0 % (ref 0–6)
ERYTHROCYTE [DISTWIDTH] IN BLOOD BY AUTOMATED COUNT: 16.9 % (ref 11.6–15.1)
FLUAV RNA RESP QL NAA+PROBE: NEGATIVE
FLUBV RNA RESP QL NAA+PROBE: NEGATIVE
GFR SERPL CREATININE-BSD FRML MDRD: 68 ML/MIN/1.73SQ M
GLUCOSE SERPL-MCNC: 122 MG/DL (ref 65–140)
HCT VFR BLD AUTO: 37.3 % (ref 34.8–46.1)
HGB BLD-MCNC: 12.1 G/DL (ref 11.5–15.4)
IMM GRANULOCYTES # BLD AUTO: 0.05 THOUSAND/UL (ref 0–0.2)
IMM GRANULOCYTES NFR BLD AUTO: 0 % (ref 0–2)
LACTATE SERPL-SCNC: 1.3 MMOL/L (ref 0.5–2)
LACTATE SERPL-SCNC: 2.2 MMOL/L (ref 0.5–2)
LIPASE SERPL-CCNC: 120 U/L (ref 73–393)
LYMPHOCYTES # BLD AUTO: 0.84 THOUSANDS/ΜL (ref 0.6–4.47)
LYMPHOCYTES NFR BLD AUTO: 8 % (ref 14–44)
MAGNESIUM SERPL-MCNC: 1.8 MG/DL (ref 1.6–2.6)
MCH RBC QN AUTO: 28.6 PG (ref 26.8–34.3)
MCHC RBC AUTO-ENTMCNC: 32.4 G/DL (ref 31.4–37.4)
MCV RBC AUTO: 88 FL (ref 82–98)
MONOCYTES # BLD AUTO: 0.66 THOUSAND/ΜL (ref 0.17–1.22)
MONOCYTES NFR BLD AUTO: 6 % (ref 4–12)
NEUTROPHILS # BLD AUTO: 9.62 THOUSANDS/ΜL (ref 1.85–7.62)
NEUTS SEG NFR BLD AUTO: 86 % (ref 43–75)
NRBC BLD AUTO-RTO: 0 /100 WBCS
PLATELET # BLD AUTO: 376 THOUSANDS/UL (ref 149–390)
PMV BLD AUTO: 11.4 FL (ref 8.9–12.7)
POTASSIUM SERPL-SCNC: 3.1 MMOL/L (ref 3.5–5.3)
PROT SERPL-MCNC: 6.9 G/DL (ref 6.4–8.2)
RBC # BLD AUTO: 4.23 MILLION/UL (ref 3.81–5.12)
RSV RNA RESP QL NAA+PROBE: NEGATIVE
SARS-COV-2 RNA RESP QL NAA+PROBE: NEGATIVE
SODIUM SERPL-SCNC: 142 MMOL/L (ref 136–145)
WBC # BLD AUTO: 11.22 THOUSAND/UL (ref 4.31–10.16)

## 2021-03-25 PROCEDURE — 4004F PT TOBACCO SCREEN RCVD TLK: CPT | Performed by: FAMILY MEDICINE

## 2021-03-25 PROCEDURE — 36415 COLL VENOUS BLD VENIPUNCTURE: CPT | Performed by: EMERGENCY MEDICINE

## 2021-03-25 PROCEDURE — 99285 EMERGENCY DEPT VISIT HI MDM: CPT | Performed by: EMERGENCY MEDICINE

## 2021-03-25 PROCEDURE — 96361 HYDRATE IV INFUSION ADD-ON: CPT

## 2021-03-25 PROCEDURE — 99285 EMERGENCY DEPT VISIT HI MDM: CPT

## 2021-03-25 PROCEDURE — 83605 ASSAY OF LACTIC ACID: CPT | Performed by: EMERGENCY MEDICINE

## 2021-03-25 PROCEDURE — C9113 INJ PANTOPRAZOLE SODIUM, VIA: HCPCS | Performed by: EMERGENCY MEDICINE

## 2021-03-25 PROCEDURE — 87493 C DIFF AMPLIFIED PROBE: CPT | Performed by: EMERGENCY MEDICINE

## 2021-03-25 PROCEDURE — 0241U HB NFCT DS VIR RESP RNA 4 TRGT: CPT | Performed by: EMERGENCY MEDICINE

## 2021-03-25 PROCEDURE — 80053 COMPREHEN METABOLIC PANEL: CPT | Performed by: EMERGENCY MEDICINE

## 2021-03-25 PROCEDURE — 99215 OFFICE O/P EST HI 40 MIN: CPT | Performed by: FAMILY MEDICINE

## 2021-03-25 PROCEDURE — 1111F DSCHRG MED/CURRENT MED MERGE: CPT | Performed by: FAMILY MEDICINE

## 2021-03-25 PROCEDURE — 83690 ASSAY OF LIPASE: CPT | Performed by: EMERGENCY MEDICINE

## 2021-03-25 PROCEDURE — 74177 CT ABD & PELVIS W/CONTRAST: CPT

## 2021-03-25 PROCEDURE — 96365 THER/PROPH/DIAG IV INF INIT: CPT

## 2021-03-25 PROCEDURE — 83735 ASSAY OF MAGNESIUM: CPT | Performed by: NURSE PRACTITIONER

## 2021-03-25 PROCEDURE — 85025 COMPLETE CBC W/AUTO DIFF WBC: CPT | Performed by: EMERGENCY MEDICINE

## 2021-03-25 PROCEDURE — G1004 CDSM NDSC: HCPCS

## 2021-03-25 PROCEDURE — 99223 1ST HOSP IP/OBS HIGH 75: CPT | Performed by: NURSE PRACTITIONER

## 2021-03-25 PROCEDURE — 96375 TX/PRO/DX INJ NEW DRUG ADDON: CPT

## 2021-03-25 RX ORDER — PANTOPRAZOLE SODIUM 40 MG/1
40 INJECTION, POWDER, FOR SOLUTION INTRAVENOUS ONCE
Status: COMPLETED | OUTPATIENT
Start: 2021-03-25 | End: 2021-03-25

## 2021-03-25 RX ORDER — HYDROXYCHLOROQUINE SULFATE 200 MG/1
100 TABLET, FILM COATED ORAL
Status: DISCONTINUED | OUTPATIENT
Start: 2021-03-26 | End: 2021-03-27 | Stop reason: HOSPADM

## 2021-03-25 RX ORDER — HYDROMORPHONE HCL/PF 1 MG/ML
1 SYRINGE (ML) INJECTION EVERY 4 HOURS PRN
Status: DISCONTINUED | OUTPATIENT
Start: 2021-03-25 | End: 2021-03-27 | Stop reason: HOSPADM

## 2021-03-25 RX ORDER — NICOTINE 21 MG/24HR
1 PATCH, TRANSDERMAL 24 HOURS TRANSDERMAL DAILY
Status: DISCONTINUED | OUTPATIENT
Start: 2021-03-26 | End: 2021-03-27 | Stop reason: HOSPADM

## 2021-03-25 RX ORDER — SODIUM CHLORIDE 9 MG/ML
100 INJECTION, SOLUTION INTRAVENOUS CONTINUOUS
Status: DISCONTINUED | OUTPATIENT
Start: 2021-03-25 | End: 2021-03-27

## 2021-03-25 RX ORDER — ONDANSETRON 2 MG/ML
4 INJECTION INTRAMUSCULAR; INTRAVENOUS EVERY 6 HOURS PRN
Status: DISCONTINUED | OUTPATIENT
Start: 2021-03-25 | End: 2021-03-27 | Stop reason: HOSPADM

## 2021-03-25 RX ORDER — ALPRAZOLAM 0.5 MG/1
1 TABLET ORAL 3 TIMES DAILY PRN
Status: DISCONTINUED | OUTPATIENT
Start: 2021-03-25 | End: 2021-03-27 | Stop reason: HOSPADM

## 2021-03-25 RX ORDER — LEVOTHYROXINE SODIUM 88 UG/1
88 TABLET ORAL DAILY
Status: DISCONTINUED | OUTPATIENT
Start: 2021-03-26 | End: 2021-03-27 | Stop reason: HOSPADM

## 2021-03-25 RX ORDER — HYDROMORPHONE HCL/PF 1 MG/ML
0.5 SYRINGE (ML) INJECTION EVERY 4 HOURS PRN
Status: DISCONTINUED | OUTPATIENT
Start: 2021-03-25 | End: 2021-03-27 | Stop reason: HOSPADM

## 2021-03-25 RX ORDER — POTASSIUM CHLORIDE 14.9 MG/ML
20 INJECTION INTRAVENOUS ONCE
Status: COMPLETED | OUTPATIENT
Start: 2021-03-25 | End: 2021-03-25

## 2021-03-25 RX ORDER — HYDROXYCHLOROQUINE SULFATE 200 MG/1
200 TABLET, FILM COATED ORAL
Status: DISCONTINUED | OUTPATIENT
Start: 2021-03-26 | End: 2021-03-27 | Stop reason: HOSPADM

## 2021-03-25 RX ORDER — CYCLOBENZAPRINE HCL 10 MG
10 TABLET ORAL
Status: DISCONTINUED | OUTPATIENT
Start: 2021-03-25 | End: 2021-03-27 | Stop reason: HOSPADM

## 2021-03-25 RX ORDER — ACETAMINOPHEN 325 MG/1
650 TABLET ORAL EVERY 6 HOURS PRN
Status: DISCONTINUED | OUTPATIENT
Start: 2021-03-25 | End: 2021-03-27 | Stop reason: HOSPADM

## 2021-03-25 RX ORDER — ONDANSETRON 2 MG/ML
4 INJECTION INTRAMUSCULAR; INTRAVENOUS ONCE
Status: COMPLETED | OUTPATIENT
Start: 2021-03-25 | End: 2021-03-25

## 2021-03-25 RX ORDER — HYDROMORPHONE HCL/PF 1 MG/ML
0.5 SYRINGE (ML) INJECTION ONCE
Status: COMPLETED | OUTPATIENT
Start: 2021-03-25 | End: 2021-03-25

## 2021-03-25 RX ORDER — LACTOBACILLUS ACIDOPHILUS / LACTOBACILLUS BULGARICUS 100 MILLION CFU STRENGTH
1 GRANULES ORAL
Status: DISCONTINUED | OUTPATIENT
Start: 2021-03-26 | End: 2021-03-27 | Stop reason: HOSPADM

## 2021-03-25 RX ORDER — PANTOPRAZOLE SODIUM 40 MG/1
40 INJECTION, POWDER, FOR SOLUTION INTRAVENOUS
Status: DISCONTINUED | OUTPATIENT
Start: 2021-03-26 | End: 2021-03-27 | Stop reason: HOSPADM

## 2021-03-25 RX ORDER — LABETALOL 20 MG/4 ML (5 MG/ML) INTRAVENOUS SYRINGE
10 ONCE
Status: COMPLETED | OUTPATIENT
Start: 2021-03-25 | End: 2021-03-25

## 2021-03-25 RX ADMIN — SODIUM CHLORIDE 500 ML: 0.9 INJECTION, SOLUTION INTRAVENOUS at 20:49

## 2021-03-25 RX ADMIN — SODIUM CHLORIDE 1000 ML: 0.9 INJECTION, SOLUTION INTRAVENOUS at 16:56

## 2021-03-25 RX ADMIN — ALPRAZOLAM 1 MG: 0.5 TABLET ORAL at 22:27

## 2021-03-25 RX ADMIN — PANTOPRAZOLE SODIUM 40 MG: 40 INJECTION, POWDER, FOR SOLUTION INTRAVENOUS at 18:36

## 2021-03-25 RX ADMIN — ONDANSETRON 4 MG: 2 INJECTION INTRAMUSCULAR; INTRAVENOUS at 16:56

## 2021-03-25 RX ADMIN — IOHEXOL 100 ML: 350 INJECTION, SOLUTION INTRAVENOUS at 18:21

## 2021-03-25 RX ADMIN — ONDANSETRON 4 MG: 2 INJECTION INTRAMUSCULAR; INTRAVENOUS at 21:00

## 2021-03-25 RX ADMIN — HYDROMORPHONE HYDROCHLORIDE 1 MG: 1 INJECTION, SOLUTION INTRAMUSCULAR; INTRAVENOUS; SUBCUTANEOUS at 20:59

## 2021-03-25 RX ADMIN — SODIUM CHLORIDE 100 ML/HR: 0.9 INJECTION, SOLUTION INTRAVENOUS at 22:18

## 2021-03-25 RX ADMIN — MAGNESIUM OXIDE TAB 400 MG (241.3 MG ELEMENTAL MG) 400 MG: 400 (241.3 MG) TAB at 22:27

## 2021-03-25 RX ADMIN — HYDROMORPHONE HYDROCHLORIDE 0.5 MG: 1 INJECTION, SOLUTION INTRAMUSCULAR; INTRAVENOUS; SUBCUTANEOUS at 16:56

## 2021-03-25 RX ADMIN — POTASSIUM CHLORIDE 20 MEQ: 14.9 INJECTION, SOLUTION INTRAVENOUS at 18:41

## 2021-03-25 RX ADMIN — LABETALOL 20 MG/4 ML (5 MG/ML) INTRAVENOUS SYRINGE 10 MG: at 20:48

## 2021-03-25 NOTE — TELEPHONE ENCOUNTER
Patients GI provider:  Dr Surya Childs    Number to return call: [phone]    Reason for call: Dr Juan Ramon Cui pt's PCP called wanting to sched pt for ED/FU visit for colitis  Please call office with sooner appt      Scheduled procedure/appointment date if applicable: NA

## 2021-03-25 NOTE — ASSESSMENT & PLAN NOTE
Colitis  Patient with suspected viral colitis  She completed course of antibiotics but continues to have significant symptoms of abdominal pain, nausea, and vomiting  Patient has been using her Zofran medication without relief in symptoms  I had a long discussion with the patient and her   It was felt that patient should have re-evaluation in emergency room for her symptoms as well as her overall weakness  I had called [facility] Gastroenterology Department and was told they would have to call us back to have patient squeezed in for an appointment for follow-up

## 2021-03-25 NOTE — ED NOTES
Patient transported to [address] [place] [address],[address], RN  03/25/21 Yohannes Allison

## 2021-03-25 NOTE — ED PROVIDER NOTES
History  Chief Complaint   Patient presents with    Vomiting     pt states she is vomiting, diarrhea and weakness  had follow up with her pcp today and he told her to come here for eval  hx breast cancer  47-year-old female presents the emergency department for evaluation of nausea, vomiting, and abdominal pain  Patient was admitted to the hospital with similar symptoms on March 16th  At that time she was treated for colitis  She has completed 7 day course of Levaquin and Flagyl without improvement  She was evaluated by her PCP today for nausea and vomiting and sent directly to the emergency department  She does have a history of breast cancer and is receiving chemotherapy every 3 weeks  Her last treatment was reschedule due to her illness  Patient has been losing weight  She has had several episodes of bilious vomiting today  She has small loose watery nonbloody stools patient continues to have right lower quadrant abdominal pain this has changed or improved her last admission  Patient denies fevers  No cough or shortness of breath  History provided by:  Patient, medical records and spouse   used: No    Abdominal Pain  Pain location:  RLQ  Pain quality: cramping and sharp    Pain radiates to:  Does not radiate  Pain severity:  Severe  Onset quality:  Unable to specify  Duration:  2 weeks  Timing:  Constant  Progression:  Unchanged  Chronicity:  New  Context: previous surgery (Hysterectomy)    Context: not diet changes, not sick contacts, not suspicious food intake and not trauma    Relieved by:  Nothing  Worsened by:  Nothing  Ineffective treatments:  Not moving and lying down (Zofran)  Associated symptoms: anorexia, diarrhea, fatigue, nausea and vomiting    Associated symptoms: no chest pain, no constipation, no cough and no dysuria    Risk factors: recent hospitalization        Prior to Admission Medications   Prescriptions Last Dose Informant Patient Reported? Taking? ALPRAZolam (XANAX) 1 mg tablet 3/25/2021 at Unknown time  No Yes   Sig: Take 1 tablet (1 mg total) by mouth 3 (three) times a day as needed for anxiety   acetaminophen (TYLENOL) 325 mg tablet Past Week at Unknown time  No Yes   Sig: Take 2 tablets (650 mg total) by mouth every 6 (six) hours as needed for mild pain, headaches or fever   al mag oxide-diphenhydramine-lidocaine viscous (MAGIC MOUTHWASH) 1:1:1 suspension 3/25/2021 at Unknown time  No Yes   Sig: Swish and swallow 10 mL every 6 (six) hours as needed for mouth pain or discomfort or mucositis   cyclobenzaprine (FLEXERIL) 5 mg tablet 3/24/2021 at Unknown time  Yes Yes   Sig: Take 10 mg by mouth daily at bedtime as needed   hydroxychloroquine (PLAQUENIL) 200 mg tablet 3/25/2021 at Unknown time Self Yes Yes   Sig: Take 1 tablet in morning and 1/2 tablet in evening   lactobacillus acidophilus-bulgaricus [facility]) packet Past Month at Unknown time  No Yes   Sig: Take 1 packet by mouth 3 (three) times a day with meals   levothyroxine 88 mcg tablet 3/25/2021 at Unknown time  No Yes   Sig: TAKE ONE TABLET BY MOUTH EVERY DAY   magnesium oxide (MAG-OX) 400 mg 3/25/2021 at Unknown time  Yes Yes   Sig: Take 400 mg by mouth 2 (two) times a day   ondansetron (ZOFRAN) 4 mg tablet 3/25/2021 at Unknown time  No Yes   Sig: Take 1 tablet (4 mg total) by mouth every 8 (eight) hours as needed for nausea or vomiting   oxyCODONE (ROXICODONE) 10 MG TABS Not Taking at Unknown time  No No   Sig: Take 1-1 5 tablets (10-15 mg total) by mouth every 4 (four) hours as needed (cancer pain    Max of 8 tabs / day ) NO FURTHER FILLS WITHOUT VISIT TO PALLIATIVE CLINIC   Patient not taking: Reported on 3/25/2021   pantoprazole (PROTONIX) 40 mg tablet 3/25/2021 at Unknown time  No Yes   Sig: Take 1 tablet (40 mg total) by mouth daily   sertraline (ZOLOFT) 100 mg tablet 3/25/2021 at Unknown time  No Yes   Sig: TAKE 1 & 1/2 TABLETS BY MOUTH ONCE DAILY   Patient taking differently: Take 150 mg by mouth daily BY MOUTH ONCE DAILY      Facility-Administered Medications: None       Past Medical History:   Diagnosis Date    Disease of thyroid gland     Hypertension     Lupus ([facility] [address] )     Malignant neoplasm of upper-inner quadrant of left breast in female, estrogen receptor negative ([facility] [address] ) 2/25/2020    Nephrolithiasis     PTSD (post-traumatic stress disorder)        Past Surgical History:   Procedure Laterality Date    FEMUR FRACTURE SURGERY      FL GUIDED CENTRAL VENOUS ACCESS DEVICE INSERTION  3/17/2020    HYSTERECTOMY      LEFT OOPHORECTOMY      due to torsion     MAMMO STEREOTACTIC BREAST BIOPSY RIGHT (ALL INC) Right 2/12/2020    MASTECTOMY W/ SENTINEL NODE BIOPSY Left 8/18/2020    Procedure: BREAST MASTECTOMY WITH SENTINEL LYMPH NODE BIOPSY, LYMPHATIC MAPPING WITH BLUE DYE AND RADIOACTIVE DYE (INJECT AT 1430 BY DR WRIGHT IN THE OR); Surgeon: Mica Martinez MD;  Location: [facility];  Service: Surgical Oncology    MRI BREAST BIOPSY LEFT (ALL INCLUSIVE) Left 3/20/2020    MA INSJ TUNNELED CTR VAD W/SUBQ PORT AGE 5 YR/> N/A 3/17/2020    Procedure: INSERTION VENOUS PORT ( PORT-A-CATH) IR;  Surgeon: Lynn Guzman DO;  [facility];  Service: Interventional Radiology    US GUIDANCE BREAST BIOPSY LEFT EACH ADDITIONAL Left 2/12/2020    US GUIDED BREAST BIOPSY LEFT COMPLETE Left 2/12/2020    VAGINA SURGERY         Family History   Problem Relation Age of Onset    Diabetes Mother     Hypertension Mother     Nephrolithiasis Mother     Breast cancer Mother 79    Heart disease Father     Hypertension Father     Diabetes Brother     Nephrolithiasis Brother     Breast cancer Maternal Aunt     No Known Problems Maternal Grandmother     No Known Problems Maternal Grandfather     No Known Problems Paternal Grandmother     No Known Problems Paternal Grandfather      I have reviewed and agree with the history as documented      E-Cigarette/Vaping    E-Cigarette Use Never User E-Cigarette/Vaping Substances    Nicotine No     THC No     CBD No     Flavoring No     Other No     Unknown No      Social History     Tobacco Use    Smoking status: Current Every Day Smoker     Packs/day: 0 50     Years: 40 00     Pack years: 20 00     Types: Cigarettes     Start date: [address] [facility]    Smokeless tobacco: Never Used   Substance Use Topics    Alcohol use: Not Currently     Alcohol/week: 21 0 standard drinks     Types: 21 Cans of beer per week     Frequency: Never     Drinks per session: Patient refused     Binge frequency: Never     Comment: pt states she had her last beer 3/22/2020    Drug use: No       Review of Systems   Constitutional: Positive for appetite change, fatigue and unexpected weight change  Respiratory: Negative for cough and choking  Cardiovascular: Negative for chest pain and leg swelling  Gastrointestinal: Positive for abdominal pain, anorexia, diarrhea, nausea and vomiting  Negative for constipation  Genitourinary: Negative for dysuria  Neurological: Positive for weakness  All other systems reviewed and are negative  Physical Exam  Physical Exam  Vitals signs and nursing note reviewed  Constitutional:       General: She is in acute distress  Appearance: She is well-developed  She is ill-appearing  She is not toxic-appearing  HENT:      Head: Normocephalic  Nose: Nose normal       Mouth/Throat:      Pharynx: No oropharyngeal exudate  Eyes:      Conjunctiva/sclera: Conjunctivae normal       Pupils: Pupils are equal, round, and reactive to light  Neck:      Musculoskeletal: Normal range of motion and neck supple  Cardiovascular:      Rate and Rhythm: Normal rate and regular rhythm  Heart sounds: Normal heart sounds  Pulmonary:      Effort: Pulmonary effort is normal       Breath sounds: Normal breath sounds  Chest:       Abdominal:      General: Abdomen is flat  Bowel sounds are normal  There is no distension        Palpations: Abdomen is soft  Tenderness: There is abdominal tenderness in the right lower quadrant  There is guarding  There is no right CVA tenderness or rebound  Hernia: No hernia is present  Musculoskeletal: Normal range of motion  General: No tenderness or deformity  Lymphadenopathy:      Cervical: No cervical adenopathy  Skin:     General: Skin is warm and dry  Findings: No rash  Neurological:      Mental Status: She is oriented to person, place, and time  She is lethargic  Cranial Nerves: No cranial nerve deficit  Sensory: No sensory deficit  Motor: No abnormal muscle tone  Coordination: Coordination normal       Gait: Gait normal       Deep Tendon Reflexes: Reflexes are normal and symmetric  Psychiatric:         Behavior: Behavior normal          Thought Content:  Thought content normal          Judgment: Judgment normal          Vital Signs  ED Triage Vitals   Temperature Pulse Respirations Blood Pressure SpO2   03/25/21 1536 03/25/21 1536 03/25/21 1536 03/25/21 1536 03/25/21 1536   98 5 °F (36 9 °C) 89 16 147/89 100 %      Temp Source Heart Rate Source Patient Position - Orthostatic VS BP Location FiO2 (%)   03/25/21 1536 03/25/21 1536 03/25/21 1536 03/25/21 1536 --   Oral Monitor Sitting Left arm       Pain Score       03/25/21 1656       Worst Possible Pain           Vitals:    03/25/21 1900 03/25/21 2045 03/25/21 2100 03/25/21 2151   BP: (!) 216/102 (!) 187/93 (!) 186/102 150/68   Pulse: 90 80 72 78   Patient Position - Orthostatic VS: Lying Lying Lying Lying         Visual Acuity      ED Medications  Medications   ondansetron (ZOFRAN) injection 4 mg (4 mg Intravenous Given 3/25/21 2100)   HYDROmorphone (DILAUDID) injection 0 5 mg (has no administration in time range)   HYDROmorphone (DILAUDID) injection 0 5 mg (has no administration in time range)   HYDROmorphone (DILAUDID) injection 1 mg (1 mg Intravenous Given 3/25/21 2059)   lamin Do oxide-diphenhydramine-lidocaine viscous (MAGIC MOUTHWASH) suspension 10 mL (has no administration in time range)   ALPRAZolam [place]) tablet 1 mg (1 mg Oral Given 3/25/21 2227)   cyclobenzaprine (FLEXERIL) tablet 10 mg (has no administration in time range)   hydroxychloroquine (PLAQUENIL) tablet 200 mg (has no administration in time range)   hydroxychloroquine (PLAQUENIL) tablet 100 mg (has no administration in time range)   lactobacillus acidophilus-bulgaricus [facility]) packet 1 packet (has no administration in time range)   levothyroxine tablet 88 mcg (has no administration in time range)   magnesium oxide (MAG-OX) tablet 400 mg (400 mg Oral Given 3/25/21 2227)   pantoprazole (PROTONIX) injection 40 mg (has no administration in time range)   sertraline (ZOLOFT) tablet 150 mg (has no administration in time range)   sodium chloride 0 9 % infusion (100 mL/hr Intravenous New Bag 3/25/21 2218)   nicotine (NICODERM CQ) 14 mg/24hr TD 24 hr patch 1 patch (has no administration in time range)   acetaminophen (TYLENOL) tablet 650 mg (has no administration in time range)   enoxaparin (LOVENOX) subcutaneous injection 40 mg (has no administration in time range)   sodium chloride 0 9 % bolus 1,000 mL (0 mL Intravenous Stopped 3/25/21 1832)   ondansetron (ZOFRAN) injection 4 mg (4 mg Intravenous Given 3/25/21 1656)   HYDROmorphone (DILAUDID) injection 0 5 mg (0 5 mg Intravenous Given 3/25/21 1656)   potassium chloride 20 mEq IVPB (premix) (0 mEq Intravenous Stopped 3/25/21 2049)   pantoprazole (PROTONIX) injection 40 mg (40 mg Intravenous Given 3/25/21 1836)   iohexol (OMNIPAQUE) 350 MG/ML injection (MULTI-DOSE) 100 mL (100 mL Intravenous Given 3/25/21 1821)   Labetalol HCl (NORMODYNE) injection 10 mg (10 mg Intravenous Given 3/25/21 2048)   sodium chloride 0 9 % bolus 500 mL (500 mL Intravenous New Bag 3/25/21 2049)       Diagnostic Studies  Results Reviewed     Procedure Component Value Units Date/Time    Lactic acid 2 Hours [[id number]]  (Normal) Collected: 03/25/21 2214    Lab Status: Final result Specimen: Blood from Vein Updated: 03/25/21 2246     LACTIC ACID 1 3 mmol/L     Narrative:      Result may be elevated if tourniquet was used during collection  Magnesium [[id number]]  (Normal) Collected: 03/25/21 1700    Lab Status: Final result Specimen: Blood from Central Venous Line Updated: 03/25/21 2144     Magnesium 1 8 mg/dL     Lactic acid [[id number]]  (Abnormal) Collected: 03/25/21 1700    Lab Status: Final result Specimen: Blood from Central Venous Line Updated: 03/25/21 2023     LACTIC ACID 2 2 mmol/L     Narrative:      Result may be elevated if tourniquet was used during collection  Stool Enteric Bacterial Panel by PCR [[id number]]     Lab Status: No result Specimen: Stool     COVID19, Influenza A/B, RSV PCR, [facility] [[id number]]  (Normal) Collected: 03/25/21 1701    Lab Status: Final result Specimen: Nares from Nose Updated: 03/25/21 1807     SARS-CoV-2 Negative     INFLUENZA A PCR Negative     INFLUENZA B PCR Negative     RSV PCR Negative    Narrative: This test has been authorized by FDA under an EUA (Emergency Use Assay) for use by authorized laboratories  Clinical caution and judgement should be used with the interpretation of these results with consideration of the clinical impression and other laboratory testing  Testing reported as "Positive" or "Negative" has been proven to be accurate according to standard laboratory validation requirements  All testing is performed with control materials showing appropriate reactivity at standard intervals      Comprehensive metabolic panel [[id number]]  (Abnormal) Collected: 03/25/21 1700    Lab Status: Final result Specimen: Blood from Central Venous Line Updated: 03/25/21 1752     Sodium 142 mmol/L      Potassium 3 1 mmol/L      Chloride 108 mmol/L      CO2 19 mmol/L      ANION GAP 15 mmol/L      BUN 8 mg/dL      Creatinine 0 94 mg/dL      Glucose 122 mg/dL      Calcium 9 3 mg/dL      AST 12 U/L      ALT 11 U/L      Alkaline Phosphatase 76 U/L      Total Protein 6 9 g/dL      Albumin 3 6 g/dL      Total Bilirubin 0 29 mg/dL      eGFR 68 ml/min/1 73sq m     Narrative:      [place] guidelines for Chronic Kidney Disease (CKD):     Stage 1 with normal or high GFR (GFR > 90 mL/min/1 73 square meters)    Stage 2 Mild CKD (GFR = 60-89 mL/min/1 73 square meters)    Stage 3A Moderate CKD (GFR = 45-59 mL/min/1 73 square meters)    Stage 3B Moderate CKD (GFR = 30-44 mL/min/1 73 square meters)    Stage 4 Severe CKD (GFR = 15-29 mL/min/1 73 square meters)    Stage 5 End Stage CKD (GFR <15 mL/min/1 73 square meters)  Note: GFR calculation is accurate only with a steady state creatinine    Lipase [[id number]]  (Normal) Collected: 03/25/21 1700    Lab Status: Final result Specimen: Blood from Central Venous Line Updated: 03/25/21 1752     Lipase 120 u/L     CBC and differential [[id number]]  (Abnormal) Collected: 03/25/21 1700    Lab Status: Final result Specimen: Blood from Central Venous Line Updated: 03/25/21 1726     WBC 11 22 Thousand/uL      RBC 4 23 Million/uL      Hemoglobin 12 1 g/dL      Hematocrit 37 3 %      MCV 88 fL      MCH 28 6 pg      MCHC 32 4 g/dL      RDW 16 9 %      MPV 11 4 fL      Platelets 543 Thousands/uL      nRBC 0 /100 WBCs      Neutrophils Relative 86 %      Immat GRANS % 0 %      Lymphocytes Relative 8 %      Monocytes Relative 6 %      Eosinophils Relative 0 %      Basophils Relative 0 %      Neutrophils Absolute 9 62 Thousands/µL      Immature Grans Absolute 0 05 Thousand/uL      Lymphocytes Absolute 0 84 Thousands/µL      Monocytes Absolute 0 66 Thousand/µL      Eosinophils Absolute 0 00 Thousand/µL      Basophils Absolute 0 05 Thousands/µL     Clostridium difficile toxin by PCR with EIA [[id number]] Collected: 03/25/21 1701    Lab Status:  In process Specimen: Stool from Rectum Updated: 03/25/21 1722                 CT abdomen pelvis with contrast   Final Result by Bailee Schneider MD (03/25 1846)      Normal appendix  No evidence of bowel obstruction, colitis or diverticulitis            Workstation performed: [id number]                    Procedures  Procedures         ED Course                             SBIRT 20yo+      Most Recent Value   SBIRT (24 yo +)   In order to provide better care to our patients, we are screening all of our patients for alcohol and drug use  Would it be okay to ask you these screening questions? Yes Filed at: 03/25/2021 1704   Initial Alcohol Screen: US AUDIT-C    1  How often do you have a drink containing alcohol?  0 Filed at: 03/25/2021 1704   2  How many drinks containing alcohol do you have on a typical day you are drinking? 0 Filed at: 03/25/2021 1704   3a  Male UNDER 65: How often do you have five or more drinks on one occasion? 0 Filed at: 03/25/2021 1704   3b  FEMALE Any Age, or MALE 65+: How often do you have 4 or more drinks on one occassion? 0 Filed at: 03/25/2021 1704   Audit-C Score  0 Filed at: 03/25/2021 1704   FARIDEH: How many times in the past year have you    Used an illegal drug or used a prescription medication for non-medical reasons?   Never Filed at: 03/25/2021 1704                    MDM  Number of Diagnoses or Management Options  Dehydration: new and requires workup  Gastroenteritis: new and requires workup  Hypokalemia: new and requires workup     Amount and/or Complexity of Data Reviewed  Clinical lab tests: reviewed and ordered  Tests in the radiology section of CPT®: ordered and reviewed  Decide to obtain previous medical records or to obtain history from someone other than the patient: yes  Independent visualization of images, tracings, or specimens: yes    Patient Progress  Patient progress: stable      Disposition  Final diagnoses:   Dehydration   Gastroenteritis   Hypokalemia     Time reflects when diagnosis was documented in both MDM as applicable and the Disposition within this note     Time User Action Codes Description Comment    3/25/2021  7:28 PM John Hay Add [E86 0] Dehydration     3/25/2021  7:28 PM Kristen Candice Add [K52 9] Gastroenteritis     3/25/2021  7:28 PM Candice Peterson Add [E87 6] Hypokalemia       ED Disposition     ED Disposition Condition Date/Time Comment    Admit Stable Thu Mar 25, 2021  7:28 PM Case was discussed with Dr Nathaly Alvares and the patient's admission status was agreed to be Admission Status: inpatient status to the service of Dr Nathaly Alvares           Follow-up Information    None         Current Discharge Medication List      CONTINUE these medications which have NOT CHANGED    Details   acetaminophen (TYLENOL) 325 mg tablet Take 2 tablets (650 mg total) by mouth every 6 (six) hours as needed for mild pain, headaches or fever  Qty: 30 tablet, Refills: 0    Associated Diagnoses: Systemic lupus erythematosus, unspecified SLE type, unspecified organ involvement status (HCC)      al mag oxide-diphenhydramine-lidocaine viscous (MAGIC MOUTHWASH) 1:1:1 suspension Swish and swallow 10 mL every 6 (six) hours as needed for mouth pain or discomfort or mucositis  Qty: 1 Bottle, Refills: 2    Associated Diagnoses: Esophagitis      ALPRAZolam (XANAX) 1 mg tablet Take 1 tablet (1 mg total) by mouth 3 (three) times a day as needed for anxiety  Qty: 90 tablet, Refills: 0    Associated Diagnoses: Anxiety      cyclobenzaprine (FLEXERIL) 5 mg tablet Take 10 mg by mouth daily at bedtime as needed      hydroxychloroquine (PLAQUENIL) 200 mg tablet Take 1 tablet in morning and 1/2 tablet in evening      lactobacillus acidophilus-bulgaricus (FLORANEX) packet Take 1 packet by mouth 3 (three) times a day with meals  Qty: 30 packet, Refills: 0    Associated Diagnoses: Colitis      levothyroxine 88 mcg tablet TAKE ONE TABLET BY MOUTH EVERY DAY  Qty: 30 tablet, Refills: 5    Associated Diagnoses: Hypothyroidism, unspecified type      magnesium oxide (MAG-OX) 400 mg Take 400 mg by mouth 2 (two) times a day      ondansetron (ZOFRAN) 4 mg tablet Take 1 tablet (4 mg total) by mouth every 8 (eight) hours as needed for nausea or vomiting  Qty: 30 tablet, Refills: 3    Associated Diagnoses: Malignant neoplasm of upper-inner quadrant of left breast in female, estrogen receptor negative (HCC)      pantoprazole (PROTONIX) 40 mg tablet Take 1 tablet (40 mg total) by mouth daily  Qty: 30 tablet, Refills: 5    Associated Diagnoses: Esophageal dysphagia      sertraline (ZOLOFT) 100 mg tablet TAKE 1 & 1/2 TABLETS BY MOUTH ONCE DAILY  Qty: 45 tablet, Refills: 5    Associated Diagnoses: Anxiety      oxyCODONE (ROXICODONE) 10 MG TABS Take 1-1 5 tablets (10-15 mg total) by mouth every 4 (four) hours as needed (cancer pain  Max of 8 tabs / day ) NO FURTHER FILLS WITHOUT VISIT TO PALLIATIVE CLINIC  Qty: 180 tablet, Refills: 0    Comments: #60 tabs for 1week supply  Associated Diagnoses: Malignant neoplasm of upper-inner quadrant of left breast in female, estrogen receptor negative ([facility] [address] )           No discharge procedures on file      PDMP Review       Value Time User    PDMP Reviewed  Yes 3/25/2021  8:29 PM Fiorella Baker          ED Provider  Electronically Signed by           Gregory Carrasco DO  03/26/21 7613

## 2021-03-25 NOTE — PROGRESS NOTES
FAMILY PRACTICE OFFICE VISIT       NAME: Tita Davey  AGE: 60 y o  SEX: female       : 1965        MRN: [id number]    DATE: 3/25/2021  TIME: 3:14 PM    Assessment and Plan     Problem List Items Addressed This Visit        Digestive    Colitis - Primary      Colitis  Patient with suspected viral colitis  She completed course of antibiotics but continues to have significant symptoms of abdominal pain, nausea, and vomiting  Patient has been using her Zofran medication without relief in symptoms  I had a long discussion with the patient and her   It was felt that patient should have re-evaluation in emergency room for her symptoms as well as her overall weakness  I had called [address] [place] [address] Gastroenterology Department and was told they would have to call us back to have patient squeezed in for an appointment for follow-up  Relevant Orders    Ambulatory referral to Gastroenterology        TCM Call (since 2021)     Date and time call was made  3/22/2021  9:48 AM    Hospital care reviewed  Records reviewed    Patient was hospitialized at  [address] [place] [address]        Date of Admission  21    Date of discharge  21    Diagnosis  Colitis, Hypokalemia,  Malignant neoplasm of  left breast     Disposition  Home    Were the patients medications reviewed and updated  No    Current Symptoms  Fatigue    Fatigue severity  Moderate      TCM Call (since 2021)     Post hospital issues  Reduced activity    Should patient be enrolled in anticoag monitoring? No    Scheduled for follow up? Yes    Patients specialists  Other (comment)    Other specialists names  [facility]- [facility]    Other specialists contcat #  [phone]    Did you obtain your prescribed medications  Yes    Do you need help managing your prescriptions or medications  No    Is transportation to your appointment needed  Yes    Specify why  Pt's  transports pt to apts       I have advised the patient to call PCP with any new or worsening symptoms  Skyler Narvaez, CMA    Living Arrangements  Spouse or Significiant other    Support System  Spouse    Are you recieving any outpatient services  Yes    What type of services  [facility]    Are you recieving home care services  No    Interperter language line needed  No    Counseling  Patient    Counseling topics  Importance of RX compliance; instructions for management    Comments  Apt scheduled for 03/25/21 at 2:30 pm                Chief Complaint     Chief Complaint   Patient presents with    Transition of Care Management       History of Present Illness       I reviewed the patient's discharge summary from her latest hospitalization  Patient continues to have abdominal pain and decreased appetite as well as nausea and vomiting  She was felt to have colitis perhaps from viral etiology  She completed course of Flagyl and Levaquin but continues to experience pain  She has been without her oxycodone medication due to prior authorization required by her insurance carrier  She was recommended to see gastroenterology however she feels she has been unable to call to make an appointment due to her weakness  Marshall Quintanilla is a 60 y o  female patient with underlying history of breast cancer on chemotherapy who originally presented to the hospital on 3/16/2021 due to abdominal pain, nausea, vomiting, diarrhea  Upon mention to the hospital patient had a CT scan of the abdomen and pelvis which showed concern for colitis  She tested negative for COVID  She was initiated on Levaquin and Flagyl with supportive care with IV fluids and diet was advanced as tolerated    She tested negative for C diff and though it is possible her symptoms were chemotherapy versus related, given that she is immune compromised, she was recommended to complete a 7 day course of antibiotics and follow-up with her regular gastroenterologist       Review of Systems   Review of Systems Constitutional: Positive for fatigue  Negative for fever  Respiratory: Negative  Gastrointestinal: Positive for abdominal distention, abdominal pain, nausea and vomiting  Genitourinary: Negative  Musculoskeletal: Negative  Neurological: Negative  Psychiatric/Behavioral: Positive for decreased concentration, dysphoric mood and sleep disturbance  The patient is nervous/anxious          Active Problem List     Patient Active Problem List   Diagnosis    Hypertension    Systemic lupus erythematosus ([facility] )    Hypothyroidism    Posttraumatic stress disorder    Adult celiac disease    Anxiety    Depression    Esophagitis    Nephrolithiasis    Vitamin D deficiency    Malignant neoplasm of upper-inner quadrant of left breast in female, estrogen receptor negative ([facility] )    Chemotherapy induced neutropenia (HCC)    Leukocytosis    Increased anion gap metabolic acidosis    Port-A-Cath in place    Breast cancer (HCC)    Nausea and vomiting    SIRS (systemic inflammatory response syndrome) (HCC)    JEFERSON (acute kidney injury) ([facility] )    Hyponatremia    Hypokalemia    Anemia    Colitis    Cancer related pain    Continuous opioid dependence (HCC)    Smoking    Gastritis    Candida esophagitis (HCC)    Severe protein-calorie malnutrition (HCC)    Hypomagnesemia       Past Medical History:  Past Medical History:   Diagnosis Date    Disease of thyroid gland     Hypertension     Lupus ([facility] )     Malignant neoplasm of upper-inner quadrant of left breast in female, estrogen receptor negative ([facility] ) 2/25/2020    Nephrolithiasis     PTSD (post-traumatic stress disorder)        Past Surgical History:  Past Surgical History:   Procedure Laterality Date    FEMUR FRACTURE SURGERY      FL GUIDED CENTRAL VENOUS ACCESS DEVICE INSERTION  3/17/2020    HYSTERECTOMY      LEFT OOPHORECTOMY      due to torsion     MAMMO STEREOTACTIC BREAST BIOPSY RIGHT (ALL INC) Right 2/12/2020    MASTECTOMY W/ SENTINEL NODE BIOPSY Left 8/18/2020    Procedure: BREAST MASTECTOMY WITH SENTINEL LYMPH NODE BIOPSY, LYMPHATIC MAPPING WITH BLUE DYE AND RADIOACTIVE DYE (INJECT AT 1430 BY DR WRIGHT IN THE OR);   Surgeon: Onesimo Toribio MD;  Location: [facility];  Service: Surgical Oncology    MRI BREAST BIOPSY LEFT (ALL INCLUSIVE) Left 3/20/2020    SD INSJ TUNNELED CTR VAD W/SUBQ PORT AGE 5 YR/> N/A 3/17/2020    Procedure: INSERTION VENOUS PORT ( PORT-A-CATH) IR;  Surgeon: Marco Antonio Smiley DO;  [facility];  Service: Interventional Radiology    US GUIDANCE BREAST BIOPSY LEFT EACH ADDITIONAL Left 2/12/2020    US GUIDED BREAST BIOPSY LEFT COMPLETE Left 2/12/2020    VAGINA SURGERY         Family History:  Family History   Problem Relation Age of Onset    Diabetes Mother     Hypertension Mother     Nephrolithiasis Mother     Breast cancer Mother 79    Heart disease Father     Hypertension Father     Diabetes Brother     Nephrolithiasis Brother     Breast cancer Maternal Aunt     No Known Problems Maternal Grandmother     No Known Problems Maternal Grandfather     No Known Problems Paternal Grandmother     No Known Problems Paternal Grandfather        Social History:  Social History     Socioeconomic History    Marital status: /Civil Union     Spouse name: Not on file    Number of children: Not on file    Years of education: Not on file    Highest education level: Not on file   Occupational History    Not on file   Social Needs    Financial resource strain: Not on file    Food insecurity     Worry: Not on file     Inability: Not on file   [employer] needs     Medical: Not on file     Non-medical: Not on file   Tobacco Use    Smoking status: Current Every Day Smoker     Packs/day: 0 50     Years: 40 00     Pack years: 20 00     Types: Cigarettes     Start date: 1990    Smokeless tobacco: Never Used   Substance and Sexual Activity    Alcohol use: Not Currently     Alcohol/week: 21 0 standard drinks Types: 21 Cans of beer per week     Frequency: Never     Drinks per session: Patient refused     Binge frequency: Never     Comment: pt states she had her last beer 3/22/2020    Drug use: No    Sexual activity: Not Currently     Partners: Male     Birth control/protection: None   Lifestyle    Physical activity     Days per week: Not on file     Minutes per session: Not on file    Stress: Not on file   Relationships    Social connections     Talks on phone: Not on file     Gets together: Not on file     Attends Bahai service: Not on file     Active member of club or organization: Not on file     Attends meetings of clubs or organizations: Not on file     Relationship status: Not on file    Intimate partner violence     Fear of current or ex partner: Not on file     Emotionally abused: Not on file     Physically abused: Not on file     Forced sexual activity: Not on file   Other Topics Concern    Not on file   Social History Narrative    Not on file       Objective     Vitals:    03/25/21 1423   BP: 116/64   Pulse: 100   Resp: 18   Temp: 97 8 °F (36 6 °C)   SpO2: 100%     Wt Readings from Last 3 Encounters:   03/25/21 45 8 kg (101 lb)   03/10/21 54 4 kg (119 lb 14 9 oz)   03/05/21 54 7 kg (120 lb 9 6 oz)       Physical Exam  Constitutional:       Appearance: She is ill-appearing  Comments: Patient in no acute distress but appeared very fatigued and weak during examination   HENT:      Head: Normocephalic and atraumatic  Cardiovascular:      Rate and Rhythm: Regular rhythm  Heart sounds: Normal heart sounds  No murmur  Pulmonary:      Effort: Pulmonary effort is normal  No respiratory distress  Breath sounds: Normal breath sounds  No wheezing, rhonchi or rales  Abdominal:      Comments: Patient's abdomen was soft with  Decreased bowel sounds  She had generalized tenderness to mild palpation  No masses palpated    Patient no rebound but mild guarding   Neurological:      Mental Status: She is alert  Psychiatric:      Comments: Patient appeared very lethargic and weak  She had episodes of vomiting and dry heaving during  Office visit  Pertinent Laboratory/Diagnostic Studies:  Lab Results   Component Value Date    GLUCOSE 97 04/22/2015    BUN 4 (L) 03/18/2021    CREATININE 0 75 03/18/2021    CALCIUM 8 7 03/18/2021     (L) 04/22/2015    K 3 7 03/18/2021    CO2 25 03/18/2021     03/18/2021     Lab Results   Component Value Date    ALT 8 (L) 03/18/2021    AST 15 03/18/2021    ALKPHOS 55 03/18/2021    BILITOT 0 2 04/22/2015       Lab Results   Component Value Date    WBC 5 27 03/18/2021    HGB 9 9 (L) 03/18/2021    HCT 31 8 (L) 03/18/2021    MCV 90 03/18/2021     (H) 03/18/2021       No results found for: TSH    No results found for: CHOL  No results found for: TRIG  No results found for: HDL  No results found for: LDLCALC  No results found for: HGBA1C    Results for orders placed or performed during the hospital encounter of 03/16/21   Blood culture #1    Specimen: Arm, Right; Blood   Result Value Ref Range    Blood Culture No Growth After 5 Days  Blood culture #2    Specimen: Hand, Right; Blood   Result Value Ref Range    Blood Culture No Growth After 5 Days  Clostridium difficile toxin by PCR with EIA    Specimen: Per Rectum; Stool   Result Value Ref Range     C difficile toxin by PCR  Negative Negative   COVID19, Influenza A/B, RSV PCR, [facility]    Specimen: Nasopharyngeal Swab; Nares   Result Value Ref Range    SARS-CoV-2 Negative Negative    INFLUENZA A PCR Negative Negative    INFLUENZA B PCR Negative Negative    RSV PCR Negative Negative   Fecal leukocytes    Specimen: Per Rectum; Stool   Result Value Ref Range    White Blood Cells, Stool No white blood cells seen   None Seen   CBC and differential   Result Value Ref Range    WBC 7 34 4 31 - 10 16 Thousand/uL    RBC 4 51 3 81 - 5 12 Million/uL    Hemoglobin 12 7 11 5 - 15 4 g/dL    Hematocrit 39 5 34 8 - 46 1 % MCV 88 82 - 98 fL    MCH 28 2 26 8 - 34 3 pg    MCHC 32 2 31 4 - 37 4 g/dL    RDW 16 2 (H) 11 6 - 15 1 %    MPV 10 7 8 9 - 12 7 fL    Platelets 257 (H) 729 - 390 Thousands/uL    nRBC 0 /100 WBCs    Neutrophils Relative 81 (H) 43 - 75 %    Immat GRANS % 0 0 - 2 %    Lymphocytes Relative 11 (L) 14 - 44 %    Monocytes Relative 8 4 - 12 %    Eosinophils Relative 0 0 - 6 %    Basophils Relative 0 0 - 1 %    Neutrophils Absolute 5 87 1 85 - 7 62 Thousands/µL    Immature Grans Absolute 0 03 0 00 - 0 20 Thousand/uL    Lymphocytes Absolute 0 83 0 60 - 4 47 Thousands/µL    Monocytes Absolute 0 58 0 17 - 1 22 Thousand/µL    Eosinophils Absolute 0 00 0 00 - 0 61 Thousand/µL    Basophils Absolute 0 03 0 00 - 0 10 Thousands/µL   Comprehensive metabolic panel   Result Value Ref Range    Sodium 137 136 - 145 mmol/L    Potassium 3 7 3 5 - 5 3 mmol/L    Chloride 99 (L) 100 - 108 mmol/L    CO2 20 (L) 21 - 32 mmol/L    ANION GAP 18 (H) 4 - 13 mmol/L    BUN 6 5 - 25 mg/dL    Creatinine 0 75 0 60 - 1 30 mg/dL    Glucose 98 65 - 140 mg/dL    Calcium 9 0 8 3 - 10 1 mg/dL    Corrected Calcium 9 6 8 3 - 10 1 mg/dL    AST 26 5 - 45 U/L    ALT 14 12 - 78 U/L    Alkaline Phosphatase 83 46 - 116 U/L    Total Protein 7 1 6 4 - 8 2 g/dL    Albumin 3 3 (L) 3 5 - 5 0 g/dL    Total Bilirubin 0 30 0 20 - 1 00 mg/dL    eGFR 89 ml/min/1 73sq m   Lipase   Result Value Ref Range    Lipase 73 73 - 393 u/L   Lactic acid   Result Value Ref Range    LACTIC ACID 1 1 0 5 - 2 0 mmol/L   Procalcitonin with AM Reflex   Result Value Ref Range    Procalcitonin <0 05 <=0 25 ng/ml   Urine Microscopic   Result Value Ref Range    RBC, UA 1-2 None Seen, 0-1, 1-2, 2-4, 0-5 /hpf    WBC, UA 2-4 None Seen, 0-1, 1-2, 0-5, 2-4 /hpf    Epithelial Cells Occasional None Seen, Occasional /hpf    Bacteria, UA Occasional None Seen, Occasional /hpf    Hyaline Casts, UA 0-1 (A) (none) /lpf    MUCUS THREADS Moderate (A) None Seen   Procalcitonin Reflex   Result Value Ref Range Procalcitonin <0 05 <=0 25 ng/ml   Comprehensive metabolic panel   Result Value Ref Range    Sodium 137 136 - 145 mmol/L    Potassium 2 9 (L) 3 5 - 5 3 mmol/L    Chloride 103 100 - 108 mmol/L    CO2 26 21 - 32 mmol/L    ANION GAP 8 4 - 13 mmol/L    BUN 5 5 - 25 mg/dL    Creatinine 0 80 0 60 - 1 30 mg/dL    Glucose 87 65 - 140 mg/dL    Calcium 8 2 (L) 8 3 - 10 1 mg/dL    Corrected Calcium 9 2 8 3 - 10 1 mg/dL    AST 18 5 - 45 U/L    ALT 11 (L) 12 - 78 U/L    Alkaline Phosphatase 64 46 - 116 U/L    Total Protein 6 1 (L) 6 4 - 8 2 g/dL    Albumin 2 7 (L) 3 5 - 5 0 g/dL    Total Bilirubin 0 22 0 20 - 1 00 mg/dL    eGFR 82 ml/min/1 73sq m   CBC (With Platelets)   Result Value Ref Range    WBC 6 01 4 31 - 10 16 Thousand/uL    RBC 3 97 3 81 - 5 12 Million/uL    Hemoglobin 11 3 (L) 11 5 - 15 4 g/dL    Hematocrit 35 2 34 8 - 46 1 %    MCV 89 82 - 98 fL    MCH 28 5 26 8 - 34 3 pg    MCHC 32 1 31 4 - 37 4 g/dL    RDW 16 2 (H) 11 6 - 15 1 %    Platelets 517 (H) 835 - 390 Thousands/uL    MPV 9 5 8 9 - 12 7 fL   CBC and differential   Result Value Ref Range    WBC 5 27 4 31 - 10 16 Thousand/uL    RBC 3 55 (L) 3 81 - 5 12 Million/uL    Hemoglobin 9 9 (L) 11 5 - 15 4 g/dL    Hematocrit 31 8 (L) 34 8 - 46 1 %    MCV 90 82 - 98 fL    MCH 27 9 26 8 - 34 3 pg    MCHC 31 1 (L) 31 4 - 37 4 g/dL    RDW 16 2 (H) 11 6 - 15 1 %    MPV 9 6 8 9 - 12 7 fL    Platelets 191 (H) 740 - 390 Thousands/uL    nRBC 0 /100 WBCs    Neutrophils Relative 60 43 - 75 %    Immat GRANS % 0 0 - 2 %    Lymphocytes Relative 25 14 - 44 %    Monocytes Relative 12 4 - 12 %    Eosinophils Relative 2 0 - 6 %    Basophils Relative 1 0 - 1 %    Neutrophils Absolute 3 17 1 85 - 7 62 Thousands/µL    Immature Grans Absolute 0 02 0 00 - 0 20 Thousand/uL    Lymphocytes Absolute 1 32 0 60 - 4 47 Thousands/µL    Monocytes Absolute 0 62 0 17 - 1 22 Thousand/µL    Eosinophils Absolute 0 10 0 00 - 0 61 Thousand/µL    Basophils Absolute 0 04 0 00 - 0 10 Thousands/µL   Magnesium Result Value Ref Range    Magnesium 1 5 (L) 1 6 - 2 6 mg/dL   Comprehensive metabolic panel   Result Value Ref Range    Sodium 140 136 - 145 mmol/L    Potassium 3 7 3 5 - 5 3 mmol/L    Chloride 107 100 - 108 mmol/L    CO2 25 21 - 32 mmol/L    ANION GAP 8 4 - 13 mmol/L    BUN 4 (L) 5 - 25 mg/dL    Creatinine 0 75 0 60 - 1 30 mg/dL    Glucose 92 65 - 140 mg/dL    Calcium 8 7 8 3 - 10 1 mg/dL    Corrected Calcium 10 0 8 3 - 10 1 mg/dL    AST 15 5 - 45 U/L    ALT 8 (L) 12 - 78 U/L    Alkaline Phosphatase 55 46 - 116 U/L    Total Protein 5 4 (L) 6 4 - 8 2 g/dL    Albumin 2 4 (L) 3 5 - 5 0 g/dL    Total Bilirubin 0 18 (L) 0 20 - 1 00 mg/dL    eGFR 89 ml/min/1 73sq m   Urine Macroscopic, POC   Result Value Ref Range    Color, UA Yellow     Clarity, UA Clear     pH, UA 8 5 (H) 4 5 - 8 0    Leukocytes, UA Negative Negative    Nitrite, UA Negative Negative    Protein, UA 30 (1+) (A) Negative mg/dl    Glucose, UA Negative Negative mg/dl    Ketones, UA 80 (3+) (A) Negative mg/dl    Urobilinogen, UA 0 2 0 2, 1 0 E U /dl E U /dl    Bilirubin, UA Interference- unable to analyze (A) Negative    Blood, UA Trace (A) Negative    Specific Gravity, UA 1 025 1 003 - 1 030       Orders Placed This Encounter   Procedures    Ambulatory referral to Gastroenterology       ALLERGIES:  Allergies   Allergen Reactions    Mobic [Meloxicam] Eye Swelling     Reaction Date: 12Aug2011;     Methotrexate Rash    Sulfa Antibiotics Rash       Current Medications     Current Outpatient Medications   Medication Sig Dispense Refill    acetaminophen (TYLENOL) 325 mg tablet Take 2 tablets (650 mg total) by mouth every 6 (six) hours as needed for mild pain, headaches or fever 30 tablet 0    al mag oxide-diphenhydramine-lidocaine viscous (MAGIC MOUTHWASH) 1:1:1 suspension Swish and swallow 10 mL every 6 (six) hours as needed for mouth pain or discomfort or mucositis 1 Bottle 2    ALPRAZolam (XANAX) 1 mg tablet Take 1 tablet (1 mg total) by mouth 3 (three) times a day as needed for anxiety 90 tablet 0    cyclobenzaprine (FLEXERIL) 5 mg tablet Take 10 mg by mouth daily at bedtime as needed      hydroxychloroquine (PLAQUENIL) 200 mg tablet Take 1 tablet in morning and 1/2 tablet in evening      lactobacillus acidophilus-bulgaricus (FLORANEX) packet Take 1 packet by mouth 3 (three) times a day with meals 30 packet 0    levothyroxine 88 mcg tablet TAKE ONE TABLET BY MOUTH EVERY DAY 30 tablet 5    ondansetron (ZOFRAN) 4 mg tablet Take 1 tablet (4 mg total) by mouth every 8 (eight) hours as needed for nausea or vomiting 30 tablet 3    pantoprazole (PROTONIX) 40 mg tablet Take 1 tablet (40 mg total) by mouth daily 30 tablet 5    sertraline (ZOLOFT) 100 mg tablet TAKE 1 & 1/2 TABLETS BY MOUTH ONCE DAILY (Patient taking differently: Take 150 mg by mouth daily BY MOUTH ONCE DAILY) 45 tablet 5    magnesium oxide (MAG-OX) 400 mg Take 400 mg by mouth 2 (two) times a day      oxyCODONE (ROXICODONE) 10 MG TABS Take 1-1 5 tablets (10-15 mg total) by mouth every 4 (four) hours as needed (cancer pain  Max of 8 tabs / day ) NO FURTHER FILLS WITHOUT VISIT TO PALLIATIVE CLINIC (Patient not taking: Reported on 3/25/2021) 180 tablet 0     No current facility-administered medications for this visit            Health Maintenance     Health Maintenance   Topic Date Due    Hepatitis C Screening  Never done    Pneumococcal Vaccine: Pediatrics (0 to 5 Years) and At-Risk Patients (6 to 59 Years) (1 of 1 - PPSV23) Never done    HIV Screening  Never done    COVID-19 Vaccine (1) Never done    BMI: Followup Plan  Never done    Annual Physical  Never done    DTaP,Tdap,and Td Vaccines (1 - Tdap) Never done    Influenza Vaccine (1) 06/30/2021 (Originally 9/1/2020)    MAMMOGRAM  02/04/2022    Depression Remission PHQ  02/25/2022    BMI: Adult  03/25/2022    Colorectal Cancer Screening  10/09/2027    HIB Vaccine  Aged Out    Hepatitis B Vaccine  Aged Out    IPV Vaccine  Aged Out    Hepatitis A Vaccine  Aged Out    Meningococcal ACWY Vaccine  Aged Out    HPV Vaccine  Aged Out     There is no immunization history for the selected administration types on file for this patient      Yobani Zee MD

## 2021-03-26 LAB
ANION GAP SERPL CALCULATED.3IONS-SCNC: 9 MMOL/L (ref 4–13)
BASOPHILS # BLD AUTO: 0.03 THOUSANDS/ΜL (ref 0–0.1)
BASOPHILS NFR BLD AUTO: 0 % (ref 0–1)
BUN SERPL-MCNC: 6 MG/DL (ref 5–25)
C DIFF TOX B TCDB STL QL NAA+PROBE: NEGATIVE
CALCIUM SERPL-MCNC: 7.9 MG/DL (ref 8.3–10.1)
CHLORIDE SERPL-SCNC: 111 MMOL/L (ref 100–108)
CO2 SERPL-SCNC: 23 MMOL/L (ref 21–32)
CREAT SERPL-MCNC: 0.72 MG/DL (ref 0.6–1.3)
EOSINOPHIL # BLD AUTO: 0.03 THOUSAND/ΜL (ref 0–0.61)
EOSINOPHIL NFR BLD AUTO: 0 % (ref 0–6)
ERYTHROCYTE [DISTWIDTH] IN BLOOD BY AUTOMATED COUNT: 17.2 % (ref 11.6–15.1)
GFR SERPL CREATININE-BSD FRML MDRD: 93 ML/MIN/1.73SQ M
GLUCOSE SERPL-MCNC: 96 MG/DL (ref 65–140)
HCT VFR BLD AUTO: 31.6 % (ref 34.8–46.1)
HGB BLD-MCNC: 9.9 G/DL (ref 11.5–15.4)
IMM GRANULOCYTES # BLD AUTO: 0.02 THOUSAND/UL (ref 0–0.2)
IMM GRANULOCYTES NFR BLD AUTO: 0 % (ref 0–2)
LYMPHOCYTES # BLD AUTO: 2.41 THOUSANDS/ΜL (ref 0.6–4.47)
LYMPHOCYTES NFR BLD AUTO: 28 % (ref 14–44)
MAGNESIUM SERPL-MCNC: 1.8 MG/DL (ref 1.6–2.6)
MCH RBC QN AUTO: 28.4 PG (ref 26.8–34.3)
MCHC RBC AUTO-ENTMCNC: 31.3 G/DL (ref 31.4–37.4)
MCV RBC AUTO: 91 FL (ref 82–98)
MONOCYTES # BLD AUTO: 0.69 THOUSAND/ΜL (ref 0.17–1.22)
MONOCYTES NFR BLD AUTO: 8 % (ref 4–12)
NEUTROPHILS # BLD AUTO: 5.59 THOUSANDS/ΜL (ref 1.85–7.62)
NEUTS SEG NFR BLD AUTO: 64 % (ref 43–75)
NRBC BLD AUTO-RTO: 0 /100 WBCS
PLATELET # BLD AUTO: 278 THOUSANDS/UL (ref 149–390)
PMV BLD AUTO: 10.6 FL (ref 8.9–12.7)
POTASSIUM SERPL-SCNC: 3.1 MMOL/L (ref 3.5–5.3)
RBC # BLD AUTO: 3.49 MILLION/UL (ref 3.81–5.12)
SODIUM SERPL-SCNC: 143 MMOL/L (ref 136–145)
WBC # BLD AUTO: 8.77 THOUSAND/UL (ref 4.31–10.16)

## 2021-03-26 PROCEDURE — 80048 BASIC METABOLIC PNL TOTAL CA: CPT | Performed by: NURSE PRACTITIONER

## 2021-03-26 PROCEDURE — 99232 SBSQ HOSP IP/OBS MODERATE 35: CPT | Performed by: INTERNAL MEDICINE

## 2021-03-26 PROCEDURE — C9113 INJ PANTOPRAZOLE SODIUM, VIA: HCPCS | Performed by: NURSE PRACTITIONER

## 2021-03-26 PROCEDURE — 85025 COMPLETE CBC W/AUTO DIFF WBC: CPT | Performed by: NURSE PRACTITIONER

## 2021-03-26 PROCEDURE — 83735 ASSAY OF MAGNESIUM: CPT | Performed by: NURSE PRACTITIONER

## 2021-03-26 RX ORDER — ALPRAZOLAM 1 MG/1
TABLET ORAL
Qty: 30 TABLET | Refills: 0 | OUTPATIENT
Start: 2021-03-26

## 2021-03-26 RX ORDER — POTASSIUM CHLORIDE 20 MEQ/1
40 TABLET, EXTENDED RELEASE ORAL ONCE
Status: COMPLETED | OUTPATIENT
Start: 2021-03-26 | End: 2021-03-26

## 2021-03-26 RX ORDER — ALPRAZOLAM 1 MG/1
1 TABLET ORAL 3 TIMES DAILY PRN
Qty: 30 TABLET | Refills: 3 | Status: SHIPPED | OUTPATIENT
Start: 2021-03-26 | End: 2021-04-19 | Stop reason: SDUPTHER

## 2021-03-26 RX ADMIN — CYCLOBENZAPRINE HYDROCHLORIDE 10 MG: 10 TABLET, FILM COATED ORAL at 23:49

## 2021-03-26 RX ADMIN — PANTOPRAZOLE SODIUM 40 MG: 40 INJECTION, POWDER, FOR SOLUTION INTRAVENOUS at 08:28

## 2021-03-26 RX ADMIN — HYDROMORPHONE HYDROCHLORIDE 1 MG: 1 INJECTION, SOLUTION INTRAMUSCULAR; INTRAVENOUS; SUBCUTANEOUS at 04:13

## 2021-03-26 RX ADMIN — Medication 1 PACKET: at 08:28

## 2021-03-26 RX ADMIN — HYDROMORPHONE HYDROCHLORIDE 0.5 MG: 1 INJECTION, SOLUTION INTRAMUSCULAR; INTRAVENOUS; SUBCUTANEOUS at 20:20

## 2021-03-26 RX ADMIN — HYDROMORPHONE HYDROCHLORIDE 1 MG: 1 INJECTION, SOLUTION INTRAMUSCULAR; INTRAVENOUS; SUBCUTANEOUS at 08:27

## 2021-03-26 RX ADMIN — HYDROXYCHLOROQUINE SULFATE 100 MG: 200 TABLET, FILM COATED ORAL at 17:46

## 2021-03-26 RX ADMIN — HYDROMORPHONE HYDROCHLORIDE 1 MG: 1 INJECTION, SOLUTION INTRAMUSCULAR; INTRAVENOUS; SUBCUTANEOUS at 23:50

## 2021-03-26 RX ADMIN — HYDROXYCHLOROQUINE SULFATE 200 MG: 200 TABLET, FILM COATED ORAL at 08:28

## 2021-03-26 RX ADMIN — HYDROMORPHONE HYDROCHLORIDE 1 MG: 1 INJECTION, SOLUTION INTRAMUSCULAR; INTRAVENOUS; SUBCUTANEOUS at 12:52

## 2021-03-26 RX ADMIN — MAGNESIUM OXIDE TAB 400 MG (241.3 MG ELEMENTAL MG) 400 MG: 400 (241.3 MG) TAB at 08:24

## 2021-03-26 RX ADMIN — Medication 1 PACKET: at 17:22

## 2021-03-26 RX ADMIN — LEVOTHYROXINE SODIUM 88 MCG: 88 TABLET ORAL at 08:23

## 2021-03-26 RX ADMIN — POTASSIUM CHLORIDE 40 MEQ: 1500 TABLET, EXTENDED RELEASE ORAL at 10:09

## 2021-03-26 RX ADMIN — MAGNESIUM OXIDE TAB 400 MG (241.3 MG ELEMENTAL MG) 400 MG: 400 (241.3 MG) TAB at 17:46

## 2021-03-26 RX ADMIN — ONDANSETRON 4 MG: 2 INJECTION INTRAMUSCULAR; INTRAVENOUS at 04:13

## 2021-03-26 RX ADMIN — ENOXAPARIN SODIUM 40 MG: 40 INJECTION SUBCUTANEOUS at 08:26

## 2021-03-26 RX ADMIN — HYDROMORPHONE HYDROCHLORIDE 1 MG: 1 INJECTION, SOLUTION INTRAMUSCULAR; INTRAVENOUS; SUBCUTANEOUS at 17:22

## 2021-03-26 RX ADMIN — ALPRAZOLAM 1 MG: 0.5 TABLET ORAL at 23:54

## 2021-03-26 RX ADMIN — Medication 1 PACKET: at 11:45

## 2021-03-26 RX ADMIN — ALPRAZOLAM 1 MG: 0.5 TABLET ORAL at 14:47

## 2021-03-26 RX ADMIN — SERTRALINE HYDROCHLORIDE 150 MG: 100 TABLET ORAL at 08:23

## 2021-03-26 NOTE — ASSESSMENT & PLAN NOTE
Potassium level upon admission:  3 1, repeated today at 3 1  replete orally today noting lack of vomiting  Secondary to vomiting and diarrhea

## 2021-03-26 NOTE — ASSESSMENT & PLAN NOTE
· Been taken:  Patient presents to the ER with complaints of nausea, vomiting, diarrhea and right lower quadrant abdominal pain for one day  Patient reports several episodes of bilious vomiting today  Additionally, she reports small, loose, watery, nonbloody stools  Of note, patient was admitted on 03/16/2021 due to colitis at which time she completed a course of IV Levaquin and Flagyl  Patient reports her symptoms had improved up until 03/25/2021  Patient presented to her outpatient PCP office today due to her complaints who referred her to the ER for further evaluation  · CT abdomen pelvis:  Normal appendix  No evidence of bowel obstruction, colitis or diverticulitis    · Lipase unremarkable  · Obtain stool enteric bacterial panel and C diff sample  · IV hydration  · Pain control  · IV antiemetics  · Daily weights and I&Os  · Clear liquid diet, advanced as tolerated

## 2021-03-26 NOTE — CASE MANAGEMENT
LOS: 0 days  Readmission: Yes  Risk of readmission: RED  Bundle: No    CM met with pt at bedside  Pt aware of CM role with dcp  Pt resides with her , Leonard Cole, in a 2 story home with 6-7 JESUS and bedroom/bathroom on 2nd floor  Pt reports she is independent with ADLs and ambulation PTA  Pt reports she is able to drive, but her  normally provides transportation  Pt reports her  is home ot help her if needed  Denies hx of VNA and STR  Denies MH or drug/alcohol abuse  Ph[facility]acy: [facility] [place] [facility] [place]  No POA or AD  Pt reports her  will transport her home at d/c  Pt reports her PCP had directed her to go to the ER  Pt reports when she was d/c she was able to get her medication and had no concerns  Pt f/u with her PCP as well  Pt reports she understood who to contact if she had problems/questions  CM reviewed d/c planning process including the following: identifying help at home, patient preference for d/c planning needs, Discharge Lounge, [facility] Meds to Bed program, availability of treatment team to discuss questions or concerns patient and/or family may have regarding understanding medications and recognizing signs and symptoms once discharged  CM also encouraged patient to follow up with all recommended appointments after discharge  Patient advised of importance for patient and family to participate in managing patients medical well being

## 2021-03-26 NOTE — ASSESSMENT & PLAN NOTE
· Present on admission, WBCs 11 22, Suspect reactive  · Patient is currently afebrile  wbc unremarkable today

## 2021-03-26 NOTE — ASSESSMENT & PLAN NOTE
Potassium level upon admission:  3 1  Secondary to vomiting and diarrhea  Magnesium level:  Pending  Replete and monitor BMP

## 2021-03-26 NOTE — ASSESSMENT & PLAN NOTE
· Anion gap of 15 now resolved  · Suspect in the setting of nausea, vomiting and poor oral intake  · IV hydration  · IV antiemetics  · Trend BMP

## 2021-03-26 NOTE — ASSESSMENT & PLAN NOTE
· Diagnosed March 2020  · Patient known to Dr Elda Sullivan  · Status post neoadjuvant chemotherapy with [facility] with pertuzumab x3 cycles, completed in early May 2020  S/P mastectomy and sentinel lymph node biopsy in August 2020    · Currently receiving adjuvant trastuzumab and pertuzumab with no cardiac toxicity; Q3 weeks - expected to complete in June 2021  · Due to recent inpatient admission for colitis on 03/16/2021, patient's last treatment was rescheduled, next treatment scheduled for 05/17/2021

## 2021-03-26 NOTE — ASSESSMENT & PLAN NOTE
· Patient is known to palliative care  · Continue current regimen of oxy IR 10-15 mg PO q4h PRN  · PDMP reviewed

## 2021-03-26 NOTE — ASSESSMENT & PLAN NOTE
· Present on admission, lactic acid of 2 2  · Suspect in the setting nausea and vomiting  · Lactate 1 3 on repeat  · IV hydration

## 2021-03-26 NOTE — ASSESSMENT & PLAN NOTE
· Present on admission, patient initially hypertensive with blood pressure 216/103  · S/p Labetalol 10 mg IV   · Patient is currently not on any antihypertensives as an outpatient     · Suspect in the setting of acute pain  · BP this am within normal limits

## 2021-03-26 NOTE — ASSESSMENT & PLAN NOTE
· Anion gap of 15   · Suspect in the setting of nausea, vomiting and poor oral intake  · IV hydration  · IV antiemetics  · Trend BMP

## 2021-03-26 NOTE — PLAN OF CARE
Problem: Potential for Falls  Goal: Patient will remain free of falls  Description: INTERVENTIONS:  - Assess patient frequently for physical needs  -  Identify cognitive and physical deficits and behaviors that affect risk of falls  -  Patrick fall precautions as indicated by assessment   - Educate patient/family on patient safety including physical limitations  - Instruct patient to call for assistance with activity based on assessment  - Modify environment to reduce risk of injury  - Consider OT/PT consult to assist with strengthening/mobility  Outcome: Progressing     Problem: Nutrition/Hydration-ADULT  Goal: Nutrient/Hydration intake appropriate for improving, restoring or maintaining nutritional needs  Description: Monitor and assess patient's nutrition/hydration status for malnutrition  Collaborate with interdisciplinary team and initiate plan and interventions as ordered  Monitor patient's weight and dietary intake as ordered or per policy  Utilize nutrition screening tool and intervene as necessary  Determine patient's food preferences and provide high-protein, high-caloric foods as appropriate       INTERVENTIONS:  - Monitor oral intake, urinary output, labs, and treatment plans  - Assess nutrition and hydration status and recommend course of action  - Evaluate amount of meals eaten  - Assist patient with eating if necessary   - Allow adequate time for meals  - Recommend/ encourage appropriate diets, oral nutritional supplements, and vitamin/mineral supplements  - Order, calculate, and assess calorie counts as needed  - Recommend, monitor, and adjust tube feedings and TPN/PPN based on assessed needs  - Assess need for intravenous fluids  - Provide specific nutrition/hydration education as appropriate  - Include patient/family/caregiver in decisions related to nutrition  Outcome: Progressing     Problem: PAIN - ADULT  Goal: Verbalizes/displays adequate comfort level or baseline comfort level  Description: Interventions:  - Encourage patient to monitor pain and request assistance  - Assess pain using appropriate pain scale  - Administer analgesics based on type and severity of pain and evaluate response  - Implement non-pharmacological measures as appropriate and evaluate response  - Consider cultural and social influences on pain and pain management  - Notify physician/advanced practitioner if interventions unsuccessful or patient reports new pain  Outcome: Progressing     Problem: GASTROINTESTINAL - ADULT  Goal: Minimal or absence of nausea and/or vomiting  Description: INTERVENTIONS:  - Administer IV fluids if ordered to ensure adequate hydration  - Maintain NPO status until nausea and vomiting are resolved  - Nasogastric tube if ordered  - Administer ordered antiemetic medications as needed  - Provide nonpharmacologic comfort measures as appropriate  - Advance diet as tolerated, if ordered  - Consider nutrition services referral to assist patient with adequate nutrition and appropriate food choices  Outcome: Progressing

## 2021-03-26 NOTE — ASSESSMENT & PLAN NOTE
· Patient follows with a counselor as an outpatient  Reports feeling more isolated, fatigued and lack of desire  · Continue Zoloft and Xanax PRN

## 2021-03-26 NOTE — ASSESSMENT & PLAN NOTE
· Present on admission, patient initially hypertensive with blood pressure 216/103  · Labetalol 10 mg IV ordered  · Patient is currently not on any antihypertensives as an outpatient  · Suspect in the setting of acute pain  · Monitor BP closely

## 2021-03-26 NOTE — PROGRESS NOTES
[address]   Progress Note - Joaquina Finney 1965, 60 y o  female MRN: [id number]  Unit/Bed#: S -01 Encounter: [id number]  Primary Care Provider: Mary Ellen Silva MD   Date and time admitted to hospital: 3/25/2021  3:37 PM    * Nausea, vomiting, and diarrhea  Assessment & Plan  · No BM overnight to provide sample, CT unremarkable for colitis, suspect viral gastroenteritis ( also felt ill)  · Presentation:  Patient presents to the ER with complaints of nausea, vomiting, diarrhea and right lower quadrant abdominal pain for one day  Patient reports several episodes of bilious vomiting today  Additionally, she reports small, loose, watery, nonbloody stools  Of note, patient was admitted on 03/16/2021 due to colitis at which time she completed a course of IV Levaquin and Flagyl  Patient reports her symptoms had improved up until 03/25/2021  Patient presented to her outpatient PCP office today due to her complaints who referred her to the ER for further evaluation  · CT abdomen pelvis:  Normal appendix  No evidence of bowel obstruction, colitis or diverticulitis    · Lipase unremarkable  · Obtain stool enteric bacterial panel and C diff sample  · IV hydration, Pain control, IV antiemetics  · Daily weights and I&Os  · Clear liquid diet tolerated, advance today    Elevated lactic acid level  Assessment & Plan  · Present on admission, lactic acid of 2 2  · Suspect in the setting nausea and vomiting  · Lactate 1 3 on repeat  · IV hydration  Cancer related pain  Assessment & Plan  · Patient is known to palliative care  · Continue current regimen of oxy IR 10-15 mg PO q4h PRN  · PDMP reviewed  Hypokalemia  Assessment & Plan  Potassium level upon admission:  3 1, repeated today at 3 1  replete orally today noting lack of vomiting  Secondary to vomiting and diarrhea  Increased anion gap metabolic acidosis  Assessment & Plan  · Anion gap of 15 now resolved    · Suspect in the setting of nausea, vomiting and poor oral intake  · IV hydration  · IV antiemetics  · Trend BMP  Leukocytosis  Assessment & Plan  · Present on admission, WBCs 11 22, Suspect reactive  · Patient is currently afebrile  wbc unremarkable today  Malignant neoplasm of upper-inner quadrant of left breast in female, estrogen receptor negative [facility])  Assessment & Plan  · Diagnosed March 2020  · Patient known to Dr Tanner Hager  · Status post neoadjuvant chemotherapy with [facility] with pertuzumab x3 cycles, completed in early May 2020  S/P mastectomy and sentinel lymph node biopsy in August 2020  · Currently receiving adjuvant trastuzumab and pertuzumab with no cardiac toxicity; Q3 weeks - expected to complete in June 2021  · Due to recent inpatient admission for colitis on 03/16/2021, patient's last treatment was rescheduled, next treatment scheduled for 05/17/2021    Posttraumatic stress disorder  Assessment & Plan  · Patient follows with a counselor as an outpatient  Reports feeling more isolated, fatigued and lack of desire  · Continue Zoloft and Xanax PRN  Systemic lupus erythematosus ([facility] [address] )  Assessment & Plan  · Patient known to rheumatology  · On Methotrexate and Plaquenil  Hypertensive urgency  Assessment & Plan  · Present on admission, patient initially hypertensive with blood pressure 216/103  · S/p Labetalol 10 mg IV   · Patient is currently not on any antihypertensives as an outpatient  · Suspect in the setting of acute pain  · BP this am within normal limits            Subjective/Objective     Subjective:   Feels better this am  Reports no BM overnight  Pain improved  She's always "chilly" but feels no fever/night sweats  No nausea, tolerating liquid diet  Objective:  Vitals: Blood pressure 139/71, pulse 88, temperature 97 9 °F (36 6 °C), temperature source Oral, resp  rate 18, SpO2 96 %, not currently breastfeeding  ,There is no height or weight on file to calculate BMI       Intake/Output Summary (Last 24 hours) at 3/26/2021 2323  Last data filed at 3/25/2021 2049  Gross per 24 hour   Intake 1106 67 ml   Output --   Net 1106 67 ml       Invasive Devices     Central Venous Catheter Line            Port A Cath 03/17/20 Right Chest 374 days          Drain            Closed/Suction Drain Left;Lateral Chest Bulb 19 Fr  219 days    Closed/Suction Drain Left;Medial Chest Bulb 19 Fr  219 days                Physical Exam:   Appears tired and ill  No scleral icterus, eomi  Supple neck, no thrush, dry mm  CTA b/l no w/c  s1s2 non tachy  Soft nt nd pos BS  No scleral icterus or rash noted    Lab, Imaging and other studies: I have personally reviewed pertinent reports      VTE Pharmacologic Prophylaxis: Enoxaparin (Lovenox)  VTE Mechanical Prophylaxis: sequential compression device

## 2021-03-26 NOTE — ASSESSMENT & PLAN NOTE
· No BM overnight to provide sample, CT unremarkable for colitis, suspect viral gastroenteritis ( also felt ill)  · Presentation:  Patient presents to the ER with complaints of nausea, vomiting, diarrhea and right lower quadrant abdominal pain for one day  Patient reports several episodes of bilious vomiting today  Additionally, she reports small, loose, watery, nonbloody stools  Of note, patient was admitted on 03/16/2021 due to colitis at which time she completed a course of IV Levaquin and Flagyl  Patient reports her symptoms had improved up until 03/25/2021  Patient presented to her outpatient PCP office today due to her complaints who referred her to the ER for further evaluation  · CT abdomen pelvis:  Normal appendix  No evidence of bowel obstruction, colitis or diverticulitis    · Lipase unremarkable  · Obtain stool enteric bacterial panel and C diff sample  · IV hydration, Pain control, IV antiemetics  · Daily weights and I&Os    · Clear liquid diet tolerated, advance today

## 2021-03-26 NOTE — ASSESSMENT & PLAN NOTE
· Present on admission, lactic acid of 2 2  · Suspect in the setting nausea and vomiting  · Lactic acid q 2 hours until < 2  · IV hydration

## 2021-03-26 NOTE — ASSESSMENT & PLAN NOTE
· Present on admission, WBCs 11 22  · Suspect reactive  · Patient is currently afebrile  · Trend CBC

## 2021-03-26 NOTE — UTILIZATION REVIEW
Initial Clinical Review    Admission: Date/Time/Statement:   Admission Orders (From admission, onward)     Ordered        03/25/21 1929  Inpatient Admission  Once                   Orders Placed This Encounter   Procedures    Inpatient Admission     Standing Status:   Standing     Number of Occurrences:   1     Order Specific Question:   Level of Care     Answer:   Med Surg [16]     Order Specific Question:   Estimated length of stay     Answer:   More than 2 Midnights     Order Specific Question:   Certification     Answer:   I certify that inpatient services are medically necessary for this patient for a duration of greater than two midnights  See H&P and MD Progress Notes for additional information about the patient's course of treatment  ED Arrival Information     Expected Arrival Acuity Means of Arrival Escorted By Service Admission Type    - 3/25/2021 15:29 Urgent Wheelchair Spouse Hospitalist Urgent    Arrival Complaint    vomiting/diarrhea/weakness        Chief Complaint   Patient presents with    Vomiting     pt states she is vomiting, diarrhea and weakness  had follow up with her pcp today and he told her to come here for eval  hx breast cancer  HPI:   60 y o  female with PMH of HTN, Lupus, PTSD, cancer related pain and malignant neoplasm of left breast,  on chemotherapy,  who presents with N/V/D and right lower quadrant abdominal pain  Patient reports several episodes of bilious vomiting today  Additionally, she reports small, loose, watery, nonbloody stools  Patient was admitted on 03/16/2021 due to colitis at which time she completed a course of IV Levaquin and Flagyl  Patient reports her symptoms had improved up until 03/25/2021  Patient presented to her outpatient PCP office today due to her complaints who referred her to the ER for further evaluation  ED labs revealed leukocytosis, hypokalemia, elevated lactic acid, and increased anion gap metabolic acidosis  Hypertensive in ED  Physical exam: generalized abdominal tenderness, mucous membranes dry  Plan: Inpatient Med Surg admission for evaluation and treatment of nausea, vomiting and diarrhea, hypokalemia, elevated lactic acid, increased anion gap metabolic acidosis, leukocytosis and hypertensive urgency:   IV hydration, IV antiemetics, pain control, stool for C diff and enteric panel, clear liquid diet, advance as tolerated  Replete and monitor potassium  Lactic acid q2H until <2  Trend BMP, CBC  Currently not on antihypertensives, IV labetalol, monitor BP  ED Triage Vitals   Temperature Pulse Respirations Blood Pressure SpO2   03/25/21 1536 03/25/21 1536 03/25/21 1536 03/25/21 1536 03/25/21 1536   98 5 °F (36 9 °C) 89 16 147/89 100 %      Temp Source Heart Rate Source Patient Position - Orthostatic VS BP Location FiO2 (%)   03/25/21 1536 03/25/21 1536 03/25/21 1536 03/25/21 1536 --   Oral Monitor Sitting Left arm       Pain Score       03/25/21 1656       Worst Possible Pain          Wt Readings from Last 1 Encounters:   03/25/21 45 8 kg (101 lb)     Additional Vital Signs:       Date/Time  Temp  Pulse  Resp  BP  MAP   SpO2  O2 Device   03/26/21 0645  97 9   88  --  139/71  96  96 %  None (Room air)   03/25/21 2151  98 3   78  18  150/68  98  95 %  None (Room air)   03/25/21 2100  --  72  18  186/102Abnormal   135  100 %  None (Room air)   03/25/21 2045  --  80  17  187/93Abnormal   132  98 %  --   03/25/21 1900  --  90  17  216/102Abnormal   146  99 %  None (Room air)   03/25/21 1834  --  96  16  216/102Abnormal   --  100 %  None (Room air)       Pertinent Labs/Diagnostic Test Results:     3/25 CT AP:    Normal appendix    No evidence of bowel obstruction, colitis or diverticulitis      Results from last 7 days   Lab Units 03/25/21  1701   SARS-COV-2  Negative     Results from last 7 days   Lab Units 03/26/21  0522 03/25/21  1700   WBC Thousand/uL 8 77 11 22*   HEMOGLOBIN g/dL 9 9* 12 1   HEMATOCRIT % 31 6* 37 3   PLATELETS Thousands/uL 278 376   NEUTROS ABS Thousands/µL 5 59 9 62*         Results from last 7 days   Lab Units 03/26/21  0522 03/25/21  1700   SODIUM mmol/L 143 142   POTASSIUM mmol/L 3 1* 3 1*   CHLORIDE mmol/L 111* 108   CO2 mmol/L 23 19*   ANION GAP mmol/L 9 15*   BUN mg/dL 6 8   CREATININE mg/dL 0 72 0 94   EGFR ml/min/1 73sq m 93 68   CALCIUM mg/dL 7 9* 9 3   MAGNESIUM mg/dL 1 8 1 8     Results from last 7 days   Lab Units 03/25/21  1700   AST U/L 12   ALT U/L 11*   ALK PHOS U/L 76   TOTAL PROTEIN g/dL 6 9   ALBUMIN g/dL 3 6   TOTAL BILIRUBIN mg/dL 0 29         Results from last 7 days   Lab Units 03/26/21  0522 03/25/21  1700   GLUCOSE RANDOM mg/dL 96 122                 Results from last 7 days   Lab Units 03/25/21  2214 03/25/21  1700   LACTIC ACID mmol/L 1 3 2 2*           Results from last 7 days   Lab Units 03/25/21  1700   LIPASE u/L 120                 Results from last 7 days   Lab Units 03/25/21  1701   INFLUENZA A PCR  Negative   INFLUENZA B PCR  Negative   RSV PCR  Negative                 Results from last 7 days   Lab Units 03/25/21  1701   C DIFF TOXIN B BY PCR  Negative         ED Treatment:   Medication Administration from 03/25/2021 1528 to 03/25/2021 2149       Date/Time Order Dose Route Action     03/25/2021 1832 sodium chloride 0 9 % bolus 1,000 mL 0 mL Intravenous Stopped     03/25/2021 1656 sodium chloride 0 9 % bolus 1,000 mL 1,000 mL Intravenous New Bag     03/25/2021 1656 ondansetron (ZOFRAN) injection 4 mg 4 mg Intravenous Given     03/25/2021 1656 HYDROmorphone (DILAUDID) injection 0 5 mg 0 5 mg Intravenous Given     03/25/2021 2049 potassium chloride 20 mEq IVPB (premix) 0 mEq Intravenous Stopped     03/25/2021 1841 potassium chloride 20 mEq IVPB (premix) 20 mEq Intravenous New Bag     03/25/2021 1836 pantoprazole (PROTONIX) injection 40 mg 40 mg Intravenous Given     03/25/2021 1821 iohexol (OMNIPAQUE) 350 MG/ML injection (MULTI-DOSE) 100 mL 100 mL Intravenous Given     03/25/2021 2048 Labetalol HCl (NORMODYNE) injection 10 mg 10 mg Intravenous Given     03/25/2021 2049 sodium chloride 0 9 % bolus 500 mL 500 mL Intravenous New Bag     03/25/2021 2100 ondansetron (ZOFRAN) injection 4 mg 4 mg Intravenous Given     03/25/2021 2059 HYDROmorphone (DILAUDID) injection 1 mg 1 mg Intravenous Given        Past Medical History:   Diagnosis Date    Disease of thyroid gland     Hypertension     Lupus ([facility] [address] )     Malignant neoplasm of upper-inner quadrant of left breast in female, estrogen receptor negative ([facility] [address] ) 2/25/2020    Nephrolithiasis     PTSD (post-traumatic stress disorder)      Present on Admission:   Hypertensive urgency   Cancer related pain   Posttraumatic stress disorder   Systemic lupus erythematosus (HCC)   Hypokalemia   Leukocytosis   Nausea, vomiting, and diarrhea   Increased anion gap metabolic acidosis      Admitting Diagnosis: Dehydration [E86 0]  Hypokalemia [E87 6]  Gastroenteritis [K52 9]  Weakness [R53 1]  Vomiting and diarrhea [R11 10, R19 7]  Age/Sex: 60 y o  female       Admission Orders:      Scheduled Medications:      enoxaparin, 40 mg, Subcutaneous, Daily  hydroxychloroquine, 100 mg, Oral, Daily With Dinner  hydroxychloroquine, 200 mg, Oral, Daily With Breakfast  lactobacillus acidophilus-bulgaricus, 1 packet, Oral, TID With Meals  levothyroxine, 88 mcg, Oral, Daily  magnesium oxide, 400 mg, Oral, BID  nicotine, 1 patch, Transdermal, Daily  pantoprazole, 40 mg, Intravenous, Q24H JORGE  sertraline, 150 mg, Oral, Daily      Continuous IV Infusions:      sodium chloride, 100 mL/hr, Intravenous, Continuous      PRN Meds:      acetaminophen, 650 mg, Oral, Q6H PRN  al mag oxide-diphenhydramine-lidocaine viscous, 10 mL, Swish & Swallow, Q6H PRN  ALPRAZolam, 1 mg, Oral, TID PRN  cyclobenzaprine, 10 mg, Oral, HS PRN  HYDROmorphone, 0 5 mg, Intravenous, Q4H PRN  HYDROmorphone, 0 5 mg, Intravenous, Q4H PRN  HYDROmorphone, 1 mg, Intravenous, Q4H PRN x 3 doses 3/26 @ 4404, 9402, 1252  ondansetron, 4 mg, Intravenous, Q6H PRN x 1 dose 3/26 @ 0413        None    Network Utilization Review Department  ATTENTION: Please call with any questions or concerns to [phone] and carefully listen to the prompts so that you are directed to the right person  All voicemails are confidential   Ermias Dodge all requests for admission clinical reviews, approved or denied determinations and any other requests to dedicated fax number below belonging to the campus where the patient is receiving treatment   List of dedicated fax numbers for the Facilities:  [address] DENIALS (Administrative/Medical Necessity) [phone]   [address] (Maternity/NICU/Pediatrics) [phone]   [address] Adriana Marcus 9777 (  [facility]) [address] [place] [address] Karan Maurizio Vargas 14[address] [place] [address] [phone]

## 2021-03-26 NOTE — H&P
[facility]  H&P-  Gum 1965, 60 y o  female MRN: [id number]  Unit/Bed#: S -01 Encounter: [id number]  Primary Care Provider: Yobani Zee MD   Date and time admitted to hospital: 3/25/2021  3:37 PM    * Nausea, vomiting, and diarrhea  Assessment & Plan  · Presentation:  Patient presents to the ER with complaints of nausea, vomiting, diarrhea and right lower quadrant abdominal pain for one day  Patient reports several episodes of bilious vomiting today  Additionally, she reports small, loose, watery, nonbloody stools  Of note, patient was admitted on 03/16/2021 due to colitis at which time she completed a course of IV Levaquin and Flagyl  Patient reports her symptoms had improved up until 03/25/2021  Patient presented to her outpatient PCP office today due to her complaints who referred her to the ER for further evaluation  · CT abdomen pelvis:  Normal appendix  No evidence of bowel obstruction, colitis or diverticulitis    · Lipase unremarkable  · Obtain stool enteric bacterial panel and C diff sample  · IV hydration  · Pain control  · IV antiemetics  · Daily weights and I&Os  · Clear liquid diet, advanced as tolerated  Hypokalemia  Assessment & Plan  Potassium level upon admission:  3 1  Secondary to vomiting and diarrhea  Magnesium level:  Pending  Replete and monitor BMP  Malignant neoplasm of upper-inner quadrant of left breast in female, estrogen receptor negative [facility])  Assessment & Plan  · Diagnosed March 2020  · Patient known to Dr Mitchell Conway  · Status post neoadjuvant chemotherapy with SUSANA PERALTA with pertuzumab x3 cycles, completed in early May 2020  S/P mastectomy and sentinel lymph node biopsy in August 2020    · Currently receiving adjuvant trastuzumab and pertuzumab with no cardiac toxicity; Q3 weeks - expected to complete in June 2021  · Due to recent inpatient admission for colitis on 03/16/2021, patient's last treatment was rescheduled, next treatment scheduled for 05/17/2021    Elevated lactic acid level  Assessment & Plan  · Present on admission, lactic acid of 2 2  · Suspect in the setting nausea and vomiting  · Lactic acid q 2 hours until < 2  · IV hydration  Increased anion gap metabolic acidosis  Assessment & Plan  · Anion gap of 15   · Suspect in the setting of nausea, vomiting and poor oral intake  · IV hydration  · IV antiemetics  · Trend BMP  Leukocytosis  Assessment & Plan  · Present on admission, WBCs 11 22  · Suspect reactive  · Patient is currently afebrile  · Trend CBC  Hypertensive urgency  Assessment & Plan  · Present on admission, patient initially hypertensive with blood pressure 216/103  · Labetalol 10 mg IV ordered  · Patient is currently not on any antihypertensives as an outpatient  · Suspect in the setting of acute pain  · Monitor BP closely  Cancer related pain  Assessment & Plan  · Patient is known to palliative care  · Continue current regimen of oxy IR 10-15 mg PO q4h PRN  · PDMP reviewed  Posttraumatic stress disorder  Assessment & Plan  · Patient follows with a counselor as an outpatient  · Continue Zoloft and Xanax PRN  Systemic lupus erythematosus ([facility] [address] )  Assessment & Plan  · Patient known to rheumatology  · On Methotrexate and Plaquenil  VTE Prophylaxis: Enoxaparin (Lovenox)  / reason for no mechanical VTE prophylaxis Not indicated   Code Status: Full  POLST: POLST form is not discussed and not completed at this time  Anticipated Length of Stay:  Patient will be admitted on an Inpatient basis with an anticipated length of stay of  > 2 midnights  Justification for Hospital Stay: IV hydration, pain control, IV Antiemetics    Total Time for Visit, including Counseling / Coordination of Care: 1 hour  Greater than 50% of this total time spent on direct patient counseling and coordination of care      Chief Complaint:   Nausea, vomiting, diarrhea and right lower quadrant abdominal pain    History of Present Illness:    Cheikh Hernandez is a 60 y o  female a past medical history of hypertension, lupus, PTSD, cancer related pain and malignant neoplasm of left breast on chemotherapy who presents with nausea, vomiting, diarrhea and right lower quadrant abdominal pain for one day  Patient reports several episodes of bilious vomiting today  Additionally, she reports small, loose, watery, nonbloody stools  Patient reports intermittent chills and feeling feverish; however, no documented fevers recorded  Of note, patient was admitted on 03/16/2021 due to colitis at which time she completed a course of IV Levaquin and Flagyl  Patient reports her symptoms had improved up until 03/25/2021  Patient presented to her outpatient PCP office today due to her complaints who referred her to the ER for further evaluation  Patient will be admitted for pain control, IV antiemetics, IV hydration and advancement of p o  Oral intake  Review of Systems:    Review of Systems   Constitutional: Positive for appetite change and chills  Negative for fever  Respiratory: Negative for cough and shortness of breath  Cardiovascular: Negative for chest pain  Gastrointestinal: Positive for diarrhea, nausea and vomiting  Negative for blood in stool and constipation  Neurological: Positive for weakness (Generalized)  All other systems reviewed and are negative        Past Medical and Surgical History:     Past Medical History:   Diagnosis Date    Disease of thyroid gland     Hypertension     Lupus ([address] )     Malignant neoplasm of upper-inner quadrant of left breast in female, estrogen receptor negative ([facility] [address] ) 2/25/2020    Nephrolithiasis     PTSD (post-traumatic stress disorder)        Past Surgical History:   Procedure Laterality Date    FEMUR FRACTURE SURGERY      FL GUIDED CENTRAL VENOUS ACCESS DEVICE INSERTION  3/17/2020    HYSTERECTOMY      LEFT OOPHORECTOMY      due to torsion     MAMMO STEREOTACTIC BREAST BIOPSY RIGHT (ALL INC) Right 2/12/2020    MASTECTOMY W/ SENTINEL NODE BIOPSY Left 8/18/2020    Procedure: BREAST MASTECTOMY WITH SENTINEL LYMPH NODE BIOPSY, LYMPHATIC MAPPING WITH BLUE DYE AND RADIOACTIVE DYE (INJECT AT 1430 BY DR WRIGHT IN THE OR); Surgeon: Shelbi Ramirez MD;  Location: [facility] Main [facility];  Service: Surgical Oncology    MRI BREAST BIOPSY LEFT (ALL INCLUSIVE) Left 3/20/2020    NC INSJ TUNNELED CTR VAD W/SUBQ PORT AGE 5 YR/> N/A 3/17/2020    Procedure: INSERTION VENOUS PORT ( PORT-A-CATH) IR;  Surgeon: Adrianne Styles DO;  Location: [facility]  MAIN OR;  Service: Interventional Radiology    US GUIDANCE BREAST BIOPSY LEFT EACH ADDITIONAL Left 2/12/2020    US GUIDED BREAST BIOPSY LEFT COMPLETE Left 2/12/2020    VAGINA SURGERY         Meds/Allergies:    Prior to Admission medications    Medication Sig Start Date End Date Taking?  Authorizing Provider   acetaminophen (TYLENOL) 325 mg tablet Take 2 tablets (650 mg total) by mouth every 6 (six) hours as needed for mild pain, headaches or fever 8/27/20  Yes Gabrielle Dick PA-C   al mag oxide-diphenhydramine-lidocaine viscous (MAGIC MOUTHWASH) 1:1:1 suspension Swish and swallow 10 mL every 6 (six) hours as needed for mouth pain or discomfort or mucositis 3/5/21  Yes Mary Paul PA-C   ALPRAZolam Judith Dill) 1 mg tablet Take 1 tablet (1 mg total) by mouth 3 (three) times a day as needed for anxiety 1/41/65  Yes Sandra Hicks MD   cyclobenzaprine (FLEXERIL) 5 mg tablet Take 10 mg by mouth daily at bedtime as needed 9/3/20  Yes Historical Provider, MD   hydroxychloroquine (PLAQUENIL) 200 mg tablet Take 1 tablet in morning and 1/2 tablet in evening   Yes Historical Provider, MD   lactobacillus acidophilus-bulgaricus [facility]) packet Take 1 packet by mouth 3 (three) times a day with meals 3/19/21  Yes Gabrielle Dick PA-C   levothyroxine 88 mcg tablet TAKE ONE TABLET BY MOUTH EVERY DAY 1/7/42  Yes Sandra Hicks MD   magnesium oxide (MAG-OX) 400 mg Take 400 mg by mouth 2 (two) times a day 3/13/21  Yes Historical Provider, MD   ondansetron (ZOFRAN) 4 mg tablet Take 1 tablet (4 mg total) by mouth every 8 (eight) hours as needed for nausea or vomiting 8/34/25  Yes Rehan Balderrama MD   pantoprazole (PROTONIX) 40 mg tablet Take 1 tablet (40 mg total) by mouth daily 3/5/21  Yes Iris Lucero PA-C   sertraline (ZOLOFT) 100 mg tablet TAKE 1 & 1/2 TABLETS BY MOUTH ONCE DAILY  Patient taking differently: Take 150 mg by mouth daily BY MOUTH ONCE DAILY 2/55/05  Yes Rehan Balderrama MD   oxyCODONE (ROXICODONE) 10 MG TABS Take 1-1 5 tablets (10-15 mg total) by mouth every 4 (four) hours as needed (cancer pain  Max of 8 tabs / day ) NO FURTHER FILLS WITHOUT VISIT TO PALLIATIVE CLINIC  Patient not taking: Reported on 3/25/2021 2/10/90   Rehan Balderrama MD     I have reviewed home medications with patient personally  Allergies:    Allergies   Allergen Reactions    Mobic [Meloxicam] Eye Swelling     Reaction Date: 12Aug2011;     Methotrexate Rash    Sulfa Antibiotics Rash       Social History:     Marital Status: /Civil Union   Occupation: Unknown  Patient Pre-hospital Living Situation: Private residence  Patient Pre-hospital Level of Mobility: Indepedent  Patient Pre-hospital Diet Restrictions: None  Substance Use History:   Social History     Substance and Sexual Activity   Alcohol Use Not Currently    Alcohol/week: 21 0 standard drinks    Types: 21 Cans of beer per week    Frequency: Never    Drinks per session: Patient refused    Binge frequency: Never    Comment: pt states she had her last beer 3/22/2020     Social History     Tobacco Use   Smoking Status Current Every Day Smoker    Packs/day: 0 50    Years: 40 00    Pack years: 20 00    Types: Cigarettes    Start date: 200   Smokeless Tobacco Never Used     Social History     Substance and Sexual Activity   Drug Use No       Family History:    non-contributory    Physical Exam:     Vitals:   Blood Pressure: 150/68 (03/25/21 2151)  Pulse: 78 (03/25/21 2151)  Temperature: 98 3 °F (36 8 °C) (03/25/21 2151)  Temp Source: Oral (03/25/21 2151)  Respirations: 18 (03/25/21 2151)  SpO2: 95 % (03/25/21 2151)    Physical Exam  Vitals signs and nursing note reviewed  Constitutional:       General: She is not in acute distress  Appearance: She is ill-appearing  HENT:      Head: Normocephalic and atraumatic  Mouth/Throat:      Mouth: Mucous membranes are dry  Eyes:      General: No scleral icterus  Extraocular Movements: Extraocular movements intact  Conjunctiva/sclera: Conjunctivae normal    Neck:      Musculoskeletal: Normal range of motion  Cardiovascular:      Rate and Rhythm: Normal rate and regular rhythm  Pulses: Normal pulses  Heart sounds: Normal heart sounds  No murmur  Pulmonary:      Effort: Pulmonary effort is normal  No respiratory distress  Breath sounds: Normal breath sounds  No wheezing, rhonchi or rales  Chest:      Chest wall: No tenderness  Abdominal:      General: Bowel sounds are normal  There is no distension  Palpations: Abdomen is soft  Tenderness: There is generalized abdominal tenderness  Musculoskeletal: Normal range of motion  General: No swelling or deformity  Skin:     General: Skin is warm  Capillary Refill: Capillary refill takes 2 to 3 seconds  Neurological:      Mental Status: She is alert and oriented to person, place, and time  Psychiatric:         Speech: Speech normal              Additional Data:     Lab Results: I have personally reviewed pertinent reports        Results from last 7 days   Lab Units 03/25/21  1700   WBC Thousand/uL 11 22*   HEMOGLOBIN g/dL 12 1   HEMATOCRIT % 37 3   PLATELETS Thousands/uL 376   NEUTROS PCT % 86*   LYMPHS PCT % 8*   MONOS PCT % 6   EOS PCT % 0     Results from last 7 days   Lab Units 03/25/21  1700   SODIUM mmol/L 142   POTASSIUM mmol/L 3 1*   CHLORIDE mmol/L 108   CO2 mmol/L 19*   BUN mg/dL 8 CREATININE mg/dL 0 94   ANION GAP mmol/L 15*   CALCIUM mg/dL 9 3   ALBUMIN g/dL 3 6   TOTAL BILIRUBIN mg/dL 0 29   ALK PHOS U/L 76   ALT U/L 11*   AST U/L 12   GLUCOSE RANDOM mg/dL 122                 Results from last 7 days   Lab Units 03/25/21  1700   LACTIC ACID mmol/L 2 2*       Imaging: I have personally reviewed pertinent reports  CT abdomen pelvis with contrast   Final Result by Clarke Rosario MD (03/25 1846)      Normal appendix  No evidence of bowel obstruction, colitis or diverticulitis            Workstation performed: [id number]             EKG, Pathology, and Other Studies Reviewed on Admission:   · EKG: None    Allscripts / Epic Records Reviewed: Yes     ** Please Note: This note has been constructed using a voice recognition system   **

## 2021-03-26 NOTE — UTILIZATION REVIEW
Notification of Inpatient Admission/Inpatient Authorization Request   This is a Notification of Inpatient Admission for [facility]  Be advised that this patient was admitted to our facility under Inpatient Status  Contact Jennifer Ross at [phone] for additional admission information  [facility] DE[facility]  DEDICATED -[phone]  Patient Name:   Danyelle Crespo   YOB: 1965       [address]   [address]:   [address]  Tax ID: [id number]  NPI: [id number] Attending Provider/NPI:  Address:  Phone: Deven Alba Md [[id number]]  Same as the facility  [phone]   Place of Service Code: 24 Place of Service Name:  [address] [place] [address]   Start Date: 3/25/21 1929     Discharge Date & Time: No discharge date for patient encounter  Type of Admission: Inpatient Status Discharge Disposition   (if discharged): Home/Self Care   Patient Diagnoses: Dehydration [E86 0]  Hypokalemia [E87 6]  Gastroenteritis [K52 9]  Weakness [R53 1]  Vomiting and diarrhea [R11 10, R19 7]     Orders: Admission Orders (From admission, onward)     Ordered        03/25/21 1929  Inpatient Admission  Once                    Assigned Utilization Review Contact: Jennifer Ross  Utilization   Network Utilization Review Department  Phone: [phone]; Fax [phone]  Email: [email or web address]   ATTENTION PAYERS: Please call the assigned Utilization  directly with any questions or concerns ALL voicemails in the department are confidential  Send all requests for admission clinical reviews, approved or denied determinations and any other requests to dedicated fax number belonging to the campus where the patient is receiving treatment

## 2021-03-26 NOTE — ASSESSMENT & PLAN NOTE
· Diagnosed March 2020  · Patient known to Dr Carri Shepard  · Status post neoadjuvant chemotherapy with SUSANA PERALTA with pertuzumab x3 cycles, completed in early May 2020  S/P mastectomy and sentinel lymph node biopsy in August 2020    · Currently receiving adjuvant trastuzumab and pertuzumab with no cardiac toxicity; Q3 weeks - expected to complete in June 2021  · Due to recent inpatient admission for colitis on 03/16/2021, patient's last treatment was rescheduled, next treatment scheduled for 05/17/2021

## 2021-03-27 VITALS
SYSTOLIC BLOOD PRESSURE: 154 MMHG | HEART RATE: 75 BPM | TEMPERATURE: 98 F | OXYGEN SATURATION: 95 % | DIASTOLIC BLOOD PRESSURE: 84 MMHG | RESPIRATION RATE: 16 BRPM

## 2021-03-27 PROBLEM — E87.29 INCREASED ANION GAP METABOLIC ACIDOSIS: Status: RESOLVED | Noted: 2020-04-25 | Resolved: 2021-03-27

## 2021-03-27 PROBLEM — E87.2 INCREASED ANION GAP METABOLIC ACIDOSIS: Status: RESOLVED | Noted: 2020-04-25 | Resolved: 2021-03-27

## 2021-03-27 PROBLEM — R19.7 NAUSEA, VOMITING, AND DIARRHEA: Status: RESOLVED | Noted: 2020-04-24 | Resolved: 2021-03-27

## 2021-03-27 PROBLEM — R11.2 NAUSEA, VOMITING, AND DIARRHEA: Status: RESOLVED | Noted: 2020-04-24 | Resolved: 2021-03-27

## 2021-03-27 PROBLEM — R79.89 ELEVATED LACTIC ACID LEVEL: Status: RESOLVED | Noted: 2021-03-25 | Resolved: 2021-03-27

## 2021-03-27 PROBLEM — E87.6 HYPOKALEMIA: Status: RESOLVED | Noted: 2020-05-12 | Resolved: 2021-03-27

## 2021-03-27 PROBLEM — D72.829 LEUKOCYTOSIS: Status: RESOLVED | Noted: 2020-04-24 | Resolved: 2021-03-27

## 2021-03-27 LAB
ANION GAP SERPL CALCULATED.3IONS-SCNC: 8 MMOL/L (ref 4–13)
BASOPHILS # BLD AUTO: 0.06 THOUSANDS/ΜL (ref 0–0.1)
BASOPHILS NFR BLD AUTO: 1 % (ref 0–1)
BUN SERPL-MCNC: 2 MG/DL (ref 5–25)
CALCIUM SERPL-MCNC: 7.8 MG/DL (ref 8.3–10.1)
CHLORIDE SERPL-SCNC: 114 MMOL/L (ref 100–108)
CO2 SERPL-SCNC: 22 MMOL/L (ref 21–32)
CREAT SERPL-MCNC: 0.6 MG/DL (ref 0.6–1.3)
EOSINOPHIL # BLD AUTO: 0.11 THOUSAND/ΜL (ref 0–0.61)
EOSINOPHIL NFR BLD AUTO: 1 % (ref 0–6)
ERYTHROCYTE [DISTWIDTH] IN BLOOD BY AUTOMATED COUNT: 17.3 % (ref 11.6–15.1)
GFR SERPL CREATININE-BSD FRML MDRD: 102 ML/MIN/1.73SQ M
GLUCOSE SERPL-MCNC: 86 MG/DL (ref 65–140)
HCT VFR BLD AUTO: 32.2 % (ref 34.8–46.1)
HGB BLD-MCNC: 10 G/DL (ref 11.5–15.4)
IMM GRANULOCYTES # BLD AUTO: 0.03 THOUSAND/UL (ref 0–0.2)
IMM GRANULOCYTES NFR BLD AUTO: 0 % (ref 0–2)
LYMPHOCYTES # BLD AUTO: 0.9 THOUSANDS/ΜL (ref 0.6–4.47)
LYMPHOCYTES NFR BLD AUTO: 10 % (ref 14–44)
MCH RBC QN AUTO: 28.3 PG (ref 26.8–34.3)
MCHC RBC AUTO-ENTMCNC: 31.1 G/DL (ref 31.4–37.4)
MCV RBC AUTO: 91 FL (ref 82–98)
MONOCYTES # BLD AUTO: 0.53 THOUSAND/ΜL (ref 0.17–1.22)
MONOCYTES NFR BLD AUTO: 6 % (ref 4–12)
NEUTROPHILS # BLD AUTO: 7.15 THOUSANDS/ΜL (ref 1.85–7.62)
NEUTS SEG NFR BLD AUTO: 82 % (ref 43–75)
NRBC BLD AUTO-RTO: 0 /100 WBCS
PLATELET # BLD AUTO: 277 THOUSANDS/UL (ref 149–390)
PMV BLD AUTO: 10.4 FL (ref 8.9–12.7)
POTASSIUM SERPL-SCNC: 3.5 MMOL/L (ref 3.5–5.3)
RBC # BLD AUTO: 3.53 MILLION/UL (ref 3.81–5.12)
SODIUM SERPL-SCNC: 144 MMOL/L (ref 136–145)
WBC # BLD AUTO: 8.78 THOUSAND/UL (ref 4.31–10.16)

## 2021-03-27 PROCEDURE — 97163 PT EVAL HIGH COMPLEX 45 MIN: CPT

## 2021-03-27 PROCEDURE — 99238 HOSP IP/OBS DSCHRG MGMT 30/<: CPT | Performed by: INTERNAL MEDICINE

## 2021-03-27 PROCEDURE — 85025 COMPLETE CBC W/AUTO DIFF WBC: CPT | Performed by: INTERNAL MEDICINE

## 2021-03-27 PROCEDURE — C9113 INJ PANTOPRAZOLE SODIUM, VIA: HCPCS | Performed by: NURSE PRACTITIONER

## 2021-03-27 PROCEDURE — 80048 BASIC METABOLIC PNL TOTAL CA: CPT | Performed by: INTERNAL MEDICINE

## 2021-03-27 RX ADMIN — SODIUM CHLORIDE 100 ML/HR: 0.9 INJECTION, SOLUTION INTRAVENOUS at 09:37

## 2021-03-27 RX ADMIN — ENOXAPARIN SODIUM 40 MG: 40 INJECTION SUBCUTANEOUS at 08:35

## 2021-03-27 RX ADMIN — Medication 1 PACKET: at 08:34

## 2021-03-27 RX ADMIN — HYDROMORPHONE HYDROCHLORIDE 0.5 MG: 1 INJECTION, SOLUTION INTRAMUSCULAR; INTRAVENOUS; SUBCUTANEOUS at 08:59

## 2021-03-27 RX ADMIN — ALPRAZOLAM 1 MG: 0.5 TABLET ORAL at 08:44

## 2021-03-27 RX ADMIN — MAGNESIUM OXIDE TAB 400 MG (241.3 MG ELEMENTAL MG) 400 MG: 400 (241.3 MG) TAB at 08:34

## 2021-03-27 RX ADMIN — HYDROMORPHONE HYDROCHLORIDE 1 MG: 1 INJECTION, SOLUTION INTRAMUSCULAR; INTRAVENOUS; SUBCUTANEOUS at 07:32

## 2021-03-27 RX ADMIN — HYDROMORPHONE HYDROCHLORIDE 1 MG: 1 INJECTION, SOLUTION INTRAMUSCULAR; INTRAVENOUS; SUBCUTANEOUS at 10:58

## 2021-03-27 RX ADMIN — PANTOPRAZOLE SODIUM 40 MG: 40 INJECTION, POWDER, FOR SOLUTION INTRAVENOUS at 08:34

## 2021-03-27 RX ADMIN — HYDROXYCHLOROQUINE SULFATE 200 MG: 200 TABLET, FILM COATED ORAL at 08:42

## 2021-03-27 RX ADMIN — SERTRALINE HYDROCHLORIDE 150 MG: 100 TABLET ORAL at 08:35

## 2021-03-27 RX ADMIN — LEVOTHYROXINE SODIUM 88 MCG: 88 TABLET ORAL at 08:35

## 2021-03-27 NOTE — ASSESSMENT & PLAN NOTE
· Present on admission, patient initially hypertensive with blood pressure 216/103  Now normotensive x 24 hours  · S/p Labetalol 10 mg IV   · Patient is currently not on any antihypertensives as an outpatient     · Suspect in the setting of acute pain

## 2021-03-27 NOTE — NURSING NOTE
During hourly rounding, followed up with patient on how she was tolerating the advancement of her diet from full liquid to regular  She reported that "everything went right through me" but also that she isn't feeling as bad today  She c/o continued pain and did request severe pain medication, was able to have a BM today that was mucous like in appearance  Will continue to monitor diet tolerance and pain

## 2021-03-27 NOTE — PHYSICAL THERAPY NOTE
PHYSICAL THERAPY Evaluation NOTE    Patient Name: Chano Fagan  TRQSJ'F Date: 3/27/2021     AGE:   60 y o  Mrn:   [id number]  ADMIT DX:  Dehydration [E86 0]  Hypokalemia [E87 6]  Gastroenteritis [K52 9]  Weakness [R53 1]  Vomiting and diarrhea [R11 10, R19 7]    Past Medical History:   Diagnosis Date    Disease of thyroid gland     Hypertension     [facility] )     Malignant neoplasm of upper-inner quadrant of left breast in female, estrogen receptor negative ([facility] ) 2/25/2020    Nephrolithiasis     PTSD (post-traumatic stress disorder)      Length Of Stay: 2  PHYSICAL THERAPY EVALUATION :    03/27/21 0826   PT Last Visit   PT Visit Date 03/27/21   Note Type   Note type Evaluation   Pain Assessment   Pain Assessment Tool 0-10   Pain Score 4   Pain Location/Orientation Location: Abdomen   Home Living   Type of Home House   Home Layout Two level;Bed/bath upstairs  (Two story home, 4-5 steps to enter)   Bathroom Shower/Tub Tub/shower unit   Bathroom Toilet Standard   Bathroom Accessibility Accessible   Additional Comments Patient lives with  in a 2 story home 4-5 steps to enter, has a cane but was not using prior to admission   Prior Function   Level of [place] Independent with ADLs and functional mobility; Needs assistance with IADLs   Lives With Spouse   Receives Help From Family   ADL Assistance Independent   IADLs Needs assistance   Falls in the last 6 months 1 to 4   Vocational On disability  (Worked in [employer] prior)   Comments Prior to admission patient reports independent with ADLs assistance with IADLs from spouse   Restrictions/Precautions   Other Precautions Fall Risk;Pain   General   Additional Pertinent History Patient is a 49-year-old female who presents with nausea vomiting and diarrhea generalized weakness and gait instability     Family/Caregiver Present No   Cognition   Overall Cognitive Status [facility] Arousal/Participation Cooperative   Orientation Level Oriented X4   Memory Within functional limits   Following Commands Follows all commands and directions without difficulty   Comments Patient was identified with full name and birth date   RLE Assessment   RLE Assessment   (Grossly 4-out of 5)   LLE Assessment   LLE Assessment   (Grossly 4-out of 5)   Coordination   Movements are Fluid and Coordinated 0   Coordination and Movement Description Mildly antalgic gait with increased postural sway   Light Touch   RLE Light Touch Grossly intact   LLE Light Touch Grossly intact   Bed Mobility   Supine to Sit 5  Supervision   Additional items Assist x 1; Increased time required;HOB elevated   Sit to Supine Unable to assess   Additional Comments Patient seated out of bed on recliner following PT session   Transfers   Sit to Stand 5  Supervision   Additional items Assist x 1; Increased time required   Stand to Sit 5  Supervision   Additional items Assist x 1; Increased time required   Additional Comments Increased time and verbal cues for sequencing and body mechanics no proper hand placement  Standing and returning to sitting   Ambulation/Elevation   Gait pattern Improper Weight shift; Antalgic; Inconsistent leti; Excessively slow   Gait Assistance 5  Supervision   Additional items Assist x 1;Verbal cues  (For posture and gait mechanics)   Assistive Device   (Patient self guided her IV pole)   Distance 150 ft X 1   Balance   Static Sitting Good   Dynamic Sitting Fair +   Static Standing Fair   Dynamic Standing Fair -   Ambulatory Fair -   Endurance Deficit   Endurance Deficit Yes   Endurance Deficit Description No postural and gait degradation with fatigue   Activity Tolerance   Activity Tolerance Patient tolerated treatment well   Nurse Made Aware Spoke to RN cleared for PT session   Assessment   Prognosis Good   Problem List Decreased strength;Decreased range of motion; Impaired balance;Decreased endurance;Decreased mobility; Decreased coordination;Decreased safety awareness   Assessment Patient is a 76-year-old female who presents with nausea vomiting and diarrhea generalized weakness and gait instability  order placed for PT eval and tx, w/ activity order of activity as tolerated  pt presents w/ comorbidities of Lupus, PTSD, nausea and vomiting, Breast Cancer, JEFERSON, anemia and personal factors of living in 2 story house, stair(s) to enter home, anxiety, depression and inability to perform IADLs  pt presents w/ pain, weakness, decreased endurance, impaired balance, gait deviations, decreased safety awareness and fall risk  these impairments are evident in findings from physical examination (weakness and impaired coordination), mobility assessment (need for Supervision assist w/ all phases of mobility when usually mobilizing independently, tolerance to only 150 feet of ambulation and need for cueing for mobility technique), and AM-PAC 6-Clicks: 32/77  pt needed input for task focus and mobility technique  pt is at risk for falls due to physical and safety awareness deficits  pt's clinical presentation is unstable/unpredictable (evident in tolerance to only 150 feet of ambulation, pain impacting overall mobility status, need for input for mobility technique, need for input for task focus and mobility technique and need for input for mobility technique/safety)  pt needs inpatient PT tx to improve mobility deficits  discharge recommendation is for home w/ family support to reduce fall risk and maximize level of functional independence  Goals   Patient Goals To get home   STG Expiration Date 04/06/21   Short Term Goal #1 pt will:  Increase bilateral LE strength 1/2 grade to facilitate independent mobility, Perform all bed mobility tasks independently to decrease fall risk factors, Perform all transfers independently to improve independence, Ambulate 350 ft  with least restrictive assistive device independently w/o LOB, Navigate 12 stairs independently with unilateral handrail to facilitate return to previous living environment and Increase all balance 1/2 grade to decrease risk for falls   PT Treatment Day 0   Plan   Treatment/Interventions Functional transfer training;LE strengthening/ROM; Therapeutic exercise; Endurance training;Cognitive reorientation;Patient/family training;Equipment eval/education; Bed mobility;Gait training;Spoke to nursing   PT Frequency   (3-5x/wk)   Recommendation   PT Discharge Recommendation Home with skilled therapy  (Home health PT versus home with family support pending progr)   PT - OK to Discharge Yes   Additional Comments When medically appropriate   AM-PAC Basic Mobility Inpatient   Turning in Bed Without Bedrails 4   Lying on Back to Sitting on Edge of Flat Bed 4   Moving Bed to Chair 4   Standing Up From Chair 4   Walk in Room 4   Climb 3-5 Stairs 3   Basic Mobility Inpatient Raw Score 23   Basic Mobility Standardized Score 50 88     Skilled PT recommended while in hospital and upon DC to progress pt toward treatment goals  The patient's AM-PAC Basic Mobility Inpatient Short Form Raw Score is 23, Standardized Score is 50  88  A standardized score greater than 42 9 suggests the patient may benefit from discharge to home  Please also refer to the recommendation of the Physical Therapist for safe discharge planning      Parveen Cain, PT, DPT 3/27/2021

## 2021-03-27 NOTE — ASSESSMENT & PLAN NOTE
· Tolerating regular diet  No BM overnight to provide sample, CT unremarkable for colitis, suspect viral gastroenteritis ( also felt ill)  · Presentation:  Patient presents to the ER with complaints of nausea, vomiting, diarrhea and right lower quadrant abdominal pain for one day  Patient reports several episodes of bilious vomiting today  Additionally, she reports small, loose, watery, nonbloody stools  Of note, patient was admitted on 03/16/2021 due to colitis at which time she completed a course of IV Levaquin and Flagyl  Patient reports her symptoms had improved up until 03/25/2021  Patient presented to her outpatient PCP office today due to her complaints who referred her to the ER for further evaluation  · Lipase unremarkable  c dif repeat neg  · IV hydration, Pain control, IV antiemetics  · Daily weights and I&Os

## 2021-03-27 NOTE — DISCHARGE SUMMARY
Discharge Summary - Dieter Etienne 60 y o  female MRN: [id number]    Unit/Bed#: S -31 Encounter: [id number]    Admission Date:   Admission Orders (From admission, onward)     Ordered        03/25/21 1929  Inpatient Admission  Once                     Admitting Diagnosis: Dehydration [E86 0]  Hypokalemia [E87 6]  Gastroenteritis [K52 9]  Weakness [R53 1]  Vomiting and diarrhea [R11 10, R19 7]   Severe protein-calorie malnutrition       HPI: Dieter Etienne is a 60 y o  female a past medical history of hypertension, lupus, PTSD, cancer related pain and malignant neoplasm of left breast on chemotherapy who presents with nausea, vomiting, diarrhea and right lower quadrant abdominal pain for one day  Patient reports several episodes of bilious vomiting today  Additionally, she reports small, loose, watery, nonbloody stools  Patient reports intermittent chills and feeling feverish; however, no documented fevers recorded  Of note, patient was admitted on 03/16/2021 due to colitis at which time she completed a course of IV Levaquin and Flagyl  Patient reports her symptoms had improved up until 03/25/2021  Patient presented to her outpatient PCP office on the day of admissions due to her complaints who referred her to the ER for further evaluation  Procedures Performed: No orders of the defined types were placed in this encounter  Summary of Hospital Course: Patient was dmitted for pain control, IV antiemetics, IV hydration and advancement of p o  diet  She tolerated the supportive care well    A work up for a bacterial infection were negative (c dif neg, leukocytosis resolved)    Significant Findings, Care, Treatment and Services Provided: c dif neg, CT without colitis    Complications: n/a    Discharge Diagnosis: nausea/vomiting/diarrhea self resolved    Resolved Problems  Date Reviewed: 3/25/2021          Resolved    Hypertensive urgency 3/27/2021     Resolved by  Kiet Reese MD    * (Principal) Nausea, vomiting, and diarrhea 3/27/2021     Resolved by  Tony Silva MD    Leukocytosis 3/27/2021     Resolved by  Tony Silva MD    Increased anion gap metabolic acidosis 6/25/7989     Resolved by  Tony Silva MD    Hypokalemia 3/27/2021     Resolved by  Tony Silva MD    Elevated lactic acid level 3/27/2021     Resolved by  Tony Silva MD          Condition at Discharge: fair         Discharge instructions/Information to patient and family:   See after visit summary for information provided to patient and family  Provisions for Follow-Up Care:  See after visit summary for information related to follow-up care and any pertinent home health orders  PCP: Sissy Salguero MD    Disposition: Home    Planned Readmission: No      Discharge Statement   I spent 25 minutes discharging the patient  This time was spent on the day of discharge  I had direct contact with the patient on the day of discharge  Additional documentation is required if more than 30 minutes were spent on discharge  Discharge Medications:  See after visit summary for reconciled discharge medications provided to patient and family

## 2021-03-27 NOTE — ASSESSMENT & PLAN NOTE
Resolved- suspect viral etiology with history of partner feeling unwell and negative work up  Afebrile throughout, leukocytosis improved with supportive care  Eating a regular diet for 24 hours prior to discharge

## 2021-03-27 NOTE — MALNUTRITION/BMI
This medical record reflects one or more clinical indicators suggestive of malnutrition and/or morbid obesity  Malnutrition Findings:   Adult Malnutrition type: Chronic illness  Adult Degree of Malnutrition: Other severe protein calorie malnutrition(related to inadequate energy intake, catabolic illness)  Malnutrition Characteristics: Muscle loss, Inadequate energy, Weight loss, Other (comment)(as evidenced by intake inadequate to meet >75% of estimated needs, 19% wt loss x 1 month (2/25/21 126 lb, 3/25/21 101 lb), muscle depletion (temples), BMI <18 5  Treatment-supplement )    BMI Findings:  Adult BMI Classifications: Underweight < 18 5    See Nutrition note dated 3/27/21 for additional details  Completed nutrition assessment is viewable in the nutrition documentation

## 2021-03-27 NOTE — PLAN OF CARE
Problem: PHYSICAL THERAPY ADULT  Goal: Performs mobility at highest level of function for planned discharge setting  See evaluation for individualized goals  Description: Treatment/Interventions: Functional transfer training, LE strengthening/ROM, Therapeutic exercise, Endurance training, Cognitive reorientation, Patient/family training, Equipment eval/education, Bed mobility, Gait training, Spoke to nursing          See flowsheet documentation for full assessment, interventions and recommendations  Note: Prognosis: Good  Problem List: Decreased strength, Decreased range of motion, Impaired balance, Decreased endurance, Decreased mobility, Decreased coordination, Decreased safety awareness  Assessment: Patient is a 57-year-old female who presents with nausea vomiting and diarrhea generalized weakness and gait instability  order placed for PT eval and tx, w/ activity order of activity as tolerated  pt presents w/ comorbidities of Lupus, PTSD, nausea and vomiting, Breast Cancer, JEFERSON, anemia and personal factors of living in 2 story house, stair(s) to enter home, anxiety, depression and inability to perform IADLs  pt presents w/ pain, weakness, decreased endurance, impaired balance, gait deviations, decreased safety awareness and fall risk  these impairments are evident in findings from physical examination (weakness and impaired coordination), mobility assessment (need for Supervision assist w/ all phases of mobility when usually mobilizing independently, tolerance to only 150 feet of ambulation and need for cueing for mobility technique), and AM-PAC 6-Clicks: 22/51  pt needed input for task focus and mobility technique  pt is at risk for falls due to physical and safety awareness deficits   pt's clinical presentation is unstable/unpredictable (evident in tolerance to only 150 feet of ambulation, pain impacting overall mobility status, need for input for mobility technique, need for input for task focus and mobility technique and need for input for mobility technique/safety)  pt needs inpatient PT tx to improve mobility deficits  discharge recommendation is for home w/ family support to reduce fall risk and maximize level of functional independence  PT Discharge Recommendation: Home with skilled therapy(Home health PT versus home with family support pending progr)     PT - OK to Discharge: Yes    See flowsheet documentation for full assessment

## 2021-03-29 ENCOUNTER — TELEPHONE (OUTPATIENT)
Dept: FAMILY MEDICINE CLINIC | Facility: CLINIC | Age: 56
End: 2021-03-29

## 2021-03-29 ENCOUNTER — TRANSITIONAL CARE MANAGEMENT (OUTPATIENT)
Dept: FAMILY MEDICINE CLINIC | Facility: CLINIC | Age: 56
End: 2021-03-29

## 2021-03-29 DIAGNOSIS — C50.212 MALIGNANT NEOPLASM OF UPPER-INNER QUADRANT OF LEFT BREAST IN FEMALE, ESTROGEN RECEPTOR NEGATIVE (HCC): ICD-10-CM

## 2021-03-29 DIAGNOSIS — Z17.1 MALIGNANT NEOPLASM OF UPPER-INNER QUADRANT OF LEFT BREAST IN FEMALE, ESTROGEN RECEPTOR NEGATIVE (HCC): ICD-10-CM

## 2021-03-29 RX ORDER — OXYCODONE HYDROCHLORIDE 10 MG/1
10-15 TABLET ORAL EVERY 4 HOURS PRN
Qty: 120 TABLET | Refills: 0 | Status: SHIPPED | OUTPATIENT
Start: 2021-03-29 | End: 2021-04-19 | Stop reason: SDUPTHER

## 2021-03-29 NOTE — TELEPHONE ENCOUNTER
Taina Carrion as per Dr Mariajose Pino agreement please can you place patient on his schedule for Wednesday at 330 pm    Called dr Conrado Phillips office they are aware and will contact patient

## 2021-03-29 NOTE — TELEPHONE ENCOUNTER
Spoke to staff at Dr Doc Krishna, office, and they confirmed that they will schedule patient for an appointment this Wednesday at 3:30 PM  They asked if someone at our office could call patient to confirm this appointment

## 2021-03-29 NOTE — TELEPHONE ENCOUNTER
Patients insurance will not cover 180 Oxycodone HCi tablets for 30 days, however they will cover 120 tablets  Would you please send over a script for 120 pills  I did speak with patient and she is ok with this    Please call to advise

## 2021-03-29 NOTE — TELEPHONE ENCOUNTER
Dr Wren Half am I ok to overbook this patient on Wednesday afternoon for you here at [facility]  Her insurance is only PAR with [place]    Please let me know    Thank you    Skyler Monterroso

## 2021-03-31 ENCOUNTER — OFFICE VISIT (OUTPATIENT)
Dept: GASTROENTEROLOGY | Facility: AMBULARY SURGERY CENTER | Age: 56
End: 2021-03-31
Payer: COMMERCIAL

## 2021-03-31 VITALS — HEIGHT: 63 IN | BODY MASS INDEX: 20.73 KG/M2 | WEIGHT: 117 LBS | RESPIRATION RATE: 16 BRPM

## 2021-03-31 DIAGNOSIS — R10.31 CHRONIC RLQ PAIN: ICD-10-CM

## 2021-03-31 DIAGNOSIS — G89.29 CHRONIC RLQ PAIN: ICD-10-CM

## 2021-03-31 DIAGNOSIS — K52.9 COLITIS: Primary | ICD-10-CM

## 2021-03-31 PROCEDURE — 99213 OFFICE O/P EST LOW 20 MIN: CPT | Performed by: INTERNAL MEDICINE

## 2021-03-31 PROCEDURE — 1111F DSCHRG MED/CURRENT MED MERGE: CPT | Performed by: INTERNAL MEDICINE

## 2021-03-31 RX ORDER — DICYCLOMINE HYDROCHLORIDE 10 MG/1
10 CAPSULE ORAL 2 TIMES DAILY
Qty: 60 CAPSULE | Refills: 3 | Status: SHIPPED | OUTPATIENT
Start: 2021-03-31 | End: 2021-08-26

## 2021-03-31 RX ORDER — SODIUM, POTASSIUM,MAG SULFATES 17.5-3.13G
1 SOLUTION, RECONSTITUTED, ORAL ORAL ONCE
Qty: 1 BOTTLE | Refills: 0 | Status: SHIPPED | OUTPATIENT
Start: 2021-03-31 | End: 2021-05-27 | Stop reason: ALTCHOICE

## 2021-03-31 NOTE — PROGRESS NOTES
[facility]  Abdi Floyd Tamica 60 y o  female MRN: [id number]            Assessment & Plan:     61-year-old female with a history of breast cancer, on chemotherapy, seen recently by GI service for dysphagia, with chronic intermittent right lower quadrant pain, intermittent constipation, recent abnormal CT scan  1  Right lower quadrant pain:  Suspect underlying irritable bowel syndrome   - recommended daily fiber supplement, if she is not moving her bowels more regularly recommended adding MiraLax as well   -trial of dicyclomine  -we will proceed with colonoscopy to exclude any significant luminal pathology   - discussed with her risks of procedure including bleeding, surgery, perforation    2  Dysphagia: Secondary to Schatzki's ring, much improved after last dilation, treat expectantly if recurrent symptoms              _____________________________________________________________        CC: Follow-up    HPI:  Jorge Conti is a 60 y  o female who is here for  Follow-up  This is a 61-year-old female, history of breast cancer, on chemotherapy, history of lupus, we had seen her in the hospital when she presented with complaints of dysphagia, upper endoscopy was noted for a Schatzki's ring which was dilated, recent had a repeat upper endoscopy  Patient also had several ER visits in the several weeks for right lower quadrant abdominal pain, there is also some irregular stools, suggestive of colitis somewhat CT scan she was treated with Levaquin and Flagyl  Continues to have chronic intermittent right lower quadrant pain  Reports having fairly irregular stools, which she describes hard small stools  Denies any nausea, vomiting  Still has intermittent dysphagia to pills and extreme temperature liquids but otherwise is swallowing better  Her appetite has been good  Her weight has been stable        Patient's last colonoscopy was 5 years ago she suspects she may have had a polyp at that time       ROS:  The remainder of the ROS was negative except for the pertinent positives mentioned in HPI  Allergies: Mobic [meloxicam], Methotrexate, and Sulfa antibiotics    Medications:   Current Outpatient Medications:     acetaminophen (TYLENOL) 325 mg tablet, Take 2 tablets (650 mg total) by mouth every 6 (six) hours as needed for mild pain, headaches or fever, Disp: 30 tablet, Rfl: 0    al mag oxide-diphenhydramine-lidocaine viscous (MAGIC MOUTHWASH) 1:1:1 suspension, Swish and swallow 10 mL every 6 (six) hours as needed for mouth pain or discomfort or mucositis, Disp: 1 Bottle, Rfl: 2    ALPRAZolam (XANAX) 1 mg tablet, Take 1 tablet (1 mg total) by mouth 3 (three) times a day as needed for anxiety, Disp: 30 tablet, Rfl: 3    cyclobenzaprine (FLEXERIL) 5 mg tablet, Take 10 mg by mouth daily at bedtime as needed, Disp: , Rfl:     hydroxychloroquine (PLAQUENIL) 200 mg tablet, Take 1 tablet in morning and 1/2 tablet in evening, Disp: , Rfl:     lactobacillus acidophilus-bulgaricus (FLORANEX) packet, Take 1 packet by mouth 3 (three) times a day with meals, Disp: 30 packet, Rfl: 0    levothyroxine 88 mcg tablet, TAKE ONE TABLET BY MOUTH EVERY DAY, Disp: 30 tablet, Rfl: 5    magnesium oxide (MAG-OX) 400 mg, Take 400 mg by mouth 2 (two) times a day, Disp: , Rfl:     ondansetron (ZOFRAN) 4 mg tablet, Take 1 tablet (4 mg total) by mouth every 8 (eight) hours as needed for nausea or vomiting, Disp: 30 tablet, Rfl: 3    oxyCODONE (ROXICODONE) 10 MG TABS, Take 1-1 5 tablets (10-15 mg total) by mouth every 4 (four) hours as needed (cancer pain    Max of 8 tabs / day ) NO FURTHER FILLS WITHOUT VISIT TO PALLIATIVE CLINIC, Disp: 120 tablet, Rfl: 0    pantoprazole (PROTONIX) 40 mg tablet, Take 1 tablet (40 mg total) by mouth daily, Disp: 30 tablet, Rfl: 5    sertraline (ZOLOFT) 100 mg tablet, TAKE 1 & 1/2 TABLETS BY MOUTH ONCE DAILY (Patient taking differently: Take 150 mg by mouth daily BY MOUTH ONCE DAILY), Disp: 45 tablet, Rfl: 5    ALPRAZolam (XANAX) 1 mg tablet, Take 1 tablet (1 mg total) by mouth 3 (three) times a day as needed for anxiety (Patient not taking: Reported on 3/31/2021), Disp: 90 tablet, Rfl: 0    dicyclomine (BENTYL) 10 mg capsule, Take 1 capsule (10 mg total) by mouth 2 (two) times a day, Disp: 60 capsule, Rfl: 3    Na Sulfate-K Sulfate-Mg Sulf (Suprep Bowel Prep Kit) 17 5-3 13-1 6 GM/177ML SOLN, Take 1 Bottle by mouth once for 1 dose, Disp: 1 Bottle, Rfl: 0    Past Medical History:   Diagnosis Date    Disease of thyroid gland     Hypertension     [facility] [facility] [address]     Malignant neoplasm of upper-inner quadrant of left breast in female, estrogen receptor negative ([facility] [address] ) 2/25/2020    Nephrolithiasis     PTSD (post-traumatic stress disorder)        Past Surgical History:   Procedure Laterality Date    FEMUR FRACTURE SURGERY      FL GUIDED CENTRAL VENOUS ACCESS DEVICE INSERTION  3/17/2020    HYSTERECTOMY      LEFT OOPHORECTOMY      due to torsion     MAMMO STEREOTACTIC BREAST BIOPSY RIGHT (ALL INC) Right 2/12/2020    MASTECTOMY W/ SENTINEL NODE BIOPSY Left 8/18/2020    Procedure: BREAST MASTECTOMY WITH SENTINEL LYMPH NODE BIOPSY, LYMPHATIC MAPPING WITH BLUE DYE AND RADIOACTIVE DYE (INJECT AT 1430 BY DR WRIGHT IN THE OR);   Surgeon: Yonas Olguin MD;  [facility];  Service: Surgical Oncology    MRI BREAST BIOPSY LEFT (ALL INCLUSIVE) Left 3/20/2020    VT INSJ TUNNELED CTR VAD W/SUBQ PORT AGE 5 YR/> N/A 3/17/2020    Procedure: INSERTION VENOUS PORT ( PORT-A-CATH) IR;  Surgeon: Mario Koroma DO;  [facility];  Service: Interventional Radiology    US GUIDANCE BREAST BIOPSY LEFT EACH ADDITIONAL Left 2/12/2020    US GUIDED BREAST BIOPSY LEFT COMPLETE Left 2/12/2020    VAGINA SURGERY         Family History   Problem Relation Age of Onset    Diabetes Mother     Hypertension Mother     Nephrolithiasis Mother     Breast cancer Mother 79    Heart disease Father     Hypertension Father     Diabetes Brother     Nephrolithiasis Brother     Breast cancer Maternal Aunt     No Known Problems Maternal Grandmother     No Known Problems Maternal Grandfather     No Known Problems Paternal Grandmother     No Known Problems Paternal Grandfather         reports that she has been smoking cigarettes  She started smoking about 31 years ago  She has a 20 00 pack-year smoking history  She has never used smokeless tobacco  She reports previous alcohol use of about 21 0 standard drinks of alcohol per week  She reports that she does not use drugs        Physical Exam:    Resp 16   Ht 5' 3" (1 6 m)   Wt 53 1 kg (117 lb)   LMP  (LMP Unknown)   BMI 20 73 kg/m²     Gen: wn/wd, NAD  HEENT: anicteric, MMM, no cervical LAD  CVS: RRR, no m/r/g  CHEST: CTA b/l  ABD: +BS, soft,   Reproducible right lower quadrant tenderness on examination, no rebound or guarding, no palpable masses, no hepatosplenomegaly  EXT: no c/c/e  NEURO: aaox3  SKIN: NO rashes

## 2021-03-31 NOTE — LETTER
March 31, 7621     [address] [place] [address]    Patient: Danyelle Crespo   YOB: 1965   Date of Visit: 3/31/2021       Dear Dr Mirella Laws: Thank you for referring Rob Peoples to me for evaluation  Below are my notes for this consultation  If you have questions, please do not hesitate to call me  I look forward to following your patient along with you  Sincerely,        Arlette Coleman MD        CC: No Recipients  Arlette Coleman MD  3/31/2021  4:51 PM  Sign when Signing Visit  [facility]  Jeffjessi Davey 60 y o  female MRN: [id number]            Assessment & Plan:     55-year-old female with a history of breast cancer, on chemotherapy, seen recently by GI service for dysphagia, with chronic intermittent right lower quadrant pain, intermittent constipation, recent abnormal CT scan  1  Right lower quadrant pain:  Suspect underlying irritable bowel syndrome   - recommended daily fiber supplement, if she is not moving her bowels more regularly recommended adding MiraLax as well   -trial of dicyclomine  -we will proceed with colonoscopy to exclude any significant luminal pathology   - discussed with her risks of procedure including bleeding, surgery, perforation    2  Dysphagia: Secondary to Schatzki's ring, much improved after last dilation, treat expectantly if recurrent symptoms              _____________________________________________________________        CC: Follow-up    HPI:  Danyelle Crespo is a 60 y  o female who is here for  Follow-up  This is a 55-year-old female, history of breast cancer, on chemotherapy, history of lupus, we had seen her in the hospital when she presented with complaints of dysphagia, upper endoscopy was noted for a Schatzki's ring which was dilated, recent had a repeat upper endoscopy    Patient also had several ER visits in the several weeks for right lower quadrant abdominal pain, there is also some irregular stools, suggestive of colitis somewhat CT scan she was treated with Levaquin and Flagyl  Continues to have chronic intermittent right lower quadrant pain  Reports having fairly irregular stools, which she describes hard small stools  Denies any nausea, vomiting  Still has intermittent dysphagia to pills and extreme temperature liquids but otherwise is swallowing better  Her appetite has been good  Her weight has been stable  Patient's last colonoscopy was 5 years ago she suspects she may have had a polyp at that time  ROS:  The remainder of the ROS was negative except for the pertinent positives mentioned in HPI        Allergies: Mobic [meloxicam], Methotrexate, and Sulfa antibiotics    Medications:   Current Outpatient Medications:     acetaminophen (TYLENOL) 325 mg tablet, Take 2 tablets (650 mg total) by mouth every 6 (six) hours as needed for mild pain, headaches or fever, Disp: 30 tablet, Rfl: 0    al mag oxide-diphenhydramine-lidocaine viscous (MAGIC MOUTHWASH) 1:1:1 suspension, Swish and swallow 10 mL every 6 (six) hours as needed for mouth pain or discomfort or mucositis, Disp: 1 Bottle, Rfl: 2    ALPRAZolam (XANAX) 1 mg tablet, Take 1 tablet (1 mg total) by mouth 3 (three) times a day as needed for anxiety, Disp: 30 tablet, Rfl: 3    cyclobenzaprine (FLEXERIL) 5 mg tablet, Take 10 mg by mouth daily at bedtime as needed, Disp: , Rfl:     hydroxychloroquine (PLAQUENIL) 200 mg tablet, Take 1 tablet in morning and 1/2 tablet in evening, Disp: , Rfl:     lactobacillus acidophilus-bulgaricus (FLORANEX) packet, Take 1 packet by mouth 3 (three) times a day with meals, Disp: 30 packet, Rfl: 0    levothyroxine 88 mcg tablet, TAKE ONE TABLET BY MOUTH EVERY DAY, Disp: 30 tablet, Rfl: 5    magnesium oxide (MAG-OX) 400 mg, Take 400 mg by mouth 2 (two) times a day, Disp: , Rfl:     ondansetron (ZOFRAN) 4 mg tablet, Take 1 tablet (4 mg total) by mouth every 8 (eight) hours as needed for nausea or vomiting, Disp: 30 tablet, Rfl: 3    oxyCODONE (ROXICODONE) 10 MG TABS, Take 1-1 5 tablets (10-15 mg total) by mouth every 4 (four) hours as needed (cancer pain  Max of 8 tabs / day ) NO FURTHER FILLS WITHOUT VISIT TO PALLIATIVE CLINIC, Disp: 120 tablet, Rfl: 0    pantoprazole (PROTONIX) 40 mg tablet, Take 1 tablet (40 mg total) by mouth daily, Disp: 30 tablet, Rfl: 5    sertraline (ZOLOFT) 100 mg tablet, TAKE 1 & 1/2 TABLETS BY MOUTH ONCE DAILY (Patient taking differently: Take 150 mg by mouth daily BY MOUTH ONCE DAILY), Disp: 45 tablet, Rfl: 5    ALPRAZolam (XANAX) 1 mg tablet, Take 1 tablet (1 mg total) by mouth 3 (three) times a day as needed for anxiety (Patient not taking: Reported on 3/31/2021), Disp: 90 tablet, Rfl: 0    dicyclomine (BENTYL) 10 mg capsule, Take 1 capsule (10 mg total) by mouth 2 (two) times a day, Disp: 60 capsule, Rfl: 3    Na Sulfate-K Sulfate-Mg Sulf (Suprep Bowel Prep Kit) 17 5-3 13-1 6 GM/177ML SOLN, Take 1 Bottle by mouth once for 1 dose, Disp: 1 Bottle, Rfl: 0    Past Medical History:   Diagnosis Date    Disease of thyroid gland     Hypertension     Lup[facility] ([facility] [address] )     Malignant neoplasm of upper-inner quadrant of left breast in female, estrogen receptor negative ([facility] [address] ) 2/25/2020    Nephrolithiasis     PTSD (post-traumatic stress disorder)        Past Surgical History:   Procedure Laterality Date    FEMUR FRACTURE SURGERY      FL GUIDED CENTRAL VENOUS ACCESS DEVICE INSERTION  3/17/2020    HYSTERECTOMY      LEFT OOPHORECTOMY      due to torsion     MAMMO STEREOTACTIC BREAST BIOPSY RIGHT (ALL INC) Right 2/12/2020    MASTECTOMY W/ SENTINEL NODE BIOPSY Left 8/18/2020    Procedure: BREAST MASTECTOMY WITH SENTINEL LYMPH NODE BIOPSY, LYMPHATIC MAPPING WITH BLUE DYE AND RADIOACTIVE DYE (INJECT AT 1430 BY DR WRIGHT IN THE OR);   Surgeon: Lilly Terrell MD;  [facility];  Service: Surgical Oncology    MRI BREAST BIOPSY LEFT (ALL INCLUSIVE) Left 3/20/2020    SD INSJ TUNNELED CTR VAD W/SUBQ PORT AGE 5 YR/> N/A 3/17/2020    Procedure: INSERTION VENOUS PORT ( PORT-A-CATH) IR;  Surgeon: Boni Espinoza DO;  [facility];  Service: Interventional Radiology    US GUIDANCE BREAST BIOPSY LEFT EACH ADDITIONAL Left 2/12/2020    US GUIDED BREAST BIOPSY LEFT COMPLETE Left 2/12/2020    VAGINA SURGERY         Family History   Problem Relation Age of Onset    Diabetes Mother     Hypertension Mother     Nephrolithiasis Mother     Breast cancer Mother 79    Heart disease Father     Hypertension Father     Diabetes Brother     Nephrolithiasis Brother     Breast cancer Maternal Aunt     No Known Problems Maternal Grandmother     No Known Problems Maternal Grandfather     No Known Problems Paternal Grandmother     No Known Problems Paternal Grandfather         reports that she has been smoking cigarettes  She started smoking about 31 years ago  She has a 20 00 pack-year smoking history  She has never used smokeless tobacco  She reports previous alcohol use of about 21 0 standard drinks of alcohol per week  She reports that she does not use drugs        Physical Exam:    Resp 16   Ht 5' 3" (1 6 m)   Wt 53 1 kg (117 lb)   LMP  (LMP Unknown)   BMI 20 73 kg/m²     Gen: wn/wd, NAD  HEENT: anicteric, MMM, no cervical LAD  CVS: RRR, no m/r/g  CHEST: CTA b/l  ABD: +BS, soft,   Reproducible right lower quadrant tenderness on examination, no rebound or guarding, no palpable masses, no hepatosplenomegaly  EXT: no c/c/e  NEURO: aaox3  SKIN: NO rashes

## 2021-03-31 NOTE — PATIENT INSTRUCTIONS
Take metamucil 1 tablespoon daily  Take bentyl 1-2 times daily as needed, okay to take regularly   If metamucil is not helping, also take 1 capful of miralax daily

## 2021-04-01 ENCOUNTER — OFFICE VISIT (OUTPATIENT)
Dept: FAMILY MEDICINE CLINIC | Facility: CLINIC | Age: 56
End: 2021-04-01
Payer: MEDICARE

## 2021-04-01 VITALS
RESPIRATION RATE: 16 BRPM | TEMPERATURE: 97.6 F | SYSTOLIC BLOOD PRESSURE: 114 MMHG | HEART RATE: 63 BPM | OXYGEN SATURATION: 96 % | BODY MASS INDEX: 20.94 KG/M2 | WEIGHT: 118.2 LBS | DIASTOLIC BLOOD PRESSURE: 60 MMHG | HEIGHT: 63 IN

## 2021-04-01 DIAGNOSIS — Z17.1 MALIGNANT NEOPLASM OF UPPER-INNER QUADRANT OF LEFT BREAST IN FEMALE, ESTROGEN RECEPTOR NEGATIVE (HCC): ICD-10-CM

## 2021-04-01 DIAGNOSIS — K29.00 ACUTE SUPERFICIAL GASTRITIS WITHOUT HEMORRHAGE: Primary | ICD-10-CM

## 2021-04-01 DIAGNOSIS — F32.5 MAJOR DEPRESSIVE DISORDER IN FULL REMISSION, UNSPECIFIED WHETHER RECURRENT (HCC): ICD-10-CM

## 2021-04-01 DIAGNOSIS — C50.212 MALIGNANT NEOPLASM OF UPPER-INNER QUADRANT OF LEFT BREAST IN FEMALE, ESTROGEN RECEPTOR NEGATIVE (HCC): ICD-10-CM

## 2021-04-01 DIAGNOSIS — M32.9 SYSTEMIC LUPUS ERYTHEMATOSUS, UNSPECIFIED SLE TYPE, UNSPECIFIED ORGAN INVOLVEMENT STATUS (HCC): ICD-10-CM

## 2021-04-01 PROCEDURE — 3008F BODY MASS INDEX DOCD: CPT | Performed by: FAMILY MEDICINE

## 2021-04-01 PROCEDURE — 1111F DSCHRG MED/CURRENT MED MERGE: CPT | Performed by: FAMILY MEDICINE

## 2021-04-01 PROCEDURE — 99495 TRANSJ CARE MGMT MOD F2F 14D: CPT | Performed by: FAMILY MEDICINE

## 2021-04-01 RX ORDER — PREDNISONE 1 MG/1
TABLET ORAL
Qty: 60 TABLET | Refills: 1 | Status: SHIPPED | OUTPATIENT
Start: 2021-04-01 | End: 2021-06-21 | Stop reason: SDUPTHER

## 2021-04-01 NOTE — PROGRESS NOTES
FAMILY PRACTICE OFFICE VISIT       NAME: Rhoda Davey  AGE: 60 y o  SEX: female       : 1965        MRN: [id number]    DATE: 2021  TIME: 6:53 AM    Assessment and Plan     Problem List Items Addressed This Visit        Digestive    Gastritis - Primary      Gastritis  Patient will continue to use  Metamucil and MiraLax as needed for prevent in constipation  She may also use dicyclomine as ordered by her gastroenterologist   She will follow-up with GI for colonoscopy  Other    Systemic lupus erythematosus (HCC)    Relevant Medications    predniSONE 5 mg tablet    Depression       Depression and  PTSD  Patient continues to  Speak with her counselor every 2 weeks for ongoing therapy as well as using her Xanax as needed  She will continue with Zoloft as ordered         Malignant neoplasm of upper-inner quadrant of left breast in female, estrogen receptor negative ([facility] [address] )      Breast cancer  Patient had to hold her usual maintenance chemotherapy which she received every 3 weeks due to her nausea vomiting and dehydration  She will be contacting her oncologist to decide when to restart treatments  TCM Call (since 3/2/2021)     Date and time call was made  3/29/2021  9:09 AM    Hospital care reviewed  Records reviewed    Patient was hospitialized at  [address] [place] [address]        Date of Admission  21    Date of discharge  21    Diagnosis   N/V/D, Dehydration, Hypokalemia    Disposition  Home    Were the patients medications reviewed and updated  No    Current Symptoms  None    Fatigue severity  Moderate      TCM Call (since 3/2/2021)     Post hospital issues  Reduced activity    Should patient be enrolled in anticoag monitoring? No    Scheduled for follow up?   Yes    Patients specialists  Cardiologist; Other (comment)    Cardiologist name  [facility]) Cardiology- 21    Other specialists names  [facility]) Hematology- 21    Referrals needed  No    Did you obtain your prescribed medications  No    Why were you unable to obtain your medications  As per pt, no new meds were prescribed    Do you need help managing your prescriptions or medications  No    Is transportation to your appointment needed  No    Specify why  Pt's  transports pt to apts  I have advised the patient to call PCP with any new or worsening symptoms  [facility] [address] or [facility] other    Support System  Spouse    Are you recieving any outpatient services  Yes    What type of services  Oncology Infusions    Are you recieving home care services  No    Interperter language line needed  No    Counseling  Patient    Counseling topics  instructions for management; Importance of RX compliance    Comments  Apt scheduled for 04/01/21 at 2pm              Chief Complaint     Chief Complaint   Patient presents with    Transition of Care Management       History of Present Illness      I reviewed the patient's most recent discharge summary from her hospitalization  He was admitted for intractable nausea and vomiting and dehydration  She was also seen by gastroenterologist who felt she may have abdominal pain related to irritable bowel syndrome  She has also felt some constipation perhaps due to her chronic use of pain medicine  She believes some of her symptoms may be related to not being able to get insurance to pay for her usual dosing of pain medicine  She continues to have ongoing monitoring and treatments for her breast cancer by her oncologist   Patient continues to speak with her  Psychology counselor every 2 weeks  For her ongoing anxiety and posttraumatic stress disorder      Review of Systems   Review of Systems   Constitutional: Positive for fatigue  Negative for fever  HENT: Negative  Respiratory: Negative  Cardiovascular: Negative  Gastrointestinal: Positive for abdominal pain  Negative for abdominal distention, constipation, diarrhea, nausea and vomiting  Genitourinary: Negative  Musculoskeletal: Negative  Neurological: Negative  Psychiatric/Behavioral: Positive for decreased concentration, dysphoric mood and sleep disturbance  The patient is nervous/anxious  Active Problem List     Patient Active Problem List   Diagnosis    Systemic lupus erythematosus (HCC)    Hypothyroidism    Posttraumatic stress disorder    Adult celiac disease    Anxiety    Depression    Esophagitis    Nephrolithiasis    Vitamin D deficiency    Malignant neoplasm of upper-inner quadrant of left breast in female, estrogen receptor negative ([facility] [address] )    Chemotherapy induced neutropenia ([facility] [address] )    Port-A-Cath in place    Breast cancer (HCC)    Nausea and vomiting    SIRS (systemic inflammatory response syndrome) (HCC)    JEFERSON (acute kidney injury) ([address] )    Hyponatremia    Anemia    Colitis    Cancer related pain    Continuous opioid dependence (HCC)    Smoking    Gastritis    Candida esophagitis (HCC)    Severe protein-calorie malnutrition (HCC)    Hypomagnesemia    Chronic RLQ pain       Past Medical History:  Past Medical History:   Diagnosis Date    Disease of thyroid gland     Hypertension     Lupus ([facility] [address] )     Malignant neoplasm of upper-inner quadrant of left breast in female, estrogen receptor negative ([facility] [address] ) 2/25/2020    Nephrolithiasis     PTSD (post-traumatic stress disorder)        Past Surgical History:  Past Surgical History:   Procedure Laterality Date    FEMUR FRACTURE SURGERY      FL GUIDED CENTRAL VENOUS ACCESS DEVICE INSERTION  3/17/2020    HYSTERECTOMY      LEFT OOPHORECTOMY      due to torsion     MAMMO STEREOTACTIC BREAST BIOPSY RIGHT (ALL INC) Right 2/12/2020    MASTECTOMY W/ SENTINEL NODE BIOPSY Left 8/18/2020    Procedure: BREAST MASTECTOMY WITH SENTINEL LYMPH NODE BIOPSY, LYMPHATIC MAPPING WITH BLUE DYE AND RADIOACTIVE DYE (INJECT AT 1430 BY DR WRIGHT IN THE OR);   Surgeon: Fidelia Marques MD;  Location: [facility];  Service: Surgical Oncology    MRI BREAST BIOPSY LEFT (ALL INCLUSIVE) Left 3/20/2020    MN INSJ TUNNELED CTR VAD W/SUBQ PORT AGE 5 YR/> N/A 3/17/2020    Procedure: INSERTION VENOUS PORT ( PORT-A-CATH) IR;  Surgeon: Bean Carpio DO;  [facility];  Service: Interventional Radiology    US GUIDANCE BREAST BIOPSY LEFT EACH ADDITIONAL Left 2/12/2020    US GUIDED BREAST BIOPSY LEFT COMPLETE Left 2/12/2020    VAGINA SURGERY         Family History:  Family History   Problem Relation Age of Onset    Diabetes Mother     Hypertension Mother     Nephrolithiasis Mother     Breast cancer Mother 79    Heart disease Father     Hypertension Father     Diabetes Brother     Nephrolithiasis Brother     Breast cancer Maternal Aunt     No Known Problems Maternal Grandmother     No Known Problems Maternal Grandfather     No Known Problems Paternal Grandmother     No Known Problems Paternal Grandfather        Social History:  Social History     Socioeconomic History    Marital status: /Civil Union     Spouse name: Not on file    Number of children: Not on file    Years of education: Not on file    Highest education level: Not on file   Occupational History    Not on file   Social Needs    Financial resource strain: Not on file    Food insecurity     Worry: Not on file     Inability: Not on file    Transportation needs     Medical: Not on file     Non-medical: Not on file   Tobacco Use    Smoking status: Current Every Day Smoker     Packs/day: 0 50     Years: 40 00     Pack years: 20 00     Types: Cigarettes     Start date: 1990    Smokeless tobacco: Never Used   Substance and Sexual Activity    Alcohol use: Not Currently     Alcohol/week: 21 0 standard drinks     Types: 21 Cans of beer per week     Frequency: Never     Drinks per session: Patient refused     Binge frequency: Never     Comment: pt states she had her last beer 3/22/2020    Drug use: No    Sexual activity: Not Currently     Partners: Male     Birth control/protection: None   Lifestyle    Physical activity     Days per week: Not on file     Minutes per session: Not on file    Stress: Not on file   Relationships    Social connections     Talks on phone: Not on file     Gets together: Not on file     Attends [employer]ism service: Not on file     Active member of club or organization: Not on file     Attends meetings of clubs or organizations: Not on file     Relationship status: Not on file    Intimate partner violence     Fear of current or ex partner: Not on file     Emotionally abused: Not on file     Physically abused: Not on file     Forced sexual activity: Not on file   Other Topics Concern    Not on file   Social History Narrative    Not on file       Objective     Vitals:    04/01/21 1400   BP: 114/60   Pulse: 63   Resp: 16   Temp: 97 6 °F (36 4 °C)   SpO2: 96%     Wt Readings from Last 3 Encounters:   04/01/21 53 6 kg (118 lb 3 2 oz)   03/31/21 53 1 kg (117 lb)   03/25/21 45 8 kg (101 lb)       Physical Exam  Constitutional:       General: She is not in acute distress  Appearance: Normal appearance  She is not ill-appearing  HENT:      Head: Normocephalic and atraumatic  Cardiovascular:      Heart sounds: Normal heart sounds  Comments: Regular rate and rhythm with no murmurs  Pulmonary:      Effort: Pulmonary effort is normal       Comments: Lungs are clear to auscultation without wheezes,rales, or rhonchi  Abdominal:      Comments: Patient's abdomen was soft with positive bowel sounds  She had not much less tenderness than previous office visit  No hepatomegaly  No splenomegaly  But no rebound or guarding   Musculoskeletal:      Right lower leg: No edema  Left lower leg: No edema  Neurological:      General: No focal deficit present  Mental Status: She is alert and oriented to person, place, and time  Mental status is at baseline     Psychiatric:         Mood and Affect: Mood normal          Behavior: Behavior normal          Thought Content:  Thought content normal          Judgment: Judgment normal          Pertinent Laboratory/Diagnostic Studies:  Lab Results   Component Value Date    GLUCOSE 97 04/22/2015    BUN 2 (L) 03/27/2021    CREATININE 0 60 03/27/2021    CALCIUM 7 8 (L) 03/27/2021     (L) 04/22/2015    K 3 5 03/27/2021    CO2 22 03/27/2021     (H) 03/27/2021     Lab Results   Component Value Date    ALT 11 (L) 03/25/2021    AST 12 03/25/2021    ALKPHOS 76 03/25/2021    BILITOT 0 2 04/22/2015       Lab Results   Component Value Date    WBC 8 78 03/27/2021    HGB 10 0 (L) 03/27/2021    HCT 32 2 (L) 03/27/2021    MCV 91 03/27/2021     03/27/2021       No results found for: TSH    No results found for: CHOL  No results found for: TRIG  No results found for: HDL  No results found for: LDLCALC  No results found for: HGBA1C    Results for orders placed or performed during the hospital encounter of 03/25/21   COVID19, Influenza A/B, RSV PCR, [facility]    Specimen: Nose; Nares   Result Value Ref Range    SARS-CoV-2 Negative Negative    INFLUENZA A PCR Negative Negative    INFLUENZA B PCR Negative Negative    RSV PCR Negative Negative   Clostridium difficile toxin by PCR with EIA    Specimen: Rectum; Stool   Result Value Ref Range     C difficile toxin by PCR  Negative Negative   CBC and differential   Result Value Ref Range    WBC 11 22 (H) 4 31 - 10 16 Thousand/uL    RBC 4 23 3 81 - 5 12 Million/uL    Hemoglobin 12 1 11 5 - 15 4 g/dL    Hematocrit 37 3 34 8 - 46 1 %    MCV 88 82 - 98 fL    MCH 28 6 26 8 - 34 3 pg    MCHC 32 4 31 4 - 37 4 g/dL    RDW 16 9 (H) 11 6 - 15 1 %    MPV 11 4 8 9 - 12 7 fL    Platelets 274 173 - 838 Thousands/uL    nRBC 0 /100 WBCs    Neutrophils Relative 86 (H) 43 - 75 %    Immat GRANS % 0 0 - 2 %    Lymphocytes Relative 8 (L) 14 - 44 %    Monocytes Relative 6 4 - 12 %    Eosinophils Relative 0 0 - 6 %    Basophils Relative 0 0 - 1 %    Neutrophils Absolute 9 62 (H) 1 85 - 7 62 Thousands/µL    Immature Grans Absolute 0 05 0 00 - 0 20 Thousand/uL    Lymphocytes Absolute 0 84 0 60 - 4 47 Thousands/µL    Monocytes Absolute 0 66 0 17 - 1 22 Thousand/µL    Eosinophils Absolute 0 00 0 00 - 0 61 Thousand/µL    Basophils Absolute 0 05 0 00 - 0 10 Thousands/µL   Comprehensive metabolic panel   Result Value Ref Range    Sodium 142 136 - 145 mmol/L    Potassium 3 1 (L) 3 5 - 5 3 mmol/L    Chloride 108 100 - 108 mmol/L    CO2 19 (L) 21 - 32 mmol/L    ANION GAP 15 (H) 4 - 13 mmol/L    BUN 8 5 - 25 mg/dL    Creatinine 0 94 0 60 - 1 30 mg/dL    Glucose 122 65 - 140 mg/dL    Calcium 9 3 8 3 - 10 1 mg/dL    AST 12 5 - 45 U/L    ALT 11 (L) 12 - 78 U/L    Alkaline Phosphatase 76 46 - 116 U/L    Total Protein 6 9 6 4 - 8 2 g/dL    Albumin 3 6 3 5 - 5 0 g/dL    Total Bilirubin 0 29 0 20 - 1 00 mg/dL    eGFR 68 ml/min/1 73sq m   Lactic acid   Result Value Ref Range    LACTIC ACID 2 2 (HH) 0 5 - 2 0 mmol/L   Lipase   Result Value Ref Range    Lipase 120 73 - 393 u/L   Magnesium   Result Value Ref Range    Magnesium 1 8 1 6 - 2 6 mg/dL   Lactic acid 2 Hours   Result Value Ref Range    LACTIC ACID 1 3 0 5 - 2 0 mmol/L   Basic metabolic panel   Result Value Ref Range    Sodium 143 136 - 145 mmol/L    Potassium 3 1 (L) 3 5 - 5 3 mmol/L    Chloride 111 (H) 100 - 108 mmol/L    CO2 23 21 - 32 mmol/L    ANION GAP 9 4 - 13 mmol/L    BUN 6 5 - 25 mg/dL    Creatinine 0 72 0 60 - 1 30 mg/dL    Glucose 96 65 - 140 mg/dL    Calcium 7 9 (L) 8 3 - 10 1 mg/dL    eGFR 93 ml/min/1 73sq m   CBC and differential   Result Value Ref Range    WBC 8 77 4 31 - 10 16 Thousand/uL    RBC 3 49 (L) 3 81 - 5 12 Million/uL    Hemoglobin 9 9 (L) 11 5 - 15 4 g/dL    Hematocrit 31 6 (L) 34 8 - 46 1 %    MCV 91 82 - 98 fL    MCH 28 4 26 8 - 34 3 pg    MCHC 31 3 (L) 31 4 - 37 4 g/dL    RDW 17 2 (H) 11 6 - 15 1 %    MPV 10 6 8 9 - 12 7 fL    Platelets 930 685 - 467 Thousands/uL    nRBC 0 /100 WBCs    Neutrophils Relative 64 43 - 75 %    Immat GRANS % 0 0 - 2 %    Lymphocytes Relative 28 14 - 44 %    Monocytes Relative 8 4 - 12 %    Eosinophils Relative 0 0 - 6 %    Basophils Relative 0 0 - 1 %    Neutrophils Absolute 5 59 1 85 - 7 62 Thousands/µL    Immature Grans Absolute 0 02 0 00 - 0 20 Thousand/uL    Lymphocytes Absolute 2 41 0 60 - 4 47 Thousands/µL    Monocytes Absolute 0 69 0 17 - 1 22 Thousand/µL    Eosinophils Absolute 0 03 0 00 - 0 61 Thousand/µL    Basophils Absolute 0 03 0 00 - 0 10 Thousands/µL   Magnesium   Result Value Ref Range    Magnesium 1 8 1 6 - 2 6 mg/dL   Basic metabolic panel   Result Value Ref Range    Sodium 144 136 - 145 mmol/L    Potassium 3 5 3 5 - 5 3 mmol/L    Chloride 114 (H) 100 - 108 mmol/L    CO2 22 21 - 32 mmol/L    ANION GAP 8 4 - 13 mmol/L    BUN 2 (L) 5 - 25 mg/dL    Creatinine 0 60 0 60 - 1 30 mg/dL    Glucose 86 65 - 140 mg/dL    Calcium 7 8 (L) 8 3 - 10 1 mg/dL    eGFR 102 ml/min/1 73sq m   CBC and differential   Result Value Ref Range    WBC 8 78 4 31 - 10 16 Thousand/uL    RBC 3 53 (L) 3 81 - 5 12 Million/uL    Hemoglobin 10 0 (L) 11 5 - 15 4 g/dL    Hematocrit 32 2 (L) 34 8 - 46 1 %    MCV 91 82 - 98 fL    MCH 28 3 26 8 - 34 3 pg    MCHC 31 1 (L) 31 4 - 37 4 g/dL    RDW 17 3 (H) 11 6 - 15 1 %    MPV 10 4 8 9 - 12 7 fL    Platelets 950 577 - 754 Thousands/uL    nRBC 0 /100 WBCs    Neutrophils Relative 82 (H) 43 - 75 %    Immat GRANS % 0 0 - 2 %    Lymphocytes Relative 10 (L) 14 - 44 %    Monocytes Relative 6 4 - 12 %    Eosinophils Relative 1 0 - 6 %    Basophils Relative 1 0 - 1 %    Neutrophils Absolute 7 15 1 85 - 7 62 Thousands/µL    Immature Grans Absolute 0 03 0 00 - 0 20 Thousand/uL    Lymphocytes Absolute 0 90 0 60 - 4 47 Thousands/µL    Monocytes Absolute 0 53 0 17 - 1 22 Thousand/µL    Eosinophils Absolute 0 11 0 00 - 0 61 Thousand/µL    Basophils Absolute 0 06 0 00 - 0 10 Thousands/µL       No orders of the defined types were placed in this encounter        ALLERGIES:  Allergies   Allergen Reactions  Mobic [Meloxicam] Eye Swelling     Reaction Date: 12Aug2011;     Methotrexate Rash    Sulfa Antibiotics Rash       Current Medications     Current Outpatient Medications   Medication Sig Dispense Refill    acetaminophen (TYLENOL) 325 mg tablet Take 2 tablets (650 mg total) by mouth every 6 (six) hours as needed for mild pain, headaches or fever 30 tablet 0    al mag oxide-diphenhydramine-lidocaine viscous (MAGIC MOUTHWASH) 1:1:1 suspension Swish and swallow 10 mL every 6 (six) hours as needed for mouth pain or discomfort or mucositis 1 Bottle 2    ALPRAZolam (XANAX) 1 mg tablet Take 1 tablet (1 mg total) by mouth 3 (three) times a day as needed for anxiety 30 tablet 3    cyclobenzaprine (FLEXERIL) 5 mg tablet Take 10 mg by mouth daily at bedtime as needed      dicyclomine (BENTYL) 10 mg capsule Take 1 capsule (10 mg total) by mouth 2 (two) times a day 60 capsule 3    hydroxychloroquine (PLAQUENIL) 200 mg tablet Take 1 tablet in morning and 1/2 tablet in evening      lactobacillus acidophilus-bulgaricus (FLORANEX) packet Take 1 packet by mouth 3 (three) times a day with meals 30 packet 0    levothyroxine 88 mcg tablet TAKE ONE TABLET BY MOUTH EVERY DAY 30 tablet 5    magnesium oxide (MAG-OX) 400 mg Take 400 mg by mouth 2 (two) times a day      ondansetron (ZOFRAN) 4 mg tablet Take 1 tablet (4 mg total) by mouth every 8 (eight) hours as needed for nausea or vomiting 30 tablet 3    oxyCODONE (ROXICODONE) 10 MG TABS Take 1-1 5 tablets (10-15 mg total) by mouth every 4 (four) hours as needed (cancer pain    Max of 8 tabs / day ) NO FURTHER FILLS WITHOUT VISIT TO PALLIATIVE CLINIC 120 tablet 0    pantoprazole (PROTONIX) 40 mg tablet Take 1 tablet (40 mg total) by mouth daily 30 tablet 5    sertraline (ZOLOFT) 100 mg tablet TAKE 1 & 1/2 TABLETS BY MOUTH ONCE DAILY (Patient taking differently: Take 150 mg by mouth daily BY MOUTH ONCE DAILY) 45 tablet 5    ALPRAZolam (XANAX) 1 mg tablet Take 1 tablet (1 mg total) by mouth 3 (three) times a day as needed for anxiety (Patient not taking: Reported on 3/31/2021) 90 tablet 0    Na Sulfate-K Sulfate-Mg Sulf (Suprep Bowel Prep Kit) 17 5-3 13-1 6 GM/177ML SOLN Take 1 Bottle by mouth once for 1 dose 1 Bottle 0    predniSONE 5 mg tablet One po bid with food 60 tablet 1     No current facility-administered medications for this visit  Health Maintenance     Health Maintenance   Topic Date Due    Hepatitis C Screening  Never done    Pneumococcal Vaccine: Pediatrics (0 to 5 Years) and At-Risk Patients (6 to 59 Years) (1 of 1 - PPSV23) Never done    HIV Screening  Never done    COVID-19 Vaccine (1) Never done    Annual Physical  Never done    DTaP,Tdap,and Td Vaccines (1 - Tdap) Never done    Influenza Vaccine (1) 06/30/2021 (Originally 9/1/2020)    MAMMOGRAM  02/04/2022    Depression Remission PHQ  02/25/2022    BMI: Adult  04/01/2022    Colorectal Cancer Screening  10/09/2027    HIB Vaccine  Aged Out    Hepatitis B Vaccine  Aged Out    IPV Vaccine  Aged Out    Hepatitis A Vaccine  Aged Out    Meningococcal ACWY Vaccine  Aged Out    HPV Vaccine  Aged Out     There is no immunization history for the selected administration types on file for this patient      Kristin Lind MD

## 2021-04-02 DIAGNOSIS — D70.1 CHEMOTHERAPY INDUCED NEUTROPENIA (HCC): Primary | ICD-10-CM

## 2021-04-02 DIAGNOSIS — T45.1X5A CHEMOTHERAPY INDUCED NEUTROPENIA (HCC): Primary | ICD-10-CM

## 2021-04-02 DIAGNOSIS — C50.212 MALIGNANT NEOPLASM OF UPPER-INNER QUADRANT OF LEFT BREAST IN FEMALE, ESTROGEN RECEPTOR NEGATIVE (HCC): ICD-10-CM

## 2021-04-02 DIAGNOSIS — Z17.1 MALIGNANT NEOPLASM OF UPPER-INNER QUADRANT OF LEFT BREAST IN FEMALE, ESTROGEN RECEPTOR NEGATIVE (HCC): ICD-10-CM

## 2021-04-02 RX ORDER — SODIUM CHLORIDE 9 MG/ML
20 INJECTION, SOLUTION INTRAVENOUS ONCE
Status: CANCELLED | OUTPATIENT
Start: 2021-04-06

## 2021-04-02 NOTE — ASSESSMENT & PLAN NOTE
Breast cancer  Patient had to hold her usual maintenance chemotherapy which she received every 3 weeks due to her nausea vomiting and dehydration  She will be contacting her oncologist to decide when to restart treatments

## 2021-04-02 NOTE — ASSESSMENT & PLAN NOTE
Gastritis  Patient will continue to use  Metamucil and MiraLax as needed for prevent in constipation  She may also use dicyclomine as ordered by her gastroenterologist   She will follow-up with GI for colonoscopy

## 2021-04-02 NOTE — ASSESSMENT & PLAN NOTE
Depression and  PTSD  Patient continues to  Speak with her counselor every 2 weeks for ongoing therapy as well as using her Xanax as needed    She will continue with Zoloft as ordered

## 2021-04-06 ENCOUNTER — PATIENT OUTREACH (OUTPATIENT)
Dept: CASE MANAGEMENT | Facility: HOSPITAL | Age: 56
End: 2021-04-06

## 2021-04-06 ENCOUNTER — HOSPITAL ENCOUNTER (OUTPATIENT)
Dept: INFUSION CENTER | Facility: CLINIC | Age: 56
Discharge: HOME | End: 2021-04-06
Payer: MEDICARE

## 2021-04-06 VITALS
BODY MASS INDEX: 20.38 KG/M2 | RESPIRATION RATE: 18 BRPM | HEART RATE: 104 BPM | DIASTOLIC BLOOD PRESSURE: 72 MMHG | TEMPERATURE: 97.6 F | SYSTOLIC BLOOD PRESSURE: 136 MMHG | OXYGEN SATURATION: 98 % | WEIGHT: 115 LBS | HEIGHT: 63 IN

## 2021-04-06 DIAGNOSIS — D70.1 CHEMOTHERAPY INDUCED NEUTROPENIA (HCC): Primary | ICD-10-CM

## 2021-04-06 DIAGNOSIS — Z17.1 MALIGNANT NEOPLASM OF UPPER-INNER QUADRANT OF LEFT BREAST IN FEMALE, ESTROGEN RECEPTOR NEGATIVE (HCC): ICD-10-CM

## 2021-04-06 DIAGNOSIS — T45.1X5A CHEMOTHERAPY INDUCED NEUTROPENIA (HCC): Primary | ICD-10-CM

## 2021-04-06 DIAGNOSIS — C50.212 MALIGNANT NEOPLASM OF UPPER-INNER QUADRANT OF LEFT BREAST IN FEMALE, ESTROGEN RECEPTOR NEGATIVE (HCC): ICD-10-CM

## 2021-04-06 PROCEDURE — 96367 TX/PROPH/DG ADDL SEQ IV INF: CPT

## 2021-04-06 PROCEDURE — 96413 CHEMO IV INFUSION 1 HR: CPT

## 2021-04-06 PROCEDURE — 96417 CHEMO IV INFUS EACH ADDL SEQ: CPT

## 2021-04-06 RX ORDER — SODIUM CHLORIDE 9 MG/ML
20 INJECTION, SOLUTION INTRAVENOUS ONCE
Status: COMPLETED | OUTPATIENT
Start: 2021-04-06 | End: 2021-04-06

## 2021-04-06 RX ADMIN — ONDANSETRON 8 MG: 2 INJECTION INTRAMUSCULAR; INTRAVENOUS at 13:56

## 2021-04-06 RX ADMIN — SODIUM CHLORIDE 20 ML/HR: 9 INJECTION, SOLUTION INTRAVENOUS at 13:56

## 2021-04-06 RX ADMIN — PERTUZUMAB 420 MG: 30 INJECTION, SOLUTION, CONCENTRATE INTRAVENOUS at 15:08

## 2021-04-06 RX ADMIN — SODIUM CHLORIDE 337 MG: 0.9 INJECTION, SOLUTION INTRAVENOUS at 14:34

## 2021-04-06 NOTE — PROGRESS NOTES
DEMETRICEW met with pt in the [facility] infusion center during pts treatment  Pt stated she is doing much better and has been able to begin eating again  LSW offered emotional support and applied active listening skills  DEMETRICEW encouraged pt to call with concerns or needs pt agreed to do so

## 2021-04-06 NOTE — PROGRESS NOTES
Pt presents to ED with dad for fever of 101F at home, cough, and congestion. +sick contacts at home. Pt acting age appropriate in triage. No distress noted. Last dose of motrin was 10:45pm and 7pm was tylenol. Pt UTD on imms. Pt to clinic for perjeta and trastuzumab infusion, pt tolerated without complications, aware of next appointment, avs printed

## 2021-04-06 NOTE — PLAN OF CARE
Problem: Potential for Falls  Goal: Patient will remain free of falls  Description: INTERVENTIONS:  - Assess patient frequently for physical needs  -  Identify cognitive and physical deficits and behaviors that affect risk of falls  -  [place] fall precautions as indicated by assessment   - Educate patient/family on patient safety including physical limitations  - Instruct patient to call for assistance with activity based on assessment  - Modify environment to reduce risk of injury  - Consider OT/PT consult to assist with strengthening/mobility  Outcome: Progressing     Problem: Knowledge Deficit  Goal: Patient/family/caregiver demonstrates understanding of disease process, treatment plan, medications, and discharge instructions  Description: Complete learning assessment and assess knowledge base    Interventions:  - Provide teaching at level of understanding  - Provide teaching via preferred learning methods  Outcome: Progressing

## 2021-04-08 ENCOUNTER — IMMUNIZATIONS (OUTPATIENT)
Dept: FAMILY MEDICINE CLINIC | Facility: HOSPITAL | Age: 56
End: 2021-04-08

## 2021-04-08 DIAGNOSIS — Z23 ENCOUNTER FOR IMMUNIZATION: Primary | ICD-10-CM

## 2021-04-08 PROCEDURE — 0001A SARS-COV-2 / COVID-19 MRNA VACCINE (PFIZER-BIONTECH) 30 MCG: CPT

## 2021-04-08 PROCEDURE — 91300 SARS-COV-2 / COVID-19 MRNA VACCINE (PFIZER-BIONTECH) 30 MCG: CPT

## 2021-04-16 ENCOUNTER — HOSPITAL ENCOUNTER (EMERGENCY)
Facility: HOSPITAL | Age: 56
Discharge: HOME | End: 2021-04-17
Attending: EMERGENCY MEDICINE | Admitting: INTERNAL MEDICINE
Payer: MEDICARE

## 2021-04-16 DIAGNOSIS — R11.2 INTRACTABLE VOMITING WITH NAUSEA, UNSPECIFIED VOMITING TYPE: ICD-10-CM

## 2021-04-16 DIAGNOSIS — R10.9 CHRONIC ABDOMINAL PAIN: ICD-10-CM

## 2021-04-16 DIAGNOSIS — E86.0 DEHYDRATION: Primary | ICD-10-CM

## 2021-04-16 DIAGNOSIS — R53.83 FATIGUE: ICD-10-CM

## 2021-04-16 DIAGNOSIS — G89.29 CHRONIC ABDOMINAL PAIN: ICD-10-CM

## 2021-04-16 LAB
ANION GAP SERPL CALCULATED.3IONS-SCNC: 17 MMOL/L (ref 4–13)
BASOPHILS # BLD AUTO: 0.03 THOUSANDS/ΜL (ref 0–0.1)
BASOPHILS NFR BLD AUTO: 0 % (ref 0–1)
BUN SERPL-MCNC: 23 MG/DL (ref 5–25)
CALCIUM SERPL-MCNC: 9.6 MG/DL (ref 8.3–10.1)
CHLORIDE SERPL-SCNC: 96 MMOL/L (ref 100–108)
CO2 SERPL-SCNC: 20 MMOL/L (ref 21–32)
CREAT SERPL-MCNC: 1.11 MG/DL (ref 0.6–1.3)
EOSINOPHIL # BLD AUTO: 0.03 THOUSAND/ΜL (ref 0–0.61)
EOSINOPHIL NFR BLD AUTO: 0 % (ref 0–6)
ERYTHROCYTE [DISTWIDTH] IN BLOOD BY AUTOMATED COUNT: 16.1 % (ref 11.6–15.1)
FLUAV RNA RESP QL NAA+PROBE: NEGATIVE
FLUBV RNA RESP QL NAA+PROBE: NEGATIVE
GFR SERPL CREATININE-BSD FRML MDRD: 55 ML/MIN/1.73SQ M
GLUCOSE SERPL-MCNC: 119 MG/DL (ref 65–140)
HCT VFR BLD AUTO: 45.6 % (ref 34.8–46.1)
HGB BLD-MCNC: 14.5 G/DL (ref 11.5–15.4)
HOLD SPECIMEN: NORMAL
IMM GRANULOCYTES # BLD AUTO: 0.04 THOUSAND/UL (ref 0–0.2)
IMM GRANULOCYTES NFR BLD AUTO: 0 % (ref 0–2)
LIPASE SERPL-CCNC: 132 U/L (ref 73–393)
LYMPHOCYTES # BLD AUTO: 0.75 THOUSANDS/ΜL (ref 0.6–4.47)
LYMPHOCYTES NFR BLD AUTO: 8 % (ref 14–44)
MAGNESIUM SERPL-MCNC: 2.5 MG/DL (ref 1.6–2.6)
MCH RBC QN AUTO: 28.7 PG (ref 26.8–34.3)
MCHC RBC AUTO-ENTMCNC: 31.8 G/DL (ref 31.4–37.4)
MCV RBC AUTO: 90 FL (ref 82–98)
MONOCYTES # BLD AUTO: 0.86 THOUSAND/ΜL (ref 0.17–1.22)
MONOCYTES NFR BLD AUTO: 9 % (ref 4–12)
NEUTROPHILS # BLD AUTO: 8.05 THOUSANDS/ΜL (ref 1.85–7.62)
NEUTS SEG NFR BLD AUTO: 83 % (ref 43–75)
NRBC BLD AUTO-RTO: 0 /100 WBCS
PLATELET # BLD AUTO: 385 THOUSANDS/UL (ref 149–390)
PMV BLD AUTO: 9.3 FL (ref 8.9–12.7)
POTASSIUM SERPL-SCNC: 3.1 MMOL/L (ref 3.5–5.3)
RBC # BLD AUTO: 5.06 MILLION/UL (ref 3.81–5.12)
RSV RNA RESP QL NAA+PROBE: NEGATIVE
SARS-COV-2 RNA RESP QL NAA+PROBE: NEGATIVE
SODIUM SERPL-SCNC: 133 MMOL/L (ref 136–145)
WBC # BLD AUTO: 9.76 THOUSAND/UL (ref 4.31–10.16)

## 2021-04-16 PROCEDURE — 99284 EMERGENCY DEPT VISIT MOD MDM: CPT

## 2021-04-16 PROCEDURE — 99285 EMERGENCY DEPT VISIT HI MDM: CPT | Performed by: EMERGENCY MEDICINE

## 2021-04-16 PROCEDURE — 96375 TX/PRO/DX INJ NEW DRUG ADDON: CPT

## 2021-04-16 PROCEDURE — 96361 HYDRATE IV INFUSION ADD-ON: CPT

## 2021-04-16 PROCEDURE — 83735 ASSAY OF MAGNESIUM: CPT | Performed by: INTERNAL MEDICINE

## 2021-04-16 PROCEDURE — 36415 COLL VENOUS BLD VENIPUNCTURE: CPT

## 2021-04-16 PROCEDURE — 99220 PR INITIAL OBSERVATION CARE/DAY 70 MINUTES: CPT | Performed by: INTERNAL MEDICINE

## 2021-04-16 PROCEDURE — 85025 COMPLETE CBC W/AUTO DIFF WBC: CPT | Performed by: EMERGENCY MEDICINE

## 2021-04-16 PROCEDURE — 0241U HB NFCT DS VIR RESP RNA 4 TRGT: CPT | Performed by: INTERNAL MEDICINE

## 2021-04-16 PROCEDURE — 80048 BASIC METABOLIC PNL TOTAL CA: CPT | Performed by: EMERGENCY MEDICINE

## 2021-04-16 PROCEDURE — 83690 ASSAY OF LIPASE: CPT | Performed by: INTERNAL MEDICINE

## 2021-04-16 PROCEDURE — 96374 THER/PROPH/DIAG INJ IV PUSH: CPT

## 2021-04-16 RX ORDER — LACTOBACILLUS ACIDOPHILUS / LACTOBACILLUS BULGARICUS 100 MILLION CFU STRENGTH
1 GRANULES ORAL
Status: DISCONTINUED | OUTPATIENT
Start: 2021-04-16 | End: 2021-04-17 | Stop reason: HOSPADM

## 2021-04-16 RX ORDER — OXYCODONE HYDROCHLORIDE 10 MG/1
10 TABLET ORAL EVERY 4 HOURS PRN
Status: DISCONTINUED | OUTPATIENT
Start: 2021-04-16 | End: 2021-04-17 | Stop reason: HOSPADM

## 2021-04-16 RX ORDER — PREDNISONE 10 MG/1
5 TABLET ORAL DAILY
Status: DISCONTINUED | OUTPATIENT
Start: 2021-04-17 | End: 2021-04-17 | Stop reason: HOSPADM

## 2021-04-16 RX ORDER — POTASSIUM CHLORIDE 20 MEQ/1
40 TABLET, EXTENDED RELEASE ORAL ONCE
Status: COMPLETED | OUTPATIENT
Start: 2021-04-16 | End: 2021-04-16

## 2021-04-16 RX ORDER — HYDROXYCHLOROQUINE SULFATE 200 MG/1
200 TABLET, FILM COATED ORAL
Status: DISCONTINUED | OUTPATIENT
Start: 2021-04-17 | End: 2021-04-17 | Stop reason: HOSPADM

## 2021-04-16 RX ORDER — HYDROXYCHLOROQUINE SULFATE 200 MG/1
100 TABLET, FILM COATED ORAL
Status: DISCONTINUED | OUTPATIENT
Start: 2021-04-16 | End: 2021-04-17 | Stop reason: HOSPADM

## 2021-04-16 RX ORDER — PANTOPRAZOLE SODIUM 40 MG/1
40 TABLET, DELAYED RELEASE ORAL DAILY
Status: DISCONTINUED | OUTPATIENT
Start: 2021-04-17 | End: 2021-04-17 | Stop reason: HOSPADM

## 2021-04-16 RX ORDER — ONDANSETRON 2 MG/ML
4 INJECTION INTRAMUSCULAR; INTRAVENOUS EVERY 6 HOURS PRN
Status: DISCONTINUED | OUTPATIENT
Start: 2021-04-16 | End: 2021-04-17 | Stop reason: HOSPADM

## 2021-04-16 RX ORDER — ACETAMINOPHEN 325 MG/1
650 TABLET ORAL EVERY 6 HOURS PRN
Status: DISCONTINUED | OUTPATIENT
Start: 2021-04-16 | End: 2021-04-17 | Stop reason: HOSPADM

## 2021-04-16 RX ORDER — POTASSIUM CHLORIDE AND SODIUM CHLORIDE 900; 300 MG/100ML; MG/100ML
125 INJECTION, SOLUTION INTRAVENOUS CONTINUOUS
Status: DISCONTINUED | OUTPATIENT
Start: 2021-04-16 | End: 2021-04-17 | Stop reason: HOSPADM

## 2021-04-16 RX ORDER — CALCIUM CARBONATE 200(500)MG
1000 TABLET,CHEWABLE ORAL DAILY PRN
Status: DISCONTINUED | OUTPATIENT
Start: 2021-04-16 | End: 2021-04-17 | Stop reason: HOSPADM

## 2021-04-16 RX ORDER — ONDANSETRON 2 MG/ML
4 INJECTION INTRAMUSCULAR; INTRAVENOUS ONCE
Status: COMPLETED | OUTPATIENT
Start: 2021-04-16 | End: 2021-04-16

## 2021-04-16 RX ORDER — CYCLOBENZAPRINE HCL 10 MG
10 TABLET ORAL 3 TIMES DAILY PRN
Status: DISCONTINUED | OUTPATIENT
Start: 2021-04-16 | End: 2021-04-17 | Stop reason: HOSPADM

## 2021-04-16 RX ORDER — ALPRAZOLAM 0.25 MG/1
1 TABLET ORAL 3 TIMES DAILY PRN
Status: DISCONTINUED | OUTPATIENT
Start: 2021-04-16 | End: 2021-04-17 | Stop reason: HOSPADM

## 2021-04-16 RX ORDER — HYDROMORPHONE HCL/PF 1 MG/ML
1 SYRINGE (ML) INJECTION ONCE
Status: COMPLETED | OUTPATIENT
Start: 2021-04-16 | End: 2021-04-16

## 2021-04-16 RX ORDER — DICYCLOMINE HYDROCHLORIDE 10 MG/1
10 CAPSULE ORAL 2 TIMES DAILY
Status: DISCONTINUED | OUTPATIENT
Start: 2021-04-16 | End: 2021-04-17 | Stop reason: HOSPADM

## 2021-04-16 RX ORDER — LEVOTHYROXINE SODIUM 88 UG/1
88 TABLET ORAL
Status: DISCONTINUED | OUTPATIENT
Start: 2021-04-17 | End: 2021-04-17 | Stop reason: HOSPADM

## 2021-04-16 RX ADMIN — MAGNESIUM OXIDE TAB 400 MG (241.3 MG ELEMENTAL MG) 400 MG: 400 (241.3 MG) TAB at 19:47

## 2021-04-16 RX ADMIN — HYDROMORPHONE HYDROCHLORIDE 1 MG: 1 INJECTION, SOLUTION INTRAMUSCULAR; INTRAVENOUS; SUBCUTANEOUS at 16:53

## 2021-04-16 RX ADMIN — SODIUM CHLORIDE 1000 ML: 0.9 INJECTION, SOLUTION INTRAVENOUS at 16:53

## 2021-04-16 RX ADMIN — Medication 1 PACKET: at 19:47

## 2021-04-16 RX ADMIN — ALPRAZOLAM 1 MG: 0.25 TABLET ORAL at 22:02

## 2021-04-16 RX ADMIN — OXYCODONE HYDROCHLORIDE 10 MG: 10 TABLET ORAL at 19:47

## 2021-04-16 RX ADMIN — ONDANSETRON 4 MG: 2 INJECTION INTRAMUSCULAR; INTRAVENOUS at 16:53

## 2021-04-16 RX ADMIN — POTASSIUM CHLORIDE 40 MEQ: 1500 TABLET, EXTENDED RELEASE ORAL at 19:47

## 2021-04-16 RX ADMIN — POTASSIUM CHLORIDE AND SODIUM CHLORIDE 125 ML/HR: 900; 300 INJECTION, SOLUTION INTRAVENOUS at 19:46

## 2021-04-16 NOTE — ASSESSMENT & PLAN NOTE
· Patient on prednisone 5 mg and Plaquenil  · Will continue the same  · Outpatient Rheumatology follow-up

## 2021-04-16 NOTE — H&P
[facility]  H&P- Keri Leos 1965, 60 y o  female MRN: [id number]  Unit/Bed#: ED 26 Encounter: [id number]  Primary Care Provider: Kristin Lind MD   Date and time admitted to hospital: 4/16/2021  4:01 PM    Nausea and vomiting  Assessment & Plan  · Will admissions for nausea and vomiting  She has received chemotherapy for breast cancer  Likely related to cancer chemotherapy  · Supportive care and hydration  · Check COVID-19  · She was seen by Gastroenterology in outpatient setting, suspected she may be having signs and symptoms of irritable bowel syndrome  · She is scheduled to have outpatient colonoscopy  Hyponatremia  Assessment & Plan  · Likely due to decreased p o  Intake and dehydration  Will continue with IV hydration and replete potassium as well    Breast cancer ([facility] [address] )  Assessment & Plan  · She is getting chemotherapy in 3 weeks cycles  Last 1 was about 1 week ago  She does report symptoms usually get worse after chemotherapy  · Continue supportive care an outpatient oncology follow-up    Hypokalemia  Assessment & Plan  · Replete with potassium chloride and recheck a m  Systemic lupus erythematosus ([facility] [address] )  Assessment & Plan  · Patient on prednisone 5 mg and Plaquenil  · Will continue the same  · Outpatient Rheumatology follow-up    VTE Prophylaxis: Enoxaparin (Lovenox)  / sequential compression device   Code Status:  Full code  POLST: There is no POLST form on file for this patient (pre-hospital)  Discussion with family:     Anticipated Length of Stay:  Patient will be admitted on an Emergency basis with an anticipated length of stay of  > 2 midnights  Justification for Hospital Stay:  Nausea and vomiting and decreased p o  Intake    Total Time for Visit, including Counseling / Coordination of Care: 1 hour  Greater than 50% of this total time spent on direct patient counseling and coordination of care      Chief Complaint:   Nausea    History of Present Illness:    Serene Serrano is a 60 y o  female with past medical significant for breast cancer on chemotherapy, systemic lupus erythematosus, anxiety who presents with nausea and vomiting  Patient had chemotherapy about 10 days ago, since then she is not feeling well  She started having nausea and vomiting, she had loose bowel movements at the beginning but now having pellet-like stools  Today her symptoms have gotten worse and decided to come to the emergency department for evaluation  Reports constant nausea in between the episodes  Denies any abdominal pain  Occasional chills no fever  No headache  Review of Systems:    Review of Systems  Twelve point review systems negative except noted above  Past Medical and Surgical History:     Past Medical History:   Diagnosis Date    Disease of thyroid gland     Hypertension     Lupus ([facility] [address] )     Malignant neoplasm of upper-inner quadrant of left breast in female, estrogen receptor negative ([facility] [address] ) 2/25/2020    Nephrolithiasis     PTSD (post-traumatic stress disorder)        Past Surgical History:   Procedure Laterality Date    FEMUR FRACTURE SURGERY      FL GUIDED CENTRAL VENOUS ACCESS DEVICE INSERTION  3/17/2020    HYSTERECTOMY      LEFT OOPHORECTOMY      due to torsion     MAMMO STEREOTACTIC BREAST BIOPSY RIGHT (ALL INC) Right 2/12/2020    MASTECTOMY W/ SENTINEL NODE BIOPSY Left 8/18/2020    Procedure: BREAST MASTECTOMY WITH SENTINEL LYMPH NODE BIOPSY, LYMPHATIC MAPPING WITH BLUE DYE AND RADIOACTIVE DYE (INJECT AT 1430 BY DR WRIGHT IN THE OR);   Surgeon: Subha Hayden MD;  Location: [facility];  Service: Surgical Oncology    MRI BREAST BIOPSY LEFT (ALL INCLUSIVE) Left 3/20/2020    [place] INSJ TUNNELED CTR VAD W/SUBQ PORT AGE 5 YR/> N/A 3/17/2020    Procedure: INSERTION VENOUS PORT ( PORT-A-CATH) IR;  Surgeon: Migel Stiles DO;  Location: [facility];  Service: Interventional Radiology    US GUIDANCE BREAST BIOPSY LEFT EACH ADDITIONAL Left 2/12/2020    US GUIDED BREAST BIOPSY LEFT COMPLETE Left 2/12/2020    VAGINA SURGERY         Meds/Allergies:    Prior to Admission medications    Medication Sig Start Date End Date Taking? Authorizing Provider   acetaminophen (TYLENOL) 325 mg tablet Take 2 tablets (650 mg total) by mouth every 6 (six) hours as needed for mild pain, headaches or fever 8/27/20  Yes Gabrielle Dick PA-C   al mag oxide-diphenhydramine-lidocaine viscous (MAGIC MOUTHWASH) 1:1:1 suspension Swish and swallow 10 mL every 6 (six) hours as needed for mouth pain or discomfort or mucositis 3/5/21  Yes Adelita Langston PA-C   ALPRAZolam Leila Mcdonald) 1 mg tablet Take 1 tablet (1 mg total) by mouth 3 (three) times a day as needed for anxiety 5/82/91  Yes Real Valencia MD   cyclobenzaprine (FLEXERIL) 5 mg tablet Take 10 mg by mouth daily at bedtime as needed 9/3/20  Yes Historical Provider, MD   dicyclomine (BENTYL) 10 mg capsule Take 1 capsule (10 mg total) by mouth 2 (two) times a day 3/31/21  Yes Zi Oneal MD   hydroxychloroquine (PLAQUENIL) 200 mg tablet Take 1 tablet in morning and 1/2 tablet in evening   Yes Historical Provider, MD   lactobacillus acidophilus-bulgaricus [facility]) packet Take 1 packet by mouth 3 (three) times a day with meals 3/19/21  Yes Gabrielle Dick PA-C   levothyroxine 88 mcg tablet TAKE ONE TABLET BY MOUTH EVERY DAY 7/6/41  Yes Real Valencia MD   magnesium oxide (MAG-OX) 400 mg Take 400 mg by mouth 2 (two) times a day 3/13/21  Yes Historical Provider, MD   ondansetron (ZOFRAN) 4 mg tablet Take 1 tablet (4 mg total) by mouth every 8 (eight) hours as needed for nausea or vomiting 4/71/60  Yes Real Valencia MD   oxyCODONE (ROXICODONE) 10 MG TABS Take 1-1 5 tablets (10-15 mg total) by mouth every 4 (four) hours as needed (cancer pain    Max of 8 tabs / day ) NO FURTHER FILLS WITHOUT VISIT TO PALLIATIVE CLINIC 7/56/54  Yes Real Valencia MD   pantoprazole (PROTONIX) 40 mg tablet Take 1 tablet (40 mg total) by mouth daily 3/5/21  Yes Gasper Vale Thee Morton PA-C   predniSONE 5 mg tablet One po bid with food 2/5/44  Yes Arvin Chavez MD   sertraline (ZOLOFT) 100 mg tablet TAKE 1 & 1/2 TABLETS BY MOUTH ONCE DAILY  Patient taking differently: Take 150 mg by mouth daily BY MOUTH ONCE DAILY 7/21/61  Yes Arvin Chavez MD   ALPRAZolam Spence Anchors) 1 mg tablet Take 1 tablet (1 mg total) by mouth 3 (three) times a day as needed for anxiety  Patient not taking: Reported on 3/31/2021 2/96/61   Arvin Chavez MD   Na Sulfate-K Sulfate-Mg Sulf (Suprep Bowel Prep Kit) 17 5-3 13-1 6 GM/177ML SOLN Take 1 Bottle by mouth once for 1 dose 3/31/21 3/31/21  Abundio Adorno MD     I have reviewed home medications with patient personally  Allergies:    Allergies   Allergen Reactions    Mobic [Meloxicam] Eye Swelling     Reaction Date: 12Aug2011;     Methotrexate Rash    Sulfa Antibiotics Rash       Social History:     Marital Status: /Civil Union   Occupation:   Patient Pre-hospital Living Situation:  With Family  Patient Pre-hospital Level of Mobility:  Independent  Patient Pre-hospital Diet Restrictions:  None  Substance Use History:   Social History     Substance and Sexual Activity   Alcohol Use Not Currently    Alcohol/week: 21 0 standard drinks    Types: 21 Cans of beer per week    Frequency: Never    Drinks per session: Patient refused    Binge frequency: Never    Comment: pt states she had her last beer 3/22/2020     Social History     Tobacco Use   Smoking Status Current Every Day Smoker    Packs/day: 0 50    Years: 40 00    Pack years: 20 00    Types: Cigarettes    Start date: 200   Smokeless Tobacco Never Used     Social History     Substance and Sexual Activity   Drug Use No       Family History:    non-contributory    Physical Exam:     Vitals:   Blood Pressure: 149/93 (04/16/21 1630)  Pulse: 96 (04/16/21 1630)  Temperature: 98 2 °F (36 8 °C) (04/16/21 1357)  Temp Source: Oral (04/16/21 1357)  Respirations: 16 (04/16/21 1630)  Height: 5' 3" (160 cm) (04/16/21 1622)  Weight - Scale: 46 7 kg (103 lb) (04/16/21 1622)  SpO2: 99 % (04/16/21 1630)    Physical Exam    Gen -Patient comfortable   Dry mucous membranes  Cachectic  Neck- Supple  No thyromegaly or lymphadenopathy  Lungs-Clear bilaterally without any wheeze or rales   Heart S1-S2, regular rate and rhythm, no murmurs  Abdomen-soft nontender, no organomegaly  Bowel sounds present  Extremities-no cyanosi,  clubbing or edema  Skin- no rash  Neuro-nonfocal       Additional Data:     Lab Results: I have personally reviewed pertinent reports  Results from last 7 days   Lab Units 04/16/21  1402   WBC Thousand/uL 9 76   HEMOGLOBIN g/dL 14 5   HEMATOCRIT % 45 6   PLATELETS Thousands/uL 385   NEUTROS PCT % 83*   LYMPHS PCT % 8*   MONOS PCT % 9   EOS PCT % 0     Results from last 7 days   Lab Units 04/16/21  1402   SODIUM mmol/L 133*   POTASSIUM mmol/L 3 1*   CHLORIDE mmol/L 96*   CO2 mmol/L 20*   BUN mg/dL 23   CREATININE mg/dL 1 11   ANION GAP mmol/L 17*   CALCIUM mg/dL 9 6   GLUCOSE RANDOM mg/dL 119                       Imaging: I have personally reviewed pertinent reports  No orders to display       EKG, Pathology, and Other Studies Reviewed on Admission:   · EKG:     [facility] Epic Records Reviewed: Yes     ** Please Note: This note has been constructed using a voice recognition system   **

## 2021-04-16 NOTE — ASSESSMENT & PLAN NOTE
· Will admissions for nausea and vomiting  She has received chemotherapy for breast cancer  Likely related to cancer chemotherapy  · Supportive care and hydration  · She was seen by Gastroenterology in outpatient setting, suspected she may be having signs and symptoms of irritable bowel syndrome  · She is scheduled to have outpatient colonoscopy

## 2021-04-16 NOTE — ED PROVIDER NOTES
History  Chief Complaint   Patient presents with    Generalized Body Aches     C/o following symptoms: nausea, body/joint aches, diarrhea, headaches  60 y o  female presents with chief complaint of fatigue, body aches, continued diarrhea and nausea  This has been a recurrent problem for the patient and she has been admitted 2x in the past 30 days for the same  No       History provided by:  Patient   used: No    Malaise - 7 years or greater  Severity:  Moderate  Onset quality:  Gradual  Duration:  1 week  Timing:  Constant  Progression:  Worsening  Chronicity:  New  Context: dehydration    Relieved by:  Nothing  Worsened by:  Nothing  Ineffective treatments:  None tried  Associated symptoms: abdominal pain (chronic, recurrent), diarrhea, headaches and nausea    Associated symptoms: no dysuria, no frequency and no vomiting        Prior to Admission Medications   Prescriptions Last Dose Informant Patient Reported? Taking?    ALPRAZolam (XANAX) 1 mg tablet 4/16/2021 at Unknown time  No Yes   Sig: Take 1 tablet (1 mg total) by mouth 3 (three) times a day as needed for anxiety   ALPRAZolam (XANAX) 1 mg tablet   No No   Sig: Take 1 tablet (1 mg total) by mouth 3 (three) times a day as needed for anxiety   Patient not taking: Reported on 3/31/2021   Na Sulfate-K Sulfate-Mg Sulf (Suprep Bowel Prep Kit) 17 5-3 13-1 6 GM/177ML SOLN   No No   Sig: Take 1 Bottle by mouth once for 1 dose   acetaminophen (TYLENOL) 325 mg tablet Past Week at Unknown time  No Yes   Sig: Take 2 tablets (650 mg total) by mouth every 6 (six) hours as needed for mild pain, headaches or fever   al mag oxide-diphenhydramine-lidocaine viscous (MAGIC MOUTHWASH) 1:1:1 suspension 4/16/2021 at Unknown time  No Yes   Sig: Swish and swallow 10 mL every 6 (six) hours as needed for mouth pain or discomfort or mucositis   cyclobenzaprine (FLEXERIL) 5 mg tablet 4/15/2021 at Unknown time  Yes Yes   Sig: Take 10 mg by mouth daily at bedtime as needed   dicyclomine (BENTYL) 10 mg capsule 4/16/2021 at Unknown time  No Yes   Sig: Take 1 capsule (10 mg total) by mouth 2 (two) times a day   hydroxychloroquine (PLAQUENIL) 200 mg tablet 4/16/2021 at Unknown time Self Yes Yes   Sig: Take 1 tablet in morning and 1/2 tablet in evening   lactobacillus acidophilus-bulgaricus (FLORANEX) packet 4/16/2021 at Unknown time  No Yes   Sig: Take 1 packet by mouth 3 (three) times a day with meals   levothyroxine 88 mcg tablet 4/16/2021 at Unknown time  No Yes   Sig: TAKE ONE TABLET BY MOUTH EVERY DAY   magnesium oxide (MAG-OX) 400 mg 4/16/2021 at Unknown time  Yes Yes   Sig: Take 400 mg by mouth 2 (two) times a day   ondansetron (ZOFRAN) 4 mg tablet 4/16/2021 at Unknown time  No Yes   Sig: Take 1 tablet (4 mg total) by mouth every 8 (eight) hours as needed for nausea or vomiting   oxyCODONE (ROXICODONE) 10 MG TABS 4/15/2021 at Unknown time  No Yes   Sig: Take 1-1 5 tablets (10-15 mg total) by mouth every 4 (four) hours as needed (cancer pain    Max of 8 tabs / day ) NO FURTHER FILLS WITHOUT VISIT TO PALLIATIVE CLINIC   pantoprazole (PROTONIX) 40 mg tablet 4/16/2021 at Unknown time  No Yes   Sig: Take 1 tablet (40 mg total) by mouth daily   predniSONE 5 mg tablet 4/16/2021 at Unknown time  No Yes   Sig: One po bid with food   sertraline (ZOLOFT) 100 mg tablet 4/16/2021 at Unknown time  No Yes   Sig: TAKE 1 & 1/2 TABLETS BY MOUTH ONCE DAILY   Patient taking differently: Take 150 mg by mouth daily BY MOUTH ONCE DAILY      Facility-Administered Medications: None       Past Medical History:   Diagnosis Date    Disease of thyroid gland     Hypertension     Lup[facility] [address] )     Malignant neoplasm of upper-inner quadrant of left breast in female, estrogen receptor negative ([facility] [address] ) 2/25/2020    Nephrolithiasis     PTSD (post-traumatic stress disorder)        Past Surgical History:   Procedure Laterality Date    FEMUR FRACTURE SURGERY      FL GUIDED CENTRAL VENOUS ACCESS DEVICE INSERTION  3/17/2020    HYSTERECTOMY      LEFT OOPHORECTOMY      due to torsion     MAMMO STEREOTACTIC BREAST BIOPSY RIGHT (ALL INC) Right 2/12/2020    MASTECTOMY W/ SENTINEL NODE BIOPSY Left 8/18/2020    Procedure: BREAST MASTECTOMY WITH SENTINEL LYMPH NODE BIOPSY, LYMPHATIC MAPPING WITH BLUE DYE AND RADIOACTIVE DYE (INJECT AT 1430 BY DR WRIGHT IN THE OR); Surgeon: Madeline Manzano MD;  Location: [facility];  Service: Surgical Oncology    MRI BREAST BIOPSY LEFT (ALL INCLUSIVE) Left 3/20/2020    [place] INSJ TUNNELED CTR VAD W/SUBQ PORT AGE 5 YR/> N/A 3/17/2020    Procedure: INSERTION VENOUS PORT ( PORT-A-CATH) IR;  Surgeon: Anna Mullen DO;  [facility];  Service: Interventional Radiology    US GUIDANCE BREAST BIOPSY LEFT EACH ADDITIONAL Left 2/12/2020    US GUIDED BREAST BIOPSY LEFT COMPLETE Left 2/12/2020    VAGINA SURGERY         Family History   Problem Relation Age of Onset    Diabetes Mother     Hypertension Mother     Nephrolithiasis Mother     Breast cancer Mother 79    Heart disease Father     Hypertension Father     Diabetes Brother     Nephrolithiasis Brother     Breast cancer Maternal Aunt     No Known Problems Maternal Grandmother     No Known Problems Maternal Grandfather     No Known Problems Paternal Grandmother     No Known Problems Paternal Grandfather      I have reviewed and agree with the history as documented      E-Cigarette/Vaping    E-Cigarette Use Never User      E-Cigarette/Vaping Substances    Nicotine No     THC No     CBD No     Flavoring No     Other No     Unknown No      Social History     Tobacco Use    Smoking status: Current Every Day Smoker     Packs/day: 0 50     Years: 40 00     Pack years: 20 00     Types: Cigarettes     Start date: 1990    Smokeless tobacco: Never Used   Substance Use Topics    Alcohol use: Not Currently     Alcohol/week: 21 0 standard drinks     Types: 21 Cans of beer per week     Frequency: Never     Drinks per session: Patient refused     Binge frequency: Never     Comment: pt states she had her last beer 3/22/2020    Drug use: No       Review of Systems   Constitutional: Positive for fatigue  Negative for chills and diaphoresis  Cardiovascular: Negative for palpitations  Gastrointestinal: Positive for abdominal pain (chronic, recurrent), diarrhea and nausea  Negative for vomiting  Genitourinary: Negative for dysuria and frequency  Neurological: Positive for weakness and headaches  Difficulty using right hand     Psychiatric/Behavioral: Positive for confusion  All other systems reviewed and are negative  Physical Exam  Physical Exam  Vitals signs and nursing note reviewed  Constitutional:       General: She is not in acute distress  Appearance: She is well-developed  HENT:      Head: Normocephalic and atraumatic  Eyes:      Pupils: Pupils are equal, round, and reactive to light  Neck:      Musculoskeletal: Normal range of motion  Vascular: No JVD  Cardiovascular:      Rate and Rhythm: Normal rate and regular rhythm  Heart sounds: Normal heart sounds  No murmur  No friction rub  No gallop  Pulmonary:      Effort: Pulmonary effort is normal  No respiratory distress  Breath sounds: Normal breath sounds  No wheezing or rales  Chest:      Chest wall: No tenderness  Abdominal:      General: There is no distension  Palpations: There is no mass  Tenderness: There is abdominal tenderness (generalized - mild)  There is no guarding  Hernia: No hernia is present  Musculoskeletal: Normal range of motion  General: No tenderness  Skin:     General: Skin is warm and dry  Neurological:      General: No focal deficit present  Mental Status: She is alert and oriented to person, place, and time  Psychiatric:         Behavior: Behavior normal          Thought Content:  Thought content normal          Judgment: Judgment normal          Vital Signs  ED Triage Vitals   Temperature Pulse Respirations Blood Pressure SpO2   04/16/21 1357 04/16/21 1355 04/16/21 1355 04/16/21 1355 04/16/21 1355   98 2 °F (36 8 °C) (!) 118 18 126/84 97 %      Temp Source Heart Rate Source Patient Position - Orthostatic VS BP Location FiO2 (%)   04/16/21 1357 04/16/21 1355 04/16/21 1355 04/16/21 1355 --   Oral Monitor Sitting Left arm       Pain Score       04/16/21 1355       Worst Possible Pain           Vitals:    04/16/21 1355 04/16/21 1622 04/16/21 1630   BP: 126/84 145/90 149/93   Pulse: (!) 118 97 96   Patient Position - Orthostatic VS: Sitting Sitting Sitting         Visual Acuity      ED Medications  Medications   sodium chloride 0 9 % bolus 1,000 mL (1,000 mL Intravenous New Bag 4/16/21 1653)   HYDROmorphone (DILAUDID) injection 1 mg (1 mg Intravenous Given 4/16/21 1653)   ondansetron (ZOFRAN) injection 4 mg (4 mg Intravenous Given 4/16/21 1653)       Diagnostic Studies  Results Reviewed     Procedure Component Value Units Date/Time    Lipase [[id number]]     Lab Status: No result Specimen: Blood     Magnesium [[id number]]     Lab Status: No result Specimen: Blood     [place] draw [[id number]] Collected: 04/16/21 1402    Lab Status: Final result Specimen: Blood from Arm, Right Updated: 04/16/21 1603    Narrative: The following orders were created for panel order [place] draw  Procedure                               Abnormality         Status                     ---------                               -----------         ------                     Andre Rodriguez Top on RWUD[[id number]]                           Final result               Gold top on BDGN[[id number]]                                 Final result               Green / Black tube on KQTI[[id number]]                       Final result                 Please view results for these tests on the individual orders      Basic metabolic panel [[id number]]  (Abnormal) Collected: 04/16/21 1402    Lab Status: Final result Specimen: Blood from Hand, Right Updated: 04/16/21 1424     Sodium 133 mmol/L      Potassium 3 1 mmol/L      Chloride 96 mmol/L      CO2 20 mmol/L      ANION GAP 17 mmol/L      BUN 23 mg/dL      Creatinine 1 11 mg/dL      Glucose 119 mg/dL      Calcium 9 6 mg/dL      eGFR 55 ml/min/1 73sq m     Narrative:      [place] guidelines for Chronic Kidney Disease (CKD):     Stage 1 with normal or high GFR (GFR > 90 mL/min/1 73 square meters)    Stage 2 Mild CKD (GFR = 60-89 mL/min/1 73 square meters)    Stage 3A Moderate CKD (GFR = 45-59 mL/min/1 73 square meters)    Stage 3B Moderate CKD (GFR = 30-44 mL/min/1 73 square meters)    Stage 4 Severe CKD (GFR = 15-29 mL/min/1 73 square meters)    Stage 5 End Stage CKD (GFR <15 mL/min/1 73 square meters)  Note: GFR calculation is accurate only with a steady state creatinine    CBC and differential [[id number]]  (Abnormal) Collected: 04/16/21 1402    Lab Status: Final result Specimen: Blood from Hand, Right Updated: 04/16/21 1413     WBC 9 76 Thousand/uL      RBC 5 06 Million/uL      Hemoglobin 14 5 g/dL      Hematocrit 45 6 %      MCV 90 fL      MCH 28 7 pg      MCHC 31 8 g/dL      RDW 16 1 %      MPV 9 3 fL      Platelets 382 Thousands/uL      nRBC 0 /100 WBCs      Neutrophils Relative 83 %      Immat GRANS % 0 %      Lymphocytes Relative 8 %      Monocytes Relative 9 %      Eosinophils Relative 0 %      Basophils Relative 0 %      Neutrophils Absolute 8 05 Thousands/µL      Immature Grans Absolute 0 04 Thousand/uL      Lymphocytes Absolute 0 75 Thousands/µL      Monocytes Absolute 0 86 Thousand/µL      Eosinophils Absolute 0 03 Thousand/µL      Basophils Absolute 0 03 Thousands/µL                  No orders to display              Procedures  Procedures         ED Course                             SBIRT 20yo+      Most Recent Value   SBIRT (24 yo +)   In order to provide better care to our patients, we are screening all of our patients for alcohol and drug use  Would it be okay to ask you these screening questions? Unable to answer at this time Filed at: 04/16/2021 1621                    [facility]  Number of Diagnoses or Management Options  Chronic abdominal pain: new and requires workup  Dehydration: new and requires workup  Fatigue: new and requires workup  Diagnosis management comments: Background: 60 y o  female presents with generalized fatigue, n/v/d    Differential DX includes but is not limited to: dehydration, deconditioning, IBS, continued colitis     Plan: cbc, cmp, lipase, ivf, symptomatic treatment, possible admission          Amount and/or Complexity of Data Reviewed  Clinical lab tests: ordered and reviewed    Risk of Complications, Morbidity, and/or Mortality  Presenting problems: high  Diagnostic procedures: high  Management options: high    Patient Progress  Patient progress: stable      Disposition  Final diagnoses:   Dehydration   Fatigue   Chronic abdominal pain     Time reflects when diagnosis was documented in both MDM as applicable and the Disposition within this note     Time User Action Codes Description Comment    4/16/2021  5:26 PM Caralee Brightly Add [E86 0] Dehydration     4/16/2021  5:26 PM Latonia Button B Add [R53 83] Fatigue     4/16/2021  5:26 PM Caralee Brightly Add [R10 9,  G89 29] Chronic abdominal pain       ED Disposition     ED Disposition Condition Date/Time Comment    Admit Stable Fri Apr 16, 2021  5:27 PM Case was discussed with Dr Yasemin Arreguin and the patient's admission status was agreed to be Admission Status: observation status to the service of Dr Yasemin Arreguin   Follow-up Information    None         Patient's Medications   Discharge Prescriptions    No medications on file     No discharge procedures on file      PDMP Review       Value Time User    PDMP Reviewed  Yes 3/25/2021  8:29 PM Elisa Sosa          ED Provider  Electronically Signed by           Kavita Tafoya MD  04/16/21 2381

## 2021-04-16 NOTE — ASSESSMENT & PLAN NOTE
· She is getting chemotherapy in 3 weeks cycles  Last 1 was about 1 week ago  She does report symptoms usually get worse after chemotherapy    · Continue supportive care an outpatient oncology follow-up

## 2021-04-16 NOTE — ASSESSMENT & PLAN NOTE
· Likely due to decreased p o  Intake and dehydration    Will continue with IV hydration and replete potassium as well

## 2021-04-17 VITALS
TEMPERATURE: 98.2 F | BODY MASS INDEX: 18.25 KG/M2 | DIASTOLIC BLOOD PRESSURE: 59 MMHG | HEIGHT: 63 IN | SYSTOLIC BLOOD PRESSURE: 108 MMHG | OXYGEN SATURATION: 98 % | WEIGHT: 103 LBS | HEART RATE: 71 BPM | RESPIRATION RATE: 18 BRPM

## 2021-04-17 LAB
ANION GAP SERPL CALCULATED.3IONS-SCNC: 7 MMOL/L (ref 4–13)
BASOPHILS # BLD AUTO: 0.06 THOUSANDS/ΜL (ref 0–0.1)
BASOPHILS NFR BLD AUTO: 1 % (ref 0–1)
BUN SERPL-MCNC: 18 MG/DL (ref 5–25)
CALCIUM SERPL-MCNC: 7.7 MG/DL (ref 8.3–10.1)
CHLORIDE SERPL-SCNC: 111 MMOL/L (ref 100–108)
CO2 SERPL-SCNC: 22 MMOL/L (ref 21–32)
CREAT SERPL-MCNC: 0.98 MG/DL (ref 0.6–1.3)
EOSINOPHIL # BLD AUTO: 0.1 THOUSAND/ΜL (ref 0–0.61)
EOSINOPHIL NFR BLD AUTO: 1 % (ref 0–6)
ERYTHROCYTE [DISTWIDTH] IN BLOOD BY AUTOMATED COUNT: 16.3 % (ref 11.6–15.1)
GFR SERPL CREATININE-BSD FRML MDRD: 64 ML/MIN/1.73SQ M
GLUCOSE SERPL-MCNC: 92 MG/DL (ref 65–140)
HCT VFR BLD AUTO: 33.8 % (ref 34.8–46.1)
HGB BLD-MCNC: 10.5 G/DL (ref 11.5–15.4)
IMM GRANULOCYTES # BLD AUTO: 0.02 THOUSAND/UL (ref 0–0.2)
IMM GRANULOCYTES NFR BLD AUTO: 0 % (ref 0–2)
LYMPHOCYTES # BLD AUTO: 1.76 THOUSANDS/ΜL (ref 0.6–4.47)
LYMPHOCYTES NFR BLD AUTO: 24 % (ref 14–44)
MAGNESIUM SERPL-MCNC: 2.1 MG/DL (ref 1.6–2.6)
MCH RBC QN AUTO: 28.6 PG (ref 26.8–34.3)
MCHC RBC AUTO-ENTMCNC: 31.1 G/DL (ref 31.4–37.4)
MCV RBC AUTO: 92 FL (ref 82–98)
MONOCYTES # BLD AUTO: 0.83 THOUSAND/ΜL (ref 0.17–1.22)
MONOCYTES NFR BLD AUTO: 11 % (ref 4–12)
NEUTROPHILS # BLD AUTO: 4.59 THOUSANDS/ΜL (ref 1.85–7.62)
NEUTS SEG NFR BLD AUTO: 63 % (ref 43–75)
NRBC BLD AUTO-RTO: 0 /100 WBCS
PLATELET # BLD AUTO: 270 THOUSANDS/UL (ref 149–390)
PMV BLD AUTO: 9.9 FL (ref 8.9–12.7)
POTASSIUM SERPL-SCNC: 4.2 MMOL/L (ref 3.5–5.3)
RBC # BLD AUTO: 3.67 MILLION/UL (ref 3.81–5.12)
SODIUM SERPL-SCNC: 140 MMOL/L (ref 136–145)
WBC # BLD AUTO: 7.36 THOUSAND/UL (ref 4.31–10.16)

## 2021-04-17 PROCEDURE — 80048 BASIC METABOLIC PNL TOTAL CA: CPT | Performed by: INTERNAL MEDICINE

## 2021-04-17 PROCEDURE — 85025 COMPLETE CBC W/AUTO DIFF WBC: CPT | Performed by: INTERNAL MEDICINE

## 2021-04-17 PROCEDURE — 36415 COLL VENOUS BLD VENIPUNCTURE: CPT | Performed by: INTERNAL MEDICINE

## 2021-04-17 PROCEDURE — 83735 ASSAY OF MAGNESIUM: CPT | Performed by: INTERNAL MEDICINE

## 2021-04-17 PROCEDURE — 99217 PR OBSERVATION CARE DISCHARGE MANAGEMENT: CPT | Performed by: INTERNAL MEDICINE

## 2021-04-17 RX ORDER — METOCLOPRAMIDE 5 MG/1
5 TABLET ORAL
Qty: 10 TABLET | Refills: 0 | Status: SHIPPED | OUTPATIENT
Start: 2021-04-17 | End: 2021-06-21 | Stop reason: SDUPTHER

## 2021-04-17 RX ADMIN — Medication 1 PACKET: at 09:11

## 2021-04-17 RX ADMIN — PANTOPRAZOLE SODIUM 40 MG: 40 TABLET, DELAYED RELEASE ORAL at 09:10

## 2021-04-17 RX ADMIN — LEVOTHYROXINE SODIUM 88 MCG: 88 TABLET ORAL at 06:36

## 2021-04-17 RX ADMIN — MAGNESIUM OXIDE TAB 400 MG (241.3 MG ELEMENTAL MG) 400 MG: 400 (241.3 MG) TAB at 09:12

## 2021-04-17 RX ADMIN — NICOTINE 1 PATCH: 7 PATCH, EXTENDED RELEASE TRANSDERMAL at 09:11

## 2021-04-17 RX ADMIN — SERTRALINE HYDROCHLORIDE 150 MG: 50 TABLET ORAL at 09:10

## 2021-04-17 RX ADMIN — OXYCODONE HYDROCHLORIDE 10 MG: 10 TABLET ORAL at 00:50

## 2021-04-17 RX ADMIN — OXYCODONE HYDROCHLORIDE 10 MG: 10 TABLET ORAL at 09:20

## 2021-04-17 RX ADMIN — CYCLOBENZAPRINE HYDROCHLORIDE 10 MG: 10 TABLET, FILM COATED ORAL at 00:50

## 2021-04-17 RX ADMIN — ENOXAPARIN SODIUM 40 MG: 40 INJECTION SUBCUTANEOUS at 09:11

## 2021-04-17 RX ADMIN — HYDROXYCHLOROQUINE SULFATE 100 MG: 200 TABLET, FILM COATED ORAL at 00:50

## 2021-04-17 RX ADMIN — HYDROXYCHLOROQUINE SULFATE 200 MG: 200 TABLET, FILM COATED ORAL at 09:11

## 2021-04-17 RX ADMIN — ALPRAZOLAM 1 MG: 0.25 TABLET ORAL at 09:20

## 2021-04-17 RX ADMIN — PREDNISONE 5 MG: 10 TABLET ORAL at 09:12

## 2021-04-17 RX ADMIN — OXYCODONE HYDROCHLORIDE 10 MG: 10 TABLET ORAL at 06:36

## 2021-04-17 RX ADMIN — DICYCLOMINE HYDROCHLORIDE 10 MG: 10 CAPSULE ORAL at 09:12

## 2021-04-17 NOTE — ED NOTES
Breakfast tray ordered  Pt denies any other needs or complaints at this time        Luis Doll RN  04/17/21 [id number]

## 2021-04-17 NOTE — DISCHARGE SUMMARY
Discharge Summary - [facility] Internal Medicine    Patient Information: Thea Lynne 60 y o  female MRN: [id number]  Unit/Bed#: ED 32 Encounter: [id number]    Discharging Physician / Practitioner: Bunny Garcia MD  PCP: Leanne Roman MD  Admission Date: 4/16/2021  Discharge Date: 04/17/21    Disposition:     Home    Reason for Admission:  Nausea and vomiting    Discharge Diagnoses: Active Problems:    Nausea and vomiting    Breast cancer (HCC)    Hyponatremia    Systemic lupus erythematosus (HCC)    Hypokalemia  Resolved Problems:    * No resolved hospital problems  *      Consultations During Hospital Stay:  · None    Procedures Performed:     · None    Hospital Course:     Thea Lynne is a 60 y o  female patient who originally presented to the hospital on 4/16/2021 due to nausea and vomiting  Patient recently had chemotherapy, after that she started feeling nauseous  Had very poor p o  Intake last few days  Decided to come to the emergency department last night for evaluation  She denied any fever chills or rigors  She had similar event of receiving chemotherapy and was treated with supportive measures  Patient was subsequently observed overnight with supportive measures including IV fluids antiemetics  Her symptoms have resolved  She is tolerating regular diet  She is being discharged with antiemetics  She will follow with her oncologist for next course of chemo  Condition at Discharge: good     Discharge Day Visit / Exam:     Subjective:  Offers no specific complaints  Anxious to go home  She was able to eat  chicken fingers last night and was able to keep it down      Vitals: Blood Pressure: 108/59 (04/17/21 0413)  Pulse: 71 (04/17/21 0413)  Temperature: 98 2 °F (36 8 °C) (04/16/21 1357)  Temp Source: Oral (04/16/21 1357)  Respirations: 18 (04/17/21 0413)  Height: 5' 3" (160 cm) (04/16/21 1622)  Weight - Scale: 46 7 kg (103 lb) (04/16/21 1622)  SpO2: 98 % (04/17/21 0413)  Exam: Physical Exam     Gen -Patient comfortable   Neck- Supple  No thyromegaly or lymphadenopathy  Lungs-Clear bilaterally without any wheeze or rales   Heart S1-S2, regular rate and rhythm, no murmurs  Abdomen-soft nontender, no organomegaly  Bowel sounds present  Extremities-no cyanosi,  clubbing or edema  Skin- no rash  Neuro-nonfocal       Discussion with Family:     Discharge instructions/Information to patient and family:   See after visit summary for information provided to patient and family  Provisions for Follow-Up Care:  See after visit summary for information related to follow-up care and any pertinent home health orders  Planned Readmission:  No     Discharge Statement:  I spent 35 minutes discharging the patient  This time was spent on the day of discharge  I had direct contact with the patient on the day of discharge  Greater than 50% of the total time was spent examining patient, answering all patient questions, arranging and discussing plan of care with patient as well as directly providing post-discharge instructions  Additional time then spent on discharge activities  Discharge Medications:  See after visit summary for reconciled discharge medications provided to patient and family        ** Please Note: This note has been constructed using a voice recognition system **

## 2021-04-19 DIAGNOSIS — F41.9 ANXIETY: ICD-10-CM

## 2021-04-19 DIAGNOSIS — C50.212 MALIGNANT NEOPLASM OF UPPER-INNER QUADRANT OF LEFT BREAST IN FEMALE, ESTROGEN RECEPTOR NEGATIVE (HCC): ICD-10-CM

## 2021-04-19 DIAGNOSIS — C50.919 MALIGNANT NEOPLASM OF FEMALE BREAST, UNSPECIFIED ESTROGEN RECEPTOR STATUS, UNSPECIFIED LATERALITY, UNSPECIFIED SITE OF BREAST (HCC): Primary | ICD-10-CM

## 2021-04-19 DIAGNOSIS — Z17.1 MALIGNANT NEOPLASM OF UPPER-INNER QUADRANT OF LEFT BREAST IN FEMALE, ESTROGEN RECEPTOR NEGATIVE (HCC): ICD-10-CM

## 2021-04-19 RX ORDER — ALPRAZOLAM 1 MG/1
1 TABLET ORAL 3 TIMES DAILY PRN
Qty: 30 TABLET | Refills: 3 | Status: SHIPPED | OUTPATIENT
Start: 2021-04-19 | End: 2021-05-13 | Stop reason: SDUPTHER

## 2021-04-19 RX ORDER — OXYCODONE HYDROCHLORIDE 10 MG/1
10-15 TABLET ORAL EVERY 4 HOURS PRN
Qty: 120 TABLET | Refills: 0 | Status: SHIPPED | OUTPATIENT
Start: 2021-04-19 | End: 2021-05-27 | Stop reason: ALTCHOICE

## 2021-04-20 DIAGNOSIS — C50.212 MALIGNANT NEOPLASM OF UPPER-INNER QUADRANT OF LEFT BREAST IN FEMALE, ESTROGEN RECEPTOR NEGATIVE (HCC): ICD-10-CM

## 2021-04-20 DIAGNOSIS — T45.1X5A CHEMOTHERAPY INDUCED NEUTROPENIA (HCC): Primary | ICD-10-CM

## 2021-04-20 DIAGNOSIS — D70.1 CHEMOTHERAPY INDUCED NEUTROPENIA (HCC): Primary | ICD-10-CM

## 2021-04-20 DIAGNOSIS — Z17.1 MALIGNANT NEOPLASM OF UPPER-INNER QUADRANT OF LEFT BREAST IN FEMALE, ESTROGEN RECEPTOR NEGATIVE (HCC): ICD-10-CM

## 2021-04-20 RX ORDER — SODIUM CHLORIDE 9 MG/ML
20 INJECTION, SOLUTION INTRAVENOUS ONCE
Status: CANCELLED | OUTPATIENT
Start: 2021-04-26

## 2021-04-20 RX ORDER — SODIUM CHLORIDE 9 MG/ML
20 INJECTION, SOLUTION INTRAVENOUS ONCE
Status: CANCELLED | OUTPATIENT
Start: 2021-06-07

## 2021-04-20 RX ORDER — SODIUM CHLORIDE 9 MG/ML
20 INJECTION, SOLUTION INTRAVENOUS ONCE
Status: CANCELLED | OUTPATIENT
Start: 2021-05-17

## 2021-04-21 ENCOUNTER — PATIENT OUTREACH (OUTPATIENT)
Dept: CASE MANAGEMENT | Facility: HOSPITAL | Age: 56
End: 2021-04-21

## 2021-04-21 NOTE — PROGRESS NOTES
LSW attempted to contact pt, no answer  Pt is currently inpatient  LSW will attempt to contact pt at a later date

## 2021-04-23 DIAGNOSIS — E03.9 HYPOTHYROIDISM, UNSPECIFIED TYPE: ICD-10-CM

## 2021-04-23 DIAGNOSIS — F41.9 ANXIETY: ICD-10-CM

## 2021-04-23 RX ORDER — SERTRALINE HYDROCHLORIDE 100 MG/1
TABLET, FILM COATED ORAL
Qty: 45 TABLET | Refills: 5 | Status: SHIPPED | OUTPATIENT
Start: 2021-04-23 | End: 2021-11-08

## 2021-04-23 RX ORDER — LEVOTHYROXINE SODIUM 88 UG/1
TABLET ORAL
Qty: 30 TABLET | Refills: 5 | Status: SHIPPED | OUTPATIENT
Start: 2021-04-23 | End: 2021-06-04 | Stop reason: SDUPTHER

## 2021-04-26 ENCOUNTER — HOSPITAL ENCOUNTER (OUTPATIENT)
Dept: INFUSION CENTER | Facility: CLINIC | Age: 56
Discharge: HOME | End: 2021-04-26
Payer: MEDICARE

## 2021-04-26 VITALS
OXYGEN SATURATION: 99 % | TEMPERATURE: 96.9 F | DIASTOLIC BLOOD PRESSURE: 78 MMHG | BODY MASS INDEX: 21.28 KG/M2 | HEART RATE: 90 BPM | WEIGHT: 120.15 LBS | SYSTOLIC BLOOD PRESSURE: 140 MMHG | RESPIRATION RATE: 16 BRPM

## 2021-04-26 DIAGNOSIS — T45.1X5A CHEMOTHERAPY INDUCED NEUTROPENIA (HCC): Primary | ICD-10-CM

## 2021-04-26 DIAGNOSIS — D70.1 CHEMOTHERAPY INDUCED NEUTROPENIA (HCC): Primary | ICD-10-CM

## 2021-04-26 DIAGNOSIS — C50.212 MALIGNANT NEOPLASM OF UPPER-INNER QUADRANT OF LEFT BREAST IN FEMALE, ESTROGEN RECEPTOR NEGATIVE (HCC): ICD-10-CM

## 2021-04-26 DIAGNOSIS — Z17.1 MALIGNANT NEOPLASM OF UPPER-INNER QUADRANT OF LEFT BREAST IN FEMALE, ESTROGEN RECEPTOR NEGATIVE (HCC): ICD-10-CM

## 2021-04-26 PROCEDURE — 96417 CHEMO IV INFUS EACH ADDL SEQ: CPT

## 2021-04-26 PROCEDURE — 96413 CHEMO IV INFUSION 1 HR: CPT

## 2021-04-26 RX ORDER — SODIUM CHLORIDE 9 MG/ML
20 INJECTION, SOLUTION INTRAVENOUS ONCE
Status: COMPLETED | OUTPATIENT
Start: 2021-04-26 | End: 2021-04-26

## 2021-04-26 RX ADMIN — PERTUZUMAB 420 MG: 30 INJECTION, SOLUTION, CONCENTRATE INTRAVENOUS at 14:36

## 2021-04-26 RX ADMIN — SODIUM CHLORIDE 20 ML/HR: 0.9 INJECTION, SOLUTION INTRAVENOUS at 13:58

## 2021-04-26 RX ADMIN — SODIUM CHLORIDE 337 MG: 0.9 INJECTION, SOLUTION INTRAVENOUS at 15:11

## 2021-04-26 NOTE — PROGRESS NOTES
Patient here for perjeta/herceptin and is doing well today, she offers no c/o and no changes reported

## 2021-04-26 NOTE — PROGRESS NOTES
Patient tolerated treatment without incident and was discharged post   She offers no c/o and will RTO 5/17/21 for same  AVS given

## 2021-04-26 NOTE — PATIENT INSTRUCTIONS
April 2021 Sunday Monday Tuesday Wednesday Thursday Friday Saturday                       1    TRANSITIONAL CARE MGMT PG   1:45 PM   (30 min )   Kristin Lind MD   [facility] 2     3                4     5     6    INF ONCOLOGY TX-TREATMENT PLAN   1:30 PM   (180 min )   AN INF CHAIR [id number] [address] 7     [address]   1:55 PM   (10 min )   [address] Covid Vaccine 9     10         Cycle 9, Day 1       11     12     13     14     15     16    Admission   4:01 PM   Jae Esposito MD   [facility] 107 Emergency Department   (Discharge: 4/17/2021) 17                18     19     20     21     22     23     24                25     26    INF ONCOLOGY TX-TREATMENT PLAN   1:30 PM   (180 min )   AN INF CHAIR [address] 27     28     29     30    ECHO COMP W CON IF INDICATED   1:00 PM   (60 min )   AN  ECHO 1   [facility] [place]         Cycle 10, Day 1             Treatment Details       4/6/2021 - Cycle 9, Day 1      Chemotherapy: TRASTUZUMAB-QYYP INFUSION, ONCBCN PROVIDER COMMUNICATION2, PERTUZUMAB IVPB, ONCBCN PROVIDER COMMUNICATION2, ONCBCN PROVIDER COMMUNICATION2    4/26/2021 - Cycle 10, Day 1      Chemotherapy: TRASTUZUMAB-QYYP INFUSION, ONCBCN PROVIDER COMMUNICATION2, PERTUZUMAB IVPB, ONCBCN PROVIDER [employer]  [facility] PROVIDER COMMUNICATION2        May 2021      Angel Monday Tuesday Wednesday Thursday Friday Saturday                                 1                2     3    COVID PFIZER BOOSTER   3:45 PM   (10 min )   [address] [facility] 28 Covid Vaccine 4     5     6     7     8                9     10     11     12     13     14     15                16     17    INF ONCOLOGY TX-TREATMENT PLAN   1:30 PM   (180 min )   AN INF  [address]    FOLLOW UP PG  11:25 AM   (20 min )   Dinorah Collins MD   [facility] [place] 19     20     21     22        Cycle 11, Day 1        23     24     25     26     27    GI COLONOSCOPY  10:30 AM   (30 min )   AN  GI ROOM [address] [place] [address] Endoscopy 28     29                30     31                                              Treatment Details       5/17/2021 - Cycle 11, Day 1      Chemotherapy: TRASTUZUMAB-QYYP INFUSION, ONCBCN PROVIDER COMMUNICATION2, PERTUZUMAB IVPB, ONCBCN PROVIDER COMMUNICATION2, [address]

## 2021-04-27 NOTE — UTILIZATION REVIEW
Notification of Discharge   This is a Notification of Discharge from our facility [address]  Please be advised that this patient has been discharge from our facility  Below you will find the admission and discharge date and time including the patients disposition  UTILIZATION REVIEW CONTACT:  Leia Munoz  Utilization   Network Utilization Review Department  Phone: [phone] x carefully listen to the prompts  All voicemails are confidential   Email: [email or web address]     PHYSICIAN ADVISORY SERVICES:  FOR LNCE-SA-VQRT REVIEW - MEDICAL NECESSITY DENIAL  Phone: [phone]  Fax: [phone]  Email: [email or web address]     PRESENTATION DATE: 3/25/2021  3:37 PM  OBERVATION ADMISSION DATE:   INPATIENT ADMISSION DATE: 3/25/21 1929   DISCHARGE DATE: 3/27/2021 12:11 PM  DISPOSITION: Home/Self Care Home/Self Care      IMPORTANT INFORMATION:  Send all requests for admission clinical reviews, approved or denied determinations and any other requests to dedicated fax number below belonging to the campus where the patient is receiving treatment   List of dedicated fax numbers:  [address] DENIALS (Administrative/Medical Necessity) [phone]   [address] (Maternity/NICU/Pediatrics) [phone]   Che Roca [phone]   Candie Acevedo [phone]   Marielena Schwarz [phone]   [address] [phone]   [facility]  [phone]   [address] [facility] [address]  [address] [facility] [address] [facility] [phone]

## 2021-04-30 ENCOUNTER — HOSPITAL ENCOUNTER (OUTPATIENT)
Dept: NON INVASIVE DIAGNOSTICS | Facility: CLINIC | Age: 56
Discharge: HOME | End: 2021-04-30
Payer: MEDICARE

## 2021-04-30 DIAGNOSIS — D70.1 CHEMOTHERAPY INDUCED NEUTROPENIA (HCC): ICD-10-CM

## 2021-04-30 DIAGNOSIS — Z17.1 MALIGNANT NEOPLASM OF UPPER-INNER QUADRANT OF LEFT BREAST IN FEMALE, ESTROGEN RECEPTOR NEGATIVE (HCC): ICD-10-CM

## 2021-04-30 DIAGNOSIS — T45.1X5A CHEMOTHERAPY INDUCED NEUTROPENIA (HCC): ICD-10-CM

## 2021-04-30 DIAGNOSIS — C50.212 MALIGNANT NEOPLASM OF UPPER-INNER QUADRANT OF LEFT BREAST IN FEMALE, ESTROGEN RECEPTOR NEGATIVE (HCC): ICD-10-CM

## 2021-04-30 PROCEDURE — 93306 TTE W/DOPPLER COMPLETE: CPT | Performed by: INTERNAL MEDICINE

## 2021-04-30 PROCEDURE — 93356 MYOCRD STRAIN IMG SPCKL TRCK: CPT

## 2021-04-30 PROCEDURE — 93306 TTE W/DOPPLER COMPLETE: CPT

## 2021-05-03 ENCOUNTER — IMMUNIZATIONS (OUTPATIENT)
Dept: FAMILY MEDICINE CLINIC | Facility: HOSPITAL | Age: 56
End: 2021-05-03

## 2021-05-03 DIAGNOSIS — Z23 ENCOUNTER FOR IMMUNIZATION: Primary | ICD-10-CM

## 2021-05-03 PROCEDURE — 0002A SARS-COV-2 / COVID-19 MRNA VACCINE (PFIZER-BIONTECH) 30 MCG: CPT

## 2021-05-03 PROCEDURE — 91300 SARS-COV-2 / COVID-19 MRNA VACCINE (PFIZER-BIONTECH) 30 MCG: CPT

## 2021-05-06 ENCOUNTER — PATIENT OUTREACH (OUTPATIENT)
Dept: CASE MANAGEMENT | Facility: HOSPITAL | Age: 56
End: 2021-05-06

## 2021-05-06 NOTE — PROGRESS NOTES
LSW attempted to contact pt for follow up, no answer  LSW left message and will check on pt during next treatment on 5/17

## 2021-05-13 ENCOUNTER — ANESTHESIA EVENT (OUTPATIENT)
Dept: GASTROENTEROLOGY | Facility: AMBULARY SURGERY CENTER | Age: 56
End: 2021-05-13

## 2021-05-13 DIAGNOSIS — F41.9 ANXIETY: ICD-10-CM

## 2021-05-13 RX ORDER — ALPRAZOLAM 1 MG/1
1 TABLET ORAL 3 TIMES DAILY PRN
Qty: 30 TABLET | Refills: 0 | Status: SHIPPED | OUTPATIENT
Start: 2021-05-13 | End: 2021-06-04 | Stop reason: SDUPTHER

## 2021-05-13 NOTE — TELEPHONE ENCOUNTER
Spoke with pt, aware of NP instructions  Will pass this to [facility] for review on Monday

## 2021-05-13 NOTE — TELEPHONE ENCOUNTER
In regards to her question regarding changing oxycodone to Vicodin:    Unfortunately, Dr Yobani Boyer is out of the office until Monday  Will await Dr Marissa Campbell recommendations regarding this

## 2021-05-13 NOTE — TELEPHONE ENCOUNTER
Patient also wants to know if she can go back on vicodin instead of the onycodone, she would like a refill of vicodin, patient stated that she functions better on vicodin

## 2021-05-16 DIAGNOSIS — C50.919 MALIGNANT NEOPLASM OF FEMALE BREAST, UNSPECIFIED ESTROGEN RECEPTOR STATUS, UNSPECIFIED LATERALITY, UNSPECIFIED SITE OF BREAST (HCC): Primary | ICD-10-CM

## 2021-05-16 RX ORDER — HYDROCODONE BITARTRATE AND ACETAMINOPHEN 5; 325 MG/1; MG/1
1 TABLET ORAL EVERY 6 HOURS PRN
Qty: 120 TABLET | Refills: 0 | Status: SHIPPED | OUTPATIENT
Start: 2021-05-16 | End: 2021-06-07 | Stop reason: SDUPTHER

## 2021-05-17 ENCOUNTER — HOSPITAL ENCOUNTER (OUTPATIENT)
Dept: INFUSION CENTER | Facility: CLINIC | Age: 56
Discharge: HOME | End: 2021-05-17
Payer: MEDICARE

## 2021-05-17 ENCOUNTER — TELEPHONE (OUTPATIENT)
Dept: FAMILY MEDICINE CLINIC | Facility: CLINIC | Age: 56
End: 2021-05-17

## 2021-05-17 ENCOUNTER — PATIENT OUTREACH (OUTPATIENT)
Dept: CASE MANAGEMENT | Facility: HOSPITAL | Age: 56
End: 2021-05-17

## 2021-05-17 VITALS
SYSTOLIC BLOOD PRESSURE: 132 MMHG | WEIGHT: 115 LBS | TEMPERATURE: 98.3 F | DIASTOLIC BLOOD PRESSURE: 86 MMHG | HEART RATE: 115 BPM | BODY MASS INDEX: 20.37 KG/M2 | OXYGEN SATURATION: 98 % | RESPIRATION RATE: 20 BRPM

## 2021-05-17 DIAGNOSIS — Z17.1 MALIGNANT NEOPLASM OF UPPER-INNER QUADRANT OF LEFT BREAST IN FEMALE, ESTROGEN RECEPTOR NEGATIVE (HCC): ICD-10-CM

## 2021-05-17 DIAGNOSIS — C50.212 MALIGNANT NEOPLASM OF UPPER-INNER QUADRANT OF LEFT BREAST IN FEMALE, ESTROGEN RECEPTOR NEGATIVE (HCC): ICD-10-CM

## 2021-05-17 DIAGNOSIS — T45.1X5A CHEMOTHERAPY INDUCED NEUTROPENIA (HCC): Primary | ICD-10-CM

## 2021-05-17 DIAGNOSIS — D70.1 CHEMOTHERAPY INDUCED NEUTROPENIA (HCC): Primary | ICD-10-CM

## 2021-05-17 PROCEDURE — 96413 CHEMO IV INFUSION 1 HR: CPT

## 2021-05-17 PROCEDURE — 96417 CHEMO IV INFUS EACH ADDL SEQ: CPT

## 2021-05-17 RX ORDER — SODIUM CHLORIDE 9 MG/ML
20 INJECTION, SOLUTION INTRAVENOUS ONCE
Status: COMPLETED | OUTPATIENT
Start: 2021-05-17 | End: 2021-05-17

## 2021-05-17 RX ADMIN — SODIUM CHLORIDE 337 MG: 0.9 INJECTION, SOLUTION INTRAVENOUS at 14:55

## 2021-05-17 RX ADMIN — PERTUZUMAB 420 MG: 30 INJECTION, SOLUTION, CONCENTRATE INTRAVENOUS at 14:19

## 2021-05-17 RX ADMIN — SODIUM CHLORIDE 20 ML/HR: 0.9 INJECTION, SOLUTION INTRAVENOUS at 14:00

## 2021-05-17 NOTE — PROGRESS NOTES
Pt tolerated treatment well with no complications  Port flushed and blood return noted before de accessing  Pt aware of future appts  AVS printed and given to pt

## 2021-05-17 NOTE — PROGRESS NOTES
Pt here for Trazimera and Perjeta infusion  EF is 65% from ECHO completed on 4/30/21  Port accessed and blood return noted with NSS infusing @ [facility]  Pt resting comfortably and has no further questions or concerns at this time  Call bell with in reach

## 2021-05-17 NOTE — PROGRESS NOTES
LSW met with pt in [facility] infusion unit  PT stated she is doing well physically but is struggling emotionally  PT shared that she is tired of being home all the time and not able to go out and be with her friends  Pt did share that she has an upcoming appt with her physician and will be asking if she can safely begin to go to small gatherings with family and friends  Pt believes this will be good for her overall mental health  Pt is continuing to see her therapist with phone sessions and stated she will be asking for in person sessions if her physician agrees it is safe  Pts attitude was upbeat and cheerful, pt thanked LSW for visiting with her today  LSW has moved pt to [facility]box and will check in periodically, pt was encouraged to reach out when needs arise, pt agreed to do so

## 2021-05-17 NOTE — TELEPHONE ENCOUNTER
Pt called requesting a script for Magic Mouthwash  It was last prescription was prescribed in the hospital     She has 2 more rounds of chemo, then she is done  She sees Onc tomorrow  Please advise

## 2021-05-18 ENCOUNTER — OFFICE VISIT (OUTPATIENT)
Dept: HEMATOLOGY ONCOLOGY | Facility: CLINIC | Age: 56
End: 2021-05-18
Payer: MEDICARE

## 2021-05-18 VITALS
BODY MASS INDEX: 20.73 KG/M2 | TEMPERATURE: 97.9 F | HEIGHT: 63 IN | RESPIRATION RATE: 18 BRPM | WEIGHT: 117 LBS | OXYGEN SATURATION: 98 % | HEART RATE: 108 BPM | DIASTOLIC BLOOD PRESSURE: 82 MMHG | SYSTOLIC BLOOD PRESSURE: 122 MMHG

## 2021-05-18 DIAGNOSIS — C50.919 MALIGNANT NEOPLASM OF FEMALE BREAST, UNSPECIFIED ESTROGEN RECEPTOR STATUS, UNSPECIFIED LATERALITY, UNSPECIFIED SITE OF BREAST (HCC): Primary | ICD-10-CM

## 2021-05-18 DIAGNOSIS — C50.212 MALIGNANT NEOPLASM OF UPPER-INNER QUADRANT OF LEFT BREAST IN FEMALE, ESTROGEN RECEPTOR NEGATIVE (HCC): ICD-10-CM

## 2021-05-18 DIAGNOSIS — Z17.1 MALIGNANT NEOPLASM OF LEFT BREAST IN FEMALE, ESTROGEN RECEPTOR NEGATIVE, UNSPECIFIED SITE OF BREAST (HCC): Primary | ICD-10-CM

## 2021-05-18 DIAGNOSIS — T45.1X5A CHEMOTHERAPY INDUCED NEUTROPENIA (HCC): ICD-10-CM

## 2021-05-18 DIAGNOSIS — Z17.1 MALIGNANT NEOPLASM OF UPPER-INNER QUADRANT OF LEFT BREAST IN FEMALE, ESTROGEN RECEPTOR NEGATIVE (HCC): ICD-10-CM

## 2021-05-18 DIAGNOSIS — C50.912 MALIGNANT NEOPLASM OF LEFT BREAST IN FEMALE, ESTROGEN RECEPTOR NEGATIVE, UNSPECIFIED SITE OF BREAST (HCC): Primary | ICD-10-CM

## 2021-05-18 DIAGNOSIS — D70.1 CHEMOTHERAPY INDUCED NEUTROPENIA (HCC): ICD-10-CM

## 2021-05-18 PROCEDURE — 99214 OFFICE O/P EST MOD 30 MIN: CPT | Performed by: INTERNAL MEDICINE

## 2021-05-18 RX ORDER — SODIUM CHLORIDE 9 MG/ML
20 INJECTION, SOLUTION INTRAVENOUS ONCE
Status: CANCELLED | OUTPATIENT
Start: 2021-06-28

## 2021-05-18 NOTE — PROGRESS NOTES
Hematology / Oncology Outpatient Follow Up Note    Betina Exon 60 y o  female :[id number] BNR:[id number]         Date:  2021    Assessment / Plan:  A 58-year-old postmenopausal woman with clinical stage II left breast cancer, grade 3, ER negative, CT negative, HER2 fish positive disease   She underwent neoadjuvant chemotherapy with TCH with pertuzumab which produced severe toxicity   She therefore, she subsequently or on weekly paclitaxel, trastuzumab and pertuzumab for 6 weeks   She achieved complete pathological response when she underwent mastectomy and sentinel lymph node biopsy in 2020  She is currently on adjuvant trastuzumab and pertuzumab with excellent tolerance without cardiac toxicity  Clinically, she has no evidence recurrent disease  I recommended her to continue with trastuzumab and pertuzumab of until the end of 2021  She is in agreement with my recommendation  I am going to ask interventional radiology to remove the Port-A-Cath in 2021  I will see her again in 6 months for routine follow-up  I strongly encouraged her to cut back her smoking  She is in agreement with my recommendations  Subjective:      HPI:  A 58-year-old postmenopausal woman who felt a lump in her left breast which she brought to medical attention  Fanny Dubois was found to have large left breast mass for which she underwent biopsy recently which showed invasive ductal carcinoma, grade 3   This was ER negative, CT negative, HER2 2+ disease  K[place]um [place] fish was positive for gene application with ratio of 2 2   She was subsequently seen by Dr Katy Granados on MRI, she had 2 7 x 2 1 x 2 3 cm of left breast mass with no enlarged axillary lymph node   Bone scan was negative for osseous metastasis   CT scan of chest abdomen pelvis showed no evidence of distant metastasis   She was referred to me to discuss neoadjuvant chemotherapy   She presents today with her   Fanny Dubois has no breast related symptomatology   She denied any pain   She has no respiratory symptoms   Her weight is stable   She has extensive history of smoking with 1 and half pack per day for 40 years  Meron Vela also drinks 4 beers every day for many years  Meron Vela has lupus for which she is on low-dose prednisone   She also has hypothyroidism as well as well-controlled hypertension  Meron Vela has a mother and maternal and had breast cancer above the age of 61  She has no family history of ovarian cancer   Her performance status is 0 to 1/4 on the ECOG scale            Interval History:  A 49-year-old postmenopausal woman with clinical stage II left breast cancer, grade 3, ER negative, AR negative, HER2 fish positive disease  She had 3 cycle of neoadjuvant chemotherapy with TCH with pertuzumab with significant toxicities   She has multiple hospitalization due to the severe nausea vomiting as well as deconditioning  Due to the toxicity, treatment regimen was changed to weekly paclitaxel, try weekly pertuzumab and trastuzumab which she has been tolerating well   She completed neoadjuvant chemotherapy in July 2020, resulting in complete clinical response   Subsequently, she underwent mastectomy and sentinel lymph node biopsy by Dr Maria Eugenia Guerra in August 2020 which showed no evidence of residual carcinoma   3 sentinel lymph nodes were all negative for metastatic disease, consistent with complete pathological response  She is currently on adjuvant trastuzumab and pertuzumab with no cardiac toxicity  Her echocardiogram in April 2021 showed ejection fraction 65%  Since her last visit, she has a few hospitalization with nausea vomiting  The etiology of nausea vomiting is not totally clear  She is eating fair  Her weight is stable  She has no respiratory symptoms  She is still active smoker with less than 1 pack per day  She has no complaint of pain    Her performance status is normal         Objective:      Primary Diagnosis:     Clinical stage II left breast cancer, grade 3, ER negative, NM negative, HER2 fish positive disease, diagnosed in March 2020       Cancer Staging:  Cancer Staging  No matching staging information was found for the patient         Previous Hematologic/ Oncologic Treatment:       1  Neoadjuvant chemotherapy with TCH with pertuzumab x3 cycles, completed in early May 2020       2  Neoadjuvant chemotherapy with weekly paclitaxel, try weekly trastuzumab and pertuzumab x6 weeks, completed in July 2020       Current Hematologic/ Oncologic Treatment:       Adjuvant therapy with trastuzumab and pertuzumab  To be completed in June 2021      Disease Status:      1  Clinical complete response      2  Complete pathological response      3  HARVEY status post mastectomy and sentinel lymph node biopsy      Test Results:     Pathology:     Left breast biopsy showed invasive ductal carcinoma, grade 3, ER negative, NM negative, HER2 fish positive disease with ratio of 2 2      In August 2020, mastectomy specimen showed no evidence of residual carcinoma   3 sentinel lymph nodes were all negative for metastatic disease      Radiology:     Bone scan was negative for osseous metastasis      CT scan of chest abdomen pelvis showed no evidence of distant metastasis      Echocardiogram showed ejection fraction 65% in April 2021      Laboratory:     See below      Physical Exam:        General Appearance:    Alert, oriented          Eyes:    PERRL   Ears:    Normal external ear canals, both ears   Nose:   Nares normal, septum midline   Throat:   Mucosa moist  Pharynx without injection  Neck:   Supple         Lungs:     Clear to auscultation bilaterally   Chest Wall:    No tenderness or deformity    Heart:    Regular rate and rhythm         Abdomen:     Soft, non-tender, bowel sounds +, no organomegaly               Extremities:   Extremities no cyanosis or edema         Skin:   no rash or icterus      Lymph nodes:   Cervical, supraclavicular, and axillary nodes normal   Neurologic:   CNII-XII intact, normal strength, sensation and reflexes     Throughout             Breast exam:   status post left mastectomy without reconstruction  No palpable abnormality in her left chest wall  Right breast exam is negative             ROS: Review of Systems   All other systems reviewed and are negative  Imaging: No results found  Labs:   Lab Results   Component Value Date    WBC 7 36 04/17/2021    HGB 10 5 (L) 04/17/2021    HCT 33 8 (L) 04/17/2021    MCV 92 04/17/2021     04/17/2021     Lab Results   Component Value Date     (L) 04/22/2015    K 4 2 04/17/2021     (H) 04/17/2021    CO2 22 04/17/2021    ANIONGAP 11 04/22/2015    BUN 18 04/17/2021    CREATININE 0 98 04/17/2021    GLUCOSE 97 04/22/2015    GLUF 87 10/19/2020    CALCIUM 7 7 (L) 04/17/2021    CORRECTEDCA 10 0 03/18/2021    AST 12 03/25/2021    ALT 11 (L) 03/25/2021    ALKPHOS 76 03/25/2021    PROT 7 2 04/22/2015    BILITOT 0 2 04/22/2015    EGFR 64 04/17/2021         Current Medications: Reviewed  Allergies: Reviewed  PMH/FH/SH:  Reviewed      Vital Sign:    Body surface area is 1 54 meters squared      Wt Readings from Last 3 Encounters:   05/18/21 53 1 kg (117 lb)   05/17/21 52 2 kg (115 lb)   04/26/21 54 5 kg (120 lb 2 4 oz)        Temp Readings from Last 3 Encounters:   05/18/21 97 9 °F (36 6 °C) (Tympanic Core)   05/17/21 98 3 °F (36 8 °C) (Temporal)   04/26/21 (!) 96 9 °F (36 1 °C) (Temporal)        BP Readings from Last 3 Encounters:   05/18/21 122/82   05/17/21 132/86   04/26/21 140/78         Pulse Readings from Last 3 Encounters:   05/18/21 (!) 108   05/17/21 (!) 115   04/26/21 90     @LASTSAO2(3)@

## 2021-05-27 ENCOUNTER — HOSPITAL ENCOUNTER (OUTPATIENT)
Dept: GASTROENTEROLOGY | Facility: AMBULARY SURGERY CENTER | Age: 56
Setting detail: OUTPATIENT SURGERY
Discharge: HOME | End: 2021-05-27
Attending: INTERNAL MEDICINE | Admitting: INTERNAL MEDICINE
Payer: MEDICARE

## 2021-05-27 ENCOUNTER — ANESTHESIA (OUTPATIENT)
Dept: GASTROENTEROLOGY | Facility: AMBULARY SURGERY CENTER | Age: 56
End: 2021-05-27

## 2021-05-27 VITALS
RESPIRATION RATE: 18 BRPM | HEIGHT: 63 IN | WEIGHT: 114.8 LBS | SYSTOLIC BLOOD PRESSURE: 122 MMHG | HEART RATE: 93 BPM | TEMPERATURE: 97 F | OXYGEN SATURATION: 98 % | DIASTOLIC BLOOD PRESSURE: 66 MMHG | BODY MASS INDEX: 20.34 KG/M2

## 2021-05-27 DIAGNOSIS — G89.29 CHRONIC RLQ PAIN: ICD-10-CM

## 2021-05-27 DIAGNOSIS — R10.31 CHRONIC RLQ PAIN: ICD-10-CM

## 2021-05-27 DIAGNOSIS — R19.7 INTERMITTENT DIARRHEA: ICD-10-CM

## 2021-05-27 DIAGNOSIS — K52.9 COLITIS: ICD-10-CM

## 2021-05-27 PROCEDURE — 88305 TISSUE EXAM BY PATHOLOGIST: CPT | Performed by: PATHOLOGY

## 2021-05-27 PROCEDURE — 45380 COLONOSCOPY AND BIOPSY: CPT | Performed by: INTERNAL MEDICINE

## 2021-05-27 PROCEDURE — 45385 COLONOSCOPY W/LESION REMOVAL: CPT | Performed by: INTERNAL MEDICINE

## 2021-05-27 RX ORDER — ONDANSETRON 2 MG/ML
4 INJECTION INTRAMUSCULAR; INTRAVENOUS ONCE AS NEEDED
Status: CANCELLED | OUTPATIENT
Start: 2021-05-27

## 2021-05-27 RX ORDER — SODIUM CHLORIDE, SODIUM LACTATE, POTASSIUM CHLORIDE, CALCIUM CHLORIDE 600; 310; 30; 20 MG/100ML; MG/100ML; MG/100ML; MG/100ML
125 INJECTION, SOLUTION INTRAVENOUS CONTINUOUS
Status: DISCONTINUED | OUTPATIENT
Start: 2021-05-27 | End: 2021-05-31 | Stop reason: HOSPADM

## 2021-05-27 RX ORDER — PROPOFOL 10 MG/ML
INJECTION, EMULSION INTRAVENOUS AS NEEDED
Status: DISCONTINUED | OUTPATIENT
Start: 2021-05-27 | End: 2021-05-27

## 2021-05-27 RX ADMIN — PROPOFOL 30 MG: 10 INJECTION, EMULSION INTRAVENOUS at 11:12

## 2021-05-27 RX ADMIN — PROPOFOL 30 MG: 10 INJECTION, EMULSION INTRAVENOUS at 11:10

## 2021-05-27 RX ADMIN — PROPOFOL 30 MG: 10 INJECTION, EMULSION INTRAVENOUS at 11:20

## 2021-05-27 RX ADMIN — PROPOFOL 30 MG: 10 INJECTION, EMULSION INTRAVENOUS at 11:14

## 2021-05-27 RX ADMIN — PROPOFOL 30 MG: 10 INJECTION, EMULSION INTRAVENOUS at 11:17

## 2021-05-27 RX ADMIN — SODIUM CHLORIDE, SODIUM LACTATE, POTASSIUM CHLORIDE, AND CALCIUM CHLORIDE: .6; .31; .03; .02 INJECTION, SOLUTION INTRAVENOUS at 11:06

## 2021-05-27 RX ADMIN — PROPOFOL 30 MG: 10 INJECTION, EMULSION INTRAVENOUS at 11:23

## 2021-05-27 RX ADMIN — SODIUM CHLORIDE, SODIUM LACTATE, POTASSIUM CHLORIDE, AND CALCIUM CHLORIDE: .6; .31; .03; .02 INJECTION, SOLUTION INTRAVENOUS at 11:08

## 2021-05-27 RX ADMIN — PROPOFOL 100 MG: 10 INJECTION, EMULSION INTRAVENOUS at 11:08

## 2021-05-27 NOTE — H&P
History and Physical - [facility]  Britany Davey 60 y o  female MRN: [id number]    HPI: Tiffanie Edwards is a 60y o  year old female who presents with RLQ pain and abnormal CT  Review of Systems    Historical Information   Past Medical History:   Diagnosis Date    Colon polyp     Disease of thyroid gland     Hypertension     Lupus ([facility] [address] )     Malignant neoplasm of upper-inner quadrant of left breast in female, estrogen receptor negative ([facility] [address] ) 2/25/2020    Nephrolithiasis     PTSD (post-traumatic stress disorder)      Past Surgical History:   Procedure Laterality Date    FEMUR FRACTURE SURGERY      FL GUIDED CENTRAL VENOUS ACCESS DEVICE INSERTION  3/17/2020    HYSTERECTOMY      LEFT OOPHORECTOMY      due to torsion     MAMMO STEREOTACTIC BREAST BIOPSY RIGHT (ALL INC) Right 2/12/2020    MASTECTOMY W/ SENTINEL NODE BIOPSY Left 8/18/2020    Procedure: BREAST MASTECTOMY WITH SENTINEL LYMPH NODE BIOPSY, LYMPHATIC MAPPING WITH BLUE DYE AND RADIOACTIVE DYE (INJECT AT 1430 BY DR WRIGHT IN THE OR);   Surgeon: Fidelia Marques MD;  Location: [facility] Main [facility];  Service: Surgical Oncology    MRI BREAST BIOPSY LEFT (ALL INCLUSIVE) Left 3/20/2020    MO INSJ TUNNELED CTR VAD W/SUBQ PORT AGE 5 YR/> N/A 3/17/2020    Procedure: INSERTION VENOUS PORT ( PORT-A-CATH) IR;  Surgeon: Wilfredo Vázquez, DO;  Location: [facility];  Service: Interventional Radiology    US GUIDANCE BREAST BIOPSY LEFT EACH ADDITIONAL Left 2/12/2020    US GUIDED BREAST BIOPSY LEFT COMPLETE Left 2/12/2020    VAGINA SURGERY       Social History   Social History     Substance and Sexual Activity   Alcohol Use Not Currently     Social History     Substance and Sexual Activity   Drug Use No     Social History     Tobacco Use   Smoking Status Current Every Day Smoker    Packs/day: 0 50    Years: 40 00    Pack years: 20 00    Types: Cigarettes    Start date: 1990   Smokeless Tobacco Never Used     Family History   Problem Relation Age of Onset    Diabetes Mother     Hypertension Mother     Nephrolithiasis Mother     Breast cancer Mother 79    Heart disease Father     Hypertension Father     Diabetes Brother     Nephrolithiasis Brother     Breast cancer Maternal Aunt     No Known Problems Maternal Grandmother     No Known Problems Maternal Grandfather     No Known Problems Paternal Grandmother     No Known Problems Paternal Grandfather        Meds/Allergies     (Not in a hospital admission)      Allergies   Allergen Reactions    Mobic [Meloxicam] Eye Swelling     Reaction Date: 12Aug2011;     Methotrexate Rash    Sulfa Antibiotics Rash       Objective     /79   Pulse (!) 126   Temp 97 6 °F (36 4 °C) (Temporal)   Resp 20   Ht 5' 3" (1 6 m)   Wt 52 1 kg (114 lb 12 8 oz)   LMP  (LMP Unknown)   SpO2 97%   BMI 20 34 kg/m²       PHYSICAL EXAM    Gen: NAD  CV: RRR  CHEST: Clear  ABD: soft, NT/ND  EXT: no edema  Neuro: AAO      ASSESSMENT/PLAN:  This is a 60y o  year old female here for RLQ pain and abnormal CT         PLAN:   Procedure: colonoscopy

## 2021-05-27 NOTE — ANESTHESIA POSTPROCEDURE EVALUATION
Post-Op Assessment Note    CV Status:  Stable  Pain Score: 0    Pain management: adequate     Mental Status:  Alert and awake   Hydration Status:  Euvolemic   PONV Controlled:  Controlled   Airway Patency:  Patent      Post Op Vitals Reviewed: Yes      Staff: Anesthesiologist, CRNA         No complications documented      /55 (05/27/21 1132)    Temp (!) 97 °F (36 1 °C) (05/27/21 1132)    Pulse 97 (05/27/21 1132)   Resp 18 (05/27/21 1132)    SpO2 96 % (05/27/21 1132)

## 2021-05-27 NOTE — ANESTHESIA PREPROCEDURE EVALUATION
Procedure:  COLONOSCOPY    Relevant Problems   ANESTHESIA (within normal limits)      ENDO   (+) Hypothyroidism      /RENAL   (+) JEFERSON (acute kidney injury) ([facility] [address] )   (+) Nephrolithiasis      GYN   (+) Breast cancer (HCC)   (+) Malignant neoplasm of upper-inner quadrant of left breast in female, estrogen receptor negative (HCC)      HEMATOLOGY   (+) Anemia      MUSCULOSKELETAL   (+) Systemic lupus erythematosus (HCC)      NEURO/PSYCH   (+) Anxiety   (+) Continuous opioid dependence (HCC)   (+) Depression   (+) Posttraumatic stress disorder      PULMONARY   (+) Smoking        Physical Exam    Airway    Mallampati score: II  TM Distance: >3 FB  Neck ROM: full     Dental   lower dentures and upper dentures,     Cardiovascular  Rhythm: regular, Rate: abnormal,     Pulmonary  Breath sounds clear to auscultation,     Other Findings        Anesthesia Plan  ASA Score- 3     Anesthesia Type- IV sedation with anesthesia with ASA Monitors  Additional Monitors:   Airway Plan:           Plan Factors-Exercise tolerance (METS): >4 METS  Chart reviewed  EKG reviewed  Existing labs reviewed  Patient summary reviewed  Patient is a current smoker  Patient instructed to abstain from smoking on day of procedure  Patient smoked on day of surgery  Induction- intravenous  Postoperative Plan-     Informed Consent- Anesthetic plan and risks discussed with patient  I personally reviewed this patient with the CRNA  Discussed and agreed on the Anesthesia Plan with the CRNA  David Mckeon

## 2021-06-01 ENCOUNTER — OFFICE VISIT (OUTPATIENT)
Dept: SURGICAL ONCOLOGY | Facility: CLINIC | Age: 56
End: 2021-06-01
Payer: MEDICARE

## 2021-06-01 VITALS
HEIGHT: 63 IN | BODY MASS INDEX: 21.09 KG/M2 | WEIGHT: 119 LBS | RESPIRATION RATE: 16 BRPM | OXYGEN SATURATION: 99 % | HEART RATE: 102 BPM | TEMPERATURE: 97.6 F | DIASTOLIC BLOOD PRESSURE: 80 MMHG | SYSTOLIC BLOOD PRESSURE: 142 MMHG

## 2021-06-01 DIAGNOSIS — R07.89 CHEST WALL TENDERNESS: ICD-10-CM

## 2021-06-01 DIAGNOSIS — Z90.12 STATUS POST LEFT MASTECTOMY: ICD-10-CM

## 2021-06-01 DIAGNOSIS — C50.212 MALIGNANT NEOPLASM OF UPPER-INNER QUADRANT OF LEFT BREAST IN FEMALE, ESTROGEN RECEPTOR NEGATIVE (HCC): Primary | ICD-10-CM

## 2021-06-01 DIAGNOSIS — Z17.1 MALIGNANT NEOPLASM OF UPPER-INNER QUADRANT OF LEFT BREAST IN FEMALE, ESTROGEN RECEPTOR NEGATIVE (HCC): Primary | ICD-10-CM

## 2021-06-01 PROCEDURE — 99214 OFFICE O/P EST MOD 30 MIN: CPT | Performed by: NURSE PRACTITIONER

## 2021-06-01 NOTE — PROGRESS NOTES
Surgical Oncology Follow Up       [address]  [facility]  [address]  [facility] [address]    Susan Wilkins Mock  1965  [id number]  [address] [facility]  [facility]  [address] [address]    Chief Complaint   Patient presents with    Breast Cancer     Pt is here for 6 month f/u        Assessment/Plan:  1  Malignant neoplasm of upper-inner quadrant of left breast in female, estrogen receptor negative ([address] )    - Mammo diagnostic right w 3d & cad; Future  - Ambulatory referral to Physical Therapy; Future  - 6 mo f/u visit    2  Chest wall tenderness    - Ambulatory referral to Physical Therapy; Future    3  Status post left mastectomy    - Ambulatory referral to Physical Therapy; Future      Discussion/Summary:  Patient is a 24-year-old female that presents today for a follow-up visit for left breast cancer diagnosed in February of 2020  Her pathology revealed invasive ductal carcinoma, ER/OH negative, HER2 fish positive  She underwent genetic testing which was negative  She received neoadjuvant chemotherapy with targeted HER2 therapy and underwent a left mastectomy and sentinel node biopsy with Dr Katy Mckeon in August of 2020  She had no residual carcinoma  She continues on Herceptin therapy-she has 2 more scheduled infusions  She complains today of pain at her left mastectomy site  On clinical exam she reports tenderness of the chest wall  I am recommending physical therapy with transition to the strength ABC program   Patient also reports chronic pain secondary to lupus and is following with GI for abdominal pain/bloating  There are no worrisome findings on her exam today  I will plan to see her back in 6 months or sooner should the need arise  She was instructed to call with any new concerns or symptoms prior to that time  All of her questions were answered today      History of Present Illness:     Oncology History   Malignant neoplasm of upper-inner quadrant of left breast in female, estrogen receptor negative ([facility] [address] )   2/12/2020 Biopsy    A  & B  Right breast stereotactic biopsy with and without calcs; 11 o'clock, 10 cm from nipple  Benign breast glandular parenchyma with fibroadenomatoid stromal hyperplasia  No atypia and no evidence of malignancy     C  Left breast ultrasound-guided biopsy; 10 o'clock, 5 cm from nipple  Benign breast glandular tissue  No atypia and no evidence of malignancy    D  Left breast ultrasound-guided biopsy; 10 o'clock, 4 cm from nipple  Invasive breast carcinoma of no special type (ductal NST)  Grade 3  ER 0  CO 0  HER2 2+; FISH positive    Concordant  Right breast clear  Malignant mass measures 2 3 cm on mammo  Left axilla US negative  Mammographic abnormality with no US correlate  MRI recommended to further evaluate this finding  2/26/2020 Genetic Testing    The following genes were evaluated: LIZZETTE, BRCA1, BRCA2, CDH1, CHEK2, PALB2, PTEN, STK11, TP53  Negative result   No pathogenic sequence variants or deletions/dupllications identified  [employer]     3/24/2020 - 5/25/2020 Chemotherapy    pegfilgrastim (NEULASTA ONPRO) subcutaneous injection kit 6 mg, 6 mg, Subcutaneous, Once, 4 of 6 cycles  Administration: 6 mg (3/24/2020), 6 mg (4/14/2020), 6 mg (5/5/2020)  fosaprepitant (EMEND) 150 mg in sodium chloride 0 9 % 250 mL IVPB, 150 mg, Intravenous, Once, 4 of 6 cycles  Administration: 150 mg (3/24/2020), 150 mg (4/14/2020), 150 mg (5/5/2020)  pertuzumab (PERJETA) 840 mg in sodium chloride 0 9 % 250 mL IVPB, 840 mg, Intravenous, Once, 4 of 6 cycles  Administration: 840 mg (3/24/2020), 420 mg (4/14/2020), 420 mg (5/5/2020)  CARBOplatin (PARAPLATIN) 547 8 mg in sodium chloride 0 9 % 250 mL IVPB, 547 8 mg, Intravenous, Once, 4 of 6 cycles  Administration: 547 8 mg (3/24/2020), 575 4 mg (4/14/2020), 625 8 mg (5/5/2020)  DOCEtaxel (TAXOTERE) 122 2 mg in sodium chloride 0 9 % 250 mL chemo infusion, 75 mg/m2 = 122 2 mg, Intravenous, Once, 4 of 6 cycles  Administration: 122 2 mg (3/24/2020), 122 2 mg (4/14/2020), 122 2 mg (5/5/2020)  trastuzumab (HERCEPTIN) 486 mg in sodium chloride 0 9 % 250 mL chemo infusion, 8 mg/kg = 486 mg, Intravenous, Once, 4 of 18 cycles  Administration: 486 mg (3/24/2020), 364 mg (4/14/2020), 364 mg (5/5/2020)     6/3/2020 -  Chemotherapy    pertuzumab (PERJETA) 420 mg in sodium chloride 0 9 % 250 mL IVPB, 420 mg (50 % of original dose 840 mg), Intravenous, Once, 11 of 13 cycles  Dose modification: 420 mg (original dose 840 mg, Cycle 1, Reason: Dose Not Tolerated)  Administration: 420 mg (6/3/2020), 420 mg (11/9/2020), 420 mg (6/25/2020), 420 mg (11/30/2020), 420 mg (12/21/2020), 420 mg (1/11/2021), 420 mg (2/2/2021), 420 mg (2/23/2021), 420 mg (4/6/2021), 420 mg (4/26/2021), 420 mg (5/17/2021)  trastuzumab-qyyp (TRAZIMERA) 337 mg in sodium chloride 0 9 % 250 mL chemo infusion, 6 mg/kg = 337 mg (100 % of original dose 6 mg/kg), Intravenous, Once, 3 of 5 cycles  Dose modification: 6 mg/kg (original dose 6 mg/kg, Cycle 9)  Administration: 337 mg (4/6/2021), 337 mg (4/26/2021), 337 mg (5/17/2021)  PACLitaxel (TAXOL) 127 2 mg in sodium chloride 0 9 % 250 mL chemo IVPB, 80 mg/m2 = 127 2 mg, Intravenous, Once, 2 of 2 cycles  Administration: 127 2 mg (6/3/2020), 127 2 mg (6/10/2020), 127 2 mg (6/17/2020), 127 2 mg (6/25/2020), 127 2 mg (7/1/2020), 127 2 mg (7/7/2020)  trastuzumab (HERCEPTIN) 340 mg in sodium chloride 0 9 % 250 mL chemo infusion, 6 mg/kg = 340 mg (75 % of original dose 8 mg/kg), Intravenous, Once, 8 of 8 cycles  Dose modification: 6 mg/kg (original dose 8 mg/kg, Cycle 1, Reason: Dose Not Tolerated)  Administration: 340 mg (6/3/2020), 376 mg (11/9/2020), 340 mg (6/25/2020), 376 mg (11/30/2020), 376 mg (12/21/2020), 376 mg (1/11/2021), 376 mg (2/2/2021), 337 mg (2/23/2021)     6/4/2020 -  Cancer Staged    Staging form: Breast, AJCC 8th Edition  - Clinical: Stage IIA (cT2, cN0, cM0, G3, ER-, IA-, HER2+) - Signed by Fanny Silva MD on 6/4/2020  Laterality: Left  Histologic grading system: 3 grade system       8/18/2020 Surgery    Left breast mastectomy with sentinel lymph node biopsy  No residual carcinoma identified  Prior procedural site changes  0/3 Lymph nodes          -Interval History:  Patient reports today for a follow-up for left breast cancer  She has 2 more Herceptin infusions  She reports pain at her mastectomy site  She reports chronic pain secondary to lupus  She has also been following with GI secondary to abdominal pain and bloating  She had a recent colonoscopy  She had a CT scan in March for the symptoms which revealed colitis  She denies any new or persistent headaches, cough or shortness of breath  Review of Systems:  Review of Systems   Constitutional: Negative for activity change, appetite change, chills, fatigue, fever and unexpected weight change  Respiratory: Negative for cough and shortness of breath  Cardiovascular: Negative for chest pain  Gastrointestinal: Positive for abdominal pain  Negative for constipation, diarrhea, nausea and vomiting  Musculoskeletal: Positive for arthralgias (Attributes to lupus) and back pain  Negative for gait problem and myalgias  Skin: Negative for color change and rash  Neurological: Negative for dizziness and headaches  Hematological: Negative for adenopathy  Psychiatric/Behavioral: Negative for agitation and confusion  All other systems reviewed and are negative        Patient Active Problem List   Diagnosis    Systemic lupus erythematosus ([address] )    Hypothyroidism    Posttraumatic stress disorder    Adult celiac disease    Anxiety    Depression    Esophagitis    Nephrolithiasis    Vitamin D deficiency    Malignant neoplasm of upper-inner quadrant of left breast in female, estrogen receptor negative ([address] )    Chemotherapy induced neutropenia ([address] )    Port-A-Cath in place  Breast cancer (HCC)    Nausea and vomiting    SIRS (systemic inflammatory response syndrome) (HCC)    JEFERSON (acute kidney injury) (HCC)    Hyponatremia    Hypokalemia    Anemia    Colitis    Cancer related pain    Continuous opioid dependence (HCC)    Smoking    Gastritis    Candida esophagitis (HCC)    Severe protein-calorie malnutrition (HCC)    Hypomagnesemia    Chronic RLQ pain     Past Medical History:   Diagnosis Date    Colon polyp     Disease of thyroid gland     Hypertension     Lupus ([facility] [address] )     Malignant neoplasm of upper-inner quadrant of left breast in female, estrogen receptor negative ([facility] [address] ) 2/25/2020    Nephrolithiasis     PTSD (post-traumatic stress disorder)      Past Surgical History:   Procedure Laterality Date    FEMUR FRACTURE SURGERY      FL GUIDED CENTRAL VENOUS ACCESS DEVICE INSERTION  3/17/2020    HYSTERECTOMY      LEFT OOPHORECTOMY      due to torsion     MAMMO STEREOTACTIC BREAST BIOPSY RIGHT (ALL INC) Right 2/12/2020    MASTECTOMY W/ SENTINEL NODE BIOPSY Left 8/18/2020    Procedure: BREAST MASTECTOMY WITH SENTINEL LYMPH NODE BIOPSY, LYMPHATIC MAPPING WITH BLUE DYE AND RADIOACTIVE DYE (INJECT AT 1430 BY DR WRIGHT IN THE OR);   Surgeon: Brijesh Woo MD;  Location: [facility];  Service: Surgical Oncology    MRI BREAST BIOPSY LEFT (ALL INCLUSIVE) Left 3/20/2020    NM INSJ TUNNELED CTR VAD W/SUBQ PORT AGE 5 YR/> N/A 3/17/2020    Procedure: INSERTION VENOUS PORT ( PORT-A-CATH) IR;  Surgeon: Piyush Gonzalez DO;  [facility];  Service: Interventional Radiology    US GUIDANCE BREAST BIOPSY LEFT EACH ADDITIONAL Left 2/12/2020    US GUIDED BREAST BIOPSY LEFT COMPLETE Left 2/12/2020    VAGINA SURGERY       Family History   Problem Relation Age of Onset    Diabetes Mother     Hypertension Mother     Nephrolithiasis Mother     Breast cancer Mother 79    Heart disease Father     Hypertension Father     Diabetes Brother     Nephrolithiasis Brother     Breast cancer Maternal Aunt     No Known Problems Maternal Grandmother     No Known Problems Maternal Grandfather     No Known Problems Paternal Grandmother     No Known Problems Paternal Grandfather      Social History     Socioeconomic History    Marital status: /Civil Union     Spouse name: Not on file    Number of children: Not on file    Years of education: Not on file    Highest education level: Not on file   Occupational History    Not on file   Social Needs    Financial resource strain: Not on file    Food insecurity     Worry: Not on file     Inability: Not on file   [employer] needs     Medical: Not on file     Non-medical: Not on file   Tobacco Use    Smoking status: Current Every Day Smoker     Packs/day: 0 50     Years: 40 00     Pack years: 20 00     Types: Cigarettes     Start date: 1990    Smokeless tobacco: Never Used   Substance and Sexual Activity    Alcohol use: Not Currently    Drug use: No    Sexual activity: Not Currently     Partners: Male     Birth control/protection: None   Lifestyle    Physical activity     Days per week: Not on file     Minutes per session: Not on file    Stress: Not on file   Relationships    Social connections     Talks on phone: Not on file     Gets together: Not on file     Attends Gnosticist service: Not on file     Active member of club or organization: Not on file     Attends meetings of clubs or organizations: Not on file     Relationship status: Not on file    Intimate partner violence     Fear of current or ex partner: Not on file     Emotionally abused: Not on file     Physically abused: Not on file     Forced sexual activity: Not on file   Other Topics Concern    Not on file   Social History Narrative    Not on file       Current Outpatient Medications:     ALPRAZolam (XANAX) 1 mg tablet, Take 1 tablet (1 mg total) by mouth 3 (three) times a day as needed for anxiety, Disp: 30 tablet, Rfl: 0    cyclobenzaprine (FLEXERIL) 5 mg tablet, Take 10 mg by mouth daily at bedtime as needed, Disp: , Rfl:     HYDROcodone-acetaminophen (NORCO) 5-325 mg per tablet, Take 1 tablet by mouth every 6 (six) hours as needed for painMax Daily Amount: 4 tablets, Disp: 120 tablet, Rfl: 0    hydroxychloroquine (PLAQUENIL) 200 mg tablet, Take 1 tablet in morning and 1/2 tablet in evening, Disp: , Rfl:     levothyroxine 88 mcg tablet, TAKE ONE TABLET BY MOUTH EVERY DAY, Disp: 30 tablet, Rfl: 5    magnesium oxide (MAG-OX) 400 mg, TAKE ONE TABLET BY MOUTH TWICE A DAY, Disp: 60 each, Rfl: 5    pantoprazole (PROTONIX) 40 mg tablet, Take 1 tablet (40 mg total) by mouth daily, Disp: 30 tablet, Rfl: 5    predniSONE 5 mg tablet, One po bid with food, Disp: 60 tablet, Rfl: 1    sertraline (ZOLOFT) 100 mg tablet, TAEK ONE AND ONE-HALF TABLETS  DAILY  , Disp: 45 tablet, Rfl: 5    acetaminophen (TYLENOL) 325 mg tablet, Take 2 tablets (650 mg total) by mouth every 6 (six) hours as needed for mild pain, headaches or fever (Patient not taking: Reported on 6/1/2021), Disp: 30 tablet, Rfl: 0    al mag oxide-diphenhydramine-lidocaine viscous (MAGIC MOUTHWASH) 1:1:1 suspension, Swish and swallow 10 mL every 6 (six) hours as needed for mouth pain or discomfort or mucositis (Patient not taking: Reported on 6/1/2021), Disp: 1 Bottle, Rfl: 2    al mag oxide-diphenhydramine-lidocaine viscous (MAGIC MOUTHWASH) 1:1:1 suspension, Swish and spit 10 mL every 4 (four) hours as needed for mouth pain or discomfort (Patient not taking: Reported on 6/1/2021), Disp: 180 mL, Rfl: 3    dicyclomine (BENTYL) 10 mg capsule, Take 1 capsule (10 mg total) by mouth 2 (two) times a day (Patient not taking: Reported on 6/1/2021), Disp: 60 capsule, Rfl: 3    metoclopramide (REGLAN) 5 mg tablet, Take 1 tablet (5 mg total) by mouth 3 (three) times a day before meals (Patient not taking: Reported on 6/1/2021), Disp: 10 tablet, Rfl: 0  Allergies   Allergen Reactions    Mobic [Meloxicam] Eye Swelling Reaction Date: 12Aug2011;     Methotrexate Rash    Sulfa Antibiotics Rash     Vitals:    06/01/21 1255   BP: 142/80   Pulse: 102   Resp: 16   Temp: 97 6 °F (36 4 °C)   SpO2: 99%       Physical Exam  Vitals signs reviewed  Constitutional:       General: She is not in acute distress  Appearance: Normal appearance  She is well-developed  She is not diaphoretic  HENT:      Head: Normocephalic and atraumatic  Neck:      Musculoskeletal: Normal range of motion  Cardiovascular:      Rate and Rhythm: Normal rate and regular rhythm  Heart sounds: Normal heart sounds  Pulmonary:      Effort: Pulmonary effort is normal       Breath sounds: Normal breath sounds  Chest:      Breasts:         Right: No swelling, bleeding, inverted nipple, mass, nipple discharge, skin change or tenderness  Comments: Left mastectomy site free of masses, skin nodules or lesions  Patient reports tenderness with palpation of the anterior chest wall and lateral chest wall musculature  Abdominal:      Palpations: Abdomen is soft  There is no mass  Tenderness: There is no abdominal tenderness  Musculoskeletal: Normal range of motion  Lymphadenopathy:      Upper Body:      Right upper body: No supraclavicular or axillary adenopathy  Left upper body: No supraclavicular or axillary adenopathy  Skin:     General: Skin is warm and dry  Findings: No rash  Neurological:      Mental Status: She is alert and oriented to person, place, and time  Psychiatric:         Speech: Speech normal            Advance Care Planning/Advance Directives:  Discussed disease status, cancer treatment plans and/or cancer treatment goals with the patient

## 2021-06-02 ENCOUNTER — HOSPITAL ENCOUNTER (OUTPATIENT)
Dept: MAMMOGRAPHY | Facility: CLINIC | Age: 56
Discharge: HOME | End: 2021-06-02
Payer: MEDICARE

## 2021-06-02 ENCOUNTER — PATIENT OUTREACH (OUTPATIENT)
Dept: CASE MANAGEMENT | Facility: HOSPITAL | Age: 56
End: 2021-06-02

## 2021-06-02 VITALS — HEIGHT: 63 IN | BODY MASS INDEX: 21.09 KG/M2 | WEIGHT: 119 LBS

## 2021-06-02 DIAGNOSIS — C50.212 MALIGNANT NEOPLASM OF UPPER-INNER QUADRANT OF LEFT BREAST IN FEMALE, ESTROGEN RECEPTOR NEGATIVE (HCC): ICD-10-CM

## 2021-06-02 DIAGNOSIS — Z17.1 MALIGNANT NEOPLASM OF UPPER-INNER QUADRANT OF LEFT BREAST IN FEMALE, ESTROGEN RECEPTOR NEGATIVE (HCC): ICD-10-CM

## 2021-06-02 PROCEDURE — G0279 TOMOSYNTHESIS, MAMMO: HCPCS

## 2021-06-02 PROCEDURE — 77065 DX MAMMO INCL CAD UNI: CPT

## 2021-06-02 NOTE — PROGRESS NOTES
DEMETRICEW was completing chart review for follow up with pt and noticed a DT from yesterday where pt scored 7/10 and noted concerns with depression, fears, nervousness, sadness, worry and 10 out of 22 concerns with physical issues  LSW attempted to contact pt to discuss DT, no answer  LSW left message and requested a call back   LSW will reach out to pt at a later date

## 2021-06-04 ENCOUNTER — TELEPHONE (OUTPATIENT)
Dept: GASTROENTEROLOGY | Facility: CLINIC | Age: 56
End: 2021-06-04

## 2021-06-04 DIAGNOSIS — F41.9 ANXIETY: ICD-10-CM

## 2021-06-04 DIAGNOSIS — E03.9 HYPOTHYROIDISM, UNSPECIFIED TYPE: ICD-10-CM

## 2021-06-04 RX ORDER — LEVOTHYROXINE SODIUM 88 UG/1
88 TABLET ORAL DAILY
Qty: 30 TABLET | Refills: 5 | Status: SHIPPED | OUTPATIENT
Start: 2021-06-04 | End: 2021-11-23

## 2021-06-04 RX ORDER — ALPRAZOLAM 1 MG/1
1 TABLET ORAL 3 TIMES DAILY PRN
Qty: 30 TABLET | Refills: 3 | Status: SHIPPED | OUTPATIENT
Start: 2021-06-04 | End: 2021-07-20 | Stop reason: SDUPTHER

## 2021-06-04 NOTE — TELEPHONE ENCOUNTER
Medication Refill Request    Request for refill(s) of levothyroxine 88 mcg tablet and ALPRAZolam (XANAX) 1 mg tablet  to be sent to [facility]

## 2021-06-07 ENCOUNTER — HOSPITAL ENCOUNTER (OUTPATIENT)
Dept: INFUSION CENTER | Facility: CLINIC | Age: 56
Discharge: HOME | End: 2021-06-07
Payer: MEDICARE

## 2021-06-07 VITALS
SYSTOLIC BLOOD PRESSURE: 136 MMHG | OXYGEN SATURATION: 97 % | WEIGHT: 119.5 LBS | DIASTOLIC BLOOD PRESSURE: 88 MMHG | HEART RATE: 62 BPM | BODY MASS INDEX: 21.17 KG/M2 | TEMPERATURE: 98 F | RESPIRATION RATE: 16 BRPM

## 2021-06-07 DIAGNOSIS — D70.1 CHEMOTHERAPY INDUCED NEUTROPENIA (HCC): Primary | ICD-10-CM

## 2021-06-07 DIAGNOSIS — Z17.1 MALIGNANT NEOPLASM OF UPPER-INNER QUADRANT OF LEFT BREAST IN FEMALE, ESTROGEN RECEPTOR NEGATIVE (HCC): ICD-10-CM

## 2021-06-07 DIAGNOSIS — C50.919 MALIGNANT NEOPLASM OF FEMALE BREAST, UNSPECIFIED ESTROGEN RECEPTOR STATUS, UNSPECIFIED LATERALITY, UNSPECIFIED SITE OF BREAST (HCC): ICD-10-CM

## 2021-06-07 DIAGNOSIS — T45.1X5A CHEMOTHERAPY INDUCED NEUTROPENIA (HCC): Primary | ICD-10-CM

## 2021-06-07 DIAGNOSIS — C50.212 MALIGNANT NEOPLASM OF UPPER-INNER QUADRANT OF LEFT BREAST IN FEMALE, ESTROGEN RECEPTOR NEGATIVE (HCC): ICD-10-CM

## 2021-06-07 PROCEDURE — 96413 CHEMO IV INFUSION 1 HR: CPT

## 2021-06-07 PROCEDURE — 96417 CHEMO IV INFUS EACH ADDL SEQ: CPT

## 2021-06-07 RX ORDER — HYDROCODONE BITARTRATE AND ACETAMINOPHEN 5; 325 MG/1; MG/1
1 TABLET ORAL EVERY 6 HOURS PRN
Qty: 120 TABLET | Refills: 0 | Status: SHIPPED | OUTPATIENT
Start: 2021-06-07 | End: 2021-06-29 | Stop reason: SDUPTHER

## 2021-06-07 RX ORDER — SODIUM CHLORIDE 9 MG/ML
20 INJECTION, SOLUTION INTRAVENOUS ONCE
Status: COMPLETED | OUTPATIENT
Start: 2021-06-07 | End: 2021-06-07

## 2021-06-07 RX ADMIN — PERTUZUMAB 420 MG: 30 INJECTION, SOLUTION, CONCENTRATE INTRAVENOUS at 14:36

## 2021-06-07 RX ADMIN — SODIUM CHLORIDE 20 ML/HR: 0.9 INJECTION, SOLUTION INTRAVENOUS at 14:15

## 2021-06-07 RX ADMIN — SODIUM CHLORIDE 337 MG: 0.9 INJECTION, SOLUTION INTRAVENOUS at 15:12

## 2021-06-07 NOTE — TELEPHONE ENCOUNTER
Medication Refill Request    Request for refill(s) of HYDROcodone-acetaminophen (NORCO) 5-325 mg per tablet  to be sent to [address] [place] [address]

## 2021-06-07 NOTE — PROGRESS NOTES
Pt here for Herceptin and Perjeta infusion  EF is 65% from ECHO completed on 4/30/2021  Port accessed and blood return noted with NSS infusing @ [facility]  Pt resting comfortably and has no further questions or concerns at this time  Call bell with in reach

## 2021-06-08 ENCOUNTER — TELEPHONE (OUTPATIENT)
Dept: GASTROENTEROLOGY | Facility: CLINIC | Age: 56
End: 2021-06-08

## 2021-06-08 RX ORDER — HYDROCODONE BITARTRATE AND ACETAMINOPHEN 5; 325 MG/1; MG/1
1 TABLET ORAL EVERY 6 HOURS PRN
Qty: 120 TABLET | Refills: 0 | OUTPATIENT
Start: 2021-06-08

## 2021-06-08 NOTE — TELEPHONE ENCOUNTER
----- Message from Ijeoma Barriga MD sent at 6/3/2021  5:21 PM EDT -----  Please inform the patient that the small polyp removed was a tubular adenoma, there was no high-grade dysplasia and no cancer  As we discussed, recommend repeat colonoscopy in 5 years  There was 1 area that had mild inflammation in the biopsies  If the patient were to have it continued diarrhea, we could consider treatment for microscopic colitis  However if her symptoms are controlled with dicyclomine and fiber supplementation I would not make any changes to her regimen at this time  Please have the patient call with any questions or concerns, follow up in the office as needed

## 2021-06-09 ENCOUNTER — TELEPHONE (OUTPATIENT)
Dept: GASTROENTEROLOGY | Facility: AMBULARY SURGERY CENTER | Age: 56
End: 2021-06-09

## 2021-06-09 NOTE — TELEPHONE ENCOUNTER
----- Message from Car Young MD sent at 6/3/2021  5:21 PM EDT -----  Please inform the patient that the small polyp removed was a tubular adenoma, there was no high-grade dysplasia and no cancer  As we discussed, recommend repeat colonoscopy in 5 years  There was 1 area that had mild inflammation in the biopsies  If the patient were to have it continued diarrhea, we could consider treatment for microscopic colitis  However if her symptoms are controlled with dicyclomine and fiber supplementation I would not make any changes to her regimen at this time  Please have the patient call with any questions or concerns, follow up in the office as needed

## 2021-06-09 NOTE — TELEPHONE ENCOUNTER
Informed pt of results , pt verbalized understanding  PT states she is still having diarrhea  Today was the first day it was normal  She started Citrical a few weeks ago  Taking once a day  Any suggestions?    Thank you

## 2021-06-10 NOTE — TELEPHONE ENCOUNTER
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

## 2021-06-21 DIAGNOSIS — R11.2 INTRACTABLE VOMITING WITH NAUSEA, UNSPECIFIED VOMITING TYPE: ICD-10-CM

## 2021-06-21 DIAGNOSIS — M32.9 SYSTEMIC LUPUS ERYTHEMATOSUS, UNSPECIFIED SLE TYPE, UNSPECIFIED ORGAN INVOLVEMENT STATUS (HCC): ICD-10-CM

## 2021-06-21 RX ORDER — METOCLOPRAMIDE 5 MG/1
5 TABLET ORAL
Qty: 30 TABLET | Refills: 1 | Status: SHIPPED | OUTPATIENT
Start: 2021-06-21 | End: 2021-08-27

## 2021-06-21 RX ORDER — PREDNISONE 1 MG/1
TABLET ORAL
Qty: 60 TABLET | Refills: 1 | Status: SHIPPED | OUTPATIENT
Start: 2021-06-21 | End: 2021-08-11

## 2021-06-22 ENCOUNTER — ANESTHESIA EVENT (OUTPATIENT)
Dept: PERIOP | Facility: AMBULARY SURGERY CENTER | Age: 56
End: 2021-06-22
Payer: COMMERCIAL

## 2021-06-25 RX ORDER — AMLODIPINE BESYLATE 2.5 MG/1
2.5 TABLET ORAL DAILY
COMMUNITY

## 2021-06-25 RX ORDER — ALENDRONATE SODIUM 70 MG/1
70 TABLET ORAL
COMMUNITY

## 2021-06-25 NOTE — PRE-PROCEDURE INSTRUCTIONS
Pre-Surgery Instructions:   Medication Instructions    acetaminophen (TYLENOL) 325 mg tablet PRN    al mag oxide-diphenhydramine-lidocaine viscous (MAGIC MOUTHWASH) 1:1:1 suspension prn    alendronate (FOSAMAX) 70 mg tablet Take weekly as directed    ALPRAZolam (XANAX) 1 mg tablet Take as directed    amLODIPine (NORVASC) 2 5 mg tablet Take as directed    cyclobenzaprine (FLEXERIL) 5 mg tablet Take as directed    dicyclomine (BENTYL) 10 mg capsule Take as directed    HYDROcodone-acetaminophen (NORCO) 5-325 mg per tablet Take as directed    hydroxychloroquine (PLAQUENIL) 200 mg tablet Take as directed    levothyroxine 88 mcg tablet Take as directed    magnesium oxide (MAG-OX) 400 mg Take as directed    metoclopramide (REGLAN) 5 mg tablet Take as directed    pantoprazole (PROTONIX) 40 mg tablet Take as directed    predniSONE 5 mg tablet Take as directed    sertraline (ZOLOFT) 100 mg tablet Take as directed      My Surgical Experience    The following information was developed to assist you to prepare for your operation  What do I need to do before coming to the hospital?   Arrange for a responsible person to drive you to and from the hospital    Arrange care for your children at home  Children are not allowed in the recovery areas of the hospital   Plan to wear clothing that is easy to put on and take off  If you are having shoulder surgery, wear a shirt that buttons or zippers in the front  Bathing  o Shower the evening before and the morning of your surgery with an antibacterial soap  Please refer to the Pre Op Showering Instructions for Surgery Patients Sheet   o Remove nail polish and all body piercing jewelry  o Do not shave any body part for at least 24 hours before surgery-this includes face, arms, legs and upper body  Food  o Nothing to eat or drink after midnight the night before your surgery   This includes candy and chewing gum  o Exception: If your surgery is after 12:00pm (noon), you may have clear liquids such as 7-Up®, ginger ale, apple or cranberry juice, Jell-O®, water, or clear broth until 8:00 am  o Do not drink milk or juice with pulp on the morning before surgery  o Do not drink alcohol 24 hours before surgery  Medicine  o Follow instructions you received from your surgeon about which medicines you may take on the day of surgery  o If instructed to take medicine on the morning of surgery, take pills with just a small sip of water  Call your prescribing doctor for specific infroamtion on what to do if you take insulin    What should I bring to the hospital?    Bring:  Татьяна Rhoades or a walker, if you have them, for foot or knee surgery   A list of the daily medicines, vitamins, minerals, herbals and nutritional supplements you take  Include the dosages of medicines and the time you take them each day   Glasses, dentures or hearing aids   Minimal clothing; you will be wearing hospital sleepwear   Photo ID; required to verify your identity   If you have a Living Will or Power of , bring a copy of the documents   If you have an ostomy, bring an extra pouch and any supplies you use    Do not bring   Medicines or inhalers   Money, valuables or jewelry    What other information should I know about the day of surgery?  Notify your surgeons if you develop a cold, sore throat, cough, fever, rash or any other illness   Report to the Ambulatory Surgical/Same Day Surgery Unit   You will be instructed to stop at Registration only if you have not been pre-registered   Inform your  fi they do not stay that they will be asked by the staff to leave a phone number where they can be reached   Be available to be reached before surgery   In the event the operating room schedule changes, you may be asked to come in earlier or later than expected    *It is important to tell your doctor and others involved in your health care if you are taking or have been taking any non-prescription drugs, vitamins, minerals, herbals or other nutritional supplements   Any of these may interact with some food or medicines and cause a reaction

## 2021-06-28 ENCOUNTER — LAB REQUISITION (OUTPATIENT)
Dept: LAB | Facility: HOSPITAL | Age: 56
End: 2021-06-28
Payer: MEDICARE

## 2021-06-28 ENCOUNTER — HOSPITAL ENCOUNTER (OUTPATIENT)
Dept: INFUSION CENTER | Facility: CLINIC | Age: 56
Discharge: HOME | End: 2021-06-28
Payer: MEDICARE

## 2021-06-28 VITALS
WEIGHT: 125.66 LBS | HEART RATE: 111 BPM | BODY MASS INDEX: 22.26 KG/M2 | RESPIRATION RATE: 16 BRPM | SYSTOLIC BLOOD PRESSURE: 132 MMHG | DIASTOLIC BLOOD PRESSURE: 82 MMHG | TEMPERATURE: 97.4 F | OXYGEN SATURATION: 98 %

## 2021-06-28 DIAGNOSIS — M32.10 SYSTEMIC LUPUS ERYTHEMATOSUS, ORGAN OR SYSTEM INVOLVEMENT UNSPECIFIED (HCC): ICD-10-CM

## 2021-06-28 DIAGNOSIS — Z17.1 MALIGNANT NEOPLASM OF UPPER-INNER QUADRANT OF LEFT BREAST IN FEMALE, ESTROGEN RECEPTOR NEGATIVE (HCC): ICD-10-CM

## 2021-06-28 DIAGNOSIS — C50.212 MALIGNANT NEOPLASM OF UPPER-INNER QUADRANT OF LEFT BREAST IN FEMALE, ESTROGEN RECEPTOR NEGATIVE (HCC): ICD-10-CM

## 2021-06-28 DIAGNOSIS — T45.1X5A CHEMOTHERAPY INDUCED NEUTROPENIA (HCC): Primary | ICD-10-CM

## 2021-06-28 DIAGNOSIS — D70.1 CHEMOTHERAPY INDUCED NEUTROPENIA (HCC): Primary | ICD-10-CM

## 2021-06-28 LAB
C3 SERPL-MCNC: 114 MG/DL (ref 90–180)
C4 SERPL-MCNC: 23 MG/DL (ref 10–40)
CRP SERPL QL: 6 MG/L
ERYTHROCYTE [SEDIMENTATION RATE] IN BLOOD: 12 MM/HOUR (ref 0–29)

## 2021-06-28 PROCEDURE — 86039 ANTINUCLEAR ANTIBODIES (ANA): CPT | Performed by: INTERNAL MEDICINE

## 2021-06-28 PROCEDURE — 96413 CHEMO IV INFUSION 1 HR: CPT

## 2021-06-28 PROCEDURE — 86038 ANTINUCLEAR ANTIBODIES: CPT | Performed by: INTERNAL MEDICINE

## 2021-06-28 PROCEDURE — 86140 C-REACTIVE PROTEIN: CPT | Performed by: INTERNAL MEDICINE

## 2021-06-28 PROCEDURE — 86225 DNA ANTIBODY NATIVE: CPT | Performed by: INTERNAL MEDICINE

## 2021-06-28 PROCEDURE — 86235 NUCLEAR ANTIGEN ANTIBODY: CPT | Performed by: INTERNAL MEDICINE

## 2021-06-28 PROCEDURE — 96417 CHEMO IV INFUS EACH ADDL SEQ: CPT

## 2021-06-28 PROCEDURE — 86160 COMPLEMENT ANTIGEN: CPT | Performed by: INTERNAL MEDICINE

## 2021-06-28 PROCEDURE — 86162 COMPLEMENT TOTAL (CH50): CPT | Performed by: INTERNAL MEDICINE

## 2021-06-28 PROCEDURE — 85652 RBC SED RATE AUTOMATED: CPT | Performed by: INTERNAL MEDICINE

## 2021-06-28 RX ORDER — SODIUM CHLORIDE 9 MG/ML
20 INJECTION, SOLUTION INTRAVENOUS ONCE
Status: COMPLETED | OUTPATIENT
Start: 2021-06-28 | End: 2021-06-28

## 2021-06-28 RX ADMIN — SODIUM CHLORIDE 20 ML/HR: 0.9 INJECTION, SOLUTION INTRAVENOUS at 14:45

## 2021-06-28 RX ADMIN — SODIUM CHLORIDE 337 MG: 0.9 INJECTION, SOLUTION INTRAVENOUS at 15:24

## 2021-06-28 RX ADMIN — PERTUZUMAB 420 MG: 30 INJECTION, SOLUTION, CONCENTRATE INTRAVENOUS at 14:48

## 2021-06-28 NOTE — PROGRESS NOTES
Pt offers no complaints, final herceptin perjeta completed without adverse event, Pt to f/u with MD as scheduled and has appointment for port removal

## 2021-06-29 DIAGNOSIS — C50.919 MALIGNANT NEOPLASM OF FEMALE BREAST, UNSPECIFIED ESTROGEN RECEPTOR STATUS, UNSPECIFIED LATERALITY, UNSPECIFIED SITE OF BREAST (HCC): ICD-10-CM

## 2021-06-29 LAB
CH50 SERPL-ACNC: >60 U/ML
ENA SS-A AB SER-ACNC: >8 AI (ref 0–0.9)
ENA SS-B AB SER-ACNC: <0.2 AI (ref 0–0.9)

## 2021-06-29 RX ORDER — HYDROCODONE BITARTRATE AND ACETAMINOPHEN 5; 325 MG/1; MG/1
1 TABLET ORAL EVERY 6 HOURS PRN
Qty: 120 TABLET | Refills: 0 | Status: SHIPPED | OUTPATIENT
Start: 2021-06-29 | End: 2021-07-29 | Stop reason: SDUPTHER

## 2021-06-29 NOTE — TELEPHONE ENCOUNTER
Medication Refill Request    Request for a refill of HYDROcodone-acetaminophen (NORCO) 5-325 mg per tablet  to be sent to [facility]      Last/Next OV: 4/1/21

## 2021-06-30 LAB
ANA HOMOGEN SER QL IF: NORMAL
ANA HOMOGEN TITR SER: NORMAL {TITER}
RYE IGE QN: POSITIVE

## 2021-07-03 ENCOUNTER — TELEPHONE (OUTPATIENT)
Dept: OTHER | Facility: OTHER | Age: 56
End: 2021-07-03

## 2021-07-03 NOTE — TELEPHONE ENCOUNTER
Patients insurance is requiring a prior authorization for HYDROcodone-acetaminophen (NORCO) 5-325 mg per tablet

## 2021-07-06 ENCOUNTER — ANESTHESIA (OUTPATIENT)
Dept: PERIOP | Facility: AMBULARY SURGERY CENTER | Age: 56
End: 2021-07-06
Payer: COMMERCIAL

## 2021-07-06 ENCOUNTER — HOSPITAL ENCOUNTER (OUTPATIENT)
Facility: AMBULARY SURGERY CENTER | Age: 56
Setting detail: OUTPATIENT SURGERY
Discharge: HOME | End: 2021-07-06
Attending: RADIOLOGY | Admitting: RADIOLOGY
Payer: COMMERCIAL

## 2021-07-06 VITALS
BODY MASS INDEX: 22.93 KG/M2 | SYSTOLIC BLOOD PRESSURE: 139 MMHG | HEIGHT: 63 IN | DIASTOLIC BLOOD PRESSURE: 70 MMHG | TEMPERATURE: 97.2 F | OXYGEN SATURATION: 97 % | WEIGHT: 129.4 LBS | RESPIRATION RATE: 18 BRPM | HEART RATE: 86 BPM

## 2021-07-06 PROCEDURE — 99024 POSTOP FOLLOW-UP VISIT: CPT | Performed by: RADIOLOGY

## 2021-07-06 PROCEDURE — 36590 REMOVAL TUNNELED CV CATH: CPT | Performed by: RADIOLOGY

## 2021-07-06 RX ORDER — FENTANYL CITRATE 50 UG/ML
INJECTION, SOLUTION INTRAMUSCULAR; INTRAVENOUS AS NEEDED
Status: DISCONTINUED | OUTPATIENT
Start: 2021-07-06 | End: 2021-07-06

## 2021-07-06 RX ORDER — LIDOCAINE HYDROCHLORIDE AND EPINEPHRINE 10; 10 MG/ML; UG/ML
INJECTION, SOLUTION INFILTRATION; PERINEURAL AS NEEDED
Status: DISCONTINUED | OUTPATIENT
Start: 2021-07-06 | End: 2021-07-06 | Stop reason: HOSPADM

## 2021-07-06 RX ORDER — SODIUM CHLORIDE, SODIUM LACTATE, POTASSIUM CHLORIDE, CALCIUM CHLORIDE 600; 310; 30; 20 MG/100ML; MG/100ML; MG/100ML; MG/100ML
75 INJECTION, SOLUTION INTRAVENOUS CONTINUOUS
Status: DISCONTINUED | OUTPATIENT
Start: 2021-07-06 | End: 2021-07-06 | Stop reason: HOSPADM

## 2021-07-06 RX ORDER — ONDANSETRON 2 MG/ML
INJECTION INTRAMUSCULAR; INTRAVENOUS AS NEEDED
Status: DISCONTINUED | OUTPATIENT
Start: 2021-07-06 | End: 2021-07-06

## 2021-07-06 RX ORDER — SODIUM CHLORIDE, SODIUM LACTATE, POTASSIUM CHLORIDE, CALCIUM CHLORIDE 600; 310; 30; 20 MG/100ML; MG/100ML; MG/100ML; MG/100ML
INJECTION, SOLUTION INTRAVENOUS CONTINUOUS PRN
Status: DISCONTINUED | OUTPATIENT
Start: 2021-07-06 | End: 2021-07-06

## 2021-07-06 RX ORDER — MAGNESIUM HYDROXIDE 1200 MG/15ML
LIQUID ORAL AS NEEDED
Status: DISCONTINUED | OUTPATIENT
Start: 2021-07-06 | End: 2021-07-06 | Stop reason: HOSPADM

## 2021-07-06 RX ORDER — ACETAMINOPHEN 325 MG/1
650 TABLET ORAL ONCE
Status: COMPLETED | OUTPATIENT
Start: 2021-07-06 | End: 2021-07-06

## 2021-07-06 RX ORDER — ONDANSETRON 2 MG/ML
4 INJECTION INTRAMUSCULAR; INTRAVENOUS ONCE AS NEEDED
Status: DISCONTINUED | OUTPATIENT
Start: 2021-07-06 | End: 2021-07-06 | Stop reason: HOSPADM

## 2021-07-06 RX ORDER — FENTANYL CITRATE/PF 50 MCG/ML
25 SYRINGE (ML) INJECTION
Status: DISCONTINUED | OUTPATIENT
Start: 2021-07-06 | End: 2021-07-06 | Stop reason: HOSPADM

## 2021-07-06 RX ORDER — MIDAZOLAM HYDROCHLORIDE 2 MG/2ML
INJECTION, SOLUTION INTRAMUSCULAR; INTRAVENOUS AS NEEDED
Status: DISCONTINUED | OUTPATIENT
Start: 2021-07-06 | End: 2021-07-06

## 2021-07-06 RX ADMIN — FENTANYL CITRATE 50 MCG: 50 INJECTION, SOLUTION INTRAMUSCULAR; INTRAVENOUS at 08:35

## 2021-07-06 RX ADMIN — FENTANYL CITRATE 50 MCG: 50 INJECTION, SOLUTION INTRAMUSCULAR; INTRAVENOUS at 08:39

## 2021-07-06 RX ADMIN — MIDAZOLAM HYDROCHLORIDE 1 MG: 1 INJECTION, SOLUTION INTRAMUSCULAR; INTRAVENOUS at 08:32

## 2021-07-06 RX ADMIN — ACETAMINOPHEN 650 MG: 325 TABLET ORAL at 09:14

## 2021-07-06 RX ADMIN — SODIUM CHLORIDE, SODIUM LACTATE, POTASSIUM CHLORIDE, AND CALCIUM CHLORIDE: .6; .31; .03; .02 INJECTION, SOLUTION INTRAVENOUS at 08:30

## 2021-07-06 RX ADMIN — MIDAZOLAM HYDROCHLORIDE 1 MG: 1 INJECTION, SOLUTION INTRAMUSCULAR; INTRAVENOUS at 08:35

## 2021-07-06 RX ADMIN — ONDANSETRON 4 MG: 2 INJECTION INTRAMUSCULAR; INTRAVENOUS at 08:51

## 2021-07-06 NOTE — OP NOTE
Right chest port removal 7/6/21        History- Breast carcinoma         Contrast- None     Fluoroscopy time: None         Procedure- The patient was identified verbally and by wristband  Timeout was performed  Informed consent was obtained  Following obtaining informed consent, the patient was prepped and   draped in the usual sterile fashion  The region of the right anterior   chest port was anesthetized using 1% lidocaine  A incision was made   over the previously placed chest port with a 15 blade scalpel  Using   blunt and sharp dissection the port was removed in its entirety  The   port pocket was flushed with copious saline  The incision was   approximated using 3-0 Vicryl and tissue adhesive  The patient tolerated   the procedure well without apparent immediate complication  The patient   left the IR department in unchanged condition  Dr Annabel Adorno performed and   directly supervised the entire procedure  Findings-      The skin overlying the chest wall is nonerythematous  There is no   evidence of purulent exudate  The port was removed in its entirety   without complication  Impression-       Successful removal of right chest port

## 2021-07-06 NOTE — ANESTHESIA POSTPROCEDURE EVALUATION
Post-Op Assessment Note    CV Status:  Stable  Pain Score: 0    Pain management: adequate     Mental Status:  Alert   Hydration Status:  Stable and euvolemic   PONV Controlled:  None   Airway Patency:  Patent      Post Op Vitals Reviewed: Yes      Staff: CRNA         No complications documented      BP   132/63   Temp 97 2   Pulse 87   Resp 18   SpO2 94

## 2021-07-06 NOTE — DISCHARGE INSTRUCTIONS
Implanted Venous Access Port Removal    WHAT YOU NEED TO KNOW:   An implanted venous access port is a device used to give treatments and take blood  It may also be called a central venous access device (CVAD)  The port is a small container that is placed under your skin, usually in your upper chest  A port can also be placed in your arm or abdomen  The port is attached to a catheter that enters a large vein  DISCHARGE INSTRUCTIONS:   Resume your normal diet  Small sips of flat soda will help with mild nausea  Prevent an infection:     Wash your hands often  Use soap and water  Clean your hands before and  after you care for your incision  Check your skin for infection every day  Look for redness, swelling, or fluid oozing from the incision site  Dressing may come off in 24 hours  Medical glue will peel off on its own in 5 to 10 days  You may shower 24 hours after procedure  Follow up with your healthcare provider as directed  Write down your questions so you remember to ask them during your visits  Activity:  You may return to your daily activities when the area heals  Contact Interventional Radiology at [phone] Mary PATIENTS: Contact Interventional Radiology at [phone]) Mario Machuca PATIENTS: Contact Interventional Radiology at [phone]) if:     You have a fever  You have persistent nausea  Your inciscion site is red, swollen, or draining pus  You have questions or concerns about your condition or care  Seek care immediately or call 911 if:  Blood soaks through your bandage  The skin over or around your incision breaks open  Your heart is jumping or fluttering  You have a headache, blurred vision, and feel confused  You have pain in your arm, neck, shoulder, or chest     You have trouble breathing that is getting worse over time

## 2021-07-06 NOTE — H&P
Interventional Radiology  History and Physical 7/6/2021     Brian Sierra Mock   1965   [id number]    Assessment/Plan:  59 yo female with breast carcinoma presents for port removal    Problem List Items Addressed This Visit     None             Subjective:     Patient ID: Carl Crain is a 60 y o  female  History of Present Illness  59 yo post menopausal female with stage II left breast cancer, grade 3, ER negative, [place] negative, HER2 positive  Currently has no evidence of recurrent disease  Right chest port placed by myself 3/17/20  Port removal anticipated for this morning  Review of Systems      Past Medical History:   Diagnosis Date    Cancer [facility])     Colon polyp     Disease of thyroid gland     History of chemotherapy     Hypertension     Kidney stone     Lupus ([facility] )     Malignant neoplasm of upper-inner quadrant of left breast in female, estrogen receptor negative ([facility] ) 2/25/2020    left    Nephrolithiasis     PTSD (post-traumatic stress disorder)     Sjogren's disease ([facility] )         Past Surgical History:   Procedure Laterality Date    BREAST BIOPSY Left 02/12/2020    us guided -  invasive ductal carcinoma    BREAST BIOPSY Right 02/12/2020    benign stereo    ECTOPIC PREGNANCY SURGERY      FEMUR FRACTURE SURGERY      FL GUIDED CENTRAL VENOUS ACCESS DEVICE INSERTION  3/17/2020    HYSTERECTOMY      LEFT OOPHORECTOMY      due to torsion     MAMMO STEREOTACTIC BREAST BIOPSY RIGHT (ALL INC) Right 2/12/2020    MASTECTOMY Left 08/2020    MASTECTOMY W/ SENTINEL NODE BIOPSY Left 8/18/2020    Procedure: BREAST MASTECTOMY WITH SENTINEL LYMPH NODE BIOPSY, LYMPHATIC MAPPING WITH BLUE DYE AND RADIOACTIVE DYE (INJECT AT 1430 BY DR WRIGHT IN THE OR);   Surgeon: Dago Torres MD;  [facility];  Service: Surgical Oncology    MRI BREAST BIOPSY LEFT (ALL INCLUSIVE) Left 3/20/2020    [place] INSJ TUNNELED CTR VAD W/SUBQ PORT AGE 5 YR/> N/A 3/17/2020    Procedure: INSERTION VENOUS PORT ( PORT-A-CATH) IR;  Surgeon: Dago Prabhakar DO;  [facility];  Service: Interventional Radiology    US GUIDANCE BREAST BIOPSY LEFT EACH ADDITIONAL Left 2/12/2020    US GUIDED BREAST BIOPSY LEFT COMPLETE Left 2/12/2020    VAGINA SURGERY          Social History     Tobacco Use   Smoking Status Current Every Day Smoker    Packs/day: 0 50    Years: 40 00    Pack years: 20 00    Types: Cigarettes    Start date: 1990   Smokeless Tobacco Never Used        Social History     Substance and Sexual Activity   Alcohol Use Not Currently        Social History     Substance and Sexual Activity   Drug Use No        Allergies   Allergen Reactions    Mobic [Meloxicam] Eye Swelling     Reaction Date: 12Aug2011;     Methotrexate Rash    Sulfa Antibiotics Rash       No current facility-administered medications for this encounter  Objective: There were no vitals filed for this visit  Physical Exam  Cardiovascular:      Rate and Rhythm: Normal rate and regular rhythm  Pulmonary:      Effort: Pulmonary effort is normal       Breath sounds: Normal breath sounds  No results found for: BNP   Lab Results   Component Value Date    WBC 7 36 04/17/2021    HGB 10 5 (L) 04/17/2021    HCT 33 8 (L) 04/17/2021    MCV 92 04/17/2021     04/17/2021     Lab Results   Component Value Date    INR 0 94 09/19/2020    INR 0 92 08/17/2020    INR 1 09 04/24/2020    PROTIME 12 7 09/19/2020    PROTIME 12 5 08/17/2020    PROTIME 13 5 04/24/2020     Lab Results   Component Value Date    PTT 29 09/19/2020         I have personally reviewed pertinent imaging and laboratory results  Code Status: Prior  Advance Directive and Living Will:      Power of :    POLST:      This text is generated with voice recognition software  There may be translation, syntax,  or grammatical errors  If you have any questions, please contact the dictating provider

## 2021-07-06 NOTE — ANESTHESIA PREPROCEDURE EVALUATION
Procedure:  REMOVAL VENOUS PORT (PORT-A-CATH)IR (N/A Chest)    Relevant Problems   ENDO   (+) Hypothyroidism      /RENAL   (+) JEFERSON (acute kidney injury) ([facility] )   (+) Nephrolithiasis      GYN   (+) Breast cancer (HCC)   (+) Malignant neoplasm of upper-inner quadrant of left breast in female, estrogen receptor negative (HCC)      HEMATOLOGY   (+) Anemia      MUSCULOSKELETAL   (+) Systemic lupus erythematosus (HCC)      NEURO/PSYCH   (+) Anxiety   (+) Continuous opioid dependence (HCC)   (+) Depression   (+) Posttraumatic stress disorder      PULMONARY   (+) Smoking        Physical Exam    Airway    Mallampati score: III  TM Distance: >3 FB  Neck ROM: full     Dental       Cardiovascular      Pulmonary      Other Findings  Full uppers, partial lower      Anesthesia Plan  ASA Score- 3     Anesthesia Type- IV sedation with anesthesia with ASA Monitors  Additional Monitors:   Airway Plan:           Plan Factors-Exercise tolerance (METS): >4 METS  Chart reviewed  Existing labs reviewed  Patient is not a current smoker           Induction-     Postoperative Plan-     Informed Consent-

## 2021-07-12 LAB — MISCELLANEOUS LAB TEST RESULT: NORMAL

## 2021-07-15 ENCOUNTER — PATIENT OUTREACH (OUTPATIENT)
Dept: CASE MANAGEMENT | Facility: HOSPITAL | Age: 56
End: 2021-07-15

## 2021-07-20 DIAGNOSIS — F41.9 ANXIETY: ICD-10-CM

## 2021-07-20 RX ORDER — ALPRAZOLAM 1 MG/1
1 TABLET ORAL 3 TIMES DAILY PRN
Qty: 30 TABLET | Refills: 3 | Status: SHIPPED | OUTPATIENT
Start: 2021-07-20 | End: 2021-08-31

## 2021-07-22 LAB — MISCELLANEOUS LAB TEST RESULT: NORMAL

## 2021-07-29 DIAGNOSIS — C50.919 MALIGNANT NEOPLASM OF FEMALE BREAST, UNSPECIFIED ESTROGEN RECEPTOR STATUS, UNSPECIFIED LATERALITY, UNSPECIFIED SITE OF BREAST (HCC): ICD-10-CM

## 2021-07-29 RX ORDER — HYDROCODONE BITARTRATE AND ACETAMINOPHEN 5; 325 MG/1; MG/1
1 TABLET ORAL EVERY 6 HOURS PRN
Qty: 120 TABLET | Refills: 0 | Status: SHIPPED | OUTPATIENT
Start: 2021-07-29 | End: 2021-08-23 | Stop reason: SDUPTHER

## 2021-08-05 ENCOUNTER — OFFICE VISIT (OUTPATIENT)
Dept: FAMILY MEDICINE CLINIC | Facility: CLINIC | Age: 56
End: 2021-08-05
Payer: COMMERCIAL

## 2021-08-05 VITALS
OXYGEN SATURATION: 98 % | TEMPERATURE: 97.5 F | WEIGHT: 140 LBS | BODY MASS INDEX: 24.8 KG/M2 | SYSTOLIC BLOOD PRESSURE: 142 MMHG | DIASTOLIC BLOOD PRESSURE: 80 MMHG | HEIGHT: 63 IN | RESPIRATION RATE: 15 BRPM | HEART RATE: 96 BPM

## 2021-08-05 DIAGNOSIS — I73.00 RAYNAUD'S DISEASE WITHOUT GANGRENE: Primary | ICD-10-CM

## 2021-08-05 DIAGNOSIS — M79.671 RIGHT FOOT PAIN: ICD-10-CM

## 2021-08-05 DIAGNOSIS — F11.20 CONTINUOUS OPIOID DEPENDENCE (HCC): ICD-10-CM

## 2021-08-05 PROCEDURE — 3725F SCREEN DEPRESSION PERFORMED: CPT | Performed by: FAMILY MEDICINE

## 2021-08-05 PROCEDURE — 99214 OFFICE O/P EST MOD 30 MIN: CPT | Performed by: FAMILY MEDICINE

## 2021-08-05 PROCEDURE — 4004F PT TOBACCO SCREEN RCVD TLK: CPT | Performed by: FAMILY MEDICINE

## 2021-08-05 PROCEDURE — G0439 PPPS, SUBSEQ VISIT: HCPCS | Performed by: FAMILY MEDICINE

## 2021-08-05 PROCEDURE — 3008F BODY MASS INDEX DOCD: CPT | Performed by: FAMILY MEDICINE

## 2021-08-05 RX ORDER — ERGOCALCIFEROL 1.25 MG/1
CAPSULE ORAL
COMMUNITY
Start: 2021-07-27

## 2021-08-05 RX ORDER — AMLODIPINE BESYLATE 2.5 MG/1
TABLET ORAL
Qty: 30 TABLET | Refills: 5
Start: 2021-08-05

## 2021-08-05 NOTE — PROGRESS NOTES
FAMILY PRACTICE OFFICE VISIT       NAME: Trish Davey  AGE: 60 y o  SEX: female       : 1965        MRN: [id number]    DATE: 2021  TIME: 7:08 AM    Assessment and Plan     Problem List Items Addressed This Visit        Cardiovascular and Mediastinum    Raynaud's disease without gangrene - Primary      Raynaud's disease  Patient was diagnosed with Raynaud's disease by her rheumatologist and placed on amlodipine which she feels has helped significantly with her symptoms  She will continue with current regimen of medications         Relevant Medications    amLODIPine (NORVASC) 2 5 mg tablet       Other    Continuous opioid dependence (HCC)    Right foot pain       Foot pain  Patient will obtain x-ray of right foot for further evaluation  If x-ray is unremarkable patient may have exacerbation of her inflammatory arthritis  If this is the case we would recommend starting a prednisone dose consisting of 50 mg x 2 days, 40 mg x 2, 30 x 2, 20 x 2, than maintaining her 5 mg b i d  dosing         Relevant Orders    XR foot 3+ vw right (Completed)              Chief Complaint     Chief Complaint   Patient presents with    Medicare Wellness Visit       History of Present Illness      Patient in the office with complaints of right foot pain and swelling for approximately 2 weeks  She does not recall any specific injuries  She states previously swelling was worse compared to presentation at this time  She has difficulties bearing weight on her foot and was walk on the lateral surface of his foot to ambulate  patient also states her rheumatologist had diagnosed her with Raynaud syndrome and initiated amlodipine 2 5 mg b i d  which has helped significantly with her symptoms      Review of Systems   Review of Systems   Constitutional: Negative  HENT: Negative  Respiratory: Negative  Cardiovascular: Negative  Gastrointestinal: Negative      Musculoskeletal: Positive for arthralgias, gait problem and joint swelling  Psychiatric/Behavioral: Positive for sleep disturbance  The patient is nervous/anxious          Active Problem List     Patient Active Problem List   Diagnosis    Systemic lupus erythematosus (HCC)    Hypothyroidism    Posttraumatic stress disorder    Adult celiac disease    Anxiety    Depression    Esophagitis    Nephrolithiasis    Vitamin D deficiency    Malignant neoplasm of upper-inner quadrant of left breast in female, estrogen receptor negative ([facility] )    Chemotherapy induced neutropenia ([facility] )    Port-A-Cath in place    Breast cancer ([facility] )    Nausea and vomiting    SIRS (systemic inflammatory response syndrome) (HCC)    JEFERSON (acute kidney injury) ([facility] )    Hyponatremia    Hypokalemia    Anemia    Colitis    Cancer related pain    Continuous opioid dependence (HCC)    Smoking    Gastritis    Candida esophagitis (HCC)    Severe protein-calorie malnutrition (HCC)    Hypomagnesemia    Chronic RLQ pain    Right foot pain    Raynaud's disease without gangrene       Past Medical History:  Past Medical History:   Diagnosis Date    Cancer ([facility] )     Colon polyp     Disease of thyroid gland     History of chemotherapy     Hypertension     Kidney stone     Lupus ([facility] )     Malignant neoplasm of upper-inner quadrant of left breast in female, estrogen receptor negative [facility] ) 2/25/2020    left    Nephrolithiasis     PTSD (post-traumatic stress disorder)     Sjogren's disease ([facility] )        Past Surgical History:  Past Surgical History:   Procedure Laterality Date    BREAST BIOPSY Left 02/12/2020    us guided -  invasive ductal carcinoma    BREAST BIOPSY Right 02/12/2020    benign stereo    ECTOPIC PREGNANCY SURGERY      FEMUR FRACTURE SURGERY      FL GUIDED CENTRAL VENOUS ACCESS DEVICE INSERTION  3/17/2020    HYSTERECTOMY      LEFT OOPHORECTOMY      due to torsion     MAMMO STEREOTACTIC BREAST BIOPSY RIGHT (ALL INC) Right 2/12/2020    MASTECTOMY Left 08/2020    MASTECTOMY W/ SENTINEL NODE BIOPSY Left 8/18/2020    Procedure: BREAST MASTECTOMY WITH SENTINEL LYMPH NODE BIOPSY, LYMPHATIC MAPPING WITH BLUE DYE AND RADIOACTIVE DYE (INJECT AT 1430 BY DR WRIGHT IN THE OR);   Surgeon: Michi Dooley MD;  [facility];  Service: Surgical Oncology    MRI BREAST BIOPSY LEFT (ALL INCLUSIVE) Left 3/20/2020    [place] INSJ TUNNELED CTR VAD W/SUBQ PORT AGE 5 YR/> N/A 3/17/2020    Procedure: INSERTION VENOUS PORT ( PORT-A-CATH) IR;  Surgeon: Bulmaro Contreras DO;  [facility];  Service: Interventional Radiology    DE RMVL MARSHA CTR VAD W/SUBQ PORT/ CTR/PRPH INSJ N/A 7/6/2021    Procedure: REMOVAL VENOUS PORT (PORT-A-CATH)IR;  Surgeon: Bulmaro Contreras DO;  [facility];  Service: Interventional Radiology    US GUIDANCE BREAST BIOPSY LEFT EACH ADDITIONAL Left 2/12/2020    US GUIDED BREAST BIOPSY LEFT COMPLETE Left 2/12/2020    VAGINA SURGERY         Family History:  Family History   Problem Relation Age of Onset    Diabetes Mother     Hypertension Mother     Nephrolithiasis Mother     Breast cancer Mother 79    Heart disease Father     Hypertension Father     Diabetes Brother     Nephrolithiasis Brother     Breast cancer Maternal Aunt     No Known Problems Maternal Grandmother     No Known Problems Maternal Grandfather     No Known Problems Paternal Grandmother     No Known Problems Paternal Grandfather        Social History:  Social History     Socioeconomic History    Marital status: /Civil Union     Spouse name: Not on file    Number of children: Not on file    Years of education: Not on file    Highest education level: Not on file   Occupational History    Not on file   Tobacco Use    Smoking status: Current Every Day Smoker     Packs/day: 0 50     Years: 40 00     Pack years: 20 00     Types: Cigarettes     Start date: 1990    Smokeless tobacco: Never Used   Vaping Use    Vaping Use: Never used   Substance and Sexual Activity    Alcohol use: Not Currently    Drug use: No    Sexual activity: Not Currently     Partners: Male     Birth control/protection: None   Other Topics Concern    Not on file   Social History Narrative    Not on file     Social Determinants of Health     Financial Resource Strain:     Difficulty of Paying Living Expenses:    Food Insecurity:     Worried About Running Out of Food in the Last Year:     [address] in the Last Year:    Transportation Needs:     Lack of Transportation (Medical):  Lack of Transportation (Non-Medical):    Physical Activity:     Days of Exercise per Week:     Minutes of Exercise per Session:    Stress:     Feeling of Stress :    Social Connections:     Frequency of Communication with Friends and Family:     Frequency of Social Gatherings with Friends and Family:     Attends Gnosticist Services:     Active Member of Clubs or Organizations:     Attends Club or Organization Meetings:     Marital Status:    Intimate Partner Violence:     Fear of Current or Ex-Partner:     Emotionally Abused:     Physically Abused:     Sexually Abused:        Objective     Vitals:    08/05/21 1429   BP: 142/80   Pulse: 96   Resp: 15   Temp: 97 5 °F (36 4 °C)   SpO2: 98%     Wt Readings from Last 3 Encounters:   08/05/21 63 5 kg (140 lb)   07/06/21 58 7 kg (129 lb 6 4 oz)   06/28/21 57 kg (125 lb 10 6 oz)       Physical Exam  Constitutional:       General: She is not in acute distress  Appearance: Normal appearance  She is not ill-appearing  Musculoskeletal:         General: Tenderness present  Right lower leg: Edema present  Left lower leg: No edema  Comments: Patient with +1-2 edema of right foot especially dorsal surface  There is an area of redness from 2nd to 4th metatarsal area that is very tender to palpation  Patient is ambulating on her lateral foot to ambulate due to pain  She has significant limp due to her symptoms   +2 dorsalis pedis and posterior tibialis pulses   Neurological:      General: No focal deficit present  Mental Status: She is alert and oriented to person, place, and time  Mental status is at baseline  Cranial Nerves: No cranial nerve deficit  Psychiatric:         Mood and Affect: Mood normal          Thought Content:  Thought content normal          Judgment: Judgment normal          Pertinent Laboratory/Diagnostic Studies:  Lab Results   Component Value Date    GLUCOSE 97 04/22/2015    BUN 18 04/17/2021    CREATININE 0 98 04/17/2021    CALCIUM 7 7 (L) 04/17/2021     (L) 04/22/2015    K 4 2 04/17/2021    CO2 22 04/17/2021     (H) 04/17/2021     Lab Results   Component Value Date    ALT 11 (L) 03/25/2021    AST 12 03/25/2021    ALKPHOS 76 03/25/2021    BILITOT 0 2 04/22/2015       Lab Results   Component Value Date    WBC 7 36 04/17/2021    HGB 10 5 (L) 04/17/2021    HCT 33 8 (L) 04/17/2021    MCV 92 04/17/2021     04/17/2021       No results found for: TSH    No results found for: CHOL  No results found for: TRIG  No results found for: HDL  No results found for: LDLCALC  No results found for: HGBA1C    Results for orders placed or performed in visit on 06/28/21   C3 complement   Result Value Ref Range    C3 Complement 114 0 90 0 - 180 0 mg/dL   C4 complement   Result Value Ref Range    C4, COMPLEMENT 23 0 10 0 - 40 0 mg/dL   Complement, total   Result Value Ref Range    Compl, Total (CH50) >60 >41 U/mL       Orders Placed This Encounter   Procedures    XR foot 3+ vw right       ALLERGIES:  Allergies   Allergen Reactions    Mobic [Meloxicam] Eye Swelling     Reaction Date: 12Aug2011;     Methotrexate Rash    Sulfa Antibiotics Rash       Current Medications     Current Outpatient Medications   Medication Sig Dispense Refill    acetaminophen (TYLENOL) 325 mg tablet Take 2 tablets (650 mg total) by mouth every 6 (six) hours as needed for mild pain, headaches or fever 30 tablet 0    al Irina Colander oxide-diphenhydramine-lidocaine viscous (MAGIC MOUTHWASH) 1:1:1 suspension Swish and swallow 10 mL every 6 (six) hours as needed for mouth pain or discomfort or mucositis 1 Bottle 2    al mag oxide-diphenhydramine-lidocaine viscous (MAGIC MOUTHWASH) 1:1:1 suspension Swish and spit 10 mL every 4 (four) hours as needed for mouth pain or discomfort 180 mL 3    alendronate (FOSAMAX) 70 mg tablet Take 70 mg by mouth every 7 days      ALPRAZolam (XANAX) 1 mg tablet Take 1 tablet (1 mg total) by mouth 3 (three) times a day as needed for anxiety 30 tablet 3    amLODIPine (NORVASC) 2 5 mg tablet Take 2 5 mg by mouth daily      cyclobenzaprine (FLEXERIL) 5 mg tablet Take 10 mg by mouth daily at bedtime as needed      dicyclomine (BENTYL) 10 mg capsule Take 1 capsule (10 mg total) by mouth 2 (two) times a day 60 capsule 3    ergocalciferol (VITAMIN D2) 50,000 units TAKE ONE CAPSULE ONCE WEEKLY      HYDROcodone-acetaminophen (NORCO) 5-325 mg per tablet Take 1 tablet by mouth every 6 (six) hours as needed for painMax Daily Amount: 4 tablets 120 tablet 0    hydroxychloroquine (PLAQUENIL) 200 mg tablet Take 1 tablet in morning and 1/2 tablet in evening      levothyroxine 88 mcg tablet Take 1 tablet (88 mcg total) by mouth daily 30 tablet 5    magnesium oxide (MAG-OX) 400 mg TAKE ONE TABLET BY MOUTH TWICE A DAY 60 each 5    metoclopramide (REGLAN) 5 mg tablet Take 1 tablet (5 mg total) by mouth 3 (three) times a day before meals 30 tablet 1    pantoprazole (PROTONIX) 40 mg tablet Take 1 tablet (40 mg total) by mouth daily 30 tablet 5    sertraline (ZOLOFT) 100 mg tablet TAEK ONE AND ONE-HALF TABLETS  DAILY  45 tablet 5    amLODIPine (NORVASC) 2 5 mg tablet One po bid 30 tablet 5    predniSONE 5 mg tablet TAKE ONE TABLET BY MOUTH TWICE A DAY WITH FOOD 60 tablet 0     No current facility-administered medications for this visit           Health Maintenance     Health Maintenance   Topic Date Due    Hepatitis C Screening  Never done    Pneumococcal Vaccine: Pediatrics (0 to 5 Years) and At-Risk Patients (6 to 59 Years) (1 of 2 - PPSV23) Never done    HIV Screening  Never done    DTaP,Tdap,and Td Vaccines (1 - Tdap) Never done    Influenza Vaccine (1) 09/01/2021    Medicare Annual Wellness Visit (AWV)  08/05/2022    BMI: Adult  08/05/2022    Depression Remission PHQ  08/05/2022    Breast Cancer Screening: Mammogram  06/02/2023    Colorectal Cancer Screening  05/27/2026    COVID-19 Vaccine  Completed    HIB Vaccine  Aged Out    Hepatitis B Vaccine  Aged Out    IPV Vaccine  Aged Out    Hepatitis A Vaccine  Aged Out    Meningococcal ACWY Vaccine  Aged Out    HPV Vaccine  Aged Out     Immunization History   Administered Date(s) Administered    SARS-CoV-2 / COVID-19 mRNA IM ([employer]) 04/08/2021, 40/65/0442       Juvencio Ramirez MD     I spent 25 minutes with this patient which greater than 50% spent counselor

## 2021-08-05 NOTE — PROGRESS NOTES
Assessment and Plan:     Problem List Items Addressed This Visit     None           Preventive health issues were discussed with patient, and age appropriate screening tests were ordered as noted in patient's After Visit Summary  Personalized health advice and appropriate referrals for health education or preventive services given if needed, as noted in patient's After Visit Summary       History of Present Illness:     Patient presents for Medicare Annual Wellness visit    Patient Care Team:  Ben Pleitez MD as PCP - General (Family Medicine)  Freddie Weinberg MD as PCP - [address] [place] [address] (RTE)  Freddie Weinberg MD as PCP - PCP-[employer] (RTE)  MD Jaret Cheung MD (Urology)  Vanna Dunn MD (Gastroenterology)  Yael Russell MD (Obstetrics and Gynecology)  Anabelle Lopez MD (Obstetrics and Gynecology)  Mckenna Mendiola MD as Surgeon (Surgical Oncology)  Adriana Boxer, MD as Medical Oncologist (Hematology and Oncology)  Rachel Crabtree as  Care Manager ()     Problem List:     Patient Active Problem List   Diagnosis    Systemic lupus erythematosus ([address] )    Hypothyroidism    Posttraumatic stress disorder    Adult celiac disease    Anxiety    Depression    Esophagitis    Nephrolithiasis    Vitamin D deficiency    Malignant neoplasm of upper-inner quadrant of left breast in female, estrogen receptor negative ([address] )    Chemotherapy induced neutropenia ([address] )    Port-A-Cath in place    Breast cancer ([address] )    Nausea and vomiting    SIRS (systemic inflammatory response syndrome) ([address] )    JEFERSON (acute kidney injury) ([address] )    Hyponatremia    Hypokalemia    Anemia    Colitis    Cancer related pain    Continuous opioid dependence ([address] )    Smoking    Gastritis    Candida esophagitis ([address] )    Severe protein-calorie malnutrition ([address] )    Hypomagnesemia    Chronic RLQ pain      Past Medical and Surgical History:     Past Medical History:   Diagnosis Date    Cancer [facility])     Colon polyp     Disease of thyroid gland     History of chemotherapy     Hypertension     Kidney stone     Lupus [facility] )     Malignant neoplasm of upper-inner quadrant of left breast in female, estrogen receptor negative ([facility] ) 2/25/2020    left    Nephrolithiasis     PTSD (post-traumatic stress disorder)     Sjogren's disease ([facility] )      Past Surgical History:   Procedure Laterality Date    BREAST BIOPSY Left 02/12/2020    us guided -  invasive ductal carcinoma    BREAST BIOPSY Right 02/12/2020    benign stereo    ECTOPIC PREGNANCY SURGERY      FEMUR FRACTURE SURGERY      FL GUIDED CENTRAL VENOUS ACCESS DEVICE INSERTION  3/17/2020    HYSTERECTOMY      LEFT OOPHORECTOMY      due to torsion     MAMMO STEREOTACTIC BREAST BIOPSY RIGHT (ALL INC) Right 2/12/2020    MASTECTOMY Left 08/2020    MASTECTOMY W/ SENTINEL NODE BIOPSY Left 8/18/2020    Procedure: BREAST MASTECTOMY WITH SENTINEL LYMPH NODE BIOPSY, LYMPHATIC MAPPING WITH BLUE DYE AND RADIOACTIVE DYE (INJECT AT 1430 BY DR WRIGHT IN THE OR);   Surgeon: Jeri Garcia MD;  [facility];  Service: Surgical Oncology    MRI BREAST BIOPSY LEFT (ALL INCLUSIVE) Left 3/20/2020    MD INSJ TUNNELED CTR VAD W/SUBQ PORT AGE 5 YR/> N/A 3/17/2020    Procedure: INSERTION VENOUS PORT ( PORT-A-CATH) IR;  Surgeon: Claris Harada, DO;  [facility];  Service: Interventional Radiology    MD RMVL MARSHA CTR VAD W/SUBQ PORT/ CTR/PRPH INSJ N/A 7/6/2021    Procedure: REMOVAL VENOUS PORT (PORT-A-CATH)IR;  Surgeon: Claris Harada, DO;  [facility];  Service: Interventional Radiology    US GUIDANCE BREAST BIOPSY LEFT EACH ADDITIONAL Left 2/12/2020    US GUIDED BREAST BIOPSY LEFT COMPLETE Left 2/12/2020    VAGINA SURGERY        Family History:     Family History   Problem Relation Age of Onset    Diabetes Mother     Hypertension Mother     Nephrolithiasis Mother     Breast cancer Mother 79    Heart disease Father  Hypertension Father     Diabetes Brother     Nephrolithiasis Brother     Breast cancer Maternal Aunt     No Known Problems Maternal Grandmother     No Known Problems Maternal Grandfather     No Known Problems Paternal Grandmother     No Known Problems Paternal Grandfather       Social History:     Social History     Socioeconomic History    Marital status: /Civil Union     Spouse name: None    Number of children: None    Years of education: None    Highest education level: None   Occupational History    None   Tobacco Use    Smoking status: Current Every Day Smoker     Packs/day: 0 50     Years: 40 00     Pack years: 20 00     Types: Cigarettes     Start date: 1990    Smokeless tobacco: Never Used   Vaping Use    Vaping Use: Never used   Substance and Sexual Activity    Alcohol use: Not Currently    Drug use: No    Sexual activity: Not Currently     Partners: Male     Birth control/protection: None   Other Topics Concern    None   Social History Narrative    None     Social Determinants of Health     Financial Resource Strain:     Difficulty of Paying Living Expenses:    Food Insecurity:     Worried About Running Out of Food in the Last Year:     Ran Out of Food in the Last Year:    Transportation Needs:     Lack of Transportation (Medical):      Lack of Transportation (Non-Medical):    Physical Activity:     Days of Exercise per Week:     Minutes of Exercise per Session:    Stress:     Feeling of Stress :    Social Connections:     Frequency of Communication with Friends and Family:     Frequency of Social Gatherings with Friends and Family:     Attends Hinduism Services:     Active Member of Clubs or Organizations:     Attends Club or Organization Meetings:     Marital Status:    Intimate Partner Violence:     Fear of Current or Ex-Partner:     Emotionally Abused:     Physically Abused:     Sexually Abused:       Medications and Allergies:     Current Outpatient Medications   Medication Sig Dispense Refill    acetaminophen (TYLENOL) 325 mg tablet Take 2 tablets (650 mg total) by mouth every 6 (six) hours as needed for mild pain, headaches or fever 30 tablet 0    al mag oxide-diphenhydramine-lidocaine viscous (MAGIC MOUTHWASH) 1:1:1 suspension Swish and swallow 10 mL every 6 (six) hours as needed for mouth pain or discomfort or mucositis 1 Bottle 2    al mag oxide-diphenhydramine-lidocaine viscous (MAGIC MOUTHWASH) 1:1:1 suspension Swish and spit 10 mL every 4 (four) hours as needed for mouth pain or discomfort 180 mL 3    alendronate (FOSAMAX) 70 mg tablet Take 70 mg by mouth every 7 days      ALPRAZolam (XANAX) 1 mg tablet Take 1 tablet (1 mg total) by mouth 3 (three) times a day as needed for anxiety 30 tablet 3    amLODIPine (NORVASC) 2 5 mg tablet Take 2 5 mg by mouth daily      cyclobenzaprine (FLEXERIL) 5 mg tablet Take 10 mg by mouth daily at bedtime as needed      dicyclomine (BENTYL) 10 mg capsule Take 1 capsule (10 mg total) by mouth 2 (two) times a day 60 capsule 3    ergocalciferol (VITAMIN D2) 50,000 units TAKE ONE CAPSULE ONCE WEEKLY      HYDROcodone-acetaminophen (NORCO) 5-325 mg per tablet Take 1 tablet by mouth every 6 (six) hours as needed for painMax Daily Amount: 4 tablets 120 tablet 0    hydroxychloroquine (PLAQUENIL) 200 mg tablet Take 1 tablet in morning and 1/2 tablet in evening      levothyroxine 88 mcg tablet Take 1 tablet (88 mcg total) by mouth daily 30 tablet 5    magnesium oxide (MAG-OX) 400 mg TAKE ONE TABLET BY MOUTH TWICE A DAY 60 each 5    metoclopramide (REGLAN) 5 mg tablet Take 1 tablet (5 mg total) by mouth 3 (three) times a day before meals 30 tablet 1    pantoprazole (PROTONIX) 40 mg tablet Take 1 tablet (40 mg total) by mouth daily 30 tablet 5    predniSONE 5 mg tablet One po bid with food 60 tablet 1    sertraline (ZOLOFT) 100 mg tablet TAEK ONE AND ONE-HALF TABLETS  DAILY   45 tablet 5     No current facility-administered medications for this visit  Allergies   Allergen Reactions    Mobic [Meloxicam] Eye Swelling     Reaction Date: 12Aug2011;     Methotrexate Rash    Sulfa Antibiotics Rash      Immunizations:     Immunization History   Administered Date(s) Administered    SARS-CoV-2 / COVID-19 mRNA IM ([facility]) 04/08/2021, 05/03/2021      Health Maintenance:         Topic Date Due    Hepatitis C Screening  Never done    HIV Screening  Never done    Breast Cancer Screening: Mammogram  06/02/2023    Colorectal Cancer Screening  05/27/2026         Topic Date Due    Pneumococcal Vaccine: Pediatrics (0 to 5 Years) and At-Risk Patients (6 to 59 Years) (1 of 2 - PPSV23) Never done    DTaP,Tdap,and Td Vaccines (1 - Tdap) Never done    Influenza Vaccine (1) 09/01/2021      Medicare Health Risk Assessment:     /80 (BP Location: Left arm, Patient Position: Sitting, Cuff Size: Adult)   Pulse 96   Temp 97 5 °F (36 4 °C) (Temporal)   Resp 15   Ht 5' 3" (1 6 m)   Wt 63 5 kg (140 lb)   LMP  (LMP Unknown)   SpO2 98%   BMI 24 80 kg/m²      Vannesa Sage is here for her Subsequent Wellness visit  Health Risk Assessment:   Patient rates overall health as fair  Patient feels that their physical health rating is slightly worse  Patient is satisfied with their life  Eyesight was rated as slightly worse  Hearing was rated as same  Patient feels that their emotional and mental health rating is same  Patients states they are never, rarely angry  Patient states they are always unusually tired/fatigued  Pain experienced in the last 7 days has been a lot  Patient's pain rating has been 8/10  Patient states that she has experienced weight loss or gain in last 6 months  Depression Screening:   PHQ-2 Score: 2  PHQ-9 Score: 6      Fall Risk Screening:    In the past year, patient has experienced: history of falling in past year    Number of falls: 1  Injured during fall?: No    Feels unsteady when standing or walking?: Yes    Worried about falling?: Yes      Urinary Incontinence Screening:   Patient has not leaked urine accidently in the last six months  Home Safety:  Patient has trouble with stairs inside or outside of their home  Patient has working smoke alarms and has working carbon monoxide detector  Home safety hazards include: none  Nutrition:   Current diet is Regular  Medications:   Patient is not currently taking any over-the-counter supplements  Patient is able to manage medications  Activities of Daily Living (ADLs)/Instrumental Activities of Daily Living (IADLs):   Walk and transfer into and out of bed and chair?: Yes  Dress and groom yourself?: Yes    Bathe or shower yourself?: Yes    Feed yourself? Yes  Do your laundry/housekeeping?: Yes  Manage your money, pay your bills and track your expenses?: Yes  Make your own meals?: Yes    Do your own shopping?: Yes    Previous Hospitalizations:   Any hospitalizations or ED visits within the last 12 months?: Yes      Advance Care Planning:   Living will: No    Durable POA for healthcare: No    Advanced directive: No      PREVENTIVE SCREENINGS      Cardiovascular Screening:    General: Screening Current      Diabetes Screening:     General: Screening Current      Colorectal Cancer Screening:     General: Screening Current      Breast Cancer Screening:     General: History Breast Cancer      Lung Cancer Screening:     General: Screening Not Indicated    Screening, Brief Intervention, and Referral to Treatment (SBIRT)    Screening  Typical number of drinks in a day: 0  Typical number of drinks in a week: 0  Interpretation: Low risk drinking behavior      Single Item Drug Screening:  How often have you used an illegal drug (including marijuana) or a prescription medication for non-medical reasons in the past year? never    Single Item Drug Screen Score: 0  Interpretation: Negative screen for possible drug use disorder      Ottoniel Lora MD

## 2021-08-05 NOTE — PATIENT INSTRUCTIONS
Medicare Preventive Visit Patient Instructions  Thank you for completing your Welcome to Medicare Visit or Medicare Annual Wellness Visit today  Your next wellness visit will be due in one year (8/6/2022)  The screening/preventive services that you may require over the next 5-10 years are detailed below  Some tests may not apply to you based off risk factors and/or age  Screening tests ordered at today's visit but not completed yet may show as past due  Also, please note that scanned in results may not display below  Preventive Screenings:  Service Recommendations Previous Testing/Comments   Colorectal Cancer Screening  * Colonoscopy    * Fecal Occult Blood Test (FOBT)/Fecal Immunochemical Test (FIT)  * Fecal DNA/Cologuard Test  * Flexible Sigmoidoscopy Age: 54-65 years old   Colonoscopy: every 10 years (may be performed more frequently if at higher risk)  OR  FOBT/FIT: every 1 year  OR  Cologuard: every 3 years  OR  Sigmoidoscopy: every 5 years  Screening may be recommended earlier than age 48 if at higher risk for colorectal cancer  Also, an individualized decision between you and your healthcare provider will decide whether screening between the ages of 74-80 would be appropriate  Colonoscopy: 05/27/2021  FOBT/FIT: Not on file  Cologuard: Not on file  Sigmoidoscopy: Not on file    Screening Current     Breast Cancer Screening Age: 36 years old  Frequency: every 1-2 years  Not required if history of left and right mastectomy Mammogram: 06/02/2021    History Breast Cancer   Cervical Cancer Screening Between the ages of 21-29, pap smear recommended once every 3 years  Between the ages of 33-67, can perform pap smear with HPV co-testing every 5 years     Recommendations may differ for women with a history of total hysterectomy, cervical cancer, or abnormal pap smears in past  Pap Smear: Not on file        Hepatitis C Screening Once for adults born between 1945 and 1965  More frequently in patients at high risk for Hepatitis C Hep C Antibody: Not on file        Diabetes Screening 1-2 times per year if you're at risk for diabetes or have pre-diabetes Fasting glucose: 87 mg/dL   A1C: No results in last 5 years    Screening Current   Cholesterol Screening Once every 5 years if you don't have a lipid disorder  May order more often based on risk factors  Lipid panel: Not on file          Other Preventive Screenings Covered by Medicare:  1  Abdominal Aortic Aneurysm (AAA) Screening: covered once if your at risk  You're considered to be at risk if you have a family history of AAA  2  Lung Cancer Screening: covers low dose CT scan once per year if you meet all of the following conditions: (1) Age 50-69; (2) No signs or symptoms of lung cancer; (3) Current smoker or have quit smoking within the last 15 years; (4) You have a tobacco smoking history of at least 30 pack years (packs per day multiplied by number of years you smoked); (5) You get a written order from a healthcare provider  3  Glaucoma Screening: covered annually if you're considered high risk: (1) You have diabetes OR (2) Family history of glaucoma OR (3)  aged 48 and older OR (3)  American aged 72 and older  3  Osteoporosis Screening: covered every 2 years if you meet one of the following conditions: (1) You're estrogen deficient and at risk for osteoporosis based off medical history and other findings; (2) Have a vertebral abnormality; (3) On glucocorticoid therapy for more than 3 months; (4) Have primary hyperparathyroidism; (5) On osteoporosis medications and need to assess response to drug therapy  · Last bone density test (DXA Scan): Not on file  5  HIV Screening: covered annually if you're between the age of 12-76  Also covered annually if you are younger than 13 and older than 72 with risk factors for HIV infection  For pregnant patients, it is covered up to 3 times per pregnancy      Immunizations:  Immunization Recommendations Influenza Vaccine Annual influenza vaccination during flu season is recommended for all persons aged >= 6 months who do not have contraindications   Pneumococcal Vaccine (Prevnar and Pneumovax)  * Prevnar = PCV13  * Pneumovax = PPSV23   Adults 25-60 years old: 1-3 doses may be recommended based on certain risk factors  Adults 72 years old: Prevnar (PCV13) vaccine recommended followed by Pneumovax (PPSV23) vaccine  If already received PPSV23 since turning 65, then PCV13 recommended at least one year after PPSV23 dose  Hepatitis B Vaccine 3 dose series if at intermediate or high risk (ex: diabetes, end stage renal disease, liver disease)   Tetanus (Td) Vaccine - COST NOT COVERED BY MEDICARE PART B Following completion of primary series, a booster dose should be given every 10 years to maintain immunity against tetanus  Td may also be given as tetanus wound prophylaxis  Tdap Vaccine - COST NOT COVERED BY MEDICARE PART B Recommended at least once for all adults  For pregnant patients, recommended with each pregnancy  Shingles Vaccine (Shingrix) - COST NOT COVERED BY MEDICARE PART B  2 shot series recommended in those aged 48 and above     Health Maintenance Due:      Topic Date Due    Hepatitis C Screening  Never done    HIV Screening  Never done    Breast Cancer Screening: Mammogram  06/02/2023    Colorectal Cancer Screening  05/27/2026     Immunizations Due:      Topic Date Due    Pneumococcal Vaccine: Pediatrics (0 to 5 Years) and At-Risk Patients (6 to 59 Years) (1 of 2 - PPSV23) Never done    DTaP,Tdap,and Td Vaccines (1 - Tdap) Never done    Influenza Vaccine (1) 09/01/2021     Advance Directives   What are advance directives? Advance directives are legal documents that state your wishes and plans for medical care  These plans are made ahead of time in case you lose your ability to make decisions for yourself   Advance directives can apply to any medical decision, such as the treatments you want, and if you want to donate organs  What are the types of advance directives? There are many types of advance directives, and each state has rules about how to use them  You may choose a combination of any of the following:  · Living will: This is a written record of the treatment you want  You can also choose which treatments you do not want, which to limit, and which to stop at a certain time  This includes surgery, medicine, IV fluid, and tube feedings  · Durable power of  for healthcare [facility]): This is a written record that states who you want to make healthcare choices for you when you are unable to make them for yourself  This person, called a proxy, is usually a family member or a friend  You may choose more than 1 proxy  · Do not resuscitate (DNR) order:  A DNR order is used in case your heart stops beating or you stop breathing  It is a request not to have certain forms of treatment, such as CPR  A DNR order may be included in other types of advance directives  · Medical directive: This covers the care that you want if you are in a coma, near death, or unable to make decisions for yourself  You can list the treatments you want for each condition  Treatment may include pain medicine, surgery, blood transfusions, dialysis, IV or tube feedings, and a ventilator (breathing machine)  · Values history: This document has questions about your views, beliefs, and how you feel and think about life  This information can help others choose the care that you would choose  Why are advance directives important? An advance directive helps you control your care  Although spoken wishes may be used, it is better to have your wishes written down  Spoken wishes can be misunderstood, or not followed  Treatments may be given even if you do not want them  An advance directive may make it easier for your family to make difficult choices about your care     Cigarette Smoking and Your Health   Risks to your health if you smoke:  Nicotine and other chemicals found in tobacco damage every cell in your body  Even if you are a light smoker, you have an increased risk for cancer, heart disease, and lung disease  If you are pregnant or have diabetes, smoking increases your risk for complications  Benefits to your health if you stop smoking:   · You decrease respiratory symptoms such as coughing, wheezing, and shortness of breath  · You reduce your risk for cancers of the lung, mouth, throat, kidney, bladder, pancreas, stomach, and cervix  If you already have cancer, you increase the benefits of chemotherapy  You also reduce your risk for cancer returning or a second cancer from developing  · You reduce your risk for heart disease, blood clots, heart attack, and stroke  · You reduce your risk for lung infections, and diseases such as pneumonia, asthma, chronic bronchitis, and emphysema  · Your circulation improves  More oxygen can be delivered to your body  If you have diabetes, you lower your risk for complications, such as kidney, artery, and eye diseases  You also lower your risk for nerve damage  Nerve damage can lead to amputations, poor vision, and blindness  · You improve your body's ability to heal and to fight infections  For more information and support to stop smoking:   · [employer]  gov  Phone: 2- 501 - 146-4832  Web Address: www [employer]  [address] [place] [address] Dr Pelayo Information is for End User's use only and may not be sold, redistributed or otherwise used for commercial purposes   All illustrations and images included in CareNotes® are the copyrighted property of [employer]  or [address] [place] [address]

## 2021-08-06 ENCOUNTER — HOSPITAL ENCOUNTER (OUTPATIENT)
Dept: RADIOLOGY | Facility: HOSPITAL | Age: 56
Discharge: HOME | End: 2021-08-06
Payer: COMMERCIAL

## 2021-08-06 DIAGNOSIS — M79.671 RIGHT FOOT PAIN: ICD-10-CM

## 2021-08-06 PROCEDURE — 73630 X-RAY EXAM OF FOOT: CPT

## 2021-08-06 NOTE — ASSESSMENT & PLAN NOTE
Foot pain  Patient will obtain x-ray of right foot for further evaluation  If x-ray is unremarkable patient may have exacerbation of her inflammatory arthritis    If this is the case we would recommend starting a prednisone dose consisting of 50 mg x 2 days, 40 mg x 2, 30 x 2, 20 x 2, than maintaining her 5 mg b i d  dosing

## 2021-08-11 DIAGNOSIS — M32.9 SYSTEMIC LUPUS ERYTHEMATOSUS, UNSPECIFIED SLE TYPE, UNSPECIFIED ORGAN INVOLVEMENT STATUS (HCC): ICD-10-CM

## 2021-08-11 RX ORDER — PREDNISONE 1 MG/1
TABLET ORAL
Qty: 60 TABLET | Refills: 0 | Status: SHIPPED | OUTPATIENT
Start: 2021-08-11 | End: 2021-11-15 | Stop reason: SDUPTHER

## 2021-08-16 NOTE — ASSESSMENT & PLAN NOTE
Raynaud's disease  Patient was diagnosed with Raynaud's disease by her rheumatologist and placed on amlodipine which she feels has helped significantly with her symptoms    She will continue with current regimen of medications

## 2021-08-21 DIAGNOSIS — K52.9 COLITIS: ICD-10-CM

## 2021-08-21 DIAGNOSIS — G89.29 CHRONIC RLQ PAIN: ICD-10-CM

## 2021-08-21 DIAGNOSIS — R10.31 CHRONIC RLQ PAIN: ICD-10-CM

## 2021-08-23 DIAGNOSIS — C50.919 MALIGNANT NEOPLASM OF FEMALE BREAST, UNSPECIFIED ESTROGEN RECEPTOR STATUS, UNSPECIFIED LATERALITY, UNSPECIFIED SITE OF BREAST (HCC): ICD-10-CM

## 2021-08-23 RX ORDER — HYDROCODONE BITARTRATE AND ACETAMINOPHEN 5; 325 MG/1; MG/1
1 TABLET ORAL EVERY 6 HOURS PRN
Qty: 120 TABLET | Refills: 0 | Status: SHIPPED | OUTPATIENT
Start: 2021-08-23 | End: 2021-09-20 | Stop reason: SDUPTHER

## 2021-08-26 RX ORDER — DICYCLOMINE HYDROCHLORIDE 10 MG/1
CAPSULE ORAL
Qty: 60 CAPSULE | Refills: 3 | Status: SHIPPED | OUTPATIENT
Start: 2021-08-26 | End: 2021-12-22

## 2021-08-27 DIAGNOSIS — R11.2 INTRACTABLE VOMITING WITH NAUSEA, UNSPECIFIED VOMITING TYPE: ICD-10-CM

## 2021-08-27 RX ORDER — METOCLOPRAMIDE 5 MG/1
TABLET ORAL
Qty: 30 TABLET | Refills: 1 | Status: SHIPPED | OUTPATIENT
Start: 2021-08-27 | End: 2021-10-18 | Stop reason: SDUPTHER

## 2021-08-30 ENCOUNTER — OFFICE VISIT (OUTPATIENT)
Dept: PODIATRY | Facility: CLINIC | Age: 56
End: 2021-08-30
Payer: COMMERCIAL

## 2021-08-30 VITALS
HEART RATE: 86 BPM | HEIGHT: 63 IN | SYSTOLIC BLOOD PRESSURE: 147 MMHG | DIASTOLIC BLOOD PRESSURE: 85 MMHG | WEIGHT: 145.6 LBS | BODY MASS INDEX: 25.8 KG/M2

## 2021-08-30 DIAGNOSIS — S92.334A CLOSED NONDISPLACED FRACTURE OF THIRD METATARSAL BONE OF RIGHT FOOT, INITIAL ENCOUNTER: Primary | ICD-10-CM

## 2021-08-30 DIAGNOSIS — M79.671 RIGHT FOOT PAIN: ICD-10-CM

## 2021-08-30 DIAGNOSIS — F41.9 ANXIETY: ICD-10-CM

## 2021-08-30 PROCEDURE — 4004F PT TOBACCO SCREEN RCVD TLK: CPT | Performed by: PODIATRIST

## 2021-08-30 PROCEDURE — 3008F BODY MASS INDEX DOCD: CPT | Performed by: PODIATRIST

## 2021-08-30 PROCEDURE — 99203 OFFICE O/P NEW LOW 30 MIN: CPT | Performed by: PODIATRIST

## 2021-08-30 NOTE — PROGRESS NOTES
Assessment/Plan:         Diagnoses and all orders for this visit:    Closed nondisplaced fracture of third metatarsal bone of right foot, initial encounter  -     Cam Juanjoot    Right foot pain  -     Ambulatory referral to Podiatry  -     Cam Boot      Diagnosis and options discussed with patient  Patient agreeable to the plan as stated below    CAM boot 4 weeks, serial XRay    Reviewed PCP note and referral  Reviewed XRay  We discussed the relationship between cigarette smoking, atherosclerotic disease, and its effects on the lower extremity  I emphasized the importance of smoking cessation       Subjective:      Patient ID: Samantha Sharma is a 60 y o  female  Patient began having foot pain about a month ago  She had sharp pain out of nowhere  She is active a lot  She denies trauma  She got an XRay which showed a stress fracture  She is in slippers today walking on her heel, the pain has improved over the past few weeks  PMH significant for Lupus, chronic pain syndrome, low vitaminD, breast cancer s/p mastectomy and chemotherapy  She smokes daily, 1/2 pack          The following portions of the patient's history were reviewed and updated as appropriate: She  has a past medical history of Cancer [facility]), Colon polyp, Disease of thyroid gland, History of chemotherapy, Hypertension, Kidney stone, Lupus ([address] ), Malignant neoplasm of upper-inner quadrant of left breast in female, estrogen receptor negative ([address] ) (2/25/2020), Nephrolithiasis, PTSD (post-traumatic stress disorder), and Sjogren's disease ([address] )    She   Patient Active Problem List    Diagnosis Date Noted    Right foot pain 08/05/2021    Raynaud's disease without gangrene 08/05/2021    Chronic RLQ pain 03/31/2021    Severe protein-calorie malnutrition ([address] ) 03/18/2021    Hypomagnesemia 03/18/2021    Candida esophagitis ([address] ) 02/20/2021    Gastritis 10/28/2020    Continuous opioid dependence ([address] ) 10/19/2020    Smoking 10/19/2020    Cancer related pain 09/30/2020    Colitis 09/19/2020    Anemia 07/12/2020    Hypokalemia 05/12/2020    Nausea and vomiting 05/10/2020    SIRS (systemic inflammatory response syndrome) ([place] [address] ) 05/10/2020    JEFERSON (acute kidney injury) ([place] [address] ) 05/10/2020    Hyponatremia 05/10/2020    Breast cancer ([place] [address] ) 05/08/2020    Port-A-Cath in place 04/29/2020    Chemotherapy induced neutropenia ([place] [address] ) 03/11/2020    Malignant neoplasm of upper-inner quadrant of left breast in female, estrogen receptor negative ([place] [address] ) 02/25/2020    Posttraumatic stress disorder 10/02/2018    Adult celiac disease 08/14/2017    Esophagitis 08/14/2017    Systemic lupus erythematosus ([place] [address] )     Hypothyroidism     Vitamin D deficiency 05/12/2017    Nephrolithiasis 08/29/2013    Anxiety 05/03/2013    Depression 11/10/2012     She  has a past surgical history that includes Femur fracture surgery; Hysterectomy; Left oophorectomy; Vagina surgery; Mammo stereotactic breast biopsy right (all inc) (Right, 2/12/2020); US guided breast biopsy left complete (Left, 2/12/2020); US guidance breast biopsy left each additional (Left, 2/12/2020); pr insj tunneled ctr vad w/subq port age 9 yr/> (N/A, 3/17/2020); FL guided central venous access device insertion (3/17/2020); MRI breast biopsy left (all inclusive) (Left, 3/20/2020); Mastectomy w/ sentinel node biopsy (Left, 8/18/2020); Mastectomy (Left, 08/2020); Breast biopsy (Left, 02/12/2020); Breast biopsy (Right, 02/12/2020); Ectopic pregnancy surgery; and pr rmvl saba ctr vad w/subq port/ ctr/prph insj (N/A, 7/6/2021)  Her family history includes Breast cancer in her maternal aunt; Breast cancer (age of onset: 79) in her mother; Diabetes in her brother and mother; Heart disease in her father; Hypertension in her father and mother; Nephrolithiasis in her brother and mother; No Known Problems in her maternal grandfather, maternal grandmother, paternal grandfather, and paternal grandmother    She  reports that she has been smoking cigarettes  She started smoking about 31 years ago  She has a 20 00 pack-year smoking history  She has never used smokeless tobacco  She reports previous alcohol use  She reports that she does not use drugs    Current Outpatient Medications   Medication Sig Dispense Refill    acetaminophen (TYLENOL) 325 mg tablet Take 2 tablets (650 mg total) by mouth every 6 (six) hours as needed for mild pain, headaches or fever 30 tablet 0    al mag oxide-diphenhydramine-lidocaine viscous (MAGIC MOUTHWASH) 1:1:1 suspension Swish and swallow 10 mL every 6 (six) hours as needed for mouth pain or discomfort or mucositis 1 Bottle 2    al mag oxide-diphenhydramine-lidocaine viscous (MAGIC MOUTHWASH) 1:1:1 suspension Swish and spit 10 mL every 4 (four) hours as needed for mouth pain or discomfort 180 mL 3    alendronate (FOSAMAX) 70 mg tablet Take 70 mg by mouth every 7 days      ALPRAZolam (XANAX) 1 mg tablet Take 1 tablet (1 mg total) by mouth 3 (three) times a day as needed for anxiety 30 tablet 3    amLODIPine (NORVASC) 2 5 mg tablet Take 2 5 mg by mouth daily      amLODIPine (NORVASC) 2 5 mg tablet One po bid 30 tablet 5    cyclobenzaprine (FLEXERIL) 5 mg tablet Take 10 mg by mouth daily at bedtime as needed      dicyclomine (BENTYL) 10 mg capsule TAKE ONE CAPSULE BY MOUTH TWICE A DAY 60 capsule 3    ergocalciferol (VITAMIN D2) 50,000 units TAKE ONE CAPSULE ONCE WEEKLY      HYDROcodone-acetaminophen (NORCO) 5-325 mg per tablet Take 1 tablet by mouth every 6 (six) hours as needed for painMax Daily Amount: 4 tablets 120 tablet 0    hydroxychloroquine (PLAQUENIL) 200 mg tablet Take 1 tablet in morning and 1/2 tablet in evening      levothyroxine 88 mcg tablet Take 1 tablet (88 mcg total) by mouth daily 30 tablet 5    magnesium oxide (MAG-OX) 400 mg TAKE ONE TABLET BY MOUTH TWICE A DAY 60 each 5    metoclopramide (REGLAN) 5 mg tablet TAKE ONE TABLET BY MOUTH THREE TIMES A DAY BEFORE MEALS 30 tablet 1    pantoprazole (PROTONIX) 40 mg tablet Take 1 tablet (40 mg total) by mouth daily 30 tablet 5    predniSONE 5 mg tablet TAKE ONE TABLET BY MOUTH TWICE A DAY WITH FOOD 60 tablet 0    sertraline (ZOLOFT) 100 mg tablet TAEK ONE AND ONE-HALF TABLETS  DAILY  45 tablet 5     No current facility-administered medications for this visit       Current Outpatient Medications on File Prior to Visit   Medication Sig    acetaminophen (TYLENOL) 325 mg tablet Take 2 tablets (650 mg total) by mouth every 6 (six) hours as needed for mild pain, headaches or fever    al mag oxide-diphenhydramine-lidocaine viscous (MAGIC MOUTHWASH) 1:1:1 suspension Swish and swallow 10 mL every 6 (six) hours as needed for mouth pain or discomfort or mucositis    al mag oxide-diphenhydramine-lidocaine viscous (MAGIC MOUTHWASH) 1:1:1 suspension Swish and spit 10 mL every 4 (four) hours as needed for mouth pain or discomfort    alendronate (FOSAMAX) 70 mg tablet Take 70 mg by mouth every 7 days    ALPRAZolam (XANAX) 1 mg tablet Take 1 tablet (1 mg total) by mouth 3 (three) times a day as needed for anxiety    amLODIPine (NORVASC) 2 5 mg tablet Take 2 5 mg by mouth daily    amLODIPine (NORVASC) 2 5 mg tablet One po bid    cyclobenzaprine (FLEXERIL) 5 mg tablet Take 10 mg by mouth daily at bedtime as needed    dicyclomine (BENTYL) 10 mg capsule TAKE ONE CAPSULE BY MOUTH TWICE A DAY    ergocalciferol (VITAMIN D2) 50,000 units TAKE ONE CAPSULE ONCE WEEKLY    HYDROcodone-acetaminophen (NORCO) 5-325 mg per tablet Take 1 tablet by mouth every 6 (six) hours as needed for painMax Daily Amount: 4 tablets    hydroxychloroquine (PLAQUENIL) 200 mg tablet Take 1 tablet in morning and 1/2 tablet in evening    levothyroxine 88 mcg tablet Take 1 tablet (88 mcg total) by mouth daily    magnesium oxide (MAG-OX) 400 mg TAKE ONE TABLET BY MOUTH TWICE A DAY    metoclopramide (REGLAN) 5 mg tablet TAKE ONE TABLET BY MOUTH THREE TIMES A DAY BEFORE MEALS    pantoprazole (PROTONIX) 40 mg tablet Take 1 tablet (40 mg total) by mouth daily    predniSONE 5 mg tablet TAKE ONE TABLET BY MOUTH TWICE A DAY WITH FOOD    sertraline (ZOLOFT) 100 mg tablet TAEK ONE AND ONE-HALF TABLETS  DAILY  No current facility-administered medications on file prior to visit  She is allergic to mobic [meloxicam], methotrexate, and sulfa antibiotics       Review of Systems   Constitutional: Negative  HENT: Negative  Respiratory: Negative  Cardiovascular: Negative  Gastrointestinal: Negative  Musculoskeletal: Positive for arthralgias and joint swelling  Skin: Negative for color change, rash and wound  Neurological: Negative for weakness and numbness  Objective:      /85   Pulse 86   Ht 5' 3" (1 6 m) Comment: verbal  Wt 66 kg (145 lb 9 6 oz)   LMP  (LMP Unknown)   BMI 25 79 kg/m²          Physical Exam    Vitals reviewed    Constitutional: Patient is not distressed  Patient is well developed  Patient is obese  Vascular: Dorsalis pedis and posterior tibial pulses palpable  Capillary refill time within normal limits to all digits  No erythema  No edema  Moderate varicosities  Dermatology: No rash  No open lesions  Present pedal hair  Skin has healthy turgor  Musculoskeletal: Normal range of motion to ankle, subtalar joint, and midtarsal joint  Normal range of motion first MTPJ  Manual muscle testing 5 out of 5 for inversion/eversion/dorsiflexion/plantarflexion  On stance patients feet are generally rectus  Pinpoint pain right 3rd metatarsal neck, no MTPJ instability    Neurological: Monofilament sensation is intact  Vibratory sensation is intact  Achilles reflex is normal    Proprioception is normal    Respiratory: Normal respiratory effort, no distress    Psych: Patient is AAOx3  Normal mood       Lymphatic: nonpalpable popliteal lymph nodes  Nonpalpable groin lymph nodes        XRay 3 views of the right foot personally read by Dr Cannon Situ in office today and discussed with patient:  1   Small stress fracture neck 3rd metatarsal

## 2021-08-31 RX ORDER — ALPRAZOLAM 1 MG/1
TABLET ORAL
Qty: 30 TABLET | Refills: 3 | Status: SHIPPED | OUTPATIENT
Start: 2021-08-31 | End: 2021-10-15

## 2021-09-05 DIAGNOSIS — R13.19 ESOPHAGEAL DYSPHAGIA: ICD-10-CM

## 2021-09-07 RX ORDER — PANTOPRAZOLE SODIUM 40 MG/1
TABLET, DELAYED RELEASE ORAL
Qty: 30 TABLET | Refills: 5 | Status: SHIPPED | OUTPATIENT
Start: 2021-09-07 | End: 2022-02-24

## 2021-09-20 DIAGNOSIS — C50.919 MALIGNANT NEOPLASM OF FEMALE BREAST, UNSPECIFIED ESTROGEN RECEPTOR STATUS, UNSPECIFIED LATERALITY, UNSPECIFIED SITE OF BREAST (HCC): ICD-10-CM

## 2021-09-20 RX ORDER — HYDROCODONE BITARTRATE AND ACETAMINOPHEN 5; 325 MG/1; MG/1
1 TABLET ORAL EVERY 6 HOURS PRN
Qty: 120 TABLET | Refills: 0 | Status: SHIPPED | OUTPATIENT
Start: 2021-09-20 | End: 2021-10-18 | Stop reason: SDUPTHER

## 2021-10-06 ENCOUNTER — OFFICE VISIT (OUTPATIENT)
Dept: PODIATRY | Facility: CLINIC | Age: 56
End: 2021-10-06
Payer: COMMERCIAL

## 2021-10-06 VITALS
HEART RATE: 103 BPM | BODY MASS INDEX: 25.69 KG/M2 | DIASTOLIC BLOOD PRESSURE: 86 MMHG | HEIGHT: 63 IN | SYSTOLIC BLOOD PRESSURE: 126 MMHG | WEIGHT: 145 LBS

## 2021-10-06 DIAGNOSIS — S92.334A CLOSED NONDISPLACED FRACTURE OF THIRD METATARSAL BONE OF RIGHT FOOT, INITIAL ENCOUNTER: Primary | ICD-10-CM

## 2021-10-06 PROCEDURE — 3008F BODY MASS INDEX DOCD: CPT | Performed by: PODIATRIST

## 2021-10-06 PROCEDURE — 4004F PT TOBACCO SCREEN RCVD TLK: CPT | Performed by: PODIATRIST

## 2021-10-06 PROCEDURE — 99213 OFFICE O/P EST LOW 20 MIN: CPT | Performed by: PODIATRIST

## 2021-10-15 DIAGNOSIS — F41.9 ANXIETY: ICD-10-CM

## 2021-10-15 RX ORDER — ALPRAZOLAM 1 MG/1
TABLET ORAL
Qty: 30 TABLET | Refills: 3 | Status: SHIPPED | OUTPATIENT
Start: 2021-10-15 | End: 2021-11-29

## 2021-10-18 DIAGNOSIS — R11.2 INTRACTABLE VOMITING WITH NAUSEA, UNSPECIFIED VOMITING TYPE: ICD-10-CM

## 2021-10-18 DIAGNOSIS — C50.919 MALIGNANT NEOPLASM OF FEMALE BREAST, UNSPECIFIED ESTROGEN RECEPTOR STATUS, UNSPECIFIED LATERALITY, UNSPECIFIED SITE OF BREAST (HCC): ICD-10-CM

## 2021-10-18 RX ORDER — METOCLOPRAMIDE 5 MG/1
5 TABLET ORAL
Qty: 30 TABLET | Refills: 1 | Status: SHIPPED | OUTPATIENT
Start: 2021-10-18 | End: 2022-03-22

## 2021-10-18 RX ORDER — HYDROCODONE BITARTRATE AND ACETAMINOPHEN 5; 325 MG/1; MG/1
1 TABLET ORAL EVERY 6 HOURS PRN
Qty: 120 TABLET | Refills: 0 | Status: SHIPPED | OUTPATIENT
Start: 2021-10-18 | End: 2021-11-15 | Stop reason: SDUPTHER

## 2021-11-02 ENCOUNTER — OFFICE VISIT (OUTPATIENT)
Dept: HEMATOLOGY ONCOLOGY | Facility: CLINIC | Age: 56
End: 2021-11-02
Payer: COMMERCIAL

## 2021-11-02 VITALS
SYSTOLIC BLOOD PRESSURE: 136 MMHG | OXYGEN SATURATION: 96 % | TEMPERATURE: 99 F | WEIGHT: 147 LBS | BODY MASS INDEX: 26.05 KG/M2 | DIASTOLIC BLOOD PRESSURE: 84 MMHG | RESPIRATION RATE: 18 BRPM | HEIGHT: 63 IN | HEART RATE: 126 BPM

## 2021-11-02 DIAGNOSIS — C50.912 MALIGNANT NEOPLASM OF LEFT BREAST IN FEMALE, ESTROGEN RECEPTOR NEGATIVE, UNSPECIFIED SITE OF BREAST (HCC): Primary | ICD-10-CM

## 2021-11-02 DIAGNOSIS — Z17.1 MALIGNANT NEOPLASM OF LEFT BREAST IN FEMALE, ESTROGEN RECEPTOR NEGATIVE, UNSPECIFIED SITE OF BREAST (HCC): Primary | ICD-10-CM

## 2021-11-02 PROCEDURE — 99214 OFFICE O/P EST MOD 30 MIN: CPT | Performed by: INTERNAL MEDICINE

## 2021-11-06 DIAGNOSIS — F41.9 ANXIETY: ICD-10-CM

## 2021-11-08 RX ORDER — SERTRALINE HYDROCHLORIDE 100 MG/1
TABLET, FILM COATED ORAL
Qty: 45 TABLET | Refills: 5 | Status: SHIPPED | OUTPATIENT
Start: 2021-11-08 | End: 2022-04-26

## 2021-11-15 DIAGNOSIS — M32.9 SYSTEMIC LUPUS ERYTHEMATOSUS, UNSPECIFIED SLE TYPE, UNSPECIFIED ORGAN INVOLVEMENT STATUS (HCC): ICD-10-CM

## 2021-11-15 DIAGNOSIS — C50.919 MALIGNANT NEOPLASM OF FEMALE BREAST, UNSPECIFIED ESTROGEN RECEPTOR STATUS, UNSPECIFIED LATERALITY, UNSPECIFIED SITE OF BREAST (HCC): ICD-10-CM

## 2021-11-16 DIAGNOSIS — C50.919 MALIGNANT NEOPLASM OF FEMALE BREAST, UNSPECIFIED ESTROGEN RECEPTOR STATUS, UNSPECIFIED LATERALITY, UNSPECIFIED SITE OF BREAST (HCC): ICD-10-CM

## 2021-11-16 DIAGNOSIS — M32.9 SYSTEMIC LUPUS ERYTHEMATOSUS, UNSPECIFIED SLE TYPE, UNSPECIFIED ORGAN INVOLVEMENT STATUS (HCC): ICD-10-CM

## 2021-11-16 RX ORDER — PREDNISONE 1 MG/1
TABLET ORAL
Qty: 60 TABLET | Refills: 0 | Status: SHIPPED | OUTPATIENT
Start: 2021-11-16

## 2021-11-16 RX ORDER — HYDROCODONE BITARTRATE AND ACETAMINOPHEN 5; 325 MG/1; MG/1
1 TABLET ORAL EVERY 6 HOURS PRN
Qty: 120 TABLET | Refills: 0 | OUTPATIENT
Start: 2021-11-16

## 2021-11-16 RX ORDER — PREDNISONE 1 MG/1
TABLET ORAL
Qty: 60 TABLET | Refills: 0 | OUTPATIENT
Start: 2021-11-16

## 2021-11-16 RX ORDER — HYDROCODONE BITARTRATE AND ACETAMINOPHEN 5; 325 MG/1; MG/1
1 TABLET ORAL EVERY 6 HOURS PRN
Qty: 120 TABLET | Refills: 0 | Status: SHIPPED | OUTPATIENT
Start: 2021-11-16 | End: 2021-12-10 | Stop reason: SDUPTHER

## 2021-11-18 ENCOUNTER — OFFICE VISIT (OUTPATIENT)
Dept: FAMILY MEDICINE CLINIC | Facility: CLINIC | Age: 56
End: 2021-11-18
Payer: COMMERCIAL

## 2021-11-18 VITALS
RESPIRATION RATE: 19 BRPM | SYSTOLIC BLOOD PRESSURE: 140 MMHG | WEIGHT: 153.25 LBS | TEMPERATURE: 97.6 F | BODY MASS INDEX: 27.15 KG/M2 | OXYGEN SATURATION: 95 % | DIASTOLIC BLOOD PRESSURE: 9 MMHG | HEIGHT: 63 IN | HEART RATE: 100 BPM

## 2021-11-18 DIAGNOSIS — C50.919 MALIGNANT NEOPLASM OF FEMALE BREAST, UNSPECIFIED ESTROGEN RECEPTOR STATUS, UNSPECIFIED LATERALITY, UNSPECIFIED SITE OF BREAST (HCC): ICD-10-CM

## 2021-11-18 DIAGNOSIS — M32.9 SYSTEMIC LUPUS ERYTHEMATOSUS, UNSPECIFIED SLE TYPE, UNSPECIFIED ORGAN INVOLVEMENT STATUS (HCC): ICD-10-CM

## 2021-11-18 DIAGNOSIS — Z23 FLU VACCINE NEED: Primary | ICD-10-CM

## 2021-11-18 PROBLEM — R63.5 WEIGHT GAIN: Status: ACTIVE | Noted: 2021-11-18

## 2021-11-18 PROCEDURE — 99215 OFFICE O/P EST HI 40 MIN: CPT | Performed by: FAMILY MEDICINE

## 2021-11-18 PROCEDURE — G0008 ADMIN INFLUENZA VIRUS VAC: HCPCS

## 2021-11-18 PROCEDURE — 3008F BODY MASS INDEX DOCD: CPT | Performed by: FAMILY MEDICINE

## 2021-11-18 PROCEDURE — 4004F PT TOBACCO SCREEN RCVD TLK: CPT | Performed by: FAMILY MEDICINE

## 2021-11-18 PROCEDURE — 90682 RIV4 VACC RECOMBINANT DNA IM: CPT

## 2021-11-23 DIAGNOSIS — C50.919 MALIGNANT NEOPLASM OF FEMALE BREAST, UNSPECIFIED ESTROGEN RECEPTOR STATUS, UNSPECIFIED LATERALITY, UNSPECIFIED SITE OF BREAST (HCC): ICD-10-CM

## 2021-11-23 DIAGNOSIS — E03.9 HYPOTHYROIDISM, UNSPECIFIED TYPE: ICD-10-CM

## 2021-11-23 RX ORDER — LEVOTHYROXINE SODIUM 88 UG/1
TABLET ORAL
Qty: 30 TABLET | Refills: 5 | Status: SHIPPED | OUTPATIENT
Start: 2021-11-23 | End: 2022-05-11

## 2021-11-28 DIAGNOSIS — F41.9 ANXIETY: ICD-10-CM

## 2021-11-29 RX ORDER — ALPRAZOLAM 1 MG/1
TABLET ORAL
Qty: 30 TABLET | Refills: 3 | Status: SHIPPED | OUTPATIENT
Start: 2021-11-29 | End: 2022-01-11

## 2021-12-10 DIAGNOSIS — C50.919 MALIGNANT NEOPLASM OF FEMALE BREAST, UNSPECIFIED ESTROGEN RECEPTOR STATUS, UNSPECIFIED LATERALITY, UNSPECIFIED SITE OF BREAST (HCC): ICD-10-CM

## 2021-12-10 RX ORDER — HYDROCODONE BITARTRATE AND ACETAMINOPHEN 5; 325 MG/1; MG/1
1 TABLET ORAL EVERY 6 HOURS PRN
Qty: 120 TABLET | Refills: 0 | Status: SHIPPED | OUTPATIENT
Start: 2021-12-10 | End: 2022-02-07 | Stop reason: SDUPTHER

## 2021-12-18 ENCOUNTER — HOSPITAL ENCOUNTER (OUTPATIENT)
Dept: RADIOLOGY | Facility: HOSPITAL | Age: 56
Discharge: HOME | End: 2021-12-18
Payer: COMMERCIAL

## 2021-12-18 DIAGNOSIS — C50.919 MALIGNANT NEOPLASM OF FEMALE BREAST, UNSPECIFIED ESTROGEN RECEPTOR STATUS, UNSPECIFIED LATERALITY, UNSPECIFIED SITE OF BREAST (HCC): ICD-10-CM

## 2021-12-18 PROCEDURE — G1004 CDSM NDSC: HCPCS

## 2021-12-18 PROCEDURE — 74177 CT ABD & PELVIS W/CONTRAST: CPT

## 2021-12-18 RX ADMIN — IOHEXOL 100 ML: 350 INJECTION, SOLUTION INTRAVENOUS at 13:45

## 2021-12-22 DIAGNOSIS — K52.9 COLITIS: ICD-10-CM

## 2021-12-22 DIAGNOSIS — R10.31 CHRONIC RLQ PAIN: ICD-10-CM

## 2021-12-22 DIAGNOSIS — G89.29 CHRONIC RLQ PAIN: ICD-10-CM

## 2021-12-22 RX ORDER — DICYCLOMINE HYDROCHLORIDE 10 MG/1
CAPSULE ORAL
Qty: 60 CAPSULE | Refills: 3 | Status: SHIPPED | OUTPATIENT
Start: 2021-12-22 | End: 2022-04-19

## 2022-01-06 ENCOUNTER — TELEPHONE (OUTPATIENT)
Dept: FAMILY MEDICINE CLINIC | Facility: CLINIC | Age: 57
End: 2022-01-06

## 2022-01-06 NOTE — TELEPHONE ENCOUNTER
It would depend on how she is feeling after taking prednisone and hydrocodone?   If still having significant symptoms I would follow-up with rheumatologist and we could consider oxycodone

## 2022-01-06 NOTE — TELEPHONE ENCOUNTER
Patient still experiencing same symptoms  Patient seeing Rheumatology on 1/13      Patient also inquiring about most recent CT results

## 2022-01-06 NOTE — TELEPHONE ENCOUNTER
Patient calling to inquire if she is to continue taking prednisone and hydrocodone or if she is to go back to taking oxycodone

## 2022-01-07 ENCOUNTER — TELEPHONE (OUTPATIENT)
Dept: FAMILY MEDICINE CLINIC | Facility: CLINIC | Age: 57
End: 2022-01-07

## 2022-01-07 NOTE — TELEPHONE ENCOUNTER
Recent CT scan did not show any evidence of malignancy of abdomen and pelvis    I will send new prescription for oxycodone to pharmacy as requested

## 2022-01-11 DIAGNOSIS — F41.9 ANXIETY: ICD-10-CM

## 2022-01-11 RX ORDER — ALPRAZOLAM 1 MG/1
TABLET ORAL
Qty: 30 TABLET | Refills: 3 | Status: SHIPPED | OUTPATIENT
Start: 2022-01-11 | End: 2022-02-28

## 2022-01-21 DIAGNOSIS — C50.919 MALIGNANT NEOPLASM OF FEMALE BREAST, UNSPECIFIED ESTROGEN RECEPTOR STATUS, UNSPECIFIED LATERALITY, UNSPECIFIED SITE OF BREAST (HCC): ICD-10-CM

## 2022-01-21 RX ORDER — OXYCODONE HYDROCHLORIDE 5 MG/1
5 TABLET ORAL EVERY 6 HOURS PRN
Qty: 120 TABLET | Refills: 0 | OUTPATIENT
Start: 2022-01-21

## 2022-01-24 NOTE — TELEPHONE ENCOUNTER
Patient called back, stated that she only has 6 pills left of the oxycodone, she stated she broke her foot and took a few extra ones  She is requesting a refill

## 2022-01-28 DIAGNOSIS — C50.919 MALIGNANT NEOPLASM OF FEMALE BREAST, UNSPECIFIED ESTROGEN RECEPTOR STATUS, UNSPECIFIED LATERALITY, UNSPECIFIED SITE OF BREAST (HCC): ICD-10-CM

## 2022-01-28 RX ORDER — HYDROCODONE BITARTRATE AND ACETAMINOPHEN 5; 325 MG/1; MG/1
1 TABLET ORAL EVERY 6 HOURS PRN
Qty: 120 TABLET | Refills: 0 | OUTPATIENT
Start: 2022-01-28

## 2022-01-31 NOTE — TELEPHONE ENCOUNTER
I cannot authorize this medication for more than 4 tablets per day  Unfortunately she stated she was taking more than this amount therefore ran out early  I can place order at pharmacy however it will not be able to be filled until actual due date from last refill which is 30 days from 1/7/22

## 2022-01-31 NOTE — TELEPHONE ENCOUNTER
Patient called back, stated that she needs a refill of oxycodone, is this something you are able to refill for her?

## 2022-02-07 DIAGNOSIS — C50.919 MALIGNANT NEOPLASM OF FEMALE BREAST, UNSPECIFIED ESTROGEN RECEPTOR STATUS, UNSPECIFIED LATERALITY, UNSPECIFIED SITE OF BREAST (HCC): ICD-10-CM

## 2022-02-07 RX ORDER — HYDROCODONE BITARTRATE AND ACETAMINOPHEN 5; 325 MG/1; MG/1
1 TABLET ORAL EVERY 6 HOURS PRN
Qty: 120 TABLET | Refills: 0 | Status: SHIPPED | OUTPATIENT
Start: 2022-02-07 | End: 2022-03-04 | Stop reason: SDUPTHER

## 2022-02-24 DIAGNOSIS — R13.19 ESOPHAGEAL DYSPHAGIA: ICD-10-CM

## 2022-02-24 RX ORDER — PANTOPRAZOLE SODIUM 40 MG/1
TABLET, DELAYED RELEASE ORAL
Qty: 30 TABLET | Refills: 5 | Status: SHIPPED | OUTPATIENT
Start: 2022-02-24 | End: 2022-08-09

## 2022-02-28 DIAGNOSIS — F41.9 ANXIETY: ICD-10-CM

## 2022-02-28 RX ORDER — ALPRAZOLAM 1 MG/1
TABLET ORAL
Qty: 30 TABLET | Refills: 3 | Status: SHIPPED | OUTPATIENT
Start: 2022-02-28 | End: 2022-04-14

## 2022-03-01 ENCOUNTER — PATIENT OUTREACH (OUTPATIENT)
Dept: CASE MANAGEMENT | Facility: HOSPITAL | Age: 57
End: 2022-03-01

## 2022-03-04 DIAGNOSIS — C50.919 MALIGNANT NEOPLASM OF FEMALE BREAST, UNSPECIFIED ESTROGEN RECEPTOR STATUS, UNSPECIFIED LATERALITY, UNSPECIFIED SITE OF BREAST (HCC): ICD-10-CM

## 2022-03-07 RX ORDER — HYDROCODONE BITARTRATE AND ACETAMINOPHEN 5; 325 MG/1; MG/1
1 TABLET ORAL EVERY 6 HOURS PRN
Qty: 120 TABLET | Refills: 0 | Status: SHIPPED | OUTPATIENT
Start: 2022-03-07 | End: 2022-04-04 | Stop reason: SDUPTHER

## 2022-03-22 DIAGNOSIS — R11.2 INTRACTABLE VOMITING WITH NAUSEA, UNSPECIFIED VOMITING TYPE: ICD-10-CM

## 2022-03-22 RX ORDER — METOCLOPRAMIDE 5 MG/1
TABLET ORAL
Qty: 30 TABLET | Refills: 1 | Status: SHIPPED | OUTPATIENT
Start: 2022-03-22

## 2022-03-30 NOTE — H&P (VIEW-ONLY)
Hematology / Oncology Outpatient Consult Note    David Wright 62 y o  female [id number]         Date:  3/11/2020    Assessment / Plan:  A 76-year-old postmenopausal woman with newly diagnosed clinical stage II left breast cancer, grade 3, ER negative, NM negative, HER2 fish positive disease  She presents today with her  to discuss neoadjuvant chemotherapy  She is a heavy smoking history others alcohol drink  I strongly recommended her to quit smoking and stop drinking  We had extensive discussion regarding the diagnosis, aggressive nature of disease, tumor phenotype, probable staging information, prognosis with a without systemic chemotherapy, and treatment options  Based on her phenotype and stage, it is reasonable to treat her with neoadjuvant chemotherapy  I recommended her to have [place] with pertuzumab every 3 weeks x6 cycles  Side effects of this regimen was thoroughly discussed, including but not limited to alopecia, nausea, vomiting, neutropenia, risk of infection, allergic reaction, peripheral edema, cardiac toxicities  She understood and wished to proceed  I am going to ask interventional Radiology to place a Port-A-Cath  She is going to have echocardiogram for initial cardiac monitoring  She is going to have 1st cycle of neoadjuvant chemotherapy at [facility] in March 24, 2020  She was instructed to give us a call immediately if she has fever above 100 4  All the patient and her 's questions were answered to their satisfaction  Subjective:     HPI:  A 76-year-old postmenopausal woman who felt a lump in her left breast which she brought to medical attention  She was found to have large left breast mass for which she underwent biopsy recently which showed invasive ductal carcinoma, grade 3  This was ER negative, NM negative, HER2 2+ disease  HER2 fish was positive for gene application with ratio of 2 2  She was subsequently seen by Dr Morgan Miller  Refill request for Testosterone. LOV: 12/6/2022.  MLD please advise, thanks Based on MRI, she had 2 7 x 2 1 x 2 3 cm of left breast mass with no enlarged axillary lymph node  Bone scan was negative for osseous metastasis  CT scan of chest abdomen pelvis showed no evidence of distant metastasis  She was referred to me to discuss neoadjuvant chemotherapy  She presents today with her   She has no breast related symptomatology  She denied any pain  She has no respiratory symptoms  Her weight is stable  She has extensive history of smoking with 1 and half pack per day for 40 years  She also drinks 4 beers every day for many years  She has lupus for which she is on low-dose prednisone  She also has hypothyroidism as well as well-controlled hypertension  She has a mother and maternal and had breast cancer above the age of 61  She has no family history of ovarian cancer  Her performance status is 0 to 1/4 on the ECOG scale  Interval History:          Objective:     Primary Diagnosis:    Clinical stage II left breast cancer, grade 3, ER negative, MD negative, HER2 fish positive disease, diagnosed in March 2020  Cancer Staging:  Cancer Staging  No matching staging information was found for the patient  Previous Hematologic/ Oncologic Treatment:         Current Hematologic/ Oncologic Treatment:      Neoadjuvant chemotherapy with TCH with pertuzumab x6 cycles  1st cycle to be given in March 24, 2020  Disease Status:     Not evaluated at this time  Test Results:    Pathology:    Left breast biopsy showed invasive ductal carcinoma, grade 3, ER negative, MD negative, HER2 fish positive disease with ratio of 2 2  Radiology:    Bone scan was negative for osseous metastasis  CT scan of chest abdomen pelvis showed no evidence of distant metastasis  Laboratory:    See below      Physical Exam:      General Appearance:    Alert, oriented        Eyes:    PERRL   Ears:    Normal external ear canals, both ears   Nose:   Nares normal, septum midline   Throat: Mucosa moist  Pharynx without injection  Neck:   Supple       Lungs:     Clear to auscultation bilaterally   Chest Wall:    No tenderness or deformity    Heart:    Regular rate and rhythm       Abdomen:     Soft, non-tender, bowel sounds +, no organomegaly           Extremities:   Extremities no cyanosis or edema       Skin:   no rash or icterus  Lymph nodes:   Cervical, supraclavicular, and axillary nodes normal   Neurologic:   CNII-XII intact, normal strength, sensation and reflexes     Throughout          Breast exam:   5 x 5 cm of mass at in upper quadrant of her left breast   No palpable left axillary adenopathy  Right breast exam is negative  ROS: Review of Systems   All other systems reviewed and are negative  Imaging: Nm Bone Scan Whole Body    Result Date: 2/27/2020  Narrative: BONE SCAN  WHOLE BODY INDICATION: C50 212: Malignant neoplasm of upper-inner quadrant of left female breast Z17 1: Estrogen receptor negative status (ER-) PREVIOUS FILM CORRELATION:    CT chest abdomen pelvis 2/26/2020 TECHNIQUE:   This study was performed following the intravenous administration of 25 0 mCi Tc-99m labeled MDP  Delayed, anterior and posterior whole body images were acquired, 2-3 hours after radiopharmaceutical administration  Additional delayed static images acquired of the bilateral ribs and pelvis  FINDINGS:  Foci of radiotracer uptake at the maxilla and mandible may be related to dental disease  Linear band of radiotracer uptake at the L1 level  On CT there is slight sclerosis at the superior endplate with mild loss of vertebral body height  Mild patchy radiotracer uptake in the lower cervical spine on the left is probably degenerative  Mild patchy radiotracer uptake in the lower lumbar spine on the right is compatible with degenerative changes as seen on CT  Mild focal radiotracer uptake at the left hip greater trochanter corresponds to enthesopathy and old fracture deformity on CT  Foci of radiotracer uptake at the bilateral shoulders and left wrist are likely related to arthritic changes given the distribution  Renal activity appears symmetric  Impression: 1  Linear band of radiotracer uptake at the L1 vertebral body level  On CT there is slight sclerosis at the superior endplate with mild loss of vertebral body height  There is likely a developing compression deformity based on the CT findings  This is not typical for metastasis  This could be further evaluated with MRI  Workstation performed: [id number]     Ct Chest Abdomen Pelvis W Contrast    Result Date: 2/26/2020  Narrative: CT CHEST, ABDOMEN AND PELVIS WITH IV CONTRAST INDICATION:   C50 212: Malignant neoplasm of upper-inner quadrant of left female breast Z17 1: Estrogen receptor negative status (ER-)  COMPARISON:  August 4, 2017  TECHNIQUE: CT examination of the chest, abdomen and pelvis was performed  Axial, sagittal, and coronal 2D reformatted images were created from the source data and submitted for interpretation  Radiation dose length product (DLP) for this visit:  581 mGy-cm   This examination, like all CT scans performed in the [facility], was performed utilizing techniques to minimize radiation dose exposure, including the use of iterative reconstruction and automated exposure control  IV Contrast: 100 mL of iohexol (OMNIPAQUE) Enteric Contrast: Enteric contrast was administered  FINDINGS: CHEST LUNGS:  Hypoventilatory changes noted in the dependent lungs  There is a calcified subpleural granuloma in the posterior right lower lobe  No suspicious noncalcified pulmonary mass  PLEURA:  Unremarkable  HEART/GREAT VESSELS:  Unremarkable for patient's age  MEDIASTINUM AND FACUNDO:  Unremarkable  CHEST WALL AND LOWER NECK: There is a 10 mm nodule in the lower pole the right thyroid lobe    Incidental discovery of one or more thyroid nodule(s) measuring less than 1 5 cm and without suspicious features is noted in this patient who is above 28years old; according to guidelines published in the February 2015 white paper on incidental thyroid nodules in the Journal of the [employer] of Radiology [facility]), no further evaluation is recommended  There is an enhancing 2 4 x 1 8 cm mass in the left breast consistent with the known primary breast malignancy  ABDOMEN LIVER/BILIARY TREE:  Unremarkable  GALLBLADDER:  No calcified gallstones  No pericholecystic inflammatory change  SPLEEN:  Unremarkable  PANCREAS:  Unremarkable  ADRENAL GLANDS:  Unremarkable  KIDNEYS/URETERS:  Unremarkable  No hydronephrosis  STOMACH AND BOWEL:  Unremarkable  APPENDIX:  A normal appendix was visualized  ABDOMINOPELVIC CAVITY:  No ascites or free intraperitoneal air  No lymphadenopathy  VESSELS:  Unremarkable for patient's age  PELVIS REPRODUCTIVE ORGANS:  Patient is status post hysterectomy  URINARY BLADDER:  Unremarkable  ABDOMINAL WALL/INGUINAL REGIONS:  There is a small fat-containing umbilical hernia, unchanged from previous examination  OSSEOUS STRUCTURES:  Postsurgical deformity associated with the left greater trochanter  Mild lumbar degenerative changes most notable at L4-L5  Sclerosis and subtle chronic irregularity in the superior endplate at L1  No acute fracture or destructive  osseous lesion  Impression: Primary malignancy in the left breast   No CT evidence of metastatic tumor in the chest, abdomen or pelvis   Workstation performed: [id number]     Mri Breast Bilateral W And Wo Contrast W Cad    Result Date: 3/10/2020  Narrative: DIAGNOSIS: Malignant neoplasm of upper-inner quadrant of left breast in female, estrogen receptor negative (HCC) TECHNIQUE: MRI of both breasts was performed in axial and sagittal planes utilizing a combination of axial diffusion, fat suppressed sagittal T2 , gradient echo in phase T1 weighting followed by sagittal dynamic post contrast imaging with 3D fat suppressed gradient-echo T1 sequences (VIBRANT) at 1 5 minute intervals  Axial VIBRANT post contrast images were obtained  Sagittal subtraction images were generated  This exam was read in conjunction with Astley Clarke software  Intravenous contrast was administered at 2cc/sec, without documented contrast reaction  MEDICATIONS ADMINISTERED: gadobutrol injection (MULTI-DOSE) SOLN 6 mL, Total Given: 6 mL (1 dose) COMPARISONS:  No prior breast MRI is available for comparison  Mammograms and ultrasounds dated between 02/20/2009 and 02/12/2020 were reviewed  RELEVANT HISTORY: Family Breast Cancer History: History of breast cancer in Mother, Maternal Aunt  Family Medical History: Family medical history includes breast cancer in maternal aunt and breast cancer in mother  Personal History: Hormone history includes birth control  Surgical history includes hysterectomy  Medical history includes breast cancer  RISK ASSESSMENT: 5 Year Tyrer-Cuzick: 2 39 % 10 Year Tyrer-Cuzick: 5 27 % Lifetime Tyrer-Cuzick: 16 % TISSUE DENSITY: FGT: The breasts have heterogeneous fibroglandular tissue  BPE: The background parenchymal enhancement is mild  INDICATION: Sofy Patel is a 62 y o  female presenting for Invasive breast cancer, stage I/II, initial workup  FINDINGS: LEFT 1) MASS: There is a 27 mm x 21 mm x 23 mm irregularly shaped, heterogeneous mass with spiculated margins seen in the left breast at 10 o'clock  Delayed phase is washout  This is the biopsy-proven carcinoma  Susceptibility artifact from biopsy marker clip is noted in the mass  No adenopathy is identified  No skin, nipple areolar complex, or chest wall involvement is identified  2) MASS: Post biopsy changes are noted in the region of the biopsied mass in the left breast at 10:00 a m , 5 cm from nipple  3) MASS: There is a 5 mm oval, heterogeneous mass with irregular margins seen in the left breast at 9 o'clock  Delayed phase is washout  Axial postcontrast image series 8, image 248/432    Sagittal post-contrast image series 97431, image 35/140  This correlates with the focal asymmetry seen on recent mammography  Left MRI guided core needle biopsy is recommended  RIGHT There are no suspicious enhancing masses or suspicious areas of nonmass enhancement  There is no axillary or internal mammary adenopathy  Post biopsy changes are noted  Impression:  Biopsy proven carcinoma measures 2 7 cm (left breast 10:00)  5 mm non circumscribed mass with washout is noted in the 9 o'clock position of the left breast, posterior 3rd  Left MRI guided core needle biopsy is recommended  No suspicious enhancement in the right breast  No adenopathy identified  ASSESSMENT/BI-RADS CATEGORY: Left: 4 - Suspicious Right: 2 - Benign Overall: 4 - Suspicious RECOMMENDATION:      - Surgical consultation for the left breast       - MRI-guided breast biopsy for the left breast       - Diagnostic mammogram in 1 year for the right breast  Workstation ID: [id number]    Mammo Stereotactic Breast Biopsy Right ([employer]), Us Guided Breast Biopsy Left Complete, Us Guidance Breast Biopsy Left Each Additional, Mammo Post Biopsy Bilateral    Addendum Date: 2/14/2020 Addendum:   ADDENDUM: PATHOLOGY RESULTS: 4) Calcifications in the right breast at 11 o'clock, 10 cm from the nipple: Benign finding: the pathology findings indicate fibroadenomatoid hyperplasia and benign calcifications  The pathology result is concordant with imaging  2) Mass in the left breast at 10 o'clock, 5 cm from the nipple: Benign finding: the pathology findings indicate stromal collagenization  The pathology result is concordant with imaging  1) Mass in left breast at 10 o'clock, 4 cm from the nipple: Malignant finding: the pathology findings indicate invasive ductal carcinoma  The pathology result is concordant with imaging  Right breast:  Pathology is benign and concordant with imaging    Recent right breast Imaging dated February 4, 2020 was reviewed and no other suspicious findings are seen  Left breast:  Pathology results are concordant with imaging  The malignant mass at 10 o'clock, 5 cm from the nipple measures 19 mm craniocaudal by 23 mm AP x 19 mm transverse on mammogram 02/04/2020  On ultrasound the same day the mass measures 21 x 19 x 17 mm  Ultrasound of the left axilla was performed 02/04/2020 and no abnormal lymph nodes were visualized  There is a mammographic asymmetry in the left breast which did not have a sonographic correlate and has not been biopsied  Pretreatment MRI is recommended to further evaluate this finding (please see the diagnostic report from 02/04/2020)  I personally discussed the pathology results and recommendation for pretreatment MRI with the patient via telephone at 2:05 p m  02/14/2020  She will be contacted by our nurse to facilitate scheduling her surgery consultation appointment  RECOMMENDATION:      - Surgical Consultation for both breasts  - Breast MRI for both breasts  END ADDENDUM    Result Date: 2/12/2020  Narrative: DIAGNOSIS: Abnormal mammogram INDICATION: Pablo Dawson is a 62 y o  female presenting for bilateral breast biopsies  FINDINGS: Informed consent was obtained by myself, the performing physician  The following technique was used for the stereotactic biopsy:  A time-out was performed  The calcifications were imaged and localized under stereotactic guidance  The skin was cleansed in the usual manner  Anesthesia was administered with local lidocaine  The needle was advanced into the breast, location and targeting was confirmed with additional images  Multiple samples were obtained  Calcifications were confirmed within the specimen radiograph  A clip was placed in the biopsy cavity and confirmed with an additional image  The needle was removed and hemostasis was obtained with direct pressure on the breast  The following technique was used for both ultrasound-guided biopsies:  Time-out was performed    The mass was localized under ultrasound guidance and the skin was marked  The skin was cleansed in the usual manner  Anesthesia was administered with local lidocaine  Under direct ultrasound guidance, a spring-loaded biopsy device was used to obtain multiple samples  Under direct ultrasound guidance, a biopsy clip was placed within the biopsy cavity  Hemostasis was obtained with direct pressure on the breast  Biopsy site A:  Right breast CALCIFICATIONS at 11 o'clock, 10 cm from the nipple (these calcifications were previously described at 12 o'clock, however, the location is better seen on the CC view today confirming they are at 11:00), finding 4 in the prior report  Stereotactic biopsy was performed  5 mL of lidocaine 1% and 20 mL of lidocaine 1% with epinephrine was administered  An 8 gauge vacuum assisted biopsy device was used to obtain 4 specimens from a lateral approach  A triple twist clip was placed within the biopsy cavity  The clip appears well placed on the post biopsy mammogram   Biopsy site B:  LEFT breast MASS at 10 o'clock, 5 cm from the nipple, finding 2 in the prior report  Ultrasound-guided biopsy was performed  5 mL of lidocaine 1% was administered  A 12 gauge spring-loaded vacuum device was used to obtain 3 specimens  A heart shaped clip was placed in the biopsy cavity  The clip projects in the expected location on the post biopsy mammogram   Biopsy site C:  Left breast MASS at 10 o'clock, 4 cm from the nipple, finding 1 on the prior report  Ultrasound-guided biopsy was performed  5 mL of lidocaine 1% was administered  A 12 G spring-loaded biopsy device was used to obtain 4 samples  A wing clip was placed within the mass  The clip appears well placed on the post biopsy mammogram      Impression:  1  Successful stereotactic core needle biopsy of calcifications in the right breast at 11 o'clock, 10 cm from the nipple (previously described as 12:00)    Biopsy site is marked with a triple twist clip which appears well placed  2  Successful ultrasound-guided core needle biopsy of the small mass in the left breast at 10 o'clock, 5 cm from the nipple  The biopsy site is marked with a heart shaped clip which projects in the expected location on the post biopsy mammogram  3  Successful ultrasound-guided core needle biopsy of the mass in the left breast at 4 o'clock, 1 cm from the nipple  The mass is marked with a wing clip which appears well placed  4  Management recommendations will be made once the pathology results are available  If any of the biopsy results are malignant, preoperative MRI may be indicated to further evaluate the small asymmetry described on the recent diagnostic study as this does not correspond with today's biopsy sites  RECOMMENDATION:      - Waiting for pathology for both breasts  Workstation ID: [id number]          Labs:   Lab Results   Component Value Date    WBC 9 16 02/20/2020    HGB 14 3 02/20/2020    HCT 43 4 02/20/2020     (H) 02/20/2020     02/20/2020     Lab Results   Component Value Date     (L) 04/22/2015    K 4 0 02/20/2020     02/20/2020    CO2 25 02/20/2020    ANIONGAP 11 04/22/2015    BUN 10 02/20/2020    CREATININE 0 68 02/20/2020    GLUCOSE 97 04/22/2015    GLUF 91 08/04/2017    CALCIUM 9 4 02/20/2020    AST 23 02/20/2020    ALT 20 02/20/2020    ALKPHOS 86 02/20/2020    PROT 7 2 04/22/2015    BILITOT 0 2 04/22/2015    EGFR 98 02/20/2020         Vital Sign:    Body surface area is 1 63 meters squared      Wt Readings from Last 3 Encounters:   03/11/20 60 8 kg (134 lb)   03/09/20 54 4 kg (120 lb)   02/26/20 59 kg (130 lb)        Temp Readings from Last 3 Encounters:   03/11/20 (!) 97 4 °F (36 3 °C) (Tympanic Core)   02/26/20 98 3 °F (36 8 °C)   02/20/20 (!) 96 9 °F (36 1 °C) (Tympanic)        BP Readings from Last 3 Encounters:   03/11/20 122/78   02/26/20 122/90   02/20/20 130/80         Pulse Readings from Last 3 Encounters:   03/11/20 92   02/26/20 (!) 112   02/20/20 (!) 106     @LASTSAO2(3)@    Active Problems:   Patient Active Problem List   Diagnosis    Vomiting    Chest pain    Urinary tract infection    Hypertension    Systemic lupus erythematosus ([facility] )    Hypothyroidism    Posttraumatic stress disorder    Adult celiac disease    Anxiety    Depression    Esophagitis    Nephrolithiasis    Vitamin D deficiency    Malignant neoplasm of upper-inner quadrant of left breast in female, estrogen receptor negative ([facility] )    Chemotherapy induced neutropenia ([facility] )       Past Medical History:   Past Medical History:   Diagnosis Date    Disease of thyroid gland     Hypertension     Lupus ([facility]     Malignant neoplasm of upper-inner quadrant of left breast in female, estrogen receptor negative ([facility] ) 2/25/2020    Nephrolithiasis        Surgical History:   Past Surgical History:   Procedure Laterality Date    FEMUR FRACTURE SURGERY      HYSTERECTOMY      LEFT OOPHORECTOMY      due to torsion     MAMMO STEREOTACTIC BREAST BIOPSY RIGHT ([employer] Right 2/12/2020    US GUIDANCE BREAST BIOPSY LEFT EACH ADDITIONAL Left 2/12/2020    US GUIDED BREAST BIOPSY LEFT COMPLETE Left 2/12/2020    VAGINA SURGERY         Family History:    Family History   Problem Relation Age of Onset    Diabetes Mother     Hypertension Mother     Nephrolithiasis Mother     Breast cancer Mother 79    Heart disease Father     Hypertension Father     Diabetes Brother     Nephrolithiasis Brother     Breast cancer Maternal Aunt     No Known Problems Maternal Grandmother     No Known Problems Maternal Grandfather     No Known Problems Paternal Grandmother     No Known Problems Paternal Grandfather        Cancer-related family history includes Breast cancer in her maternal aunt; Breast cancer (age of onset: 79) in her mother      Social History:   Social History     Socioeconomic History    Marital status: /Civil Union     Spouse name: Not on file    Number of children: Not on file    Years of education: Not on file    Highest education level: Not on file   Occupational History    Not on file   Social Needs    Financial resource strain: Not on file    Food insecurity:     Worry: Not on file     Inability: Not on file    Transportation needs:     Medical: Not on file     Non-medical: Not on file   Tobacco Use    Smoking status: Current Every Day Smoker     Packs/day: 1 50     Types: Cigarettes     Start date: 200    Smokeless tobacco: Never Used    Tobacco comment:  5 ppd per [employer]   Substance and Sexual Activity    Alcohol use:  Yes     Alcohol/week: 21 0 standard drinks     Types: 21 Cans of beer per week     Comment: 3 beers day, socially started 30 yrs prior     Drug use: No    Sexual activity: Not on file   Lifestyle    Physical activity:     Days per week: Not on file     Minutes per session: Not on file    Stress: Not on file   Relationships    Social connections:     Talks on phone: Not on file     Gets together: Not on file     Attends Sabianism service: Not on file     Active member of club or organization: Not on file     Attends meetings of clubs or organizations: Not on file     Relationship status: Not on file    Intimate partner violence:     Fear of current or ex partner: Not on file     Emotionally abused: Not on file     Physically abused: Not on file     Forced sexual activity: Not on file   Other Topics Concern    Not on file   Social History Narrative    Not on file       Current Medications:   Current Outpatient Medications   Medication Sig Dispense Refill    ALPRAZolam (XANAX) 1 mg tablet Take 1 tablet (1 mg total) by mouth 3 (three) times a day as needed for anxiety 90 tablet 0    Cholecalciferol (VITAMIN D3) 94052 units CAPS Take 50,000 Units by mouth once a week      CRANIAL PROSTHESIS, RX, One wig as needed 1 Piece 0    cyclobenzaprine (FLEXERIL) 10 mg tablet Take 5 mg by mouth 3 (three) times a day as needed for muscle spasms      cyclobenzaprine (FLEXERIL) 5 mg tablet Take 5 mg by mouth 3 (three) times a day as needed for muscle spasms      ergocalciferol (VITAMIN D2) 50,000 units Take 50,000 Units by mouth once a week      HYDROcodone-acetaminophen (NORCO) 5-325 mg per tablet Take 1 tablet by mouth every 6 (six) hours as needed for pain      hydroxychloroquine (PLAQUENIL) 200 mg tablet Take 1 tablet in morning and 1/2 tablet in evening      levothyroxine 88 mcg tablet Take 1 tablet (88 mcg total) by mouth daily 30 tablet 5    moexipril (UNIVASC) 15 MG tablet Take 1 tablet (15 mg total) by mouth daily 30 tablet 5    Nutritional Supplements (OSTEOPOROSIS SUPPORT PO) Take by mouth      predniSONE 1 mg tablet Take 1 mg by mouth 2 (two) times a day as needed (Lupus exacerbations)      sertraline (ZOLOFT) 100 mg tablet Use one and one-half tablet once daily 45 tablet 5    sertraline (ZOLOFT) 100 mg tablet TAKE 1 & 1/2 TABLETS BY MOUTH ONCE DAILY 45 tablet 5    sertraline (ZOLOFT) 50 mg tablet 1/2 tab po q day 45 tablet 1    ondansetron (ZOFRAN) 4 mg tablet Take 1 tablet (4 mg total) by mouth every 8 (eight) hours as needed for nausea or vomiting 30 tablet 1     No current facility-administered medications for this visit  Allergies:    Allergies   Allergen Reactions    Mobic [Meloxicam] Eye Swelling     Reaction Date: 12Aug2011;     Methotrexate Rash    Sulfa Antibiotics Rash

## 2022-04-04 DIAGNOSIS — C50.919 MALIGNANT NEOPLASM OF FEMALE BREAST, UNSPECIFIED ESTROGEN RECEPTOR STATUS, UNSPECIFIED LATERALITY, UNSPECIFIED SITE OF BREAST (HCC): ICD-10-CM

## 2022-04-04 RX ORDER — HYDROCODONE BITARTRATE AND ACETAMINOPHEN 5; 325 MG/1; MG/1
1 TABLET ORAL EVERY 6 HOURS PRN
Qty: 120 TABLET | Refills: 0 | Status: SHIPPED | OUTPATIENT
Start: 2022-04-04 | End: 2022-05-02 | Stop reason: SDUPTHER

## 2022-04-04 NOTE — TELEPHONE ENCOUNTER
Per [facility] pt needs follow up every 3months  Does not have an upcoming appt scheduled      Medication: Norco

## 2022-04-19 DIAGNOSIS — K52.9 COLITIS: ICD-10-CM

## 2022-04-19 DIAGNOSIS — R10.31 CHRONIC RLQ PAIN: ICD-10-CM

## 2022-04-19 DIAGNOSIS — G89.29 CHRONIC RLQ PAIN: ICD-10-CM

## 2022-04-19 RX ORDER — DICYCLOMINE HYDROCHLORIDE 10 MG/1
CAPSULE ORAL
Qty: 60 CAPSULE | Refills: 3 | Status: SHIPPED | OUTPATIENT
Start: 2022-04-19 | End: 2022-08-09

## 2022-04-26 DIAGNOSIS — F41.9 ANXIETY: ICD-10-CM

## 2022-04-26 RX ORDER — SERTRALINE HYDROCHLORIDE 100 MG/1
TABLET, FILM COATED ORAL
Qty: 45 TABLET | Refills: 5 | Status: SHIPPED | OUTPATIENT
Start: 2022-04-26

## 2022-05-02 DIAGNOSIS — C50.919 MALIGNANT NEOPLASM OF FEMALE BREAST, UNSPECIFIED ESTROGEN RECEPTOR STATUS, UNSPECIFIED LATERALITY, UNSPECIFIED SITE OF BREAST (HCC): ICD-10-CM

## 2022-05-03 RX ORDER — HYDROCODONE BITARTRATE AND ACETAMINOPHEN 5; 325 MG/1; MG/1
1 TABLET ORAL EVERY 6 HOURS PRN
Qty: 120 TABLET | Refills: 0 | Status: SHIPPED | OUTPATIENT
Start: 2022-05-03 | End: 2022-05-31 | Stop reason: SDUPTHER

## 2022-05-06 DIAGNOSIS — F41.9 ANXIETY: ICD-10-CM

## 2022-05-06 RX ORDER — ALPRAZOLAM 1 MG/1
TABLET ORAL
Qty: 60 TABLET | Refills: 0 | Status: SHIPPED | OUTPATIENT
Start: 2022-05-06 | End: 2022-05-31

## 2022-05-11 ENCOUNTER — TELEPHONE (OUTPATIENT)
Dept: FAMILY MEDICINE CLINIC | Facility: CLINIC | Age: 57
End: 2022-05-11

## 2022-05-11 NOTE — TELEPHONE ENCOUNTER
Patient called, is asking for prednisone, she stated that she is leaving on vacation next week for 10 days, she stated that her joints are swollen again, she did state that her ankles, knees and feet are swelling

## 2022-05-15 DIAGNOSIS — M32.9 SYSTEMIC LUPUS ERYTHEMATOSUS, UNSPECIFIED SLE TYPE, UNSPECIFIED ORGAN INVOLVEMENT STATUS (HCC): Primary | ICD-10-CM

## 2022-05-16 RX ORDER — LANOLIN ALCOHOL/MO/W.PET/CERES
CREAM (GRAM) TOPICAL
Qty: 60 TABLET | Refills: 5 | Status: SHIPPED | OUTPATIENT
Start: 2022-05-16

## 2022-05-31 DIAGNOSIS — C50.919 MALIGNANT NEOPLASM OF FEMALE BREAST, UNSPECIFIED ESTROGEN RECEPTOR STATUS, UNSPECIFIED LATERALITY, UNSPECIFIED SITE OF BREAST (HCC): ICD-10-CM

## 2022-05-31 RX ORDER — HYDROCODONE BITARTRATE AND ACETAMINOPHEN 5; 325 MG/1; MG/1
1 TABLET ORAL EVERY 6 HOURS PRN
Qty: 120 TABLET | Refills: 0 | Status: SHIPPED | OUTPATIENT
Start: 2022-05-31 | End: 2022-06-27 | Stop reason: SDUPTHER

## 2022-06-27 DIAGNOSIS — C50.919 MALIGNANT NEOPLASM OF FEMALE BREAST, UNSPECIFIED ESTROGEN RECEPTOR STATUS, UNSPECIFIED LATERALITY, UNSPECIFIED SITE OF BREAST (HCC): ICD-10-CM

## 2022-06-28 ENCOUNTER — RA CDI HCC (OUTPATIENT)
Dept: OTHER | Facility: HOSPITAL | Age: 57
End: 2022-06-28

## 2022-06-28 RX ORDER — HYDROCODONE BITARTRATE AND ACETAMINOPHEN 5; 325 MG/1; MG/1
1 TABLET ORAL EVERY 6 HOURS PRN
Qty: 120 TABLET | Refills: 0 | Status: SHIPPED | OUTPATIENT
Start: 2022-06-28 | End: 2022-07-21 | Stop reason: SDUPTHER

## 2022-06-28 NOTE — PROGRESS NOTES
[facility]  coding opportunities       Chart reviewed, no opportunity found:   [address]        Patients Insurance     Medicare Insurance: [facility] Advantage

## 2022-07-01 ENCOUNTER — OFFICE VISIT (OUTPATIENT)
Dept: FAMILY MEDICINE CLINIC | Facility: CLINIC | Age: 57
End: 2022-07-01
Payer: COMMERCIAL

## 2022-07-01 VITALS
BODY MASS INDEX: 26.05 KG/M2 | OXYGEN SATURATION: 95 % | HEART RATE: 96 BPM | SYSTOLIC BLOOD PRESSURE: 140 MMHG | WEIGHT: 147 LBS | HEIGHT: 63 IN | TEMPERATURE: 98.2 F | DIASTOLIC BLOOD PRESSURE: 90 MMHG | RESPIRATION RATE: 18 BRPM

## 2022-07-01 DIAGNOSIS — C50.919 MALIGNANT NEOPLASM OF FEMALE BREAST, UNSPECIFIED ESTROGEN RECEPTOR STATUS, UNSPECIFIED LATERALITY, UNSPECIFIED SITE OF BREAST (HCC): Primary | ICD-10-CM

## 2022-07-01 DIAGNOSIS — R63.5 WEIGHT GAIN: ICD-10-CM

## 2022-07-01 DIAGNOSIS — F32.5 MAJOR DEPRESSIVE DISORDER IN FULL REMISSION, UNSPECIFIED WHETHER RECURRENT (HCC): ICD-10-CM

## 2022-07-01 DIAGNOSIS — E03.9 HYPOTHYROIDISM, UNSPECIFIED TYPE: ICD-10-CM

## 2022-07-01 DIAGNOSIS — M32.9 SYSTEMIC LUPUS ERYTHEMATOSUS, UNSPECIFIED SLE TYPE, UNSPECIFIED ORGAN INVOLVEMENT STATUS (HCC): ICD-10-CM

## 2022-07-01 PROCEDURE — 99214 OFFICE O/P EST MOD 30 MIN: CPT | Performed by: FAMILY MEDICINE

## 2022-07-01 RX ORDER — MAGNESIUM OXIDE 400 MG/1
1 TABLET ORAL 2 TIMES DAILY
COMMUNITY
Start: 2022-04-21

## 2022-07-01 NOTE — PROGRESS NOTES
FAMILY PRACTICE OFFICE VISIT       NAME: Beatriz Davey  AGE: 60 y o  SEX: female       : 1965        MRN: [id number]    DATE: 7/3/2022  TIME: 7:19 AM    Assessment and Plan     Problem List Items Addressed This Visit        Endocrine    Hypothyroidism     Hypothyroidism  Patient will obtain blood work as ordered to check TSH level  She will continue with current dose of levothyroxine           Relevant Orders    TSH, 3rd generation    T4, free       Other    Systemic lupus erythematosus (HCC)     Lupus  Patient continues to see her rheumatologist for her history of systemic lupus  Breast cancer ([facility] ) - Primary     Breast cancer  Patient was given prescription to have diagnostic mammogram as requested for her history of left sided breast cancer           Relevant Orders    Mammo diagnostic right w cad    Weight gain     Weight gain  I suspect waking is multifactorial however patient has completed her chemotherapy treatments and her appetite is adequate  Her weight gain has stabilized compared to last year  She will obtain blood work as ordered for further evaluation  Patient will call if symptoms persist after medication completed           Major depressive disorder in full remission, unspecified whether recurrent ([place] [id number] )     Depression with anxiety  Patient continues to see her counselor intermittently as well as continue with current regimen of medications and Xanax  Overall her anxiety has improved over the past year and she has been able to spend more time going out with some friends for barbecue use and going for motorcycle rides with her                      Chief Complaint     Chief Complaint   Patient presents with    Shoulder Pain     X weeks        History of Present Illness     Patient with a history of breast cancer however she completed her chemotherapy treatments    She also has a history of systemic lupus for which she sees rheumatologist   She does have chronic intermittent shoulder and arm pains as well as her back  Her biggest concern was of increased weight gain  Most of weight gain occur throughout the summer of 2021  Since that time patient's weight has been maintained with few fluctuations  She denies any changes in bowel movements  She feels her appetite has not changed  Review of Systems   Review of Systems   Constitutional: Positive for unexpected weight change  HENT: Negative  Respiratory: Negative  Cardiovascular: Negative  Gastrointestinal:        Intermittent abdominal discomfort for which she takes PPI medication   Musculoskeletal: Positive for arthralgias and back pain  Psychiatric/Behavioral: Positive for agitation and dysphoric mood  The patient is nervous/anxious          Active Problem List     Patient Active Problem List   Diagnosis    Systemic lupus erythematosus (HCC)    Hypothyroidism    Posttraumatic stress disorder    Adult celiac disease    Anxiety    Depression    Esophagitis    Nephrolithiasis    Vitamin D deficiency    Malignant neoplasm of upper-inner quadrant of left breast in female, estrogen receptor negative ([facility] )    Chemotherapy induced neutropenia ([facility] )    Port-A-Cath in place    Breast cancer ([facility] )    Nausea and vomiting    SIRS (systemic inflammatory response syndrome) (HCC)    JEFERSON (acute kidney injury) ([facility] )    Hyponatremia    Hypokalemia    Anemia    Colitis    Cancer related pain    Continuous opioid dependence (HCC)    Smoking    Gastritis    Candida esophagitis (HCC)    Severe protein-calorie malnutrition (HCC)    Hypomagnesemia    Chronic RLQ pain    Right foot pain    Raynaud's disease without gangrene    Flu vaccine need    Weight gain    Major depressive disorder in full remission, unspecified whether recurrent ([facility] )       Past Medical History:  Past Medical History:   Diagnosis Date    Cancer ([facility] )     Colon polyp     Disease of thyroid gland     History of chemotherapy     Hypertension     Kidney stone     L[facility]us [facility] )     Malignant neoplasm of upper-inner quadrant of left breast in female, estrogen receptor negative ([facility] ) 2/25/2020    left    Nephrolithiasis     PTSD (post-traumatic stress disorder)     Sjogren's disease ([facility] )        Past Surgical History:  Past Surgical History:   Procedure Laterality Date    BREAST BIOPSY Left 02/12/2020    us guided -  invasive ductal carcinoma    BREAST BIOPSY Right 02/12/2020    benign stereo    ECTOPIC PREGNANCY SURGERY      FEMUR FRACTURE SURGERY      FL GUIDED CENTRAL VENOUS ACCESS DEVICE INSERTION  3/17/2020    HYSTERECTOMY      LEFT OOPHORECTOMY      due to torsion     MAMMO STEREOTACTIC BREAST BIOPSY RIGHT (ALL INC) Right 2/12/2020    MASTECTOMY Left 08/2020    MASTECTOMY W/ SENTINEL NODE BIOPSY Left 8/18/2020    Procedure: BREAST MASTECTOMY WITH SENTINEL LYMPH NODE BIOPSY, LYMPHATIC MAPPING WITH BLUE DYE AND RADIOACTIVE DYE (INJECT AT 1430 BY DR WRIGHT IN THE OR);   Surgeon: Ha Mcdonald MD;  [facility]: [facility];  Service: Surgical Oncology    MRI BREAST BIOPSY LEFT (ALL INCLUSIVE) Left 3/20/2020    CT INSJ TUNNELED CTR VAD W/SUBQ PORT AGE 5 YR/> N/A 3/17/2020    Procedure: INSERTION VENOUS PORT ( PORT-A-CATH) IR;  Surgeon: Kaitlynn Silva DO;  [facility];  Service: Interventional Radiology    CT RMVL MARSHA CTR VAD W/SUBQ PORT/ CTR/PRPH INSJ N/A 7/6/2021    Procedure: REMOVAL VENOUS PORT (PORT-A-CATH)IR;  Surgeon: Kaitlynn Silva DO;  [facility];  Service: Interventional Radiology    US GUIDANCE BREAST BIOPSY LEFT EACH ADDITIONAL Left 2/12/2020    US GUIDED BREAST BIOPSY LEFT COMPLETE Left 2/12/2020    VAGINA SURGERY         Family History:  Family History   Problem Relation Age of Onset    Diabetes Mother     Hypertension Mother     Nephrolithiasis Mother     Breast cancer Mother 79    Heart disease Father     Hypertension Father     Diabetes Brother     Nephrolithiasis Brother     Breast cancer Maternal Aunt     No Known Problems Maternal Grandmother     No Known Problems Maternal Grandfather     No Known Problems Paternal Grandmother     No Known Problems Paternal Grandfather        Social History:  Social History     Socioeconomic History    Marital status: /Civil Union     Spouse name: Not on file    Number of children: Not on file    Years of education: Not on file    Highest education level: Not on file   Occupational History    Not on file   Tobacco Use    Smoking status: Current Every Day Smoker     Packs/day: 0 50     Years: 40 00     Pack years: 20 00     Types: Cigarettes     Start date: 1990    Smokeless tobacco: Never Used   Vaping Use    Vaping Use: Never used   Substance and Sexual Activity    Alcohol use: Not Currently    Drug use: No    Sexual activity: Not Currently     Partners: Male     Birth control/protection: None   Other Topics Concern    Not on file   Social History Narrative    Not on file     Social Determinants of Health     Financial Resource Strain: Not on file   Food Insecurity: Not on file   Transportation Needs: Not on file   Physical Activity: Not on file   Stress: Not on file   Social Connections: Not on file   Intimate Partner Violence: Not on file   Housing Stability: Not on file       Objective     Vitals:    07/01/22 1425   BP: 140/90   Pulse: 96   Resp: 18   Temp: 98 2 °F (36 8 °C)   SpO2: 95%     Wt Readings from Last 3 Encounters:   07/01/22 66 7 kg (147 lb)   11/18/21 69 5 kg (153 lb 4 oz)   11/02/21 66 7 kg (147 lb)       Physical Exam  Constitutional:       General: She is not in acute distress  Appearance: Normal appearance  She is not ill-appearing  HENT:      Head: Normocephalic and atraumatic  Eyes:      General:         Right eye: No discharge  Left eye: No discharge  Extraocular Movements: Extraocular movements intact        Conjunctiva/sclera: Conjunctivae normal  Pupils: Pupils are equal, round, and reactive to light  Neck:      Vascular: No carotid bruit  Cardiovascular:      Rate and Rhythm: Normal rate and regular rhythm  Heart sounds: Normal heart sounds  No murmur heard  Pulmonary:      Effort: Pulmonary effort is normal       Breath sounds: Normal breath sounds  No wheezing, rhonchi or rales  Abdominal:      General: Abdomen is flat  Bowel sounds are normal  There is no distension  Palpations: Abdomen is soft  Tenderness: There is no abdominal tenderness  There is no guarding or rebound  Musculoskeletal:      Right lower leg: No edema  Left lower leg: No edema  Lymphadenopathy:      Cervical: No cervical adenopathy  Skin:     Findings: No rash  Neurological:      General: No focal deficit present  Mental Status: She is alert and oriented to person, place, and time  Cranial Nerves: No cranial nerve deficit  Psychiatric:         Mood and Affect: Mood normal          Behavior: Behavior normal          Thought Content:  Thought content normal          Judgment: Judgment normal          Pertinent Laboratory/Diagnostic Studies:  Lab Results   Component Value Date    GLUCOSE 97 04/22/2015    BUN 18 04/17/2021    CREATININE 0 98 04/17/2021    CALCIUM 7 7 (L) 04/17/2021     (L) 04/22/2015    K 4 2 04/17/2021    CO2 22 04/17/2021     (H) 04/17/2021     Lab Results   Component Value Date    ALT 11 (L) 03/25/2021    AST 12 03/25/2021    ALKPHOS 76 03/25/2021    BILITOT 0 2 04/22/2015       Lab Results   Component Value Date    WBC 7 36 04/17/2021    HGB 10 5 (L) 04/17/2021    HCT 33 8 (L) 04/17/2021    MCV 92 04/17/2021     04/17/2021       No results found for: TSH    No results found for: CHOL  No results found for: TRIG  No results found for: HDL  No results found for: LDLCALC  No results found for: HGBA1C    Results for orders placed or performed in visit on 06/28/21   C3 complement   Result Value Ref Range    C3 Complement 114 0 90 0 - 180 0 mg/dL   C4 complement   Result Value Ref Range    C4, COMPLEMENT 23 0 10 0 - 40 0 mg/dL   Complement, total   Result Value Ref Range    Compl, Total (CH50) >60 >41 U/mL       Orders Placed This Encounter   Procedures    Mammo diagnostic right w cad    TSH, 3rd generation    T4, free       ALLERGIES:  Allergies   Allergen Reactions    Mobic [Meloxicam] Eye Swelling     Reaction Date: 12Aug2011;     Methotrexate Rash    Sulfa Antibiotics Rash       Current Medications     Current Outpatient Medications   Medication Sig Dispense Refill    alendronate (FOSAMAX) 70 mg tablet Take 70 mg by mouth every 7 days      ALPRAZolam (XANAX) 1 mg tablet TAKE 1 TABLET BY MOUTH 3 TIMES A DAY AS NEEDED FOR ANXIETY 60 tablet 2    amLODIPine (NORVASC) 2 5 mg tablet One po bid 30 tablet 5    cyclobenzaprine (FLEXERIL) 5 mg tablet Take 10 mg by mouth daily at bedtime as needed      dicyclomine (BENTYL) 10 mg capsule TAKE ONE CAPSULE BY MOUTH TWICE A DAY 60 capsule 3    ergocalciferol (VITAMIN D2) 50,000 units TAKE ONE CAPSULE ONCE WEEKLY      HYDROcodone-acetaminophen (NORCO) 5-325 mg per tablet Take 1 tablet by mouth every 6 (six) hours as needed for pain Max Daily Amount: 4 tablets 120 tablet 0    hydroxychloroquine (PLAQUENIL) 200 mg tablet Take 1 tablet in morning and 1/2 tablet in evening      levothyroxine 88 mcg tablet TAKE 1 TABLET BY MOUTH EVERY DAY   30 tablet 0    magnesium Oxide (MAG-OX) 400 mg TABS TAKE ONE TABLET BY MOUTH TWICE A DAY 60 tablet 5    magnesium oxide (MAG-OX) 400 mg TAKE ONE TABLET BY MOUTH TWICE A DAY 60 tablet 5    metoclopramide (REGLAN) 5 mg tablet TAKE ONE TABLET BY MOUTH THREE TIMES A DAY BEFORE EACH MEAL 30 tablet 1    pantoprazole (PROTONIX) 40 mg tablet TAKE 1 TABLET BY MOUTH DAILY 30 tablet 5    predniSONE 10 mg tablet 5 tabs daily X 3 days, 4 tabs daily X 3 days, 3 tabs daily X 3 days, 2 tabs daily X 3 days, 1 tab daily X 3 days (Patient not taking: Reported on 7/1/2022) 45 tablet 0    sertraline (ZOLOFT) 100 mg tablet TAKE ONE AND ONE HALF TABLETS BY MOUTH DAILY 45 tablet 5    acetaminophen (TYLENOL) 325 mg tablet Take 2 tablets (650 mg total) by mouth every 6 (six) hours as needed for mild pain, headaches or fever (Patient not taking: Reported on 7/1/2022) 30 tablet 0    al mag oxide-diphenhydramine-lidocaine viscous (MAGIC MOUTHWASH) 1:1:1 suspension Swish and swallow 10 mL every 6 (six) hours as needed for mouth pain or discomfort or mucositis (Patient not taking: No sig reported) 1 Bottle 2    al mag oxide-diphenhydramine-lidocaine viscous (MAGIC MOUTHWASH) 1:1:1 suspension Swish and spit 10 mL every 4 (four) hours as needed for mouth pain or discomfort (Patient not taking: No sig reported) 180 mL 3    amLODIPine (NORVASC) 2 5 mg tablet Take 2 5 mg by mouth daily (Patient not taking: No sig reported)      magnesium oxide (MAG-OX) 400 mg tablet Take 1 tablet by mouth 2 (two) times a day (Patient not taking: Reported on 7/1/2022)      oxyCODONE (Roxicodone) 5 immediate release tablet Take 1 tablet (5 mg total) by mouth every 6 (six) hours as needed for moderate pain Max Daily Amount: 20 mg (Patient not taking: Reported on 7/1/2022) 120 tablet 0    predniSONE 5 mg tablet TAKE ONE TABLET BY MOUTH TWICE A DAY WITH FOOD (Patient not taking: Reported on 7/1/2022) 60 tablet 0     No current facility-administered medications for this visit           Health Maintenance     Health Maintenance   Topic Date Due    Hepatitis C Screening  Never done    Pneumococcal Vaccine: Pediatrics (0 to 5 Years) and At-Risk Patients (6 to 59 Years) (1 - PCV) Never done    HIV Screening  Never done    DTaP,Tdap,and Td Vaccines (1 - Tdap) Never done    Osteoporosis Screening  Never done    COVID-19 Vaccine (3 - Booster for Jones Peter series) 10/03/2021    Medicare Annual Wellness Visit (AWV)  08/05/2022    Depression Remission PHQ  08/05/2022    Influenza Vaccine (1) 09/01/2022    BMI: Followup Plan  11/18/2022    Breast Cancer Screening: Mammogram  06/02/2023    BMI: Adult  07/01/2023    Colorectal Cancer Screening  05/27/2026    HIB Vaccine  Aged Out    Hepatitis B Vaccine  Aged Out    IPV Vaccine  Aged Out    Hepatitis A Vaccine  Aged Out    Meningococcal ACWY Vaccine  Aged Out    HPV Vaccine  Aged Out     Immunization History   Administered Date(s) Administered    COVID-19 PFIZER VACCINE 0 3 ML IM 04/08/2021, 05/03/2021    Influenza, recombinant, quadrivalent,injectable, preservative free 14/39/1291       Josiah Edwards MD    I spent 30 minutes with this patient which greater than 50% was spent counseling her reviewing chart

## 2022-07-03 NOTE — ASSESSMENT & PLAN NOTE
Depression with anxiety  Patient continues to see her counselor intermittently as well as continue with current regimen of medications and Xanax    Overall her anxiety has improved over the past year and she has been able to spend more time going out with some friends for barbecue use and going for motorcycle rides with her

## 2022-07-03 NOTE — ASSESSMENT & PLAN NOTE
Weight gain  I suspect waking is multifactorial however patient has completed her chemotherapy treatments and her appetite is adequate  Her weight gain has stabilized compared to last year  She will obtain blood work as ordered for further evaluation    Patient will call if symptoms persist after medication completed

## 2022-07-03 NOTE — ASSESSMENT & PLAN NOTE
Hypothyroidism  Patient will obtain blood work as ordered to check TSH level    She will continue with current dose of levothyroxine

## 2022-07-03 NOTE — ASSESSMENT & PLAN NOTE
Breast cancer    Patient was given prescription to have diagnostic mammogram as requested for her history of left sided breast cancer

## 2022-07-21 DIAGNOSIS — Z17.1 MALIGNANT NEOPLASM OF UPPER-INNER QUADRANT OF LEFT BREAST IN FEMALE, ESTROGEN RECEPTOR NEGATIVE (HCC): ICD-10-CM

## 2022-07-21 DIAGNOSIS — C50.919 MALIGNANT NEOPLASM OF FEMALE BREAST, UNSPECIFIED ESTROGEN RECEPTOR STATUS, UNSPECIFIED LATERALITY, UNSPECIFIED SITE OF BREAST (HCC): ICD-10-CM

## 2022-07-21 DIAGNOSIS — C50.212 MALIGNANT NEOPLASM OF UPPER-INNER QUADRANT OF LEFT BREAST IN FEMALE, ESTROGEN RECEPTOR NEGATIVE (HCC): ICD-10-CM

## 2022-07-21 DIAGNOSIS — E03.9 HYPOTHYROIDISM, UNSPECIFIED TYPE: ICD-10-CM

## 2022-07-21 RX ORDER — LEVOTHYROXINE SODIUM 88 UG/1
88 TABLET ORAL DAILY
Qty: 30 TABLET | Refills: 3 | OUTPATIENT
Start: 2022-07-21

## 2022-07-22 RX ORDER — ONDANSETRON 4 MG/1
4 TABLET, FILM COATED ORAL EVERY 8 HOURS PRN
Qty: 30 TABLET | Refills: 3 | Status: SHIPPED | OUTPATIENT
Start: 2022-07-22

## 2022-07-22 RX ORDER — HYDROCODONE BITARTRATE AND ACETAMINOPHEN 5; 325 MG/1; MG/1
1 TABLET ORAL EVERY 6 HOURS PRN
Qty: 120 TABLET | Refills: 0 | Status: SHIPPED | OUTPATIENT
Start: 2022-07-22 | End: 2022-08-16 | Stop reason: SDUPTHER

## 2022-07-28 DIAGNOSIS — F41.9 ANXIETY: ICD-10-CM

## 2022-07-29 RX ORDER — ALPRAZOLAM 1 MG/1
TABLET ORAL
Qty: 60 TABLET | Refills: 2 | Status: SHIPPED | OUTPATIENT
Start: 2022-07-29 | End: 2022-09-23 | Stop reason: SDUPTHER

## 2022-08-09 DIAGNOSIS — R13.19 ESOPHAGEAL DYSPHAGIA: ICD-10-CM

## 2022-08-09 DIAGNOSIS — R10.31 CHRONIC RLQ PAIN: ICD-10-CM

## 2022-08-09 DIAGNOSIS — G89.29 CHRONIC RLQ PAIN: ICD-10-CM

## 2022-08-09 DIAGNOSIS — K52.9 COLITIS: ICD-10-CM

## 2022-08-09 RX ORDER — PANTOPRAZOLE SODIUM 40 MG/1
TABLET, DELAYED RELEASE ORAL
Qty: 30 TABLET | Refills: 5 | Status: SHIPPED | OUTPATIENT
Start: 2022-08-09

## 2022-08-09 RX ORDER — DICYCLOMINE HYDROCHLORIDE 10 MG/1
CAPSULE ORAL
Qty: 60 CAPSULE | Refills: 3 | Status: SHIPPED | OUTPATIENT
Start: 2022-08-09

## 2022-08-16 DIAGNOSIS — C50.919 MALIGNANT NEOPLASM OF FEMALE BREAST, UNSPECIFIED ESTROGEN RECEPTOR STATUS, UNSPECIFIED LATERALITY, UNSPECIFIED SITE OF BREAST (HCC): ICD-10-CM

## 2022-08-18 RX ORDER — HYDROCODONE BITARTRATE AND ACETAMINOPHEN 5; 325 MG/1; MG/1
1 TABLET ORAL EVERY 6 HOURS PRN
Qty: 120 TABLET | Refills: 0 | Status: SHIPPED | OUTPATIENT
Start: 2022-08-18 | End: 2022-09-15 | Stop reason: SDUPTHER

## 2022-08-19 NOTE — PROGRESS NOTES
LSW has closed care management episode due to no contact and no oncology needs at this time  LSW will remain available for referrals as needed 
84.4

## 2022-09-09 ENCOUNTER — HOSPITAL ENCOUNTER (OUTPATIENT)
Dept: MAMMOGRAPHY | Facility: CLINIC | Age: 57
Discharge: HOME | End: 2022-09-09
Payer: COMMERCIAL

## 2022-09-09 VITALS — BODY MASS INDEX: 26.04 KG/M2 | HEIGHT: 63 IN

## 2022-09-09 DIAGNOSIS — C50.919 MALIGNANT NEOPLASM OF FEMALE BREAST, UNSPECIFIED ESTROGEN RECEPTOR STATUS, UNSPECIFIED LATERALITY, UNSPECIFIED SITE OF BREAST (HCC): ICD-10-CM

## 2022-09-09 PROCEDURE — 77065 DX MAMMO INCL CAD UNI: CPT

## 2022-09-09 PROCEDURE — G0279 TOMOSYNTHESIS, MAMMO: HCPCS

## 2022-09-15 DIAGNOSIS — C50.919 MALIGNANT NEOPLASM OF FEMALE BREAST, UNSPECIFIED ESTROGEN RECEPTOR STATUS, UNSPECIFIED LATERALITY, UNSPECIFIED SITE OF BREAST (HCC): ICD-10-CM

## 2022-09-15 RX ORDER — HYDROCODONE BITARTRATE AND ACETAMINOPHEN 5; 325 MG/1; MG/1
1 TABLET ORAL EVERY 6 HOURS PRN
Qty: 120 TABLET | Refills: 0 | Status: SHIPPED | OUTPATIENT
Start: 2022-09-15 | End: 2022-10-12 | Stop reason: SDUPTHER

## 2022-10-12 DIAGNOSIS — C50.919 MALIGNANT NEOPLASM OF FEMALE BREAST, UNSPECIFIED ESTROGEN RECEPTOR STATUS, UNSPECIFIED LATERALITY, UNSPECIFIED SITE OF BREAST (HCC): ICD-10-CM

## 2022-10-13 RX ORDER — HYDROCODONE BITARTRATE AND ACETAMINOPHEN 5; 325 MG/1; MG/1
1 TABLET ORAL EVERY 6 HOURS PRN
Qty: 120 TABLET | Refills: 0 | Status: SHIPPED | OUTPATIENT
Start: 2022-10-13

## 2022-10-16 DIAGNOSIS — F41.9 ANXIETY: ICD-10-CM

## 2022-10-16 RX ORDER — SERTRALINE HYDROCHLORIDE 100 MG/1
TABLET, FILM COATED ORAL
Qty: 45 TABLET | Refills: 5 | Status: SHIPPED | OUTPATIENT
Start: 2022-10-16

## 2022-10-19 ENCOUNTER — OFFICE VISIT (OUTPATIENT)
Dept: FAMILY MEDICINE CLINIC | Facility: CLINIC | Age: 57
End: 2022-10-19
Payer: COMMERCIAL

## 2022-10-19 VITALS
SYSTOLIC BLOOD PRESSURE: 130 MMHG | OXYGEN SATURATION: 96 % | WEIGHT: 146.5 LBS | RESPIRATION RATE: 18 BRPM | TEMPERATURE: 97.8 F | BODY MASS INDEX: 25.96 KG/M2 | DIASTOLIC BLOOD PRESSURE: 90 MMHG | HEIGHT: 63 IN | HEART RATE: 100 BPM

## 2022-10-19 DIAGNOSIS — Z01.818 PREOPERATIVE EVALUATION OF A MEDICAL CONDITION TO RULE OUT SURGICAL CONTRAINDICATIONS (TAR REQUIRED): ICD-10-CM

## 2022-10-19 DIAGNOSIS — Z01.818 PRE-OPERATIVE EXAM: Primary | ICD-10-CM

## 2022-10-19 PROCEDURE — 99214 OFFICE O/P EST MOD 30 MIN: CPT | Performed by: FAMILY MEDICINE

## 2022-10-19 PROCEDURE — 93000 ELECTROCARDIOGRAM COMPLETE: CPT | Performed by: FAMILY MEDICINE

## 2022-10-19 PROCEDURE — G0439 PPPS, SUBSEQ VISIT: HCPCS | Performed by: FAMILY MEDICINE

## 2022-10-19 NOTE — PROGRESS NOTES
Assessment and Plan:     Problem List Items Addressed This Visit    None          Preventive health issues were discussed with patient, and age appropriate screening tests were ordered as noted in patient's After Visit Summary  Personalized health advice and appropriate referrals for health education or preventive services given if needed, as noted in patient's After Visit Summary  History of Present Illness:     Patient presents for a Medicare Wellness Visit    Patient in the office for preoperative consultation  She is scheduled to have bilateral cataract surgery on October 21st and November 18, 2022  Procedures to be performed by Amanda Carmona  Patient denies any recent illness  She has had decreased visual acuity due to cataract formation  I reviewed her current list of medications  Patient Care Team:  Alba Reddy MD as PCP - General (Family Medicine)  Narciso Barrios MD as PCP - [address] [place] [address] (RTE)  Narciso Barrios MD as PCP - PCP-[employer] (RTE)  MD Kiesha Grant MD (Urology)  Alysa Christianson MD (Gastroenterology)  Dalia Guadalupe MD (Obstetrics and Gynecology)  Courtney Sanchez MD (Obstetrics and Gynecology)  Moises Camejo MD as Surgeon (Surgical Oncology)  Yessy Little MD as Medical Oncologist (Hematology and Oncology)     Review of Systems:     Review of Systems   Constitutional: Positive for fatigue  Negative for fever  HENT: Negative  Eyes: Positive for visual disturbance  Respiratory: Negative  Cardiovascular: Negative  Gastrointestinal: Negative  Endocrine: Negative  Genitourinary: Negative  Musculoskeletal: Positive for arthralgias and myalgias  Skin: Negative  Allergic/Immunologic: Negative  Neurological: Negative  Hematological: Negative  Psychiatric/Behavioral: Positive for agitation, decreased concentration, dysphoric mood and sleep disturbance  The patient is nervous/anxious           Problem List:     Patient Active Problem List   Diagnosis   • Systemic lupus erythematosus ([place] [address] )   • Hypothyroidism   • Posttraumatic stress disorder   • Adult celiac disease   • Anxiety   • Depression   • Esophagitis   • Nephrolithiasis   • Vitamin D deficiency   • Malignant neoplasm of upper-inner quadrant of left breast in female, estrogen receptor negative ([place] [address] )   • Chemotherapy induced neutropenia ([place] [address] )   • Port-A-Cath in place   • Breast cancer (HCC)   • Nausea and vomiting   • SIRS (systemic inflammatory response syndrome) ([facility])   • JEFERSON (acute kidney injury) ([place] [address] )   • Hyponatremia   • Hypokalemia   • Anemia   • Colitis   • Cancer related pain   • Continuous opioid dependence (HCC)   • Smoking   • Gastritis   • Candida esophagitis (HCC)   • Severe protein-calorie malnutrition (HCC)   • Hypomagnesemia   • Chronic RLQ pain   • Right foot pain   • Raynaud's disease without gangrene   • Flu vaccine need   • Weight gain   • Major depressive disorder in full remission, unspecified whether recurrent ([place] [address] )      Past Medical and Surgical History:     Past Medical History:   Diagnosis Date   • Cancer [facility])    • Colon polyp    • Disease of thyroid gland    • History of chemotherapy    • Hypertension    • Kidney stone    • Lupus ([place] [address] )    • Malignant neoplasm of upper-inner quadrant of left breast in female, estrogen receptor negative ([place] [address] ) 2/25/2020    left   • Nephrolithiasis    • PTSD (post-traumatic stress disorder)    • Sjogren's disease ([place] [address] )      Past Surgical History:   Procedure Laterality Date   • BREAST BIOPSY Left 02/12/2020    us guided -  invasive ductal carcinoma   • BREAST BIOPSY Right 02/12/2020    benign stereo   • ECTOPIC PREGNANCY SURGERY     • FEMUR FRACTURE SURGERY     • FL GUIDED CENTRAL VENOUS ACCESS DEVICE INSERTION  03/17/2020   • HYSTERECTOMY     • LEFT OOPHORECTOMY      due to torsion    • MAMMO STEREOTACTIC BREAST BIOPSY RIGHT (ALL INC) Right 02/12/2020   • MASTECTOMY Left 08/2020   • MASTECTOMY W/ SENTINEL NODE BIOPSY Left 08/18/2020    Procedure: BREAST MASTECTOMY WITH SENTINEL LYMPH NODE BIOPSY, LYMPHATIC MAPPING WITH BLUE DYE AND RADIOACTIVE DYE (INJECT AT 1430 BY DR WRIGHT IN THE OR);   Surgeon: Fidelia Marques MD;  Location: [facility];  Service: Surgical Oncology   • MRI BREAST BIOPSY LEFT (ALL INCLUSIVE) Left 03/20/2020   • KS INSJ TUNNELED CTR VAD W/SUBQ PORT AGE 5 YR/> N/A 03/17/2020    Procedure: INSERTION VENOUS PORT ( PORT-A-CATH) IR;  Surgeon: Wilfredo Vázquez DO;  [facility];  Service: Interventional Radiology   • KS RMVL MARSHA CTR VAD W/SUBQ PORT/ CTR/PRPH INSJ N/A 07/06/2021    Procedure: REMOVAL VENOUS PORT (PORT-A-CATH)IR;  Surgeon: Wilfredo Vázquez DO;  [facility];  Service: Interventional Radiology   • US GUIDANCE BREAST BIOPSY LEFT EACH ADDITIONAL Left 02/12/2020   • US GUIDED BREAST BIOPSY LEFT COMPLETE Left 02/12/2020   • VAGINA SURGERY        Family History:     Family History   Problem Relation Age of Onset   • Diabetes Mother    • Hypertension Mother    • Nephrolithiasis Mother    • Breast cancer Mother 79   • Heart disease Father    • Hypertension Father    • Diabetes Brother    • Nephrolithiasis Brother    • Breast cancer Maternal Aunt    • No Known Problems Maternal Grandmother    • No Known Problems Maternal Grandfather    • No Known Problems Paternal Grandmother    • No Known Problems Paternal Grandfather       Social History:     Social History     Socioeconomic History   • Marital status: /Civil Union     Spouse name: None   • Number of children: None   • Years of education: None   • Highest education level: None   Occupational History   • None   Tobacco Use   • Smoking status: Current Every Day Smoker     Packs/day: 0 50     Years: 40 00     Pack years: 20 00     Types: Cigarettes     Start date: 1990   • Smokeless tobacco: Never Used   Vaping Use   • Vaping Use: Never used   Substance and Sexual Activity   • Alcohol use: Not Currently   • Drug use: No   • Sexual activity: Not Currently     Partners: Male     Birth control/protection: None   Other Topics Concern   • None   Social History Narrative   • None     Social Determinants of Health     Financial Resource Strain: Not on file   Food Insecurity: Not on file   Transportation Needs: Not on file   Physical Activity: Not on file   Stress: Not on file   Social Connections: Not on file   Intimate Partner Violence: Not on file   Housing Stability: Not on file      Medications and Allergies:     Current Outpatient Medications   Medication Sig Dispense Refill   • alendronate (FOSAMAX) 70 mg tablet Take 70 mg by mouth every 7 days     • ALPRAZolam (XANAX) 1 mg tablet Take 1 tablet (1 mg total) by mouth 3 (three) times a day as needed for anxiety 60 tablet 2   • amLODIPine (NORVASC) 2 5 mg tablet One po bid 30 tablet 5   • cyclobenzaprine (FLEXERIL) 5 mg tablet Take 10 mg by mouth daily at bedtime as needed     • dicyclomine (BENTYL) 10 mg capsule TAKE 1 CAPSULE BY MOUTH TWICE A DAY 60 capsule 3   • ergocalciferol (VITAMIN D2) 50,000 units TAKE ONE CAPSULE ONCE WEEKLY     • HYDROcodone-acetaminophen (NORCO) 5-325 mg per tablet Take 1 tablet by mouth every 6 (six) hours as needed for pain Max Daily Amount: 4 tablets 120 tablet 0   • hydroxychloroquine (PLAQUENIL) 200 mg tablet Take 1 tablet in morning and 1/2 tablet in evening     • levothyroxine 88 mcg tablet TAKE ONE TABLET BY MOUTH EVERY DAY 30 tablet 0   • magnesium oxide (MAG-OX) 400 mg TAKE ONE TABLET BY MOUTH TWICE A DAY 60 tablet 5   • metoclopramide (REGLAN) 5 mg tablet TAKE ONE TABLET BY MOUTH THREE TIMES A DAY BEFORE EACH MEAL 30 tablet 1   • ondansetron (ZOFRAN) 4 mg tablet Take 1 tablet (4 mg total) by mouth every 8 (eight) hours as needed for nausea or vomiting 30 tablet 3   • pantoprazole (PROTONIX) 40 mg tablet TAKE ONE TABLET BY MOUTH EVERY DAY 30 tablet 5   • predniSONE 10 mg tablet 5 tabs daily X 3 days, 4 tabs daily X 3 days, 3 tabs daily X 3 days, 2 tabs daily X 3 days, 1 tab daily X 3 days (Patient not taking: No sig reported) 45 tablet 0   • sertraline (ZOLOFT) 100 mg tablet TAKE 1 1/2 TABLETS BY MOPUTH DAILY 45 tablet 5   • amLODIPine (NORVASC) 2 5 mg tablet Take 2 5 mg by mouth daily (Patient not taking: No sig reported)     • magnesium oxide (MAG-OX) 400 mg tablet Take 1 tablet by mouth 2 (two) times a day (Patient not taking: No sig reported)     • magnesium Oxide (MAG-OX) 400 mg TABS TAKE ONE TABLET BY MOUTH TWICE A DAY (Patient not taking: Reported on 10/19/2022) 60 tablet 5   • predniSONE 5 mg tablet TAKE ONE TABLET BY MOUTH TWICE A DAY WITH FOOD (Patient not taking: No sig reported) 60 tablet 0     No current facility-administered medications for this visit  Allergies   Allergen Reactions   • Mobic [Meloxicam] Eye Swelling     Reaction Date: 12Aug2011;    • Methotrexate Rash   • Sulfa Antibiotics Rash      Immunizations:     Immunization History   Administered Date(s) Administered   • COVID-19 PFIZER VACCINE 0 3 ML IM 04/08/2021, 05/03/2021   • Influenza, recombinant, quadrivalent,injectable, preservative free 11/18/2021      Health Maintenance:         Topic Date Due   • Hepatitis C Screening  Never done   • HIV Screening  Never done   • Lung Cancer Screening  02/17/2022   • Breast Cancer Screening: Mammogram  09/09/2024   • Colorectal Cancer Screening  05/27/2026         Topic Date Due   • Pneumococcal Vaccine: Pediatrics (0 to 5 Years) and At-Risk Patients (6 to 59 Years) (1 - PCV) Never done   • COVID-19 Vaccine (3 - Booster for Pfizer series) 10/03/2021   • Influenza Vaccine (1) 09/01/2022      Medicare Screening Tests and Risk Assessments:         Health Risk Assessment:   Patient rates overall health as fair  Patient feels that their physical health rating is same  Patient is satisfied with their life  Eyesight was rated as slightly worse  Hearing was rated as same  Patient feels that their emotional and mental health rating is same   Patients states they are never, rarely angry  Patient states they are never, rarely unusually tired/fatigued  Pain experienced in the last 7 days has been some  Patient's pain rating has been 8/10  Patient states that she has experienced no weight loss or gain in last 6 months  Depression Screening:   PHQ-9 Score: 1      Fall Risk Screening: In the past year, patient has experienced: no history of falling in past year      Urinary Incontinence Screening:   Patient has not leaked urine accidently in the last six months  Home Safety:  Patient has trouble with stairs inside or outside of their home  Patient has working smoke alarms and has working carbon monoxide detector  Home safety hazards include: none  Nutrition:   Current diet is Regular  Medications:   Patient is currently taking over-the-counter supplements  OTC medications include: see medication list  Patient is able to manage medications  Activities of Daily Living (ADLs)/Instrumental Activities of Daily Living (IADLs):   Walk and transfer into and out of bed and chair?: Yes  Dress and groom yourself?: Yes    Bathe or shower yourself?: Yes    Feed yourself?  Yes  Do your laundry/housekeeping?: Yes  Manage your money, pay your bills and track your expenses?: Yes  Make your own meals?: Yes    Do your own shopping?: Yes    Previous Hospitalizations:   Any hospitalizations or ED visits within the last 12 months?: No      Advance Care Planning:   Living will: No      PREVENTIVE SCREENINGS      Cardiovascular Screening:    General: Screening Not Indicated      Diabetes Screening:     General: Screening Current      Colorectal Cancer Screening:     General: Screening Current      Breast Cancer Screening:     General: History Breast Cancer    Screening, Brief Intervention, and Referral to Treatment (SBIRT)    Screening    Typical number of drinks in a week: 1    Single Item Drug Screening:  How often have you used an illegal drug (including marijuana) or a prescription medication for non-medical reasons in the past year? never    Single Item Drug Screen Score: 0  Interpretation: Negative screen for possible drug use disorder    Review of Current Opioid Use    Opioid Risk Tool (ORT) Interpretation: Complete Opioid Risk Tool (ORT)    No exam data present     Physical Exam:     LMP  (LMP Unknown)     Physical Exam  Vitals and nursing note reviewed  Constitutional:       General: She is not in acute distress  Appearance: She is well-developed  HENT:      Head: Normocephalic and atraumatic  Right Ear: Tympanic membrane, ear canal and external ear normal  There is no impacted cerumen  Left Ear: Tympanic membrane, ear canal and external ear normal  There is no impacted cerumen  Eyes:      General: No scleral icterus  Right eye: No discharge  Left eye: No discharge  Extraocular Movements: Extraocular movements intact  Conjunctiva/sclera: Conjunctivae normal    Cardiovascular:      Rate and Rhythm: Normal rate and regular rhythm  Heart sounds: No murmur heard  Pulmonary:      Effort: Pulmonary effort is normal  No respiratory distress  Breath sounds: Normal breath sounds  Abdominal:      General: Abdomen is flat  Bowel sounds are normal       Palpations: Abdomen is soft  Tenderness: There is no abdominal tenderness  There is no guarding or rebound  Musculoskeletal:      Cervical back: Neck supple  Right lower leg: No edema  Left lower leg: No edema  Skin:     General: Skin is warm and dry  Neurological:      Mental Status: She is alert  Psychiatric:         Mood and Affect: Mood normal          Behavior: Behavior normal          Thought Content:  Thought content normal          Judgment: Judgment normal       EKG showed normal sinus rhythm at 76 beats per minute, normal axis, negative acute ischemic changes    Noreen Pisano MD

## 2022-10-19 NOTE — PATIENT INSTRUCTIONS
Medicare Preventive Visit Patient Instructions  Thank you for completing your Welcome to Medicare Visit or Medicare Annual Wellness Visit today  Your next wellness visit will be due in one year (10/20/2023)  The screening/preventive services that you may require over the next 5-10 years are detailed below  Some tests may not apply to you based off risk factors and/or age  Screening tests ordered at today's visit but not completed yet may show as past due  Also, please note that scanned in results may not display below  Preventive Screenings:  Service Recommendations Previous Testing/Comments   Colorectal Cancer Screening  * Colonoscopy    * Fecal Occult Blood Test (FOBT)/Fecal Immunochemical Test (FIT)  * Fecal DNA/Cologuard Test  * Flexible Sigmoidoscopy Age: 39-70 years old   Colonoscopy: every 10 years (may be performed more frequently if at higher risk)  OR  FOBT/FIT: every 1 year  OR  Cologuard: every 3 years  OR  Sigmoidoscopy: every 5 years  Screening may be recommended earlier than age 39 if at higher risk for colorectal cancer  Also, an individualized decision between you and your healthcare provider will decide whether screening between the ages of 74-80 would be appropriate  Colonoscopy: 05/27/2021  FOBT/FIT: Not on file  Cologuard: Not on file  Sigmoidoscopy: Not on file    Screening Current     Breast Cancer Screening Age: 36 years old  Frequency: every 1-2 years  Not required if history of left and right mastectomy Mammogram: 09/09/2022    History Breast Cancer   Cervical Cancer Screening Between the ages of 21-29, pap smear recommended once every 3 years  Between the ages of 33-67, can perform pap smear with HPV co-testing every 5 years     Recommendations may differ for women with a history of total hysterectomy, cervical cancer, or abnormal pap smears in past  Pap Smear: Not on file        Hepatitis C Screening Once for adults born between 1945 and 1965  More frequently in patients at high risk for Hepatitis C Hep C Antibody: Not on file        Diabetes Screening 1-2 times per year if you're at risk for diabetes or have pre-diabetes Fasting glucose: 87 mg/dL (10/19/2020)  A1C: No results in last 5 years (No results in last 5 years)      Cholesterol Screening Once every 5 years if you don't have a lipid disorder  May order more often based on risk factors  Lipid panel: Not on file          Other Preventive Screenings Covered by Medicare:  1  Abdominal Aortic Aneurysm (AAA) Screening: covered once if your at risk  You're considered to be at risk if you have a family history of AAA  2  Lung Cancer Screening: covers low dose CT scan once per year if you meet all of the following conditions: (1) Age 50-69; (2) No signs or symptoms of lung cancer; (3) Current smoker or have quit smoking within the last 15 years; (4) You have a tobacco smoking history of at least 20 pack years (packs per day multiplied by number of years you smoked); (5) You get a written order from a healthcare provider  3  Glaucoma Screening: covered annually if you're considered high risk: (1) You have diabetes OR (2) Family history of glaucoma OR (3)  aged 48 and older OR (3)  American aged 72 and older  3  Osteoporosis Screening: covered every 2 years if you meet one of the following conditions: (1) You're estrogen deficient and at risk for osteoporosis based off medical history and other findings; (2) Have a vertebral abnormality; (3) On glucocorticoid therapy for more than 3 months; (4) Have primary hyperparathyroidism; (5) On osteoporosis medications and need to assess response to drug therapy  · Last bone density test (DXA Scan): Not on file  5  HIV Screening: covered annually if you're between the age of 12-76  Also covered annually if you are younger than 13 and older than 72 with risk factors for HIV infection   For pregnant patients, it is covered up to 3 times per pregnancy  Immunizations:  Immunization Recommendations   Influenza Vaccine Annual influenza vaccination during flu season is recommended for all persons aged >= 6 months who do not have contraindications   Pneumococcal Vaccine   * Pneumococcal conjugate vaccine = PCV13 (Prevnar 13), PCV15 (Vaxneuvance), PCV20 (Prevnar 20)  * Pneumococcal polysaccharide vaccine = PPSV23 (Pneumovax) Adults 25-60 years old: 1-3 doses may be recommended based on certain risk factors  Adults 72 years old: 1-2 doses may be recommended based off what pneumonia vaccine you previously received   Hepatitis B Vaccine 3 dose series if at intermediate or high risk (ex: diabetes, end stage renal disease, liver disease)   Tetanus (Td) Vaccine - COST NOT COVERED BY MEDICARE PART B Following completion of primary series, a booster dose should be given every 10 years to maintain immunity against tetanus  Td may also be given as tetanus wound prophylaxis  Tdap Vaccine - COST NOT COVERED BY MEDICARE PART B Recommended at least once for all adults  For pregnant patients, recommended with each pregnancy  Shingles Vaccine (Shingrix) - COST NOT COVERED BY MEDICARE PART B  2 shot series recommended in those aged 48 and above     Health Maintenance Due:      Topic Date Due   • Hepatitis C Screening  Never done   • HIV Screening  Never done   • Lung Cancer Screening  02/17/2022   • Breast Cancer Screening: Mammogram  09/09/2024   • Colorectal Cancer Screening  05/27/2026     Immunizations Due:      Topic Date Due   • Pneumococcal Vaccine: Pediatrics (0 to 5 Years) and At-Risk Patients (6 to 59 Years) (1 - PCV) Never done   • COVID-19 Vaccine (3 - Booster for Pfizer series) 10/03/2021   • Influenza Vaccine (1) 09/01/2022     Advance Directives   What are advance directives? Advance directives are legal documents that state your wishes and plans for medical care   These plans are made ahead of time in case you lose your ability to make decisions for yourself  Advance directives can apply to any medical decision, such as the treatments you want, and if you want to donate organs  What are the types of advance directives? There are many types of advance directives, and each state has rules about how to use them  You may choose a combination of any of the following:  · Living will: This is a written record of the treatment you want  You can also choose which treatments you do not want, which to limit, and which to stop at a certain time  This includes surgery, medicine, IV fluid, and tube feedings  · Durable power of  for healthcare [facility]): This is a written record that states who you want to make healthcare choices for you when you are unable to make them for yourself  This person, called a proxy, is usually a family member or a friend  You may choose more than 1 proxy  · Do not resuscitate (DNR) order:  A DNR order is used in case your heart stops beating or you stop breathing  It is a request not to have certain forms of treatment, such as CPR  A DNR order may be included in other types of advance directives  · Medical directive: This covers the care that you want if you are in a coma, near death, or unable to make decisions for yourself  You can list the treatments you want for each condition  Treatment may include pain medicine, surgery, blood transfusions, dialysis, IV or tube feedings, and a ventilator (breathing machine)  · Values history: This document has questions about your views, beliefs, and how you feel and think about life  This information can help others choose the care that you would choose  Why are advance directives important? An advance directive helps you control your care  Although spoken wishes may be used, it is better to have your wishes written down  Spoken wishes can be misunderstood, or not followed  Treatments may be given even if you do not want them   An advance directive may make it easier for your family to make difficult choices about your care  Cigarette Smoking and Your Health   Risks to your health if you smoke:  Nicotine and other chemicals found in tobacco damage every cell in your body  Even if you are a light smoker, you have an increased risk for cancer, heart disease, and lung disease  If you are pregnant or have diabetes, smoking increases your risk for complications  Benefits to your health if you stop smoking:   · You decrease respiratory symptoms such as coughing, wheezing, and shortness of breath  · You reduce your risk for cancers of the lung, mouth, throat, kidney, bladder, pancreas, stomach, and cervix  If you already have cancer, you increase the benefits of chemotherapy  You also reduce your risk for cancer returning or a second cancer from developing  · You reduce your risk for heart disease, blood clots, heart attack, and stroke  · You reduce your risk for lung infections, and diseases such as pneumonia, asthma, chronic bronchitis, and emphysema  · Your circulation improves  More oxygen can be delivered to your body  If you have diabetes, you lower your risk for complications, such as kidney, artery, and eye diseases  You also lower your risk for nerve damage  Nerve damage can lead to amputations, poor vision, and blindness  · You improve your body's ability to heal and to fight infections  For more information and support to stop smoking:   · [employer]  Phone: 4- 539 - 824-8794  Web Address: www [employer]  Weight Management   Why it is important to manage your weight:  Being overweight increases your risk of health conditions such as heart disease, high blood pressure, type 2 diabetes, and certain types of cancer  It can also increase your risk for osteoarthritis, sleep apnea, and other respiratory problems  Aim for a slow, steady weight loss  Even a small amount of weight loss can lower your risk of health problems    How to lose weight safely:  A safe and healthy way to lose weight is to eat fewer calories and get regular exercise  You can lose up about 1 pound a week by decreasing the number of calories you eat by 500 calories each day  Healthy meal plan for weight management:  A healthy meal plan includes a variety of foods, contains fewer calories, and helps you stay healthy  A healthy meal plan includes the following:  · Eat whole-grain foods more often  A healthy meal plan should contain fiber  Fiber is the part of grains, fruits, and vegetables that is not broken down by your body  Whole-grain foods are healthy and provide extra fiber in your diet  Some examples of whole-grain foods are whole-wheat breads and pastas, oatmeal, brown rice, and bulgur  · Eat a variety of vegetables every day  Include dark, leafy greens such as spinach, kale, graham greens, and mustard greens  Eat yellow and orange vegetables such as carrots, sweet potatoes, and winter squash  · Eat a variety of fruits every day  Choose fresh or canned fruit (canned in its own juice or light syrup) instead of juice  Fruit juice has very little or no fiber  · Eat low-fat dairy foods  Drink fat-free (skim) milk or 1% milk  Eat fat-free yogurt and low-fat cottage cheese  Try low-fat cheeses such as mozzarella and other reduced-fat cheeses  · Choose meat and other protein foods that are low in fat  Choose beans or other legumes such as split peas or lentils  Choose fish, skinless poultry (chicken or turkey), or lean cuts of red meat (beef or pork)  Before you cook meat or poultry, cut off any visible fat  · Use less fat and oil  Try baking foods instead of frying them  Add less fat, such as margarine, sour cream, regular salad dressing and mayonnaise to foods  Eat fewer high-fat foods  Some examples of high-fat foods include french fries, doughnuts, ice cream, and cakes  · Eat fewer sweets  Limit foods and drinks that are high in sugar   This includes candy, cookies, regular soda, and sweetened drinks  Exercise:  Exercise at least 30 minutes per day on most days of the week  Some examples of exercise include walking, biking, dancing, and swimming  You can also fit in more physical activity by taking the stairs instead of the elevator or parking farther away from stores  Ask your healthcare provider about the best exercise plan for you  Narcotic (Opioid) Safety    Use narcotics safely:  · Take prescribed narcotics exactly as directed  · Do not give narcotics to others or take narcotics that belong to someone else  · Do not mix narcotics without medicines or alcohol  · Do not drive or operate heavy machinery after you take the narcotic  · Monitor for side effects and notify your healthcare provider if you experienced side effects such as nausea, sleepiness, itching, or trouble thinking clearly  Manage constipation:    Constipation is the most common side effect of narcotic medicine  Constipation is when you have hard, dry bowel movements, or you go longer than usual between bowel movements  Tell your healthcare provider about all changes in your bowel movements while you are taking narcotics  He or she may recommend laxative medicine to help you have a bowel movement  He or she may also change the kind of narcotic you are taking, or change when you take it  The following are more ways you can prevent or relieve constipation:    · Drink liquids as directed  You may need to drink extra liquids to help soften and move your bowels  Ask how much liquid to drink each day and which liquids are best for you  · Eat high-fiber foods  This may help decrease constipation by adding bulk to your bowel movements  High-fiber foods include fruits, vegetables, whole-grain breads and cereals, and beans  Your healthcare provider or dietitian can help you create a high-fiber meal plan  Your provider may also recommend a fiber supplement if you cannot get enough fiber from food  · Exercise regularly    Regular physical activity can help stimulate your intestines  Walking is a good exercise to prevent or relieve constipation  Ask which exercises are best for you  · Schedule a time each day to have a bowel movement  This may help train your body to have regular bowel movements  Bend forward while you are on the toilet to help move the bowel movement out  Sit on the toilet for at least 10 minutes, even if you do not have a bowel movement  Store narcotics safely:   · Store narcotics where others cannot easily get them  Keep them in a locked cabinet or secure area  Do not  keep them in a purse or other bag you carry with you  A person may be looking for something else and find the narcotics  · Make sure narcotics are stored out of the reach of children  A child can easily overdose on narcotics  Narcotics may look like candy to a small child  The best way to dispose of narcotics: The laws vary by country and area  In the [place], the best way is to return the narcotics through a take-back program  This program is offered by the [employer]  The following are options for using the program:  · Take the narcotics to a [facility] collection site  The site is often a law enforcement center  Call your local law enforcement center for scheduled take-back days in your area  You will be given information on where to go if the collection site is in a different location  · Take the narcotics to an approved pharmacy or hospital   A pharmacy or hospital may be set up as a collection site  You will need to ask if it is a [facility] collection site if you were not directed there  A pharmacy or doctor's office may not be able to take back narcotics unless it is a [facility] site  · Use a mail-back system  This means you are given containers to put the narcotics into  You will then mail them in the containers  · Use a take-back drop box  This is a place to leave the narcotics at any time   People and animals will not be able to get into the box  Your local law enforcement agency can tell you where to find a drop box in your area  Other ways to manage pain:   · Ask your healthcare provider about non-narcotic medicines to control pain  Nonprescription medicines include NSAIDs (such as ibuprofen) and acetaminophen  Prescription medicines include muscle relaxers, antidepressants, and steroids  · Pain may be managed without any medicines  Some ways to relieve pain include massage, aromatherapy, or meditation  Physical or occupational therapy may also help  For more information:   · Drug Enforcement Administration  [facility] [address]  Phone: 0- 002 - 651-3315  Web Address: [facility] gov/drug_disposal/    · [address]  and Drug Administration  [facility] , [address] [place] [address]  Phone: 9- 056 - 962-4112  Web Address: http://[employer]/     © Copyright [employer] 2018 Information is for End User's use only and may not be sold, redistributed or otherwise used for commercial purposes   All illustrations and images included in CareNotes® are the copyrighted property of [employer]  or [address] [place] [address] [facility] [facility]

## 2022-10-20 PROBLEM — R11.2 NAUSEA AND VOMITING: Status: RESOLVED | Noted: 2020-05-10 | Resolved: 2022-10-20

## 2022-10-20 PROBLEM — E83.42 HYPOMAGNESEMIA: Status: RESOLVED | Noted: 2021-03-18 | Resolved: 2022-10-20

## 2022-10-20 PROBLEM — B37.81 CANDIDA ESOPHAGITIS (HCC): Status: RESOLVED | Noted: 2021-02-20 | Resolved: 2022-10-20

## 2022-10-20 PROBLEM — R10.31 CHRONIC RLQ PAIN: Status: RESOLVED | Noted: 2021-03-31 | Resolved: 2022-10-20

## 2022-10-20 PROBLEM — E43 SEVERE PROTEIN-CALORIE MALNUTRITION (HCC): Status: RESOLVED | Noted: 2021-03-18 | Resolved: 2022-10-20

## 2022-10-20 PROBLEM — G89.29 CHRONIC RLQ PAIN: Status: RESOLVED | Noted: 2021-03-31 | Resolved: 2022-10-20

## 2022-10-20 PROBLEM — E87.1 HYPONATREMIA: Status: RESOLVED | Noted: 2020-05-10 | Resolved: 2022-10-20

## 2022-10-20 PROBLEM — E87.6 HYPOKALEMIA: Status: RESOLVED | Noted: 2020-05-12 | Resolved: 2022-10-20

## 2022-10-20 PROBLEM — Z01.818 PRE-OPERATIVE EXAM: Status: ACTIVE | Noted: 2022-10-20

## 2022-10-20 PROBLEM — Z23 FLU VACCINE NEED: Status: RESOLVED | Noted: 2021-11-18 | Resolved: 2022-10-20

## 2022-10-20 PROBLEM — K29.70 GASTRITIS: Status: RESOLVED | Noted: 2020-10-28 | Resolved: 2022-10-20

## 2022-10-20 PROBLEM — R65.10 SIRS (SYSTEMIC INFLAMMATORY RESPONSE SYNDROME) (HCC): Status: RESOLVED | Noted: 2020-05-10 | Resolved: 2022-10-20

## 2022-10-20 PROBLEM — R63.5 WEIGHT GAIN: Status: RESOLVED | Noted: 2021-11-18 | Resolved: 2022-10-20

## 2022-10-20 PROBLEM — G89.3 CANCER RELATED PAIN: Status: RESOLVED | Noted: 2020-09-30 | Resolved: 2022-10-20

## 2022-10-20 NOTE — ASSESSMENT & PLAN NOTE
Preoperative consultation  Overall the patient appears to be in stable health and she is medically cleared to proceed with bilateral cataract surgery

## 2022-11-07 DIAGNOSIS — C50.919 MALIGNANT NEOPLASM OF FEMALE BREAST, UNSPECIFIED ESTROGEN RECEPTOR STATUS, UNSPECIFIED LATERALITY, UNSPECIFIED SITE OF BREAST (HCC): ICD-10-CM

## 2022-11-07 RX ORDER — HYDROCODONE BITARTRATE AND ACETAMINOPHEN 5; 325 MG/1; MG/1
1 TABLET ORAL EVERY 6 HOURS PRN
Qty: 120 TABLET | Refills: 0 | Status: SHIPPED | OUTPATIENT
Start: 2022-11-07

## 2022-11-08 ENCOUNTER — OFFICE VISIT (OUTPATIENT)
Dept: HEMATOLOGY ONCOLOGY | Facility: CLINIC | Age: 57
End: 2022-11-08

## 2022-11-08 VITALS
DIASTOLIC BLOOD PRESSURE: 84 MMHG | HEART RATE: 106 BPM | RESPIRATION RATE: 16 BRPM | TEMPERATURE: 97.2 F | OXYGEN SATURATION: 98 % | BODY MASS INDEX: 24.98 KG/M2 | HEIGHT: 63 IN | SYSTOLIC BLOOD PRESSURE: 120 MMHG | WEIGHT: 141 LBS

## 2022-11-08 DIAGNOSIS — Z17.1 MALIGNANT NEOPLASM OF UPPER-INNER QUADRANT OF LEFT BREAST IN FEMALE, ESTROGEN RECEPTOR NEGATIVE (HCC): Primary | ICD-10-CM

## 2022-11-08 DIAGNOSIS — C50.212 MALIGNANT NEOPLASM OF UPPER-INNER QUADRANT OF LEFT BREAST IN FEMALE, ESTROGEN RECEPTOR NEGATIVE (HCC): Primary | ICD-10-CM

## 2022-11-08 NOTE — PROGRESS NOTES
Hematology / Oncology Outpatient Follow Up Note    Heather Forman 59 y o  female :1965 HXD:[id number]         Date:  2022    Assessment / Plan:  A 67-year-old postmenopausal woman with clinical stage II left breast cancer, grade 3, ER negative, VT negative, HER2 fish positive disease   She underwent neoadjuvant chemotherapy with TCH with pertuzumab which produced severe toxicity   She therefore, she subsequently or on weekly paclitaxel, trastuzumab and pertuzumab for 6 weeks   She achieved complete pathological response when she underwent mastectomy and sentinel lymph node biopsy in 2020  Subsequently, she was on adjuvant trastuzumab and pertuzumab until 2021 with no cardiac toxicity  She is currently on observation  She has no evidence recurrent disease, based on her symptoms and physical examinations  I recommended her to continue surveillance  I recommended her smoking cessation  She is in agreement with my recommendations         Subjective:      HPI:  A 67-year-old postmenopausal woman who felt a lump in her left breast which she brought to medical attention  Derek Lau was found to have large left breast mass for which she underwent biopsy recently which showed invasive ductal carcinoma, grade 3   This was ER negative, VT negative, HER2 2+ disease   Michaelle Alvarenga fish was positive for gene application with ratio of 2 2   She was subsequently seen by Dr Arlette Bolanos on MRI, she had 2 7 x 2 1 x 2 3 cm of left breast mass with no enlarged axillary lymph node   Bone scan was negative for osseous metastasis   CT scan of chest abdomen pelvis showed no evidence of distant metastasis   She was referred to me to discuss neoadjuvant chemotherapy   She presents today with her   Derek Lau has no breast related symptomatology   She denied any pain   She has no respiratory symptoms   Her weight is stable   She has extensive history of smoking with 1 and half pack per day for 40 years  Derek Lau also drinks 4 beers every day for many years  Sheila Mayes has lupus for which she is on low-dose prednisone  Sheila Mayes also has hypothyroidism as well as well-controlled hypertension  Sheila Mayes has a mother and maternal and had breast cancer above the age of 61  She has no family history of ovarian cancer   Her performance status is 0 to 1/4 on the ECOG scale            Interval History:  A 25-year-old postmenopausal woman with clinical stage II left breast cancer, grade 3, ER negative, NH negative, HER2 fish positive disease  She had 3 cycle of neoadjuvant chemotherapy with TCH with pertuzumab with significant toxicities   She has multiple hospitalization due to the severe nausea vomiting as well as deconditioning  Due to the toxicity, treatment regimen was changed to weekly paclitaxel, try weekly pertuzumab and trastuzumab which she has been tolerating well   She completed neoadjuvant chemotherapy in July 2020, resulting in complete clinical response   Subsequently, she underwent mastectomy and sentinel lymph node biopsy by Dr Jessica Roman in August 2020 which showed no evidence of residual carcinoma   3 sentinel lymph nodes were all negative for metastatic disease, consistent with complete pathological response  Postoperatively, she was treated with adjuvant trastuzumab and pertuzumab until June 2021 without cardiac toxicity  Currently, she is on observation  She presents today for follow-up  She feels well with no new complaint  She denied any bone pain  Her weight is stable  She is still active smoker with less than half pack per day    Her performance status is normal         Objective:      Primary Diagnosis:     Clinical stage II left breast cancer, grade 3, ER negative, NH negative, HER2 fish positive disease, diagnosed in March 2020       Cancer Staging:  Cancer Staging  No matching staging information was found for the patient         Previous Hematologic/ Oncologic Treatment:       1  Neoadjuvant chemotherapy with TCH with pertuzumab x3 cycles, completed in early May 2020       2  Neoadjuvant chemotherapy with weekly paclitaxel, try weekly trastuzumab and pertuzumab x6 weeks, completed in July 2020       3  Adjuvant trastuzumab and pertuzumab, completed in June 2021      Current Hematologic/ Oncologic Treatment:        observation      Disease Status:      1  Clinical complete response      2  Complete pathological response      3  HARVEY status post mastectomy and sentinel lymph node biopsy      Test Results:     Pathology:     Left breast biopsy showed invasive ductal carcinoma, grade 3, ER negative, MA negative, HER2 fish positive disease with ratio of 2 2      In August 2020, mastectomy specimen showed no evidence of residual carcinoma   3 sentinel lymph nodes were all negative for metastatic disease      Radiology:       Mammography in September 2022 was benign  BI-RADS 2  CT scan of abdomen pelvis in December 2021 showed no evidence of metastatic disease      Laboratory:     See below      Physical Exam:        General Appearance:    Alert, oriented          Eyes:    PERRL   Ears:    Normal external ear canals, both ears   Nose:   Nares normal, septum midline   Throat:   Mucosa moist  Pharynx without injection  Neck:   Supple         Lungs:     Clear to auscultation bilaterally   Chest Wall:    No tenderness or deformity    Heart:    Regular rate and rhythm         Abdomen:     Soft, non-tender, bowel sounds +, no organomegaly               Extremities:   Extremities no cyanosis or edema         Skin:   no rash or icterus      Lymph nodes:   Cervical, supraclavicular, and axillary nodes normal   Neurologic:   CNII-XII intact, normal strength, sensation and reflexes     Throughout             Breast exam:   status post left mastectomy without reconstruction   No palpable abnormality in her left chest wall   Right breast exam is negative               ROS: Review of Systems   All other systems reviewed and are negative  Imaging: No results found  Labs:   Lab Results   Component Value Date    WBC 7 36 04/17/2021    HGB 10 5 (L) 04/17/2021    HCT 33 8 (L) 04/17/2021    MCV 92 04/17/2021     04/17/2021     Lab Results   Component Value Date     (L) 04/22/2015    K 4 2 04/17/2021     (H) 04/17/2021    CO2 22 04/17/2021    ANIONGAP 11 04/22/2015    BUN 18 04/17/2021    CREATININE 0 98 04/17/2021    GLUCOSE 97 04/22/2015    GLUF 87 10/19/2020    CALCIUM 7 7 (L) 04/17/2021    CORRECTEDCA 10 0 03/18/2021    AST 12 03/25/2021    ALT 11 (L) 03/25/2021    ALKPHOS 76 03/25/2021    PROT 7 2 04/22/2015    BILITOT 0 2 04/22/2015    EGFR 64 04/17/2021         Current Medications: Reviewed  Allergies: Reviewed  PMH/FH/SH:  Reviewed      Vital Sign:    Body surface area is 1 67 meters squared      Wt Readings from Last 3 Encounters:   11/08/22 64 kg (141 lb)   10/19/22 66 5 kg (146 lb 8 oz)   07/01/22 66 7 kg (147 lb)        Temp Readings from Last 3 Encounters:   11/08/22 (!) 97 2 °F (36 2 °C) (Temporal)   10/19/22 97 8 °F (36 6 °C)   07/01/22 98 2 °F (36 8 °C)        BP Readings from Last 3 Encounters:   11/08/22 120/84   10/19/22 130/90   07/01/22 140/90         Pulse Readings from Last 3 Encounters:   11/08/22 (!) 106   10/19/22 100   07/01/22 96     @LASTSAO2(3)@

## 2022-11-10 DIAGNOSIS — M32.9 SYSTEMIC LUPUS ERYTHEMATOSUS, UNSPECIFIED SLE TYPE, UNSPECIFIED ORGAN INVOLVEMENT STATUS (HCC): ICD-10-CM

## 2022-11-10 RX ORDER — LANOLIN ALCOHOL/MO/W.PET/CERES
CREAM (GRAM) TOPICAL
Qty: 60 TABLET | Refills: 5 | Status: SHIPPED | OUTPATIENT
Start: 2022-11-10

## 2022-11-12 NOTE — TELEPHONE ENCOUNTER
Patient has made a followup appointment with you on Thursday, Feb 19th, but she does need her Zanax refilled today she is totally out of the medication  Can you please send RX to [facility] on [address], in [place]  Advise patient at [phone] 
97

## 2022-11-17 DIAGNOSIS — F41.9 ANXIETY: ICD-10-CM

## 2022-11-17 RX ORDER — ALPRAZOLAM 1 MG/1
TABLET ORAL
Qty: 60 TABLET | Refills: 2 | Status: SHIPPED | OUTPATIENT
Start: 2022-11-17

## 2022-11-28 DIAGNOSIS — G89.29 CHRONIC RLQ PAIN: ICD-10-CM

## 2022-11-28 DIAGNOSIS — R10.31 CHRONIC RLQ PAIN: ICD-10-CM

## 2022-11-28 DIAGNOSIS — K52.9 COLITIS: ICD-10-CM

## 2022-11-28 RX ORDER — DICYCLOMINE HYDROCHLORIDE 10 MG/1
CAPSULE ORAL
Qty: 60 CAPSULE | Refills: 3 | Status: SHIPPED | OUTPATIENT
Start: 2022-11-28

## 2022-12-05 DIAGNOSIS — C50.919 MALIGNANT NEOPLASM OF FEMALE BREAST, UNSPECIFIED ESTROGEN RECEPTOR STATUS, UNSPECIFIED LATERALITY, UNSPECIFIED SITE OF BREAST (HCC): ICD-10-CM

## 2022-12-05 RX ORDER — HYDROCODONE BITARTRATE AND ACETAMINOPHEN 5; 325 MG/1; MG/1
1 TABLET ORAL EVERY 6 HOURS PRN
Qty: 120 TABLET | Refills: 0 | Status: SHIPPED | OUTPATIENT
Start: 2022-12-05

## 2022-12-14 ENCOUNTER — APPOINTMENT (OUTPATIENT)
Dept: LAB | Facility: CLINIC | Age: 57
End: 2022-12-14

## 2022-12-14 DIAGNOSIS — N08 LUPOID NEPHRITIS (HCC): ICD-10-CM

## 2022-12-14 DIAGNOSIS — E03.9 HYPOTHYROIDISM, UNSPECIFIED TYPE: ICD-10-CM

## 2022-12-14 DIAGNOSIS — M32.10 LUPOID NEPHRITIS (HCC): ICD-10-CM

## 2022-12-14 LAB
25(OH)D3 SERPL-MCNC: 89.7 NG/ML (ref 30–100)
CALCIUM SERPL-MCNC: 9.1 MG/DL (ref 8.4–10.2)
CREAT SERPL-MCNC: 0.76 MG/DL (ref 0.6–1.3)
ERYTHROCYTE [SEDIMENTATION RATE] IN BLOOD: 18 MM/HOUR (ref 0–29)
GFR SERPL CREATININE-BSD FRML MDRD: 86 ML/MIN/1.73SQ M
PTH-INTACT SERPL-MCNC: 77.5 PG/ML (ref 18.4–80.1)
T4 FREE SERPL-MCNC: 0.76 NG/DL (ref 0.76–1.46)
T4 SERPL-MCNC: 7.6 UG/DL (ref 4.7–13.3)
TSH SERPL DL<=0.05 MIU/L-ACNC: 4.87 UIU/ML (ref 0.45–4.5)

## 2022-12-16 LAB
ALBUMIN SERPL ELPH-MCNC: 4.11 G/DL (ref 3.5–5)
ALBUMIN SERPL ELPH-MCNC: 60.4 % (ref 52–65)
ALPHA1 GLOB SERPL ELPH-MCNC: 0.35 G/DL (ref 0.1–0.4)
ALPHA1 GLOB SERPL ELPH-MCNC: 5.1 % (ref 2.5–5)
ALPHA2 GLOB SERPL ELPH-MCNC: 0.75 G/DL (ref 0.4–1.2)
ALPHA2 GLOB SERPL ELPH-MCNC: 11.1 % (ref 7–13)
BETA GLOB ABNORMAL SERPL ELPH-MCNC: 0.37 G/DL (ref 0.4–0.8)
BETA1 GLOB SERPL ELPH-MCNC: 5.5 % (ref 5–13)
BETA2 GLOB SERPL ELPH-MCNC: 5 % (ref 2–8)
BETA2+GAMMA GLOB SERPL ELPH-MCNC: 0.34 G/DL (ref 0.2–0.5)
GAMMA GLOB ABNORMAL SERPL ELPH-MCNC: 0.88 G/DL (ref 0.5–1.6)
GAMMA GLOB SERPL ELPH-MCNC: 12.9 % (ref 12–22)
IGG/ALB SER: 1.53 {RATIO} (ref 1.1–1.8)
PROT PATTERN SERPL ELPH-IMP: ABNORMAL
PROT SERPL-MCNC: 6.8 G/DL (ref 6.4–8.2)

## 2022-12-28 DIAGNOSIS — E03.9 HYPOTHYROIDISM, UNSPECIFIED TYPE: ICD-10-CM

## 2022-12-28 RX ORDER — LEVOTHYROXINE SODIUM 88 UG/1
88 TABLET ORAL DAILY
Qty: 30 TABLET | Refills: 0 | Status: SHIPPED | OUTPATIENT
Start: 2022-12-28

## 2022-12-29 DIAGNOSIS — C50.919 MALIGNANT NEOPLASM OF FEMALE BREAST, UNSPECIFIED ESTROGEN RECEPTOR STATUS, UNSPECIFIED LATERALITY, UNSPECIFIED SITE OF BREAST (HCC): ICD-10-CM

## 2023-01-03 RX ORDER — HYDROCODONE BITARTRATE AND ACETAMINOPHEN 5; 325 MG/1; MG/1
1 TABLET ORAL EVERY 6 HOURS PRN
Qty: 120 TABLET | Refills: 0 | Status: SHIPPED | OUTPATIENT
Start: 2023-01-03

## 2023-01-09 DIAGNOSIS — F41.9 ANXIETY: ICD-10-CM

## 2023-01-13 RX ORDER — ALPRAZOLAM 1 MG/1
TABLET ORAL
Qty: 60 TABLET | Refills: 0 | Status: SHIPPED | OUTPATIENT
Start: 2023-01-13

## 2023-01-18 DIAGNOSIS — R13.19 ESOPHAGEAL DYSPHAGIA: ICD-10-CM

## 2023-01-18 RX ORDER — PANTOPRAZOLE SODIUM 40 MG/1
TABLET, DELAYED RELEASE ORAL
Qty: 30 TABLET | Refills: 5 | Status: SHIPPED | OUTPATIENT
Start: 2023-01-18

## 2023-01-21 DIAGNOSIS — E03.9 HYPOTHYROIDISM, UNSPECIFIED TYPE: ICD-10-CM

## 2023-01-23 RX ORDER — LEVOTHYROXINE SODIUM 88 UG/1
88 TABLET ORAL DAILY
Qty: 30 TABLET | Refills: 0 | Status: SHIPPED | OUTPATIENT
Start: 2023-01-23

## 2023-01-30 DIAGNOSIS — C50.919 MALIGNANT NEOPLASM OF FEMALE BREAST, UNSPECIFIED ESTROGEN RECEPTOR STATUS, UNSPECIFIED LATERALITY, UNSPECIFIED SITE OF BREAST (HCC): ICD-10-CM

## 2023-01-30 RX ORDER — HYDROCODONE BITARTRATE AND ACETAMINOPHEN 5; 325 MG/1; MG/1
1 TABLET ORAL EVERY 6 HOURS PRN
Qty: 120 TABLET | Refills: 0 | Status: SHIPPED | OUTPATIENT
Start: 2023-01-30

## 2023-01-31 DIAGNOSIS — F41.9 ANXIETY: ICD-10-CM

## 2023-02-01 RX ORDER — ALPRAZOLAM 1 MG/1
1 TABLET ORAL 3 TIMES DAILY PRN
Qty: 60 TABLET | Refills: 0 | Status: SHIPPED | OUTPATIENT
Start: 2023-02-01

## 2023-02-19 DIAGNOSIS — F41.9 ANXIETY: ICD-10-CM

## 2023-02-19 DIAGNOSIS — M32.9 SYSTEMIC LUPUS ERYTHEMATOSUS, UNSPECIFIED SLE TYPE, UNSPECIFIED ORGAN INVOLVEMENT STATUS (HCC): ICD-10-CM

## 2023-02-23 DIAGNOSIS — E03.9 HYPOTHYROIDISM, UNSPECIFIED TYPE: ICD-10-CM

## 2023-02-23 RX ORDER — LEVOTHYROXINE SODIUM 88 UG/1
TABLET ORAL
Qty: 30 TABLET | Refills: 0 | Status: SHIPPED | OUTPATIENT
Start: 2023-02-23 | End: 2023-02-23 | Stop reason: SDUPTHER

## 2023-02-23 RX ORDER — LEVOTHYROXINE SODIUM 88 UG/1
88 TABLET ORAL DAILY
Qty: 30 TABLET | Refills: 0 | Status: SHIPPED | OUTPATIENT
Start: 2023-02-23

## 2023-02-27 DIAGNOSIS — C50.919 MALIGNANT NEOPLASM OF FEMALE BREAST, UNSPECIFIED ESTROGEN RECEPTOR STATUS, UNSPECIFIED LATERALITY, UNSPECIFIED SITE OF BREAST (HCC): ICD-10-CM

## 2023-02-27 RX ORDER — HYDROCODONE BITARTRATE AND ACETAMINOPHEN 5; 325 MG/1; MG/1
1 TABLET ORAL EVERY 6 HOURS PRN
Qty: 120 TABLET | Refills: 0 | Status: SHIPPED | OUTPATIENT
Start: 2023-02-27

## 2023-03-03 ENCOUNTER — RA CDI HCC (OUTPATIENT)
Dept: OTHER | Facility: HOSPITAL | Age: 58
End: 2023-03-03

## 2023-03-03 NOTE — PROGRESS NOTES
[facility]  coding opportunities       Chart reviewed, no opportunity found:   [address]        Patients Insurance     Medicare Insurance: [facility] [employer]

## 2023-03-08 RX ORDER — ALPRAZOLAM 1 MG/1
1 TABLET ORAL 3 TIMES DAILY PRN
Qty: 60 TABLET | Refills: 0 | Status: SHIPPED | OUTPATIENT
Start: 2023-03-08

## 2023-03-08 RX ORDER — PREDNISONE 10 MG/1
TABLET ORAL
Qty: 45 TABLET | Refills: 0 | Status: SHIPPED | OUTPATIENT
Start: 2023-03-08

## 2023-03-10 ENCOUNTER — OFFICE VISIT (OUTPATIENT)
Dept: FAMILY MEDICINE CLINIC | Facility: CLINIC | Age: 58
End: 2023-03-10

## 2023-03-10 VITALS
OXYGEN SATURATION: 100 % | HEART RATE: 99 BPM | SYSTOLIC BLOOD PRESSURE: 140 MMHG | WEIGHT: 143.38 LBS | BODY MASS INDEX: 25.41 KG/M2 | DIASTOLIC BLOOD PRESSURE: 80 MMHG | RESPIRATION RATE: 18 BRPM | TEMPERATURE: 97.6 F | HEIGHT: 63 IN

## 2023-03-10 DIAGNOSIS — M79.2 NEURALGIA: ICD-10-CM

## 2023-03-10 DIAGNOSIS — F32.5 MAJOR DEPRESSIVE DISORDER IN FULL REMISSION, UNSPECIFIED WHETHER RECURRENT (HCC): ICD-10-CM

## 2023-03-10 DIAGNOSIS — J32.9 SINUSITIS, UNSPECIFIED CHRONICITY, UNSPECIFIED LOCATION: ICD-10-CM

## 2023-03-10 DIAGNOSIS — M32.9 SYSTEMIC LUPUS ERYTHEMATOSUS, UNSPECIFIED SLE TYPE, UNSPECIFIED ORGAN INVOLVEMENT STATUS (HCC): ICD-10-CM

## 2023-03-10 DIAGNOSIS — E03.9 HYPOTHYROIDISM, UNSPECIFIED TYPE: Primary | ICD-10-CM

## 2023-03-10 PROBLEM — M79.671 RIGHT FOOT PAIN: Status: RESOLVED | Noted: 2021-08-05 | Resolved: 2023-03-10

## 2023-03-10 PROBLEM — Z01.818 PRE-OPERATIVE EXAM: Status: RESOLVED | Noted: 2022-10-20 | Resolved: 2023-03-10

## 2023-03-10 RX ORDER — GABAPENTIN 300 MG/1
300 CAPSULE ORAL
Qty: 30 CAPSULE | Refills: 5 | Status: SHIPPED | OUTPATIENT
Start: 2023-03-10

## 2023-03-10 RX ORDER — VARENICLINE 0.03 MG/.05ML
SPRAY NASAL
COMMUNITY
Start: 2023-01-23

## 2023-03-10 RX ORDER — AZITHROMYCIN 250 MG/1
TABLET, FILM COATED ORAL
Qty: 6 TABLET | Refills: 0 | Status: SHIPPED | OUTPATIENT
Start: 2023-03-10 | End: 2023-03-15

## 2023-03-10 NOTE — ASSESSMENT & PLAN NOTE
Major depression disorder with history of PTSD  Patient continues on current dose of Zoloft and Xanax  She does meet with her behavioral therapist by phone on occasions

## 2023-03-10 NOTE — ASSESSMENT & PLAN NOTE
Sinusitis  Patient given prescription for Zithromax Z-Johnie to take as directed for 5 days    Patient will call if symptoms persist after medication completed

## 2023-03-10 NOTE — PROGRESS NOTES
FAMILY PRACTICE OFFICE VISIT       NAME: Naveed Davey  AGE: 59 y o  SEX: female       : 1965        MRN: [id number]    DATE: 3/10/2023  TIME: 5:46 PM    Assessment and Plan     Problem List Items Addressed This Visit        Endocrine    Hypothyroidism - Primary     Hypothyroidism  Patient's latest TSH levels were stable on current dose of levothyroxine            Respiratory    Sinusitis     Sinusitis  Patient given prescription for Zithromax Z-Johnie to take as directed for 5 days  Patient will call if symptoms persist after medication completed         Relevant Medications    azithromycin (Zithromax) 250 mg tablet       Other    Systemic lupus erythematosus (HCC)    Relevant Medications    gabapentin (Neurontin) 300 mg capsule    Major depressive disorder in full remission, unspecified whether recurrent ([facility] )     Major depression disorder with history of PTSD  Patient continues on current dose of Zoloft and Xanax  She does meet with her behavioral therapist by phone on occasions  Neuralgia     Neuralgia  Patient with suspected neuralgia causing some of her symptoms of sharp shooting pains from her head to her shoulder  She was given prescription for trial of gabapentin 300 mg at bedtime  Initially she is to hold her Flexeril medication however if she does not see symptom improvement within 5 to 7 days she may take Flexeril 5 mg as well and wait 5 to 7 days and increase to 10 mg if needed  Chief Complaint     Chief Complaint   Patient presents with   • Follow-up   • Cough     Sinue   • Headache       History of Present Illness     Patient in the office to review chronic medical conditions  She has slowly been able to spend more time out of her house and driving herself to the pharmacy or grocery store  She has also 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      Patient has seen her rheumatologist recently for routine examination as well as for sure intermittent shooting pains from her occiput to her left shoulder  Rheumatologist felt symptoms may be related to her fibromyalgia  She currently takes cyclobenzaprine 10 mg at bedtime however she continues to have significant troubles with getting up early throughout the night and being unable to fall asleep  She has very vivid dreams which at times causes her to to have anxiety attacks upon awakening  Patient also describes at least 4-week history of facial pressure with some nasal discharge  She denies any sore throat or fevers  She has no significant chest pain or coughing    Patient is thankful that her  has started working once again after being out of work for 2 to 3 years after [facility] began  Review of Systems   Review of Systems   Constitutional: Positive for fatigue  Negative for fever  HENT: Negative  Respiratory: Negative  Cardiovascular: Negative  Musculoskeletal: Positive for neck pain and neck stiffness  Neurological:        As per HPI   Psychiatric/Behavioral: Positive for agitation, decreased concentration, dysphoric mood and sleep disturbance  The patient is nervous/anxious          Active Problem List     Patient Active Problem List   Diagnosis   • Systemic lupus erythematosus (HCC)   • Hypothyroidism   • Posttraumatic stress disorder   • Adult celiac disease   • Anxiety   • Depression   • Esophagitis   • Nephrolithiasis   • Vitamin D deficiency   • Malignant neoplasm of upper-inner quadrant of left breast in female, estrogen receptor negative ([facility] )   • Chemotherapy induced neutropenia ([facility] )   • Port-A-Cath in place   • Breast cancer ([facility] )   • JEFERSON (acute kidney injury) ([facility] )   • Anemia   • Colitis   • Continuous opioid dependence ([facility] )   • Smoking   • Raynaud's disease without gangrene   • Major depressive disorder in full remission, unspecified whether recurrent ([facility] )   • Sinusitis   • Neuralgia       Past Medical History:  Past Medical History:   Diagnosis Date   • Cancer ([facility] ) • Colon polyp    • Disease of thyroid gland    • History of chemotherapy    • Hypertension    • Kidney stone    • Lupus ([facility] )    • Malignant neoplasm of upper-inner quadrant of left breast in female, estrogen receptor negative ([facility] ) 2/25/2020    left   • Nephrolithiasis    • PTSD (post-traumatic stress disorder)    • Sjogren's disease ([facility] )        Past Surgical History:  Past Surgical History:   Procedure Laterality Date   • BREAST BIOPSY Left 02/12/2020    us guided -  invasive ductal carcinoma   • BREAST BIOPSY Right 02/12/2020    benign stereo   • ECTOPIC PREGNANCY SURGERY     • FEMUR FRACTURE SURGERY     • FL GUIDED CENTRAL VENOUS ACCESS DEVICE INSERTION  03/17/2020   • HYSTERECTOMY     • LEFT OOPHORECTOMY      due to torsion    • MAMMO STEREOTACTIC BREAST BIOPSY RIGHT (ALL INC) Right 02/12/2020   • MASTECTOMY Left 08/2020   • MASTECTOMY W/ SENTINEL NODE BIOPSY Left 08/18/2020    Procedure: BREAST MASTECTOMY WITH SENTINEL LYMPH NODE BIOPSY, LYMPHATIC MAPPING WITH BLUE DYE AND RADIOACTIVE DYE (INJECT AT 1430 BY DR WRIGHT IN THE OR);   Surgeon: Anuja Harrell MD;  Location: [facility];  Service: Surgical Oncology   • MRI BREAST BIOPSY LEFT (ALL INCLUSIVE) Left 03/20/2020   • TX INSJ TUNNELED CTR VAD W/SUBQ PORT AGE 5 YR/> N/A 03/17/2020    Procedure: INSERTION VENOUS PORT ( PORT-A-CATH) IR;  Surgeon: Tim Guevara DO;  Location[facility];  Service: Interventional Radiology   • TX RMVL MARSHA CTR VAD W/SUBQ PORT/ CTR/PRPH INSJ N/A 07/06/2021    Procedure: REMOVAL VENOUS PORT (PORT-A-CATH)IR;  Surgeon: Tim Guevara DO;  [facility];  Service: Interventional Radiology   • US GUIDANCE BREAST BIOPSY LEFT EACH ADDITIONAL Left 02/12/2020   • US GUIDED BREAST BIOPSY LEFT COMPLETE Left 02/12/2020   • VAGINA SURGERY         Family History:  Family History   Problem Relation Age of Onset   • Diabetes Mother    • Hypertension Mother    • Nephrolithiasis Mother    • Breast cancer Mother 79   • Heart disease Father    • Hypertension Father    • Diabetes Brother    • Nephrolithiasis Brother    • Breast cancer Maternal Aunt    • No Known Problems Maternal Grandmother    • No Known Problems Maternal Grandfather    • No Known Problems Paternal Grandmother    • No Known Problems Paternal Grandfather        Social History:  Social History     Socioeconomic History   • Marital status: /Civil Union     Spouse name: Not on file   • Number of children: Not on file   • Years of education: Not on file   • Highest education level: Not on file   Occupational History   • Not on file   Tobacco Use   • Smoking status: Every Day     Packs/day: 0 50     Years: 40 00     Pack years: 20 00     Types: Cigarettes     Start date: 200   • Smokeless tobacco: Never   Vaping Use   • Vaping Use: Never used   Substance and Sexual Activity   • Alcohol use: Not Currently   • Drug use: No   • Sexual activity: Not Currently     Partners: Male     Birth control/protection: None   Other Topics Concern   • Not on file   Social History Narrative   • Not on file     Social Determinants of Health     Financial Resource Strain: Low Risk    • Difficulty of Paying Living Expenses: Not very hard   Food Insecurity: Not on file   Transportation Needs: No Transportation Needs   • Lack of Transportation (Medical): No   • Lack of Transportation (Non-Medical): No   Physical Activity: Not on file   Stress: Not on file   Social Connections: Not on file   Intimate Partner Violence: Not on file   Housing Stability: Not on file       Objective     Vitals:    03/10/23 1257   BP: 140/80   Pulse: 99   Resp: 18   Temp: 97 6 °F (36 4 °C)   SpO2: 100%     Wt Readings from Last 3 Encounters:   03/10/23 65 kg (143 lb 6 oz)   11/08/22 64 kg (141 lb)   10/19/22 66 5 kg (146 lb 8 oz)       Physical Exam  Constitutional:       General: She is not in acute distress  Appearance: Normal appearance  She is not ill-appearing  HENT:      Head: Normocephalic and atraumatic  Eyes:      General:         Right eye: No discharge  Left eye: No discharge  Extraocular Movements: Extraocular movements intact  Conjunctiva/sclera: Conjunctivae normal       Pupils: Pupils are equal, round, and reactive to light  Neck:      Vascular: No carotid bruit  Cardiovascular:      Rate and Rhythm: Normal rate and regular rhythm  Heart sounds: Normal heart sounds  No murmur heard  Pulmonary:      Effort: Pulmonary effort is normal       Breath sounds: Normal breath sounds  No wheezing, rhonchi or rales  Musculoskeletal:      Right lower leg: No edema  Left lower leg: No edema  Lymphadenopathy:      Cervical: No cervical adenopathy  Skin:     Findings: No rash  Neurological:      General: No focal deficit present  Mental Status: She is alert and oriented to person, place, and time  Mental status is at baseline  Cranial Nerves: No cranial nerve deficit  Psychiatric:         Mood and Affect: Mood normal          Behavior: Behavior normal          Thought Content:  Thought content normal          Judgment: Judgment normal          Pertinent Laboratory/Diagnostic Studies:  Lab Results   Component Value Date    GLUCOSE 97 04/22/2015    BUN 18 04/17/2021    CREATININE 0 76 12/14/2022    CALCIUM 9 1 12/14/2022     (L) 04/22/2015    K 4 2 04/17/2021    CO2 22 04/17/2021     (H) 04/17/2021     Lab Results   Component Value Date    ALT 11 (L) 03/25/2021    AST 12 03/25/2021    ALKPHOS 76 03/25/2021    BILITOT 0 2 04/22/2015       Lab Results   Component Value Date    WBC 7 36 04/17/2021    HGB 10 5 (L) 04/17/2021    HCT 33 8 (L) 04/17/2021    MCV 92 04/17/2021     04/17/2021       No results found for: TSH    No results found for: CHOL  No results found for: TRIG  No results found for: HDL  No results found for: LDLCALC  No results found for: HGBA1C    Results for orders placed or performed in visit on 12/14/22   TSH, 3rd generation   Result Value Ref Range    TSH 3RD GENERATON 4 869 (H) 0 450 - 4 500 uIU/mL   T4, free   Result Value Ref Range    Free T4 0 76 0 76 - 1 46 ng/dL   Vitamin D 25 hydroxy   Result Value Ref Range    Vit D, 25-Hydroxy 89 7 30 0 - 100 0 ng/mL   Calcium   Result Value Ref Range    Calcium 9 1 8 4 - 10 2 mg/dL   PTH, intact   Result Value Ref Range    PTH 77 5 18 4 - 80 1 pg/mL   Protein electrophoresis, serum   Result Value Ref Range    A/G Ratio 1 53 1 10 - 1 80    Albumin Electrophoresis 60 4 52 0 - 65 0 %    Albumin CONC 4 11 3 50 - 5 00 g/dl    Alpha 1 5 1 (H) 2 5 - 5 0 %    ALPHA 1 CONC 0 35 0 10 - 0 40 g/dL    Alpha 2 11 1 7 0 - 13 0 %    ALPHA 2 CONC 0 75 0 40 - 1 20 g/dL    Beta-1 5 5 5 0 - 13 0 %    BETA 1 CONC 0 37 (L) 0 40 - 0 80 g/dL    Beta-2 5 0 2 0 - 8 0 %    BETA 2 CONC 0 34 0 20 - 0 50 g/dL    Gamma Globulin 12 9 12 0 - 22 0 %    GAMMA CONC 0 88 0 50 - 1 60 g/dL    Total Protein 6 8 6 4 - 8 2 g/dL    SPEP Interpretation See Comment    Sedimentation rate, automated   Result Value Ref Range    Sed Rate 18 0 - 29 mm/hour   T4   Result Value Ref Range    T4 TOTAL 7 6 4 7 - 13 3 ug/dL   Creatinine, serum   Result Value Ref Range    Creatinine 0 76 0 60 - 1 30 mg/dL    eGFR 86 ml/min/1 73sq m       No orders of the defined types were placed in this encounter  ALLERGIES:  Allergies   Allergen Reactions   • Mobic [Meloxicam] Eye Swelling     Reaction Date: 12Aug2011;    • Methotrexate Rash   • Sulfa Antibiotics Rash       Current Medications     Current Outpatient Medications   Medication Sig Dispense Refill   • alendronate (FOSAMAX) 70 mg tablet Take 70 mg by mouth every 7 days     • ALPRAZolam (XANAX) 1 mg tablet Take 1 tablet (1 mg total) by mouth 3 (three) times a day as needed for anxiety 60 tablet 0   • amLODIPine (NORVASC) 2 5 mg tablet One po bid 30 tablet 5   • azithromycin (Zithromax) 250 mg tablet Take 2 tablets (500 mg total) by mouth daily for 1 day, THEN 1 tablet (250 mg total) daily for 4 days   6 tablet 0   • cyclobenzaprine (FLEXERIL) 5 mg tablet Take 10 mg by mouth daily at bedtime as needed     • dicyclomine (BENTYL) 10 mg capsule TAKE ONE CAPSULE BY MOUTH TWICE A DAY 60 capsule 3   • ergocalciferol (VITAMIN D2) 50,000 units TAKE ONE CAPSULE ONCE WEEKLY     • gabapentin (Neurontin) 300 mg capsule Take 1 capsule (300 mg total) by mouth daily at bedtime 30 capsule 5   • HYDROcodone-acetaminophen (NORCO) 5-325 mg per tablet Take 1 tablet by mouth every 6 (six) hours as needed for pain Max Daily Amount: 4 tablets 120 tablet 0   • hydroxychloroquine (PLAQUENIL) 200 mg tablet Take 1 tablet in morning and 1/2 tablet in evening     • levothyroxine 88 mcg tablet Take 1 tablet (88 mcg total) by mouth daily 30 tablet 0   • magnesium oxide (MAG-OX) 400 mg TAKE ONE TABLET BY MOUTH TWICE A DAY 60 tablet 5   • metoclopramide (REGLAN) 5 mg tablet TAKE ONE TABLET BY MOUTH THREE TIMES A DAY BEFORE EACH MEAL 30 tablet 1   • ondansetron (ZOFRAN) 4 mg tablet Take 1 tablet (4 mg total) by mouth every 8 (eight) hours as needed for nausea or vomiting 30 tablet 3   • pantoprazole (PROTONIX) 40 mg tablet TAKE ONE TABLET BY MOUTH EVERY DAY 30 tablet 5   • sertraline (ZOLOFT) 100 mg tablet TAKE 1 1/2 TABLETS BY MOPUTH DAILY 45 tablet 5   • Tyrvaya 0 03 MG/ACT SOLN      • ALPRAZolam (XANAX) 1 mg tablet TAKE ONE TABLET BY MOUTH THREE TIMES A DAY AS NEEDED FOR ANXIETY (Patient not taking: Reported on 3/10/2023) 60 tablet 2   • amLODIPine (NORVASC) 2 5 mg tablet Take 2 5 mg by mouth daily (Patient not taking: Reported on 10/6/2021)     • magnesium oxide (MAG-OX) 400 mg tablet Take 1 tablet by mouth 2 (two) times a day (Patient not taking: Reported on 3/10/2023)     • magnesium Oxide (MAG-OX) 400 mg TABS TAKE ONE TABLET BY MOUTH TWICE A DAY (Patient not taking: Reported on 3/10/2023) 60 tablet 5   • predniSONE 10 mg tablet 5 tabs daily X 3 days, 4 tabs daily X 3 days, 3 tabs daily X 3 days, 2 tabs daily X 3 days, 1 tab daily X 3 days (Patient not taking: Reported on 3/10/2023) 45 tablet 0   • predniSONE 5 mg tablet TAKE ONE TABLET BY MOUTH TWICE A DAY WITH FOOD (Patient not taking: Reported on 7/1/2022) 60 tablet 0     No current facility-administered medications for this visit           Health Maintenance     Health Maintenance   Topic Date Due   • Hepatitis C Screening  Never done   • Pneumococcal Vaccine: Pediatrics (0 to 5 Years) and At-Risk Patients (6 to 59 Years) (1 - PCV) Never done   • HIV Screening  Never done   • Osteoporosis Screening  Never done   • COVID-19 Vaccine (3 - Booster for Pfizer series) 06/28/2021   • Lung Cancer Screening  02/17/2022   • BMI: Followup Plan  11/18/2022   • Influenza Vaccine (1) 06/30/2023 (Originally 9/1/2022)   • Depression Remission PHQ  09/10/2023   • Medicare Annual Wellness Visit (AWV)  10/19/2023   • BMI: Adult  03/10/2024   • Breast Cancer Screening: Mammogram  09/09/2024   • Colorectal Cancer Screening  05/27/2026   • HIB Vaccine  Aged Out   • IPV Vaccine  Aged Out   • Hepatitis A Vaccine  Aged Out   • Meningococcal ACWY Vaccine  Aged Out   • HPV Vaccine  Aged Out     Immunization History   Administered Date(s) Administered   • COVID-19 PFIZER VACCINE 0 3 ML IM 04/08/2021, 05/03/2021   • Influenza, recombinant, quadrivalent,injectable, preservative free 35/73/2122       Vilma Gonzalez MD

## 2023-03-10 NOTE — ASSESSMENT & PLAN NOTE
Neuralgia  Patient with suspected neuralgia causing some of her symptoms of sharp shooting pains from her head to her shoulder  She was given prescription for trial of gabapentin 300 mg at bedtime  Initially she is to hold her Flexeril medication however if she does not see symptom improvement within 5 to 7 days she may take Flexeril 5 mg as well and wait 5 to 7 days and increase to 10 mg if needed

## 2023-03-20 DIAGNOSIS — E03.9 HYPOTHYROIDISM, UNSPECIFIED TYPE: ICD-10-CM

## 2023-03-20 RX ORDER — LEVOTHYROXINE SODIUM 88 UG/1
88 TABLET ORAL DAILY
Qty: 30 TABLET | Refills: 0 | Status: SHIPPED | OUTPATIENT
Start: 2023-03-20

## 2023-03-26 DIAGNOSIS — C50.919 MALIGNANT NEOPLASM OF FEMALE BREAST, UNSPECIFIED ESTROGEN RECEPTOR STATUS, UNSPECIFIED LATERALITY, UNSPECIFIED SITE OF BREAST (HCC): ICD-10-CM

## 2023-03-27 RX ORDER — HYDROCODONE BITARTRATE AND ACETAMINOPHEN 5; 325 MG/1; MG/1
1 TABLET ORAL EVERY 6 HOURS PRN
Qty: 120 TABLET | Refills: 0 | Status: SHIPPED | OUTPATIENT
Start: 2023-03-27

## 2023-04-24 DIAGNOSIS — E03.9 HYPOTHYROIDISM, UNSPECIFIED TYPE: ICD-10-CM

## 2023-04-24 DIAGNOSIS — C50.919 MALIGNANT NEOPLASM OF FEMALE BREAST, UNSPECIFIED ESTROGEN RECEPTOR STATUS, UNSPECIFIED LATERALITY, UNSPECIFIED SITE OF BREAST (HCC): ICD-10-CM

## 2023-04-24 RX ORDER — LEVOTHYROXINE SODIUM 88 UG/1
88 TABLET ORAL DAILY
Qty: 30 TABLET | Refills: 0 | Status: SHIPPED | OUTPATIENT
Start: 2023-04-24

## 2023-04-24 RX ORDER — HYDROCODONE BITARTRATE AND ACETAMINOPHEN 5; 325 MG/1; MG/1
1 TABLET ORAL EVERY 6 HOURS PRN
Qty: 120 TABLET | Refills: 0 | Status: SHIPPED | OUTPATIENT
Start: 2023-04-24

## 2023-05-06 DIAGNOSIS — F41.9 ANXIETY: ICD-10-CM

## 2023-05-09 PROBLEM — J32.9 SINUSITIS: Status: RESOLVED | Noted: 2023-03-10 | Resolved: 2023-05-09

## 2023-05-09 RX ORDER — ALPRAZOLAM 1 MG/1
TABLET ORAL
Qty: 60 TABLET | Refills: 2 | Status: SHIPPED | OUTPATIENT
Start: 2023-05-09

## 2023-05-22 DIAGNOSIS — C50.919 MALIGNANT NEOPLASM OF FEMALE BREAST, UNSPECIFIED ESTROGEN RECEPTOR STATUS, UNSPECIFIED LATERALITY, UNSPECIFIED SITE OF BREAST (HCC): ICD-10-CM

## 2023-05-22 DIAGNOSIS — E03.9 HYPOTHYROIDISM, UNSPECIFIED TYPE: ICD-10-CM

## 2023-05-22 RX ORDER — LEVOTHYROXINE SODIUM 88 UG/1
88 TABLET ORAL DAILY
Qty: 30 TABLET | Refills: 0 | Status: SHIPPED | OUTPATIENT
Start: 2023-05-22

## 2023-05-22 RX ORDER — HYDROCODONE BITARTRATE AND ACETAMINOPHEN 5; 325 MG/1; MG/1
1 TABLET ORAL EVERY 6 HOURS PRN
Qty: 120 TABLET | Refills: 0 | Status: SHIPPED | OUTPATIENT
Start: 2023-05-22

## 2023-05-24 ENCOUNTER — TELEPHONE (OUTPATIENT)
Dept: FAMILY MEDICINE CLINIC | Facility: CLINIC | Age: 58
End: 2023-05-24

## 2023-05-24 NOTE — TELEPHONE ENCOUNTER
Pharmacy calling, as of now they are out of hydrocodone 5-325mg tab but he suggested if you are ok with sending 10-325mg and write instructions to break in half  Levar Decker would be 60 tabs   They have this in stock V-Y Flap Text: The defect edges were debeveled with a #15 scalpel blade.  Given the location of the defect, shape of the defect and the proximity to free margins a V-Y flap was deemed most appropriate.  Using a sterile surgical marker, an appropriate advancement flap was drawn incorporating the defect and placing the expected incisions within the relaxed skin tension lines where possible.    The area thus outlined was incised deep to adipose tissue with a #15 scalpel blade.  The skin margins were undermined to an appropriate distance in all directions utilizing iris scissors.

## 2023-05-25 DIAGNOSIS — C50.919 MALIGNANT NEOPLASM OF FEMALE BREAST, UNSPECIFIED ESTROGEN RECEPTOR STATUS, UNSPECIFIED LATERALITY, UNSPECIFIED SITE OF BREAST (HCC): Primary | ICD-10-CM

## 2023-05-25 RX ORDER — HYDROCODONE BITARTRATE AND ACETAMINOPHEN 10; 325 MG/1; MG/1
TABLET ORAL
Qty: 60 TABLET | Refills: 0 | Status: SHIPPED | OUTPATIENT
Start: 2023-05-25

## 2023-06-22 DIAGNOSIS — E03.9 HYPOTHYROIDISM, UNSPECIFIED TYPE: ICD-10-CM

## 2023-06-22 RX ORDER — LEVOTHYROXINE SODIUM 88 UG/1
TABLET ORAL
Qty: 30 TABLET | Refills: 0 | Status: SHIPPED | OUTPATIENT
Start: 2023-06-22

## 2023-06-23 DIAGNOSIS — C50.919 MALIGNANT NEOPLASM OF FEMALE BREAST, UNSPECIFIED ESTROGEN RECEPTOR STATUS, UNSPECIFIED LATERALITY, UNSPECIFIED SITE OF BREAST (HCC): ICD-10-CM

## 2023-06-23 RX ORDER — HYDROCODONE BITARTRATE AND ACETAMINOPHEN 5; 325 MG/1; MG/1
1 TABLET ORAL EVERY 6 HOURS PRN
Qty: 120 TABLET | Refills: 0 | Status: SHIPPED | OUTPATIENT
Start: 2023-06-23

## 2023-06-30 ENCOUNTER — APPOINTMENT (OUTPATIENT)
Dept: LAB | Facility: CLINIC | Age: 58
End: 2023-06-30
Payer: COMMERCIAL

## 2023-06-30 DIAGNOSIS — M81.0 SENILE OSTEOPOROSIS: ICD-10-CM

## 2023-06-30 DIAGNOSIS — M32.10 SYSTEMIC LUPUS ERYTHEMATOSUS WITH ORGAN SYSTEM INVOLVEMENT (HCC): ICD-10-CM

## 2023-06-30 LAB
25(OH)D3 SERPL-MCNC: 34 NG/ML (ref 30–100)
ANA SER QL IA: POSITIVE
BASOPHILS # BLD AUTO: 0.07 THOUSANDS/ÂΜL (ref 0–0.1)
BASOPHILS NFR BLD AUTO: 1 % (ref 0–1)
C3 SERPL-MCNC: 153 MG/DL (ref 90–180)
C4 SERPL-MCNC: 34 MG/DL (ref 10–40)
CREAT SERPL-MCNC: 0.81 MG/DL (ref 0.6–1.3)
CRP SERPL QL: 16.6 MG/L
EOSINOPHIL # BLD AUTO: 0.33 THOUSAND/ÂΜL (ref 0–0.61)
EOSINOPHIL NFR BLD AUTO: 4 % (ref 0–6)
ERYTHROCYTE [DISTWIDTH] IN BLOOD BY AUTOMATED COUNT: 15.1 % (ref 11.6–15.1)
ERYTHROCYTE [SEDIMENTATION RATE] IN BLOOD: 33 MM/HOUR (ref 0–29)
GFR SERPL CREATININE-BSD FRML MDRD: 79 ML/MIN/1.73SQ M
HCT VFR BLD AUTO: 41 % (ref 34.8–46.1)
HGB BLD-MCNC: 13 G/DL (ref 11.5–15.4)
IMM GRANULOCYTES # BLD AUTO: 0.03 THOUSAND/UL (ref 0–0.2)
IMM GRANULOCYTES NFR BLD AUTO: 0 % (ref 0–2)
LYMPHOCYTES # BLD AUTO: 2.14 THOUSANDS/ÂΜL (ref 0.6–4.47)
LYMPHOCYTES NFR BLD AUTO: 26 % (ref 14–44)
MCH RBC QN AUTO: 30 PG (ref 26.8–34.3)
MCHC RBC AUTO-ENTMCNC: 31.7 G/DL (ref 31.4–37.4)
MCV RBC AUTO: 95 FL (ref 82–98)
MONOCYTES # BLD AUTO: 0.45 THOUSAND/ÂΜL (ref 0.17–1.22)
MONOCYTES NFR BLD AUTO: 6 % (ref 4–12)
NEUTROPHILS # BLD AUTO: 5.23 THOUSANDS/ÂΜL (ref 1.85–7.62)
NEUTS SEG NFR BLD AUTO: 63 % (ref 43–75)
NRBC BLD AUTO-RTO: 0 /100 WBCS
PLATELET # BLD AUTO: 311 THOUSANDS/UL (ref 149–390)
PMV BLD AUTO: 10.9 FL (ref 8.9–12.7)
RBC # BLD AUTO: 4.34 MILLION/UL (ref 3.81–5.12)
WBC # BLD AUTO: 8.25 THOUSAND/UL (ref 4.31–10.16)

## 2023-06-30 PROCEDURE — 86140 C-REACTIVE PROTEIN: CPT

## 2023-06-30 PROCEDURE — 86039 ANTINUCLEAR ANTIBODIES (ANA): CPT

## 2023-06-30 PROCEDURE — 86038 ANTINUCLEAR ANTIBODIES: CPT

## 2023-06-30 PROCEDURE — 86235 NUCLEAR ANTIGEN ANTIBODY: CPT

## 2023-06-30 PROCEDURE — 82310 ASSAY OF CALCIUM: CPT

## 2023-06-30 PROCEDURE — 82565 ASSAY OF CREATININE: CPT

## 2023-06-30 PROCEDURE — 85652 RBC SED RATE AUTOMATED: CPT

## 2023-06-30 PROCEDURE — 86225 DNA ANTIBODY NATIVE: CPT

## 2023-06-30 PROCEDURE — 86160 COMPLEMENT ANTIGEN: CPT

## 2023-06-30 PROCEDURE — 82306 VITAMIN D 25 HYDROXY: CPT

## 2023-06-30 PROCEDURE — 85025 COMPLETE CBC W/AUTO DIFF WBC: CPT

## 2023-06-30 PROCEDURE — 36415 COLL VENOUS BLD VENIPUNCTURE: CPT

## 2023-07-01 LAB
ENA SS-A AB SER-ACNC: >8 AI (ref 0–0.9)
ENA SS-B AB SER-ACNC: <0.2 AI (ref 0–0.9)

## 2023-07-03 LAB
ANA HOMOGEN SER QL IF: NORMAL
ANA HOMOGEN TITR SER: NORMAL {TITER}
CALCIUM SERPL-MCNC: 9.9 MG/DL (ref 8.3–10.1)
DSDNA AB SER-ACNC: <4 IU/ML
ENA RNP AB SER IA-ACNC: NEGATIVE
ENA SM AB SER IA-ACNC: NEGATIVE
ENA SM+RNP IGG SER-ACNC: NEGATIVE
ENA SS-A AB SER IA-ACNC: POSITIVE
ENA SS-B AB SER IA-ACNC: NEGATIVE

## 2023-07-05 LAB — DSDNA AB SER QL CLIF: NEGATIVE

## 2023-07-21 DIAGNOSIS — R13.19 ESOPHAGEAL DYSPHAGIA: ICD-10-CM

## 2023-07-21 RX ORDER — PANTOPRAZOLE SODIUM 40 MG/1
TABLET, DELAYED RELEASE ORAL
Qty: 30 TABLET | Refills: 5 | Status: SHIPPED | OUTPATIENT
Start: 2023-07-21

## 2023-07-24 DIAGNOSIS — C50.919 MALIGNANT NEOPLASM OF FEMALE BREAST, UNSPECIFIED ESTROGEN RECEPTOR STATUS, UNSPECIFIED LATERALITY, UNSPECIFIED SITE OF BREAST (HCC): ICD-10-CM

## 2023-07-24 DIAGNOSIS — E03.9 HYPOTHYROIDISM, UNSPECIFIED TYPE: ICD-10-CM

## 2023-07-24 RX ORDER — HYDROCODONE BITARTRATE AND ACETAMINOPHEN 5; 325 MG/1; MG/1
1 TABLET ORAL EVERY 6 HOURS PRN
Qty: 120 TABLET | Refills: 0 | Status: SHIPPED | OUTPATIENT
Start: 2023-07-24

## 2023-07-24 RX ORDER — LEVOTHYROXINE SODIUM 88 UG/1
88 TABLET ORAL DAILY
Qty: 30 TABLET | Refills: 0 | Status: SHIPPED | OUTPATIENT
Start: 2023-07-24

## 2023-07-28 ENCOUNTER — TELEPHONE (OUTPATIENT)
Dept: HEMATOLOGY ONCOLOGY | Facility: CLINIC | Age: 58
End: 2023-07-28

## 2023-07-28 NOTE — TELEPHONE ENCOUNTER
Called the patient to further discuss. The patient was last seen by surgical oncology in 6/2021 and subsequently missed her 1/2022 follow up appointment. Explained that since she has not been seen in over 2 years, she will require an appointment in the office prior to a mammogram being ordered to ensure no other abnormalities on her breast exam. The patient was agreeable to this and accepted an appointment with Renny Bhatt, [address] [place] [address] on 8/2 at 11:30. She verbalized understanding of the appointment details and denies any questions at this time.

## 2023-07-28 NOTE — TELEPHONE ENCOUNTER
Patient Call    Who are you speaking with? Patient    If it is not the patient, are they listed on an active communication consent form? Yes   What is the reason for this call? Patient was requesting a script for mammo from Dr. Bjorn Carvajal    Does this require a call back? Yes   If a call back is required, please list best call back number [phone]   If a call back is required, advise that a message will be forwarded to their care team and someone will return their call as soon as possible. Did you relay this information to the patient?  Yes

## 2023-08-02 ENCOUNTER — TELEPHONE (OUTPATIENT)
Dept: HEMATOLOGY ONCOLOGY | Facility: CLINIC | Age: 58
End: 2023-08-02

## 2023-08-02 NOTE — TELEPHONE ENCOUNTER
Called Pt to let her know that she missed her appt [facility].  Left [facility] # to r/s

## 2023-08-03 ENCOUNTER — TELEPHONE (OUTPATIENT)
Dept: HEMATOLOGY ONCOLOGY | Facility: CLINIC | Age: 58
End: 2023-08-03

## 2023-08-03 NOTE — TELEPHONE ENCOUNTER
Appointment Change  Cancel, Reschedule, Change to Virtual      Who are you speaking with? Patient   If it is not the patient, are they listed on an active communication consent form? N/A   Which provider is the appointment scheduled with? ANNETTE Tom   When is the appointment scheduled? Please list date and time 8/3/23 1130   At which location is the appointment scheduled to take place? [facility]   Was the appointment rescheduled or changed from an in person visit to a virtual visit? If so, please list the details of the change. 8/4/23 2pm   What is the reason for the appointment change? Pt received vm to reschedule   Was [facility] transport scheduled for this visit? N/A   Does [facility] transport need to be scheduled for the new visit (if applicable) N/A   Does the patient need an infusion appointment rescheduled? N/A   Does the patient have an infusion appointment scheduled? If so, when? No   Is the patient undergoing chemotherapy? N/A   Was the no-show policy reviewed for appointments being changed with less then 24 hours of notice?  N/A

## 2023-08-04 ENCOUNTER — OFFICE VISIT (OUTPATIENT)
Dept: SURGICAL ONCOLOGY | Facility: CLINIC | Age: 58
End: 2023-08-04
Payer: COMMERCIAL

## 2023-08-04 VITALS
WEIGHT: 139 LBS | HEIGHT: 63 IN | TEMPERATURE: 97.7 F | SYSTOLIC BLOOD PRESSURE: 140 MMHG | DIASTOLIC BLOOD PRESSURE: 80 MMHG | HEART RATE: 103 BPM | RESPIRATION RATE: 16 BRPM | OXYGEN SATURATION: 98 % | BODY MASS INDEX: 24.63 KG/M2

## 2023-08-04 DIAGNOSIS — R42 DIZZINESS: ICD-10-CM

## 2023-08-04 DIAGNOSIS — R51.9 NEW ONSET OF HEADACHES: ICD-10-CM

## 2023-08-04 DIAGNOSIS — Z17.1 MALIGNANT NEOPLASM OF UPPER-INNER QUADRANT OF LEFT BREAST IN FEMALE, ESTROGEN RECEPTOR NEGATIVE (HCC): ICD-10-CM

## 2023-08-04 DIAGNOSIS — N63.15 BREAST LUMP ON RIGHT SIDE AT 3 O'CLOCK POSITION: ICD-10-CM

## 2023-08-04 DIAGNOSIS — C50.212 MALIGNANT NEOPLASM OF UPPER-INNER QUADRANT OF LEFT BREAST IN FEMALE, ESTROGEN RECEPTOR NEGATIVE (HCC): ICD-10-CM

## 2023-08-04 DIAGNOSIS — Z85.3 ENCOUNTER FOR FOLLOW-UP SURVEILLANCE OF BREAST CANCER: Primary | ICD-10-CM

## 2023-08-04 DIAGNOSIS — Z08 ENCOUNTER FOR FOLLOW-UP SURVEILLANCE OF BREAST CANCER: Primary | ICD-10-CM

## 2023-08-04 PROCEDURE — 99214 OFFICE O/P EST MOD 30 MIN: CPT

## 2023-08-04 RX ORDER — CLOBETASOL PROPIONATE 0.5 MG/G
EMULSION TOPICAL
COMMUNITY
Start: 2023-05-17

## 2023-08-04 NOTE — PROGRESS NOTES
Surgical Oncology Follow Up       [address] [facility] SURGICAL ONCOLOGY [place]  [address] [facility] [place] [address]    Natacha Chapman Mock  1965  [id number]  [address] [facility] SURGICAL ONCOLOGY [place]  [address] [facility] [place] [address]    Diagnoses and all orders for this visit:    Encounter for follow-up surveillance of breast cancer    Malignant neoplasm of upper-inner quadrant of left breast in female, estrogen receptor negative ([address])  -     Mammo diagnostic right w 3d & cad; Future  -     MRI brain w wo contrast; Future  -     US breast right limited (diagnostic); Future    New onset of headaches  -     MRI brain w wo contrast; Future  -     BUN; Future  -     Creatinine, serum; Future    Dizziness  -     MRI brain w wo contrast; Future    Breast lump on right side at 3 o'clock position  -     US breast right limited (diagnostic); Future    Other orders  -     Clobetasol Propionate E 0.05 % emollient cream; APPLY TO ARMS OR LEGS TWICE A DAY        Chief Complaint   Patient presents with   • Follow-up       Return in about 6 months (around 2/4/2024) for Office Visit, Imaging - See orders. Oncology History   Malignant neoplasm of upper-inner quadrant of left breast in female, estrogen receptor negative ([address] [facility])   2/12/2020 Biopsy    A. & B. Right breast stereotactic biopsy with and without calcs; 11 o'clock, 10 cm from nipple  Benign breast glandular parenchyma with fibroadenomatoid stromal hyperplasia  No atypia and no evidence of malignancy     C. Left breast ultrasound-guided biopsy; 10 o'clock, 5 cm from nipple  Benign breast glandular tissue  No atypia and no evidence of malignancy    D. Left breast ultrasound-guided biopsy; 10 o'clock, 4 cm from nipple  Invasive breast carcinoma of no special type (ductal NST)  Grade 3  ER 0  WA 0  HER2 2+; FISH positive    Concordant. Right breast clear.  Malignant mass measures 2.3 cm on mammo. Left axilla US negative. Mammographic abnormality with no US correlate. MRI recommended to further evaluate this finding. 2/26/2020 Genetic Testing    The following genes were evaluated: LIZZETTE, BRCA1, BRCA2, CDH1, CHEK2, PALB2, PTEN, STK11, TP53  Negative result.  No pathogenic sequence variants or deletions/dupllications identified  [employer]     3/24/2020 - 5/25/2020 Chemotherapy    pegfilgrastim (NEULASTA ONPRO) subcutaneous injection kit 6 mg, 6 mg, Subcutaneous, Once, 4 of 6 cycles  Administration: 6 mg (3/24/2020), 6 mg (4/14/2020), 6 mg (5/5/2020)  fosaprepitant (EMEND) 150 mg in sodium chloride 0.9 % 250 mL IVPB, 150 mg, Intravenous, Once, 4 of 6 cycles  Administration: 150 mg (3/24/2020), 150 mg (4/14/2020), 150 mg (5/5/2020)  pertuzumab (PERJETA) 840 mg in sodium chloride 0.9 % 250 mL IVPB, 840 mg, Intravenous, Once, 4 of 6 cycles  Administration: 840 mg (3/24/2020), 420 mg (4/14/2020), 420 mg (5/5/2020)  CARBOplatin (PARAPLATIN) 547.8 mg in sodium chloride 0.9 % 250 mL IVPB, 547.8 mg, Intravenous, Once, 4 of 6 cycles  Administration: 547.8 mg (3/24/2020), 575.4 mg (4/14/2020), 625.8 mg (5/5/2020)  DOCEtaxel (TAXOTERE) 122.2 mg in sodium chloride 0.9 % 250 mL chemo infusion, 75 mg/m2 = 122.2 mg, Intravenous, Once, 4 of 6 cycles  Administration: 122.2 mg (3/24/2020), 122.2 mg (4/14/2020), 122.2 mg (5/5/2020)  trastuzumab (HERCEPTIN) 486 mg in sodium chloride 0.9 % 250 mL chemo infusion, 8 mg/kg = 486 mg, Intravenous, Once, 4 of 18 cycles  Administration: 486 mg (3/24/2020), 364 mg (4/14/2020), 364 mg (5/5/2020)     6/3/2020 - 7/18/2021 Chemotherapy    pertuzumab (PERJETA) IVPB, 420 mg (50 % of original dose 840 mg), Intravenous, Once, 13 of 13 cycles  Dose modification: 420 mg (original dose 840 mg, Cycle 1, Reason: Dose Not Tolerated)  Administration: 420 mg (6/3/2020), 420 mg (11/9/2020), 420 mg (6/25/2020), 420 mg (11/30/2020), 420 mg (12/21/2020), 420 mg (1/11/2021), 420 mg (2/2/2021), 420 mg (2/23/2021), 420 mg (4/6/2021), 420 mg (4/26/2021), 420 mg (5/17/2021), 420 mg (6/7/2021), 420 mg (6/28/2021)  trastuzumab-qyyp (TRAZIMERA) chemo infusion, 6 mg/kg = 337 mg (100 % of original dose 6 mg/kg), Intravenous, Once, 5 of 5 cycles  Dose modification: 6 mg/kg (original dose 6 mg/kg, Cycle 9)  Administration: 337 mg (4/6/2021), 337 mg (4/26/2021), 337 mg (5/17/2021), 337 mg (6/7/2021), 337 mg (6/28/2021)  PACLItaxel (TAXOL) chemo IVPB, 80 mg/m2 = 127.2 mg, Intravenous, Once, 2 of 2 cycles  Administration: 127.2 mg (6/3/2020), 127.2 mg (6/10/2020), 127.2 mg (6/17/2020), 127.2 mg (6/25/2020), 127.2 mg (7/1/2020), 127.2 mg (7/7/2020)  trastuzumab (HERCEPTIN) chemo infusion, 6 mg/kg = 340 mg (75 % of original dose 8 mg/kg), Intravenous, Once, 8 of 8 cycles  Dose modification: 6 mg/kg (original dose 8 mg/kg, Cycle 1, Reason: Dose Not Tolerated)  Administration: 340 mg (6/3/2020), 376 mg (11/9/2020), 340 mg (6/25/2020), 376 mg (11/30/2020), 376 mg (12/21/2020), 376 mg (1/11/2021), 376 mg (2/2/2021), 337 mg (2/23/2021)     6/4/2020 -  Cancer Staged    Staging form: Breast, AJCC 8th Edition  - Clinical: Stage IIA (cT2, cN0, cM0, G3, ER-, OK-, HER2+) - Signed by Cb Holt MD on 6/4/2020  Laterality: Left  Histologic grading system: 3 grade system       8/18/2020 Surgery    Left breast mastectomy with sentinel lymph node biopsy  No residual carcinoma identified  Prior procedural site changes  0/3 Lymph nodes     8/18/2020 -  Cancer Staged    Staging form: Breast, AJCC 8th Edition  - Pathologic stage from 8/18/2020: No Stage Recommended (ypT0, pN0, cM0, ER-, OK-, HER2+) - Signed by ANNETTE Fischer on 8/2/2023  Stage prefix: Post-therapy  Response to neoadjuvant therapy: Complete response               History of Present Illness:  Patient returns to the office today in follow-up for her history of breast cancer.   She was diagnosed with ER/OK negative HER2 positive invasive ductal carcinoma of the left breast in February 2020. She underwent neoadjuvant chemotherapy, followed by left breast mastectomy with sentinel lymph node biopsy 6 months later. She was seen the following year for surveillance, but was then lost to follow-up. It has been over 2 years since her last visit in the office. She reports recently noticing a new lump in her right breast.  She has not noticed any changes to the left chest wall. She also reports new headaches and unsteadiness as of the last couple of months, which are not getting better. She denies any nausea, vomiting, shortness of breath, cough or weight loss. She experiences chronic pain due to her lupus. She will be due for another diagnostic mammogram of the right breast next month. Review of Systems   Constitutional: Negative for activity change, appetite change, fatigue and unexpected weight change. HENT: Negative. Respiratory: Negative. Negative for cough and shortness of breath. Cardiovascular: Negative. Gastrointestinal: Negative. Musculoskeletal: Positive for arthralgias, back pain and myalgias. Skin: Negative. Neurological: Positive for dizziness and headaches. Hematological: Negative. Negative for adenopathy. Psychiatric/Behavioral: Negative.               Patient Active Problem List   Diagnosis   • Systemic lupus erythematosus ([address])   • Hypothyroidism   • Posttraumatic stress disorder   • Adult celiac disease   • Anxiety   • Depression   • Esophagitis   • Nephrolithiasis   • Vitamin D deficiency   • Malignant neoplasm of upper-inner quadrant of left breast in female, estrogen receptor negative ([address])   • Chemotherapy induced neutropenia ([address])   • Port-A-Cath in place   • Breast cancer ([address])   • JEFERSON (acute kidney injury) ([address])   • Anemia   • Colitis   • Continuous opioid dependence ([address])   • Smoking   • Raynaud's disease without gangrene   • Major depressive disorder in full remission, unspecified whether recurrent ([address])   • Neuralgia     Past Medical History:   Diagnosis Date   • Cancer [facility])    • Colon polyp    • Disease of thyroid gland    • History of chemotherapy    • Hypertension    • Kidney stone    • Lupus ([address])    • Malignant neoplasm of upper-inner quadrant of left breast in female, estrogen receptor negative ([address]) 2/25/2020    left   • Nephrolithiasis    • PTSD (post-traumatic stress disorder)    • Sjogren's disease ([address])      Past Surgical History:   Procedure Laterality Date   • BREAST BIOPSY Left 02/12/2020    us guided -  invasive ductal carcinoma   • BREAST BIOPSY Right 02/12/2020    benign stereo   • ECTOPIC PREGNANCY SURGERY     • FEMUR FRACTURE SURGERY     • FL GUIDED CENTRAL VENOUS ACCESS DEVICE INSERTION  03/17/2020   • HYSTERECTOMY     • LEFT OOPHORECTOMY      due to torsion    • MAMMO STEREOTACTIC BREAST BIOPSY RIGHT (ALL INC) Right 02/12/2020   • MASTECTOMY Left 08/2020   • MASTECTOMY W/ SENTINEL NODE BIOPSY Left 08/18/2020    Procedure: BREAST MASTECTOMY WITH SENTINEL LYMPH NODE BIOPSY, LYMPHATIC MAPPING WITH BLUE DYE AND RADIOACTIVE DYE (INJECT AT 1430 BY DR WRIGHT IN THE OR);   Surgeon: Walter Toure MD;  Location: [facility];  Service: Surgical Oncology   • MRI BREAST BIOPSY LEFT (ALL INCLUSIVE) Left 03/20/2020   • CO INSJ TUNNELED CTR VAD W/SUBQ PORT AGE 5 YR/> N/A 03/17/2020    Procedure: INSERTION VENOUS PORT ( PORT-A-CATH) IR;  Surgeon: Moe Arana DO;  Location: [facility];  Service: Interventional Radiology   • CO RMVL MARSHA CTR VAD W/SUBQ PORT/ CTR/PRPH INSJ N/A 07/06/2021    Procedure: REMOVAL VENOUS PORT (PORT-A-CATH)IR;  Surgeon: Moe Arana DO;  [facility];  Service: Interventional Radiology   • US GUIDANCE BREAST BIOPSY LEFT EACH ADDITIONAL Left 02/12/2020   • US GUIDED BREAST BIOPSY LEFT COMPLETE Left 02/12/2020   • VAGINA SURGERY       Family History   Problem Relation Age of Onset   • Diabetes Mother    • Hypertension Mother    • Nephrolithiasis Mother    • Breast cancer Mother 79   • Heart disease Father    • Hypertension Father    • Diabetes Brother    • Nephrolithiasis Brother    • Breast cancer Maternal Aunt    • No Known Problems Maternal Grandmother    • No Known Problems Maternal Grandfather    • No Known Problems Paternal Grandmother    • No Known Problems Paternal Grandfather      Social History     Socioeconomic History   • Marital status: /Civil Union     Spouse name: Not on file   • Number of children: Not on file   • Years of education: Not on file   • Highest education level: Not on file   Occupational History   • Not on file   Tobacco Use   • Smoking status: Every Day     Packs/day: 0.50     Years: 40.00     Total pack years: 20.00     Types: Cigarettes     Start date: 200   • Smokeless tobacco: Never   Vaping Use   • Vaping Use: Never used   Substance and Sexual Activity   • Alcohol use: Not Currently   • Drug use: No   • Sexual activity: Not Currently     Partners: Male     Birth control/protection: None   Other Topics Concern   • Not on file   Social History Narrative   • Not on file     Social Determinants of Health     Financial Resource Strain: Low Risk  (10/19/2022)    Overall Financial Resource Strain (CARDIA)    • Difficulty of Paying Living Expenses: Not very hard   Food Insecurity: Not on file   Transportation Needs: No Transportation Needs (10/19/2022)    PRAPARE - Transportation    • Lack of Transportation (Medical): No    • Lack of Transportation (Non-Medical):  No   Physical Activity: Not on file   Stress: Not on file   Social Connections: Not on file   Intimate Partner Violence: Not on file   Housing Stability: Not on file       Current Outpatient Medications:   •  alendronate (FOSAMAX) 70 mg tablet, Take 70 mg by mouth every 7 days, Disp: , Rfl:   •  ALPRAZolam (XANAX) 1 mg tablet, Take 1 tablet (1 mg total) by mouth 3 (three) times a day as needed for anxiety, Disp: 60 tablet, Rfl: 0  •  ALPRAZolam (XANAX) 1 mg tablet, Take 1 tablet (1 mg total) by mouth 3 (three) times a day as needed for anxiety, Disp: 60 tablet, Rfl: 2  •  amLODIPine (NORVASC) 2.5 mg tablet, One po bid, Disp: 30 tablet, Rfl: 5  •  cyclobenzaprine (FLEXERIL) 5 mg tablet, Take 10 mg by mouth daily at bedtime as needed, Disp: , Rfl:   •  dicyclomine (BENTYL) 10 mg capsule, TAKE ONE CAPSULE BY MOUTH TWICE A DAY, Disp: 60 capsule, Rfl: 3  •  ergocalciferol (VITAMIN D2) 50,000 units, TAKE ONE CAPSULE ONCE WEEKLY, Disp: , Rfl:   •  gabapentin (Neurontin) 300 mg capsule, Take 1 capsule (300 mg total) by mouth daily at bedtime, Disp: 30 capsule, Rfl: 5  •  HYDROcodone-acetaminophen (NORCO)  mg per tablet, Takes 1/2 tablet every 6 hours as needed, Disp: 60 tablet, Rfl: 0  •  HYDROcodone-acetaminophen (NORCO) 5-325 mg per tablet, Take 1 tablet by mouth every 6 (six) hours as needed for pain Max Daily Amount: 4 tablets, Disp: 120 tablet, Rfl: 0  •  hydroxychloroquine (PLAQUENIL) 200 mg tablet, Take 1 tablet in morning and 1/2 tablet in evening, Disp: , Rfl:   •  levothyroxine 88 mcg tablet, Take 1 tablet (88 mcg total) by mouth daily, Disp: 30 tablet, Rfl: 0  •  magnesium oxide (MAG-OX) 400 mg, TAKE ONE TABLET BY MOUTH TWICE A DAY, Disp: 60 tablet, Rfl: 5  •  metoclopramide (REGLAN) 5 mg tablet, TAKE ONE TABLET BY MOUTH THREE TIMES A DAY BEFORE EACH MEAL, Disp: 30 tablet, Rfl: 1  •  ondansetron (ZOFRAN) 4 mg tablet, Take 1 tablet (4 mg total) by mouth every 8 (eight) hours as needed for nausea or vomiting, Disp: 30 tablet, Rfl: 3  •  pantoprazole (PROTONIX) 40 mg tablet, TAKE ONE TABLET BY MOUTH EVERY DAY, Disp: 30 tablet, Rfl: 5  •  sertraline (ZOLOFT) 100 mg tablet, TAKE ONE AND ONE-HALF TABLETS BY MOUTH DAILY, Disp: 45 tablet, Rfl: 5  •  Tyrvaya 0.03 MG/ACT SOLN, , Disp: , Rfl:   •  amLODIPine (NORVASC) 2.5 mg tablet, Take 2.5 mg by mouth daily (Patient not taking: Reported on 10/6/2021), Disp: , Rfl:   •  Clobetasol Propionate E 0.05 % emollient cream, APPLY TO ARMS OR LEGS TWICE A DAY, Disp: , Rfl: •  magnesium oxide (MAG-OX) 400 mg tablet, Take 1 tablet by mouth 2 (two) times a day (Patient not taking: Reported on 3/10/2023), Disp: , Rfl:   •  magnesium Oxide (MAG-OX) 400 mg TABS, TAKE ONE TABLET BY MOUTH TWICE A DAY (Patient not taking: Reported on 3/10/2023), Disp: 60 tablet, Rfl: 5  •  predniSONE 10 mg tablet, 5 tabs daily X 3 days, 4 tabs daily X 3 days, 3 tabs daily X 3 days, 2 tabs daily X 3 days, 1 tab daily X 3 days (Patient not taking: Reported on 3/10/2023), Disp: 45 tablet, Rfl: 0  •  predniSONE 5 mg tablet, TAKE ONE TABLET BY MOUTH TWICE A DAY WITH FOOD (Patient not taking: Reported on 7/1/2022), Disp: 60 tablet, Rfl: 0  Allergies   Allergen Reactions   • Mobic [Meloxicam] Eye Swelling     Reaction Date: 12Aug2011;    • Methotrexate Rash   • Sulfa Antibiotics Rash     Vitals:    08/04/23 1348   BP: 140/80   Pulse: 103   Resp: 16   Temp: 97.7 °F (36.5 °C)   SpO2: 98%       Physical Exam  Vitals reviewed. Constitutional:       General: She is not in acute distress. Appearance: Normal appearance. She is normal weight. She is not ill-appearing or toxic-appearing. HENT:      Head: Normocephalic and atraumatic. Nose: Nose normal.      Mouth/Throat:      Mouth: Mucous membranes are moist.   Eyes:      General: No scleral icterus. Conjunctiva/sclera: Conjunctivae normal.   Cardiovascular:      Rate and Rhythm: Normal rate. Pulmonary:      Effort: Pulmonary effort is normal.   Chest:   Breasts:     Right: Mass present. No inverted nipple, skin change or tenderness. Left: Absent. Comments: Left chest wall is smooth and even without any nodularity. Breast is surgically absent. Right breast is smooth and even with a small nodular focus at the 3 o'clock position. There are no skin changes, tenderness or retraction. I do not appreciate any adenopathy. Musculoskeletal:         General: Normal range of motion. Cervical back: Normal range of motion and neck supple. Lymphadenopathy:      Cervical: No cervical adenopathy. Upper Body:      Right upper body: No supraclavicular, axillary or pectoral adenopathy. Left upper body: No supraclavicular, axillary or pectoral adenopathy. Skin:     General: Skin is warm and dry. Neurological:      General: No focal deficit present. Mental Status: She is alert and oriented to person, place, and time. Psychiatric:         Mood and Affect: Mood normal.         Behavior: Behavior normal.         Thought Content: Thought content normal.         Cognition and Memory: Memory is impaired. Judgment: Judgment normal.      Comments: Patient is forgetful, has trouble with sense of time           Discussion/Summary: This is a 13-year-old female who presents today for breast cancer surveillance. She is complaining of new headaches over the last couple of months with occasional dizziness. She also reports a new lump in her breast, which on examination feels like a small nodule. I will set her up for diagnostic mammogram.  With regard to her headaches, given her high grade ER/MA- Her2+ disease, I have suggested that we consider brain imaging. She would be appreciative of having an MRI performed to rule out disease. Assuming these tests are negative, I will plan to see her again in 6 months for another clinical exam.  I have stressed to the patient the importance of regular follow-up in the office, and for her to report any new or concerning symptoms. She is agreeable to the plan all questions have been answered.

## 2023-08-13 DIAGNOSIS — M32.9 SYSTEMIC LUPUS ERYTHEMATOSUS, UNSPECIFIED SLE TYPE, UNSPECIFIED ORGAN INVOLVEMENT STATUS (HCC): ICD-10-CM

## 2023-08-13 RX ORDER — GABAPENTIN 300 MG/1
300 CAPSULE ORAL
Qty: 30 CAPSULE | Refills: 5 | Status: SHIPPED | OUTPATIENT
Start: 2023-08-13 | End: 2023-08-21 | Stop reason: SDUPTHER

## 2023-08-20 DIAGNOSIS — F41.9 ANXIETY: ICD-10-CM

## 2023-08-21 DIAGNOSIS — M32.9 SYSTEMIC LUPUS ERYTHEMATOSUS, UNSPECIFIED SLE TYPE, UNSPECIFIED ORGAN INVOLVEMENT STATUS (HCC): ICD-10-CM

## 2023-08-21 DIAGNOSIS — F41.9 ANXIETY: ICD-10-CM

## 2023-08-21 RX ORDER — ALPRAZOLAM 1 MG/1
1 TABLET ORAL 3 TIMES DAILY PRN
Qty: 60 TABLET | Refills: 0 | Status: SHIPPED | OUTPATIENT
Start: 2023-08-21

## 2023-08-21 RX ORDER — GABAPENTIN 300 MG/1
300 CAPSULE ORAL
Qty: 30 CAPSULE | Refills: 3 | Status: SHIPPED | OUTPATIENT
Start: 2023-08-21

## 2023-08-24 DIAGNOSIS — E03.9 HYPOTHYROIDISM, UNSPECIFIED TYPE: ICD-10-CM

## 2023-08-24 RX ORDER — LEVOTHYROXINE SODIUM 88 UG/1
88 TABLET ORAL DAILY
Qty: 30 TABLET | Refills: 0 | Status: SHIPPED | OUTPATIENT
Start: 2023-08-24

## 2023-09-01 DIAGNOSIS — C50.919 MALIGNANT NEOPLASM OF FEMALE BREAST, UNSPECIFIED ESTROGEN RECEPTOR STATUS, UNSPECIFIED LATERALITY, UNSPECIFIED SITE OF BREAST (HCC): ICD-10-CM

## 2023-09-01 RX ORDER — HYDROCODONE BITARTRATE AND ACETAMINOPHEN 5; 325 MG/1; MG/1
1 TABLET ORAL EVERY 6 HOURS PRN
Qty: 120 TABLET | Refills: 0 | Status: SHIPPED | OUTPATIENT
Start: 2023-09-01

## 2023-09-05 ENCOUNTER — HOSPITAL ENCOUNTER (OUTPATIENT)
Dept: MRI IMAGING | Facility: HOSPITAL | Age: 58
Discharge: HOME | End: 2023-09-05
Payer: COMMERCIAL

## 2023-09-05 DIAGNOSIS — R42 DIZZINESS: ICD-10-CM

## 2023-09-05 DIAGNOSIS — C50.212 MALIGNANT NEOPLASM OF UPPER-INNER QUADRANT OF LEFT BREAST IN FEMALE, ESTROGEN RECEPTOR NEGATIVE (HCC): ICD-10-CM

## 2023-09-05 DIAGNOSIS — R51.9 NEW ONSET OF HEADACHES: ICD-10-CM

## 2023-09-05 DIAGNOSIS — Z17.1 MALIGNANT NEOPLASM OF UPPER-INNER QUADRANT OF LEFT BREAST IN FEMALE, ESTROGEN RECEPTOR NEGATIVE (HCC): ICD-10-CM

## 2023-09-05 PROCEDURE — 70553 MRI BRAIN STEM W/O & W/DYE: CPT

## 2023-09-05 PROCEDURE — G1004 CDSM NDSC: HCPCS

## 2023-09-05 PROCEDURE — A9585 GADOBUTROL INJECTION: HCPCS

## 2023-09-05 RX ORDER — GADOBUTROL 604.72 MG/ML
6 INJECTION INTRAVENOUS
Status: COMPLETED | OUTPATIENT
Start: 2023-09-05 | End: 2023-09-05

## 2023-09-05 RX ADMIN — GADOBUTROL 6 ML: 604.72 INJECTION INTRAVENOUS at 19:17

## 2023-09-08 DIAGNOSIS — R10.31 CHRONIC RLQ PAIN: ICD-10-CM

## 2023-09-08 DIAGNOSIS — G89.29 CHRONIC RLQ PAIN: ICD-10-CM

## 2023-09-08 DIAGNOSIS — F41.9 ANXIETY: ICD-10-CM

## 2023-09-08 DIAGNOSIS — M32.9 SYSTEMIC LUPUS ERYTHEMATOSUS, UNSPECIFIED SLE TYPE, UNSPECIFIED ORGAN INVOLVEMENT STATUS (HCC): ICD-10-CM

## 2023-09-08 DIAGNOSIS — K52.9 COLITIS: ICD-10-CM

## 2023-09-08 RX ORDER — DICYCLOMINE HYDROCHLORIDE 10 MG/1
10 CAPSULE ORAL 2 TIMES DAILY
Qty: 60 CAPSULE | Refills: 0 | OUTPATIENT
Start: 2023-09-08

## 2023-09-08 NOTE — TELEPHONE ENCOUNTER
Patient has not been seen in office for over 2 years. No refill on medication until seen in office. Please call and schedule follow-up appointment with Dr. aJzmine Peterson or one of his advanced practitioners.   Thank you

## 2023-09-11 NOTE — TELEPHONE ENCOUNTER
Left a message for patient to call back to schedule an appointment for a medication check . Will send a letter to patient to give us a call.

## 2023-09-12 DIAGNOSIS — F41.9 ANXIETY: ICD-10-CM

## 2023-09-13 RX ORDER — PREDNISONE 5 MG/1
TABLET ORAL
Qty: 60 TABLET | Refills: 0 | Status: SHIPPED | OUTPATIENT
Start: 2023-09-13

## 2023-09-13 RX ORDER — ALPRAZOLAM 1 MG/1
1 TABLET ORAL 3 TIMES DAILY PRN
Qty: 60 TABLET | Refills: 0 | OUTPATIENT
Start: 2023-09-13

## 2023-09-13 RX ORDER — LANOLIN ALCOHOL/MO/W.PET/CERES
400 CREAM (GRAM) TOPICAL 2 TIMES DAILY
Qty: 60 TABLET | Refills: 0 | Status: SHIPPED | OUTPATIENT
Start: 2023-09-13

## 2023-09-18 RX ORDER — ALPRAZOLAM 1 MG/1
1 TABLET ORAL 3 TIMES DAILY PRN
Qty: 60 TABLET | Refills: 0 | Status: SHIPPED | OUTPATIENT
Start: 2023-09-18

## 2023-09-22 DIAGNOSIS — E03.9 HYPOTHYROIDISM, UNSPECIFIED TYPE: ICD-10-CM

## 2023-09-22 RX ORDER — LEVOTHYROXINE SODIUM 88 UG/1
88 TABLET ORAL DAILY
Qty: 30 TABLET | Refills: 0 | Status: SHIPPED | OUTPATIENT
Start: 2023-09-22

## 2023-09-27 ENCOUNTER — TELEPHONE (OUTPATIENT)
Dept: HEMATOLOGY ONCOLOGY | Facility: CLINIC | Age: 58
End: 2023-09-27

## 2023-09-28 DIAGNOSIS — C50.919 MALIGNANT NEOPLASM OF FEMALE BREAST, UNSPECIFIED ESTROGEN RECEPTOR STATUS, UNSPECIFIED LATERALITY, UNSPECIFIED SITE OF BREAST (HCC): ICD-10-CM

## 2023-09-28 RX ORDER — HYDROCODONE BITARTRATE AND ACETAMINOPHEN 5; 325 MG/1; MG/1
1 TABLET ORAL EVERY 6 HOURS PRN
Qty: 120 TABLET | Refills: 0 | Status: SHIPPED | OUTPATIENT
Start: 2023-09-28

## 2023-10-02 ENCOUNTER — HOSPITAL ENCOUNTER (OUTPATIENT)
Dept: MAMMOGRAPHY | Facility: CLINIC | Age: 58
Discharge: HOME | End: 2023-10-02
Payer: COMMERCIAL

## 2023-10-02 ENCOUNTER — TELEPHONE (OUTPATIENT)
Dept: SURGICAL ONCOLOGY | Facility: CLINIC | Age: 58
End: 2023-10-02

## 2023-10-02 ENCOUNTER — HOSPITAL ENCOUNTER (OUTPATIENT)
Dept: ULTRASOUND IMAGING | Facility: CLINIC | Age: 58
Discharge: HOME | End: 2023-10-02
Payer: COMMERCIAL

## 2023-10-02 ENCOUNTER — TELEPHONE (OUTPATIENT)
Dept: HEMATOLOGY ONCOLOGY | Facility: CLINIC | Age: 58
End: 2023-10-02

## 2023-10-02 VITALS — HEIGHT: 63 IN | BODY MASS INDEX: 24.61 KG/M2 | WEIGHT: 138.89 LBS

## 2023-10-02 DIAGNOSIS — Z17.1 MALIGNANT NEOPLASM OF UPPER-INNER QUADRANT OF LEFT BREAST IN FEMALE, ESTROGEN RECEPTOR NEGATIVE: ICD-10-CM

## 2023-10-02 DIAGNOSIS — C50.212 MALIGNANT NEOPLASM OF UPPER-INNER QUADRANT OF LEFT BREAST IN FEMALE, ESTROGEN RECEPTOR NEGATIVE: ICD-10-CM

## 2023-10-02 DIAGNOSIS — N63.15 BREAST LUMP ON RIGHT SIDE AT 3 O'CLOCK POSITION: ICD-10-CM

## 2023-10-02 PROCEDURE — G0279 TOMOSYNTHESIS, MAMMO: HCPCS

## 2023-10-02 PROCEDURE — 77065 DX MAMMO INCL CAD UNI: CPT

## 2023-10-02 PROCEDURE — 76642 ULTRASOUND BREAST LIMITED: CPT

## 2023-10-02 NOTE — TELEPHONE ENCOUNTER
Spoke to patient and rescheduled follow up with patient no issues and will see Wiliam Tejeda 2/15 at 2 pm

## 2023-10-02 NOTE — TELEPHONE ENCOUNTER
I called [facility] regarding an appointment that they have scheduled with Dr. Liv Redman scheduled on 11/07/23     I left a voicemail explaining to patient that this appointment will need to be rescheduled due to a change in the providers schedule. Patient was advised to call [facility] to reschedule.   Another attempt will be made to reschedule

## 2023-10-02 NOTE — TELEPHONE ENCOUNTER
Left message for patient that we need to reschedule her follow up for 6 months out with Sam Downs, unless there are issues that she HAS to discuss or see jerrell left the hopeline number ext 4 for patient to call and set up FEB 24 follow up appointment

## 2023-10-03 ENCOUNTER — TELEPHONE (OUTPATIENT)
Dept: HEMATOLOGY ONCOLOGY | Facility: CLINIC | Age: 58
End: 2023-10-03

## 2023-10-03 DIAGNOSIS — F41.9 ANXIETY: ICD-10-CM

## 2023-10-03 RX ORDER — SERTRALINE HYDROCHLORIDE 100 MG/1
TABLET, FILM COATED ORAL
Qty: 45 TABLET | Refills: 5 | Status: SHIPPED | OUTPATIENT
Start: 2023-10-03

## 2023-10-03 NOTE — LETTER
Bárbara Marcelo,     We have made multiple attempts to contact you regarding your upcoming appointment with Dr. Herber Rodriguez scheduled  11/07/23 . We have been unable to reach you by phone. Due to a change in the providers schedule, your appointment has been cancelled. At your earliest convenience, please contact the [address] [place] [address] at [phone] and we would be happy to assist you in rescheduling your appointment.      Thank you,  [address]  Oncology [place]   [phone]

## 2023-10-03 NOTE — TELEPHONE ENCOUNTER
Appointment Change  Cancel, Reschedule, Change to Virtual      Who are you speaking with? LVM for patient    If it is not the patient, is the caller listed on the communication consent form? N/A   Which provider is the appointment scheduled with? Dr. Sanya Santana   When was the original appointment scheduled? Please list date and time 11/07/23 1PM   At which location is the appointment scheduled to take place? [facility]   Was the appointment rescheduled? Was the appointment changed from an in person visit to a virtual visit? If so, please list the details of the change. No, 3 attempts to reschedule were made. LVM informing patient of cancellation and advised that she call back to reschedule   What is the reason for the appointment change?  Provider

## 2023-10-13 ENCOUNTER — TELEPHONE (OUTPATIENT)
Dept: HEMATOLOGY ONCOLOGY | Facility: CLINIC | Age: 58
End: 2023-10-13

## 2023-10-13 NOTE — TELEPHONE ENCOUNTER
Appointment Change  Cancel, Reschedule, Change to Virtual      Who are you speaking with? Patient   If it is not the patient, is the caller listed on the communication consent form? N/A   Which provider is the appointment scheduled with? Dr. Herber Rodriguez   When was the original appointment scheduled? Please list date and time 11/7/23 @ 1   At which location is the appointment scheduled to take place? [facility]   Was the appointment rescheduled? Was the appointment changed from an in person visit to a virtual visit? If so, please list the details of the change. 10/17/23 @ 840   What is the reason for the appointment change? provider       Was STAR transport scheduled? No   Does STAR transport need to be scheduled for the new visit (if applicable) No   Does the patient need an infusion appointment rescheduled? No   Does the patient have an upcoming infusion appointment scheduled? If so, when? No   Is the patient undergoing chemotherapy? No   For appointments cancelled with less than 24 hours:  Was the no-show policy reviewed?  N/A

## 2023-10-17 ENCOUNTER — OFFICE VISIT (OUTPATIENT)
Dept: HEMATOLOGY ONCOLOGY | Facility: CLINIC | Age: 58
End: 2023-10-17
Payer: COMMERCIAL

## 2023-10-17 VITALS
HEART RATE: 89 BPM | DIASTOLIC BLOOD PRESSURE: 86 MMHG | OXYGEN SATURATION: 98 % | RESPIRATION RATE: 16 BRPM | SYSTOLIC BLOOD PRESSURE: 132 MMHG | HEIGHT: 62 IN | TEMPERATURE: 97.6 F | BODY MASS INDEX: 26.13 KG/M2 | WEIGHT: 142 LBS

## 2023-10-17 DIAGNOSIS — Z17.1 MALIGNANT NEOPLASM OF LEFT BREAST IN FEMALE, ESTROGEN RECEPTOR NEGATIVE, UNSPECIFIED SITE OF BREAST: ICD-10-CM

## 2023-10-17 DIAGNOSIS — C50.912 MALIGNANT NEOPLASM OF LEFT BREAST IN FEMALE, ESTROGEN RECEPTOR NEGATIVE, UNSPECIFIED SITE OF BREAST: ICD-10-CM

## 2023-10-17 DIAGNOSIS — C50.212 MALIGNANT NEOPLASM OF UPPER-INNER QUADRANT OF LEFT BREAST IN FEMALE, ESTROGEN RECEPTOR NEGATIVE: Primary | ICD-10-CM

## 2023-10-17 DIAGNOSIS — Z17.1 MALIGNANT NEOPLASM OF UPPER-INNER QUADRANT OF LEFT BREAST IN FEMALE, ESTROGEN RECEPTOR NEGATIVE: Primary | ICD-10-CM

## 2023-10-17 PROCEDURE — 99214 OFFICE O/P EST MOD 30 MIN: CPT | Performed by: INTERNAL MEDICINE

## 2023-10-17 NOTE — PROGRESS NOTES
Teresa Kathe  1965  [facility] [place]  [address] [place] [id number]    No chief complaint on file. Oncology History   Malignant neoplasm of upper-inner quadrant of left breast in female, estrogen receptor negative    2/12/2020 Biopsy    A. & B. Right breast stereotactic biopsy with and without calcs; 11 o'clock, 10 cm from nipple  Benign breast glandular parenchyma with fibroadenomatoid stromal hyperplasia  No atypia and no evidence of malignancy     C. Left breast ultrasound-guided biopsy; 10 o'clock, 5 cm from nipple  Benign breast glandular tissue  No atypia and no evidence of malignancy    D. Left breast ultrasound-guided biopsy; 10 o'clock, 4 cm from nipple  Invasive breast carcinoma of no special type (ductal NST)  Grade 3  ER 0  TX 0  HER2 2+; FISH positive    Concordant. Right breast clear. Malignant mass measures 2.3 cm on mammo. Left axilla US negative. Mammographic abnormality with no US correlate. MRI recommended to further evaluate this finding. 2/26/2020 Genetic Testing    The following genes were evaluated: LIZZETTE, BRCA1, BRCA2, CDH1, CHEK2, PALB2, PTEN, STK11, TP53  Negative result.  No pathogenic sequence variants or deletions/dupllications identified  [facility]     3/24/2020 - 5/25/2020 Chemotherapy    pegfilgrastim (NEULASTA ONPRO) subcutaneous injection kit 6 mg, 6 mg, Subcutaneous, Once, 4 of 6 cycles  Administration: 6 mg (3/24/2020), 6 mg (4/14/2020), 6 mg (5/5/2020)  fosaprepitant (EMEND) 150 mg in sodium chloride 0.9 % 250 mL IVPB, 150 mg, Intravenous, Once, 4 of 6 cycles  Administration: 150 mg (3/24/2020), 150 mg (4/14/2020), 150 mg (5/5/2020)  pertuzumab (PERJETA) 840 mg in sodium chloride 0.9 % 250 mL IVPB, 840 mg, Intravenous, Once, 4 of 6 cycles  Administration: 840 mg (3/24/2020), 420 mg (4/14/2020), 420 mg (5/5/2020)  CARBOplatin (PARAPLATIN) 547.8 mg in sodium chloride 0.9 % 250 mL IVPB, 547.8 mg, Intravenous, Once, 4 of 6 cycles  Administration: 547.8 mg (3/24/2020), 575.4 mg (4/14/2020), 625.8 mg (5/5/2020)  DOCEtaxel (TAXOTERE) 122.2 mg in sodium chloride 0.9 % 250 mL chemo infusion, 75 mg/m2 = 122.2 mg, Intravenous, Once, 4 of 6 cycles  Administration: 122.2 mg (3/24/2020), 122.2 mg (4/14/2020), 122.2 mg (5/5/2020)  trastuzumab (HERCEPTIN) 486 mg in sodium chloride 0.9 % 250 mL chemo infusion, 8 mg/kg = 486 mg, Intravenous, Once, 4 of 18 cycles  Administration: 486 mg (3/24/2020), 364 mg (4/14/2020), 364 mg (5/5/2020)     6/3/2020 - 7/18/2021 Chemotherapy    pertuzumab (PERJETA) IVPB, 420 mg (50 % of original dose 840 mg), Intravenous, Once, 13 of 13 cycles  Dose modification: 420 mg (original dose 840 mg, Cycle 1, Reason: Dose Not Tolerated)  Administration: 420 mg (6/3/2020), 420 mg (11/9/2020), 420 mg (6/25/2020), 420 mg (11/30/2020), 420 mg (12/21/2020), 420 mg (1/11/2021), 420 mg (2/2/2021), 420 mg (2/23/2021), 420 mg (4/6/2021), 420 mg (4/26/2021), 420 mg (5/17/2021), 420 mg (6/7/2021), 420 mg (6/28/2021)  trastuzumab-qyyp (TRAZIMERA) chemo infusion, 6 mg/kg = 337 mg (100 % of original dose 6 mg/kg), Intravenous, Once, 5 of 5 cycles  Dose modification: 6 mg/kg (original dose 6 mg/kg, Cycle 9)  Administration: 337 mg (4/6/2021), 337 mg (4/26/2021), 337 mg (5/17/2021), 337 mg (6/7/2021), 337 mg (6/28/2021)  PACLItaxel (TAXOL) chemo IVPB, 80 mg/m2 = 127.2 mg, Intravenous, Once, 2 of 2 cycles  Administration: 127.2 mg (6/3/2020), 127.2 mg (6/10/2020), 127.2 mg (6/17/2020), 127.2 mg (6/25/2020), 127.2 mg (7/1/2020), 127.2 mg (7/7/2020)  trastuzumab (HERCEPTIN) chemo infusion, 6 mg/kg = 340 mg (75 % of original dose 8 mg/kg), Intravenous, Once, 8 of 8 cycles  Dose modification: 6 mg/kg (original dose 8 mg/kg, Cycle 1, Reason: Dose Not Tolerated)  Administration: 340 mg (6/3/2020), 376 mg (11/9/2020), 340 mg (6/25/2020), 376 mg (11/30/2020), 376 mg (12/21/2020), 376 mg (1/11/2021), 376 mg (2/2/2021), 337 mg (2/23/2021)     6/4/2020 -  Cancer Staged    Staging form: Breast, AJCC 8th Edition  - Clinical: Stage IIA (cT2, cN0, cM0, G3, ER-, NE-, HER2+) - Signed by Hemal Healy MD on 6/4/2020  Laterality: Left  Histologic grading system: 3 grade system       8/18/2020 Surgery    Left breast mastectomy with sentinel lymph node biopsy  No residual carcinoma identified  Prior procedural site changes  0/3 Lymph nodes     8/18/2020 -  Cancer Staged    Staging form: Breast, AJCC 8th Edition  - Pathologic stage from 8/18/2020: No Stage Recommended (ypT0, pN0, cM0, ER-, NE-, HER2+) - Signed by Alessandra Turk on 8/2/2023  Stage prefix: Post-therapy  Response to neoadjuvant therapy: Complete response           Assessment/plan:   1. Clinical stage II breast cancer, grade 3, ER negative, NE negative, HER2 positive by FISH.  >> Pathologic stage ypT0, pN0, cM0    Donya Davey is a 78-year-old postmenopausal female with clinical stage II left breast cancer, grade 3, ER negative, NE negative, HER2 fish positive disease. She underwent neoadjuvant chemotherapy with TCH with pertuzumab which produced severe toxicity. She was subsequently on weekly paclitaxel, trastuzumab and pertuzumab for 6 weeks. She achieved complete pathologic response when she underwent mastectomy and sentinel lymph node biopsy in August 2020. Subsequently, she was on adjuvant trastuzumab and pertuzumab until June 2021 with no cardiac toxicity. She is currently on observation with no clinical evidence of recurrent disease. 2.  Lupus  Low-dose prednisone. Following with rheumatology. 3.  Tobacco use  Counseled on smoking cessation. Objective:   Primary Diagnosis:  Clinical stage II left breast cancer, grade 3, ER negative, NE negative, HER2 fish positive disease, diagnosed in March 2020. Previous Hematologic/ Oncologic Treatment:   1. Neoadjuvant chemotherapy with [facility] with pertuzumab x3 cycles, completed in early May 2020. 2. Neoadjuvant chemotherapy with weekly paclitaxel, try weekly trastuzumab and pertuzumab x6 weeks, completed in July 2020. 3.  Adjuvant trastuzumab and pertuzumab, completed in June 2021. Current Hematologic/ Oncologic Treatment:    Observation      Disease Status:   1. Clinical complete response. 2. Complete pathological response. 3. HARVEY status post mastectomy and sentinel lymph node biopsy. Test Results:  Pathology:  Left breast biopsy showed invasive ductal carcinoma, grade 3, ER negative, WV negative, HER2 fish positive disease with ratio of 2.2. In August 2020, mastectomy specimen showed no evidence of residual carcinoma. 3 sentinel lymph nodes were all negative for metastatic disease. Radiology:  Mammography in September 2022 was benign. BI-RADS 2. CT scan of abdomen pelvis in December 2021 showed no evidence of metastatic disease. History of present illness:   Moses Pantoja is a 59-year-old postmenopausal female who initially felt a lump in her left breast which she brought to medical attention. She was found to have a large left breast mass for which she underwent biopsy which showed invasive ductal carcinoma, grade 3. This was ER negative, WV negative, HER2 2+ disease. HER2 fish was positive for gene application with ratio of 2.2. She was subsequently seen by Dr. Yifan Bryant. Based on MRI, she had 2.7 x 2.1 x 2.3 cm of left breast mass with no enlarged axillary lymph node. Bone scan was negative for osseous metastasis. CT scan of chest abdomen pelvis showed no evidence of distant metastasis. She had 3 cycle of neoadjuvant chemotherapy with TCH with pertuzumab with significant toxicities. She has multiple hospitalization due to the severe nausea vomiting as well as deconditioning. Due to the toxicity, treatment regimen was changed to weekly paclitaxel, try weekly pertuzumab and trastuzumab which she tolerated well.   She completed neoadjuvant chemotherapy in July 2020, resulting in complete clinical response. Subsequently, she underwent mastectomy and sentinel lymph node biopsy by Dr. Yumiko Bonilla in August 2020 which showed no evidence of residual carcinoma. 3 sentinel lymph nodes were all negative for metastatic disease, consistent with complete pathological response. Postoperatively, she was treated with adjuvant trastuzumab and pertuzumab until June 2021 without cardiac toxicity. She was previously following with Dr. Ivette Tong and last seen in the office on 11/8/2022. She is currently on observation. She presents today for transfer of care/follow-up visit. She feels well with no new complaints. She denies any bone pain. Her weight is stable. Continues to follow with rheumatology for lupus. On a low dose prednisone. She continues to smoke cigarettes. Her right diagnostic mammogram on 10/2/2023 with no evidence of malignancy    Review of systems:     Review of Systems   Constitutional:  Negative for chills and fever. Eyes:  Negative for pain and visual disturbance. Respiratory:  Negative for cough and shortness of breath. Cardiovascular:  Negative for chest pain and palpitations. Gastrointestinal:  Negative for abdominal pain and vomiting. Genitourinary:  Negative for dysuria and hematuria. Musculoskeletal:  Positive for arthralgias. Negative for back pain. Skin:  Negative for color change and rash. All other systems reviewed and are negative.       Patient Active Problem List   Diagnosis    Systemic lupus erythematosus (HCC)    Hypothyroidism    Posttraumatic stress disorder    Adult celiac disease    Anxiety    Depression    Esophagitis    Nephrolithiasis    Vitamin D deficiency    Malignant neoplasm of upper-inner quadrant of left breast in female, estrogen receptor negative     Chemotherapy induced neutropenia     Port-A-Cath in place    Breast cancer ([address])    JEFERSON (acute kidney injury) ([address])    Anemia    Colitis    Continuous opioid dependence ([address])    Smoking Raynaud's disease without gangrene    Major depressive disorder in full remission, unspecified whether recurrent ([address])    Neuralgia     Past Medical History:   Diagnosis Date    Cancer [facility])     Colon polyp     Disease of thyroid gland     History of chemotherapy     Hypertension     Kidney stone     Lupus ([address])     Malignant neoplasm of upper-inner quadrant of left breast in female, estrogen receptor negative  2/25/2020    left    Nephrolithiasis     PTSD (post-traumatic stress disorder)     Sjogren's disease ([address])      Past Surgical History:   Procedure Laterality Date    BREAST BIOPSY Left 02/12/2020    us guided -  invasive ductal carcinoma    BREAST BIOPSY Right 02/12/2020    benign stereo    ECTOPIC PREGNANCY SURGERY      FEMUR FRACTURE SURGERY      FL GUIDED CENTRAL VENOUS ACCESS DEVICE INSERTION  03/17/2020    HYSTERECTOMY      LEFT OOPHORECTOMY      due to torsion     MAMMO STEREOTACTIC BREAST BIOPSY RIGHT (ALL INC) Right 02/12/2020    MASTECTOMY Left 08/2020    MASTECTOMY W/ SENTINEL NODE BIOPSY Left 08/18/2020    Procedure: BREAST MASTECTOMY WITH SENTINEL LYMPH NODE BIOPSY, LYMPHATIC MAPPING WITH BLUE DYE AND RADIOACTIVE DYE (INJECT AT 1430 BY DR WRIGHT IN THE OR);   Surgeon: Veronica Sung MD;  Location: [facility];  Service: Surgical Oncology    MRI BREAST BIOPSY LEFT (ALL INCLUSIVE) Left 03/20/2020    OH INSJ TUNNELED CTR VAD W/SUBQ PORT AGE 5 YR/> N/A 03/17/2020    Procedure: INSERTION VENOUS PORT ( PORT-A-CATH) IR;  Surgeon: Olamide Deng DO;  [facility];  Service: Interventional Radiology    OH RMVL MARSHA CTR VAD W/SUBQ PORT/ CTR/PRPH INSJ N/A 07/06/2021    Procedure: REMOVAL VENOUS PORT (PORT-A-CATH)IR;  Surgeon: Olamide Deng DO;  [facility];  Service: Interventional Radiology    US GUIDANCE BREAST BIOPSY LEFT EACH ADDITIONAL Left 02/12/2020    US GUIDED BREAST BIOPSY LEFT COMPLETE Left 02/12/2020    VAGINA SURGERY       Family History   Problem Relation Age of Onset Diabetes Mother     Hypertension Mother     Nephrolithiasis Mother     Breast cancer Mother 79    Heart disease Father     Hypertension Father     Diabetes Brother     Nephrolithiasis Brother     Breast cancer Maternal Aunt     No Known Problems Maternal Grandmother     No Known Problems Maternal Grandfather     No Known Problems Paternal Grandmother     No Known Problems Paternal Grandfather      Social History     Socioeconomic History    Marital status: /Civil Union     Spouse name: Not on file    Number of children: Not on file    Years of education: Not on file    Highest education level: Not on file   Occupational History    Not on file   Tobacco Use    Smoking status: Every Day     Packs/day: 0.50     Years: 40.00     Total pack years: 20.00     Types: Cigarettes     Start date: 200    Smokeless tobacco: Never   Vaping Use    Vaping Use: Never used   Substance and Sexual Activity    Alcohol use: Not Currently    Drug use: No    Sexual activity: Not Currently     Partners: Male     Birth control/protection: None   Other Topics Concern    Not on file   Social History Narrative    Not on file     Social Determinants of Health     Financial Resource Strain: High Risk (10/13/2023)    Overall Financial Resource Strain (CARDIA)     Difficulty of Paying Living Expenses: Very hard   Food Insecurity: Not on file   Transportation Needs: No Transportation Needs (10/13/2023)    PRAPARE - Transportation     Lack of Transportation (Medical): No     Lack of Transportation (Non-Medical):  No   Physical Activity: Not on file   Stress: Not on file   Social Connections: Not on file   Intimate Partner Violence: Not on file   Housing Stability: Not on file       Current Outpatient Medications:     ALPRAZolam (XANAX) 1 mg tablet, Take 1 tablet (1 mg total) by mouth 3 (three) times a day as needed for anxiety, Disp: 60 tablet, Rfl: 2    ALPRAZolam (XANAX) 1 mg tablet, TAKE 1 TABLET BY MOUTH THREE TIMES A DAY  AS NEEDED FOR ANXIETY, Disp: 60 tablet, Rfl: 1    amLODIPine (NORVASC) 2.5 mg tablet, One po bid, Disp: 30 tablet, Rfl: 5    Clobetasol Propionate E 0.05 % emollient cream, APPLY TO ARMS OR LEGS TWICE A DAY, Disp: , Rfl:     cyclobenzaprine (FLEXERIL) 5 mg tablet, Take 10 mg by mouth daily at bedtime as needed, Disp: , Rfl:     dicyclomine (BENTYL) 10 mg capsule, TAKE ONE CAPSULE BY MOUTH TWICE A DAY, Disp: 60 capsule, Rfl: 3    gabapentin (NEURONTIN) 300 mg capsule, Take 1 capsule (300 mg total) by mouth daily at bedtime, Disp: 30 capsule, Rfl: 3    HYDROcodone-acetaminophen (NORCO)  mg per tablet, Takes 1/2 tablet every 6 hours as needed, Disp: 60 tablet, Rfl: 0    HYDROcodone-acetaminophen (NORCO) 5-325 mg per tablet, Take 1 tablet by mouth every 6 (six) hours as needed for pain Max Daily Amount: 4 tablets, Disp: 120 tablet, Rfl: 0    hydroxychloroquine (PLAQUENIL) 200 mg tablet, Take 1 tablet in morning and 1/2 tablet in evening, Disp: , Rfl:     levothyroxine 88 mcg tablet, Take 1 tablet (88 mcg total) by mouth daily, Disp: 30 tablet, Rfl: 0    magnesium Oxide (MAG-OX) 400 mg TABS, Take 1 tablet (400 mg total) by mouth 2 (two) times a day, Disp: 60 tablet, Rfl: 0    magnesium oxide (MAG-OX) 400 mg, TAKE ONE TABLET BY MOUTH TWICE A DAY, Disp: 60 tablet, Rfl: 5    metoclopramide (REGLAN) 5 mg tablet, TAKE ONE TABLET BY MOUTH THREE TIMES A DAY BEFORE EACH MEAL, Disp: 30 tablet, Rfl: 1    ondansetron (ZOFRAN) 4 mg tablet, Take 1 tablet (4 mg total) by mouth every 8 (eight) hours as needed for nausea or vomiting, Disp: 30 tablet, Rfl: 3    pantoprazole (PROTONIX) 40 mg tablet, TAKE ONE TABLET BY MOUTH EVERY DAY, Disp: 30 tablet, Rfl: 5    predniSONE 5 mg tablet, TAKE ONE TABLET BY MOUTH TWICE A DAY WITH FOOD, Disp: 60 tablet, Rfl: 0    sertraline (ZOLOFT) 100 mg tablet, TAKE ONE AND ONE-HALF TABLETS BY MOUTH DAILY, Disp: 45 tablet, Rfl: 5    Tyrvaya 0.03 MG/ACT SOLN, , Disp: , Rfl:     amLODIPine (NORVASC) 2.5 mg tablet, Take 2.5 mg by mouth daily (Patient not taking: Reported on 10/6/2021), Disp: , Rfl:     magnesium oxide (MAG-OX) 400 mg tablet, Take 1 tablet by mouth 2 (two) times a day (Patient not taking: Reported on 3/10/2023), Disp: , Rfl:     predniSONE 10 mg tablet, 5 tabs daily X 3 days, 4 tabs daily X 3 days, 3 tabs daily X 3 days, 2 tabs daily X 3 days, 1 tab daily X 3 days (Patient not taking: Reported on 3/10/2023), Disp: 45 tablet, Rfl: 0  Allergies   Allergen Reactions    Mobic [Meloxicam] Eye Swelling     Reaction Date: 12Aug2011;     Methotrexate Rash    Sulfa Antibiotics Rash     Vitals:    10/17/23 0842   BP: 132/86   Pulse: 89   Resp: 16   Temp: 97.6 °F (36.4 °C)   SpO2: 98%       Wt Readings from Last 3 Encounters:   10/17/23 64.4 kg (142 lb)   10/02/23 63 kg (138 lb 14.2 oz)   08/04/23 63 kg (139 lb)     Performance Status: ECOG/Zubrod/WHO: 1 - Symptomatic but completely ambulatory  Physical Exam  Vitals reviewed. Constitutional:       General: She is not in acute distress. Appearance: Normal appearance. HENT:      Head: Normocephalic and atraumatic. Mouth/Throat:      Mouth: Mucous membranes are moist.   Eyes:      Extraocular Movements: Extraocular movements intact. Conjunctiva/sclera: Conjunctivae normal.   Cardiovascular:      Rate and Rhythm: Normal rate. Pulmonary:      Effort: Pulmonary effort is normal. No respiratory distress. Breath sounds: No wheezing, rhonchi or rales. Chest:      Comments: Left breast: S/p mastectomy without reconstruction. No palpable abnormality in her left chest wall. No palpable supra/infraclavicular/axillary LAD. Right breast: No palpable abnormality. No skin erythema/thickening. No palpable supra/infraclavicular/axillary LAD. Abdominal:      General: Abdomen is flat. There is no distension. Palpations: Abdomen is soft. Musculoskeletal:      Cervical back: Normal range of motion and neck supple. Right lower leg: No edema. Left lower leg: No edema. Skin:     General: Skin is warm. Coloration: Skin is not pale. Neurological:      Mental Status: She is alert and oriented to person, place, and time. Mental status is at baseline. Cranial Nerves: No cranial nerve deficit. Psychiatric:         Thought Content: Thought content normal.     Labs:  6/30/2023: WBC 20.25, H/H: 13/41, MCV: 95, platelets: 247. Creatinine: 0.8    Imaging:  10/2/2023: Right diagnostic mammogram: No evidence of malignancy    Return in about 1 year (around 10/17/2024) for Office Visit.

## 2023-10-20 ENCOUNTER — OFFICE VISIT (OUTPATIENT)
Dept: FAMILY MEDICINE CLINIC | Facility: CLINIC | Age: 58
End: 2023-10-20
Payer: COMMERCIAL

## 2023-10-20 VITALS
TEMPERATURE: 97.6 F | RESPIRATION RATE: 18 BRPM | BODY MASS INDEX: 25.52 KG/M2 | HEIGHT: 63 IN | HEART RATE: 96 BPM | DIASTOLIC BLOOD PRESSURE: 88 MMHG | WEIGHT: 144 LBS | SYSTOLIC BLOOD PRESSURE: 126 MMHG | OXYGEN SATURATION: 97 %

## 2023-10-20 DIAGNOSIS — R82.90 ABNORMAL FINDING IN URINE: Primary | ICD-10-CM

## 2023-10-20 DIAGNOSIS — D70.1 CHEMOTHERAPY INDUCED NEUTROPENIA: ICD-10-CM

## 2023-10-20 DIAGNOSIS — M32.9 SYSTEMIC LUPUS ERYTHEMATOSUS, UNSPECIFIED SLE TYPE, UNSPECIFIED ORGAN INVOLVEMENT STATUS (HCC): ICD-10-CM

## 2023-10-20 DIAGNOSIS — Z23 ENCOUNTER FOR IMMUNIZATION: ICD-10-CM

## 2023-10-20 DIAGNOSIS — C50.912 MALIGNANT NEOPLASM OF LEFT BREAST IN FEMALE, ESTROGEN RECEPTOR NEGATIVE, UNSPECIFIED SITE OF BREAST: ICD-10-CM

## 2023-10-20 DIAGNOSIS — I73.00 RAYNAUD'S DISEASE WITHOUT GANGRENE: ICD-10-CM

## 2023-10-20 DIAGNOSIS — Z00.00 MEDICARE ANNUAL WELLNESS VISIT, SUBSEQUENT: ICD-10-CM

## 2023-10-20 DIAGNOSIS — F11.20 CONTINUOUS OPIOID DEPENDENCE (HCC): ICD-10-CM

## 2023-10-20 DIAGNOSIS — T45.1X5A CHEMOTHERAPY INDUCED NEUTROPENIA: ICD-10-CM

## 2023-10-20 DIAGNOSIS — F32.5 MAJOR DEPRESSIVE DISORDER IN FULL REMISSION, UNSPECIFIED WHETHER RECURRENT (HCC): ICD-10-CM

## 2023-10-20 DIAGNOSIS — Z17.1 MALIGNANT NEOPLASM OF LEFT BREAST IN FEMALE, ESTROGEN RECEPTOR NEGATIVE, UNSPECIFIED SITE OF BREAST: ICD-10-CM

## 2023-10-20 LAB
SL AMB  POCT GLUCOSE, UA: ABNORMAL
SL AMB LEUKOCYTE ESTERASE,UA: ABNORMAL
SL AMB POCT BILIRUBIN,UA: ABNORMAL
SL AMB POCT BLOOD,UA: ABNORMAL
SL AMB POCT CLARITY,UA: CLEAR
SL AMB POCT COLOR,UA: YELLOW
SL AMB POCT KETONES,UA: ABNORMAL
SL AMB POCT NITRITE,UA: ABNORMAL
SL AMB POCT PH,UA: 6
SL AMB POCT SPECIFIC GRAVITY,UA: 1.01
SL AMB POCT URINE PROTEIN: ABNORMAL
SL AMB POCT UROBILINOGEN: ABNORMAL

## 2023-10-20 PROCEDURE — 81002 URINALYSIS NONAUTO W/O SCOPE: CPT | Performed by: FAMILY MEDICINE

## 2023-10-20 PROCEDURE — 99214 OFFICE O/P EST MOD 30 MIN: CPT | Performed by: FAMILY MEDICINE

## 2023-10-20 PROCEDURE — G0008 ADMIN INFLUENZA VIRUS VAC: HCPCS

## 2023-10-20 PROCEDURE — G0439 PPPS, SUBSEQ VISIT: HCPCS | Performed by: FAMILY MEDICINE

## 2023-10-20 PROCEDURE — 87086 URINE CULTURE/COLONY COUNT: CPT | Performed by: FAMILY MEDICINE

## 2023-10-20 PROCEDURE — 87186 SC STD MICRODIL/AGAR DIL: CPT | Performed by: FAMILY MEDICINE

## 2023-10-20 PROCEDURE — 90686 IIV4 VACC NO PRSV 0.5 ML IM: CPT

## 2023-10-20 PROCEDURE — 87077 CULTURE AEROBIC IDENTIFY: CPT | Performed by: FAMILY MEDICINE

## 2023-10-20 RX ORDER — OXYCODONE HYDROCHLORIDE 5 MG/1
5 TABLET ORAL EVERY 8 HOURS PRN
Qty: 90 TABLET | Refills: 0 | Status: SHIPPED | OUTPATIENT
Start: 2023-10-20

## 2023-10-20 NOTE — PATIENT INSTRUCTIONS
Medicare Preventive Visit Patient Instructions  Thank you for completing your Welcome to Medicare Visit or Medicare Annual Wellness Visit today. Your next wellness visit will be due in one year (10/20/2024). The screening/preventive services that you may require over the next 5-10 years are detailed below. Some tests may not apply to you based off risk factors and/or age. Screening tests ordered at today's visit but not completed yet may show as past due. Also, please note that scanned in results may not display below. Preventive Screenings:  Service Recommendations Previous Testing/Comments   Colorectal Cancer Screening  * Colonoscopy    * Fecal Occult Blood Test (FOBT)/Fecal Immunochemical Test (FIT)  * Fecal DNA/Cologuard Test  * Flexible Sigmoidoscopy Age: 43-73 years old   Colonoscopy: every 10 years (may be performed more frequently if at higher risk)  OR  FOBT/FIT: every 1 year  OR  Cologuard: every 3 years  OR  Sigmoidoscopy: every 5 years  Screening may be recommended earlier than age 39 if at higher risk for colorectal cancer. Also, an individualized decision between you and your healthcare provider will decide whether screening between the ages of 77-80 would be appropriate. Colonoscopy: 05/27/2021  FOBT/FIT: Not on file  Cologuard: Not on file  Sigmoidoscopy: Not on file    Screening Current     Breast Cancer Screening Age: 36 years old  Frequency: every 1-2 years  Not required if history of left and right mastectomy Mammogram: 10/02/2023    History Breast Cancer   Cervical Cancer Screening Between the ages of 21-29, pap smear recommended once every 3 years. Between the ages of 32-69, can perform pap smear with HPV co-testing every 5 years.    Recommendations may differ for women with a history of total hysterectomy, cervical cancer, or abnormal pap smears in past. Pap Smear: Not on file        Hepatitis C Screening Once for adults born between 1945 and 1965  More frequently in patients at high risk for Hepatitis C Hep C Antibody: Not on file        Diabetes Screening 1-2 times per year if you're at risk for diabetes or have pre-diabetes Fasting glucose: 87 mg/dL (10/19/2020)  A1C: No results in last 5 years (No results in last 5 years)      Cholesterol Screening Once every 5 years if you don't have a lipid disorder. May order more often based on risk factors. Lipid panel: Not on file          Other Preventive Screenings Covered by Medicare:  Abdominal Aortic Aneurysm (AAA) Screening: covered once if your at risk. You're considered to be at risk if you have a family history of AAA. Lung Cancer Screening: covers low dose CT scan once per year if you meet all of the following conditions: (1) Age 48-67; (2) No signs or symptoms of lung cancer; (3) Current smoker or have quit smoking within the last 15 years; (4) You have a tobacco smoking history of at least 20 pack years (packs per day multiplied by number of years you smoked); (5) You get a written order from a healthcare provider. Glaucoma Screening: covered annually if you're considered high risk: (1) You have diabetes OR (2) Family history of glaucoma OR (3)  aged 48 and older OR (3)  American aged 72 and older  Osteoporosis Screening: covered every 2 years if you meet one of the following conditions: (1) You're estrogen deficient and at risk for osteoporosis based off medical history and other findings; (2) Have a vertebral abnormality; (3) On glucocorticoid therapy for more than 3 months; (4) Have primary hyperparathyroidism; (5) On osteoporosis medications and need to assess response to drug therapy. Last bone density test (DXA Scan): Not on file. HIV Screening: covered annually if you're between the age of 14-79. Also covered annually if you are younger than 13 and older than 72 with risk factors for HIV infection. For pregnant patients, it is covered up to 3 times per pregnancy.     Immunizations:  Immunization Recommendations Influenza Vaccine Annual influenza vaccination during flu season is recommended for all persons aged >= 6 months who do not have contraindications   Pneumococcal Vaccine   * Pneumococcal conjugate vaccine = PCV13 (Prevnar 13), PCV15 (Vaxneuvance), PCV20 (Prevnar 20)  * Pneumococcal polysaccharide vaccine = PPSV23 (Pneumovax) Adults 41-13 yo with certain risk factors or if 69+ yo  If never received any pneumonia vaccine: recommend Prevnar 20 (PCV20)  Give PCV20 if previously received 1 dose of PCV13 or PPSV23   Hepatitis B Vaccine 3 dose series if at intermediate or high risk (ex: diabetes, end stage renal disease, liver disease)   Respiratory syncytial virus (RSV) Vaccine - COVERED BY MEDICARE PART D  * RSVPreF3 (Arexvy) CDC recommends that adults 61years of age and older may receive a single dose of RSV vaccine using shared clinical decision-making (SCDM)   Tetanus (Td) Vaccine - COST NOT COVERED BY MEDICARE PART B Following completion of primary series, a booster dose should be given every 10 years to maintain immunity against tetanus. Td may also be given as tetanus wound prophylaxis. Tdap Vaccine - COST NOT COVERED BY MEDICARE PART B Recommended at least once for all adults. For pregnant patients, recommended with each pregnancy.    Shingles Vaccine (Shingrix) - COST NOT COVERED BY MEDICARE PART B  2 shot series recommended in those 19 years and older who have or will have weakened immune systems or those 50 years and older     Health Maintenance Due:      Topic Date Due   • Hepatitis C Screening  Never done   • HIV Screening  Never done   • Lung Cancer Screening  02/17/2022   • Breast Cancer Screening: Mammogram  10/02/2025   • Colorectal Cancer Screening  05/27/2026     Immunizations Due:      Topic Date Due   • Pneumococcal Vaccine: Pediatrics (0 to 5 Years) and At-Risk Patients (6 to 59 Years) (1 - PCV) Never done   • COVID-19 Vaccine (3 - Pfizer series) 06/28/2021   • Influenza Vaccine (1) 09/01/2023 Advance Directives   What are advance directives? Advance directives are legal documents that state your wishes and plans for medical care. These plans are made ahead of time in case you lose your ability to make decisions for yourself. Advance directives can apply to any medical decision, such as the treatments you want, and if you want to donate organs. What are the types of advance directives? There are many types of advance directives, and each state has rules about how to use them. You may choose a combination of any of the following:  Living will: This is a written record of the treatment you want. You can also choose which treatments you do not want, which to limit, and which to stop at a certain time. This includes surgery, medicine, IV fluid, and tube feedings. Durable power of  for [facility]): This is a written record that states who you want to make healthcare choices for you when you are unable to make them for yourself. This person, called a proxy, is usually a family member or a friend. You may choose more than 1 proxy. Do not resuscitate (DNR) order:  A DNR order is used in case your heart stops beating or you stop breathing. It is a request not to have certain forms of treatment, such as CPR. A DNR order may be included in other types of advance directives. Medical directive: This covers the care that you want if you are in a coma, near death, or unable to make decisions for yourself. You can list the treatments you want for each condition. Treatment may include pain medicine, surgery, blood transfusions, dialysis, IV or tube feedings, and a ventilator (breathing machine). Values history: This document has questions about your views, beliefs, and how you feel and think about life. This information can help others choose the care that you would choose. Why are advance directives important? An advance directive helps you control your care.  Although spoken wishes may be used, it is better to have your wishes written down. Spoken wishes can be misunderstood, or not followed. Treatments may be given even if you do not want them. An advance directive may make it easier for your family to make difficult choices about your care. Cigarette Smoking and Your Health   Risks to your health if you smoke:  Nicotine and other chemicals found in tobacco damage every cell in your body. Even if you are a light smoker, you have an increased risk for cancer, heart disease, and lung disease. If you are pregnant or have diabetes, smoking increases your risk for complications. Benefits to your health if you stop smoking: You decrease respiratory symptoms such as coughing, wheezing, and shortness of breath. You reduce your risk for cancers of the lung, mouth, throat, kidney, bladder, pancreas, stomach, and cervix. If you already have cancer, you increase the benefits of chemotherapy. You also reduce your risk for cancer returning or a second cancer from developing. You reduce your risk for heart disease, blood clots, heart attack, and stroke. You reduce your risk for lung infections, and diseases such as pneumonia, asthma, chronic bronchitis, and emphysema. Your circulation improves. More oxygen can be delivered to your body. If you have diabetes, you lower your risk for complications, such as kidney, artery, and eye diseases. You also lower your risk for nerve damage. Nerve damage can lead to amputations, poor vision, and blindness. You improve your body's ability to heal and to fight infections. For more information and support to stop smoking:   [employer]  Phone: 8- 694 - 532-8180  Web Address: www.[employer]  Weight Management   Why it is important to manage your weight:  Being overweight increases your risk of health conditions such as heart disease, high blood pressure, type 2 diabetes, and certain types of cancer.  It can also increase your risk for osteoarthritis, sleep apnea, and other respiratory problems. Aim for a slow, steady weight loss. Even a small amount of weight loss can lower your risk of health problems. How to lose weight safely:  A safe and healthy way to lose weight is to eat fewer calories and get regular exercise. You can lose up about 1 pound a week by decreasing the number of calories you eat by 500 calories each day. Healthy meal plan for weight management:  A healthy meal plan includes a variety of foods, contains fewer calories, and helps you stay healthy. A healthy meal plan includes the following:  Eat whole-grain foods more often. A healthy meal plan should contain fiber. Fiber is the part of grains, fruits, and vegetables that is not broken down by your body. Whole-grain foods are healthy and provide extra fiber in your diet. Some examples of whole-grain foods are whole-wheat breads and pastas, oatmeal, brown rice, and bulgur. Eat a variety of vegetables every day. Include dark, leafy greens such as spinach, kale, graham greens, and mustard greens. Eat yellow and orange vegetables such as carrots, sweet potatoes, and winter squash. Eat a variety of fruits every day. Choose fresh or canned fruit (canned in its own juice or light syrup) instead of juice. Fruit juice has very little or no fiber. Eat low-fat dairy foods. Drink fat-free (skim) milk or 1% milk. Eat fat-free yogurt and low-fat cottage cheese. Try low-fat cheeses such as mozzarella and other reduced-fat cheeses. Choose meat and other protein foods that are low in fat. Choose beans or other legumes such as split peas or lentils. Choose fish, skinless poultry (chicken or turkey), or lean cuts of red meat (beef or pork). Before you cook meat or poultry, cut off any visible fat. Use less fat and oil. Try baking foods instead of frying them. Add less fat, such as margarine, sour cream, regular salad dressing and mayonnaise to foods. Eat fewer high-fat foods.  Some examples of high-fat foods include french fries, doughnuts, ice cream, and cakes. Eat fewer sweets. Limit foods and drinks that are high in sugar. This includes candy, cookies, regular soda, and sweetened drinks. Exercise:  Exercise at least 30 minutes per day on most days of the week. Some examples of exercise include walking, biking, dancing, and swimming. You can also fit in more physical activity by taking the stairs instead of the elevator or parking farther away from stores. Ask your healthcare provider about the best exercise plan for you. Alcohol Use and Your Health    Drinking too much can harm your health. Excessive alcohol use leads to about 88,000 death in [facility] each year, and shortens the life of those who diet by almost 30 years. Further, excessive drinking cost the economy $249 billion in 2010. Most excessive drinkers are not alcohol dependent. Excessive alcohol use has immediate effects that increase the risk of many harmful health conditions. These are most often the result of binge drinking. Over time, excessive alcohol use can lead to the development of chronic diseases and other series health problems. What is considered a "drink"? Excessive alcohol use includes:  Binge Drinking: For women, 4 or more drinks consumed on one occasion. For men, 5 or more drinks consumed on one occasion. Heavy Drinking: For women, 8 or more drinks per week. For men, 15 or more drinks per week  Any alcohol used by pregnant women  Any alcohol used by those under the age of 21 years    If you choose to drink, do so in moderation:  Do not drink at all if you are under the age of 24, or if you are or may be pregnant, or have health problems that could be made worse by drinking.   For women, up to 1 drink per day  For men, up to 2 drinks a day    No one should begin drinking or drink more frequently based on potential health benefits    Short-Term Health Risks:  Injuries: motor vehicle crashes, falls, drownings, burns  Violence: homicide, suicide, sexual assault, intimate partner violence  Alcohol poisoning  Reproductive health: risky sexual behaviors, unintended prengnacy, sexually transmitted diseases, miscarriage, stillbirth, fetal alcohol syndrome    Long-Term Health Risks:  Chronic diseases: high blood pressure, heart disease, stroke, liver disease, digestive problems  Cancers: breast, mouth and throat, liver, colon  Learning and memory problems: dementia, poor school performance  Mental health: depression, anxiety, insomnia  Social problems: lost productivity, family problems, unemployment  Alcohol dependence    For support and more information:  Substance Abuse and [address], [place] [address]  Web Address: https://[employer]/    Alcoholics Anonymous        Web Address: http://www.[employer]/    https://www.cdc.gov/alcohol/fact-sheets/alcohol-use.htm  Narcotic (Opioid) Safety    Use narcotics safely:  Take prescribed narcotics exactly as directed  Do not give narcotics to others or take narcotics that belong to someone else  Do not mix narcotics without medicines or alcohol  Do not drive or operate heavy machinery after you take the narcotic  Monitor for side effects and notify your healthcare provider if you experienced side effects such as nausea, sleepiness, itching, or trouble thinking clearly. Manage constipation:    Constipation is the most common side effect of narcotic medicine. Constipation is when you have hard, dry bowel movements, or you go longer than usual between bowel movements. Tell your healthcare provider about all changes in your bowel movements while you are taking narcotics. He or she may recommend laxative medicine to help you have a bowel movement. He or she may also change the kind of narcotic you are taking, or change when you take it. The following are more ways you can prevent or relieve constipation:    Drink liquids as directed. You may need to drink extra liquids to help soften and move your bowels. Ask how much liquid to drink each day and which liquids are best for you. Eat high-fiber foods. This may help decrease constipation by adding bulk to your bowel movements. High-fiber foods include fruits, vegetables, whole-grain breads and cereals, and beans. Your healthcare provider or dietitian can help you create a high-fiber meal plan. Your provider may also recommend a fiber supplement if you cannot get enough fiber from food. Exercise regularly. Regular physical activity can help stimulate your intestines. Walking is a good exercise to prevent or relieve constipation. Ask which exercises are best for you. Schedule a time each day to have a bowel movement. This may help train your body to have regular bowel movements. Bend forward while you are on the toilet to help move the bowel movement out. Sit on the toilet for at least 10 minutes, even if you do not have a bowel movement. Store narcotics safely:   Store narcotics where others cannot easily get them. Keep them in a locked cabinet or secure area. Do not  keep them in a purse or other bag you carry with you. A person may be looking for something else and find the narcotics. Make sure narcotics are stored out of the reach of children. A child can easily overdose on narcotics. Narcotics may look like candy to a small child. The best way to dispose of narcotics: The laws vary by country and area. In the [facility], the best way is to return the narcotics through a take-back program. This program is offered by the [employer] The following are options for using the program:  Take the narcotics to a [facility] collection site. The site is often a law enforcement center. Call your local law enforcement center for scheduled take-back days in your area. You will be given information on where to go if the collection site is in a different location.   Take the narcotics to an approved pharmacy or hospital.  A pharmacy or hospital may be set up as a collection site. You will need to ask if it is a [facility] collection site if you were not directed there. A pharmacy or doctor's office may not be able to take back narcotics unless it is a [facility] site. Use a mail-back system. This means you are given containers to put the narcotics into. You will then mail them in the containers. Use a take-back drop box. This is a place to leave the narcotics at any time. People and animals will not be able to get into the box. Your local law enforcement agency can tell you where to find a drop box in your area. Other ways to manage pain:   Ask your healthcare provider about non-narcotic medicines to control pain. Nonprescription medicines include NSAIDs (such as ibuprofen) and acetaminophen. Prescription medicines include muscle relaxers, antidepressants, and steroids. Pain may be managed without any medicines. Some ways to relieve pain include massage, aromatherapy, or meditation. Physical or occupational therapy may also help. For more information:   Drug Enforcement Administration  [address]  Phone: 2- 401 - 007-8736  Web Address: [facility].. [employer].gov/drug_disposal/    [address] [place] [address]  Phone: 8- 692 - 253-1656  Web Address: http://[employer]/     © Copyright [employer] 2018 Information is for End User's use only and may not be sold, redistributed or otherwise used for commercial purposes.  All illustrations and images included in CareNotes® are the copyrighted property of [employer] Inc. or [address] [place] [address]

## 2023-10-20 NOTE — PROGRESS NOTES
Assessment and Plan:     Problem List Items Addressed This Visit       Systemic lupus erythematosus ([address])    Relevant Medications    oxyCODONE (Roxicodone) 5 immediate release tablet    Chemotherapy induced neutropenia     Breast cancer (HCC)     Breast cancer. Patient continues monitoring with her oncologist for her history of breast cancer         Continuous opioid dependence ([address])     Opioid dependence. Patient was given prescription to transition to oxycodone 5 mg 3 times daily as needed. Hopefully this would provide her with better pain relief. Raynaud's disease without gangrene    Relevant Medications    oxyCODONE (Roxicodone) 5 immediate release tablet    Major depressive disorder in full remission, unspecified whether recurrent ([address])     Depression. Patient continues to speak with her therapist every few weeks. She will continue on current regimen of medications. Other Visit Diagnoses       Abnormal finding in urine    -  Primary    Relevant Orders    POCT urine dip (Completed)    Urine culture (Completed)    Medicare annual wellness visit, subsequent        Encounter for immunization        Relevant Orders    influenza vaccine, quadrivalent, 0.5 mL, preservative-free, for adult and pediatric patients 6 mos+ (AFLURIA, FLUARIX, FLULAVAL, FLUZONE) (Completed)             Preventive health issues were discussed with patient, and age appropriate screening tests were ordered as noted in patient's After Visit Summary. Personalized health advice and appropriate referrals for health education or preventive services given if needed, as noted in patient's After Visit Summary. History of Present Illness:     Patient presents for a Medicare Wellness Visit    Patient in the office to discuss chronic medical condition. She continues to have virtual visits with her psychologist for chronic anxiety and posttraumatic stress syndrome.   She continues to have increase in pain levels which are not controlled by her hydrocodone. Patient would like to consider returning back to oxycodone which provided better pain relief. Patient has made little progress and feeling comfortable outside her home. She is able to go to a small line grocery store by herself for short period of time. She does attend a monthly meeting by her motorcycle riding club. Patient Care Team:  Alessandra Hart MD as PCP - General (Family Medicine)  Amelie Brown MD as PCP - [facility] [employer] [facility] ([facility])  Amelie Brown MD as PCP - PCP-[employer] ([place])  MD Jose Dawson MD (Urology)  Chente hCacon MD (Gastroenterology)  Evelyn Barrera MD (Obstetrics and Gynecology)  Fernanda Pacheco MD (Obstetrics and Gynecology)  Mary Varela MD as Surgeon (Surgical Oncology)  Dayna Moreira MD as Medical Oncologist (Hematology and Oncology)     Review of Systems:     Review of Systems   Constitutional:  Positive for fatigue. Negative for fever. HENT:  Positive for congestion. Respiratory: Negative. Cardiovascular: Negative. Gastrointestinal: Negative. Genitourinary: Negative. Musculoskeletal:  Positive for arthralgias, back pain and gait problem. Skin: Negative. Hematological: Negative. Psychiatric/Behavioral:  Positive for agitation, decreased concentration, dysphoric mood and sleep disturbance. The patient is nervous/anxious.          Problem List:     Patient Active Problem List   Diagnosis    Systemic lupus erythematosus (HCC)    Hypothyroidism    Posttraumatic stress disorder    Adult celiac disease    Anxiety    Depression    Esophagitis    Nephrolithiasis    Vitamin D deficiency    Malignant neoplasm of upper-inner quadrant of left breast in female, estrogen receptor negative     Chemotherapy induced neutropenia     Port-A-Cath in place    Breast cancer ([address])    JEFERSON (acute kidney injury) ([address])    Anemia    Colitis    Continuous opioid dependence ([address])    Smoking    Raynaud's disease without gangrene    Major depressive disorder in full remission, unspecified whether recurrent ([address])    Neuralgia      Past Medical and Surgical History:     Past Medical History:   Diagnosis Date    Cancer [facility])     Colon polyp     Disease of thyroid gland     History of chemotherapy     Hypertension     Kidney stone     Lupus ([address])     Malignant neoplasm of upper-inner quadrant of left breast in female, estrogen receptor negative  2/25/2020    left    Nephrolithiasis     PTSD (post-traumatic stress disorder)     Sjogren's disease ([address])      Past Surgical History:   Procedure Laterality Date    BREAST BIOPSY Left 02/12/2020    us guided -  invasive ductal carcinoma    BREAST BIOPSY Right 02/12/2020    benign stereo    ECTOPIC PREGNANCY SURGERY      FEMUR FRACTURE SURGERY      FL GUIDED CENTRAL VENOUS ACCESS DEVICE INSERTION  03/17/2020    HYSTERECTOMY      LEFT OOPHORECTOMY      due to torsion     MAMMO STEREOTACTIC BREAST BIOPSY RIGHT (ALL INC) Right 02/12/2020    MASTECTOMY Left 08/2020    MASTECTOMY W/ SENTINEL NODE BIOPSY Left 08/18/2020    Procedure: BREAST MASTECTOMY WITH SENTINEL LYMPH NODE BIOPSY, LYMPHATIC MAPPING WITH BLUE DYE AND RADIOACTIVE DYE (INJECT AT 1430 BY DR WRIGHT IN THE OR);   Surgeon: Prabha Monet MD;  Location: [facility];  Service: Surgical Oncology    MRI BREAST BIOPSY LEFT (ALL INCLUSIVE) Left 03/20/2020    [place] INSJ TUNNELED CTR VAD W/SUBQ PORT AGE 5 YR/> N/A 03/17/2020    Procedure: INSERTION VENOUS PORT ( PORT-A-CATH) IR;  Surgeon: Laura Leija DO;  [facility];  Service: Interventional Radiology    [place] RMVL MARSHA CTR VAD W/SUBQ PORT/ CTR/PRPH INSJ N/A 07/06/2021    Procedure: REMOVAL VENOUS PORT (PORT-A-CATH)IR;  Surgeon: Laura Leija DO;  [facility];  Service: Interventional Radiology    US GUIDANCE BREAST BIOPSY LEFT EACH ADDITIONAL Left 02/12/2020    US GUIDED BREAST BIOPSY LEFT COMPLETE Left 02/12/2020    VAGINA SURGERY        Family History:     Family History   Problem Relation Age of Onset    Diabetes Mother     Hypertension Mother     Nephrolithiasis Mother     Breast cancer Mother 79    Heart disease Father     Hypertension Father     Diabetes Brother     Nephrolithiasis Brother     Breast cancer Maternal Aunt     No Known Problems Maternal Grandmother     No Known Problems Maternal Grandfather     No Known Problems Paternal Grandmother     No Known Problems Paternal Grandfather       Social History:     Social History     Socioeconomic History    Marital status: /Civil Union     Spouse name: None    Number of children: None    Years of education: None    Highest education level: None   Occupational History    None   Tobacco Use    Smoking status: Every Day     Packs/day: 0.50     Years: 40.00     Total pack years: 20.00     Types: Cigarettes     Start date: 1990    Smokeless tobacco: Never   Vaping Use    Vaping Use: Never used   Substance and Sexual Activity    Alcohol use: Not Currently    Drug use: No    Sexual activity: Not Currently     Partners: Male     Birth control/protection: None   Other Topics Concern    None   Social History Narrative    None     Social Determinants of Health     Financial Resource Strain: High Risk (10/13/2023)    Overall Financial Resource Strain (CARDIA)     Difficulty of Paying Living Expenses: Very hard   Food Insecurity: Not on file   Transportation Needs: No Transportation Needs (10/13/2023)    PRAPARE - Transportation     Lack of Transportation (Medical): No     Lack of Transportation (Non-Medical):  No   Physical Activity: Not on file   Stress: Not on file   Social Connections: Not on file   Intimate Partner Violence: Not on file   Housing Stability: Not on file      Medications and Allergies:     Current Outpatient Medications   Medication Sig Dispense Refill    ALPRAZolam (XANAX) 1 mg tablet TAKE 1 TABLET BY MOUTH THREE TIMES A DAY  AS NEEDED FOR ANXIETY 60 tablet 1    amLODIPine (NORVASC) 2.5 mg tablet One po bid 30 tablet 5    amoxicillin (AMOXIL) 875 mg tablet Take 1 tablet (875 mg total) by mouth 2 (two) times a day for 5 days 10 tablet 0    Clobetasol Propionate E 0.05 % emollient cream APPLY TO ARMS OR LEGS TWICE A DAY      cyclobenzaprine (FLEXERIL) 5 mg tablet Take 10 mg by mouth daily at bedtime as needed      dicyclomine (BENTYL) 10 mg capsule TAKE ONE CAPSULE BY MOUTH TWICE A DAY 60 capsule 3    gabapentin (NEURONTIN) 300 mg capsule Take 1 capsule (300 mg total) by mouth daily at bedtime 30 capsule 3    HYDROcodone-acetaminophen (NORCO) 5-325 mg per tablet Take 1 tablet by mouth every 6 (six) hours as needed for pain Max Daily Amount: 4 tablets 120 tablet 0    hydroxychloroquine (PLAQUENIL) 200 mg tablet Take 1 tablet in morning and 1/2 tablet in evening      levothyroxine 88 mcg tablet Take 1 tablet (88 mcg total) by mouth daily 30 tablet 0    magnesium oxide (MAG-OX) 400 mg TAKE ONE TABLET BY MOUTH TWICE A DAY 60 tablet 5    metoclopramide (REGLAN) 5 mg tablet TAKE ONE TABLET BY MOUTH THREE TIMES A DAY BEFORE EACH MEAL 30 tablet 1    ondansetron (ZOFRAN) 4 mg tablet Take 1 tablet (4 mg total) by mouth every 8 (eight) hours as needed for nausea or vomiting 30 tablet 3    oxyCODONE (Roxicodone) 5 immediate release tablet Take 1 tablet (5 mg total) by mouth every 8 (eight) hours as needed for moderate pain Max Daily Amount: 15 mg 90 tablet 0    pantoprazole (PROTONIX) 40 mg tablet TAKE ONE TABLET BY MOUTH EVERY DAY 30 tablet 5    predniSONE 5 mg tablet TAKE ONE TABLET BY MOUTH TWICE A DAY WITH FOOD 60 tablet 0    sertraline (ZOLOFT) 100 mg tablet TAKE ONE AND ONE-HALF TABLETS BY MOUTH DAILY 45 tablet 5    Tyrvaya 0.03 MG/ACT SOLN       ALPRAZolam (XANAX) 1 mg tablet Take 1 tablet (1 mg total) by mouth 3 (three) times a day as needed for anxiety (Patient not taking: Reported on 10/20/2023) 60 tablet 2    amLODIPine (NORVASC) 2.5 mg tablet Take 2.5 mg by mouth daily (Patient not taking: Reported on 10/6/2021) HYDROcodone-acetaminophen (NORCO)  mg per tablet Takes 1/2 tablet every 6 hours as needed (Patient not taking: Reported on 10/20/2023) 60 tablet 0    magnesium oxide (MAG-OX) 400 mg tablet Take 1 tablet by mouth 2 (two) times a day (Patient not taking: Reported on 3/10/2023)      magnesium Oxide (MAG-OX) 400 mg TABS Take 1 tablet (400 mg total) by mouth 2 (two) times a day (Patient not taking: Reported on 10/20/2023) 60 tablet 0    predniSONE 10 mg tablet 5 tabs daily X 3 days, 4 tabs daily X 3 days, 3 tabs daily X 3 days, 2 tabs daily X 3 days, 1 tab daily X 3 days (Patient not taking: Reported on 3/10/2023) 45 tablet 0     No current facility-administered medications for this visit. Allergies   Allergen Reactions    Mobic [Meloxicam] Eye Swelling     Reaction Date: 12Aug2011;     Methotrexate Rash    Sulfa Antibiotics Rash      Immunizations:     Immunization History   Administered Date(s) Administered    COVID-19 PFIZER VACCINE 0.3 ML IM 04/08/2021, 05/03/2021    Influenza, injectable, quadrivalent, preservative free 0.5 mL 10/20/2023    Influenza, recombinant, quadrivalent,injectable, preservative free 11/18/2021      Health Maintenance:         Topic Date Due    Hepatitis C Screening  Never done    HIV Screening  Never done    Lung Cancer Screening  02/17/2022    Breast Cancer Screening: Mammogram  10/02/2025    Colorectal Cancer Screening  05/27/2026         Topic Date Due    Pneumococcal Vaccine: Pediatrics (0 to 5 Years) and At-Risk Patients (6 to 59 Years) (1 - PCV) Never done    COVID-19 Vaccine (3 - Pfizer series) 06/28/2021      Medicare Screening Tests and Risk Assessments:         Health Risk Assessment:   Patient rates overall health as fair. Patient feels that their physical health rating is slightly better. Patient is dissatisfied with their life. Eyesight was rated as slightly worse. Hearing was rated as same. Patient feels that their emotional and mental health rating is same.  Patients states they are never, rarely angry. Patient states they are always unusually tired/fatigued. Pain experienced in the last 7 days has been a lot. Patient's pain rating has been 7/10. Patient states that she has experienced weight loss or gain in last 6 months. Depression Screening:   PHQ-9 Score: 5      Fall Risk Screening: In the past year, patient has experienced: no history of falling in past year      Urinary Incontinence Screening:   Patient has not leaked urine accidently in the last six months. I do feel like I have been having UTI    Home Safety:  Patient does not have trouble with stairs inside or outside of their home. Patient has working smoke alarms and has no working carbon monoxide detector. Home safety hazards include: none. Nutrition:   Current diet is Regular. Medications:   Patient is not currently taking any over-the-counter supplements. Patient is able to manage medications. Activities of Daily Living (ADLs)/Instrumental Activities of Daily Living (IADLs):   Walk and transfer into and out of bed and chair?: Yes  Dress and groom yourself?: Yes    Bathe or shower yourself?: Yes    Feed yourself?  Yes  Do your laundry/housekeeping?: Yes  Manage your money, pay your bills and track your expenses?: Yes  Make your own meals?: Yes    Do your own shopping?: Yes    Previous Hospitalizations:   Any hospitalizations or ED visits within the last 12 months?: No      Advance Care Planning:   Living will: No    Durable POA for healthcare: No    Advanced directive: No      PREVENTIVE SCREENINGS        Colorectal Cancer Screening:     General: Screening Current      Breast Cancer Screening:     General: History Breast Cancer      Osteoporosis Screening:    General: Screening Not Indicated and History Osteoporosis    Screening, Brief Intervention, and Referral to Treatment (SBIRT)    Screening  Typical number of drinks in a day: 1  Typical number of drinks in a week: 6  Interpretation: Low risk drinking behavior. AUDIT-C Screenin) How often did you have a drink containing alcohol in the past year? 2 to 3 times a week  2) How many drinks did you have on a typical day when you were drinking in the past year? 3 to 4  3) How often did you have 6 or more drinks on one occasion in the past year? weekly    AUDIT-C Score: 6  Interpretation: Score 3-12 (female): POSITIVE screen for alcohol misuse    AUDIT Screenin) How often during the last year have you found that you were not able to stop drinking once you had started? 0 - never  5) How often during the last year have you failed to do what was normally expected from you because of drinking? 0 - never  6) How often during the last year have you needed a first drink in the morning to get yourself going after a heavy drinking session? 0 - never  7) How often during the last year have you had a feeling of guilt or remorse after drinking? 0 - never  8) How often during the last year have you been unable to remember what happened the night before because you had been drinking? 0 - never  9) Have you or someone else been injured as a result of your drinking? 2 - yes, but not in the last year  8) Has a relative or friend or a doctor or another health worker been concerned about your drinking or suggested you cut down? 0 - no    AUDIT Score: 8  Interpretation: Harmful or hazardous alcohol consumption    Single Item Drug Screening:  How often have you used an illegal drug (including marijuana) or a prescription medication for non-medical reasons in the past year? never    Single Item Drug Screen Score: 0  Interpretation: Negative screen for possible drug use disorder    Review of Current Opioid Use    Opioid Risk Tool (ORT) Interpretation: Complete Opioid Risk Tool (ORT)    No results found.      Physical Exam:     /88   Pulse 96   Temp 97.6 °F (36.4 °C)   Resp 18   Ht 5' 3" (1.6 m)   Wt 65.3 kg (144 lb)   LMP  (LMP Unknown)   SpO2 97%   BMI 25.51 kg/m²     Physical Exam  Vitals and nursing note reviewed. Constitutional:       General: She is not in acute distress. Appearance: Normal appearance. She is well-developed. She is not ill-appearing. HENT:      Head: Normocephalic and atraumatic. Eyes:      General:         Right eye: No discharge. Left eye: No discharge. Extraocular Movements: Extraocular movements intact. Conjunctiva/sclera: Conjunctivae normal.      Pupils: Pupils are equal, round, and reactive to light. Neck:      Vascular: No carotid bruit. Cardiovascular:      Rate and Rhythm: Normal rate and regular rhythm. Heart sounds: No murmur heard. Pulmonary:      Effort: Pulmonary effort is normal. No respiratory distress. Breath sounds: Normal breath sounds. Musculoskeletal:      Right lower leg: No edema. Left lower leg: No edema. Lymphadenopathy:      Cervical: No cervical adenopathy. Skin:     General: Skin is warm and dry. Capillary Refill: Capillary refill takes less than 2 seconds. Neurological:      Mental Status: She is alert. Mental status is at baseline. Psychiatric:         Mood and Affect: Mood normal.         Behavior: Behavior normal.         Thought Content:  Thought content normal.         Judgment: Judgment normal.     I spent 30 minutes with this patient of which greater than 50% was spent counseling or reviewing chart    Raza Rodriguez MD

## 2023-10-22 LAB — BACTERIA UR CULT: ABNORMAL

## 2023-10-23 ENCOUNTER — TELEPHONE (OUTPATIENT)
Dept: FAMILY MEDICINE CLINIC | Facility: CLINIC | Age: 58
End: 2023-10-23

## 2023-10-23 DIAGNOSIS — N30.00 ACUTE CYSTITIS WITHOUT HEMATURIA: Primary | ICD-10-CM

## 2023-10-23 RX ORDER — AMOXICILLIN 875 MG/1
875 TABLET, COATED ORAL 2 TIMES DAILY
Qty: 10 TABLET | Refills: 0 | Status: SHIPPED | OUTPATIENT
Start: 2023-10-23 | End: 2023-10-28

## 2023-10-23 NOTE — ASSESSMENT & PLAN NOTE
Depression. Patient continues to speak with her therapist every few weeks. She will continue on current regimen of medications.

## 2023-10-23 NOTE — ASSESSMENT & PLAN NOTE
Opioid dependence. Patient was given prescription to transition to oxycodone 5 mg 3 times daily as needed. Hopefully this would provide her with better pain relief.

## 2023-10-23 NOTE — TELEPHONE ENCOUNTER
----- Message from Stephen Javier MD sent at [phone]  6:07 AM EDT -----  Urine culture showed some bacteria which may be causing her symptoms. Prescription for antibiotics sent to pharmacy for 5 days.

## 2023-10-25 DIAGNOSIS — E03.9 HYPOTHYROIDISM, UNSPECIFIED TYPE: ICD-10-CM

## 2023-10-25 NOTE — TELEPHONE ENCOUNTER
----- Message from Cristobal Azul. Mock sent at 10/25/2023  6:49 AM EDT -----  Regarding: Provider Message  Contact: [phone]  Bean Paul,   Please give me a call  I am interested in the results and not sure what bacteria in my urine means   If you get my VM please leave a message to call you back or if it isn't to complicated we can try email. I am laying down but phone usually will wake me up. Thanks!   I just took my 3rd antibiotic

## 2023-10-25 NOTE — TELEPHONE ENCOUNTER
Called patient unable to speak with patient. Leave a detailed message for patient to return my call.  Will try back during the day

## 2023-10-26 RX ORDER — LEVOTHYROXINE SODIUM 88 UG/1
88 TABLET ORAL DAILY
Qty: 30 TABLET | Refills: 5 | Status: SHIPPED | OUTPATIENT
Start: 2023-10-26

## 2023-11-02 DIAGNOSIS — M32.9 SYSTEMIC LUPUS ERYTHEMATOSUS, UNSPECIFIED SLE TYPE, UNSPECIFIED ORGAN INVOLVEMENT STATUS (HCC): ICD-10-CM

## 2023-11-10 RX ORDER — PREDNISONE 5 MG/1
TABLET ORAL
Qty: 60 TABLET | Refills: 3 | Status: SHIPPED | OUTPATIENT
Start: 2023-11-10

## 2023-11-14 DIAGNOSIS — C50.212 MALIGNANT NEOPLASM OF UPPER-INNER QUADRANT OF LEFT BREAST IN FEMALE, ESTROGEN RECEPTOR NEGATIVE: ICD-10-CM

## 2023-11-14 DIAGNOSIS — M32.9 SYSTEMIC LUPUS ERYTHEMATOSUS, UNSPECIFIED SLE TYPE, UNSPECIFIED ORGAN INVOLVEMENT STATUS (HCC): ICD-10-CM

## 2023-11-14 DIAGNOSIS — Z17.1 MALIGNANT NEOPLASM OF UPPER-INNER QUADRANT OF LEFT BREAST IN FEMALE, ESTROGEN RECEPTOR NEGATIVE: ICD-10-CM

## 2023-11-14 DIAGNOSIS — I73.00 RAYNAUD'S DISEASE WITHOUT GANGRENE: ICD-10-CM

## 2023-11-14 RX ORDER — ONDANSETRON 4 MG/1
4 TABLET, FILM COATED ORAL EVERY 8 HOURS PRN
Qty: 30 TABLET | Refills: 0 | Status: SHIPPED | OUTPATIENT
Start: 2023-11-14

## 2023-11-16 RX ORDER — OXYCODONE HYDROCHLORIDE 5 MG/1
5 TABLET ORAL EVERY 8 HOURS PRN
Qty: 90 TABLET | Refills: 0 | Status: SHIPPED | OUTPATIENT
Start: 2023-11-16

## 2023-11-19 DIAGNOSIS — F41.9 ANXIETY: ICD-10-CM

## 2023-11-20 RX ORDER — ALPRAZOLAM 1 MG/1
1 TABLET ORAL 3 TIMES DAILY PRN
Qty: 60 TABLET | Refills: 2 | Status: SHIPPED | OUTPATIENT
Start: 2023-11-20

## 2023-12-07 DIAGNOSIS — M32.9 SYSTEMIC LUPUS ERYTHEMATOSUS, UNSPECIFIED SLE TYPE, UNSPECIFIED ORGAN INVOLVEMENT STATUS (HCC): ICD-10-CM

## 2023-12-07 RX ORDER — GABAPENTIN 300 MG/1
300 CAPSULE ORAL
Qty: 30 CAPSULE | Refills: 3 | Status: SHIPPED | OUTPATIENT
Start: 2023-12-07

## 2023-12-11 DIAGNOSIS — M32.9 SYSTEMIC LUPUS ERYTHEMATOSUS, UNSPECIFIED SLE TYPE, UNSPECIFIED ORGAN INVOLVEMENT STATUS (HCC): ICD-10-CM

## 2023-12-11 DIAGNOSIS — I73.00 RAYNAUD'S DISEASE WITHOUT GANGRENE: ICD-10-CM

## 2023-12-11 RX ORDER — OXYCODONE HYDROCHLORIDE 5 MG/1
5 TABLET ORAL EVERY 8 HOURS PRN
Qty: 90 TABLET | Refills: 0 | Status: SHIPPED | OUTPATIENT
Start: 2023-12-11

## 2024-01-05 DIAGNOSIS — I73.00 RAYNAUD'S DISEASE WITHOUT GANGRENE: ICD-10-CM

## 2024-01-05 DIAGNOSIS — M32.9 SYSTEMIC LUPUS ERYTHEMATOSUS, UNSPECIFIED SLE TYPE, UNSPECIFIED ORGAN INVOLVEMENT STATUS (HCC): ICD-10-CM

## 2024-01-08 RX ORDER — OXYCODONE HYDROCHLORIDE 5 MG/1
5 TABLET ORAL EVERY 8 HOURS PRN
Qty: 90 TABLET | Refills: 0 | Status: SHIPPED | OUTPATIENT
Start: 2024-01-08

## 2024-01-11 DIAGNOSIS — R13.19 ESOPHAGEAL DYSPHAGIA: ICD-10-CM

## 2024-01-11 RX ORDER — PANTOPRAZOLE SODIUM 40 MG/1
TABLET, DELAYED RELEASE ORAL
Qty: 30 TABLET | Refills: 5 | Status: SHIPPED | OUTPATIENT
Start: 2024-01-11

## 2024-01-16 DIAGNOSIS — F41.9 ANXIETY: ICD-10-CM

## 2024-01-16 RX ORDER — SERTRALINE HYDROCHLORIDE 100 MG/1
TABLET, FILM COATED ORAL
Qty: 45 TABLET | Refills: 0 | Status: SHIPPED | OUTPATIENT
Start: 2024-01-16

## 2024-01-16 RX ORDER — SERTRALINE HYDROCHLORIDE 100 MG/1
TABLET, FILM COATED ORAL
Qty: 45 TABLET | Refills: 0 | Status: SHIPPED | OUTPATIENT
Start: 2024-01-16 | End: 2024-01-16 | Stop reason: SDUPTHER

## 2024-01-18 DIAGNOSIS — C50.919 MALIGNANT NEOPLASM OF FEMALE BREAST, UNSPECIFIED ESTROGEN RECEPTOR STATUS, UNSPECIFIED LATERALITY, UNSPECIFIED SITE OF BREAST (HCC): ICD-10-CM

## 2024-01-19 RX ORDER — ALPRAZOLAM 1 MG/1
1 TABLET ORAL 3 TIMES DAILY PRN
Qty: 60 TABLET | Refills: 0 | Status: SHIPPED | OUTPATIENT
Start: 2024-01-19

## 2024-01-19 RX ORDER — HYDROCODONE BITARTRATE AND ACETAMINOPHEN 5; 325 MG/1; MG/1
1 TABLET ORAL EVERY 6 HOURS PRN
Qty: 120 TABLET | Refills: 0 | Status: SHIPPED | OUTPATIENT
Start: 2024-01-19

## 2024-02-07 DIAGNOSIS — C50.212 MALIGNANT NEOPLASM OF UPPER-INNER QUADRANT OF LEFT BREAST IN FEMALE, ESTROGEN RECEPTOR NEGATIVE: ICD-10-CM

## 2024-02-07 DIAGNOSIS — F41.9 ANXIETY: ICD-10-CM

## 2024-02-07 DIAGNOSIS — Z17.1 MALIGNANT NEOPLASM OF UPPER-INNER QUADRANT OF LEFT BREAST IN FEMALE, ESTROGEN RECEPTOR NEGATIVE: ICD-10-CM

## 2024-02-08 RX ORDER — ALPRAZOLAM 1 MG/1
1 TABLET ORAL 3 TIMES DAILY PRN
Qty: 60 TABLET | Refills: 0 | Status: SHIPPED | OUTPATIENT
Start: 2024-02-08

## 2024-02-08 RX ORDER — ONDANSETRON 4 MG/1
4 TABLET, FILM COATED ORAL EVERY 8 HOURS PRN
Qty: 30 TABLET | Refills: 0 | Status: SHIPPED | OUTPATIENT
Start: 2024-02-08

## 2024-02-11 DIAGNOSIS — C50.919 MALIGNANT NEOPLASM OF FEMALE BREAST, UNSPECIFIED ESTROGEN RECEPTOR STATUS, UNSPECIFIED LATERALITY, UNSPECIFIED SITE OF BREAST (HCC): ICD-10-CM

## 2024-02-11 DIAGNOSIS — R11.2 INTRACTABLE VOMITING WITH NAUSEA, UNSPECIFIED VOMITING TYPE: ICD-10-CM

## 2024-02-12 RX ORDER — METOCLOPRAMIDE 5 MG/1
5 TABLET ORAL 3 TIMES DAILY PRN
Qty: 30 TABLET | Refills: 0 | Status: SHIPPED | OUTPATIENT
Start: 2024-02-12

## 2024-02-12 RX ORDER — HYDROCODONE BITARTRATE AND ACETAMINOPHEN 5; 325 MG/1; MG/1
1 TABLET ORAL EVERY 6 HOURS PRN
Qty: 120 TABLET | Refills: 0 | Status: SHIPPED | OUTPATIENT
Start: 2024-02-12

## 2024-02-12 RX ORDER — VARENICLINE 0.03 MG/.05ML
SPRAY NASAL
Refills: 0 | OUTPATIENT
Start: 2024-02-12

## 2024-02-15 ENCOUNTER — APPOINTMENT (OUTPATIENT)
Dept: LAB | Facility: CLINIC | Age: 59
End: 2024-02-15
Payer: COMMERCIAL

## 2024-02-15 ENCOUNTER — OFFICE VISIT (OUTPATIENT)
Dept: SURGICAL ONCOLOGY | Facility: CLINIC | Age: 59
End: 2024-02-15
Payer: COMMERCIAL

## 2024-02-15 VITALS
SYSTOLIC BLOOD PRESSURE: 140 MMHG | DIASTOLIC BLOOD PRESSURE: 88 MMHG | BODY MASS INDEX: 26.05 KG/M2 | HEIGHT: 63 IN | RESPIRATION RATE: 20 BRPM | WEIGHT: 147 LBS | TEMPERATURE: 97.2 F | HEART RATE: 109 BPM | OXYGEN SATURATION: 99 %

## 2024-02-15 DIAGNOSIS — Z08 ENCOUNTER FOR FOLLOW-UP SURVEILLANCE OF BREAST CANCER: Primary | ICD-10-CM

## 2024-02-15 DIAGNOSIS — M81.0 SENILE OSTEOPOROSIS: ICD-10-CM

## 2024-02-15 DIAGNOSIS — Z85.3 ENCOUNTER FOR FOLLOW-UP SURVEILLANCE OF BREAST CANCER: Primary | ICD-10-CM

## 2024-02-15 DIAGNOSIS — Z85.3 PERSONAL HISTORY OF BREAST CANCER: ICD-10-CM

## 2024-02-15 LAB
25(OH)D3 SERPL-MCNC: 20.5 NG/ML (ref 30–100)
CALCIUM SERPL-MCNC: 9.7 MG/DL (ref 8.4–10.2)

## 2024-02-15 PROCEDURE — 82306 VITAMIN D 25 HYDROXY: CPT

## 2024-02-15 PROCEDURE — 36415 COLL VENOUS BLD VENIPUNCTURE: CPT

## 2024-02-15 PROCEDURE — 99213 OFFICE O/P EST LOW 20 MIN: CPT

## 2024-02-15 NOTE — PROGRESS NOTES
Surgical Oncology Follow Up       [facility] [place]  [address]  [place] [id number]    Federica PEREIRA Mock  1965  [id number]  [facility] [place]  [address]  [place] [id number]    Diagnoses and all orders for this visit:    Encounter for follow-up surveillance of breast cancer    Personal history of breast cancer        Chief Complaint   Patient presents with    Follow-up       Return in about 6 months (around 8/15/2024) for Office Visit.      Oncology History   Personal history of breast cancer   2/12/2020 Biopsy    A. & B. Right breast stereotactic biopsy with and without calcs; 11 o'clock, 10 cm from nipple  Benign breast glandular parenchyma with fibroadenomatoid stromal hyperplasia  No atypia and no evidence of malignancy     C. Left breast ultrasound-guided biopsy; 10 o'clock, 5 cm from nipple  Benign breast glandular tissue  No atypia and no evidence of malignancy    D. Left breast ultrasound-guided biopsy; 10 o'clock, 4 cm from nipple  Invasive breast carcinoma of no special type (ductal NST)  Grade 3  ER 0  RI 0  HER2 2+; FISH positive    Concordant. Right breast clear. Malignant mass measures 2.3 cm on mammo. Left axilla US negative. Mammographic abnormality with no US correlate. MRI recommended to further evaluate this finding.     2/26/2020 Genetic Testing    The following genes were evaluated: LIZZETTE, BRCA1, BRCA2, CDH1, CHEK2, PALB2, PTEN, STK11, TP53  Negative result. No pathogenic sequence variants or deletions/dupllications identified  [employer]     3/24/2020 - 5/25/2020 Chemotherapy    pegfilgrastim (NEULASTA ONPRO) subcutaneous injection kit 6 mg, 6 mg, Subcutaneous, Once, 4 of 6 cycles  Administration: 6 mg (3/24/2020), 6 mg (4/14/2020), 6 mg (5/5/2020)  fosaprepitant (EMEND) 150 mg in sodium chloride 0.9 % 250 mL IVPB, 150 mg, Intravenous, Once, 4 of 6  cycles  Administration: 150 mg (3/24/2020), 150 mg (4/14/2020), 150 mg (5/5/2020)  pertuzumab (PERJETA) 840 mg in sodium chloride 0.9 % 250 mL IVPB, 840 mg, Intravenous, Once, 4 of 6 cycles  Administration: 840 mg (3/24/2020), 420 mg (4/14/2020), 420 mg (5/5/2020)  CARBOplatin (PARAPLATIN) 547.8 mg in sodium chloride 0.9 % 250 mL IVPB, 547.8 mg, Intravenous, Once, 4 of 6 cycles  Administration: 547.8 mg (3/24/2020), 575.4 mg (4/14/2020), 625.8 mg (5/5/2020)  DOCEtaxel (TAXOTERE) 122.2 mg in sodium chloride 0.9 % 250 mL chemo infusion, 75 mg/m2 = 122.2 mg, Intravenous, Once, 4 of 6 cycles  Administration: 122.2 mg (3/24/2020), 122.2 mg (4/14/2020), 122.2 mg (5/5/2020)  trastuzumab (HERCEPTIN) 486 mg in sodium chloride 0.9 % 250 mL chemo infusion, 8 mg/kg = 486 mg, Intravenous, Once, 4 of 18 cycles  Administration: 486 mg (3/24/2020), 364 mg (4/14/2020), 364 mg (5/5/2020)     6/3/2020 - 7/18/2021 Chemotherapy    pertuzumab (PERJETA) IVPB, 420 mg (50 % of original dose 840 mg), Intravenous, Once, 13 of 13 cycles  Dose modification: 420 mg (original dose 840 mg, Cycle 1, Reason: Dose Not Tolerated)  Administration: 420 mg (6/3/2020), 420 mg (11/9/2020), 420 mg (6/25/2020), 420 mg (11/30/2020), 420 mg (12/21/2020), 420 mg (1/11/2021), 420 mg (2/2/2021), 420 mg (2/23/2021), 420 mg (4/6/2021), 420 mg (4/26/2021), 420 mg (5/17/2021), 420 mg (6/7/2021), 420 mg (6/28/2021)  trastuzumab-qyyp (TRAZIMERA) chemo infusion, 6 mg/kg = 337 mg (100 % of original dose 6 mg/kg), Intravenous, Once, 5 of 5 cycles  Dose modification: 6 mg/kg (original dose 6 mg/kg, Cycle 9)  Administration: 337 mg (4/6/2021), 337 mg (4/26/2021), 337 mg (5/17/2021), 337 mg (6/7/2021), 337 mg (6/28/2021)  PACLItaxel (TAXOL) chemo IVPB, 80 mg/m2 = 127.2 mg, Intravenous, Once, 2 of 2 cycles  Administration: 127.2 mg (6/3/2020), 127.2 mg (6/10/2020), 127.2 mg (6/17/2020), 127.2 mg (6/25/2020), 127.2 mg (7/1/2020), 127.2 mg (7/7/2020)  trastuzumab (HERCEPTIN)  chemo infusion, 6 mg/kg = 340 mg (75 % of original dose 8 mg/kg), Intravenous, Once, 8 of 8 cycles  Dose modification: 6 mg/kg (original dose 8 mg/kg, Cycle 1, Reason: Dose Not Tolerated)  Administration: 340 mg (6/3/2020), 376 mg (11/9/2020), 340 mg (6/25/2020), 376 mg (11/30/2020), 376 mg (12/21/2020), 376 mg (1/11/2021), 376 mg (2/2/2021), 337 mg (2/23/2021)     6/4/2020 -  Cancer Staged    Staging form: Breast, AJCC 8th Edition  - Clinical: Stage IIA (cT2, cN0, cM0, G3, ER-, TN-, HER2+) - Signed by Damian Langley MD on 6/4/2020  Laterality: Left  Histologic grading system: 3 grade system       8/18/2020 Surgery    Left breast mastectomy with sentinel lymph node biopsy  No residual carcinoma identified  Prior procedural site changes  0/3 Lymph nodes     8/18/2020 -  Cancer Staged    Staging form: Breast, AJCC 8th Edition  - Pathologic stage from 8/18/2020: No Stage Recommended (ypT0, pN0, cM0, ER-, TN-, HER2+) - Signed by ANNETTE Shepherd on 8/2/2023  Stage prefix: Post-therapy  Response to neoadjuvant therapy: Complete response             History of Present Illness:  The patient returns to the office today in follow-up for her history of left breast cancer.  She is currently HARVEY at 3 1/2 years from mastectomy. She does have several complex health conditions, and complains of weight gain, hand tremors and headaches.  She also notes more pain along the mastectomy incision and up into the axilla over the past 6 months.  She denies any new lumps or skin changes on self-breast exam.  Brain MRI had been performed in September due to new neck and neurologic complaints; this did not show any evidence of disease. Right breast mammogram and US were performed in October for a palpable lump.  This imaging was negative, and patient reports the lump has disappeared.        Review of Systems   Constitutional:  Positive for unexpected weight change (gain). Negative for activity change and appetite change.   HENT:  Negative.     Respiratory: Negative.     Cardiovascular: Negative.    Gastrointestinal: Negative.    Musculoskeletal:  Positive for arthralgias.   Skin: Negative.  Negative for color change and wound.   Neurological:  Positive for tremors and headaches.   Hematological: Negative.  Negative for adenopathy.   Psychiatric/Behavioral:  Positive for sleep disturbance. The patient is nervous/anxious.            Patient Active Problem List   Diagnosis    Systemic lupus erythematosus (HCC)    Hypothyroidism    Posttraumatic stress disorder    Adult celiac disease    Anxiety    Depression    Esophagitis    Nephrolithiasis    Vitamin D deficiency    Personal history of breast cancer    Chemotherapy induced neutropenia     Port-A-Cath in place    JEFERSON (acute kidney injury) (HCC)    Anemia    Colitis    Continuous opioid dependence (HCC)    Smoking    Raynaud's disease without gangrene    Major depressive disorder in full remission, unspecified whether recurrent (HCC)    Neuralgia    Encounter for follow-up surveillance of breast cancer     Past Medical History:   Diagnosis Date    Cancer (HCC)     Colon polyp     Disease of thyroid gland     History of chemotherapy     Hypertension     Kidney stone     Lupus (HCC)     Malignant neoplasm of upper-inner quadrant of left breast in female, estrogen receptor negative  2/25/2020    left    Nephrolithiasis     PTSD (post-traumatic stress disorder)     Sjogren's disease (HCC)      Past Surgical History:   Procedure Laterality Date    BREAST BIOPSY Left 02/12/2020    us guided -  invasive ductal carcinoma    BREAST BIOPSY Right 02/12/2020    benign stereo    ECTOPIC PREGNANCY SURGERY      FEMUR FRACTURE SURGERY      FL GUIDED CENTRAL VENOUS ACCESS DEVICE INSERTION  03/17/2020    HYSTERECTOMY      LEFT OOPHORECTOMY      due to torsion     MAMMO STEREOTACTIC BREAST BIOPSY RIGHT (ALL INC) Right 02/12/2020    MASTECTOMY Left 08/2020    MASTECTOMY W/ SENTINEL NODE BIOPSY Left 08/18/2020     Procedure: BREAST MASTECTOMY WITH SENTINEL LYMPH NODE BIOPSY, LYMPHATIC MAPPING WITH BLUE DYE AND RADIOACTIVE DYE (INJECT AT 1430 BY DR SHANNON IN THE OR);  Surgeon: Dominick Shannon MD;  Location: [facility];  Service: Surgical Oncology    MRI BREAST BIOPSY LEFT (ALL INCLUSIVE) Left 03/20/2020    DC INSJ TUNNELED CTR VAD W/SUBQ PORT AGE 5 YR/> N/A 03/17/2020    Procedure: INSERTION VENOUS PORT ( PORT-A-CATH) IR;  Surgeon: Jose Morel DO;  Location: [facility];  Service: Interventional Radiology    DC RMVL MASRHA CTR VAD W/SUBQ PORT/ CTR/PRPH INSJ N/A 07/06/2021    Procedure: REMOVAL VENOUS PORT (PORT-A-CATH)IR;  Surgeon: Jose Morel DO;  Location: [facility];  Service: Interventional Radiology    US GUIDANCE BREAST BIOPSY LEFT EACH ADDITIONAL Left 02/12/2020    US GUIDED BREAST BIOPSY LEFT COMPLETE Left 02/12/2020    VAGINA SURGERY       Family History   Problem Relation Age of Onset    Diabetes Mother     Hypertension Mother     Nephrolithiasis Mother     Breast cancer Mother 70    Heart disease Father     Hypertension Father     Diabetes Brother     Nephrolithiasis Brother     Breast cancer Maternal Aunt     No Known Problems Maternal Grandmother     No Known Problems Maternal Grandfather     No Known Problems Paternal Grandmother     No Known Problems Paternal Grandfather      Social History     Socioeconomic History    Marital status: /Civil Union     Spouse name: Not on file    Number of children: Not on file    Years of education: Not on file    Highest education level: Not on file   Occupational History    Not on file   Tobacco Use    Smoking status: Every Day     Current packs/day: 0.50     Average packs/day: 0.5 packs/day for 40.0 years (20.0 ttl pk-yrs)     Types: Cigarettes     Start date: 1990    Smokeless tobacco: Never   Vaping Use    Vaping status: Never Used   Substance and Sexual Activity    Alcohol use: Not Currently    Drug use: No    Sexual activity: Not Currently     Partners:  Male     Birth control/protection: None   Other Topics Concern    Not on file   Social History Narrative    Not on file     Social Determinants of Health     Financial Resource Strain: High Risk (10/13/2023)    Overall Financial Resource Strain (CARDIA)     Difficulty of Paying Living Expenses: Very hard   Food Insecurity: Not on file   Transportation Needs: No Transportation Needs (10/13/2023)    PRAPARE - Transportation     Lack of Transportation (Medical): No     Lack of Transportation (Non-Medical): No   Physical Activity: Not on file   Stress: Not on file   Social Connections: Not on file   Intimate Partner Violence: Not on file   Housing Stability: Not on file       Current Outpatient Medications:     ALPRAZolam (XANAX) 1 mg tablet, Take 1 tablet (1 mg total) by mouth 3 (three) times a day as needed for anxiety, Disp: 60 tablet, Rfl: 0    amLODIPine (NORVASC) 2.5 mg tablet, One po bid, Disp: 30 tablet, Rfl: 5    Clobetasol Propionate E 0.05 % emollient cream, APPLY TO ARMS OR LEGS TWICE A DAY, Disp: , Rfl:     cyclobenzaprine (FLEXERIL) 5 mg tablet, Take 10 mg by mouth daily at bedtime as needed, Disp: , Rfl:     dicyclomine (BENTYL) 10 mg capsule, TAKE ONE CAPSULE BY MOUTH TWICE A DAY, Disp: 60 capsule, Rfl: 3    gabapentin (NEURONTIN) 300 mg capsule, TAKE ONE CAPSULE BY MOUTH AT BEDTIME, Disp: 30 capsule, Rfl: 3    HYDROcodone-acetaminophen (NORCO) 5-325 mg per tablet, Take 1 tablet by mouth every 6 (six) hours as needed for pain Max Daily Amount: 4 tablets, Disp: 120 tablet, Rfl: 0    hydroxychloroquine (PLAQUENIL) 200 mg tablet, Take 1 tablet in morning and 1/2 tablet in evening, Disp: , Rfl:     levothyroxine 88 mcg tablet, Take 1 tablet (88 mcg total) by mouth daily, Disp: 30 tablet, Rfl: 5    magnesium oxide (MAG-OX) 400 mg, TAKE ONE TABLET BY MOUTH TWICE A DAY, Disp: 60 tablet, Rfl: 5    ondansetron (ZOFRAN) 4 mg tablet, Take 1 tablet (4 mg total) by mouth every 8 (eight) hours as needed for nausea or  vomiting, Disp: 30 tablet, Rfl: 0    pantoprazole (PROTONIX) 40 mg tablet, TAKE ONE TABLET BY MOUTH EVERY DAY, Disp: 30 tablet, Rfl: 5    predniSONE 5 mg tablet, TAKE ONE TABLET BY MOUTH TWICE A DAY WITH FOOD, Disp: 60 tablet, Rfl: 3    sertraline (ZOLOFT) 100 mg tablet, TAKE ONE AND ONE-HALF TABLETS BY MOUTH DAILY, Disp: 45 tablet, Rfl: 0    ALPRAZolam (XANAX) 1 mg tablet, Take 1 tablet (1 mg total) by mouth 3 (three) times a day as needed for anxiety (Patient not taking: Reported on 10/20/2023), Disp: 60 tablet, Rfl: 2    amLODIPine (NORVASC) 2.5 mg tablet, Take 2.5 mg by mouth daily (Patient not taking: Reported on 10/6/2021), Disp: , Rfl:     HYDROcodone-acetaminophen (NORCO)  mg per tablet, Takes 1/2 tablet every 6 hours as needed (Patient not taking: Reported on 10/20/2023), Disp: 60 tablet, Rfl: 0    magnesium oxide (MAG-OX) 400 mg tablet, Take 1 tablet by mouth 2 (two) times a day (Patient not taking: Reported on 3/10/2023), Disp: , Rfl:     magnesium Oxide (MAG-OX) 400 mg TABS, Take 1 tablet (400 mg total) by mouth 2 (two) times a day (Patient not taking: Reported on 10/20/2023), Disp: 60 tablet, Rfl: 0    metoclopramide (REGLAN) 5 mg tablet, Take 1 tablet (5 mg total) by mouth 3 (three) times a day as needed (as needed) (Patient not taking: Reported on 2/15/2024), Disp: 30 tablet, Rfl: 0    oxyCODONE (Roxicodone) 5 immediate release tablet, Take 1 tablet (5 mg total) by mouth every 8 (eight) hours as needed for moderate pain Max Daily Amount: 15 mg (Patient not taking: Reported on 2/15/2024), Disp: 90 tablet, Rfl: 0    predniSONE 10 mg tablet, 5 tabs daily X 3 days, 4 tabs daily X 3 days, 3 tabs daily X 3 days, 2 tabs daily X 3 days, 1 tab daily X 3 days (Patient not taking: Reported on 2/15/2024), Disp: 45 tablet, Rfl: 0    Tyrvaya 0.03 MG/ACT SOLN, , Disp: , Rfl:   Allergies   Allergen Reactions    Mobic [Meloxicam] Eye Swelling     Reaction Date: 12Aug2011;     Methotrexate Rash    Sulfa  Antibiotics Rash     Vitals:    02/15/24 1349   BP: 140/88   Pulse: (!) 109   Resp: 20   Temp: (!) 97.2 °F (36.2 °C)   SpO2: 99%       Physical Exam  Vitals reviewed.   Constitutional:       General: She is not in acute distress.     Appearance: Normal appearance. She is normal weight. She is not ill-appearing or toxic-appearing.   HENT:      Head: Normocephalic and atraumatic.      Nose: Nose normal.      Mouth/Throat:      Pharynx: Oropharynx is clear.   Eyes:      General: No scleral icterus.     Conjunctiva/sclera: Conjunctivae normal.   Cardiovascular:      Rate and Rhythm: Normal rate.   Pulmonary:      Effort: Pulmonary effort is normal.   Chest:          Comments: Left chest wall and right breast are smooth and even, without any masses or nodules. There is mild tenderness on palpation of left chest wall and axilla.  No erythema, swelling, drainage or skin changes. I do not appreciate any adenopathy.  Musculoskeletal:         General: No swelling.      Cervical back: Neck supple. No tenderness.   Lymphadenopathy:      Cervical: No cervical adenopathy.   Skin:     General: Skin is warm and dry.   Neurological:      General: No focal deficit present.      Mental Status: She is alert and oriented to person, place, and time.   Psychiatric:         Mood and Affect: Mood normal.         Behavior: Behavior normal.         Thought Content: Thought content normal.         Judgment: Judgment normal.           Discussion/Summary: This is a 57 y/o female with a complex health and medication history, who presents today for breast cancer surveillance.  At this time there is no evidence of disease recurrence, and her most recent imaging was normal.  Her pain is likely nerve-related, and I have encouraged her to discuss this with her PCP, as she may benefit from an adjustment in gabapentin dosing.  I will plan to see her again in 6 months for another clinical exam, or sooner should the need arise.  She is agreeable to the  plan, all questions were answered today.

## 2024-02-28 DIAGNOSIS — F41.9 ANXIETY: ICD-10-CM

## 2024-02-28 RX ORDER — ALPRAZOLAM 1 MG/1
1 TABLET ORAL 3 TIMES DAILY PRN
Qty: 60 TABLET | Refills: 0 | Status: SHIPPED | OUTPATIENT
Start: 2024-02-28

## 2024-02-29 ENCOUNTER — TELEPHONE (OUTPATIENT)
Dept: HEMATOLOGY ONCOLOGY | Facility: CLINIC | Age: 59
End: 2024-02-29

## 2024-02-29 NOTE — TELEPHONE ENCOUNTER
Patient Call    Who are you speaking with? Patient    If it is not the patient, are they listed on an active communication consent form? N/A   What is the reason for this call? Patient calling in regards to reminders she is getting in regards to Creatinine and BUN orders.  Patient would like to know if she needs this lab work done now or before she sees Malina Boone in August.  Patient would like to know if this lab work is in regards to have another port placed.  Patient would like a call back to discuss.    Does this require a call back? Yes   If a call back is required, please list best call back number [phone]    Patient gave permission to leave a detailed message on her answering machine   If a call back is required, advise that a message will be forwarded to their care team and someone will return their call as soon as possible.   Did you relay this information to the patient? Yes

## 2024-02-29 NOTE — TELEPHONE ENCOUNTER
Spoke with pt. Explained to pt that she does not need any lab work completed prior to her f/u appt with Malina in August. Pt verbalized understanding and was appreciative of the call.

## 2024-03-02 DIAGNOSIS — M32.9 SYSTEMIC LUPUS ERYTHEMATOSUS, UNSPECIFIED SLE TYPE, UNSPECIFIED ORGAN INVOLVEMENT STATUS (HCC): ICD-10-CM

## 2024-03-04 RX ORDER — PREDNISONE 5 MG/1
TABLET ORAL
Qty: 60 TABLET | Refills: 0 | Status: SHIPPED | OUTPATIENT
Start: 2024-03-04

## 2024-03-05 ENCOUNTER — PATIENT MESSAGE (OUTPATIENT)
Dept: FAMILY MEDICINE CLINIC | Facility: CLINIC | Age: 59
End: 2024-03-05

## 2024-03-11 DIAGNOSIS — C50.919 MALIGNANT NEOPLASM OF FEMALE BREAST, UNSPECIFIED ESTROGEN RECEPTOR STATUS, UNSPECIFIED LATERALITY, UNSPECIFIED SITE OF BREAST (HCC): ICD-10-CM

## 2024-03-11 RX ORDER — HYDROCODONE BITARTRATE AND ACETAMINOPHEN 5; 325 MG/1; MG/1
1 TABLET ORAL EVERY 6 HOURS PRN
Qty: 120 TABLET | Refills: 0 | Status: SHIPPED | OUTPATIENT
Start: 2024-03-11

## 2024-03-14 ENCOUNTER — APPOINTMENT (OUTPATIENT)
Dept: LAB | Facility: CLINIC | Age: 59
End: 2024-03-14
Payer: COMMERCIAL

## 2024-03-14 DIAGNOSIS — M32.10 LUPOID NEPHRITIS  (HCC): ICD-10-CM

## 2024-03-14 DIAGNOSIS — N08 LUPOID NEPHRITIS  (HCC): ICD-10-CM

## 2024-03-14 DIAGNOSIS — M81.0 SENILE OSTEOPOROSIS: ICD-10-CM

## 2024-03-14 LAB
BASOPHILS # BLD AUTO: 0.1 THOUSANDS/ÂΜL (ref 0–0.1)
BASOPHILS NFR BLD AUTO: 1 % (ref 0–1)
C3 SERPL-MCNC: 165 MG/DL (ref 87–200)
C4 SERPL-MCNC: 36 MG/DL (ref 19–52)
CRP SERPL QL: 12.7 MG/L
EOSINOPHIL # BLD AUTO: 0.12 THOUSAND/ÂΜL (ref 0–0.61)
EOSINOPHIL NFR BLD AUTO: 1 % (ref 0–6)
ERYTHROCYTE [DISTWIDTH] IN BLOOD BY AUTOMATED COUNT: 16.3 % (ref 11.6–15.1)
ERYTHROCYTE [SEDIMENTATION RATE] IN BLOOD: 12 MM/HOUR (ref 0–29)
HCT VFR BLD AUTO: 41.6 % (ref 34.8–46.1)
HGB BLD-MCNC: 13.6 G/DL (ref 11.5–15.4)
IMM GRANULOCYTES # BLD AUTO: 0.1 THOUSAND/UL (ref 0–0.2)
IMM GRANULOCYTES NFR BLD AUTO: 1 % (ref 0–2)
LYMPHOCYTES # BLD AUTO: 1.57 THOUSANDS/ÂΜL (ref 0.6–4.47)
LYMPHOCYTES NFR BLD AUTO: 15 % (ref 14–44)
MCH RBC QN AUTO: 30.1 PG (ref 26.8–34.3)
MCHC RBC AUTO-ENTMCNC: 32.7 G/DL (ref 31.4–37.4)
MCV RBC AUTO: 92 FL (ref 82–98)
MONOCYTES # BLD AUTO: 0.61 THOUSAND/ÂΜL (ref 0.17–1.22)
MONOCYTES NFR BLD AUTO: 6 % (ref 4–12)
NEUTROPHILS # BLD AUTO: 8.18 THOUSANDS/ÂΜL (ref 1.85–7.62)
NEUTS SEG NFR BLD AUTO: 76 % (ref 43–75)
NRBC BLD AUTO-RTO: 0 /100 WBCS
PLATELET # BLD AUTO: 267 THOUSANDS/UL (ref 149–390)
PMV BLD AUTO: 10.7 FL (ref 8.9–12.7)
RBC # BLD AUTO: 4.52 MILLION/UL (ref 3.81–5.12)
WBC # BLD AUTO: 10.68 THOUSAND/UL (ref 4.31–10.16)

## 2024-03-14 PROCEDURE — 86160 COMPLEMENT ANTIGEN: CPT

## 2024-03-14 PROCEDURE — 86258 DGP ANTIBODY EACH IG CLASS: CPT

## 2024-03-14 PROCEDURE — 86364 TISS TRNSGLTMNASE EA IG CLAS: CPT

## 2024-03-14 PROCEDURE — 86162 COMPLEMENT TOTAL (CH50): CPT

## 2024-03-14 PROCEDURE — 86140 C-REACTIVE PROTEIN: CPT

## 2024-03-14 PROCEDURE — 85652 RBC SED RATE AUTOMATED: CPT

## 2024-03-14 PROCEDURE — 82784 ASSAY IGA/IGD/IGG/IGM EACH: CPT

## 2024-03-14 PROCEDURE — 86231 EMA EACH IG CLASS: CPT

## 2024-03-14 PROCEDURE — 36415 COLL VENOUS BLD VENIPUNCTURE: CPT

## 2024-03-14 PROCEDURE — 85025 COMPLETE CBC W/AUTO DIFF WBC: CPT

## 2024-03-15 LAB — CH50 SERPL-ACNC: >60 U/ML

## 2024-03-16 LAB
ENDOMYSIUM IGA SER QL: NEGATIVE
GLIADIN PEPTIDE IGA SER-ACNC: 4 UNITS (ref 0–19)
GLIADIN PEPTIDE IGG SER-ACNC: 3 UNITS (ref 0–19)
IGA SERPL-MCNC: 242 MG/DL (ref 87–352)
TTG IGA SER-ACNC: <2 U/ML (ref 0–3)
TTG IGA SER-ACNC: <2 U/ML (ref 0–3)
TTG IGG SER-ACNC: <2 U/ML (ref 0–5)

## 2024-03-18 NOTE — PATIENT COMMUNICATION
Patient called back and left voicemail.   Attempted to call patient back again to schedule appointment     No answer, LM

## 2024-03-20 DIAGNOSIS — F41.9 ANXIETY: ICD-10-CM

## 2024-03-20 RX ORDER — ALPRAZOLAM 1 MG/1
1 TABLET ORAL 3 TIMES DAILY PRN
Qty: 60 TABLET | Refills: 3 | Status: SHIPPED | OUTPATIENT
Start: 2024-03-20

## 2024-03-20 RX ORDER — SERTRALINE HYDROCHLORIDE 100 MG/1
TABLET, FILM COATED ORAL
Qty: 45 TABLET | Refills: 3 | Status: SHIPPED | OUTPATIENT
Start: 2024-03-20

## 2024-03-21 NOTE — ASSESSMENT & PLAN NOTE
[facility] - Established Patient    Last Clinic Visit: 9/19/23    Chief Complaint: Follow-up for fatty liver      HISTORY     This is a 53 y.o. female with PMH noted below, here for follow-up of above.    Fatty liver first noted on abd US in 2015. Not overt findings of advanced liver fibrosis on imaging.     Risk factors for fatty liver include:   BMI >40  HTN, HLD, DM    She has had intermittent elevations in her liver enzymes over the years. Liver tests higher in 8/2023 when in the ER with GI symptoms - possible gallbladder attack vs viral syndrome.    Serologic workup has been negative for hemochromatosis, autoimmune etiology, and viral hepatitis.    Fibrosis staging:   Fibroscan 9/2023: F3 (kPa 11.1), S3  Fibroscan 3/2024: F0-F1, S3     Interval history:  Recently diagnosed with BRCA. Has seen Oncology and a surgeon. Likely plans for mastectomy.     No new concerns from liver standpoint.   No symptoms of hepatic decompensation including jaundice, ascites, cognitive problems to suggest hepatic encephalopathy, or GI bleeding.     She underwent cholecystectomy 11/2023. Liver biopsy done intra-op:  Final Pathologic Diagnosis 1. Liver, biopsy:  - Steatosis, moderate (60%; 50% macrovesicular and 10% microvesicular)  - Mild portal-based chronic inflammation with rare lobular foci of inflammation  - JAYNE Activity Score: Score 4 out of 8    - Steatosis: Score 2 out of 3    - Hepatocyte ballooning: Score 1 out of 2    - Inflammation: Score 1 out of 3    - Fibrosis: Stage 2 out of 4 (Zone 3 sinusoidal fibrosis and portal fibrosis)  - Focal changes of repair     Updates on risk factors for fatty liver:  Weight -- Body mass index is 39.75 kg/m².      Had lost 40 lb since 2017 though gained ~10 lb back.                   Dyslipidemia -- mildly elevated                                Insulin resistance / diabetes -- HgbA1c 6  Remains on Trulicity         Alcohol use -- 1 drink/month    Liver enzymes remain  · Continue home regimen of dexamethasone     · Continue follow-up as outpatient normal.     Health Maintenance:  - Most recent imaging: abd US 3/2024 without focal hepatic lesion   - Hepatitis A & B vaccination: immunity unknown           Past medical history, surgical history, problem list, family history, social history, allergies: Reviewed and updated in the appropriate section of the electronic medical record.      Current Outpatient Medications   Medication Sig Dispense Refill    blood sugar diagnostic Strp To check blood glucose 3 times daily, to use with insurance preferred meter 300 strip 4    blood-glucose meter Misc To check blood glucose 3 times daily, to use with insurance preferred meter 1 each 0    blood-glucose meter Misc Use as directed 1 each 0    dulaglutide (TRULICITY) 1.5 mg/0.5 mL pen injector Inject 1.5 mg subcutaneously into the skin every 7 days. 4 pen 5    lancets 33 gauge Misc To check blood glucose 3 times daily, to use with insurance preferred meter 300 each 4    sertraline (ZOLOFT) 100 MG tablet Take 2 tablets (200 mg total) by mouth once daily. 180 tablet 1     No current facility-administered medications for this visit.     Facility-Administered Medications Ordered in Other Visits   Medication Dose Route Frequency Provider Last Rate Last Admin    0.9%  NaCl infusion   Intravenous Continuous Jeff Jhaveri PA-C         Medication list reviewed and updated.      Review of Systems - as per HPI  Constitutional: Negative for unexpected weight change.   Respiratory: Negative for shortness of breath.    Cardiovascular: Negative for leg swelling.  Gastrointestinal: Negative for abdominal distention or abdominal pain. Negative for melena or hematemesis.  Musculoskeletal: Negative for myalgias.    Skin: Negative for jaundice or itching.  Neurological: Negative for confusion or slowed mentation. Negative for tremors.   Hematological: Does not bruise/bleed easily.       Physical Exam   Constitutional: No distress. Alert and oriented.  Eyes: No scleral icterus.  "  Pulmonary/Chest: Respiratory effort normal. No respiratory distress.   Abdominal: No distension, no ascites appreciated.   Extremities: No edema.   Neurological: No tremor.   Skin: No jaundice.   Psychiatric: Normal mood and affect. Speech, behavior, and thought content normal.         LABS & DIAGNOSTIC STUDIES     I have personally reviewed pertinent laboratory findings:    Lab Results   Component Value Date    ALT 28 02/19/2024    AST 18 02/19/2024    ALKPHOS 81 02/19/2024    BILITOT 0.5 02/19/2024    ALBUMIN 3.6 02/19/2024    INR 1.0 08/24/2023       Lab Results   Component Value Date    WBC 6.90 02/19/2024    HGB 13.8 02/19/2024    HCT 42.4 02/19/2024    MCV 85 02/19/2024     02/19/2024       Lab Results   Component Value Date     02/19/2024    K 3.6 02/19/2024    BUN 11 02/19/2024    CREATININE 0.8 02/19/2024    ESTGFRAFRICA >60.0 04/12/2022    EGFRNONAA >60.0 04/12/2022       Lab Results   Component Value Date    SMOOTHMUSCAB Negative 1:40 09/12/2023    AMAIFA Negative 1:40 09/12/2023    IGGSERUM 1029 09/12/2023    ANASCREEN Negative <1:80 09/12/2023    FERRITIN 61 09/10/2019    FESATURATED 14 (L) 09/10/2019    HEPBSAG Non-reactive 08/24/2023    HEPBIGM Non-reactive 08/24/2023    HEPCAB Non-reactive 08/24/2023    HEPAIGM Non-reactive 08/24/2023       No results found for: "AFP"    I have personally reviewed the following result reports:  Abdominal US - 3/2024  CT - 8/23/23  Liver biopsy - 11/2023        ASSESSMENT & PLAN     53 y.o. female with:    1. TSE (nonalcoholic steatohepatitis)    2. Hepatic fibrosis    3. Hyperlipidemia associated with type 2 diabetes mellitus    4. Type 2 diabetes mellitus with hyperglycemia, without long-term current use of insulin    5. Class 2 severe obesity with serious comorbidity and body mass index (BMI) of 39.0 to 39.9 in adult, unspecified obesity type        Liver biopsy 11/2023 confirmed fatty liver/MASH with stage 2 fibrosis. Liver enzymes have normalized " with weight loss and improved blood sugar control.     Recommendations:  Repeat Fibroscan in 1 year    Recommend labs to monitor liver enzymes/LFTs every 6-12 months, which can include labs with PCP    US surveillance every year given moderate fibrosis on biopsy    Recommend vaccination for hepatitis A/B if not immune    Commended on weight loss success, encouraged to continue. Low carbohydrate/sugar, high protein/fiber diet.    Maintain good control of metabolic risk factors, specifically blood sugar and cholesterol.     May be a candidate for fatty liver meds coming soon. Recommend she reach out in ~6 months if interested.       Orders Placed This Encounter   Procedures    FibroScan Transplant Hepatology(Vibration Controlled Transient Elastography)    US Abdomen Limited       Return to clinic in 1 year with US and Fibroscan.      Thank you for allowing me to participate in the care of NAMAN Daigle-C  Hepatology        Duration of encounter: 30 min  This includes face to face time and non-face to face time preparing to see the patient (eg, review of tests), obtaining and/or reviewing separately obtained history, documenting clinical information in the electronic or other health record, independently interpreting results and communicating results to the patient/family/caregiver, or care coordination.

## 2024-03-29 DIAGNOSIS — M32.9 SYSTEMIC LUPUS ERYTHEMATOSUS, UNSPECIFIED SLE TYPE, UNSPECIFIED ORGAN INVOLVEMENT STATUS (HCC): ICD-10-CM

## 2024-03-29 DIAGNOSIS — R11.2 INTRACTABLE VOMITING WITH NAUSEA, UNSPECIFIED VOMITING TYPE: ICD-10-CM

## 2024-03-29 DIAGNOSIS — I73.00 RAYNAUD'S DISEASE WITHOUT GANGRENE: ICD-10-CM

## 2024-03-29 DIAGNOSIS — E03.9 HYPOTHYROIDISM, UNSPECIFIED TYPE: ICD-10-CM

## 2024-04-01 RX ORDER — OXYCODONE HYDROCHLORIDE 5 MG/1
5 TABLET ORAL EVERY 8 HOURS PRN
Qty: 90 TABLET | Refills: 0 | Status: SHIPPED | OUTPATIENT
Start: 2024-04-01

## 2024-04-01 RX ORDER — METOCLOPRAMIDE 5 MG/1
5 TABLET ORAL 3 TIMES DAILY PRN
Qty: 30 TABLET | Refills: 5 | Status: SHIPPED | OUTPATIENT
Start: 2024-04-01

## 2024-04-01 RX ORDER — LEVOTHYROXINE SODIUM 88 UG/1
88 TABLET ORAL DAILY
Qty: 30 TABLET | Refills: 5 | Status: SHIPPED | OUTPATIENT
Start: 2024-04-01

## 2024-04-04 DIAGNOSIS — M32.9 SYSTEMIC LUPUS ERYTHEMATOSUS, UNSPECIFIED SLE TYPE, UNSPECIFIED ORGAN INVOLVEMENT STATUS (HCC): ICD-10-CM

## 2024-04-04 RX ORDER — GABAPENTIN 300 MG/1
300 CAPSULE ORAL
Qty: 30 CAPSULE | Refills: 3 | Status: SHIPPED | OUTPATIENT
Start: 2024-04-04

## 2024-04-11 DIAGNOSIS — C50.919 MALIGNANT NEOPLASM OF FEMALE BREAST, UNSPECIFIED ESTROGEN RECEPTOR STATUS, UNSPECIFIED LATERALITY, UNSPECIFIED SITE OF BREAST (HCC): ICD-10-CM

## 2024-04-11 RX ORDER — HYDROCODONE BITARTRATE AND ACETAMINOPHEN 5; 325 MG/1; MG/1
1 TABLET ORAL EVERY 6 HOURS PRN
Qty: 120 TABLET | Refills: 0 | Status: SHIPPED | OUTPATIENT
Start: 2024-04-11

## 2024-04-24 DIAGNOSIS — C50.919 MALIGNANT NEOPLASM OF FEMALE BREAST, UNSPECIFIED ESTROGEN RECEPTOR STATUS, UNSPECIFIED LATERALITY, UNSPECIFIED SITE OF BREAST (HCC): ICD-10-CM

## 2024-04-28 DIAGNOSIS — E03.9 HYPOTHYROIDISM, UNSPECIFIED TYPE: ICD-10-CM

## 2024-04-28 RX ORDER — LEVOTHYROXINE SODIUM 88 UG/1
88 TABLET ORAL DAILY
Qty: 30 TABLET | Refills: 0 | Status: SHIPPED | OUTPATIENT
Start: 2024-04-28

## 2024-05-02 DIAGNOSIS — C50.212 MALIGNANT NEOPLASM OF UPPER-INNER QUADRANT OF LEFT BREAST IN FEMALE, ESTROGEN RECEPTOR NEGATIVE (HCC): ICD-10-CM

## 2024-05-02 DIAGNOSIS — Z17.1 MALIGNANT NEOPLASM OF UPPER-INNER QUADRANT OF LEFT BREAST IN FEMALE, ESTROGEN RECEPTOR NEGATIVE (HCC): ICD-10-CM

## 2024-05-02 DIAGNOSIS — M32.9 SYSTEMIC LUPUS ERYTHEMATOSUS, UNSPECIFIED SLE TYPE, UNSPECIFIED ORGAN INVOLVEMENT STATUS (HCC): ICD-10-CM

## 2024-05-02 DIAGNOSIS — F41.9 ANXIETY: ICD-10-CM

## 2024-05-02 RX ORDER — ALPRAZOLAM 1 MG/1
1 TABLET ORAL 3 TIMES DAILY PRN
Qty: 60 TABLET | Refills: 3 | Status: SHIPPED | OUTPATIENT
Start: 2024-05-02

## 2024-05-02 RX ORDER — LANOLIN ALCOHOL/MO/W.PET/CERES
400 CREAM (GRAM) TOPICAL 2 TIMES DAILY
Qty: 60 TABLET | Refills: 3 | Status: SHIPPED | OUTPATIENT
Start: 2024-05-02

## 2024-05-06 RX ORDER — ONDANSETRON 4 MG/1
4 TABLET, FILM COATED ORAL EVERY 8 HOURS PRN
Qty: 30 TABLET | Refills: 0 | Status: SHIPPED | OUTPATIENT
Start: 2024-05-06

## 2024-05-06 RX ORDER — HYDROCODONE BITARTRATE AND ACETAMINOPHEN 5; 325 MG/1; MG/1
1 TABLET ORAL EVERY 6 HOURS PRN
Qty: 120 TABLET | Refills: 0 | Status: SHIPPED | OUTPATIENT
Start: 2024-05-06

## 2024-05-06 RX ORDER — CYCLOBENZAPRINE HCL 5 MG
10 TABLET ORAL
Qty: 30 TABLET | Refills: 3 | Status: SHIPPED | OUTPATIENT
Start: 2024-05-06

## 2024-05-24 ENCOUNTER — TELEPHONE (OUTPATIENT)
Age: 59
End: 2024-05-24

## 2024-05-24 DIAGNOSIS — C50.919 MALIGNANT NEOPLASM OF FEMALE BREAST, UNSPECIFIED ESTROGEN RECEPTOR STATUS, UNSPECIFIED LATERALITY, UNSPECIFIED SITE OF BREAST (HCC): ICD-10-CM

## 2024-05-24 DIAGNOSIS — F41.9 ANXIETY: ICD-10-CM

## 2024-05-24 NOTE — TELEPHONE ENCOUNTER
Patient is requesting a slightly early refill for both medications because she is leaving for vacation at the end of the month. So, she is trying to get the refill filled to be able to take with her.    Pharmacy is aware that she is leaving. They are holding the medications til 5/29. They will be filled on this day. So, the patient can  on that day for her trip

## 2024-05-28 RX ORDER — ALPRAZOLAM 1 MG/1
1 TABLET ORAL 3 TIMES DAILY PRN
Qty: 60 TABLET | Refills: 3 | Status: SHIPPED | OUTPATIENT
Start: 2024-05-28

## 2024-05-28 RX ORDER — HYDROCODONE BITARTRATE AND ACETAMINOPHEN 5; 325 MG/1; MG/1
1 TABLET ORAL EVERY 6 HOURS PRN
Qty: 120 TABLET | Refills: 0 | Status: SHIPPED | OUTPATIENT
Start: 2024-05-28

## 2024-06-10 ENCOUNTER — TELEPHONE (OUTPATIENT)
Age: 59
End: 2024-06-10

## 2024-06-10 NOTE — TELEPHONE ENCOUNTER
Patient looking to make appointment for rash on lower arms and hands.  Had spoken with her PCP about the situation and they advised to have her see a dermatologist for biopsy as she had a previous diagnosis of lupus and history of surviving cancer.    Rash was extremely itchy and painful, used cream provided by sister that was same as previous med 5/17/23.

## 2024-06-10 NOTE — TELEPHONE ENCOUNTER
"Call transferred from pep, re rash and pts PCP advising to have it biopsied.    Pt reports rash on lower right arm and hands. [place]. Pt feels this is r/t lupus breakout (has had one previously- itchy and painful, red and \"angry\") but PCP advised her to have rash biopsied. Will send pics in [employer] message.     Pt reports she started applying clobetasol on it from family member last week and feels it helped and is \"on its way out.\" States otherwise lupus is not in flare right now, but is currently out on disability. She's unsure if it should still be biopsied now that it has improved. Noted can route for review and be in touch with plan.   "

## 2024-06-11 NOTE — TELEPHONE ENCOUNTER
Called pt to offer first ambassador on 7/16, pt states she doesn't think rash will be there at that time and doesn't want to waste the time if rash is resolved then. Agreed to schedule and place on wait list in case of sooner availability, pt states she will CB and cancel if she doesn't feel its needed later on. Will take pictures to monitor status in interim. No other questions at this time.

## 2024-06-14 ENCOUNTER — TELEPHONE (OUTPATIENT)
Age: 59
End: 2024-06-14

## 2024-06-14 NOTE — TELEPHONE ENCOUNTER
Patient may have a letter for jury duty as requested due to her various medical conditions.  Thank you

## 2024-06-14 NOTE — TELEPHONE ENCOUNTER
Pt called to update Dr. Banerjee on Dermatology referral. Pt has an appt scheduled 7/16 and is keeping a photo journal; wanted to make him aware in case he receives any photos from Dermatologist.    Pt also states that she received a summons for jury duty and needs a letter from Dr. Banerjee as to why she won't be able to attend; PTSD, not being able to sit for full duration, etc.    Please contact pt to advise once letter completed and ready for pickup.

## 2024-06-16 DIAGNOSIS — C50.212 MALIGNANT NEOPLASM OF UPPER-INNER QUADRANT OF LEFT BREAST IN FEMALE, ESTROGEN RECEPTOR NEGATIVE (HCC): ICD-10-CM

## 2024-06-16 DIAGNOSIS — Z17.1 MALIGNANT NEOPLASM OF UPPER-INNER QUADRANT OF LEFT BREAST IN FEMALE, ESTROGEN RECEPTOR NEGATIVE (HCC): ICD-10-CM

## 2024-06-17 RX ORDER — CYCLOBENZAPRINE HCL 5 MG
10 TABLET ORAL
Qty: 30 TABLET | Refills: 2 | Status: SHIPPED | OUTPATIENT
Start: 2024-06-17

## 2024-06-20 DIAGNOSIS — C50.919 MALIGNANT NEOPLASM OF FEMALE BREAST, UNSPECIFIED ESTROGEN RECEPTOR STATUS, UNSPECIFIED LATERALITY, UNSPECIFIED SITE OF BREAST (HCC): ICD-10-CM

## 2024-06-25 RX ORDER — CLOBETASOL PROPIONATE 0.5 MG/G
EMULSION TOPICAL 2 TIMES DAILY
Qty: 45 G | Refills: 3 | Status: SHIPPED | OUTPATIENT
Start: 2024-06-25

## 2024-06-25 RX ORDER — HYDROCODONE BITARTRATE AND ACETAMINOPHEN 5; 325 MG/1; MG/1
1 TABLET ORAL EVERY 6 HOURS PRN
Qty: 120 TABLET | Refills: 0 | Status: SHIPPED | OUTPATIENT
Start: 2024-06-25

## 2024-06-28 ENCOUNTER — TELEPHONE (OUTPATIENT)
Age: 59
End: 2024-06-28

## 2024-06-28 NOTE — TELEPHONE ENCOUNTER
Please write script for:  6 bras one prosthesis to  Helen from [facility]  Fax [phone]  Please let patient know when sent     Thank you

## 2024-07-09 ENCOUNTER — TELEPHONE (OUTPATIENT)
Age: 59
End: 2024-07-09

## 2024-07-09 NOTE — TELEPHONE ENCOUNTER
Katy with [facility] called. Will re fax order for PCP signature to the office. Will need returned. Also requesting most recent office visit notes regarding mastectomy for medicare billing.     Please advise. Thank you    Tel # [phone]

## 2024-07-16 ENCOUNTER — OFFICE VISIT (OUTPATIENT)
Dept: DERMATOLOGY | Facility: CLINIC | Age: 59
End: 2024-07-16
Payer: COMMERCIAL

## 2024-07-16 VITALS — BODY MASS INDEX: 25.09 KG/M2 | WEIGHT: 141.6 LBS | HEIGHT: 63 IN | TEMPERATURE: 97.3 F

## 2024-07-16 DIAGNOSIS — L25.9 CONTACT DERMATITIS, UNSPECIFIED CONTACT DERMATITIS TYPE, UNSPECIFIED TRIGGER: Primary | ICD-10-CM

## 2024-07-16 PROCEDURE — 99204 OFFICE O/P NEW MOD 45 MIN: CPT | Performed by: DERMATOLOGY

## 2024-07-16 RX ORDER — BETAMETHASONE DIPROPIONATE 0.5 MG/G
CREAM TOPICAL 2 TIMES DAILY
Qty: 50 G | Refills: 1 | Status: SHIPPED | OUTPATIENT
Start: 2024-07-16

## 2024-07-16 RX ORDER — CLOBETASOL PROPIONATE 0.5 MG/G
CREAM TOPICAL
COMMUNITY
Start: 2024-07-14

## 2024-07-16 RX ORDER — VARENICLINE 0.03 MG/.05ML
SPRAY NASAL
COMMUNITY
Start: 2024-06-24

## 2024-07-16 NOTE — PATIENT INSTRUCTIONS
CONTACT DERMATITIS    Assessment and Plan:  Diagnosis:  Contact dermatitis  Based on a thorough discussion of this condition and the management approach to it (including a comprehensive discussion of the known risks, side effects and potential benefits of treatment), the patient (family) agrees to implement the following specific plan:      Recommend apply clobetasol 0.05% cream topically on the right arm and lower legs for 2 weeks straight.              Recommend to use Betamethasone 0.05% cream 2 times daily as needed when flaring.               Recommend to moisturize up to 3 times daily on legs and arms.    What is contact dermatitis?  Contact dermatitis is a type of eczema that results from something coming in contact with the skin.  There are 2 types:  irritant and allergic.  The majority of cases are from irritation.  Usually that is from contact with strong soaps, repeated exposure to water, contact with cleaning agents or food, or friction.  The minority are an actual allergy.  In these cases something is coming in contact with the skin and causing an allergic reaction, similar to what happens with poison ivy.  This usually occurs unexpectedly after using something for many years.  Even very tiny amounts of the substance on the skin can cause a reaction.  Some common causes are fragrances, preservatives and metals.  Sometimes this can occur when an allergic substance is eaten, as well, but most often it is from direct contact with the skin.  To determine the cause of allergy a patch test is often done.    Some general rules to follow for both types of contact dermatitis are:   Wear gloves when using strong cleansers or before prolonged contact with water (like washing dishes)   Use gentle cleansers and avoid strong soaps   Apply moisturizer to entire body after bathing    Avoid products with fragrance    Most often contact dermatitis is treated with topical medicines like topical steroids, eucrisa,  pimecrolimus or tacrolimus..  Some times oral steroids, ie prednisone, methylprednisone and prednisolone, are needed.  In chronic cases, treatment options include:  light therapy, methotrexate, cyclosporin.

## 2024-07-16 NOTE — PROGRESS NOTES
"[facility] Note     Patient Name: Federica PEREIRA Mock  Encounter Date: 7/16/24     Have you been cared for by a [facility] Dermatologist in the last 3 years and, if so, which description applies to you?    NO.   I am considered a \"new\" patient and must complete all patient intake questions. I am FEMALE/of child-bearing potential.    REVIEW OF SYSTEMS:  Have you recently had or currently have any of the following? Recent fever or chills? No  Any non-healing wound? No  Are you pregnant or planning to become pregnant? No  Are you currently or planning to be nursing or breast feeding? No   PAST MEDICAL HISTORY:  Have you personally ever had or currently have any of the following?  If \"YES,\" then please provide more detail. Skin cancer (such as Melanoma, Basal Cell Carcinoma, Squamous Cell Carcinoma?  No  Tuberculosis, HIV/AIDS, Hepatitis B or C: No  Radiation Treatment No   HISTORY OF IMMUNOSUPPRESSION:   Do you have a history of any of the following:  Systemic Immunosuppression such as Diabetes, Biologic or Immunotherapy, Chemotherapy, Organ Transplantation, Bone Marrow Transplantation?  YES, chemotherapy for breast cancer, patient on plaquenil for lupus    Answering \"YES\" requires the addition of the dotphrase \"IMMUNOSUPPRESSED\" as the first diagnosis of the patient's visit.   FAMILY HISTORY:  Any \"first degree relatives\" (parent, brother, sister, or child) with the following?    Skin Cancer, Pancreatic or Other Cancer? YES, mother breast cancer   PATIENT EXPERIENCE:    Do you want the Dermatologist to perform a COMPLETE skin exam today including a clinical examination under the \"bra and underwear\" areas?  NO  If necessary, do we have your permission to call and leave a detailed message on your Preferred Phone number that includes your specific medical information?  Yes      Allergies   Allergen Reactions    Mobic [Meloxicam] Eye Swelling     Reaction Date: 12Aug2011;     Methotrexate Rash    Sulfa " Antibiotics Rash      Current Outpatient Medications:     ALPRAZolam (XANAX) 1 mg tablet, Take 1 tablet (1 mg total) by mouth 3 (three) times a day as needed for anxiety, Disp: 60 tablet, Rfl: 3    amLODIPine (NORVASC) 2.5 mg tablet, One po bid, Disp: 30 tablet, Rfl: 5    clobetasol (TEMOVATE) 0.05 % cream, , Disp: , Rfl:     Clobetasol Propionate E 0.05 % emollient cream, Apply topically 2 (two) times a day, Disp: 45 g, Rfl: 3    cyclobenzaprine (FLEXERIL) 5 mg tablet, Take 2 tablets (10 mg total) by mouth daily at bedtime as needed for muscle spasms (as needed), Disp: 30 tablet, Rfl: 2    dicyclomine (BENTYL) 10 mg capsule, TAKE ONE CAPSULE BY MOUTH TWICE A DAY, Disp: 60 capsule, Rfl: 3    gabapentin (NEURONTIN) 300 mg capsule, TAKE ONE CAPSULE BY MOUTH AT BEDTIME, Disp: 30 capsule, Rfl: 3    HYDROcodone-acetaminophen (NORCO) 5-325 mg per tablet, Take 1 tablet by mouth every 6 (six) hours as needed for pain Max Daily Amount: 4 tablets, Disp: 120 tablet, Rfl: 0    hydroxychloroquine (PLAQUENIL) 200 mg tablet, Take 1 tablet in morning and 1/2 tablet in evening, Disp: , Rfl:     magnesium Oxide (MAG-OX) 400 mg TABS, Take 1 tablet (400 mg total) by mouth 2 (two) times a day, Disp: 60 tablet, Rfl: 3    magnesium oxide (MAG-OX) 400 mg, TAKE ONE TABLET BY MOUTH TWICE A DAY, Disp: 60 tablet, Rfl: 5    metoclopramide (REGLAN) 5 mg tablet, Take 1 tablet (5 mg total) by mouth 3 (three) times a day as needed (as needed), Disp: 30 tablet, Rfl: 5    ondansetron (ZOFRAN) 4 mg tablet, Take 1 tablet (4 mg total) by mouth every 8 (eight) hours as needed for nausea or vomiting, Disp: 30 tablet, Rfl: 0    pantoprazole (PROTONIX) 40 mg tablet, TAKE ONE TABLET BY MOUTH EVERY DAY, Disp: 30 tablet, Rfl: 5    predniSONE 5 mg tablet, TAKE ONE TABLET BY MOUTH TWICE A DAY WITH FOOD, Disp: 60 tablet, Rfl: 0    sertraline (ZOLOFT) 100 mg tablet, TAKE ONE AND ONE-HALF TABLETS BY MOUTH DAILY, Disp: 45 tablet, Rfl: 3    Tyrvaya 0.03 MG/ACT SOLN,  ONE SPRAY INTO EACH NOSTRIL TWO TIMES A DAY, Disp: , Rfl:     ALPRAZolam (XANAX) 1 mg tablet, Take 1 tablet (1 mg total) by mouth 3 (three) times a day as needed for anxiety (Patient not taking: Reported on 10/20/2023), Disp: 60 tablet, Rfl: 2    amLODIPine (NORVASC) 2.5 mg tablet, Take 2.5 mg by mouth daily (Patient not taking: Reported on 10/6/2021), Disp: , Rfl:     levothyroxine 88 mcg tablet, TAKE ONE TABLET BY MOUTH EVERY DAY, Disp: 30 tablet, Rfl: 0    magnesium oxide (MAG-OX) 400 mg tablet, Take 1 tablet by mouth 2 (two) times a day (Patient not taking: Reported on 3/10/2023), Disp: , Rfl:     oxyCODONE (Roxicodone) 5 immediate release tablet, Take 1 tablet (5 mg total) by mouth every 8 (eight) hours as needed for moderate pain Max Daily Amount: 15 mg, Disp: 90 tablet, Rfl: 0    predniSONE 10 mg tablet, 5 tabs daily X 3 days, 4 tabs daily X 3 days, 3 tabs daily X 3 days, 2 tabs daily X 3 days, 1 tab daily X 3 days (Patient not taking: Reported on 2/15/2024), Disp: 45 tablet, Rfl: 0          Whom besides the patient is providing clinical information about today's encounter?   NO ADDITIONAL HISTORIAN (patient alone provided history)    Physical Exam and Assessment/Plan by Diagnosis:    CONTACT DERMATITIS    Physical Exam:  Anatomic Location Affected:  lower right leg; right forearm  Morphological Description:  grouped erythematous papules on the right lower leg; rare lightly erythematous papules on the right forearm       Additional History of Present Condition:  patient reported rash started on June 1, 2024 on the arms, patient reported she used clobetasol 0.05% cream patient and  rash now nearly resolved. Patient reports has a pet pig that has been rubbing its nose on her right leg over site of rash.     Assessment and Plan:  Diagnosis:  Contact dermatitis  Based on a thorough discussion of this condition and the management approach to it (including a comprehensive discussion of the known risks, side effects  and potential benefits of treatment), the patient (family) agrees to implement the following specific plan:      Recommend apply clobetasol 0.05% cream topically to the affected areas the right arm and lower legs for 2 weeks at a time with 1 week break in between                              After clobetasol is finished, recommend applying Betamethasone 0.05% cream 2 times daily to the affected areas up to 2 weeks at a time with 1 week break in between as needed when flaring. Prescribing this since it is on formulary                 Recommend to moisturize up to 3 times daily on legs and arms.      Scribe Attestation      I,:  Leia Langston am acting as a scribe while in the presence of the attending physician.:       I,:  Teodoro Lopez MD personally performed the services described in this documentation    as scribed in my presence.:            Lauren Guevara MD   PGY-2 Dermatology Resident

## 2024-07-19 DIAGNOSIS — C50.919 MALIGNANT NEOPLASM OF FEMALE BREAST, UNSPECIFIED ESTROGEN RECEPTOR STATUS, UNSPECIFIED LATERALITY, UNSPECIFIED SITE OF BREAST (HCC): ICD-10-CM

## 2024-07-22 RX ORDER — HYDROCODONE BITARTRATE AND ACETAMINOPHEN 5; 325 MG/1; MG/1
1 TABLET ORAL EVERY 6 HOURS PRN
Qty: 120 TABLET | Refills: 0 | Status: SHIPPED | OUTPATIENT
Start: 2024-07-22

## 2024-07-26 DIAGNOSIS — M32.9 SYSTEMIC LUPUS ERYTHEMATOSUS, UNSPECIFIED SLE TYPE, UNSPECIFIED ORGAN INVOLVEMENT STATUS (HCC): ICD-10-CM

## 2024-07-26 DIAGNOSIS — F41.9 ANXIETY: ICD-10-CM

## 2024-07-26 RX ORDER — GABAPENTIN 300 MG/1
300 CAPSULE ORAL
Qty: 30 CAPSULE | Refills: 0 | Status: SHIPPED | OUTPATIENT
Start: 2024-07-26

## 2024-07-26 RX ORDER — SERTRALINE HYDROCHLORIDE 100 MG/1
TABLET, FILM COATED ORAL
Qty: 45 TABLET | Refills: 5 | Status: SHIPPED | OUTPATIENT
Start: 2024-07-26

## 2024-07-26 NOTE — TELEPHONE ENCOUNTER
Patient needs updated blood work. Please place orders. A courtesy refill was provided.   CMP    Please order and have staff inform pt to complete prior to next refill.

## 2024-08-06 DIAGNOSIS — F41.9 ANXIETY: ICD-10-CM

## 2024-08-06 RX ORDER — ALPRAZOLAM 1 MG/1
1 TABLET ORAL 3 TIMES DAILY PRN
Qty: 60 TABLET | Refills: 1 | Status: SHIPPED | OUTPATIENT
Start: 2024-08-06

## 2024-08-07 DIAGNOSIS — C50.212 MALIGNANT NEOPLASM OF UPPER-INNER QUADRANT OF LEFT BREAST IN FEMALE, ESTROGEN RECEPTOR NEGATIVE (HCC): ICD-10-CM

## 2024-08-07 DIAGNOSIS — M32.9 SYSTEMIC LUPUS ERYTHEMATOSUS, UNSPECIFIED SLE TYPE, UNSPECIFIED ORGAN INVOLVEMENT STATUS (HCC): ICD-10-CM

## 2024-08-07 DIAGNOSIS — Z17.1 MALIGNANT NEOPLASM OF UPPER-INNER QUADRANT OF LEFT BREAST IN FEMALE, ESTROGEN RECEPTOR NEGATIVE (HCC): ICD-10-CM

## 2024-08-08 RX ORDER — CYCLOBENZAPRINE HCL 5 MG
10 TABLET ORAL
Qty: 30 TABLET | Refills: 3 | Status: SHIPPED | OUTPATIENT
Start: 2024-08-08

## 2024-08-08 RX ORDER — GABAPENTIN 300 MG/1
300 CAPSULE ORAL
Qty: 30 CAPSULE | Refills: 0 | OUTPATIENT
Start: 2024-08-08

## 2024-08-14 DIAGNOSIS — C50.919 MALIGNANT NEOPLASM OF FEMALE BREAST, UNSPECIFIED ESTROGEN RECEPTOR STATUS, UNSPECIFIED LATERALITY, UNSPECIFIED SITE OF BREAST (HCC): ICD-10-CM

## 2024-08-15 ENCOUNTER — OFFICE VISIT (OUTPATIENT)
Dept: SURGICAL ONCOLOGY | Facility: CLINIC | Age: 59
End: 2024-08-15
Payer: COMMERCIAL

## 2024-08-15 VITALS
BODY MASS INDEX: 24.01 KG/M2 | HEART RATE: 101 BPM | HEIGHT: 63 IN | TEMPERATURE: 97.4 F | DIASTOLIC BLOOD PRESSURE: 88 MMHG | SYSTOLIC BLOOD PRESSURE: 142 MMHG | OXYGEN SATURATION: 97 % | WEIGHT: 135.5 LBS

## 2024-08-15 DIAGNOSIS — Z08 ENCOUNTER FOR FOLLOW-UP SURVEILLANCE OF BREAST CANCER: Primary | ICD-10-CM

## 2024-08-15 DIAGNOSIS — Z85.3 ENCOUNTER FOR FOLLOW-UP SURVEILLANCE OF BREAST CANCER: Primary | ICD-10-CM

## 2024-08-15 DIAGNOSIS — Z85.3 PERSONAL HISTORY OF BREAST CANCER: ICD-10-CM

## 2024-08-15 PROCEDURE — 99213 OFFICE O/P EST LOW 20 MIN: CPT

## 2024-08-15 NOTE — PROGRESS NOTES
Surgical Oncology Follow Up       [facility] SURGICAL [facility]COLOGY [place]  [address]  [place] [id number]    Federica PEREIRA Mock  1965  [id number]  [facility]GICAL [facility]COLOGY [place]  [address]  [place] [id number]    1. Encounter for follow-up surveillance of breast cancer  2. Personal history of breast cancer  Assessment & Plan:  Patient is doing well, no evidence of disease recurrence on exam.  I have advised her to discuss her neck issues with her PCP.  I will help coordinate her next mammogram in October, and I will see her again in 6 months for another clinical exam.  Orders:  -     Mammo diagnostic right w 3d & cad; Future; Expected date: 10/03/2024         Chief Complaint   Patient presents with    Follow-up       Return in about 6 months (around 2/15/2025) for Office Visit, Imaging - See orders.      Oncology History   Personal history of breast cancer   2/12/2020 Biopsy    A. & B. Right breast stereotactic biopsy with and without calcs; 11 o'clock, 10 cm from nipple  Benign breast glandular parenchyma with fibroadenomatoid stromal hyperplasia  No atypia and no evidence of malignancy     C. Left breast ultrasound-guided biopsy; 10 o'clock, 5 cm from nipple  Benign breast glandular tissue  No atypia and no evidence of malignancy    D. Left breast ultrasound-guided biopsy; 10 o'clock, 4 cm from nipple  Invasive breast carcinoma of no special type (ductal NST)  Grade 3  ER 0  NH 0  HER2 2+; FISH positive    Concordant. Right breast clear. Malignant mass measures 2.3 cm on mammo. Left axilla US negative. Mammographic abnormality with no US correlate. MRI recommended to further evaluate this finding.     2/26/2020 Genetic Testing    The following genes were evaluated: LIZZETTE, BRCA1, BRCA2, CDH1, CHEK2, PALB2, PTEN, STK11, TP53  Negative result. No pathogenic sequence variants or deletions/dupllications  identified  Nina     3/24/2020 - 5/25/2020 Chemotherapy    pegfilgrastim (NEULASTA ONPRO) subcutaneous injection kit 6 mg, 6 mg, Subcutaneous, Once, 4 of 6 cycles  Administration: 6 mg (3/24/2020), 6 mg (4/14/2020), 6 mg (5/5/2020)  fosaprepitant (EMEND) 150 mg in sodium chloride 0.9 % 250 mL IVPB, 150 mg, Intravenous, Once, 4 of 6 cycles  Administration: 150 mg (3/24/2020), 150 mg (4/14/2020), 150 mg (5/5/2020)  pertuzumab (PERJETA) 840 mg in sodium chloride 0.9 % 250 mL IVPB, 840 mg, Intravenous, Once, 4 of 6 cycles  Administration: 840 mg (3/24/2020), 420 mg (4/14/2020), 420 mg (5/5/2020)  CARBOplatin (PARAPLATIN) 547.8 mg in sodium chloride 0.9 % 250 mL IVPB, 547.8 mg, Intravenous, Once, 4 of 6 cycles  Administration: 547.8 mg (3/24/2020), 575.4 mg (4/14/2020), 625.8 mg (5/5/2020)  DOCEtaxel (TAXOTERE) 122.2 mg in sodium chloride 0.9 % 250 mL chemo infusion, 75 mg/m2 = 122.2 mg, Intravenous, Once, 4 of 6 cycles  Administration: 122.2 mg (3/24/2020), 122.2 mg (4/14/2020), 122.2 mg (5/5/2020)  trastuzumab (HERCEPTIN) 486 mg in sodium chloride 0.9 % 250 mL chemo infusion, 8 mg/kg = 486 mg, Intravenous, Once, 4 of 18 cycles  Administration: 486 mg (3/24/2020), 364 mg (4/14/2020), 364 mg (5/5/2020)     6/3/2020 - 7/18/2021 Chemotherapy    pertuzumab (PERJETA) IVPB, 420 mg (50 % of original dose 840 mg), Intravenous, Once, 13 of 13 cycles  Dose modification: 420 mg (original dose 840 mg, Cycle 1, Reason: Dose Not Tolerated)  Administration: 420 mg (6/3/2020), 420 mg (11/9/2020), 420 mg (6/25/2020), 420 mg (11/30/2020), 420 mg (12/21/2020), 420 mg (1/11/2021), 420 mg (2/2/2021), 420 mg (2/23/2021), 420 mg (4/6/2021), 420 mg (4/26/2021), 420 mg (5/17/2021), 420 mg (6/7/2021), 420 mg (6/28/2021)  trastuzumab-qyyp (TRAZIMERA) chemo infusion, 6 mg/kg = 337 mg (100 % of original dose 6 mg/kg), Intravenous, Once, 5 of 5 cycles  Dose modification: 6 mg/kg (original dose 6 mg/kg, Cycle 9)  Administration: 337 mg (4/6/2021),  337 mg (4/26/2021), 337 mg (5/17/2021), 337 mg (6/7/2021), 337 mg (6/28/2021)  PACLItaxel (TAXOL) chemo IVPB, 80 mg/m2 = 127.2 mg, Intravenous, Once, 2 of 2 cycles  Administration: 127.2 mg (6/3/2020), 127.2 mg (6/10/2020), 127.2 mg (6/17/2020), 127.2 mg (6/25/2020), 127.2 mg (7/1/2020), 127.2 mg (7/7/2020)  trastuzumab (HERCEPTIN) chemo infusion, 6 mg/kg = 340 mg (75 % of original dose 8 mg/kg), Intravenous, Once, 8 of 8 cycles  Dose modification: 6 mg/kg (original dose 8 mg/kg, Cycle 1, Reason: Dose Not Tolerated)  Administration: 340 mg (6/3/2020), 376 mg (11/9/2020), 340 mg (6/25/2020), 376 mg (11/30/2020), 376 mg (12/21/2020), 376 mg (1/11/2021), 376 mg (2/2/2021), 337 mg (2/23/2021)     6/4/2020 -  Cancer Staged    Staging form: Breast, AJCC 8th Edition  - Clinical: Stage IIA (cT2, cN0, cM0, G3, ER-, RI-, HER2+) - Signed by Damian Langley MD on 6/4/2020  Laterality: Left  Histologic grading system: 3 grade system       8/18/2020 Surgery    Left breast mastectomy with sentinel lymph node biopsy  No residual carcinoma identified  Prior procedural site changes  0/3 Lymph nodes     8/18/2020 -  Cancer Staged    Staging form: Breast, AJCC 8th Edition  - Pathologic stage from 8/18/2020: No Stage Recommended (ypT0, pN0, cM0, ER-, RI-, HER2+) - Signed by ANNETTE Shepherd on 8/2/2023  Stage prefix: Post-therapy  Response to neoadjuvant therapy: Complete response             History of Present Illness: This is a 59 y/o female who returns to the office today in follow-up for her history of left breast cancer.  She is currently HARVEY at 2 years and doing well.  She denies any new lumps, skin changes or tenderness.  Her only complaint is left neck and shoulder tingling and numbness, and she feels like she is developing balance issues.  She denies any headaches, dizziness, weight loss or bone pain.  Mammogram will be due in October.       Review of Systems   Constitutional:  Negative for activity change, appetite  change, fatigue and unexpected weight change.   HENT: Negative.     Respiratory: Negative.     Cardiovascular: Negative.    Gastrointestinal: Negative.  Negative for abdominal pain, nausea and vomiting.   Musculoskeletal:  Positive for arthralgias.   Skin: Negative.  Negative for color change and wound.   Neurological:  Positive for numbness. Negative for dizziness and headaches.   Hematological: Negative.  Negative for adenopathy.   Psychiatric/Behavioral: Negative.               Patient Active Problem List   Diagnosis    Systemic lupus erythematosus (HCC)    Hypothyroidism    Posttraumatic stress disorder    Adult celiac disease    Anxiety    Depression    Esophagitis    Nephrolithiasis    Vitamin D deficiency    Personal history of breast cancer    Chemotherapy induced neutropenia (HCC)    Port-A-Cath in place    JEFERSON (acute kidney injury) (HCC)    Anemia    Colitis    Continuous opioid dependence (HCC)    Smoking    Raynaud's disease without gangrene    Major depressive disorder in full remission, unspecified whether recurrent (HCC)    Neuralgia    Encounter for follow-up surveillance of breast cancer     Past Medical History:   Diagnosis Date    Cancer (HCC)     Colon polyp     Disease of thyroid gland     History of chemotherapy     Hypertension     Kidney stone     Lupus (HCC)     Malignant neoplasm of upper-inner quadrant of left breast in female, estrogen receptor negative (HCC) 2/25/2020    left    Nephrolithiasis     PTSD (post-traumatic stress disorder)     Sjogren's disease (HCC)      Past Surgical History:   Procedure Laterality Date    BREAST BIOPSY Left 02/12/2020    us guided -  invasive ductal carcinoma    BREAST BIOPSY Right 02/12/2020    benign stereo    ECTOPIC PREGNANCY SURGERY      FEMUR FRACTURE SURGERY      FL GUIDED CENTRAL VENOUS ACCESS DEVICE INSERTION  03/17/2020    HYSTERECTOMY      LEFT OOPHORECTOMY      due to torsion     MAMMO STEREOTACTIC BREAST BIOPSY RIGHT (ALL INC) Right  02/12/2020    MASTECTOMY Left 08/2020    MASTECTOMY W/ SENTINEL NODE BIOPSY Left 08/18/2020    Procedure: BREAST MASTECTOMY WITH SENTINEL LYMPH NODE BIOPSY, LYMPHATIC MAPPING WITH BLUE DYE AND RADIOACTIVE DYE (INJECT AT 1430 BY DR SHANNON IN THE OR);  Surgeon: Dominick Shannon MD;  Location: [facility];  Service: Surgical Oncology    MRI BREAST BIOPSY LEFT (ALL INCLUSIVE) Left 03/20/2020    SC INSJ TUNNELED CTR VAD W/SUBQ PORT AGE 5 YR/> N/A 03/17/2020    Procedure: INSERTION VENOUS PORT ( PORT-A-CATH) IR;  Surgeon: Jose Morel DO;  Location: [facility];  Service: Interventional Radiology    SC RMVL MARSHA CTR VAD W/SUBQ PORT/ CTR/PRPH INSJ N/A 07/06/2021    Procedure: REMOVAL VENOUS PORT (PORT-A-CATH)IR;  Surgeon: Jose Morel DO;  Location[facility];  Service: Interventional Radiology    US GUIDANCE BREAST BIOPSY LEFT EACH ADDITIONAL Left 02/12/2020    US GUIDED BREAST BIOPSY LEFT COMPLETE Left 02/12/2020    VAGINA SURGERY       Family History   Problem Relation Age of Onset    Diabetes Mother     Hypertension Mother     Nephrolithiasis Mother     Breast cancer Mother 70    Heart disease Father     Hypertension Father     Diabetes Brother     Nephrolithiasis Brother     Breast cancer Maternal Aunt     No Known Problems Maternal Grandmother     No Known Problems Maternal Grandfather     No Known Problems Paternal Grandmother     No Known Problems Paternal Grandfather      Social History     Socioeconomic History    Marital status: /Civil Union     Spouse name: Not on file    Number of children: Not on file    Years of education: Not on file    Highest education level: Not on file   Occupational History    Not on file   Tobacco Use    Smoking status: Every Day     Current packs/day: 0.50     Average packs/day: 0.5 packs/day for 40.5 years (20.3 ttl pk-yrs)     Types: Cigarettes     Start date: 1990    Smokeless tobacco: Never   Vaping Use    Vaping status: Never Used   Substance and Sexual Activity     Alcohol use: Not Currently    Drug use: No    Sexual activity: Not Currently     Partners: Male     Birth control/protection: None   Other Topics Concern    Not on file   Social History Narrative    Not on file     Social Determinants of Health     Financial Resource Strain: High Risk (10/13/2023)    Overall Financial Resource Strain (CARDIA)     Difficulty of Paying Living Expenses: Very hard   Food Insecurity: Not on file   Transportation Needs: No Transportation Needs (10/13/2023)    PRAPARE - Transportation     Lack of Transportation (Medical): No     Lack of Transportation (Non-Medical): No   Physical Activity: Not on file   Stress: Not on file   Social Connections: Not on file   Intimate Partner Violence: Not on file   Housing Stability: Not on file       Current Outpatient Medications:     ALPRAZolam (XANAX) 1 mg tablet, Take 1 tablet (1 mg total) by mouth 3 (three) times a day as needed for anxiety, Disp: 60 tablet, Rfl: 1    amLODIPine (NORVASC) 2.5 mg tablet, One po bid, Disp: 30 tablet, Rfl: 5    betamethasone, augmented, (DIPROLENE-AF) 0.05 % cream, Apply topically 2 (two) times a day To the area(s) affected for up to two weeks as needed for flares. Take a one-week break in between uses. Do not apply to face, underarms, or groin., Disp: 50 g, Rfl: 1    clobetasol (TEMOVATE) 0.05 % cream, , Disp: , Rfl:     Clobetasol Propionate E 0.05 % emollient cream, Apply topically 2 (two) times a day, Disp: 45 g, Rfl: 3    cyclobenzaprine (FLEXERIL) 5 mg tablet, Take 2 tablets (10 mg total) by mouth daily at bedtime as needed for muscle spasms (as needed), Disp: 30 tablet, Rfl: 3    dicyclomine (BENTYL) 10 mg capsule, TAKE ONE CAPSULE BY MOUTH TWICE A DAY, Disp: 60 capsule, Rfl: 3    gabapentin (NEURONTIN) 300 mg capsule, TAKE ONE CAPSULE BY MOUTH AT BEDTIME, Disp: 30 capsule, Rfl: 0    HYDROcodone-acetaminophen (NORCO) 5-325 mg per tablet, Take 1 tablet by mouth every 6 (six) hours as needed for pain Max Daily  Amount: 4 tablets, Disp: 120 tablet, Rfl: 0    hydroxychloroquine (PLAQUENIL) 200 mg tablet, Take 1 tablet in morning and 1/2 tablet in evening, Disp: , Rfl:     levothyroxine 88 mcg tablet, TAKE ONE TABLET BY MOUTH EVERY DAY, Disp: 30 tablet, Rfl: 0    magnesium Oxide (MAG-OX) 400 mg TABS, Take 1 tablet (400 mg total) by mouth 2 (two) times a day, Disp: 60 tablet, Rfl: 3    magnesium oxide (MAG-OX) 400 mg, TAKE ONE TABLET BY MOUTH TWICE A DAY, Disp: 60 tablet, Rfl: 5    metoclopramide (REGLAN) 5 mg tablet, Take 1 tablet (5 mg total) by mouth 3 (three) times a day as needed (as needed), Disp: 30 tablet, Rfl: 5    ondansetron (ZOFRAN) 4 mg tablet, Take 1 tablet (4 mg total) by mouth every 8 (eight) hours as needed for nausea or vomiting, Disp: 30 tablet, Rfl: 0    pantoprazole (PROTONIX) 40 mg tablet, TAKE ONE TABLET BY MOUTH EVERY DAY, Disp: 30 tablet, Rfl: 5    predniSONE 5 mg tablet, TAKE ONE TABLET BY MOUTH TWICE A DAY WITH FOOD, Disp: 60 tablet, Rfl: 0    sertraline (ZOLOFT) 100 mg tablet, TAKE ONE AND ONE-HALF TABLETS BY MOUTH DAILY, Disp: 45 tablet, Rfl: 5    Tyrvaya 0.03 MG/ACT SOLN, ONE SPRAY INTO EACH NOSTRIL TWO TIMES A DAY, Disp: , Rfl:     ALPRAZolam (XANAX) 1 mg tablet, Take 1 tablet (1 mg total) by mouth 3 (three) times a day as needed for anxiety (Patient not taking: Reported on 10/20/2023), Disp: 60 tablet, Rfl: 2    amLODIPine (NORVASC) 2.5 mg tablet, Take 2.5 mg by mouth daily (Patient not taking: Reported on 10/6/2021), Disp: , Rfl:     magnesium oxide (MAG-OX) 400 mg tablet, Take 1 tablet by mouth 2 (two) times a day (Patient not taking: Reported on 3/10/2023), Disp: , Rfl:     oxyCODONE (Roxicodone) 5 immediate release tablet, Take 1 tablet (5 mg total) by mouth every 8 (eight) hours as needed for moderate pain Max Daily Amount: 15 mg, Disp: 90 tablet, Rfl: 0    predniSONE 10 mg tablet, 5 tabs daily X 3 days, 4 tabs daily X 3 days, 3 tabs daily X 3 days, 2 tabs daily X 3 days, 1 tab daily X 3  days (Patient not taking: Reported on 2/15/2024), Disp: 45 tablet, Rfl: 0  Allergies   Allergen Reactions    Mobic [Meloxicam] Eye Swelling     Reaction Date: 12Aug2011;     Methotrexate Rash    Sulfa Antibiotics Rash     Vitals:    08/15/24 1430   BP: 142/88   Pulse: 101   Temp: (!) 97.4 °F (36.3 °C)   SpO2: 97%       Physical Exam  Vitals reviewed.   Constitutional:       General: She is not in acute distress.     Appearance: Normal appearance. She is not ill-appearing or toxic-appearing.   HENT:      Head: Normocephalic and atraumatic.      Nose: Nose normal.      Mouth/Throat:      Mouth: Mucous membranes are moist.   Eyes:      General: No scleral icterus.  Neck:      Vascular: No carotid bruit.   Cardiovascular:      Rate and Rhythm: Normal rate.   Pulmonary:      Effort: Pulmonary effort is normal.   Chest:   Breasts:     Right: Normal.      Left: Absent.      Comments: Left chest wall and right breast are smooth and even.  There are no dominant masses, nodules, skin changes or tenderness present.  I do not appreciate any adenopathy.  Musculoskeletal:         General: No swelling or tenderness. Normal range of motion.      Cervical back: Normal range of motion and neck supple.   Lymphadenopathy:      Cervical: No cervical adenopathy.      Upper Body:      Right upper body: No supraclavicular, axillary or pectoral adenopathy.      Left upper body: No supraclavicular, axillary or pectoral adenopathy.   Skin:     General: Skin is warm and dry.      Coloration: Skin is not jaundiced.      Findings: No erythema.   Neurological:      General: No focal deficit present.      Mental Status: She is alert and oriented to person, place, and time.   Psychiatric:         Mood and Affect: Mood normal.         Behavior: Behavior normal.         Thought Content: Thought content normal.         Judgment: Judgment normal.

## 2024-08-19 RX ORDER — HYDROCODONE BITARTRATE AND ACETAMINOPHEN 5; 325 MG/1; MG/1
1 TABLET ORAL EVERY 6 HOURS PRN
Qty: 120 TABLET | Refills: 0 | Status: SHIPPED | OUTPATIENT
Start: 2024-08-19

## 2024-08-28 DIAGNOSIS — G89.29 CHRONIC RLQ PAIN: ICD-10-CM

## 2024-08-28 DIAGNOSIS — R10.31 CHRONIC RLQ PAIN: ICD-10-CM

## 2024-08-28 DIAGNOSIS — M32.9 SYSTEMIC LUPUS ERYTHEMATOSUS, UNSPECIFIED SLE TYPE, UNSPECIFIED ORGAN INVOLVEMENT STATUS (HCC): ICD-10-CM

## 2024-08-28 DIAGNOSIS — Z17.1 MALIGNANT NEOPLASM OF UPPER-INNER QUADRANT OF LEFT BREAST IN FEMALE, ESTROGEN RECEPTOR NEGATIVE (HCC): ICD-10-CM

## 2024-08-28 DIAGNOSIS — K52.9 COLITIS: ICD-10-CM

## 2024-08-28 DIAGNOSIS — C50.212 MALIGNANT NEOPLASM OF UPPER-INNER QUADRANT OF LEFT BREAST IN FEMALE, ESTROGEN RECEPTOR NEGATIVE (HCC): ICD-10-CM

## 2024-08-28 RX ORDER — DICYCLOMINE HYDROCHLORIDE 10 MG/1
10 CAPSULE ORAL 2 TIMES DAILY
Qty: 60 CAPSULE | Refills: 0 | Status: SHIPPED | OUTPATIENT
Start: 2024-08-28

## 2024-08-29 RX ORDER — LANOLIN ALCOHOL/MO/W.PET/CERES
400 CREAM (GRAM) TOPICAL 2 TIMES DAILY
Qty: 60 TABLET | Refills: 0 | Status: SHIPPED | OUTPATIENT
Start: 2024-08-29

## 2024-08-29 RX ORDER — GABAPENTIN 300 MG/1
300 CAPSULE ORAL
Qty: 30 CAPSULE | Refills: 5 | Status: SHIPPED | OUTPATIENT
Start: 2024-08-29

## 2024-08-29 RX ORDER — ONDANSETRON 4 MG/1
4 TABLET, FILM COATED ORAL EVERY 8 HOURS PRN
Qty: 30 TABLET | Refills: 5 | Status: SHIPPED | OUTPATIENT
Start: 2024-08-29

## 2024-08-29 RX ORDER — CYCLOBENZAPRINE HCL 5 MG
10 TABLET ORAL
Qty: 30 TABLET | Refills: 1 | Status: SHIPPED | OUTPATIENT
Start: 2024-08-29

## 2024-09-13 DIAGNOSIS — C50.919 MALIGNANT NEOPLASM OF FEMALE BREAST, UNSPECIFIED ESTROGEN RECEPTOR STATUS, UNSPECIFIED LATERALITY, UNSPECIFIED SITE OF BREAST (HCC): ICD-10-CM

## 2024-09-16 RX ORDER — HYDROCODONE BITARTRATE AND ACETAMINOPHEN 5; 325 MG/1; MG/1
1 TABLET ORAL EVERY 6 HOURS PRN
Qty: 120 TABLET | Refills: 0 | Status: SHIPPED | OUTPATIENT
Start: 2024-09-16

## 2024-10-10 DIAGNOSIS — C50.919 MALIGNANT NEOPLASM OF FEMALE BREAST, UNSPECIFIED ESTROGEN RECEPTOR STATUS, UNSPECIFIED LATERALITY, UNSPECIFIED SITE OF BREAST (HCC): ICD-10-CM

## 2024-10-10 DIAGNOSIS — E03.9 HYPOTHYROIDISM, UNSPECIFIED TYPE: ICD-10-CM

## 2024-10-10 RX ORDER — LEVOTHYROXINE SODIUM 88 UG/1
88 TABLET ORAL DAILY
Qty: 30 TABLET | Refills: 0 | Status: SHIPPED | OUTPATIENT
Start: 2024-10-10

## 2024-10-11 RX ORDER — HYDROCODONE BITARTRATE AND ACETAMINOPHEN 5; 325 MG/1; MG/1
1 TABLET ORAL EVERY 6 HOURS PRN
Qty: 120 TABLET | Refills: 0 | Status: SHIPPED | OUTPATIENT
Start: 2024-10-11

## 2024-10-14 ENCOUNTER — RA CDI HCC (OUTPATIENT)
Dept: OTHER | Facility: HOSPITAL | Age: 59
End: 2024-10-14

## 2024-10-14 PROBLEM — N17.9 AKI (ACUTE KIDNEY INJURY) (HCC): Status: RESOLVED | Noted: 2020-05-10 | Resolved: 2024-10-14

## 2024-10-14 NOTE — PROGRESS NOTES
HCC coding opportunities          Chart Reviewed number of suggestions sent to Provider: 1  [facility]     Patients Insurance        Commercial Insurance: [employer]

## 2024-10-23 DIAGNOSIS — Z17.1 MALIGNANT NEOPLASM OF UPPER-INNER QUADRANT OF LEFT BREAST IN FEMALE, ESTROGEN RECEPTOR NEGATIVE (HCC): ICD-10-CM

## 2024-10-23 DIAGNOSIS — F41.9 ANXIETY: ICD-10-CM

## 2024-10-23 DIAGNOSIS — C50.212 MALIGNANT NEOPLASM OF UPPER-INNER QUADRANT OF LEFT BREAST IN FEMALE, ESTROGEN RECEPTOR NEGATIVE (HCC): ICD-10-CM

## 2024-10-25 RX ORDER — ALPRAZOLAM 1 MG/1
1 TABLET ORAL 3 TIMES DAILY PRN
Qty: 60 TABLET | Refills: 0 | Status: SHIPPED | OUTPATIENT
Start: 2024-10-25

## 2024-10-25 RX ORDER — CYCLOBENZAPRINE HCL 5 MG
10 TABLET ORAL
Qty: 30 TABLET | Refills: 1 | Status: SHIPPED | OUTPATIENT
Start: 2024-10-25

## 2024-11-07 DIAGNOSIS — C50.919 MALIGNANT NEOPLASM OF FEMALE BREAST, UNSPECIFIED ESTROGEN RECEPTOR STATUS, UNSPECIFIED LATERALITY, UNSPECIFIED SITE OF BREAST (HCC): ICD-10-CM

## 2024-11-07 RX ORDER — HYDROCODONE BITARTRATE AND ACETAMINOPHEN 5; 325 MG/1; MG/1
1 TABLET ORAL EVERY 6 HOURS PRN
Qty: 120 TABLET | Refills: 0 | Status: SHIPPED | OUTPATIENT
Start: 2024-11-07

## 2024-11-12 DIAGNOSIS — R10.31 CHRONIC RLQ PAIN: ICD-10-CM

## 2024-11-12 DIAGNOSIS — M32.9 SYSTEMIC LUPUS ERYTHEMATOSUS, UNSPECIFIED SLE TYPE, UNSPECIFIED ORGAN INVOLVEMENT STATUS (HCC): ICD-10-CM

## 2024-11-12 DIAGNOSIS — G89.29 CHRONIC RLQ PAIN: ICD-10-CM

## 2024-11-12 DIAGNOSIS — K52.9 COLITIS: ICD-10-CM

## 2024-11-12 DIAGNOSIS — Z17.1 MALIGNANT NEOPLASM OF UPPER-INNER QUADRANT OF LEFT BREAST IN FEMALE, ESTROGEN RECEPTOR NEGATIVE (HCC): ICD-10-CM

## 2024-11-12 DIAGNOSIS — F41.9 ANXIETY: ICD-10-CM

## 2024-11-12 DIAGNOSIS — C50.212 MALIGNANT NEOPLASM OF UPPER-INNER QUADRANT OF LEFT BREAST IN FEMALE, ESTROGEN RECEPTOR NEGATIVE (HCC): ICD-10-CM

## 2024-11-12 RX ORDER — DICYCLOMINE HYDROCHLORIDE 10 MG/1
10 CAPSULE ORAL 2 TIMES DAILY
Qty: 60 CAPSULE | Refills: 0 | Status: SHIPPED | OUTPATIENT
Start: 2024-11-12

## 2024-11-12 NOTE — ASSESSMENT & PLAN NOTE
Conjuntivae and eyelids appear normal,  Sclerae : White without injection Patient is doing well, no evidence of disease recurrence on exam.  I have advised her to discuss her neck issues with her PCP.  I will help coordinate her next mammogram in October, and I will see her again in 6 months for another clinical exam.   10-Nov-2024 00:00

## 2024-11-13 RX ORDER — CYCLOBENZAPRINE HCL 5 MG
10 TABLET ORAL
Qty: 30 TABLET | Refills: 0 | Status: SHIPPED | OUTPATIENT
Start: 2024-11-13

## 2024-11-13 RX ORDER — GABAPENTIN 300 MG/1
300 CAPSULE ORAL
Qty: 30 CAPSULE | Refills: 0 | Status: SHIPPED | OUTPATIENT
Start: 2024-11-13

## 2024-11-13 RX ORDER — ALPRAZOLAM 1 MG/1
1 TABLET ORAL 3 TIMES DAILY PRN
Qty: 60 TABLET | Refills: 1 | Status: SHIPPED | OUTPATIENT
Start: 2024-11-13

## 2024-11-14 ENCOUNTER — TELEPHONE (OUTPATIENT)
Age: 59
End: 2024-11-14

## 2024-11-14 NOTE — TELEPHONE ENCOUNTER
As long as she can come in between 7-1 when the lab is open in our building she may obtain blood work as requested

## 2024-11-14 NOTE — TELEPHONE ENCOUNTER
Donya called in because she would like to know if she can have a blood test completed in the office during her visit tomorrow 11/15/24. She was given a script for calcium blood. She is getting a Prolia shot and needs to complete this lab. Please call with an answer.

## 2024-11-15 ENCOUNTER — OFFICE VISIT (OUTPATIENT)
Dept: FAMILY MEDICINE CLINIC | Facility: CLINIC | Age: 59
End: 2024-11-15
Payer: COMMERCIAL

## 2024-11-15 VITALS
DIASTOLIC BLOOD PRESSURE: 80 MMHG | WEIGHT: 143.5 LBS | TEMPERATURE: 98 F | HEIGHT: 63 IN | BODY MASS INDEX: 25.43 KG/M2 | OXYGEN SATURATION: 98 % | SYSTOLIC BLOOD PRESSURE: 130 MMHG | RESPIRATION RATE: 17 BRPM | HEART RATE: 78 BPM

## 2024-11-15 DIAGNOSIS — M32.9 SYSTEMIC LUPUS ERYTHEMATOSUS, UNSPECIFIED SLE TYPE, UNSPECIFIED ORGAN INVOLVEMENT STATUS (HCC): Primary | ICD-10-CM

## 2024-11-15 DIAGNOSIS — F17.200 SMOKING: ICD-10-CM

## 2024-11-15 DIAGNOSIS — Z23 ENCOUNTER FOR IMMUNIZATION: ICD-10-CM

## 2024-11-15 DIAGNOSIS — E03.9 HYPOTHYROIDISM, UNSPECIFIED TYPE: ICD-10-CM

## 2024-11-15 PROCEDURE — 99214 OFFICE O/P EST MOD 30 MIN: CPT | Performed by: FAMILY MEDICINE

## 2024-11-15 PROCEDURE — 90471 IMMUNIZATION ADMIN: CPT

## 2024-11-15 PROCEDURE — 90673 RIV3 VACCINE NO PRESERV IM: CPT

## 2024-11-15 PROCEDURE — G0439 PPPS, SUBSEQ VISIT: HCPCS | Performed by: FAMILY MEDICINE

## 2024-11-15 NOTE — PROGRESS NOTES
Name: Federica Davey      : 1965      MRN: [id number]  Encounter Provider: Dean Banerjee MD  Encounter Date: 11/15/2024   Encounter department: [facility]    Assessment & Plan  Systemic lupus erythematosus, unspecified SLE type, unspecified organ involvement status (HCC)  Lupus.  Patient with history of lupus and sees her rheumatologist for monitoring of chronic condition    Orders:    TSH, 3rd generation; Future    Comprehensive metabolic panel; Future    Hypothyroidism, unspecified type  Hypothyroidism.  Patient will check TSH blood work and continue with current dose of levothyroxine    Orders:    TSH, 3rd generation; Future    Comprehensive metabolic panel; Future    Encounter for immunization    Orders:    influenza vaccine, recombinant, PF, 0.5 mL IM (Flublok)    Smoking  Nicotine use.  Patient smokes approximately three quarters of a pack per day.  She does not feel she is ready to consider smoking cessation at this time            Preventive health issues were discussed with patient, and age appropriate screening tests were ordered as noted in patient's After Visit Summary. Personalized health advice and appropriate referrals for health education or preventive services given if needed, as noted in patient's After Visit Summary.    History of Present Illness     Patient in the office to review chronic medical conditions.  She denies any recent illness..  Patient continues to struggle with her symptoms of chronic anxiety and depression.  She will go out at times with some friends.  She often feels guilty by not being able to see her family members that live out of state since she has difficulties traveling.    She continues to see her rheumatologist for her ongoing arthralgias.       Patient Care Team:  Dean Banerjee MD as PCP - General (Family Medicine)  Leia Arnett MD as PCP - PCP-[facility] ([facility])  Leia Arnett MD as PCP - PCP-[employer] ([facility])  Dean  MD Demar Banerjee MD (Urology)  Job Berumen MD (Gastroenterology)  Sandy Umaña MD (Obstetrics and Gynecology)  Laura Cristina MD (Obstetrics and Gynecology)  Dominick Shannon MD as Surgeon (Surgical Oncology)  Jesus Garcia MD as Medical Oncologist (Hematology and Oncology)  ANNETTE Shepherd as Nurse Practitioner (Surgical Oncology)  Anupama Medrano MD (Hematology and Oncology)    Review of Systems   Constitutional:  Positive for fatigue.   HENT: Negative.     Eyes: Negative.    Respiratory: Negative.     Cardiovascular: Negative.    Gastrointestinal: Negative.    Genitourinary: Negative.    Musculoskeletal:  Positive for arthralgias, gait problem and myalgias.   Skin: Negative.    Psychiatric/Behavioral:  Positive for agitation, confusion, dysphoric mood and sleep disturbance. The patient is nervous/anxious.      Medical History Reviewed by provider this encounter:  Problems       Annual Wellness Visit Questionnaire       Health Risk Assessment:   Patient rates overall health as fair. Patient feels that their physical health rating is slightly worse. Patient is satisfied with their life. Eyesight was rated as slightly worse. Hearing was rated as same. Patient feels that their emotional and mental health rating is slightly worse. Patients states they are sometimes angry. Patient states they are always unusually tired/fatigued. Pain experienced in the last 7 days has been a lot. Patient's pain rating has been 8/10. Patient states that she has experienced weight loss or gain in last 6 months.     Fall Risk Screening:   In the past year, patient has experienced: history of falling in past year      Urinary Incontinence Screening:   Patient has not leaked urine accidently in the last six months.     Home Safety:  Patient has trouble with stairs inside or outside of their home. Patient has working smoke alarms and has no working carbon monoxide detector. Home safety hazards include: none.      Nutrition:   Current diet is Regular and Limited junk food.     Medications:   Patient is not currently taking any over-the-counter supplements. Patient is able to manage medications.     Activities of Daily Living (ADLs)/Instrumental Activities of Daily Living (IADLs):   Walk and transfer into and out of bed and chair?: Yes  Dress and groom yourself?: Yes    Bathe or shower yourself?: Yes    Feed yourself? Yes  Do your laundry/housekeeping?: Yes  Manage your money, pay your bills and track your expenses?: Yes  Make your own meals?: Yes    Do your own shopping?: Yes    Previous Hospitalizations:   Any hospitalizations or ED visits within the last 12 months?: No      Advance Care Planning:   Living will: No    Durable POA for healthcare: No    Advanced directive: No      PREVENTIVE SCREENINGS      Cardiovascular Screening:    General: Screening Not Indicated      Diabetes Screening:     General: Risks and Benefits Discussed    Due for: Blood Glucose      Colorectal Cancer Screening:     General: Screening Current      Breast Cancer Screening:     General: History Breast Cancer      Osteoporosis Screening:    General: Screening Not Indicated and History Osteoporosis    Screening, Brief Intervention, and Referral to Treatment (SBIRT)    Screening  Typical number of drinks in a day: 3  Typical number of drinks in a week: 6  Interpretation: Low risk drinking behavior.    AUDIT-C Screenin) How often did you have a drink containing alcohol in the past year? 2 to 3 times a week  2) How many drinks did you have on a typical day when you were drinking in the past year? 3 to 4  3) How often did you have 6 or more drinks on one occasion in the past year? monthly    AUDIT-C Score: 5  Interpretation: Score 3-12 (female): POSITIVE screen for alcohol misuse    AUDIT Screenin) How often during the last year have you found that you were not able to stop drinking once you had started? 0 - never  5) How often during the  last year have you failed to do what was normally expected from you because of drinking? 0 - never  6) How often during the last year have you needed a first drink in the morning to get yourself going after a heavy drinking session? 0 - never  7) How often during the last year have you had a feeling of guilt or remorse after drinking? 0 - never  8) How often during the last year have you been unable to remember what happened the night before because you had been drinking? 0 - never  9) Have you or someone else been injured as a result of your drinking? 0 - no  10) Has a relative or friend or a doctor or another health worker been concerned about your drinking or suggested you cut down? 0 - no    AUDIT Score: 5  Interpretation: Low risk alcohol consumption    Single Item Drug Screening:  How often have you used an illegal drug (including marijuana) or a prescription medication for non-medical reasons in the past year? never    Single Item Drug Screen Score: 0  Interpretation: Negative screen for possible drug use disorder    SDOH Risk Assessment  Social determinants of health (SDOH) risk assesment tool was completed. The tool at a minimum covered housing stability, food insecurity, transportation needs, and utility difficulty. Patient had at risk responses for the following SDOH domains: food insecurity.     Review of Current Opioid Use    Opioid Risk Tool (ORT) Interpretation: Complete Opioid Risk Tool (ORT)    Social Drivers of Health     Financial Resource Strain: High Risk (10/13/2023)    Overall Financial Resource Strain (CARDIA)     Difficulty of Paying Living Expenses: Very hard   Food Insecurity: Food Insecurity Present (11/8/2024)    Hunger Vital Sign     Worried About Running Out of Food in the Last Year: Often true     Ran Out of Food in the Last Year: Sometimes true   Transportation Needs: No Transportation Needs (11/8/2024)    PRAPARE - Transportation     Lack of Transportation (Medical): No     Lack of  "Transportation (Non-Medical): No   Housing Stability: Low Risk  (11/8/2024)    Housing Stability Vital Sign     Unable to Pay for Housing in the Last Year: No     Number of Times Moved in the Last Year: 0     Homeless in the Last Year: No   Utilities: Not At Risk (11/8/2024)    [facility] Utilities     Threatened with loss of utilities: No     No results found.    Objective   /80 (Patient Position: Sitting, Cuff Size: Adult)   Pulse 78   Temp 98 °F (36.7 °C)   Resp 17   Ht 5' 3\" (1.6 m)   Wt 65.1 kg (143 lb 8 oz)   LMP  (LMP Unknown)   SpO2 98%   BMI 25.42 kg/m²     Physical Exam  Vitals and nursing note reviewed.   Constitutional:       General: She is not in acute distress.     Appearance: She is well-developed.   HENT:      Head: Normocephalic and atraumatic.   Eyes:      Extraocular Movements: Extraocular movements intact.      Conjunctiva/sclera: Conjunctivae normal.      Pupils: Pupils are equal, round, and reactive to light.   Cardiovascular:      Rate and Rhythm: Normal rate and regular rhythm.      Heart sounds: No murmur heard.  Pulmonary:      Effort: Pulmonary effort is normal. No respiratory distress.      Breath sounds: Normal breath sounds.   Abdominal:      Tenderness: There is abdominal tenderness.   Musculoskeletal:         General: No swelling.      Cervical back: Neck supple.   Skin:     General: Skin is warm and dry.      Capillary Refill: Capillary refill takes less than 2 seconds.   Neurological:      Mental Status: She is alert. Mental status is at baseline.   Psychiatric:         Mood and Affect: Mood normal.         Behavior: Behavior normal.         Thought Content: Thought content normal.         Judgment: Judgment normal.     I spent 30 minutes with this patient of which greater than 50% was spent counseling or reviewing chart    "

## 2024-11-16 NOTE — ASSESSMENT & PLAN NOTE
Nicotine use.  Patient smokes approximately three quarters of a pack per day.  She does not feel she is ready to consider smoking cessation at this time

## 2024-11-16 NOTE — ASSESSMENT & PLAN NOTE
Hypothyroidism.  Patient will check TSH blood work and continue with current dose of levothyroxine    Orders:    TSH, 3rd generation; Future    Comprehensive metabolic panel; Future

## 2024-11-16 NOTE — ASSESSMENT & PLAN NOTE
Lupus.  Patient with history of lupus and sees her rheumatologist for monitoring of chronic condition    Orders:    TSH, 3rd generation; Future    Comprehensive metabolic panel; Future

## 2024-11-17 DIAGNOSIS — R13.19 ESOPHAGEAL DYSPHAGIA: ICD-10-CM

## 2024-11-19 RX ORDER — PANTOPRAZOLE SODIUM 40 MG/1
40 TABLET, DELAYED RELEASE ORAL DAILY
Qty: 30 TABLET | Refills: 5 | OUTPATIENT
Start: 2024-11-19

## 2024-11-19 NOTE — TELEPHONE ENCOUNTER
Patient not seen in 3 years she will need an office appointment for any medication renewal.  Thank you

## 2024-12-02 ENCOUNTER — TELEPHONE (OUTPATIENT)
Dept: HEMATOLOGY ONCOLOGY | Facility: CLINIC | Age: 59
End: 2024-12-02

## 2024-12-02 DIAGNOSIS — C50.919 MALIGNANT NEOPLASM OF FEMALE BREAST, UNSPECIFIED ESTROGEN RECEPTOR STATUS, UNSPECIFIED LATERALITY, UNSPECIFIED SITE OF BREAST (HCC): ICD-10-CM

## 2024-12-02 NOTE — TELEPHONE ENCOUNTER
I phoned the patient and left a VM message indicating that I was calling from  [facility], Dr. Medrano and Dr. Costa's office, regarding the need to RS her appointment with Dr. Medrano from 12/5 to 1/17 at 1400 in the [place] office. I provided the [facility] number as a contact in the event that she would need to reschedule this new appointment.

## 2024-12-03 RX ORDER — HYDROCODONE BITARTRATE AND ACETAMINOPHEN 5; 325 MG/1; MG/1
1 TABLET ORAL EVERY 6 HOURS PRN
Qty: 120 TABLET | Refills: 0 | Status: SHIPPED | OUTPATIENT
Start: 2024-12-03

## 2024-12-13 DIAGNOSIS — C50.212 MALIGNANT NEOPLASM OF UPPER-INNER QUADRANT OF LEFT BREAST IN FEMALE, ESTROGEN RECEPTOR NEGATIVE (HCC): ICD-10-CM

## 2024-12-13 DIAGNOSIS — E03.9 HYPOTHYROIDISM, UNSPECIFIED TYPE: ICD-10-CM

## 2024-12-13 DIAGNOSIS — Z17.1 MALIGNANT NEOPLASM OF UPPER-INNER QUADRANT OF LEFT BREAST IN FEMALE, ESTROGEN RECEPTOR NEGATIVE (HCC): ICD-10-CM

## 2024-12-13 RX ORDER — CYCLOBENZAPRINE HCL 5 MG
10 TABLET ORAL
Qty: 30 TABLET | Refills: 3 | Status: SHIPPED | OUTPATIENT
Start: 2024-12-13

## 2024-12-13 RX ORDER — LEVOTHYROXINE SODIUM 88 UG/1
88 TABLET ORAL DAILY
Qty: 30 TABLET | Refills: 3 | Status: SHIPPED | OUTPATIENT
Start: 2024-12-13

## 2024-12-21 DIAGNOSIS — F41.9 ANXIETY: ICD-10-CM

## 2024-12-23 RX ORDER — ALPRAZOLAM 1 MG/1
1 TABLET ORAL 3 TIMES DAILY PRN
Qty: 60 TABLET | Refills: 0 | Status: SHIPPED | OUTPATIENT
Start: 2024-12-23

## 2024-12-27 DIAGNOSIS — C50.919 MALIGNANT NEOPLASM OF FEMALE BREAST, UNSPECIFIED ESTROGEN RECEPTOR STATUS, UNSPECIFIED LATERALITY, UNSPECIFIED SITE OF BREAST (HCC): ICD-10-CM

## 2024-12-31 RX ORDER — HYDROCODONE BITARTRATE AND ACETAMINOPHEN 5; 325 MG/1; MG/1
1 TABLET ORAL EVERY 6 HOURS PRN
Qty: 120 TABLET | Refills: 0 | Status: SHIPPED | OUTPATIENT
Start: 2024-12-31

## 2025-01-07 DIAGNOSIS — C50.919 MALIGNANT NEOPLASM OF FEMALE BREAST, UNSPECIFIED ESTROGEN RECEPTOR STATUS, UNSPECIFIED LATERALITY, UNSPECIFIED SITE OF BREAST (HCC): ICD-10-CM

## 2025-01-07 NOTE — TELEPHONE ENCOUNTER
Medication: HYDROcodone-acetaminophen (NORCO) 5-325 mg per tablet     Dose/Frequency: Take 1 tablet by mouth every 6 (six) hours as needed for pain Max Daily Amount: 4 tablets     Quantity: 120 tablet     Pharmacy: [place] [id number]  Phone: [phone]  Fax: [phone]     Office:   [x] PCP/Provider - Dean Banerjee MD   [] Speciality/Provider -     Does the patient have enough for 3 days?   [] Yes   [x] No     Pt has new insurance, pharmacy only filled 1-weeks of rx, new order needed due to new ins.

## 2025-01-08 ENCOUNTER — APPOINTMENT (OUTPATIENT)
Dept: LAB | Facility: CLINIC | Age: 60
End: 2025-01-08
Payer: COMMERCIAL

## 2025-01-08 DIAGNOSIS — M81.0 SENILE OSTEOPOROSIS: ICD-10-CM

## 2025-01-08 DIAGNOSIS — M32.9 SYSTEMIC LUPUS ERYTHEMATOSUS, UNSPECIFIED SLE TYPE, UNSPECIFIED ORGAN INVOLVEMENT STATUS (HCC): ICD-10-CM

## 2025-01-08 DIAGNOSIS — E03.9 HYPOTHYROIDISM, UNSPECIFIED TYPE: ICD-10-CM

## 2025-01-08 LAB
ALBUMIN SERPL BCG-MCNC: 4.6 G/DL (ref 3.5–5)
ALP SERPL-CCNC: 76 U/L (ref 34–104)
ALT SERPL W P-5'-P-CCNC: 11 U/L (ref 7–52)
ANION GAP SERPL CALCULATED.3IONS-SCNC: 10 MMOL/L (ref 4–13)
AST SERPL W P-5'-P-CCNC: 20 U/L (ref 13–39)
BILIRUB SERPL-MCNC: 0.37 MG/DL (ref 0.2–1)
BUN SERPL-MCNC: 11 MG/DL (ref 5–25)
CALCIUM SERPL-MCNC: 9.6 MG/DL (ref 8.4–10.2)
CHLORIDE SERPL-SCNC: 102 MMOL/L (ref 96–108)
CO2 SERPL-SCNC: 26 MMOL/L (ref 21–32)
CREAT SERPL-MCNC: 0.91 MG/DL (ref 0.6–1.3)
GFR SERPL CREATININE-BSD FRML MDRD: 68 ML/MIN/1.73SQ M
GLUCOSE P FAST SERPL-MCNC: 83 MG/DL (ref 65–99)
POTASSIUM SERPL-SCNC: 4.2 MMOL/L (ref 3.5–5.3)
PROT SERPL-MCNC: 7.6 G/DL (ref 6.4–8.4)
SODIUM SERPL-SCNC: 138 MMOL/L (ref 135–147)
TSH SERPL DL<=0.05 MIU/L-ACNC: 13.73 UIU/ML (ref 0.45–4.5)

## 2025-01-08 PROCEDURE — 84443 ASSAY THYROID STIM HORMONE: CPT

## 2025-01-08 PROCEDURE — 80053 COMPREHEN METABOLIC PANEL: CPT

## 2025-01-08 PROCEDURE — 36415 COLL VENOUS BLD VENIPUNCTURE: CPT

## 2025-01-09 ENCOUNTER — HOSPITAL ENCOUNTER (OUTPATIENT)
Dept: MAMMOGRAPHY | Facility: CLINIC | Age: 60
Discharge: HOME | End: 2025-01-09
Payer: COMMERCIAL

## 2025-01-09 ENCOUNTER — RESULTS FOLLOW-UP (OUTPATIENT)
Dept: FAMILY MEDICINE CLINIC | Facility: CLINIC | Age: 60
End: 2025-01-09

## 2025-01-09 VITALS — WEIGHT: 143 LBS | BODY MASS INDEX: 25.34 KG/M2 | HEIGHT: 63 IN

## 2025-01-09 DIAGNOSIS — Z85.3 PERSONAL HISTORY OF BREAST CANCER: ICD-10-CM

## 2025-01-09 DIAGNOSIS — E03.9 HYPOTHYROIDISM, UNSPECIFIED TYPE: ICD-10-CM

## 2025-01-09 PROCEDURE — 77065 DX MAMMO INCL CAD UNI: CPT

## 2025-01-09 PROCEDURE — G0279 TOMOSYNTHESIS, MAMMO: HCPCS

## 2025-01-09 RX ORDER — HYDROCODONE BITARTRATE AND ACETAMINOPHEN 5; 325 MG/1; MG/1
1 TABLET ORAL EVERY 6 HOURS PRN
Qty: 120 TABLET | Refills: 0 | OUTPATIENT
Start: 2025-01-09

## 2025-01-09 RX ORDER — LEVOTHYROXINE SODIUM 112 UG/1
112 TABLET ORAL DAILY
Qty: 90 TABLET | Refills: 1 | Status: SHIPPED | OUTPATIENT
Start: 2025-01-09

## 2025-01-09 NOTE — TELEPHONE ENCOUNTER
Please reach out to patients pharmacy to find out the specifics about the 30 day supply of this refill. There seems to be an issue with the amount that's being despised to the patient.

## 2025-01-10 ENCOUNTER — TELEPHONE (OUTPATIENT)
Dept: HEMATOLOGY ONCOLOGY | Facility: CLINIC | Age: 60
End: 2025-01-10

## 2025-01-10 NOTE — TELEPHONE ENCOUNTER
Left message on voicemail regarding upcoming appointment with Dr. Escobar  will need to be rescheduled.  Provider will be out of the office.    Advised patient to call us back to confirm new appointment date and time for 1/16/25 @ 1:40 pm

## 2025-01-12 DIAGNOSIS — F41.9 ANXIETY: ICD-10-CM

## 2025-01-13 DIAGNOSIS — C50.919 MALIGNANT NEOPLASM OF FEMALE BREAST, UNSPECIFIED ESTROGEN RECEPTOR STATUS, UNSPECIFIED LATERALITY, UNSPECIFIED SITE OF BREAST (HCC): ICD-10-CM

## 2025-01-13 DIAGNOSIS — F41.9 ANXIETY: ICD-10-CM

## 2025-01-14 NOTE — TELEPHONE ENCOUNTER
Pt calling to follow up on refill request; she has new insurance and they only gave her a week's supply on 1/2. Please advise at earliest convenience, TY.

## 2025-01-15 ENCOUNTER — TELEPHONE (OUTPATIENT)
Dept: HEMATOLOGY ONCOLOGY | Facility: CLINIC | Age: 60
End: 2025-01-15

## 2025-01-15 RX ORDER — ALPRAZOLAM 1 MG/1
1 TABLET ORAL 3 TIMES DAILY PRN
Qty: 60 TABLET | Refills: 0 | OUTPATIENT
Start: 2025-01-15

## 2025-01-15 RX ORDER — HYDROCODONE BITARTRATE AND ACETAMINOPHEN 5; 325 MG/1; MG/1
1 TABLET ORAL EVERY 6 HOURS PRN
Qty: 120 TABLET | Refills: 0 | OUTPATIENT
Start: 2025-01-15

## 2025-01-16 ENCOUNTER — TELEPHONE (OUTPATIENT)
Age: 60
End: 2025-01-16

## 2025-01-16 DIAGNOSIS — C50.919 MALIGNANT NEOPLASM OF FEMALE BREAST, UNSPECIFIED ESTROGEN RECEPTOR STATUS, UNSPECIFIED LATERALITY, UNSPECIFIED SITE OF BREAST (HCC): ICD-10-CM

## 2025-01-16 DIAGNOSIS — F41.9 ANXIETY: ICD-10-CM

## 2025-01-16 RX ORDER — ALPRAZOLAM 1 MG/1
1 TABLET ORAL 3 TIMES DAILY PRN
Qty: 90 TABLET | Refills: 0 | Status: SHIPPED | OUTPATIENT
Start: 2025-01-16

## 2025-01-16 RX ORDER — HYDROCODONE BITARTRATE AND ACETAMINOPHEN 5; 325 MG/1; MG/1
1 TABLET ORAL EVERY 6 HOURS PRN
Qty: 120 TABLET | Refills: 0 | Status: SHIPPED | OUTPATIENT
Start: 2025-01-16

## 2025-01-23 DIAGNOSIS — F41.9 ANXIETY: ICD-10-CM

## 2025-01-23 RX ORDER — SERTRALINE HYDROCHLORIDE 100 MG/1
150 TABLET, FILM COATED ORAL DAILY
Qty: 45 TABLET | Refills: 5 | Status: SHIPPED | OUTPATIENT
Start: 2025-01-23

## 2025-01-27 ENCOUNTER — OFFICE VISIT (OUTPATIENT)
Dept: HEMATOLOGY ONCOLOGY | Facility: CLINIC | Age: 60
End: 2025-01-27
Payer: COMMERCIAL

## 2025-01-27 ENCOUNTER — APPOINTMENT (OUTPATIENT)
Dept: LAB | Facility: CLINIC | Age: 60
End: 2025-01-27
Payer: COMMERCIAL

## 2025-01-27 VITALS
OXYGEN SATURATION: 96 % | HEART RATE: 86 BPM | WEIGHT: 142 LBS | RESPIRATION RATE: 18 BRPM | SYSTOLIC BLOOD PRESSURE: 136 MMHG | BODY MASS INDEX: 25.16 KG/M2 | HEIGHT: 63 IN | DIASTOLIC BLOOD PRESSURE: 84 MMHG | TEMPERATURE: 97.8 F

## 2025-01-27 DIAGNOSIS — Z17.1 MALIGNANT NEOPLASM OF UPPER-INNER QUADRANT OF LEFT BREAST IN FEMALE, ESTROGEN RECEPTOR NEGATIVE (HCC): ICD-10-CM

## 2025-01-27 DIAGNOSIS — Z17.1 MALIGNANT NEOPLASM OF UPPER-INNER QUADRANT OF LEFT BREAST IN FEMALE, ESTROGEN RECEPTOR NEGATIVE (HCC): Primary | ICD-10-CM

## 2025-01-27 DIAGNOSIS — R92.331 HETEROGENEOUSLY DENSE TISSUE OF RIGHT BREAST ON MAMMOGRAPHY: ICD-10-CM

## 2025-01-27 DIAGNOSIS — Z80.9 FAMILY HISTORY OF CANCER: ICD-10-CM

## 2025-01-27 DIAGNOSIS — E03.9 HYPOTHYROIDISM, UNSPECIFIED TYPE: ICD-10-CM

## 2025-01-27 DIAGNOSIS — C50.212 MALIGNANT NEOPLASM OF UPPER-INNER QUADRANT OF LEFT BREAST IN FEMALE, ESTROGEN RECEPTOR NEGATIVE (HCC): ICD-10-CM

## 2025-01-27 DIAGNOSIS — M32.9 SYSTEMIC LUPUS ERYTHEMATOSUS, UNSPECIFIED SLE TYPE, UNSPECIFIED ORGAN INVOLVEMENT STATUS (HCC): ICD-10-CM

## 2025-01-27 DIAGNOSIS — R07.89 ATYPICAL CHEST PAIN: ICD-10-CM

## 2025-01-27 DIAGNOSIS — Z72.0 TOBACCO ABUSE DISORDER: ICD-10-CM

## 2025-01-27 DIAGNOSIS — Z78.0 POSTMENOPAUSAL: Primary | ICD-10-CM

## 2025-01-27 DIAGNOSIS — C50.212 MALIGNANT NEOPLASM OF UPPER-INNER QUADRANT OF LEFT BREAST IN FEMALE, ESTROGEN RECEPTOR NEGATIVE (HCC): Primary | ICD-10-CM

## 2025-01-27 DIAGNOSIS — F17.200 SMOKING: ICD-10-CM

## 2025-01-27 DIAGNOSIS — F17.200 SMOKING: Primary | ICD-10-CM

## 2025-01-27 LAB
ALBUMIN SERPL BCG-MCNC: 4.6 G/DL (ref 3.5–5)
ALP SERPL-CCNC: 74 U/L (ref 34–104)
ALT SERPL W P-5'-P-CCNC: 12 U/L (ref 7–52)
ANION GAP SERPL CALCULATED.3IONS-SCNC: 8 MMOL/L (ref 4–13)
AST SERPL W P-5'-P-CCNC: 25 U/L (ref 13–39)
BASOPHILS # BLD AUTO: 0.06 THOUSANDS/ΜL (ref 0–0.1)
BASOPHILS NFR BLD AUTO: 1 % (ref 0–1)
BILIRUB SERPL-MCNC: 0.27 MG/DL (ref 0.2–1)
BUN SERPL-MCNC: 10 MG/DL (ref 5–25)
CALCIUM SERPL-MCNC: 9.7 MG/DL (ref 8.4–10.2)
CHLORIDE SERPL-SCNC: 104 MMOL/L (ref 96–108)
CO2 SERPL-SCNC: 26 MMOL/L (ref 21–32)
CREAT SERPL-MCNC: 0.79 MG/DL (ref 0.6–1.3)
EOSINOPHIL # BLD AUTO: 0.13 THOUSAND/ΜL (ref 0–0.61)
EOSINOPHIL NFR BLD AUTO: 2 % (ref 0–6)
ERYTHROCYTE [DISTWIDTH] IN BLOOD BY AUTOMATED COUNT: 14.6 % (ref 11.6–15.1)
GFR SERPL CREATININE-BSD FRML MDRD: 81 ML/MIN/1.73SQ M
GLUCOSE SERPL-MCNC: 90 MG/DL (ref 65–140)
HCT VFR BLD AUTO: 43.6 % (ref 34.8–46.1)
HGB BLD-MCNC: 14.2 G/DL (ref 11.5–15.4)
IMM GRANULOCYTES # BLD AUTO: 0.04 THOUSAND/UL (ref 0–0.2)
IMM GRANULOCYTES NFR BLD AUTO: 1 % (ref 0–2)
LYMPHOCYTES # BLD AUTO: 2.17 THOUSANDS/ΜL (ref 0.6–4.47)
LYMPHOCYTES NFR BLD AUTO: 26 % (ref 14–44)
MAGNESIUM SERPL-MCNC: 1.8 MG/DL (ref 1.9–2.7)
MCH RBC QN AUTO: 30.7 PG (ref 26.8–34.3)
MCHC RBC AUTO-ENTMCNC: 32.6 G/DL (ref 31.4–37.4)
MCV RBC AUTO: 94 FL (ref 82–98)
MONOCYTES # BLD AUTO: 0.57 THOUSAND/ΜL (ref 0.17–1.22)
MONOCYTES NFR BLD AUTO: 7 % (ref 4–12)
NEUTROPHILS # BLD AUTO: 5.44 THOUSANDS/ΜL (ref 1.85–7.62)
NEUTS SEG NFR BLD AUTO: 63 % (ref 43–75)
NRBC BLD AUTO-RTO: 0 /100 WBCS
PLATELET # BLD AUTO: 246 THOUSANDS/UL (ref 149–390)
PMV BLD AUTO: 10.3 FL (ref 8.9–12.7)
POTASSIUM SERPL-SCNC: 4.1 MMOL/L (ref 3.5–5.3)
PROT SERPL-MCNC: 7.8 G/DL (ref 6.4–8.4)
RBC # BLD AUTO: 4.62 MILLION/UL (ref 3.81–5.12)
SODIUM SERPL-SCNC: 138 MMOL/L (ref 135–147)
WBC # BLD AUTO: 8.41 THOUSAND/UL (ref 4.31–10.16)

## 2025-01-27 PROCEDURE — 83735 ASSAY OF MAGNESIUM: CPT

## 2025-01-27 PROCEDURE — 80053 COMPREHEN METABOLIC PANEL: CPT

## 2025-01-27 PROCEDURE — 99213 OFFICE O/P EST LOW 20 MIN: CPT | Performed by: INTERNAL MEDICINE

## 2025-01-27 PROCEDURE — 36415 COLL VENOUS BLD VENIPUNCTURE: CPT

## 2025-01-27 PROCEDURE — 85025 COMPLETE CBC W/AUTO DIFF WBC: CPT

## 2025-01-27 NOTE — PROGRESS NOTES
Name: Federica Davey      : 1965      MRN: [id number]  Encounter Provider: Olivier Escobar MD  Encounter Date: 2025   Encounter department: [facility] [place]    Chief Complaint   Patient presents with   • Follow-up     :  Assessment & Plan  Malignant neoplasm of upper-inner quadrant of left breast in female, estrogen receptor negative (HCC)  The patient has no clear-cut evidence of recurrent or new breast cancer syndrome    Orders:  •  CT chest abdomen pelvis w contrast; Future    Tobacco abuse disorder    Orders:  •  CT chest abdomen pelvis w contrast; Future    Heterogeneously dense tissue of right breast on mammography         Systemic lupus erythematosus, unspecified SLE type, unspecified organ involvement status (HCC)         Hypothyroidism, unspecified type         Family history of cancer  Mother had breast cancer in her 70s, maternal aunt had breast cancer in her 70s and and ultimately  from same and her maternal first cousin also had cancer       Atypical chest pain  1-2 years of chest pain across the anterior left chest wall.  No increasing or decreasing variables.  Orders:  •  CT chest abdomen pelvis w contrast; Future  •  NM bone scan whole body; Future  •  Ambulatory Referral to Occupational Therapy; Future  •  Ambulatory Referral to Physical Therapy; Future        Clinical/Pathologic Stage IIA to ypT0 pN0 in 2020      Cancer Staging   Personal history of breast cancer  Staging form: Breast, AJCC 8th Edition  - Clinical: Stage IIA (cT2, cN0, cM0, G3, ER-, CO-, HER2+) - Signed by Damian Langley MD on 2020  Histologic grading system: 3 grade system  Laterality: Left  - Pathologic stage from 2020: No Stage Recommended (ypT0, pN0, cM0, ER-, CO-, HER2+) - Signed by ANNETTE Shepherd on 2023  Stage prefix: Post-therapy  Response to neoadjuvant therapy: Complete response      Current Therapy: Active Surveillance From July  "2021      Discussion  This pleasant 59-year-old female returns for medical oncology follow-up.  Today represents her first visit with medical oncology since October 2023.    The patient's risk factors for the development of solid tumor cancer include ongoing tobacco usage, ongoing alcohol usage, age, gender, previous breast cancer diagnosis, dense breasts and family history to some extent.    The patient complains of atypical chest pain involving the left chest wall without clear-cut increasing or decreasing elements.    Given her risk factors most notably ongoing tobacco usage and alcohol use a trial restaged the patient fully as it has been so long for a comprehensive review with medical oncology.    We had a long discussion about smoking cessation and I will hope to walk her through her program aimed at cessation.    The patient expresses gratitude and understanding and will work towards improved lifestyle elements.  She will return in 2 months for further assessment post diagnostic studies      Special Studies  02/26/2020  [employer] Germline panel  Negative    No results found for: \"GENETIC\", \"INTERP\", \"GENES\"      History of Present Illness     Extracted from Dr Costa Notation October 2023    Donya Davey is a 58-year-old postmenopausal female who initially felt a lump in her left breast which she brought to medical attention.  She was found to have a large left breast mass for which she underwent biopsy which showed invasive ductal carcinoma, grade 3. This was ER negative, FL negative, HER2 2+ disease.  HER2 fish was positive for gene application with ratio of 2.2.  She was subsequently seen by Dr. Shannon.  Based on MRI, she had 2.7 x 2.1 x 2.3 cm of left breast mass with no enlarged axillary lymph node.  Bone scan was negative for osseous metastasis.  CT scan of chest abdomen pelvis showed no evidence of distant metastasis. She had 3 cycle of neoadjuvant chemotherapy with TCH with pertuzumab with significant " toxicities.  She has multiple hospitalization due to the severe nausea vomiting as well as deconditioning. Due to the toxicity, treatment regimen was changed to weekly paclitaxel, try weekly pertuzumab and trastuzumab which she tolerated well.  She completed neoadjuvant chemotherapy in July 2020, resulting in complete clinical response.  Subsequently, she underwent mastectomy and sentinel lymph node biopsy by Dr. Shannon in August 2020 which showed no evidence of residual carcinoma.  3 sentinel lymph nodes were all negative for metastatic disease, consistent with complete pathological response.  Postoperatively, she was treated with adjuvant trastuzumab and pertuzumab until June 2021 without cardiac toxicity.  She was previously following with Dr. Garcia and last seen in the office on 11/8/2022.  She is currently on observation.  She presents today for transfer of care/follow-up visit.  She feels well with no new complaints.  She denies any bone pain.  Her weight is stable. Continues to follow with rheumatology for lupus. On a low dose prednisone.      She continues to smoke cigarettes.     Her right diagnostic mammogram from 01/09/2025 with no evidence of malignancy        Pertinent History from October 2023  The patient endorses left upper chest wall discomfort off and on but steadily for 1-2 years.  Patient endorses ongoing tobacco usage at 10 cigarettes/day  Patient endorses ongoing alcohol with beer and periodic 1  No other complaints on full review of systems    Cancer Screening and Prevention  Mammography: 01/09/2025   GI/Colonoscopy: 05/27/2021   GYN: Not on file   Bone Density: Not on file   Covid 19 Vaccination Status: Pfizer series times 2  Spring 2021    Oncology Therapeutic Summary:  July 2021 to Present    Active Surveillance    June 2021 status post adjuvant trastuzumab plus pertuzumab      August 18, 2020 status post left mastectomy with sentinel lymph node review to pathologic complete  response    July 2020 status post further neoadjuvant chemotherapy weekly Paclitaxel x 6 weeks along with Trastuzumab every 3 weeks times    May 5, 2020 from March 24, 2020 status post neoadjuvant docetaxel, trastuzumab and pertuzumab x 3 cycles discontinued due to adverse complications  Oncology History   Cancer Staging   Personal history of breast cancer  Staging form: Breast, AJCC 8th Edition  - Clinical: Stage IIA (cT2, cN0, cM0, G3, ER-, VA-, HER2+) - Signed by Damian Langley MD on 6/4/2020  Histologic grading system: 3 grade system  Laterality: Left  - Pathologic stage from 8/18/2020: No Stage Recommended (ypT0, pN0, cM0, ER-, VA-, HER2+) - Signed by ANNETTE Shepherd on 8/2/2023  Stage prefix: Post-therapy  Response to neoadjuvant therapy: Complete response  Oncology History   Personal history of breast cancer   2/12/2020 Biopsy    A. & B. Right breast stereotactic biopsy with and without calcs; 11 o'clock, 10 cm from nipple  Benign breast glandular parenchyma with fibroadenomatoid stromal hyperplasia  No atypia and no evidence of malignancy     C. Left breast ultrasound-guided biopsy; 10 o'clock, 5 cm from nipple  Benign breast glandular tissue  No atypia and no evidence of malignancy    D. Left breast ultrasound-guided biopsy; 10 o'clock, 4 cm from nipple  Invasive breast carcinoma of no special type (ductal NST)  Grade 3  ER 0  VA 0  HER2 2+; FISH positive    Concordant. Right breast clear. Malignant mass measures 2.3 cm on mammo. Left axilla US negative. Mammographic abnormality with no US correlate. MRI recommended to further evaluate this finding.     2/26/2020 Genetic Testing    The following genes were evaluated: LIZZETTE, BRCA1, BRCA2, CDH1, CHEK2, PALB2, PTEN, STK11, TP53  Negative result. No pathogenic sequence variants or deletions/dupllications identified  [employer]     3/24/2020 - 5/25/2020 Chemotherapy    pegfilgrastim (NEULASTA ONPRO) subcutaneous injection kit 6 mg, 6 mg, Subcutaneous, Once, 4  of 6 cycles  Administration: 6 mg (3/24/2020), 6 mg (4/14/2020), 6 mg (5/5/2020)  fosaprepitant (EMEND) 150 mg in sodium chloride 0.9 % 250 mL IVPB, 150 mg, Intravenous, Once, 4 of 6 cycles  Administration: 150 mg (3/24/2020), 150 mg (4/14/2020), 150 mg (5/5/2020)  pertuzumab (PERJETA) 840 mg in sodium chloride 0.9 % 250 mL IVPB, 840 mg, Intravenous, Once, 4 of 6 cycles  Administration: 840 mg (3/24/2020), 420 mg (4/14/2020), 420 mg (5/5/2020)  CARBOplatin (PARAPLATIN) 547.8 mg in sodium chloride 0.9 % 250 mL IVPB, 547.8 mg, Intravenous, Once, 4 of 6 cycles  Administration: 547.8 mg (3/24/2020), 575.4 mg (4/14/2020), 625.8 mg (5/5/2020)  DOCEtaxel (TAXOTERE) 122.2 mg in sodium chloride 0.9 % 250 mL chemo infusion, 75 mg/m2 = 122.2 mg, Intravenous, Once, 4 of 6 cycles  Administration: 122.2 mg (3/24/2020), 122.2 mg (4/14/2020), 122.2 mg (5/5/2020)  trastuzumab (HERCEPTIN) 486 mg in sodium chloride 0.9 % 250 mL chemo infusion, 8 mg/kg = 486 mg, Intravenous, Once, 4 of 18 cycles  Administration: 486 mg (3/24/2020), 364 mg (4/14/2020), 364 mg (5/5/2020)     6/3/2020 - 7/18/2021 Chemotherapy    pertuzumab (PERJETA) IVPB, 420 mg (50 % of original dose 840 mg), Intravenous, Once, 13 of 13 cycles  Dose modification: 420 mg (original dose 840 mg, Cycle 1, Reason: Dose Not Tolerated)  Administration: 420 mg (6/3/2020), 420 mg (11/9/2020), 420 mg (6/25/2020), 420 mg (11/30/2020), 420 mg (12/21/2020), 420 mg (1/11/2021), 420 mg (2/2/2021), 420 mg (2/23/2021), 420 mg (4/6/2021), 420 mg (4/26/2021), 420 mg (5/17/2021), 420 mg (6/7/2021), 420 mg (6/28/2021)  trastuzumab-qyyp (TRAZIMERA) chemo infusion, 6 mg/kg = 337 mg (100 % of original dose 6 mg/kg), Intravenous, Once, 5 of 5 cycles  Dose modification: 6 mg/kg (original dose 6 mg/kg, Cycle 9)  Administration: 337 mg (4/6/2021), 337 mg (4/26/2021), 337 mg (5/17/2021), 337 mg (6/7/2021), 337 mg (6/28/2021)  PACLItaxel (TAXOL) chemo IVPB, 80 mg/m2 = 127.2 mg, Intravenous, Once, 2  of 2 cycles  Administration: 127.2 mg (6/3/2020), 127.2 mg (6/10/2020), 127.2 mg (6/17/2020), 127.2 mg (6/25/2020), 127.2 mg (7/1/2020), 127.2 mg (7/7/2020)  trastuzumab (HERCEPTIN) chemo infusion, 6 mg/kg = 340 mg (75 % of original dose 8 mg/kg), Intravenous, Once, 8 of 8 cycles  Dose modification: 6 mg/kg (original dose 8 mg/kg, Cycle 1, Reason: Dose Not Tolerated)  Administration: 340 mg (6/3/2020), 376 mg (11/9/2020), 340 mg (6/25/2020), 376 mg (11/30/2020), 376 mg (12/21/2020), 376 mg (1/11/2021), 376 mg (2/2/2021), 337 mg (2/23/2021)     6/4/2020 -  Cancer Staged    Staging form: Breast, AJCC 8th Edition  - Clinical: Stage IIA (cT2, cN0, cM0, G3, ER-, MT-, HER2+) - Signed by Damian Langley MD on 6/4/2020  Laterality: Left  Histologic grading system: 3 grade system       8/18/2020 Surgery    Left breast mastectomy with sentinel lymph node biopsy  No residual carcinoma identified  Prior procedural site changes  0/3 Lymph nodes     8/18/2020 -  Cancer Staged    Staging form: Breast, AJCC 8th Edition  - Pathologic stage from 8/18/2020: No Stage Recommended (ypT0, pN0, cM0, ER-, MT-, HER2+) - Signed by ANNETTE Shepherd on 8/2/2023  Stage prefix: Post-therapy  Response to neoadjuvant therapy: Complete response          Review of Systems   Cardiovascular:  Positive for chest pain.   All other systems reviewed and are negative.    Medical History Reviewed by provider this encounter:     .    Current Outpatient Medications on File Prior to Visit   Medication Sig Dispense Refill   • ALPRAZolam (XANAX) 1 mg tablet Take 1 tablet (1 mg total) by mouth 3 (three) times a day as needed for anxiety 90 tablet 0   • betamethasone, augmented, (DIPROLENE-AF) 0.05 % cream Apply topically 2 (two) times a day To the area(s) affected for up to two weeks as needed for flares. Take a one-week break in between uses. Do not apply to face, underarms, or groin. 50 g 1   • clobetasol (TEMOVATE) 0.05 % cream      • Clobetasol Propionate  E 0.05 % emollient cream Apply topically 2 (two) times a day 45 g 3   • cyclobenzaprine (FLEXERIL) 5 mg tablet Take 2 tablets (10 mg total) by mouth daily at bedtime as needed for muscle spasms (as needed) 30 tablet 3   • dicyclomine (BENTYL) 10 mg capsule Take 1 capsule (10 mg total) by mouth 2 (two) times a day 60 capsule 0   • gabapentin (NEURONTIN) 300 mg capsule Take 1 capsule (300 mg total) by mouth daily at bedtime 30 capsule 0   • HYDROcodone-acetaminophen (NORCO) 5-325 mg per tablet Take 1 tablet by mouth every 6 (six) hours as needed for pain Max Daily Amount: 4 tablets 120 tablet 0   • hydroxychloroquine (PLAQUENIL) 200 mg tablet Take 1 tablet in morning and 1/2 tablet in evening     • levothyroxine 112 mcg tablet Take 1 tablet (112 mcg total) by mouth daily 90 tablet 1   • magnesium Oxide (MAG-OX) 400 mg TABS TAKE 1 TABLET BY MOUTH TWO TIMES A DAY 60 tablet 0   • magnesium oxide (MAG-OX) 400 mg TAKE ONE TABLET BY MOUTH TWICE A DAY 60 tablet 5   • metoclopramide (REGLAN) 5 mg tablet Take 1 tablet (5 mg total) by mouth 3 (three) times a day as needed (as needed) 30 tablet 5   • ondansetron (ZOFRAN) 4 mg tablet Take 1 tablet (4 mg total) by mouth every 8 (eight) hours as needed for nausea or vomiting 30 tablet 5   • pantoprazole (PROTONIX) 40 mg tablet TAKE ONE TABLET BY MOUTH EVERY DAY 30 tablet 5   • predniSONE 5 mg tablet TAKE ONE TABLET BY MOUTH TWICE A DAY WITH FOOD 60 tablet 0   • sertraline (ZOLOFT) 100 mg tablet TAKE ONE AND ONE-HALF TABLETS BY MOUTH DAILY 45 tablet 5   • Tyrvaya 0.03 MG/ACT SOLN ONE SPRAY INTO EACH NOSTRIL TWO TIMES A DAY       No current facility-administered medications on file prior to visit.      Social History     Tobacco Use   • Smoking status: Every Day     Current packs/day: 0.50     Average packs/day: 0.5 packs/day for 41.0 years (20.5 ttl pk-yrs)     Types: Cigarettes     Start date: 1990   • Smokeless tobacco: Never   Vaping Use   • Vaping status: Never Used   Substance  "and Sexual Activity   • Alcohol use: Not Currently   • Drug use: No   • Sexual activity: Not Currently     Partners: Male     Birth control/protection: None         Objective   /84 (BP Location: Right arm, Patient Position: Sitting, Cuff Size: Adult)   Pulse 86   Temp 97.8 °F (36.6 °C) (Temporal)   Resp 18   Ht 5' 3\" (1.6 m)   Wt 64.4 kg (142 lb)   LMP  (LMP Unknown)   SpO2 96%   BMI 25.15 kg/m²     Pain Screening:  Pain Score:   4  ECOG   1  Physical Exam  Constitutional:       Appearance: Normal appearance. She is normal weight.   Neck:      Thyroid: No thyroid mass.      Trachea: Trachea normal.   Cardiovascular:      Rate and Rhythm: Normal rate and regular rhythm.      Heart sounds: Heart sounds not distant. No murmur heard.     No systolic murmur is present.      No diastolic murmur is present.      No friction rub. No gallop. No S3 or S4 sounds.   Pulmonary:      Breath sounds: Normal breath sounds. No stridor, decreased air movement or transmitted upper airway sounds. No decreased breath sounds, wheezing, rhonchi or rales.   Chest:      Chest wall: No mass, swelling, tenderness or crepitus. There is no dullness to percussion.   Breasts:     Right: Normal.      Left: Absent.   Abdominal:      General: Abdomen is protuberant. Bowel sounds are normal. There is no distension or abdominal bruit. There are no signs of injury.      Palpations: Abdomen is soft. There is no hepatomegaly, splenomegaly or mass.      Tenderness: There is no abdominal tenderness. There is no right CVA tenderness or left CVA tenderness.   Musculoskeletal:      Cervical back: Normal range of motion. No bony tenderness. No pain with movement, spinous process tenderness or muscular tenderness.      Thoracic back: No bony tenderness. No scoliosis.      Lumbar back: No bony tenderness.      Right lower leg: No edema.      Left lower leg: No edema.   Lymphadenopathy:      Cervical: No cervical adenopathy.      Right cervical: No " "superficial, deep or posterior cervical adenopathy.     Left cervical: No superficial, deep or posterior cervical adenopathy.   Neurological:      Mental Status: She is alert.   Psychiatric:         Behavior: Behavior is cooperative.         Labs: I have reviewed the following labs:  No results found for: \"WBC\", \"RBC\", \"HGB\", \"HCT\", \"MCV\", \"MCH\", \"RDW\", \"PLT\", \"NEUTOPHILPCT\", \"LYMPHOPCT\", \"MONOPCT\", \"EOSPCT\", \"BASOPCT\", \"IMMGRANS\", \"NEUTROABS\"  Lab Results   Component Value Date/Time    Potassium 4.2 01/08/2025 01:09 PM    Chloride 102 01/08/2025 01:09 PM    CO2 26 01/08/2025 01:09 PM    BUN 11 01/08/2025 01:09 PM    Creatinine 0.91 01/08/2025 01:09 PM    Glucose, Fasting 83 01/08/2025 01:09 PM    Calcium 9.6 01/08/2025 01:09 PM    AST 20 01/08/2025 01:09 PM    ALT 11 01/08/2025 01:09 PM    Alkaline Phosphatase 76 01/08/2025 01:09 PM    Total Protein 7.6 01/08/2025 01:09 PM    Albumin 4.6 01/08/2025 01:09 PM    Total Bilirubin 0.37 01/08/2025 01:09 PM    eGFR 68 01/08/2025 01:09 PM         Pathology:   08/18/2020  S 20 [id number]   s/p left mastectomy with sentinel lymph node reviews  Final Diagnosis   A. Left axillary sentinel lymph node (excision): - One (1) lymph node negative for carcinoma (0/1)     B. Left axillary lymph node (excision): - Two (2) lymph nodes negative for carcinoma (0/2)     C. Left breast (mastectomy): - No residual carcinoma identified (ypT0N0) - Prior procedural site changes - Focal usual ductal hyperplasia - Intraductal papilloma with clear margins - Microcalcifications present in benign ducts - Skin and nipple negative for carcinoma       03/20/2020  S 20 [id number] S/P left breast biopsy  Final  Benign breast tissue    02/12/2020  S 20 [id number] S/P US Guided biopsies plus FNA bilateral breast  Final Diagnosis   A. Right breast, 11:00, 10 cm from nipple, with calcifications, stereotactic large core needle biopsy x 2\" - Benign breast glandular parenchyma with fibroadenomatoid stromal hyperplasia, " involving by dystrophic & clustered calcificationsupto 800 microns diameter. - No epithelial atypia and no evidence of malignancy.     B. Right breast, 11:00, 10 cm from nipple, without calcifications, stereotactic large core needle biopsy x remaining - Benign fatty breast glandular tissue with nonspecific stromal collagenization. - No calcifications seen. - No epithelial atypia and no evidence of malignancy.     C. Left breast, 10:00, 5 cm from nipple, ultrasound-guided large core (12g marquee) needle biopsy x 3: - Benign breast glandular tissue with age-appropriate involutional change and nonspecific stromal collagenization. - No calcifications seen. - No epithelial atypia and no evidence of malignancy.     D. Left breast, 10:00, 4 cm from nipple, ultrasound-guided large core (12g marquee) needle biopsy x 4: - Invasive breast carcinoma of no special type (ductal NST/invasive ductal carcinoma). * [place] grade 3 of 3 (total score = 3+3+3 = of 9) -- tubule formation < 10%, score 3 -- nuclear grade 3 of 3, score 3 -- mitoses = 10 mitoses/mm2, score 3. * confirmed by tumor cell immunophenotype: -- positive: E-cadherin & p120 (each in a cytoplasmic membrane-restricted pattern), Gata3(diffusely strong nuclear), CK7(cytoplasmic),p63(nuclear & cytoplasmic in minor, < 10% subset). -- negative: p63, smooth muscle myosin heavy chain. * invasive carcinoma involves 4 of 4 submitted core biopsies, maximal dimension = 18 millimeters. *   estrogen, progesterone & HER2 receptor studies pending, to be described in a separate receptor report. - Ductal carcinoma in situ (DCIS): Not identified - Lymph-vascular invasion: Not identified. - Microcalcifications:     Addendum Specimen D: Performed on invasive carcinoma block D2:     Test Description Result Prognostic Interpretation   Estrogen Receptor/ER 0% Negative Primary Antibody: SP1 Internal control: Not present External control: Positive Chau Score*: 0     Progesterone  Receptor/PgR 0% Negative Primary Antibody: 1 Internal control: Not present External control: Positive Chau Score*: 0     HER2 by IHC 2+ Equivocal Primary Antibody:4B5 HER2 by FISH To Follow     Addendum 2 Fluorescence in-situ hybridization (FISH) analysis of HER2 over expression by invasive breast carcinoma cells, block D2 [facility], yields the following results:     Test Description Interpretation - HER2 by FISH analysis Positive /         [id number] unilateral right breast diagnostic mammogram  IMPRESSION:   No mammographic evidence of malignancy in the right breast.           ASSESSMENT/BI-RADS CATEGORY:  Right: 1 - Negative  Overall: 1 - Negative     RECOMMENDATION:       - Routine screening mammogram in 1 year for the right breast.       No results found for this or any previous visit.

## 2025-01-28 ENCOUNTER — TELEPHONE (OUTPATIENT)
Age: 60
End: 2025-01-28

## 2025-01-28 NOTE — TELEPHONE ENCOUNTER
Pt called to request a new script for prosthesis for the left breast to be faxed to [facility]  Fax# [phone] attn: aurelio    Pt wanted to inform Dr Banerjee., she met with New Oncologist , he ordered blood work and scans incase results start coming in for her.

## 2025-02-03 ENCOUNTER — TELEPHONE (OUTPATIENT)
Age: 60
End: 2025-02-03

## 2025-02-03 NOTE — TELEPHONE ENCOUNTER
Katy with [facility] called to say she faxed a few things this morning. She faxed a certificate of medical necessity for the patient. She mentioned she faxed two different ones and to disregard the  as the patient changed her mind as to what she preferred. She sent the corrected one that says silicone right after. She also is requesting last office notes be faxed. Please advise if needed.

## 2025-02-03 NOTE — TELEPHONE ENCOUNTER
Still not receive Form if [facility] send some forms this morning to the general fax number will not be getting it until tomorrow morning.  Will follow up tomorrow morning

## 2025-02-04 ENCOUNTER — TELEPHONE (OUTPATIENT)
Age: 60
End: 2025-02-04

## 2025-02-04 NOTE — TELEPHONE ENCOUNTER
Pt remembers that Dr. Escobar told her to call in 2 weeks after their last visit but she doesn't remember the reason. Pt would like a call back at [phone] to discuss. Thank you.

## 2025-02-04 NOTE — TELEPHONE ENCOUNTER
Printed to review with Dr. Adame, nothing noted in chart for a call back. Will await response from Dr. Adame.

## 2025-02-05 NOTE — TELEPHONE ENCOUNTER
I spoke with huber about the form, she is aware and she will take care this tomorrow. Please some one else can f/u with huber.

## 2025-02-05 NOTE — TELEPHONE ENCOUNTER
Katy from [facility] called to follow up on fax. Pt needed silicon prothesis . Form was faxed on 02/03/25. Will be send over again.

## 2025-02-10 ENCOUNTER — TELEPHONE (OUTPATIENT)
Age: 60
End: 2025-02-10

## 2025-02-10 DIAGNOSIS — C50.919 MALIGNANT NEOPLASM OF FEMALE BREAST, UNSPECIFIED ESTROGEN RECEPTOR STATUS, UNSPECIFIED LATERALITY, UNSPECIFIED SITE OF BREAST (HCC): ICD-10-CM

## 2025-02-10 NOTE — TELEPHONE ENCOUNTER
Patient called to inquire about keeping her breast prosthesis on during her CT scan. However, while speaking to her she was agreeable to removing it during the scan itself and had no further questions.

## 2025-02-12 RX ORDER — HYDROCODONE BITARTRATE AND ACETAMINOPHEN 5; 325 MG/1; MG/1
1 TABLET ORAL EVERY 6 HOURS PRN
Qty: 120 TABLET | Refills: 0 | Status: SHIPPED | OUTPATIENT
Start: 2025-02-12

## 2025-02-14 DIAGNOSIS — F41.9 ANXIETY: ICD-10-CM

## 2025-02-14 RX ORDER — ALPRAZOLAM 1 MG/1
1 TABLET ORAL 3 TIMES DAILY PRN
Qty: 90 TABLET | Refills: 0 | Status: SHIPPED | OUTPATIENT
Start: 2025-02-14

## 2025-02-25 ENCOUNTER — TELEPHONE (OUTPATIENT)
Age: 60
End: 2025-02-25

## 2025-02-25 NOTE — TELEPHONE ENCOUNTER
Patient called stating that she has diarrhea and would like to know if medication can be called into pharmacy.  Patient unable to come in for appointment due to having numerous other tests scheduled.  Please advise and contact patient.

## 2025-02-25 NOTE — TELEPHONE ENCOUNTER
I cannot say for sure what may be causing her diarrhea therefore I could only recommend over-the-counter Imodium.  If symptoms persist she would need evaluation at an urgent care or with a provider

## 2025-02-26 DIAGNOSIS — M32.9 SYSTEMIC LUPUS ERYTHEMATOSUS, UNSPECIFIED SLE TYPE, UNSPECIFIED ORGAN INVOLVEMENT STATUS (HCC): ICD-10-CM

## 2025-02-26 DIAGNOSIS — C50.212 MALIGNANT NEOPLASM OF UPPER-INNER QUADRANT OF LEFT BREAST IN FEMALE, ESTROGEN RECEPTOR NEGATIVE (HCC): ICD-10-CM

## 2025-02-26 DIAGNOSIS — Z17.1 MALIGNANT NEOPLASM OF UPPER-INNER QUADRANT OF LEFT BREAST IN FEMALE, ESTROGEN RECEPTOR NEGATIVE (HCC): ICD-10-CM

## 2025-02-27 RX ORDER — CYCLOBENZAPRINE HCL 5 MG
5 TABLET ORAL
Qty: 30 TABLET | Refills: 5 | Status: SHIPPED | OUTPATIENT
Start: 2025-02-27

## 2025-02-27 RX ORDER — GABAPENTIN 300 MG/1
300 CAPSULE ORAL
Qty: 30 CAPSULE | Refills: 5 | Status: SHIPPED | OUTPATIENT
Start: 2025-02-27

## 2025-02-28 ENCOUNTER — HOSPITAL ENCOUNTER (OUTPATIENT)
Dept: RADIOLOGY | Facility: HOSPITAL | Age: 60
Discharge: HOME | End: 2025-02-28
Payer: COMMERCIAL

## 2025-02-28 DIAGNOSIS — Z17.1 MALIGNANT NEOPLASM OF UPPER-INNER QUADRANT OF LEFT BREAST IN FEMALE, ESTROGEN RECEPTOR NEGATIVE (HCC): ICD-10-CM

## 2025-02-28 DIAGNOSIS — R07.89 ATYPICAL CHEST PAIN: ICD-10-CM

## 2025-02-28 DIAGNOSIS — Z72.0 TOBACCO ABUSE DISORDER: ICD-10-CM

## 2025-02-28 DIAGNOSIS — C50.212 MALIGNANT NEOPLASM OF UPPER-INNER QUADRANT OF LEFT BREAST IN FEMALE, ESTROGEN RECEPTOR NEGATIVE (HCC): ICD-10-CM

## 2025-02-28 PROCEDURE — 74177 CT ABD & PELVIS W/CONTRAST: CPT

## 2025-02-28 PROCEDURE — A9503 TC99M MEDRONATE: HCPCS

## 2025-02-28 PROCEDURE — 71260 CT THORAX DX C+: CPT

## 2025-02-28 PROCEDURE — 78306 BONE IMAGING WHOLE BODY: CPT

## 2025-02-28 RX ADMIN — IOHEXOL 100 ML: 350 INJECTION, SOLUTION INTRAVENOUS at 12:12

## 2025-03-06 DIAGNOSIS — M32.9 SYSTEMIC LUPUS ERYTHEMATOSUS, UNSPECIFIED SLE TYPE, UNSPECIFIED ORGAN INVOLVEMENT STATUS (HCC): ICD-10-CM

## 2025-03-06 DIAGNOSIS — R13.19 ESOPHAGEAL DYSPHAGIA: ICD-10-CM

## 2025-03-06 DIAGNOSIS — C50.919 MALIGNANT NEOPLASM OF FEMALE BREAST, UNSPECIFIED ESTROGEN RECEPTOR STATUS, UNSPECIFIED LATERALITY, UNSPECIFIED SITE OF BREAST (HCC): ICD-10-CM

## 2025-03-06 RX ORDER — PANTOPRAZOLE SODIUM 40 MG/1
40 TABLET, DELAYED RELEASE ORAL DAILY
Qty: 30 TABLET | Refills: 0 | Status: SHIPPED | OUTPATIENT
Start: 2025-03-06

## 2025-03-07 RX ORDER — CLOBETASOL PROPIONATE 0.5 MG/G
CREAM TOPICAL 2 TIMES DAILY
Qty: 45 G | Refills: 3 | Status: SHIPPED | OUTPATIENT
Start: 2025-03-07

## 2025-03-07 RX ORDER — HYDROCODONE BITARTRATE AND ACETAMINOPHEN 5; 325 MG/1; MG/1
1 TABLET ORAL EVERY 6 HOURS PRN
Qty: 120 TABLET | Refills: 0 | Status: SHIPPED | OUTPATIENT
Start: 2025-03-07

## 2025-03-07 RX ORDER — LANOLIN ALCOHOL/MO/W.PET/CERES
400 CREAM (GRAM) TOPICAL 2 TIMES DAILY
Qty: 60 TABLET | Refills: 0 | Status: SHIPPED | OUTPATIENT
Start: 2025-03-07

## 2025-03-14 DIAGNOSIS — F41.9 ANXIETY: ICD-10-CM

## 2025-03-14 RX ORDER — ALPRAZOLAM 1 MG/1
1 TABLET ORAL 3 TIMES DAILY PRN
Qty: 90 TABLET | Refills: 0 | Status: SHIPPED | OUTPATIENT
Start: 2025-03-14

## 2025-03-21 ENCOUNTER — HOSPITAL ENCOUNTER (OUTPATIENT)
Facility: HOSPITAL | Age: 60
Discharge: HOME | End: 2025-03-21
Payer: COMMERCIAL

## 2025-03-21 VITALS — BODY MASS INDEX: 24.8 KG/M2 | WEIGHT: 140 LBS | HEIGHT: 63 IN

## 2025-03-21 DIAGNOSIS — Z78.0 POSTMENOPAUSAL: ICD-10-CM

## 2025-03-21 PROCEDURE — 77080 DXA BONE DENSITY AXIAL: CPT

## 2025-03-26 ENCOUNTER — TELEPHONE (OUTPATIENT)
Dept: MAMMOGRAPHY | Facility: CLINIC | Age: 60
End: 2025-03-26

## 2025-03-26 ENCOUNTER — APPOINTMENT (OUTPATIENT)
Dept: LAB | Facility: CLINIC | Age: 60
End: 2025-03-26
Payer: COMMERCIAL

## 2025-03-26 ENCOUNTER — TELEPHONE (OUTPATIENT)
Dept: HEMATOLOGY ONCOLOGY | Facility: CLINIC | Age: 60
End: 2025-03-26

## 2025-03-26 ENCOUNTER — RESULTS FOLLOW-UP (OUTPATIENT)
Dept: FAMILY MEDICINE CLINIC | Facility: CLINIC | Age: 60
End: 2025-03-26

## 2025-03-26 ENCOUNTER — OFFICE VISIT (OUTPATIENT)
Dept: SURGICAL ONCOLOGY | Facility: CLINIC | Age: 60
End: 2025-03-26
Payer: COMMERCIAL

## 2025-03-26 VITALS
SYSTOLIC BLOOD PRESSURE: 126 MMHG | TEMPERATURE: 97.7 F | HEIGHT: 63 IN | HEART RATE: 97 BPM | OXYGEN SATURATION: 96 % | WEIGHT: 161 LBS | DIASTOLIC BLOOD PRESSURE: 70 MMHG | BODY MASS INDEX: 28.53 KG/M2

## 2025-03-26 DIAGNOSIS — S09.90XA TRAUMATIC INJURY OF HEAD, INITIAL ENCOUNTER: ICD-10-CM

## 2025-03-26 DIAGNOSIS — R22.2 LUMP IN CHEST: ICD-10-CM

## 2025-03-26 DIAGNOSIS — Z85.3 PERSONAL HISTORY OF BREAST CANCER: ICD-10-CM

## 2025-03-26 DIAGNOSIS — Z08 ENCOUNTER FOR FOLLOW-UP SURVEILLANCE OF BREAST CANCER: Primary | ICD-10-CM

## 2025-03-26 DIAGNOSIS — E03.9 HYPOTHYROIDISM, UNSPECIFIED TYPE: ICD-10-CM

## 2025-03-26 DIAGNOSIS — R51.9 INCREASED FREQUENCY OF HEADACHES: ICD-10-CM

## 2025-03-26 DIAGNOSIS — Z85.3 ENCOUNTER FOR FOLLOW-UP SURVEILLANCE OF BREAST CANCER: Primary | ICD-10-CM

## 2025-03-26 DIAGNOSIS — R42 DIZZINESS: ICD-10-CM

## 2025-03-26 LAB
BUN SERPL-MCNC: 13 MG/DL (ref 5–25)
CREAT SERPL-MCNC: 0.86 MG/DL (ref 0.6–1.3)
GFR SERPL CREATININE-BSD FRML MDRD: 73 ML/MIN/1.73SQ M
TSH SERPL DL<=0.05 MIU/L-ACNC: 7.79 UIU/ML (ref 0.45–4.5)

## 2025-03-26 PROCEDURE — G2211 COMPLEX E/M VISIT ADD ON: HCPCS

## 2025-03-26 PROCEDURE — 84443 ASSAY THYROID STIM HORMONE: CPT

## 2025-03-26 PROCEDURE — 36415 COLL VENOUS BLD VENIPUNCTURE: CPT

## 2025-03-26 PROCEDURE — 82565 ASSAY OF CREATININE: CPT

## 2025-03-26 PROCEDURE — 99214 OFFICE O/P EST MOD 30 MIN: CPT

## 2025-03-26 PROCEDURE — 84520 ASSAY OF UREA NITROGEN: CPT

## 2025-03-26 RX ORDER — LEVOTHYROXINE SODIUM 125 UG/1
125 TABLET ORAL DAILY
Qty: 90 TABLET | Refills: 1 | Status: SHIPPED | OUTPATIENT
Start: 2025-03-26

## 2025-03-26 RX ORDER — AMLODIPINE BESYLATE 2.5 MG/1
TABLET ORAL
COMMUNITY
Start: 2025-03-02

## 2025-03-26 NOTE — ASSESSMENT & PLAN NOTE
The patient has been experiencing chronic intermittent headaches, which now occur every day and at times with dizziness.  She also reports head trauma in the last 3-6 weeks for which she did not seek medical attention.  I have reviewed her case with Dr Ivan Espinoza in Radiology, and I will send her for CT of the head with and without contrast now.  Orders:    CT head w contrast; Future    CT head w wo contrast; Future

## 2025-03-26 NOTE — ASSESSMENT & PLAN NOTE
On exam, she does have a firm mass located medially just deep to the mastectomy scar, in the location of her increasing pain.  This is certainly concerning for recurrence.  I will send her for US now.    Orders:    CT head w contrast; Future    BUN; Future    Creatinine, serum; Future    US breast left limited (diagnostic); Future    CT head w wo contrast; Future

## 2025-03-26 NOTE — PROGRESS NOTES
Name: Federica Davey      : 1965      MRN: [id number]  Encounter Provider: ANNETTE Shepherd  Encounter Date: 3/26/2025   Encounter department: [facility]  :  Assessment & Plan  Encounter for follow-up surveillance of breast cancer         Personal history of breast cancer  On exam, she does have a firm mass located medially just deep to the mastectomy scar, in the location of her increasing pain.  This is certainly concerning for recurrence.  I will send her for US now.    Orders:    CT head w contrast; Future    BUN; Future    Creatinine, serum; Future    US breast left limited (diagnostic); Future    CT head w wo contrast; Future    Increased frequency of headaches  The patient has been experiencing chronic intermittent headaches, which now occur every day and at times with dizziness.  She also reports head trauma in the last 3-6 weeks for which she did not seek medical attention.  I have reviewed her case with Dr Ivan Espinoza in Radiology, and I will send her for CT of the head with and without contrast now.  Orders:    CT head w contrast; Future    CT head w wo contrast; Future    Traumatic injury of head, initial encounter    Orders:    CT head w contrast; Future    CT head w wo contrast; Future    Lump in chest    Orders:    US breast left limited (diagnostic); Future    Dizziness    Orders:    CT head w wo contrast; Future      History of Present Illness   Federica Davey is a 59 y.o. year old female who presents today in follow-up for her history of left breast cancer.  She is currently HARVEY at 4-1/2 years from left mastectomy for treatment of a high grade HR-negative Her2-positive breast cancer. She has been experiencing pain in the left chest wall for quite some time. She states that, at first it seemed more localized, but now the pain is somewhat more diffuse on the left chest wall. She also feels a lump in her mastectomy scar now. She denies any changes in  the right breast.  The patient reports chronic headaches which generally come and go, but have become virtually constant.  She does admit to falling sometime earlier this month or last month, at which time she sustained a head injury.  She did not seek medical attention.  She states she has felt generally unwell since the fall, and is concerned.  She states she does have dizziness at times.  She denies any weight loss or abdominal pain. A CT c/a/p and a bone scan were performed on February 28, which revealed no worrisome findings. Right breast mammogram on January 9 was unremarkable as well.      Oncology History   Cancer Staging   Personal history of breast cancer  Staging form: Breast, AJCC 8th Edition  - Clinical: Stage IIA (cT2, cN0, cM0, G3, ER-, ND-, HER2+) - Signed by Damian Langley MD on 6/4/2020  Histologic grading system: 3 grade system  Laterality: Left  - Pathologic stage from 8/18/2020: No Stage Recommended (ypT0, pN0, cM0, ER-, ND-, HER2+) - Signed by ANNETTE Shepherd on 8/2/2023  Stage prefix: Post-therapy  Response to neoadjuvant therapy: Complete response  Oncology History   Personal history of breast cancer   2/12/2020 Biopsy    A. & B. Right breast stereotactic biopsy with and without calcs; 11 o'clock, 10 cm from nipple  Benign breast glandular parenchyma with fibroadenomatoid stromal hyperplasia  No atypia and no evidence of malignancy     C. Left breast ultrasound-guided biopsy; 10 o'clock, 5 cm from nipple  Benign breast glandular tissue  No atypia and no evidence of malignancy    D. Left breast ultrasound-guided biopsy; 10 o'clock, 4 cm from nipple  Invasive breast carcinoma of no special type (ductal NST)  Grade 3  ER 0  ND 0  HER2 2+; FISH positive    Concordant. Right breast clear. Malignant mass measures 2.3 cm on mammo. Left axilla US negative. Mammographic abnormality with no US correlate. MRI recommended to further evaluate this finding.     2/26/2020 Genetic Testing    The  following genes were evaluated: LIZZETTE, BRCA1, BRCA2, CDH1, CHEK2, PALB2, PTEN, STK11, TP53  Negative result. No pathogenic sequence variants or deletions/dupllications identified  [employer]     3/24/2020 - 5/25/2020 Chemotherapy    pegfilgrastim (NEULASTA ONPRO) subcutaneous injection kit 6 mg, 6 mg, Subcutaneous, Once, 4 of 6 cycles  Administration: 6 mg (3/24/2020), 6 mg (4/14/2020), 6 mg (5/5/2020)  fosaprepitant (EMEND) 150 mg in sodium chloride 0.9 % 250 mL IVPB, 150 mg, Intravenous, Once, 4 of 6 cycles  Administration: 150 mg (3/24/2020), 150 mg (4/14/2020), 150 mg (5/5/2020)  pertuzumab (PERJETA) 840 mg in sodium chloride 0.9 % 250 mL IVPB, 840 mg, Intravenous, Once, 4 of 6 cycles  Administration: 840 mg (3/24/2020), 420 mg (4/14/2020), 420 mg (5/5/2020)  CARBOplatin (PARAPLATIN) 547.8 mg in sodium chloride 0.9 % 250 mL IVPB, 547.8 mg, Intravenous, Once, 4 of 6 cycles  Administration: 547.8 mg (3/24/2020), 575.4 mg (4/14/2020), 625.8 mg (5/5/2020)  DOCEtaxel (TAXOTERE) 122.2 mg in sodium chloride 0.9 % 250 mL chemo infusion, 75 mg/m2 = 122.2 mg, Intravenous, Once, 4 of 6 cycles  Administration: 122.2 mg (3/24/2020), 122.2 mg (4/14/2020), 122.2 mg (5/5/2020)  trastuzumab (HERCEPTIN) 486 mg in sodium chloride 0.9 % 250 mL chemo infusion, 8 mg/kg = 486 mg, Intravenous, Once, 4 of 18 cycles  Administration: 486 mg (3/24/2020), 364 mg (4/14/2020), 364 mg (5/5/2020)     6/3/2020 - 7/18/2021 Chemotherapy    pertuzumab (PERJETA) IVPB, 420 mg (50 % of original dose 840 mg), Intravenous, Once, 13 of 13 cycles  Dose modification: 420 mg (original dose 840 mg, Cycle 1, Reason: Dose Not Tolerated)  Administration: 420 mg (6/3/2020), 420 mg (11/9/2020), 420 mg (6/25/2020), 420 mg (11/30/2020), 420 mg (12/21/2020), 420 mg (1/11/2021), 420 mg (2/2/2021), 420 mg (2/23/2021), 420 mg (4/6/2021), 420 mg (4/26/2021), 420 mg (5/17/2021), 420 mg (6/7/2021), 420 mg (6/28/2021)  trastuzumab-qyyp (TRAZIMERA) chemo infusion, 6 mg/kg = 337  mg (100 % of original dose 6 mg/kg), Intravenous, Once, 5 of 5 cycles  Dose modification: 6 mg/kg (original dose 6 mg/kg, Cycle 9)  Administration: 337 mg (4/6/2021), 337 mg (4/26/2021), 337 mg (5/17/2021), 337 mg (6/7/2021), 337 mg (6/28/2021)  PACLItaxel (TAXOL) chemo IVPB, 80 mg/m2 = 127.2 mg, Intravenous, Once, 2 of 2 cycles  Administration: 127.2 mg (6/3/2020), 127.2 mg (6/10/2020), 127.2 mg (6/17/2020), 127.2 mg (6/25/2020), 127.2 mg (7/1/2020), 127.2 mg (7/7/2020)  trastuzumab (HERCEPTIN) chemo infusion, 6 mg/kg = 340 mg (75 % of original dose 8 mg/kg), Intravenous, Once, 8 of 8 cycles  Dose modification: 6 mg/kg (original dose 8 mg/kg, Cycle 1, Reason: Dose Not Tolerated)  Administration: 340 mg (6/3/2020), 376 mg (11/9/2020), 340 mg (6/25/2020), 376 mg (11/30/2020), 376 mg (12/21/2020), 376 mg (1/11/2021), 376 mg (2/2/2021), 337 mg (2/23/2021)     6/4/2020 -  Cancer Staged    Staging form: Breast, AJCC 8th Edition  - Clinical: Stage IIA (cT2, cN0, cM0, G3, ER-, FL-, HER2+) - Signed by Damian Langley MD on 6/4/2020  Laterality: Left  Histologic grading system: 3 grade system       8/18/2020 Surgery    Left breast mastectomy with sentinel lymph node biopsy  No residual carcinoma identified  Prior procedural site changes  0/3 Lymph nodes     8/18/2020 -  Cancer Staged    Staging form: Breast, AJCC 8th Edition  - Pathologic stage from 8/18/2020: No Stage Recommended (ypT0, pN0, cM0, ER-, FL-, HER2+) - Signed by ANNETTE Shepherd on 8/2/2023  Stage prefix: Post-therapy  Response to neoadjuvant therapy: Complete response          Review of Systems   Constitutional:  Negative for activity change, appetite change, fatigue and unexpected weight change.   HENT: Negative.     Respiratory: Negative.     Cardiovascular:  Positive for chest pain (left chest wall pain and tenderness).   Gastrointestinal:  Positive for nausea (occasional). Negative for abdominal distention, abdominal pain and vomiting.   Skin:  "Negative.  Negative for color change and wound.   Neurological:  Positive for dizziness (intermittent) and headaches (daily, increasing in frequency).   Hematological: Negative.  Negative for adenopathy.   Psychiatric/Behavioral: Negative.               Objective   /70   Pulse 97   Temp 97.7 °F (36.5 °C)   Ht 5' 3\" (1.6 m)   Wt 73 kg (161 lb)   LMP  (LMP Unknown)   SpO2 96%   BMI 28.52 kg/m²     Pain Screening:  Pain Score:   8  ECOG    Physical Exam  Vitals reviewed.   Constitutional:       General: She is not in acute distress.     Appearance: Normal appearance. She is not ill-appearing or toxic-appearing.   HENT:      Head: Normocephalic and atraumatic.   Eyes:      General: No scleral icterus.  Cardiovascular:      Rate and Rhythm: Normal rate.   Pulmonary:      Effort: Pulmonary effort is normal.   Chest:   Breasts:     Right: Normal.      Left: Mass and tenderness present.          Comments: Right breast is soft and even.  In the left chest wall, there is a firm masslike area just under the mastectomy scar, medial to the midpoint.  This appears fixed, and is within the tender area.  There are no additional masses, nodules or skin changes present. I do not appreciate any adenopathy.  Musculoskeletal:         General: Normal range of motion.      Cervical back: Normal range of motion and neck supple.   Lymphadenopathy:      Upper Body:      Right upper body: No supraclavicular, axillary or pectoral adenopathy.      Left upper body: No supraclavicular, axillary or pectoral adenopathy.   Skin:     General: Skin is warm and dry.   Neurological:      General: No focal deficit present.      Mental Status: She is alert and oriented to person, place, and time.   Psychiatric:         Mood and Affect: Mood normal.         Behavior: Behavior normal.         Thought Content: Thought content normal.         Judgment: Judgment normal.            Radiology Results Review: I have reviewed radiology reports from " January and February 2025 including: diagnostic right breast mammogram, bone scan, CT chest, and CT abdomen/pelvis.    NM bone scan whole body  2/28/2025    Result Text   BONE SCAN  WHOLE BODY     INDICATION: R07.89: Other chest pain     COMPARISON:    Bone scan dated 2/27/2020. Comparison is made to the skeletal structures of CT of the chest, abdomen, and pelvis dated 2/28/2025.     TECHNIQUE:   This study was performed following the intravenous administration of 26.2mCi mCi Tc-99m labeled MDP.  Delayed, anterior and posterior whole body images were acquired, 2-3 hours after radiopharmaceutical administration.     FINDINGS:     There is no focal tracer activity characteristic of osseous metastatic disease.     There is nonspecific multifocal tracer activity in a pattern most consistent with degenerative changes involving the bilateral acromioclavicular articulations, spine, and right knee..     Both kidneys are visualized.        IMPRESSION:     1. No scintigraphic evidence of osseous metastasis.          CT chest abdomen pelvis w contrast  2/28/2025  Narrative & Impression   CT CHEST, ABDOMEN AND PELVIS WITH IV CONTRAST     INDICATION: C50.212: Malignant neoplasm of upper-inner quadrant of left female breast  Z17.1: Estrogen receptor negative status (ER-)  Z72.0: Tobacco use  R07.89: Other chest pain.     COMPARISON: CT chest/abdomen/pelvis from 2/17/2021 and CT abdomen/pelvis from 12/18/2021. Unremarkable        TECHNIQUE: CT examination of the chest, abdomen and pelvis was performed. Multiplanar 2D reformatted images were created from the source data.     This examination, like all CT scans performed in the [facility], was performed utilizing techniques to minimize radiation dose exposure, including the use of iterative reconstruction and automated exposure control. Radiation dose length   product (DLP) for this visit: 491.04 mGy-cm     IV Contrast: 100 mL of iohexol  Enteric Contrast: Not  administered.     FINDINGS:     CHEST     LUNGS: Mild background emphysema with an upper lobe predominance. Dependent atelectatic changes at the lung bases. No lung nodule or consolidation. Minimal adherent mucus along the upper trachea.     PLEURA: Unremarkable.     HEART/GREAT VESSELS: Heart is unremarkable for patient's age. No thoracic aortic aneurysm.     MEDIASTINUM AND FACUNDO: Unremarkable.     CHEST WALL AND LOWER NECK: Left mastectomy. Left axillary clips.     ABDOMEN     LIVER/BILIARY TREE: Enlarged fatty liver.     GALLBLADDER: No calcified gallstones. No pericholecystic inflammatory change.     SPLEEN: Unremarkable.     PANCREAS: Unremarkable.     ADRENAL GLANDS: Unremarkable.     KIDNEYS/URETERS: Unremarkable. No hydronephrosis.     STOMACH AND BOWEL: Unremarkable.     APPENDIX: Normal.     ABDOMINOPELVIC CAVITY: No ascites. No pneumoperitoneum. No lymphadenopathy.     VESSELS: Atherosclerosis without abdominal aortic aneurysm.     PELVIS     REPRODUCTIVE ORGANS: Post hysterectomy.     URINARY BLADDER: Unremarkable.     ABDOMINAL WALL/INGUINAL REGIONS: Unremarkable.     BONES: No acute fracture or suspicious osseous lesion.     IMPRESSION:     No metastatic disease identified in the chest, abdomen or pelvis.     Mild emphysema.     Enlarged fatty liver.             Mammo diagnostic right w 3d & cad  1/9/2025  Narrative & Impression   DIAGNOSIS: Personal history of breast cancer      TECHNIQUE:  Digital diagnostic mammography was performed. Computer Aided Detection (CAD) analyzed all applicable images.     COMPARISONS: Prior breast imaging dated: 10/02/2023, 10/02/2023, 09/09/2022, 06/02/2021, 09/24/2020, 06/29/2020, 03/20/2020, 03/09/2020, 02/12/2020, 02/12/2020, 02/12/2020, 02/12/2020, 02/04/2020, 02/04/2020, 05/01/2013, 04/26/2013, 02/20/2009, and 02/06/2009     RELEVANT HISTORY:   Family Breast Cancer History: History of breast cancer in Mother, Maternal Aunt.  Family Medical History: Family medical  history includes breast cancer in 2 relatives (maternal aunt, mother).   Personal History: Hormone history includes birth control. Surgical history includes breast biopsy, mastectomy, and hysterectomy. Medical history includes breast cancer and history of chemotherapy.     RISK ASSESSMENT:   Tyrer-zick risk assessment reporting was suppressed due to the patient's history and/or demographic factors.     TISSUE DENSITY:   The breasts are heterogeneously dense, which may obscure small masses.      INDICATION: Federica Davey is a 59 y.o. female presenting for breast cancer.  Patient has a history of left breast cancer status post left mastectomy.  No breast symptoms.     FINDINGS:  Right breast:  There are no suspicious masses, grouped microcalcifications or areas of unexplained architectural distortion. The skin and nipple areolar complex are unremarkable.      IMPRESSION:   No mammographic evidence of malignancy in the right breast.     ASSESSMENT/BI-RADS CATEGORY:  Right: 1 - Negative  Overall: 1 - Negative     RECOMMENDATION:       - Routine screening mammogram in 1 year for the right breast.

## 2025-03-26 NOTE — LETTER
2025     Olivier Escobar MD  [address] [facility] [address]    Patient: Federica Davey   YOB: 1965   Date of Visit: 3/26/2025       Dear Dr. Escobar:    Thank you for referring Federica Davey to me for evaluation. Below are my notes for this consultation.    If you have questions, please do not hesitate to call me. I look forward to following your patient along with you.         Sincerely,        ANNETTE Shepherd        CC: Cassandra Lynn Cardarelli, MD Fawn Noel Wolfe, CRNP  3/26/2025  2:11 PM  Sign when Signing Visit  Name: Federica Davey      : 1965      MRN: [id number]  Encounter Provider: ANNETTE Shepherd  Encounter Date: 3/26/2025   Encounter department: CANCER [facility] SURGICAL ONCOLOGY [place]  :  Assessment & Plan  Encounter for follow-up surveillance of breast cancer         Personal history of breast cancer  On exam, she does have a firm mass located medially just deep to the mastectomy scar, in the location of her increasing pain.  This is certainly concerning for recurrence.  I will send her for US now.    Orders:  •  CT head w contrast; Future  •  BUN; Future  •  Creatinine, serum; Future  •  US breast left limited (diagnostic); Future  •  CT head w wo contrast; Future    Increased frequency of headaches  The patient has been experiencing chronic intermittent headaches, which now occur every day and at times with dizziness.  She also reports head trauma in the last 3-6 weeks for which she did not seek medical attention.  I will send her for CT now.  Orders:  •  CT head w contrast; Future  •  CT head w wo contrast; Future    Traumatic injury of head, initial encounter    Orders:  •  CT head w contrast; Future  •  CT head w wo contrast; Future    Lump in chest    Orders:  •  US breast left limited (diagnostic); Future    Dizziness    Orders:  •  CT head w wo contrast; Future      History of Present Illness  Federica Davey is a 59 y.o. year old female  who presents today in follow-up for her history of left breast cancer.  She is currently HARVEY at 4-1/2 years from left mastectomy for treatment of a high grade HR-negative Her2-positive breast cancer. She has been experiencing pain in the left chest wall for quite some time. She states that, at first it seemed more localized, but now the pain is somewhat more diffuse on the left chest wall. She also feels a lump in her mastectomy scar now. She denies any changes in the right breast.  The patient reports chronic headaches which generally come and go, but have become virtually constant.  She does admit to falling sometime earlier this month or last month, at which time she sustained a head injury.  She did not seek medical attention.  She states she has felt generally unwell since the fall, and is concerned.  She states she does have dizziness at times.  She denies any weight loss or abdominal pain. A CT c/a/p and a bone scan were performed on February 28, which revealed no worrisome findings. Right breast mammogram on January 9 was unremarkable as well.      Oncology History  Cancer Staging   Personal history of breast cancer  Staging form: Breast, AJCC 8th Edition  - Clinical: Stage IIA (cT2, cN0, cM0, G3, ER-, ME-, HER2+) - Signed by Damian Langley MD on 6/4/2020  Histologic grading system: 3 grade system  Laterality: Left  - Pathologic stage from 8/18/2020: No Stage Recommended (ypT0, pN0, cM0, ER-, ME-, HER2+) - Signed by ANNETTE Shepherd on 8/2/2023  Stage prefix: Post-therapy  Response to neoadjuvant therapy: Complete response  Oncology History   Personal history of breast cancer   2/12/2020 Biopsy    A. & B. Right breast stereotactic biopsy with and without calcs; 11 o'clock, 10 cm from nipple  Benign breast glandular parenchyma with fibroadenomatoid stromal hyperplasia  No atypia and no evidence of malignancy     C. Left breast ultrasound-guided biopsy; 10 o'clock, 5 cm from nipple  Benign breast  glandular tissue  No atypia and no evidence of malignancy    D. Left breast ultrasound-guided biopsy; 10 o'clock, 4 cm from nipple  Invasive breast carcinoma of no special type (ductal NST)  Grade 3  ER 0  FL 0  HER2 2+; FISH positive    Concordant. Right breast clear. Malignant mass measures 2.3 cm on mammo. Left axilla US negative. Mammographic abnormality with no US correlate. MRI recommended to further evaluate this finding.     2/26/2020 Genetic Testing    The following genes were evaluated: LIZZETTE, BRCA1, BRCA2, CDH1, CHEK2, PALB2, PTEN, STK11, TP53  Negative result. No pathogenic sequence variants or deletions/dupllications identified  [employer]     3/24/2020 - 5/25/2020 Chemotherapy    pegfilgrastim (NEULASTA ONPRO) subcutaneous injection kit 6 mg, 6 mg, Subcutaneous, Once, 4 of 6 cycles  Administration: 6 mg (3/24/2020), 6 mg (4/14/2020), 6 mg (5/5/2020)  fosaprepitant (EMEND) 150 mg in sodium chloride 0.9 % 250 mL IVPB, 150 mg, Intravenous, Once, 4 of 6 cycles  Administration: 150 mg (3/24/2020), 150 mg (4/14/2020), 150 mg (5/5/2020)  pertuzumab (PERJETA) 840 mg in sodium chloride 0.9 % 250 mL IVPB, 840 mg, Intravenous, Once, 4 of 6 cycles  Administration: 840 mg (3/24/2020), 420 mg (4/14/2020), 420 mg (5/5/2020)  CARBOplatin (PARAPLATIN) 547.8 mg in sodium chloride 0.9 % 250 mL IVPB, 547.8 mg, Intravenous, Once, 4 of 6 cycles  Administration: 547.8 mg (3/24/2020), 575.4 mg (4/14/2020), 625.8 mg (5/5/2020)  DOCEtaxel (TAXOTERE) 122.2 mg in sodium chloride 0.9 % 250 mL chemo infusion, 75 mg/m2 = 122.2 mg, Intravenous, Once, 4 of 6 cycles  Administration: 122.2 mg (3/24/2020), 122.2 mg (4/14/2020), 122.2 mg (5/5/2020)  trastuzumab (HERCEPTIN) 486 mg in sodium chloride 0.9 % 250 mL chemo infusion, 8 mg/kg = 486 mg, Intravenous, Once, 4 of 18 cycles  Administration: 486 mg (3/24/2020), 364 mg (4/14/2020), 364 mg (5/5/2020)     6/3/2020 - 7/18/2021 Chemotherapy    pertuzumab (PERJETA) IVPB, 420 mg (50 % of original  dose 840 mg), Intravenous, Once, 13 of 13 cycles  Dose modification: 420 mg (original dose 840 mg, Cycle 1, Reason: Dose Not Tolerated)  Administration: 420 mg (6/3/2020), 420 mg (11/9/2020), 420 mg (6/25/2020), 420 mg (11/30/2020), 420 mg (12/21/2020), 420 mg (1/11/2021), 420 mg (2/2/2021), 420 mg (2/23/2021), 420 mg (4/6/2021), 420 mg (4/26/2021), 420 mg (5/17/2021), 420 mg (6/7/2021), 420 mg (6/28/2021)  trastuzumab-qyyp (TRAZIMERA) chemo infusion, 6 mg/kg = 337 mg (100 % of original dose 6 mg/kg), Intravenous, Once, 5 of 5 cycles  Dose modification: 6 mg/kg (original dose 6 mg/kg, Cycle 9)  Administration: 337 mg (4/6/2021), 337 mg (4/26/2021), 337 mg (5/17/2021), 337 mg (6/7/2021), 337 mg (6/28/2021)  PACLItaxel (TAXOL) chemo IVPB, 80 mg/m2 = 127.2 mg, Intravenous, Once, 2 of 2 cycles  Administration: 127.2 mg (6/3/2020), 127.2 mg (6/10/2020), 127.2 mg (6/17/2020), 127.2 mg (6/25/2020), 127.2 mg (7/1/2020), 127.2 mg (7/7/2020)  trastuzumab (HERCEPTIN) chemo infusion, 6 mg/kg = 340 mg (75 % of original dose 8 mg/kg), Intravenous, Once, 8 of 8 cycles  Dose modification: 6 mg/kg (original dose 8 mg/kg, Cycle 1, Reason: Dose Not Tolerated)  Administration: 340 mg (6/3/2020), 376 mg (11/9/2020), 340 mg (6/25/2020), 376 mg (11/30/2020), 376 mg (12/21/2020), 376 mg (1/11/2021), 376 mg (2/2/2021), 337 mg (2/23/2021)     6/4/2020 -  Cancer Staged    Staging form: Breast, AJCC 8th Edition  - Clinical: Stage IIA (cT2, cN0, cM0, G3, ER-, GA-, HER2+) - Signed by Damian Langley MD on 6/4/2020  Laterality: Left  Histologic grading system: 3 grade system       8/18/2020 Surgery    Left breast mastectomy with sentinel lymph node biopsy  No residual carcinoma identified  Prior procedural site changes  0/3 Lymph nodes     8/18/2020 -  Cancer Staged    Staging form: Breast, AJCC 8th Edition  - Pathologic stage from 8/18/2020: No Stage Recommended (ypT0, pN0, cM0, ER-, GA-, HER2+) - Signed by ANNETTE Shepherd on  "8/2/2023  Stage prefix: Post-therapy  Response to neoadjuvant therapy: Complete response         Review of Systems   Constitutional:  Negative for activity change, appetite change, fatigue and unexpected weight change.   HENT: Negative.     Respiratory: Negative.     Cardiovascular:  Positive for chest pain (left chest wall pain and tenderness).   Gastrointestinal:  Positive for nausea (occasional). Negative for abdominal distention, abdominal pain and vomiting.   Skin: Negative.  Negative for color change and wound.   Neurological:  Positive for dizziness (intermittent) and headaches (daily, increasing in frequency).   Hematological: Negative.  Negative for adenopathy.   Psychiatric/Behavioral: Negative.               Objective  /70   Pulse 97   Temp 97.7 °F (36.5 °C)   Ht 5' 3\" (1.6 m)   Wt 73 kg (161 lb)   LMP  (LMP Unknown)   SpO2 96%   BMI 28.52 kg/m²     Pain Screening:  Pain Score:   8  ECOG    Physical Exam  Vitals reviewed.   Constitutional:       General: She is not in acute distress.     Appearance: Normal appearance. She is not ill-appearing or toxic-appearing.   HENT:      Head: Normocephalic and atraumatic.   Eyes:      General: No scleral icterus.  Cardiovascular:      Rate and Rhythm: Normal rate.   Pulmonary:      Effort: Pulmonary effort is normal.   Chest:   Breasts:     Right: Normal.      Left: Mass and tenderness present.          Comments: Right breast is soft and even.  In the left chest wall, there is a firm masslike area just under the mastectomy scar, medial to the midpoint.  This appears fixed, and is within the tender area.  There are no additional masses, nodules or skin changes present. I do not appreciate any adenopathy.  Musculoskeletal:         General: Normal range of motion.      Cervical back: Normal range of motion and neck supple.   Lymphadenopathy:      Upper Body:      Right upper body: No supraclavicular, axillary or pectoral adenopathy.      Left upper body: No " supraclavicular, axillary or pectoral adenopathy.   Skin:     General: Skin is warm and dry.   Neurological:      General: No focal deficit present.      Mental Status: She is alert and oriented to person, place, and time.   Psychiatric:         Mood and Affect: Mood normal.         Behavior: Behavior normal.         Thought Content: Thought content normal.         Judgment: Judgment normal.            Radiology Results Review: I have reviewed radiology reports from January and February 2025 including: diagnostic right breast mammogram, bone scan, CT chest, and CT abdomen/pelvis.    NM bone scan whole body  2/28/2025    Result Text   BONE SCAN  WHOLE BODY     INDICATION: R07.89: Other chest pain     COMPARISON:    Bone scan dated 2/27/2020. Comparison is made to the skeletal structures of CT of the chest, abdomen, and pelvis dated 2/28/2025.     TECHNIQUE:   This study was performed following the intravenous administration of 26.2mCi mCi Tc-99m labeled MDP.  Delayed, anterior and posterior whole body images were acquired, 2-3 hours after radiopharmaceutical administration.     FINDINGS:     There is no focal tracer activity characteristic of osseous metastatic disease.     There is nonspecific multifocal tracer activity in a pattern most consistent with degenerative changes involving the bilateral acromioclavicular articulations, spine, and right knee..     Both kidneys are visualized.        IMPRESSION:     1. No scintigraphic evidence of osseous metastasis.          CT chest abdomen pelvis w contrast  2/28/2025  Narrative & Impression   CT CHEST, ABDOMEN AND PELVIS WITH IV CONTRAST     INDICATION: C50.212: Malignant neoplasm of upper-inner quadrant of left female breast  Z17.1: Estrogen receptor negative status (ER-)  Z72.0: Tobacco use  R07.89: Other chest pain.     COMPARISON: CT chest/abdomen/pelvis from 2/17/2021 and CT abdomen/pelvis from 12/18/2021. Unremarkable        TECHNIQUE: CT examination of the chest,  abdomen and pelvis was performed. Multiplanar 2D reformatted images were created from the source data.     This examination, like all CT scans performed in the [facility], was performed utilizing techniques to minimize radiation dose exposure, including the use of iterative reconstruction and automated exposure control. Radiation dose length   product (DLP) for this visit: 491.04 mGy-cm     IV Contrast: 100 mL of iohexol  Enteric Contrast: Not administered.     FINDINGS:     CHEST     LUNGS: Mild background emphysema with an upper lobe predominance. Dependent atelectatic changes at the lung bases. No lung nodule or consolidation. Minimal adherent mucus along the upper trachea.     PLEURA: Unremarkable.     HEART/GREAT VESSELS: Heart is unremarkable for patient's age. No thoracic aortic aneurysm.     MEDIASTINUM AND FACUNDO: Unremarkable.     CHEST WALL AND LOWER NECK: Left mastectomy. Left axillary clips.     ABDOMEN     LIVER/BILIARY TREE: Enlarged fatty liver.     GALLBLADDER: No calcified gallstones. No pericholecystic inflammatory change.     SPLEEN: Unremarkable.     PANCREAS: Unremarkable.     ADRENAL GLANDS: Unremarkable.     KIDNEYS/URETERS: Unremarkable. No hydronephrosis.     STOMACH AND BOWEL: Unremarkable.     APPENDIX: Normal.     ABDOMINOPELVIC CAVITY: No ascites. No pneumoperitoneum. No lymphadenopathy.     VESSELS: Atherosclerosis without abdominal aortic aneurysm.     PELVIS     REPRODUCTIVE ORGANS: Post hysterectomy.     URINARY BLADDER: Unremarkable.     ABDOMINAL WALL/INGUINAL REGIONS: Unremarkable.     BONES: No acute fracture or suspicious osseous lesion.     IMPRESSION:     No metastatic disease identified in the chest, abdomen or pelvis.     Mild emphysema.     Enlarged fatty liver.             Mammo diagnostic right w 3d & cad  1/9/2025  Narrative & Impression   DIAGNOSIS: Personal history of breast cancer      TECHNIQUE:  Digital diagnostic mammography was performed. Computer  Aided Detection (CAD) analyzed all applicable images.     COMPARISONS: Prior breast imaging dated: 10/02/2023, 10/02/2023, 09/09/2022, 06/02/2021, 09/24/2020, 06/29/2020, 03/20/2020, 03/09/2020, 02/12/2020, 02/12/2020, 02/12/2020, 02/12/2020, 02/04/2020, 02/04/2020, 05/01/2013, 04/26/2013, 02/20/2009, and 02/06/2009     RELEVANT HISTORY:   Family Breast Cancer History: History of breast cancer in Mother, Maternal Aunt.  Family Medical History: Family medical history includes breast cancer in 2 relatives (maternal aunt, mother).   Personal History: Hormone history includes birth control. Surgical history includes breast biopsy, mastectomy, and hysterectomy. Medical history includes breast cancer and history of chemotherapy.     RISK ASSESSMENT:   [facility] risk assessment reporting was suppressed due to the patient's history and/or demographic factors.     TISSUE DENSITY:   The breasts are heterogeneously dense, which may obscure small masses.      INDICATION: Federica Davey is a 59 y.o. female presenting for breast cancer.  Patient has a history of left breast cancer status post left mastectomy.  No breast symptoms.     FINDINGS:  Right breast:  There are no suspicious masses, grouped microcalcifications or areas of unexplained architectural distortion. The skin and nipple areolar complex are unremarkable.      IMPRESSION:   No mammographic evidence of malignancy in the right breast.     ASSESSMENT/BI-RADS CATEGORY:  Right: 1 - Negative  Overall: 1 - Negative     RECOMMENDATION:       - Routine screening mammogram in 1 year for the right breast.

## 2025-03-27 ENCOUNTER — OFFICE VISIT (OUTPATIENT)
Dept: HEMATOLOGY ONCOLOGY | Facility: CLINIC | Age: 60
End: 2025-03-27
Payer: COMMERCIAL

## 2025-03-27 ENCOUNTER — APPOINTMENT (OUTPATIENT)
Dept: LAB | Facility: CLINIC | Age: 60
End: 2025-03-27
Payer: COMMERCIAL

## 2025-03-27 VITALS
OXYGEN SATURATION: 100 % | RESPIRATION RATE: 18 BRPM | BODY MASS INDEX: 24.98 KG/M2 | WEIGHT: 141 LBS | SYSTOLIC BLOOD PRESSURE: 142 MMHG | HEIGHT: 63 IN | TEMPERATURE: 97.8 F | DIASTOLIC BLOOD PRESSURE: 86 MMHG | HEART RATE: 83 BPM

## 2025-03-27 DIAGNOSIS — R92.333 HETEROGENEOUSLY DENSE TISSUE OF BOTH BREASTS ON MAMMOGRAPHY: ICD-10-CM

## 2025-03-27 DIAGNOSIS — K76.0 FATTY LIVER: Primary | ICD-10-CM

## 2025-03-27 DIAGNOSIS — M81.0 SENILE OSTEOPOROSIS: ICD-10-CM

## 2025-03-27 DIAGNOSIS — E03.9 HYPOTHYROIDISM, UNSPECIFIED TYPE: ICD-10-CM

## 2025-03-27 LAB
25(OH)D3 SERPL-MCNC: 18.3 NG/ML (ref 30–100)
CALCIUM SERPL-MCNC: 9.5 MG/DL (ref 8.4–10.2)

## 2025-03-27 PROCEDURE — 36415 COLL VENOUS BLD VENIPUNCTURE: CPT

## 2025-03-27 PROCEDURE — 82306 VITAMIN D 25 HYDROXY: CPT

## 2025-03-27 PROCEDURE — 99215 OFFICE O/P EST HI 40 MIN: CPT | Performed by: INTERNAL MEDICINE

## 2025-03-27 NOTE — PROGRESS NOTES
Name: Federica Davey      : 1965      MRN: [id number]  Encounter Provider: Olivier Escobar MD  Encounter Date: 2025  Encounter department: [facility] [place]         Chief Complaint   Patient presents with    Follow-up      :  Assessment & Plan  Malignant neoplasm of upper-inner quadrant of left breast in female, estrogen receptor negative (HCC)  The patient has no clear-cut evidence of recurrent or new breast cancer syndrome     Orders:    CT chest abdomen pelvis w contrast; Future     Tobacco abuse disorder     Orders:    CT chest abdomen pelvis w contrast; Future     Heterogeneously dense tissue of right breast on mammography        Systemic lupus erythematosus, unspecified SLE type, unspecified organ involvement status (HCC)        Hypothyroidism, unspecified type        Family history of cancer  Mother had breast cancer in her 70s, maternal aunt had breast cancer in her 70s and and ultimately  from same and her maternal first cousin also had cancer     Atypical chest pain  1-2 years of chest pain across the anterior left chest wall.  No increasing or decreasing variables.  Orders:    CT chest abdomen pelvis w contrast; Future    NM bone scan whole body; Future    Ambulatory Referral to Occupational Therapy; Future    Ambulatory Referral to Physical Therapy; Future           Clinical/Pathologic Stage IIA to ypT0 pN0 in 2020        Cancer Staging   Personal history of breast cancer  Staging form: Breast, AJCC 8th Edition  - Clinical: Stage IIA (cT2, cN0, cM0, G3, ER-, DE-, HER2+) - Signed by Damian Langley MD on 2020  Histologic grading system: 3 grade system  Laterality: Left  - Pathologic stage from 2020: No Stage Recommended (ypT0, pN0, cM0, ER-, DE-, HER2+) - Signed by ANNETTE Shepherd on 2023  Stage prefix: Post-therapy  Response to neoadjuvant therapy: Complete response        Current Therapy: Active Surveillance From 2021       "  Discussion  This pleasant 59-year-old female returns for medical oncology follow-up.  Today represents her first visit with medical oncology since October 2023.     The patient's risk factors for the development of solid tumor cancer include ongoing tobacco usage, ongoing alcohol usage, age, gender, previous breast cancer diagnosis, dense breasts and family history to some extent.     The patient has no evidence of metastatic disease by CT scan of the chest abdomen pelvis and nuclear medicine whole-body bone scan.    She does have fatty liver and ultimately I will perform an MRI of the abdomen for completeness.  She also has dense breast and I will update an MRI of the breast before return     special Studies  02/26/2020  [employer] Germline panel  Negative     No results found for: \"GENETIC\", \"INTERP\", \"GENES\"        History of Present Illness        Extracted from Dr Costa Notation October 2023     Donya Davey is a 58-year-old postmenopausal female who initially felt a lump in her left breast which she brought to medical attention.  She was found to have a large left breast mass for which she underwent biopsy which showed invasive ductal carcinoma, grade 3. This was ER negative, MS negative, HER2 2+ disease.  HER2 fish was positive for gene application with ratio of 2.2.  She was subsequently seen by Dr. Shannon.  Based on MRI, she had 2.7 x 2.1 x 2.3 cm of left breast mass with no enlarged axillary lymph node.  Bone scan was negative for osseous metastasis.  CT scan of chest abdomen pelvis showed no evidence of distant metastasis. She had 3 cycle of neoadjuvant chemotherapy with TCH with pertuzumab with significant toxicities.  She has multiple hospitalization due to the severe nausea vomiting as well as deconditioning. Due to the toxicity, treatment regimen was changed to weekly paclitaxel, try weekly pertuzumab and trastuzumab which she tolerated well.  She completed neoadjuvant chemotherapy in July 2020, resulting " in complete clinical response.  Subsequently, she underwent mastectomy and sentinel lymph node biopsy by Dr. Shannon in August 2020 which showed no evidence of residual carcinoma.  3 sentinel lymph nodes were all negative for metastatic disease, consistent with complete pathological response.  Postoperatively, she was treated with adjuvant trastuzumab and pertuzumab until June 2021 without cardiac toxicity.  She was previously following with Dr. Garcia and last seen in the office on 11/8/2022.  She is currently on observation.  She presents today for transfer of care/follow-up visit.  She feels well with no new complaints.  She denies any bone pain.  Her weight is stable. Continues to follow with rheumatology for lupus. On a low dose prednisone.       She continues to smoke cigarettes.     Her right diagnostic mammogram from 01/09/2025 with no evidence of malignancy           Pertinent History from October 2023  The patient endorses left upper chest wall discomfort off and on but steadily for 1-2 years.  Patient endorses ongoing tobacco usage at 10 cigarettes/day  Patient endorses ongoing alcohol with beer and periodic 1  No other complaints on full review of systems     Cancer Screening and Prevention  Mammography: 01/09/2025   GI/Colonoscopy: 05/27/2021   GYN: Not on file   Bone Density: Not on file   Covid 19 Vaccination Status: Pfizer series times 2  Spring 2021     Oncology Therapeutic Summary:  July 2021 to Present    Active Surveillance     June 2021 status post adjuvant trastuzumab plus pertuzumab        August 18, 2020 status post left mastectomy with sentinel lymph node review to pathologic complete response     July 2020 status post further neoadjuvant chemotherapy weekly Paclitaxel x 6 weeks along with Trastuzumab every 3 weeks times     May 5, 2020 from March 24, 2020 status post neoadjuvant docetaxel, trastuzumab and pertuzumab x 3 cycles discontinued due to adverse complications  Oncology History    Cancer Staging   Personal history of breast cancer  Staging form: Breast, AJCC 8th Edition  - Clinical: Stage IIA (cT2, cN0, cM0, G3, ER-, MN-, HER2+) - Signed by Damian Langley MD on 6/4/2020  Histologic grading system: 3 grade system  Laterality: Left  - Pathologic stage from 8/18/2020: No Stage Recommended (ypT0, pN0, cM0, ER-, MN-, HER2+) - Signed by ANNETTE Shepherd on 8/2/2023  Stage prefix: Post-therapy  Response to neoadjuvant therapy: Complete response      Oncology History   Personal history of breast cancer   2/12/2020 Biopsy     A. & B. Right breast stereotactic biopsy with and without calcs; 11 o'clock, 10 cm from nipple  Benign breast glandular parenchyma with fibroadenomatoid stromal hyperplasia  No atypia and no evidence of malignancy      C. Left breast ultrasound-guided biopsy; 10 o'clock, 5 cm from nipple  Benign breast glandular tissue  No atypia and no evidence of malignancy     D. Left breast ultrasound-guided biopsy; 10 o'clock, 4 cm from nipple  Invasive breast carcinoma of no special type (ductal NST)  Grade 3  ER 0  MN 0  HER2 2+; FISH positive     Concordant. Right breast clear. Malignant mass measures 2.3 cm on mammo. Left axilla US negative. Mammographic abnormality with no US correlate. MRI recommended to further evaluate this finding.      2/26/2020 Genetic Testing     The following genes were evaluated: LIZZETTE, BRCA1, BRCA2, CDH1, CHEK2, PALB2, PTEN, STK11, TP53  Negative result. No pathogenic sequence variants or deletions/dupllications identified  [employer]      3/24/2020 - 5/25/2020 Chemotherapy     pegfilgrastim (NEULASTA ONPRO) subcutaneous injection kit 6 mg, 6 mg, Subcutaneous, Once, 4 of 6 cycles  Administration: 6 mg (3/24/2020), 6 mg (4/14/2020), 6 mg (5/5/2020)  fosaprepitant (EMEND) 150 mg in sodium chloride 0.9 % 250 mL IVPB, 150 mg, Intravenous, Once, 4 of 6 cycles  Administration: 150 mg (3/24/2020), 150 mg (4/14/2020), 150 mg (5/5/2020)  pertuzumab (PERJETA) 840 mg  in sodium chloride 0.9 % 250 mL IVPB, 840 mg, Intravenous, Once, 4 of 6 cycles  Administration: 840 mg (3/24/2020), 420 mg (4/14/2020), 420 mg (5/5/2020)  CARBOplatin (PARAPLATIN) 547.8 mg in sodium chloride 0.9 % 250 mL IVPB, 547.8 mg, Intravenous, Once, 4 of 6 cycles  Administration: 547.8 mg (3/24/2020), 575.4 mg (4/14/2020), 625.8 mg (5/5/2020)  DOCEtaxel (TAXOTERE) 122.2 mg in sodium chloride 0.9 % 250 mL chemo infusion, 75 mg/m2 = 122.2 mg, Intravenous, Once, 4 of 6 cycles  Administration: 122.2 mg (3/24/2020), 122.2 mg (4/14/2020), 122.2 mg (5/5/2020)  trastuzumab (HERCEPTIN) 486 mg in sodium chloride 0.9 % 250 mL chemo infusion, 8 mg/kg = 486 mg, Intravenous, Once, 4 of 18 cycles  Administration: 486 mg (3/24/2020), 364 mg (4/14/2020), 364 mg (5/5/2020)      6/3/2020 - 7/18/2021 Chemotherapy     pertuzumab (PERJETA) IVPB, 420 mg (50 % of original dose 840 mg), Intravenous, Once, 13 of 13 cycles  Dose modification: 420 mg (original dose 840 mg, Cycle 1, Reason: Dose Not Tolerated)  Administration: 420 mg (6/3/2020), 420 mg (11/9/2020), 420 mg (6/25/2020), 420 mg (11/30/2020), 420 mg (12/21/2020), 420 mg (1/11/2021), 420 mg (2/2/2021), 420 mg (2/23/2021), 420 mg (4/6/2021), 420 mg (4/26/2021), 420 mg (5/17/2021), 420 mg (6/7/2021), 420 mg (6/28/2021)  trastuzumab-qyyp (TRAZIMERA) chemo infusion, 6 mg/kg = 337 mg (100 % of original dose 6 mg/kg), Intravenous, Once, 5 of 5 cycles  Dose modification: 6 mg/kg (original dose 6 mg/kg, Cycle 9)  Administration: 337 mg (4/6/2021), 337 mg (4/26/2021), 337 mg (5/17/2021), 337 mg (6/7/2021), 337 mg (6/28/2021)  PACLItaxel (TAXOL) chemo IVPB, 80 mg/m2 = 127.2 mg, Intravenous, Once, 2 of 2 cycles  Administration: 127.2 mg (6/3/2020), 127.2 mg (6/10/2020), 127.2 mg (6/17/2020), 127.2 mg (6/25/2020), 127.2 mg (7/1/2020), 127.2 mg (7/7/2020)  trastuzumab (HERCEPTIN) chemo infusion, 6 mg/kg = 340 mg (75 % of original dose 8 mg/kg), Intravenous, Once, 8 of 8 cycles  Dose  modification: 6 mg/kg (original dose 8 mg/kg, Cycle 1, Reason: Dose Not Tolerated)  Administration: 340 mg (6/3/2020), 376 mg (11/9/2020), 340 mg (6/25/2020), 376 mg (11/30/2020), 376 mg (12/21/2020), 376 mg (1/11/2021), 376 mg (2/2/2021), 337 mg (2/23/2021)      6/4/2020 -  Cancer Staged     Staging form: Breast, AJCC 8th Edition  - Clinical: Stage IIA (cT2, cN0, cM0, G3, ER-, NM-, HER2+) - Signed by Damian Langley MD on 6/4/2020  Laterality: Left  Histologic grading system: 3 grade system         8/18/2020 Surgery     Left breast mastectomy with sentinel lymph node biopsy  No residual carcinoma identified  Prior procedural site changes  0/3 Lymph nodes      8/18/2020 -  Cancer Staged     Staging form: Breast, AJCC 8th Edition  - Pathologic stage from 8/18/2020: No Stage Recommended (ypT0, pN0, cM0, ER-, NM-, HER2+) - Signed by ANNETTE Shepherd on 8/2/2023  Stage prefix: Post-therapy  Response to neoadjuvant therapy: Complete response            Review of Systems   Cardiovascular:  Positive for chest pain.   All other systems reviewed and are negative.     Medical History Reviewed by provider this encounter:     .     Medications Ordered Prior to Encounter          Current Outpatient Medications on File Prior to Visit   Medication Sig Dispense Refill    ALPRAZolam (XANAX) 1 mg tablet Take 1 tablet (1 mg total) by mouth 3 (three) times a day as needed for anxiety 90 tablet 0    betamethasone, augmented, (DIPROLENE-AF) 0.05 % cream Apply topically 2 (two) times a day To the area(s) affected for up to two weeks as needed for flares. Take a one-week break in between uses. Do not apply to face, underarms, or groin. 50 g 1    clobetasol (TEMOVATE) 0.05 % cream          Clobetasol Propionate E 0.05 % emollient cream Apply topically 2 (two) times a day 45 g 3    cyclobenzaprine (FLEXERIL) 5 mg tablet Take 2 tablets (10 mg total) by mouth daily at bedtime as needed for muscle spasms (as needed) 30 tablet 3     dicyclomine (BENTYL) 10 mg capsule Take 1 capsule (10 mg total) by mouth 2 (two) times a day 60 capsule 0    gabapentin (NEURONTIN) 300 mg capsule Take 1 capsule (300 mg total) by mouth daily at bedtime 30 capsule 0    HYDROcodone-acetaminophen (NORCO) 5-325 mg per tablet Take 1 tablet by mouth every 6 (six) hours as needed for pain Max Daily Amount: 4 tablets 120 tablet 0    hydroxychloroquine (PLAQUENIL) 200 mg tablet Take 1 tablet in morning and 1/2 tablet in evening        levothyroxine 112 mcg tablet Take 1 tablet (112 mcg total) by mouth daily 90 tablet 1    magnesium Oxide (MAG-OX) 400 mg TABS TAKE 1 TABLET BY MOUTH TWO TIMES A DAY 60 tablet 0    magnesium oxide (MAG-OX) 400 mg TAKE ONE TABLET BY MOUTH TWICE A DAY 60 tablet 5    metoclopramide (REGLAN) 5 mg tablet Take 1 tablet (5 mg total) by mouth 3 (three) times a day as needed (as needed) 30 tablet 5    ondansetron (ZOFRAN) 4 mg tablet Take 1 tablet (4 mg total) by mouth every 8 (eight) hours as needed for nausea or vomiting 30 tablet 5    pantoprazole (PROTONIX) 40 mg tablet TAKE ONE TABLET BY MOUTH EVERY DAY 30 tablet 5    predniSONE 5 mg tablet TAKE ONE TABLET BY MOUTH TWICE A DAY WITH FOOD 60 tablet 0    sertraline (ZOLOFT) 100 mg tablet TAKE ONE AND ONE-HALF TABLETS BY MOUTH DAILY 45 tablet 5    Tyrvaya 0.03 MG/ACT SOLN ONE SPRAY INTO EACH NOSTRIL TWO TIMES A DAY          No current facility-administered medications on file prior to visit.         Social History               Tobacco Use    Smoking status: Every Day       Current packs/day: 0.50       Average packs/day: 0.5 packs/day for 41.0 years (20.5 ttl pk-yrs)       Types: Cigarettes       Start date: 1990    Smokeless tobacco: Never   Vaping Use    Vaping status: Never Used   Substance and Sexual Activity    Alcohol use: Not Currently    Drug use: No    Sexual activity: Not Currently       Partners: Male       Birth control/protection: None             Objective     /84 (BP Location:  "Right arm, Patient Position: Sitting, Cuff Size: Adult)   Pulse 86   Temp 97.8 °F (36.6 °C) (Temporal)   Resp 18   Ht 5' 3\" (1.6 m)   Wt 64.4 kg (142 lb)   LMP  (LMP Unknown)   SpO2 96%   BMI 25.15 kg/m²      Pain Screening:  Pain Score:   4  ECOG   1  Physical Exam  Constitutional:       Appearance: Normal appearance. She is normal weight.   Neck:      Thyroid: No thyroid mass.      Trachea: Trachea normal.   Cardiovascular:      Rate and Rhythm: Normal rate and regular rhythm.      Heart sounds: Heart sounds not distant. No murmur heard.     No systolic murmur is present.      No diastolic murmur is present.      No friction rub. No gallop. No S3 or S4 sounds.   Pulmonary:      Breath sounds: Normal breath sounds. No stridor, decreased air movement or transmitted upper airway sounds. No decreased breath sounds, wheezing, rhonchi or rales.   Chest:      Chest wall: No mass, swelling, tenderness or crepitus. There is no dullness to percussion.   Breasts:     Right: Normal.      Left: Absent.   Abdominal:      General: Abdomen is protuberant. Bowel sounds are normal. There is no distension or abdominal bruit. There are no signs of injury.      Palpations: Abdomen is soft. There is no hepatomegaly, splenomegaly or mass.      Tenderness: There is no abdominal tenderness. There is no right CVA tenderness or left CVA tenderness.   Musculoskeletal:      Cervical back: Normal range of motion. No bony tenderness. No pain with movement, spinous process tenderness or muscular tenderness.      Thoracic back: No bony tenderness. No scoliosis.      Lumbar back: No bony tenderness.      Right lower leg: No edema.      Left lower leg: No edema.   Lymphadenopathy:      Cervical: No cervical adenopathy.      Right cervical: No superficial, deep or posterior cervical adenopathy.     Left cervical: No superficial, deep or posterior cervical adenopathy.   Neurological:      Mental Status: She is alert.   Psychiatric:         " "Behavior: Behavior is cooperative.            Labs: I have reviewed the following labs:  No results found for: \"WBC\", \"RBC\", \"HGB\", \"HCT\", \"MCV\", \"MCH\", \"RDW\", \"PLT\", \"NEUTOPHILPCT\", \"LYMPHOPCT\", \"MONOPCT\", \"EOSPCT\", \"BASOPCT\", \"IMMGRANS\", \"NEUTROABS\"        Lab Results   Component Value Date/Time     Potassium 4.2 01/08/2025 01:09 PM     Chloride 102 01/08/2025 01:09 PM     CO2 26 01/08/2025 01:09 PM     BUN 11 01/08/2025 01:09 PM     Creatinine 0.91 01/08/2025 01:09 PM     Glucose, Fasting 83 01/08/2025 01:09 PM     Calcium 9.6 01/08/2025 01:09 PM     AST 20 01/08/2025 01:09 PM     ALT 11 01/08/2025 01:09 PM     Alkaline Phosphatase 76 01/08/2025 01:09 PM     Total Protein 7.6 01/08/2025 01:09 PM     Albumin 4.6 01/08/2025 01:09 PM     Total Bilirubin 0.37 01/08/2025 01:09 PM     eGFR 68 01/08/2025 01:09 PM            Pathology:   08/18/2020  S 20 [id number]   s/p left mastectomy with sentinel lymph node reviews  Final Diagnosis   A. Left axillary sentinel lymph node (excision): - One (1) lymph node negative for carcinoma (0/1)      B. Left axillary lymph node (excision): - Two (2) lymph nodes negative for carcinoma (0/2)      C. Left breast (mastectomy): - No residual carcinoma identified (ypT0N0) - Prior procedural site changes - Focal usual ductal hyperplasia - Intraductal papilloma with clear margins - Microcalcifications present in benign ducts - Skin and nipple negative for carcinoma         03/20/2020  S 20 [id number] S/P left breast biopsy  Final  Benign breast tissue     02/12/2020  S 20 [id number] S/P US Guided biopsies plus FNA bilateral breast  Final Diagnosis   A. Right breast, 11:00, 10 cm from nipple, with calcifications, stereotactic large core needle biopsy x 2\" - Benign breast glandular parenchyma with fibroadenomatoid stromal hyperplasia, involving by dystrophic & clustered calcificationsupto 800 microns diameter. - No epithelial atypia and no evidence of malignancy.      B. Right breast, 11:00, 10 cm from " nipple, without calcifications, stereotactic large core needle biopsy x remaining - Benign fatty breast glandular tissue with nonspecific stromal collagenization. - No calcifications seen. - No epithelial atypia and no evidence of malignancy.      C. Left breast, 10:00, 5 cm from nipple, ultrasound-guided large core (12g marquee) needle biopsy x 3: - Benign breast glandular tissue with age-appropriate involutional change and nonspecific stromal collagenization. - No calcifications seen. - No epithelial atypia and no evidence of malignancy.      D. Left breast, 10:00, 4 cm from nipple, ultrasound-guided large core (12g marquee) needle biopsy x 4: - Invasive breast carcinoma of no special type (ductal NST/invasive ductal carcinoma). * Mooers grade 3 of 3 (total score = 3+3+3 = of 9) -- tubule formation < 10%, score 3 -- nuclear grade 3 of 3, score 3 -- mitoses = 10 mitoses/mm2, score 3. * confirmed by tumor cell immunophenotype: -- positive: E-cadherin & p120 (each in a cytoplasmic membrane-restricted pattern), Gata3(diffusely strong nuclear), CK7(cytoplasmic),p63(nuclear & cytoplasmic in minor, < 10% subset). -- negative: p63, smooth muscle myosin heavy chain. * invasive carcinoma involves 4 of 4 submitted core biopsies, maximal dimension = 18 millimeters. *   estrogen, progesterone & HER2 receptor studies pending, to be described in a separate receptor report. - Ductal carcinoma in situ (DCIS): Not identified - Lymph-vascular invasion: Not identified. - Microcalcifications:      Addendum Specimen D: Performed on invasive carcinoma block D2:      Test Description Result Prognostic Interpretation   Estrogen Receptor/ER 0% Negative Primary Antibody: SP1 Internal control: Not present External control: Positive Chau Score*: 0      Progesterone Receptor/PgR 0% Negative Primary Antibody: 1 Internal control: Not present External control: Positive Chau Score*: 0      HER2 by IHC 2+ Equivocal Primary Antibody:4B5 HER2  by FISH To Follow      Addendum 2 Fluorescence in-situ hybridization (FISH) analysis of HER2 over expression by invasive breast carcinoma cells, [facility], yields the following results:      Test Description Interpretation - HER2 by FISH analysis Positive /            Imaging:     March 21, 2025 bone density DEXA scan  IMPRESSION:     1. Osteoporosis. Based on the left femoral neck.     2.  The 10 year risk of hip fracture is 10.0% with the 10 year risk of major osteoporotic fracture being 26% as calculated by the [employer] fracture risk assessment tool (FRAX, which is based on data generated by the WHO Collaborating   [place] for Metabolic Bone Diseases).     3.  The current Bone Health and Osteoporosis Foundation (BHOF) guidelines recommend treating patients with a T-score of -2.5 or less in the lumbar spine or hips, or in post-menopausal women and men over the age of 50 with low bone mass (osteopenia;   T-score between -1.0 and -2.5) and a FRAX 10 year risk score of > 3% for hip fracture and/or > 20% for major osteoporosis-related fracture (clinical vertebral, hip, forearm, or proximal humerus).     4.  The BHOF recommends follow-up DXA in 1-2 years after initiating or changing medical therapy for osteoporosis and at appropriate intervals thereafter, according to clinical circumstances. More frequent BMD testing may be warranted in higher-risk   individuals (multiple fractures, older age, very low BMD). Less frequent BMD testing is warranted as follow-up in patients with initial T-scores in the normal or slightly below normal range (osteopenia) and for patients who have remained fracture free on   treatment      February 28, 2025 nuclear medicine whole-body bone scan     IMPRESSION:     1. No scintigraphic evidence of osseous metastasis.         February 28, 2025 CT scan chest abdomen pelvis with contrast      IMPRESSION:     No metastatic  "disease identified in the chest, abdomen or pelvis.     Mild emphysema.     Enlarged fatty liver.        January 9, 2025 diagnostic unilateral right-sided mammogram   MPRESSION:   No mammographic evidence of malignancy in the right breast.           ASSESSMENT/BI-RADS CATEGORY:  Right: 1 - Negative  Overall: 1 - Negative     RECOMMENDATION:       - Routine screening mammogram in 1 year for the right breast.        No results found for this or any previous visit.                  Electronically signed by Olivier Escobar MD at 2/4/2025  1:57 PM         Office Visit on 1/27/2025            Revision History          Note shared with patient  Additional Documentation    Vitals: /84 (BP Location: Right arm, Patient Position: Sitting, Cuff Size: Adult)     Pulse 86     Temp 97.8 °F (36.6 °C) (Temporal)     Resp 18     Ht 5' 3\" (1.6 m)     Wt 64.4 kg (142 lb)     LMP  (LMP Unknown)     SpO2 96%     BMI 25.15 kg/m²     BSA 1.67 m²     Pain Sc   4      "

## 2025-03-31 ENCOUNTER — TELEPHONE (OUTPATIENT)
Dept: SURGICAL ONCOLOGY | Facility: CLINIC | Age: 60
End: 2025-03-31

## 2025-03-31 NOTE — TELEPHONE ENCOUNTER
Called spoke to patient. Confirmed [facility] ride is scheduled 4/17/25 at 11:45am. At [facility] in [place].

## 2025-04-04 DIAGNOSIS — C50.919 MALIGNANT NEOPLASM OF FEMALE BREAST, UNSPECIFIED ESTROGEN RECEPTOR STATUS, UNSPECIFIED LATERALITY, UNSPECIFIED SITE OF BREAST (HCC): ICD-10-CM

## 2025-04-04 RX ORDER — HYDROCODONE BITARTRATE AND ACETAMINOPHEN 5; 325 MG/1; MG/1
1 TABLET ORAL EVERY 6 HOURS PRN
Qty: 120 TABLET | Refills: 0 | Status: SHIPPED | OUTPATIENT
Start: 2025-04-04

## 2025-04-07 ENCOUNTER — HOSPITAL ENCOUNTER (OUTPATIENT)
Dept: INFUSION CENTER | Facility: CLINIC | Age: 60
Discharge: HOME | End: 2025-04-07
Payer: COMMERCIAL

## 2025-04-07 PROCEDURE — 96372 THER/PROPH/DIAG INJ SC/IM: CPT

## 2025-04-07 RX ADMIN — DENOSUMAB 60 MG: 60 INJECTION SUBCUTANEOUS at 14:36

## 2025-04-07 NOTE — PROGRESS NOTES
Patient is here for Prolia, offers no complaints. Labs reviewed and are in parameters to treat. Injection given in right arm. Next appointment made for 10/10/25 at 1430. AVS declined.

## 2025-04-08 ENCOUNTER — HOSPITAL ENCOUNTER (OUTPATIENT)
Dept: CT IMAGING | Facility: HOSPITAL | Age: 60
Discharge: HOME | End: 2025-04-08
Payer: COMMERCIAL

## 2025-04-08 DIAGNOSIS — Z85.3 PERSONAL HISTORY OF BREAST CANCER: ICD-10-CM

## 2025-04-08 DIAGNOSIS — R51.9 INCREASED FREQUENCY OF HEADACHES: ICD-10-CM

## 2025-04-08 DIAGNOSIS — R42 DIZZINESS: ICD-10-CM

## 2025-04-08 DIAGNOSIS — S09.90XA TRAUMATIC INJURY OF HEAD, INITIAL ENCOUNTER: ICD-10-CM

## 2025-04-08 PROCEDURE — 70470 CT HEAD/BRAIN W/O & W/DYE: CPT

## 2025-04-08 RX ADMIN — IOHEXOL 100 ML: 350 INJECTION, SOLUTION INTRAVENOUS at 15:56

## 2025-04-09 ENCOUNTER — RESULTS FOLLOW-UP (OUTPATIENT)
Dept: SURGICAL ONCOLOGY | Facility: CLINIC | Age: 60
End: 2025-04-09

## 2025-04-10 DIAGNOSIS — F41.9 ANXIETY: ICD-10-CM

## 2025-04-11 RX ORDER — ALPRAZOLAM 1 MG/1
1 TABLET ORAL 3 TIMES DAILY PRN
Qty: 90 TABLET | Refills: 1 | Status: SHIPPED | OUTPATIENT
Start: 2025-04-11

## 2025-04-17 ENCOUNTER — HOSPITAL ENCOUNTER (OUTPATIENT)
Dept: ULTRASOUND IMAGING | Facility: CLINIC | Age: 60
Discharge: HOME | End: 2025-04-17
Payer: COMMERCIAL

## 2025-04-17 VITALS — HEIGHT: 63 IN | WEIGHT: 141 LBS | BODY MASS INDEX: 24.98 KG/M2

## 2025-04-17 DIAGNOSIS — R22.2 LUMP IN CHEST: ICD-10-CM

## 2025-04-17 DIAGNOSIS — L25.9 CONTACT DERMATITIS, UNSPECIFIED CONTACT DERMATITIS TYPE, UNSPECIFIED TRIGGER: ICD-10-CM

## 2025-04-17 DIAGNOSIS — Z85.3 PERSONAL HISTORY OF BREAST CANCER: ICD-10-CM

## 2025-04-17 PROCEDURE — 76642 ULTRASOUND BREAST LIMITED: CPT

## 2025-04-17 RX ORDER — BETAMETHASONE DIPROPIONATE 0.5 MG/G
CREAM TOPICAL
Qty: 50 G | Refills: 1 | Status: SHIPPED | OUTPATIENT
Start: 2025-04-17

## 2025-04-17 NOTE — TELEPHONE ENCOUNTER
Dr. Lopez, would you be able to refill this prescription? If not, I can contact the patient and schedule a follow up appointment. Thank you.

## 2025-04-18 NOTE — TELEPHONE ENCOUNTER
I contacted the patient to let her know that the refill of Betamethasone, Augmented 0.05% cream has been approved and sent to [facility] pharmacy in [place], [place].

## 2025-05-01 DIAGNOSIS — C50.919 MALIGNANT NEOPLASM OF FEMALE BREAST, UNSPECIFIED ESTROGEN RECEPTOR STATUS, UNSPECIFIED LATERALITY, UNSPECIFIED SITE OF BREAST (HCC): ICD-10-CM

## 2025-05-02 RX ORDER — HYDROCODONE BITARTRATE AND ACETAMINOPHEN 5; 325 MG/1; MG/1
1 TABLET ORAL EVERY 6 HOURS PRN
Qty: 120 TABLET | Refills: 0 | Status: SHIPPED | OUTPATIENT
Start: 2025-05-02

## 2025-05-16 ENCOUNTER — TELEPHONE (OUTPATIENT)
Age: 60
End: 2025-05-16

## 2025-05-29 DIAGNOSIS — C50.919 MALIGNANT NEOPLASM OF FEMALE BREAST, UNSPECIFIED ESTROGEN RECEPTOR STATUS, UNSPECIFIED LATERALITY, UNSPECIFIED SITE OF BREAST (HCC): ICD-10-CM

## 2025-05-30 RX ORDER — HYDROCODONE BITARTRATE AND ACETAMINOPHEN 5; 325 MG/1; MG/1
1 TABLET ORAL EVERY 6 HOURS PRN
Qty: 120 TABLET | Refills: 0 | Status: SHIPPED | OUTPATIENT
Start: 2025-05-30

## 2025-06-03 DIAGNOSIS — E03.9 HYPOTHYROIDISM, UNSPECIFIED TYPE: ICD-10-CM

## 2025-06-04 RX ORDER — LEVOTHYROXINE SODIUM 125 UG/1
125 TABLET ORAL DAILY
Qty: 90 TABLET | Refills: 0 | OUTPATIENT
Start: 2025-06-04

## 2025-06-08 DIAGNOSIS — F41.9 ANXIETY: ICD-10-CM

## 2025-06-09 RX ORDER — ALPRAZOLAM 1 MG/1
1 TABLET ORAL 3 TIMES DAILY PRN
Qty: 90 TABLET | Refills: 0 | Status: SHIPPED | OUTPATIENT
Start: 2025-06-09

## 2025-06-14 DIAGNOSIS — L25.9 CONTACT DERMATITIS, UNSPECIFIED CONTACT DERMATITIS TYPE, UNSPECIFIED TRIGGER: ICD-10-CM

## 2025-06-16 RX ORDER — BETAMETHASONE DIPROPIONATE 0.5 MG/G
CREAM TOPICAL
Qty: 50 G | Refills: 1 | OUTPATIENT
Start: 2025-06-16

## 2025-06-25 DIAGNOSIS — C50.919 MALIGNANT NEOPLASM OF FEMALE BREAST, UNSPECIFIED ESTROGEN RECEPTOR STATUS, UNSPECIFIED LATERALITY, UNSPECIFIED SITE OF BREAST (HCC): ICD-10-CM

## 2025-06-26 RX ORDER — HYDROCODONE BITARTRATE AND ACETAMINOPHEN 5; 325 MG/1; MG/1
1 TABLET ORAL EVERY 6 HOURS PRN
Qty: 120 TABLET | Refills: 0 | Status: SHIPPED | OUTPATIENT
Start: 2025-06-26

## 2025-06-30 DIAGNOSIS — C50.919 MALIGNANT NEOPLASM OF FEMALE BREAST, UNSPECIFIED ESTROGEN RECEPTOR STATUS, UNSPECIFIED LATERALITY, UNSPECIFIED SITE OF BREAST (HCC): ICD-10-CM

## 2025-06-30 DIAGNOSIS — M32.9 SYSTEMIC LUPUS ERYTHEMATOSUS, UNSPECIFIED SLE TYPE, UNSPECIFIED ORGAN INVOLVEMENT STATUS (HCC): ICD-10-CM

## 2025-07-01 RX ORDER — CLOBETASOL PROPIONATE 0.5 MG/G
CREAM TOPICAL 2 TIMES DAILY
Qty: 60 G | Refills: 5 | Status: SHIPPED | OUTPATIENT
Start: 2025-07-01

## 2025-07-07 DIAGNOSIS — F41.9 ANXIETY: ICD-10-CM

## 2025-07-09 RX ORDER — ALPRAZOLAM 1 MG/1
1 TABLET ORAL 3 TIMES DAILY PRN
Qty: 90 TABLET | Refills: 2 | Status: SHIPPED | OUTPATIENT
Start: 2025-07-09

## 2025-07-14 DIAGNOSIS — F41.9 ANXIETY: ICD-10-CM

## 2025-07-15 RX ORDER — SERTRALINE HYDROCHLORIDE 100 MG/1
150 TABLET, FILM COATED ORAL DAILY
Qty: 45 TABLET | Refills: 5 | Status: SHIPPED | OUTPATIENT
Start: 2025-07-15

## 2025-07-21 DIAGNOSIS — C50.919 MALIGNANT NEOPLASM OF FEMALE BREAST, UNSPECIFIED ESTROGEN RECEPTOR STATUS, UNSPECIFIED LATERALITY, UNSPECIFIED SITE OF BREAST (HCC): ICD-10-CM

## 2025-07-22 RX ORDER — HYDROCODONE BITARTRATE AND ACETAMINOPHEN 5; 325 MG/1; MG/1
1 TABLET ORAL EVERY 6 HOURS PRN
Qty: 120 TABLET | Refills: 0 | Status: SHIPPED | OUTPATIENT
Start: 2025-07-22

## 2025-07-23 ENCOUNTER — HOSPITAL ENCOUNTER (EMERGENCY)
Facility: HOSPITAL | Age: 60
Discharge: HOME | End: 2025-07-23
Attending: EMERGENCY MEDICINE
Payer: COMMERCIAL

## 2025-07-23 ENCOUNTER — APPOINTMENT (EMERGENCY)
Dept: CT IMAGING | Facility: HOSPITAL | Age: 60
End: 2025-07-23
Payer: COMMERCIAL

## 2025-07-23 VITALS
DIASTOLIC BLOOD PRESSURE: 91 MMHG | RESPIRATION RATE: 18 BRPM | HEART RATE: 106 BPM | SYSTOLIC BLOOD PRESSURE: 144 MMHG | OXYGEN SATURATION: 98 % | TEMPERATURE: 98.3 F

## 2025-07-23 DIAGNOSIS — S01.511A LACERATION OF VERMILION BORDER OF UPPER LIP, INITIAL ENCOUNTER: Primary | ICD-10-CM
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
## 2025-07-23 DIAGNOSIS — E87.6 HYPOKALEMIA: ICD-10-CM

## 2025-07-23 LAB
ANION GAP SERPL CALCULATED.3IONS-SCNC: 10 MMOL/L (ref 4–13)
BASOPHILS # BLD AUTO: 0.04 THOUSANDS/ÂΜL (ref 0–0.1)
BASOPHILS NFR BLD AUTO: 1 % (ref 0–1)
BUN SERPL-MCNC: 5 MG/DL (ref 5–25)
CALCIUM SERPL-MCNC: 8.7 MG/DL (ref 8.4–10.2)
CHLORIDE SERPL-SCNC: 104 MMOL/L (ref 96–108)
CO2 SERPL-SCNC: 22 MMOL/L (ref 21–32)
CREAT SERPL-MCNC: 0.62 MG/DL (ref 0.6–1.3)
EOSINOPHIL # BLD AUTO: 0.13 THOUSAND/ÂΜL (ref 0–0.61)
EOSINOPHIL NFR BLD AUTO: 2 % (ref 0–6)
ERYTHROCYTE [DISTWIDTH] IN BLOOD BY AUTOMATED COUNT: 14.5 % (ref 11.6–15.1)
GFR SERPL CREATININE-BSD FRML MDRD: 97 ML/MIN/1.73SQ M
GLUCOSE SERPL-MCNC: 91 MG/DL (ref 65–140)
HCT VFR BLD AUTO: 41 % (ref 34.8–46.1)
HGB BLD-MCNC: 13.6 G/DL (ref 11.5–15.4)
IMM GRANULOCYTES # BLD AUTO: 0.03 THOUSAND/UL (ref 0–0.2)
IMM GRANULOCYTES NFR BLD AUTO: 1 % (ref 0–2)
LYMPHOCYTES # BLD AUTO: 1.55 THOUSANDS/ÂΜL (ref 0.6–4.47)
LYMPHOCYTES NFR BLD AUTO: 25 % (ref 14–44)
MCH RBC QN AUTO: 31.3 PG (ref 26.8–34.3)
MCHC RBC AUTO-ENTMCNC: 33.2 G/DL (ref 31.4–37.4)
MCV RBC AUTO: 94 FL (ref 82–98)
MONOCYTES # BLD AUTO: 0.39 THOUSAND/ÂΜL (ref 0.17–1.22)
MONOCYTES NFR BLD AUTO: 6 % (ref 4–12)
NEUTROPHILS # BLD AUTO: 4.04 THOUSANDS/ÂΜL (ref 1.85–7.62)
NEUTS SEG NFR BLD AUTO: 65 % (ref 43–75)
NRBC BLD AUTO-RTO: 0 /100 WBCS
PLATELET # BLD AUTO: 192 THOUSANDS/UL (ref 149–390)
PMV BLD AUTO: 10.4 FL (ref 8.9–12.7)
POTASSIUM SERPL-SCNC: 3.4 MMOL/L (ref 3.5–5.3)
RBC # BLD AUTO: 4.35 MILLION/UL (ref 3.81–5.12)
SODIUM SERPL-SCNC: 136 MMOL/L (ref 135–147)
WBC # BLD AUTO: 6.18 THOUSAND/UL (ref 4.31–10.16)

## 2025-07-23 PROCEDURE — 99284 EMERGENCY DEPT VISIT MOD MDM: CPT

## 2025-07-23 PROCEDURE — 72125 CT NECK SPINE W/O DYE: CPT

## 2025-07-23 PROCEDURE — 99285 EMERGENCY DEPT VISIT HI MDM: CPT | Performed by: EMERGENCY MEDICINE

## 2025-07-23 PROCEDURE — 70486 CT MAXILLOFACIAL W/O DYE: CPT

## 2025-07-23 PROCEDURE — 90471 IMMUNIZATION ADMIN: CPT

## 2025-07-23 PROCEDURE — 71260 CT THORAX DX C+: CPT

## 2025-07-23 PROCEDURE — 85025 COMPLETE CBC W/AUTO DIFF WBC: CPT

## 2025-07-23 PROCEDURE — 70450 CT HEAD/BRAIN W/O DYE: CPT

## 2025-07-23 PROCEDURE — 74177 CT ABD & PELVIS W/CONTRAST: CPT

## 2025-07-23 PROCEDURE — 80048 BASIC METABOLIC PNL TOTAL CA: CPT

## 2025-07-23 PROCEDURE — 90715 TDAP VACCINE 7 YRS/> IM: CPT

## 2025-07-23 PROCEDURE — 12052 INTMD RPR FACE/MM 2.6-5.0 CM: CPT | Performed by: EMERGENCY MEDICINE

## 2025-07-23 PROCEDURE — 36415 COLL VENOUS BLD VENIPUNCTURE: CPT

## 2025-07-23 RX ORDER — LIDOCAINE HYDROCHLORIDE 10 MG/ML
5 INJECTION, SOLUTION EPIDURAL; INFILTRATION; INTRACAUDAL; PERINEURAL ONCE
Status: COMPLETED | OUTPATIENT
Start: 2025-07-23 | End: 2025-07-23

## 2025-07-23 RX ORDER — HYDROCODONE BITARTRATE AND ACETAMINOPHEN 5; 325 MG/1; MG/1
1 TABLET ORAL ONCE
Refills: 0 | Status: DISCONTINUED | OUTPATIENT
Start: 2025-07-23 | End: 2025-07-23

## 2025-07-23 RX ORDER — HYDROCODONE BITARTRATE AND ACETAMINOPHEN 5; 325 MG/1; MG/1
1 TABLET ORAL ONCE
Refills: 0 | Status: COMPLETED | OUTPATIENT
Start: 2025-07-23 | End: 2025-07-23

## 2025-07-23 RX ORDER — ACETAMINOPHEN 325 MG/1
975 TABLET ORAL ONCE
Status: DISCONTINUED | OUTPATIENT
Start: 2025-07-23 | End: 2025-07-23

## 2025-07-23 RX ORDER — LIDOCAINE HYDROCHLORIDE 20 MG/ML
JELLY TOPICAL ONCE
Status: COMPLETED | OUTPATIENT
Start: 2025-07-23 | End: 2025-07-23

## 2025-07-23 RX ADMIN — LIDOCAINE HYDROCHLORIDE 5 ML: 10 INJECTION, SOLUTION EPIDURAL; INFILTRATION; INTRACAUDAL at 20:04

## 2025-07-23 RX ADMIN — HYDROCODONE BITARTRATE AND ACETAMINOPHEN 1 TABLET: 5; 325 TABLET ORAL at 20:12

## 2025-07-23 RX ADMIN — TETANUS TOXOID, REDUCED DIPHTHERIA TOXOID AND ACELLULAR PERTUSSIS VACCINE, ADSORBED 0.5 ML: 5; 2.5; 8; 8; 2.5 SUSPENSION INTRAMUSCULAR at 20:34

## 2025-07-23 RX ADMIN — HYDROCODONE BITARTRATE AND ACETAMINOPHEN 1 TABLET: 5; 325 TABLET ORAL at 22:39

## 2025-07-23 RX ADMIN — LIDOCAINE HYDROCHLORIDE: 20 JELLY TOPICAL at 20:33

## 2025-07-23 RX ADMIN — IOHEXOL 85 ML: 350 INJECTION, SOLUTION INTRAVENOUS at 21:23

## 2025-07-24 ENCOUNTER — VBI (OUTPATIENT)
Dept: FAMILY MEDICINE CLINIC | Facility: CLINIC | Age: 60
End: 2025-07-24

## 2025-07-24 NOTE — TELEPHONE ENCOUNTER
07/24/25 10:19 AM    Patient contacted post ED visit, first outreach attempt made. Message was left for patient to return a call to the [facility] Department at [facility]: Phone [phone].    Thank you.  [facility] Forrest MA  [facility]

## 2025-07-24 NOTE — ED ATTENDING ATTESTATION
7/23/2025  I, Dasia Shah MD, saw and evaluated the patient. I have discussed the patient with the resident/non-physician practitioner and agree with the resident's/non-physician practitioner's findings, Plan of Care, and MDM as documented in the resident's/non-physician practitioner's note, except where noted. All available labs and Radiology studies were reviewed.  I was present for key portions of any procedure(s) performed by the resident/non-physician practitioner and I was immediately available to provide assistance.       At this point I agree with the current assessment done in the Emergency Department.  I have conducted an independent evaluation of this patient a history and physical is as follows:    Donya is a 60 yo F who presents to the emergency department for evaluation following fall.  She was watering plants when one of her animals hit into her causing her to fall towards her left side.  She impacted a table and a large wooden planter additionally fell on top of her.  She appreciated immediate bleeding from her mouth.  There was no loss of consciousness.  At this time she appreciates pain in her mouth as well as generalized soreness.  Transient lightheadedness upon changing position on stretcher.    She denies having felt specifically poorly over the last few days preceding today's injury.  She has been under the care of her rheumatologist for lupus & Sjogren's.  Hx breast ca affecting L breast & Raynaud's.  She has not had her usual hydrocodone-acetaminophen since this morning.  Denies chest discomfort, dyspnea or lightheadedness.    On exam she appears uncomfortable.  Mucous membranes are dry.  Through and through laceration appreciated of the left side of the upper lip.  This extends slightly above the vermilion border.  Hemostatic.  Mild tenderness over facial bones just superior to this.  No mobility appreciated.  She is edentulous.  She does have 2 implanted posts in the  mandible.  No mobility of these appreciated.  No tenderness around these.    Tenderness appreciated in the left occipital region.  No appreciated swelling or discoloration.  Heart sounds regular.  Lungs clear to auscultation bilaterally.  There is tenderness in the left upper chest wall.  (She notes that some of this is chronic although it is more intense than usual.).  Mild left shoulder and upper arm discomfort.  She is fully able to range this.  Remainder of upper extremities nontender.  Lower extremities nontender.  +2 bilateral radial and PT pulses.  Abdomen soft and nontender.  There is tenderness in the low cervical spine, upper thoracic spine, low thoracic, diffuse lumbar and sacral spine.  No step-off or overlying skin change appreciated.    Clear speech. No facial asymmetry/ deficit appreciated.  Pupils briskly reactive to light.  EOMI.  No nystagmus. Strong eye closure & symmetric brow raise. Protrudes tongue midline & ranges this laterally. Symmetric/ intact sensation in the upper, mid & lower portions of the face.  5/5 strength on shoulder shrug, bicep&  tricep  movement against resistance b/l.  5/5 strength on b/l hand , pincer grasp, finger abduction, dorsi & volar flexion, hip flexion, dorsi & plantar flexion. Symmetric grossly intact sensation in the UE & LE.      As discussed, will repair lip laceration.  Tetanus vaccine is being updated.    Obtaining CT imaging to assess for any ICH, facial fracture and/or spinal fracture.  She does have chronic back discomfort though is at risk for vertebral injury given mechanism.    Dry mucosa certainly may be attributable to Sjogren's.  Fleeting dizziness/lightheadedness may have been related to discomfort.  Obtaining labs to assess for any significant electrolyte abnormality/dehydration.      ED Course  ED Course as of 07/27/25 1343   Wed Jul 23, 2025 2128 Feeling improved at this time.  Reports good anesthesia of lip.  Dr. Aguillon is beginning laceration  repair.    CT imaging has been completed; radiology interpretation pending.  Care will be transferred to evening team including Ryan Cabello and Jarocho.  I have discussed case with Dr. Avendaño.   2147 Laceration repair completed with good edge approximation.  Minimal swelling noted.  Patient endorses good comfort.         Critical Care Time  Procedures

## 2025-07-24 NOTE — ED PROVIDER NOTES
Time reflects when diagnosis was documented in both MDM as applicable and the Disposition within this note       Time User Action Codes Description Comment    7/23/2025  8:16 PM Dasia Shah Add [S01.511A] Laceration of vermilion border of upper lip, initial encounter     7/23/2025 10:24 PM Cherise Cabello Add [W19.XXXA] Fall, initial encounter     7/23/2025 10:25 PM Cherise Cabello Add [E87.6] Hypokalemia           ED Disposition       ED Disposition   Discharge    Condition   Stable    Date/Time   Wed Jul 23, 2025 10:24 PM    Comment   Federica PEREIRA Mock discharge to home/self care.                   Assessment & Plan       Medical Decision Making  59-year-old female presents after fall  At risk for sprain, strain, osseous fracture, dislocation, abrasion, contusion, laceration, hematoma, ICH, etc.  CBC shows no leukocytosis, no anemia  CMP shows no electrolyte abnormalities of kidney and hepatic function at baseline  Will order CT head, neck, abdomen pelvis with recon to thoracolumbar junction  Discussed risks and benefits of laceration repair  Patient understands all risks and benefits, agreeable to laceration repair of lip on the left side  Wound cleaned thoroughly with jet lavage, sterile technique used  Topical lidocaine applied to surface, local infiltration with 1% lidocaine without epi  Dissolvable sutures used, vermilion border lined up appropriately  Repair successful, no immediate complications  Patient updated on tetanus booster  Will sign patient out to night team awaiting results of CT scans  Patient has no further questions at this time, in stable condition and cleared for handoff to night team provider    Amount and/or Complexity of Data Reviewed  Labs: ordered.  Radiology: ordered.    Risk  Prescription drug management.             Medications   lidocaine (URO-JET) 2 % urethral/mucosal gel ( Topical Given by Other 7/23/25 2033)   lidocaine (PF) (XYLOCAINE-MPF) 1 % injection 5 mL (5 mL  Infiltration Given by Other 7/23/25 2004)   HYDROcodone-acetaminophen (NORCO) 5-325 mg per tablet 1 tablet (1 tablet Oral Given 7/23/25 2012)   tetanus-diphtheria-acellular pertussis (BOOSTRIX) IM injection 0.5 mL (0.5 mL Intramuscular Given 7/23/25 2034)   iohexol (OMNIPAQUE) 350 MG/ML injection (MULTI-DOSE) 85 mL (85 mL Intravenous Given 7/23/25 2123)   HYDROcodone-acetaminophen (NORCO) 5-325 mg per tablet 1 tablet (1 tablet Oral Given 7/23/25 2239)       ED Risk Strat Scores                    No data recorded        SBIRT 22yo+      Flowsheet Row Most Recent Value   Initial Alcohol Screen: US AUDIT-C     1. How often do you have a drink containing alcohol? 0 Filed at: 07/23/2025 1914   2. How many drinks containing alcohol do you have on a typical day you are drinking?  0 Filed at: 07/23/2025 1914   3a. Male UNDER 65: How often do you have five or more drinks on one occasion? 0 Filed at: 07/23/2025 1914   3b. FEMALE Any Age, or MALE 65+: How often do you have 4 or more drinks on one occassion? 0 Filed at: 07/23/2025 1914   Audit-C Score 0 Filed at: 07/23/2025 1914   FARIDEH: How many times in the past year have you...    Used an illegal drug or used a prescription medication for non-medical reasons? Never Filed at: 07/23/2025 1914                            History of Present Illness       Chief Complaint   Patient presents with    Fall     Fall + mouth laceration. Patient fell while watering plants and fell on back patio. Unknown last tetanus shot       Past Medical History[1]   Past Surgical History[2]   Family History[3]   Social History[4]   E-Cigarette/Vaping    E-Cigarette Use Never User       E-Cigarette/Vaping Substances    Nicotine No     THC No     CBD No     Flavoring No     Other No     Unknown No       I have reviewed and agree with the history as documented.     59-year-old female presents status post fall with mouth laceration.  Patient states she was watering her pants on the back patio when a pig  knocked her over.  Patient fell onto her side leading to the lip laceration.  Patient states she did not lose consciousness.  Patient does not take any blood thinners.  Patient is unsure when her last tetanus shot was.      Fall  Associated symptoms: no abdominal pain, no back pain, no chest pain, no headaches, no seizures and no vomiting        Review of Systems   Constitutional:  Negative for chills and fever.   HENT:  Negative for ear pain and sore throat.    Eyes:  Negative for pain and visual disturbance.   Respiratory:  Negative for cough and shortness of breath.    Cardiovascular:  Negative for chest pain and palpitations.   Gastrointestinal:  Negative for abdominal pain and vomiting.   Musculoskeletal:  Negative for arthralgias and back pain.   Skin:  Positive for color change. Negative for rash.   Neurological:  Negative for seizures, syncope and headaches.   All other systems reviewed and are negative.          Objective       ED Triage Vitals   Temperature Pulse Blood Pressure Respirations SpO2 Patient Position - Orthostatic VS   07/23/25 1913 07/23/25 1913 07/23/25 1913 07/23/25 1913 07/23/25 1913 07/23/25 1913   98.3 °F (36.8 °C) (!) 106 144/91 18 98 % Sitting      Temp Source Heart Rate Source BP Location FiO2 (%) Pain Score    07/23/25 1913 07/23/25 1913 07/23/25 1913 -- 07/23/25 2012    Oral Monitor Right arm  8      Vitals      Date and Time Temp Pulse SpO2 Resp BP Pain Score FACES Pain Rating User   07/23/25 2239 -- -- -- -- -- 9 -- MM   07/23/25 2012 -- -- -- -- -- 8 -- EG   07/23/25 1913 98.3 °F (36.8 °C) 106 98 % 18 144/91 -- -- MD            Physical Exam  Vitals and nursing note reviewed.   Constitutional:       General: She is not in acute distress.     Appearance: She is well-developed. She is not ill-appearing or toxic-appearing.   HENT:      Head: Normocephalic and atraumatic.      Mouth/Throat:      Mouth: Mucous membranes are moist.     Eyes:      General: No scleral icterus.         Right eye: No discharge.         Left eye: No discharge.      Conjunctiva/sclera: Conjunctivae normal.       Cardiovascular:      Rate and Rhythm: Normal rate and regular rhythm.      Heart sounds: No murmur heard.  Pulmonary:      Effort: Pulmonary effort is normal. No respiratory distress.      Breath sounds: Normal breath sounds. No stridor. No wheezing, rhonchi or rales.   Abdominal:      General: There is no distension.      Palpations: Abdomen is soft.      Tenderness: There is no abdominal tenderness. There is no guarding.     Musculoskeletal:         General: Tenderness present. No swelling.      Cervical back: No rigidity.      Right lower leg: No edema.      Left lower leg: No edema.      Comments: Patient has mild tenderness to palpation along cervical spine as well as thoracolumbar junction.  No midline tenderness.  No step-offs or deformities noted.     Skin:     General: Skin is warm and dry.      Capillary Refill: Capillary refill takes less than 2 seconds.      Findings: Erythema present.      Comments: Large 4 cm laceration through left upper lip including vermilion border.  No other lacerations or abrasions noted.  Patient is hemostatic at this time.     Neurological:      General: No focal deficit present.      Mental Status: She is alert.      Sensory: No sensory deficit.      Motor: No weakness.     Psychiatric:         Mood and Affect: Mood normal.         Results Reviewed       Procedure Component Value Units Date/Time    Basic metabolic panel [[id number]]  (Abnormal) Collected: 07/23/25 2000    Lab Status: Final result Specimen: Blood from Arm, Right Updated: 07/23/25 2038     Sodium 136 mmol/L      Potassium 3.4 mmol/L      Chloride 104 mmol/L      CO2 22 mmol/L      ANION GAP 10 mmol/L      BUN 5 mg/dL      Creatinine 0.62 mg/dL      Glucose 91 mg/dL      Calcium 8.7 mg/dL      eGFR 97 ml/min/1.73sq m     Narrative:      National Kid[employer] guidelines for Chronic Kidney  Disease (CKD):     Stage 1 with normal or high GFR (GFR > 90 mL/min/1.73 square meters)    Stage 2 Mild CKD (GFR = 60-89 mL/min/1.73 square meters)    Stage 3A Moderate CKD (GFR = 45-59 mL/min/1.73 square meters)    Stage 3B Moderate CKD (GFR = 30-44 mL/min/1.73 square meters)    Stage 4 Severe CKD (GFR = 15-29 mL/min/1.73 square meters)    Stage 5 End Stage CKD (GFR <15 mL/min/1.73 square meters)  Note: GFR calculation is accurate only with a steady state creatinine    CBC and differential [[id number]] Collected: 07/23/25 2000    Lab Status: Final result Specimen: Blood from Arm, Right Updated: 07/23/25 2009     WBC 6.18 Thousand/uL      RBC 4.35 Million/uL      Hemoglobin 13.6 g/dL      Hematocrit 41.0 %      MCV 94 fL      MCH 31.3 pg      MCHC 33.2 g/dL      RDW 14.5 %      MPV 10.4 fL      Platelets 192 Thousands/uL      nRBC 0 /100 WBCs      Segmented % 65 %      Immature Grans % 1 %      Lymphocytes % 25 %      Monocytes % 6 %      Eosinophils Relative 2 %      Basophils Relative 1 %      Absolute Neutrophils 4.04 Thousands/µL      Absolute Immature Grans 0.03 Thousand/uL      Absolute Lymphocytes 1.55 Thousands/µL      Absolute Monocytes 0.39 Thousand/µL      Eosinophils Absolute 0.13 Thousand/µL      Basophils Absolute 0.04 Thousands/µL             CT head without contrast   Final Interpretation by Missy French MD (07/23 2142)      No acute intracranial abnormality.  Stable chronic microangiopathic changes within the brain.                  Workstation performed: [id number]         CT facial bones without contrast   Final Interpretation by Missy French MD (07/23 2145)      No evidence of acute traumatic injury to the facial bones.               Workstation performed: [id number]         CT spine cervical without contrast   Final Interpretation by Missy French MD (07/23 2159)      No cervical spine fracture or traumatic malalignment.                  Workstation performed: [id number]         CT chest abdomen  pelvis w contrast   Final Interpretation by Missy French MD (07/23 2205)      No findings of acute traumatic injury in the chest, abdomen or pelvis      Wall thickening and hyperenhancement at the rectosigmoid colon compatible with proctocolitis            Computerized Assisted Algorithm (CAA) may have aided analysis of applicable images.      Workstation performed: [id number]         CT recon only thoracolumbar   Final Interpretation by Missy French MD (07/23 2220)      No fracture or traumatic subluxation.               Workstation performed: [id number]               Universal Protocol:  procedure performed by consultantConsent: Verbal consent obtained  Risks and benefits: risks, benefits and alternatives were discussed  Consent given by: patient  Patient understanding: patient states understanding of the procedure being performed  Patient consent: the patient's understanding of the procedure matches consent given  Procedure consent: procedure consent matches procedure scheduled  Relevant documents: relevant documents present and verified  Patient identity confirmed: verbally with patient  Laceration repair    Date/Time: 7/23/2025 9:01 PM    Performed by: Shiva Aguillon DO  Authorized by: Shiva Aguillon DO  Body area: head/neck  Location details: upper lip  Full thickness lip laceration: yes  Vermilion border involved: yes  Lip laceration height: more than half vertical height  Laceration length: 4 cm  Contamination: The wound is contaminated.  Foreign body present: Dirt.  Tendon involvement: none  Nerve involvement: none  Anesthesia: local infiltration    Anesthesia:  Local Anesthetic: lidocaine 1% without epinephrine and topical anesthetic  Anesthetic total: 2 mL    Sedation:  Patient sedated: no      Wound Dehiscence:    Secondary closure or dehiscence: complex    Procedure Details:  Preparation: Patient was prepped and draped in the usual sterile fashion.  Irrigation solution: saline  Irrigation method: jet  lavage  Amount of cleaning: extensive  Debridement: none  Degree of undermining: none  Mucous membrane closure: 5-0 Chromic gut  Number of sutures: 5  Approximation: close  Approximation difficulty: complex  Lip approximation: vermillion border well aligned  Comments: Patient tolerated procedure well with no immediate complications.  Vermilion border well-approximated.    Cleaning details: dirt        ED Medication and Procedure Management   Prior to Admission Medications   Prescriptions Last Dose Informant Patient Reported? Taking?   ALPRAZolam (XANAX) 1 mg tablet   No No   Sig: Take 1 tablet (1 mg total) by mouth 3 (three) times a day as needed for anxiety   Clobetasol Propionate E 0.05 % emollient cream   No No   Sig: Apply topically 2 (two) times a day   HYDROcodone-acetaminophen (NORCO) 5-325 mg per tablet   No No   Sig: Take 1 tablet by mouth every 6 (six) hours as needed for pain Max Daily Amount: 4 tablets   Tyrvaya 0.03 MG/ACT SOLN   Yes No   Sig: ONE SPRAY INTO EACH NOSTRIL TWO TIMES A DAY   amLODIPine (NORVASC) 2.5 mg tablet   Yes No   Sig: TAKE 1 TABLET BY MOUTH DAILY AT BEDTIME FOR RAYNAUDS - MAY INCREASE TO 1 TABLET EVERY 12 HOURS FOR COLD FEET   betamethasone, augmented, (DIPROLENE-AF) 0.05 % cream   No No   Sig: APPLY TOPICALLY TWO TIMES A DAY TO THE AFFECTED AREA(S) FOR UP TO 2 WEEKS AS NEEDED FOR FLARES - TAKE A ONE WEEK BREAK IN BETWEEN USES - DO NOT APPLY TO FACE, UNDERARMS, OR GROIN.   clobetasol (TEMOVATE) 0.05 % cream   No No   Sig: APPLY TOPICALLY TWO TIMES A DAY   cyclobenzaprine (FLEXERIL) 5 mg tablet   No No   Sig: Take 1 tablet (5 mg total) by mouth daily at bedtime as needed for muscle spasms (as needed)   dicyclomine (BENTYL) 10 mg capsule   No No   Sig: Take 1 capsule (10 mg total) by mouth 2 (two) times a day   gabapentin (NEURONTIN) 300 mg capsule   No No   Sig: Take 1 capsule (300 mg total) by mouth daily at bedtime   hydroxychloroquine (PLAQUENIL) 200 mg tablet  Self Yes No   Sig:  Take 1 tablet in morning and 1/2 tablet in evening   levothyroxine 125 mcg tablet   No No   Sig: Take 1 tablet (125 mcg total) by mouth daily   magnesium Oxide (MAG-OX) 400 mg TABS   No No   Sig: Take 1 tablet (400 mg total) by mouth 2 (two) times a day   magnesium oxide (MAG-OX) 400 mg  Self No No   Sig: TAKE ONE TABLET BY MOUTH TWICE A DAY   metoclopramide (REGLAN) 5 mg tablet   No No   Sig: Take 1 tablet (5 mg total) by mouth 3 (three) times a day as needed (as needed)   ondansetron (ZOFRAN) 4 mg tablet   No No   Sig: Take 1 tablet (4 mg total) by mouth every 8 (eight) hours as needed for nausea or vomiting   pantoprazole (PROTONIX) 40 mg tablet   No No   Sig: Take 1 tablet (40 mg total) by mouth daily   predniSONE 5 mg tablet   No No   Sig: TAKE ONE TABLET BY MOUTH TWICE A DAY WITH FOOD   sertraline (ZOLOFT) 100 mg tablet   No No   Sig: TAKE ONE AND ONE-HALF TABLETS BY MOUTH DAILY      Facility-Administered Medications: None     Discharge Medication List as of 7/23/2025 10:27 PM        CONTINUE these medications which have NOT CHANGED    Details   ALPRAZolam (XANAX) 1 mg tablet Take 1 tablet (1 mg total) by mouth 3 (three) times a day as needed for anxiety, Starting Wed 7/9/2025, Normal      amLODIPine (NORVASC) 2.5 mg tablet TAKE 1 TABLET BY MOUTH DAILY AT BEDTIME FOR RAYNAUDS - MAY INCREASE TO 1 TABLET EVERY 12 HOURS FOR COLD FEET, Historical Med      betamethasone, augmented, (DIPROLENE-AF) 0.05 % cream APPLY TOPICALLY TWO TIMES A DAY TO THE AFFECTED AREA(S) FOR UP TO 2 WEEKS AS NEEDED FOR FLARES - TAKE A ONE WEEK BREAK IN BETWEEN USES - DO NOT APPLY TO FACE, UNDERARMS, OR GROIN., Normal      clobetasol (TEMOVATE) 0.05 % cream APPLY TOPICALLY TWO TIMES A DAY, Starting Tue 7/1/2025, Normal      Clobetasol Propionate E 0.05 % emollient cream Apply topically 2 (two) times a day, Starting Tue 6/25/2024, Normal      cyclobenzaprine (FLEXERIL) 5 mg tablet Take 1 tablet (5 mg total) by mouth daily at bedtime as  needed for muscle spasms (as needed), Starting Thu 2/27/2025, Normal      dicyclomine (BENTYL) 10 mg capsule Take 1 capsule (10 mg total) by mouth 2 (two) times a day, Starting Tue 11/12/2024, Normal      gabapentin (NEURONTIN) 300 mg capsule Take 1 capsule (300 mg total) by mouth daily at bedtime, Starting Thu 2/27/2025, Normal      HYDROcodone-acetaminophen (NORCO) 5-325 mg per tablet Take 1 tablet by mouth every 6 (six) hours as needed for pain Max Daily Amount: 4 tablets, Starting Tue 7/22/2025, Normal      hydroxychloroquine (PLAQUENIL) 200 mg tablet Take 1 tablet in morning and 1/2 tablet in evening, Historical Med      levothyroxine 125 mcg tablet Take 1 tablet (125 mcg total) by mouth daily, Starting Wed 3/26/2025, Normal      magnesium Oxide (MAG-OX) 400 mg TABS Take 1 tablet (400 mg total) by mouth 2 (two) times a day, Starting Fri 3/7/2025, Normal      magnesium oxide (MAG-OX) 400 mg TAKE ONE TABLET BY MOUTH TWICE A DAY, Normal      metoclopramide (REGLAN) 5 mg tablet Take 1 tablet (5 mg total) by mouth 3 (three) times a day as needed (as needed), Starting Mon 4/1/2024, Normal      ondansetron (ZOFRAN) 4 mg tablet Take 1 tablet (4 mg total) by mouth every 8 (eight) hours as needed for nausea or vomiting, Starting Thu 8/29/2024, Normal      pantoprazole (PROTONIX) 40 mg tablet Take 1 tablet (40 mg total) by mouth daily, Starting Thu 3/6/2025, Normal      predniSONE 5 mg tablet TAKE ONE TABLET BY MOUTH TWICE A DAY WITH FOOD, Normal      sertraline (ZOLOFT) 100 mg tablet TAKE ONE AND ONE-HALF TABLETS BY MOUTH DAILY, Starting Tue 7/15/2025, Normal      Tyrvaya 0.03 MG/ACT SOLN ONE SPRAY INTO EACH NOSTRIL TWO TIMES A DAY, Historical Med           No discharge procedures on file.  ED SEPSIS DOCUMENTATION   Time reflects when diagnosis was documented in both MDM as applicable and the Disposition within this note       Time User Action Codes Description Comment    7/23/2025  8:16 PM Dasia Shah  Add [S01.511A] Laceration of vermilion border of upper lip, initial encounter     7/23/2025 10:24 PM Cherise Cabello Add [W19.XXXA] Fall, initial encounter     7/23/2025 10:25 PM Cherise Cabello Add [E87.6] Hypokalemia                      Shiva DO Henna  07/24/25 1905         [1]   Past Medical History:  Diagnosis Date    Cancer (HCC)     Colon polyp     Disease of thyroid gland     History of chemotherapy     Hypertension     Kidney stone     Lupus     Malignant neoplasm of upper-inner quadrant of left breast in female, estrogen receptor negative (HCC) 2/25/2020    left    Nephrolithiasis     PTSD (post-traumatic stress disorder)     Sjogren's disease (HCC)    [2]   Past Surgical History:  Procedure Laterality Date    BREAST BIOPSY Left 02/12/2020    us guided -  invasive ductal carcinoma    BREAST BIOPSY Right 02/12/2020    benign stereo    ECTOPIC PREGNANCY SURGERY      FEMUR FRACTURE SURGERY      FL GUIDED CENTRAL VENOUS ACCESS DEVICE INSERTION  03/17/2020    HYSTERECTOMY      LEFT OOPHORECTOMY      due to torsion     MAMMO STEREOTACTIC BREAST BIOPSY RIGHT (ALL INC) Right 02/12/2020    MASTECTOMY Left 08/2020    MASTECTOMY W/ SENTINEL NODE BIOPSY Left 08/18/2020    Procedure: BREAST MASTECTOMY WITH SENTINEL LYMPH NODE BIOPSY, LYMPHATIC MAPPING WITH BLUE DYE AND RADIOACTIVE DYE (INJECT AT 1430 BY DR SHANNON IN THE OR);  Surgeon: Dominick Shannon MD;  Location: [facility];  Service: Surgical Oncology    MRI BREAST BIOPSY LEFT (ALL INCLUSIVE) Left 03/20/2020    OK INSJ TUNNELED CTR VAD W/SUBQ PORT AGE 5 YR/> N/A 03/17/2020    Procedure: INSERTION VENOUS PORT ( PORT-A-CATH) IR;  Surgeon: Jose Morel DO;  Location: [facility];  Service: Interventional Radiology    OK RMVL MARSHA CTR VAD W/SUBQ PORT/ CTR/PRPH INSJ N/A 07/06/2021    Procedure: REMOVAL VENOUS PORT (PORT-A-CATH)IR;  Surgeon: Jose Morel DO;  Location: [facility];  Service: Interventional Radiology    US GUIDANCE BREAST BIOPSY LEFT EACH  ADDITIONAL Left 02/12/2020    US GUIDED BREAST BIOPSY LEFT COMPLETE Left 02/12/2020    VAGINA SURGERY     [3]   Family History  Problem Relation Name Age of Onset    Diabetes Mother      Hypertension Mother      Nephrolithiasis Mother      Breast cancer Mother  70    Heart disease Father      Hypertension Father      Diabetes Brother      Nephrolithiasis Brother      Breast cancer Maternal Aunt      No Known Problems Maternal Grandmother      No Known Problems Maternal Grandfather      No Known Problems Paternal Grandmother      No Known Problems Paternal Grandfather     [4]   Social History  Tobacco Use    Smoking status: Every Day     Current packs/day: 0.50     Average packs/day: 0.5 packs/day for 41.5 years (20.7 ttl pk-yrs)     Types: Cigarettes     Start date: 1990    Smokeless tobacco: Never   Vaping Use    Vaping status: Never Used   Substance Use Topics    Alcohol use: Not Currently    Drug use: No        Shiva Aguillon DO  07/24/25 [id number]

## 2025-07-24 NOTE — ED CARE HANDOFF
Emergency Department Sign Out Note        Sign out and transfer of care from Dr. Aguillon. See Separate Emergency Department note.     The patient, Federica Davey, was evaluated by the previous provider for a fall. +HS, cervical and thoracolumbar back pain.     Workup Completed:  Lip laceration repaired, tetanus given    ED Course / Workup Pending (followup):  CT head, face,cervial/thoracic/lumbar spine, CAP, Cts all pending        No data recorded                          ED Course as of 07/24/25 0556   Wed Jul 23, 2025   2223 CT chest abdomen pelvis w contrast  No findings of acute traumatic injury in the chest, abdomen or pelvis     Wall thickening and hyperenhancement at the rectosigmoid colon compatible with proctocolitis     2223 CT recon only thoracolumbar  No fracture or traumatic subluxation.   2223 CT spine cervical without contrast  No cervical spine fracture or traumatic malalignment.   2224 CT facial bones without contrast  No evidence of acute traumatic injury to the facial bones.   2224 CT head without contrast  No acute intracranial abnormality.  Stable chronic microangiopathic changes within the brain.   2303 Shared all incidental findings with the patient.  Answered all questions.  Patient reports understanding and is comfortable discharge.     Procedures  Medical Decision Making  See ED course for MDM.    Amount and/or Complexity of Data Reviewed  Labs: ordered.  Radiology: ordered. Decision-making details documented in ED Course.    Risk  Prescription drug management.            Disposition  Final diagnoses:   Laceration of vermilion border of upper lip, initial encounter   Fall, initial encounter   Hypokalemia     Time reflects when diagnosis was documented in both MDM as applicable and the Disposition within this note       Time User Action Codes Description Comment    7/23/2025  8:16 PM Dasia Shah Add [S01.511A] Laceration of vermilion border of upper lip, initial encounter      7/23/2025 10:24 PM Cherise Cabello [W19.XXXA] Fall, initial encounter     7/23/2025 10:25 PM Cherise Cabello [E87.6] Hypokalemia           ED Disposition       ED Disposition   Discharge    Condition   Stable    Date/Time   Wed Jul 23, 2025 10:24 PM    Comment   Federica PEREIRA Mock discharge to home/self care.                   Follow-up Information       Follow up With Specialties Details Why Contact Info    Dean Banerjee MD Family Medicine In 1 week  [address] [place] [id number] [phone]            Discharge Medication List as of 7/23/2025 10:27 PM        CONTINUE these medications which have NOT CHANGED    Details   ALPRAZolam (XANAX) 1 mg tablet Take 1 tablet (1 mg total) by mouth 3 (three) times a day as needed for anxiety, Starting Wed 7/9/2025, Normal      amLODIPine (NORVASC) 2.5 mg tablet TAKE 1 TABLET BY MOUTH DAILY AT BEDTIME FOR RAYNAUDS - MAY INCREASE TO 1 TABLET EVERY 12 HOURS FOR COLD FEET, Historical Med      betamethasone, augmented, (DIPROLENE-AF) 0.05 % cream APPLY TOPICALLY TWO TIMES A DAY TO THE AFFECTED AREA(S) FOR UP TO 2 WEEKS AS NEEDED FOR FLARES - TAKE A ONE WEEK BREAK IN BETWEEN USES - DO NOT APPLY TO FACE, UNDERARMS, OR GROIN., Normal      clobetasol (TEMOVATE) 0.05 % cream APPLY TOPICALLY TWO TIMES A DAY, Starting Tue 7/1/2025, Normal      Clobetasol Propionate E 0.05 % emollient cream Apply topically 2 (two) times a day, Starting Tue 6/25/2024, Normal      cyclobenzaprine (FLEXERIL) 5 mg tablet Take 1 tablet (5 mg total) by mouth daily at bedtime as needed for muscle spasms (as needed), Starting Thu 2/27/2025, Normal      dicyclomine (BENTYL) 10 mg capsule Take 1 capsule (10 mg total) by mouth 2 (two) times a day, Starting Tue 11/12/2024, Normal      gabapentin (NEURONTIN) 300 mg capsule Take 1 capsule (300 mg total) by mouth daily at bedtime, Starting Thu 2/27/2025, Normal      HYDROcodone-acetaminophen (NORCO) 5-325 mg per tablet Take 1 tablet by mouth every 6 (six)  hours as needed for pain Max Daily Amount: 4 tablets, Starting Tue 7/22/2025, Normal      hydroxychloroquine (PLAQUENIL) 200 mg tablet Take 1 tablet in morning and 1/2 tablet in evening, Historical Med      levothyroxine 125 mcg tablet Take 1 tablet (125 mcg total) by mouth daily, Starting Wed 3/26/2025, Normal      magnesium Oxide (MAG-OX) 400 mg TABS Take 1 tablet (400 mg total) by mouth 2 (two) times a day, Starting Fri 3/7/2025, Normal      magnesium oxide (MAG-OX) 400 mg TAKE ONE TABLET BY MOUTH TWICE A DAY, Normal      metoclopramide (REGLAN) 5 mg tablet Take 1 tablet (5 mg total) by mouth 3 (three) times a day as needed (as needed), Starting Mon 4/1/2024, Normal      ondansetron (ZOFRAN) 4 mg tablet Take 1 tablet (4 mg total) by mouth every 8 (eight) hours as needed for nausea or vomiting, Starting Thu 8/29/2024, Normal      pantoprazole (PROTONIX) 40 mg tablet Take 1 tablet (40 mg total) by mouth daily, Starting Thu 3/6/2025, Normal      predniSONE 5 mg tablet TAKE ONE TABLET BY MOUTH TWICE A DAY WITH FOOD, Normal      sertraline (ZOLOFT) 100 mg tablet TAKE ONE AND ONE-HALF TABLETS BY MOUTH DAILY, Starting Tue 7/15/2025, Normal      Tyrvaya 0.03 MG/ACT SOLN ONE SPRAY INTO EACH NOSTRIL TWO TIMES A DAY, Historical Med           No discharge procedures on file.       ED Provider  Electronically Signed by     Cherise Cabello MD  07/24/25 0591

## 2025-07-25 NOTE — TELEPHONE ENCOUNTER
07/25/25 9:29 AM    Patient contacted post ED visit, [facility] department spoke with patient/caregiver and outreach was successful.    Thank you.  Nicolasa Forrest MA  [facility]

## 2025-07-29 ENCOUNTER — TELEPHONE (OUTPATIENT)
Age: 60
End: 2025-07-29

## 2025-07-29 ENCOUNTER — NURSE TRIAGE (OUTPATIENT)
Age: 60
End: 2025-07-29

## 2025-07-30 ENCOUNTER — TELEPHONE (OUTPATIENT)
Age: 60
End: 2025-07-30

## 2025-08-06 ENCOUNTER — TELEPHONE (OUTPATIENT)
Age: 60
End: 2025-08-06

## 2025-08-14 ENCOUNTER — TELEPHONE (OUTPATIENT)
Dept: HEMATOLOGY ONCOLOGY | Facility: CLINIC | Age: 60
End: 2025-08-14

## 2025-08-22 DIAGNOSIS — M32.9 SYSTEMIC LUPUS ERYTHEMATOSUS, UNSPECIFIED SLE TYPE, UNSPECIFIED ORGAN INVOLVEMENT STATUS (HCC): ICD-10-CM

## 2025-08-23 RX ORDER — GABAPENTIN 300 MG/1
300 CAPSULE ORAL
Qty: 30 CAPSULE | Refills: 5 | Status: SHIPPED | OUTPATIENT
Start: 2025-08-23

## (undated) DEVICE — DRAPE TOWEL: Brand: CONVERTORS

## (undated) DEVICE — 3M™ TEGADERM™ TRANSPARENT FILM DRESSING FRAME STYLE, 1626W, 4 IN X 4-3/4 IN (10 CM X 12 CM), 50/CT 4CT/CASE: Brand: 3M™ TEGADERM™

## (undated) DEVICE — VIAL DECANTER

## (undated) DEVICE — STERILE ICS MINOR PACK: Brand: CARDINAL HEALTH

## (undated) DEVICE — SUT VICRYL 3-0 SH 27 IN J416H

## (undated) DEVICE — SUT MONOCRYL 4-0 PS-2 18 IN Y496G

## (undated) DEVICE — ULTRASOUND GEL STERILE FOIL PK

## (undated) DEVICE — COVER PROBE INTRAOPERATIVE 6 X 96 IN

## (undated) DEVICE — GLOVE INDICATOR PI UNDERGLOVE SZ 7 BLUE

## (undated) DEVICE — MINOR PROCEDURE DRAPE: Brand: CONVERTORS

## (undated) DEVICE — SPONGE 4 X 4 XRAY 16 PLY STRL LF RFD

## (undated) DEVICE — CHLORAPREP HI-LITE 26ML ORANGE

## (undated) DEVICE — 3M™ STERI-STRIP™ REINFORCED ADHESIVE SKIN CLOSURES, R1547, 1/2 IN X 4 IN (12 MM X 100 MM), 6 STRIPS/ENVELOPE: Brand: 3M™ STERI-STRIP™

## (undated) DEVICE — ADHESIVE SKIN HIGH VISCOSITY EXOFIN 1ML

## (undated) DEVICE — PAD GROUNDING ADULT

## (undated) DEVICE — DRAIN SPONGES,6 PLY: Brand: EXCILON

## (undated) DEVICE — SUT SILK 2-0 SH 30 IN K833H

## (undated) DEVICE — INTENDED FOR TISSUE SEPARATION, AND OTHER PROCEDURES THAT REQUIRE A SHARP SURGICAL BLADE TO PUNCTURE OR CUT.: Brand: BARD-PARKER SAFETY BLADES SIZE 15, STERILE

## (undated) DEVICE — ELECTRODE BLADE MOD  E-Z CLEAN 6.5IN -0014M

## (undated) DEVICE — PENCIL ELECTROSURG E-Z CLEAN -0035H

## (undated) DEVICE — BAG DECANTER

## (undated) DEVICE — BETHLEHEM UNIVERSAL BREAST PK: Brand: CARDINAL HEALTH

## (undated) DEVICE — NEEDLE 25G X 1 1/2

## (undated) DEVICE — DRAPE EQUIPMENT RF WAND

## (undated) DEVICE — LIGHT HANDLE COVER SLEEVE DISP BLUE STELLAR

## (undated) DEVICE — SMOKE EVACUATION TUBING WITH 8 IN INTEGRAL WAND AND SPONGE GUARD: Brand: BUFFALO FILTER

## (undated) DEVICE — GAUZE SPONGES,16 PLY: Brand: CURITY

## (undated) DEVICE — SYRINGE 5ML LL

## (undated) DEVICE — GLOVE SRG BIOGEL 6.5

## (undated) DEVICE — GAUZE SPONGES,USP TYPE VII GAUZE, 12 PLY: Brand: CURITY

## (undated) DEVICE — GLOVE SRG BIOGEL 7

## (undated) DEVICE — SUT VICRYL 2-0 REEL 54 IN J286G

## (undated) DEVICE — CHEST/BREAST DRAPE: Brand: CONVERTORS

## (undated) DEVICE — SUT VICRYL 2-0 SH 27 IN UNDYED J417H

## (undated) DEVICE — LIGACLIP MCA MULTIPLE CLIP APPLIERS, 20 MEDIUM CLIPS: Brand: LIGACLIP